# Patient Record
Sex: MALE | Race: BLACK OR AFRICAN AMERICAN | NOT HISPANIC OR LATINO | Employment: OTHER | URBAN - METROPOLITAN AREA
[De-identification: names, ages, dates, MRNs, and addresses within clinical notes are randomized per-mention and may not be internally consistent; named-entity substitution may affect disease eponyms.]

---

## 2019-04-12 ENCOUNTER — HOSPITAL ENCOUNTER (OUTPATIENT)
Dept: RADIOLOGY | Facility: HOSPITAL | Age: 69
Discharge: HOME | End: 2019-04-12
Attending: INTERNAL MEDICINE
Payer: MEDICARE

## 2019-04-12 ENCOUNTER — OFFICE VISIT (OUTPATIENT)
Dept: INTERNAL MEDICINE | Facility: CLINIC | Age: 69
End: 2019-04-12
Payer: MEDICARE

## 2019-04-12 VITALS
HEART RATE: 69 BPM | SYSTOLIC BLOOD PRESSURE: 130 MMHG | BODY MASS INDEX: 32.01 KG/M2 | DIASTOLIC BLOOD PRESSURE: 78 MMHG | TEMPERATURE: 98 F | HEIGHT: 67 IN | WEIGHT: 203.94 LBS | OXYGEN SATURATION: 96 %

## 2019-04-12 DIAGNOSIS — N50.89 TESTICLE SWELLING: ICD-10-CM

## 2019-04-12 DIAGNOSIS — R35.0 URINARY FREQUENCY: ICD-10-CM

## 2019-04-12 DIAGNOSIS — I63.329 CEREBROVASCULAR ACCIDENT (CVA) DUE TO THROMBOSIS OF ANTERIOR CEREBRAL ARTERY, UNSPECIFIED BLOOD VESSEL LATERALITY: ICD-10-CM

## 2019-04-12 DIAGNOSIS — R60.9 EDEMA, UNSPECIFIED TYPE: ICD-10-CM

## 2019-04-12 DIAGNOSIS — E11.40 TYPE 2 DIABETES MELLITUS WITH DIABETIC NEUROPATHY, WITHOUT LONG-TERM CURRENT USE OF INSULIN: ICD-10-CM

## 2019-04-12 DIAGNOSIS — Z98.890 H/O COLONOSCOPY: ICD-10-CM

## 2019-04-12 DIAGNOSIS — N40.0 BENIGN PROSTATIC HYPERPLASIA, UNSPECIFIED WHETHER LOWER URINARY TRACT SYMPTOMS PRESENT: ICD-10-CM

## 2019-04-12 DIAGNOSIS — E78.2 HYPERLIPIDEMIA, MIXED: ICD-10-CM

## 2019-04-12 DIAGNOSIS — E29.1 HYPOGONADISM IN MALE: ICD-10-CM

## 2019-04-12 DIAGNOSIS — I10 ESSENTIAL HYPERTENSION: ICD-10-CM

## 2019-04-12 DIAGNOSIS — E55.9 MILD VITAMIN D DEFICIENCY: Primary | ICD-10-CM

## 2019-04-12 DIAGNOSIS — H40.9 GLAUCOMA, UNSPECIFIED GLAUCOMA TYPE, UNSPECIFIED LATERALITY: ICD-10-CM

## 2019-04-12 DIAGNOSIS — Z20.2 STD EXPOSURE: ICD-10-CM

## 2019-04-12 PROBLEM — I63.9 CEREBROVASCULAR ACCIDENT (CVA) DUE TO THROMBOSIS: Status: ACTIVE | Noted: 2019-04-12

## 2019-04-12 PROBLEM — E11.49 TYPE 2 DIABETES MELLITUS WITH NEUROLOGIC COMPLICATION, WITHOUT LONG-TERM CURRENT USE OF INSULIN: Status: ACTIVE | Noted: 2019-04-12

## 2019-04-12 LAB
ALBUMIN/CREAT UR: 292.4 UG/MG (ref 0–30)
BILIRUB UR QL STRIP: NEGATIVE
CLARITY UR REFRACT.AUTO: CLEAR
COLOR UR AUTO: YELLOW
CREAT UR-MCNC: 79 MG/DL (ref 23–375)
GLUCOSE UR QL STRIP: NEGATIVE
HGB UR QL STRIP: NEGATIVE
KETONES UR QL STRIP: NEGATIVE
LEUKOCYTE ESTERASE UR QL STRIP: NEGATIVE
MICROALBUMIN UR DL<=1MG/L-MCNC: 231 UG/ML
NITRITE UR QL STRIP: NEGATIVE
PH UR STRIP: 6 [PH] (ref 5–8)
PROT UR QL STRIP: NEGATIVE
SP GR UR STRIP: 1.01 (ref 1–1.03)
URN SPEC COLLECT METH UR: NORMAL

## 2019-04-12 PROCEDURE — 71046 X-RAY EXAM CHEST 2 VIEWS: CPT | Mod: TC,FY,PO

## 2019-04-12 PROCEDURE — 81003 URINALYSIS AUTO W/O SCOPE: CPT

## 2019-04-12 PROCEDURE — 99999 PR PBB SHADOW E&M-NEW PATIENT-LVL V: CPT | Mod: PBBFAC,,, | Performed by: INTERNAL MEDICINE

## 2019-04-12 PROCEDURE — 93010 ELECTROCARDIOGRAM REPORT: CPT | Mod: S$PBB,,, | Performed by: INTERNAL MEDICINE

## 2019-04-12 PROCEDURE — 93010 EKG 12-LEAD: ICD-10-PCS | Mod: S$PBB,,, | Performed by: INTERNAL MEDICINE

## 2019-04-12 PROCEDURE — 90471 IMMUNIZATION ADMIN: CPT | Mod: PBBFAC,PO

## 2019-04-12 PROCEDURE — 71046 XR CHEST PA AND LATERAL: ICD-10-PCS | Mod: 26,,, | Performed by: RADIOLOGY

## 2019-04-12 PROCEDURE — 82043 UR ALBUMIN QUANTITATIVE: CPT

## 2019-04-12 PROCEDURE — 99214 PR OFFICE/OUTPT VISIT, EST, LEVL IV, 30-39 MIN: ICD-10-PCS | Mod: S$PBB,,, | Performed by: INTERNAL MEDICINE

## 2019-04-12 PROCEDURE — 87086 URINE CULTURE/COLONY COUNT: CPT

## 2019-04-12 PROCEDURE — 99999 PR PBB SHADOW E&M-NEW PATIENT-LVL V: ICD-10-PCS | Mod: PBBFAC,,, | Performed by: INTERNAL MEDICINE

## 2019-04-12 PROCEDURE — 99214 OFFICE O/P EST MOD 30 MIN: CPT | Mod: S$PBB,,, | Performed by: INTERNAL MEDICINE

## 2019-04-12 PROCEDURE — 99205 OFFICE O/P NEW HI 60 MIN: CPT | Mod: PBBFAC,25,PO | Performed by: INTERNAL MEDICINE

## 2019-04-12 PROCEDURE — 71046 X-RAY EXAM CHEST 2 VIEWS: CPT | Mod: 26,,, | Performed by: RADIOLOGY

## 2019-04-12 PROCEDURE — 93005 ELECTROCARDIOGRAM TRACING: CPT | Mod: PBBFAC,PO | Performed by: INTERNAL MEDICINE

## 2019-04-12 RX ORDER — BRINZOLAMIDE 10 MG/ML
1 SUSPENSION/ DROPS OPHTHALMIC 3 TIMES DAILY
COMMUNITY
End: 2019-05-21 | Stop reason: SDUPTHER

## 2019-04-12 RX ORDER — INSULIN PUMP SYRINGE, 3 ML
EACH MISCELLANEOUS
Qty: 1 EACH | Refills: 0 | Status: SHIPPED | OUTPATIENT
Start: 2019-04-12 | End: 2020-09-18

## 2019-04-12 RX ORDER — TIMOLOL MALEATE 5 MG/ML
1 SOLUTION/ DROPS OPHTHALMIC 2 TIMES DAILY
COMMUNITY
End: 2019-05-21 | Stop reason: SDUPTHER

## 2019-04-12 RX ORDER — LOSARTAN POTASSIUM AND HYDROCHLOROTHIAZIDE 12.5; 1 MG/1; MG/1
1 TABLET ORAL DAILY
COMMUNITY
End: 2019-05-21

## 2019-04-12 RX ORDER — BRIMONIDINE TARTRATE 1.5 MG/ML
1 SOLUTION/ DROPS OPHTHALMIC 3 TIMES DAILY
Qty: 1 BOTTLE | Refills: 3
Start: 2019-04-12 | End: 2019-05-21

## 2019-04-12 RX ORDER — METFORMIN HYDROCHLORIDE 1000 MG/1
1000 TABLET ORAL 2 TIMES DAILY WITH MEALS
COMMUNITY
End: 2019-05-21 | Stop reason: SDUPTHER

## 2019-04-12 RX ORDER — AMLODIPINE BESYLATE 10 MG/1
10 TABLET ORAL DAILY
COMMUNITY
End: 2019-04-12 | Stop reason: SDUPTHER

## 2019-04-12 RX ORDER — BRIMONIDINE TARTRATE AND TIMOLOL MALEATE 2; 5 MG/ML; MG/ML
1 SOLUTION OPHTHALMIC
COMMUNITY
End: 2019-04-12

## 2019-04-12 RX ORDER — AMLODIPINE BESYLATE 10 MG/1
10 TABLET ORAL DAILY
Qty: 90 TABLET | Refills: 3 | Status: SHIPPED | OUTPATIENT
Start: 2019-04-12 | End: 2019-05-21 | Stop reason: SDUPTHER

## 2019-04-12 RX ORDER — NAPROXEN SODIUM 220 MG/1
81 TABLET, FILM COATED ORAL DAILY
Start: 2019-04-12

## 2019-04-12 RX ORDER — ROSUVASTATIN CALCIUM 20 MG/1
20 TABLET, COATED ORAL DAILY
COMMUNITY
End: 2019-05-21 | Stop reason: SDUPTHER

## 2019-04-12 NOTE — PROGRESS NOTES
Administered pneumo 13 to the left deltoid, tolerated well, no c/o pain noted, no adverse reaction noted within wait period.

## 2019-04-14 LAB
BACTERIA UR CULT: NORMAL
BACTERIA UR CULT: NORMAL

## 2019-04-18 ENCOUNTER — PATIENT OUTREACH (OUTPATIENT)
Dept: ADMINISTRATIVE | Facility: HOSPITAL | Age: 69
End: 2019-04-18

## 2019-04-21 NOTE — PROGRESS NOTES
Subjective:       Patient ID: Jae Millan is a 68 y.o. male.    [facility].  He is out of correction now and is establishing with me as primary.  He had a stroke that affected his  L side approx 8 months ago.    Review of Systems   Respiratory: Negative for shortness of breath.    Cardiovascular: Negative for chest pain.   Gastrointestinal: Negative for abdominal pain, diarrhea, nausea and vomiting.   Genitourinary: Negative for dysuria.   Neurological: Negative for seizures, syncope and headaches.       Objective:      Physical Exam   Constitutional: He is oriented to person, place, and time. He appears well-developed and well-nourished. No distress.   HENT:   Mouth/Throat: Oropharynx is clear and moist.   Neck: Neck supple. No JVD present. No thyromegaly present.   Cardiovascular: Normal rate, regular rhythm, normal heart sounds and intact distal pulses. Exam reveals no gallop and no friction rub.   No murmur heard.  Pulmonary/Chest: Effort normal and breath sounds normal. He has no wheezes. He has no rales.   Abdominal: Soft. Bowel sounds are normal. He exhibits no distension and no mass. There is no tenderness. There is no rebound and no guarding.   Genitourinary: Rectum normal and penis normal. Rectal exam shows guaiac negative stool.   Genitourinary Comments: Penis normal - R testes appears present, L scrotal area enlarged and I cannot palpate his testis in the scrotal sac    Prostate is not enlarged, no masses   Musculoskeletal: He exhibits edema (1+ william).   Lymphadenopathy:     He has no cervical adenopathy.   Neurological: He is alert and oriented to person, place, and time.   Mild L lower face weakness seen only with full smile  L hand and arm weakness 3/5 - poor  strength  L leg mile 4/5 weakness walks easily however   Skin: Skin is warm and dry.   Psychiatric: He has a normal mood and affect. His behavior is normal. Judgment and  thought content normal.       Assessment:       1. Mild vitamin D deficiency    2. Essential hypertension    3. Type 2 diabetes mellitus with diabetic neuropathy, without long-term current use of insulin    4. Hyperlipidemia, mixed    5. Cerebrovascular accident (CVA) due to thrombosis of anterior cerebral artery, unspecified blood vessel laterality    6. H/O colonoscopy    7. Benign prostatic hyperplasia, unspecified whether lower urinary tract symptoms present    8. Urinary frequency    9. Glaucoma, unspecified glaucoma type, unspecified laterality    10. STD exposure    11. Hypogonadism in male    12. Edema, unspecified type    13. Testicle swelling        Plan:   Mild vitamin D deficiency  -     Vitamin D; Future; Expected date: 04/12/2019    Essential hypertension  -     CBC auto differential; Future; Expected date: 04/12/2019  -     Comprehensive metabolic panel; Future; Expected date: 04/12/2019  -     TSH; Future; Expected date: 04/12/2019  -     X-Ray Chest PA And Lateral; Future; Expected date: 04/12/2019  Fair control when taken a second time    Type 2 diabetes mellitus with diabetic neuropathy, without long-term current use of insulin  -     Hemoglobin A1c; Future; Expected date: 04/12/2019  -     Urinalysis  -     Microalbumin/creatinine urine ratio    Hyperlipidemia, mixed  -     Lipid panel; Future; Expected date: 04/12/2019    Cerebrovascular accident (CVA) due to thrombosis of anterior cerebral artery, unspecified blood vessel laterality  -     EKG 12-lead  -     US Carotid Bilateral; Future; Expected date: 04/12/2019  -     Ambulatory consult to Physical Medicine Rehab    H/O colonoscopy    Benign prostatic hyperplasia, unspecified whether lower urinary tract symptoms present  -     PSA, Screening; Future; Expected date: 04/12/2019    Urinary frequency  -     Urine culture    Glaucoma, unspecified glaucoma type, unspecified laterality  -     Ambulatory consult to Ophthalmology    STD exposure  -      Quantiferon Gold TB; Future; Expected date: 04/12/2019  -     HIV 1/2 Ag/Ab (4th Gen); Future; Expected date: 04/12/2019  -     RPR; Future; Expected date: 04/12/2019  -     Hepatitis panel, acute; Future; Expected date: 04/12/2019    Hypogonadism in male  -     Testosterone; Future; Expected date: 04/12/2019  -     Luteinizing hormone; Future; Expected date: 04/12/2019  -     Follicle stimulating hormone; Future; Expected date: 04/12/2019  -     Prolactin; Future; Expected date: 04/12/2019    Edema, unspecified type  -     Brain natriuretic peptide; Future; Expected date: 04/12/2019    Testicle swelling  -     Ambulatory consult to Urology    Other orders  -     aspirin 81 MG Chew; Take 1 tablet (81 mg total) by mouth once daily.  -     amLODIPine (NORVASC) 10 MG tablet; Take 1 tablet (10 mg total) by mouth once daily.  Dispense: 90 tablet; Refill: 3  -     brimonidine 0.15 % OPTH DROP (ALPHAGAN) 0.15 % ophthalmic solution; Place 1 drop into the left eye 3 (three) times daily.  Dispense: 1 Bottle; Refill: 3  -     blood-glucose meter kit; Check glucose daily E11.9 meter covered by insurance  Dispense: 1 each; Refill: 0  -     blood sugar diagnostic Strp; Strips and lancets covered by insurance E11.9  Dispense: 100 each; Refill: 3  -     (In Office Administered) Pneumococcal Conjugate Vaccine (13 Valent) (IM)    Obtain records

## 2019-05-02 ENCOUNTER — HOSPITAL ENCOUNTER (OUTPATIENT)
Dept: RADIOLOGY | Facility: HOSPITAL | Age: 69
Discharge: HOME | End: 2019-05-02
Attending: INTERNAL MEDICINE
Payer: MEDICARE

## 2019-05-02 DIAGNOSIS — I63.329 CEREBROVASCULAR ACCIDENT (CVA) DUE TO THROMBOSIS OF ANTERIOR CEREBRAL ARTERY, UNSPECIFIED BLOOD VESSEL LATERALITY: ICD-10-CM

## 2019-05-02 PROCEDURE — 93880 US CAROTID BILATERAL: ICD-10-PCS | Mod: 26,,, | Performed by: RADIOLOGY

## 2019-05-02 PROCEDURE — 93880 EXTRACRANIAL BILAT STUDY: CPT | Mod: 26,,, | Performed by: RADIOLOGY

## 2019-05-02 PROCEDURE — 93880 EXTRACRANIAL BILAT STUDY: CPT | Mod: TC

## 2019-05-03 ENCOUNTER — TELEPHONE (OUTPATIENT)
Dept: INTERNAL MEDICINE | Facility: CLINIC | Age: 69
End: 2019-05-03

## 2019-05-03 NOTE — TELEPHONE ENCOUNTER
Spoke with patient, informed of results, verbalized understanding----- Message from Maryjane Farias MD sent at 5/2/2019  6:31 PM CDT -----  Please inform no blockage seen.

## 2019-05-21 ENCOUNTER — LAB VISIT (OUTPATIENT)
Dept: LAB | Facility: HOSPITAL | Age: 69
End: 2019-05-21
Attending: INTERNAL MEDICINE
Payer: MEDICARE

## 2019-05-21 ENCOUNTER — OFFICE VISIT (OUTPATIENT)
Dept: INTERNAL MEDICINE | Facility: CLINIC | Age: 69
End: 2019-05-21
Payer: MEDICARE

## 2019-05-21 VITALS
TEMPERATURE: 98 F | HEART RATE: 72 BPM | SYSTOLIC BLOOD PRESSURE: 138 MMHG | WEIGHT: 205 LBS | DIASTOLIC BLOOD PRESSURE: 80 MMHG | HEIGHT: 67 IN | BODY MASS INDEX: 32.18 KG/M2 | OXYGEN SATURATION: 98 %

## 2019-05-21 DIAGNOSIS — H40.9 GLAUCOMA, UNSPECIFIED GLAUCOMA TYPE, UNSPECIFIED LATERALITY: ICD-10-CM

## 2019-05-21 DIAGNOSIS — E11.40 TYPE 2 DIABETES MELLITUS WITH DIABETIC NEUROPATHY, WITHOUT LONG-TERM CURRENT USE OF INSULIN: ICD-10-CM

## 2019-05-21 DIAGNOSIS — I63.329 CEREBROVASCULAR ACCIDENT (CVA) DUE TO THROMBOSIS OF ANTERIOR CEREBRAL ARTERY, UNSPECIFIED BLOOD VESSEL LATERALITY: Primary | ICD-10-CM

## 2019-05-21 DIAGNOSIS — B18.2 CHRONIC HEPATITIS C WITHOUT HEPATIC COMA: ICD-10-CM

## 2019-05-21 LAB
ALBUMIN SERPL BCP-MCNC: 3.7 G/DL (ref 3.5–5.2)
ALP SERPL-CCNC: 62 U/L (ref 55–135)
ALT SERPL W/O P-5'-P-CCNC: 38 U/L (ref 10–44)
ANION GAP SERPL CALC-SCNC: 8 MMOL/L (ref 8–16)
AST SERPL-CCNC: 26 U/L (ref 10–40)
BILIRUB SERPL-MCNC: 0.5 MG/DL (ref 0.1–1)
BUN SERPL-MCNC: 16 MG/DL (ref 8–23)
CALCIUM SERPL-MCNC: 10 MG/DL (ref 8.7–10.5)
CHLORIDE SERPL-SCNC: 106 MMOL/L (ref 95–110)
CO2 SERPL-SCNC: 26 MMOL/L (ref 23–29)
CREAT SERPL-MCNC: 1.1 MG/DL (ref 0.5–1.4)
EST. GFR  (AFRICAN AMERICAN): >60 ML/MIN/1.73 M^2
EST. GFR  (NON AFRICAN AMERICAN): >60 ML/MIN/1.73 M^2
GLUCOSE SERPL-MCNC: 131 MG/DL (ref 70–110)
POTASSIUM SERPL-SCNC: 4.1 MMOL/L (ref 3.5–5.1)
PROT SERPL-MCNC: 7.9 G/DL (ref 6–8.4)
SODIUM SERPL-SCNC: 140 MMOL/L (ref 136–145)

## 2019-05-21 PROCEDURE — 99213 PR OFFICE/OUTPT VISIT, EST, LEVL III, 20-29 MIN: ICD-10-PCS | Mod: S$PBB,,, | Performed by: INTERNAL MEDICINE

## 2019-05-21 PROCEDURE — 99215 OFFICE O/P EST HI 40 MIN: CPT | Mod: PBBFAC,PO | Performed by: INTERNAL MEDICINE

## 2019-05-21 PROCEDURE — 36415 COLL VENOUS BLD VENIPUNCTURE: CPT

## 2019-05-21 PROCEDURE — 99999 PR PBB SHADOW E&M-EST. PATIENT-LVL V: ICD-10-PCS | Mod: PBBFAC,,, | Performed by: INTERNAL MEDICINE

## 2019-05-21 PROCEDURE — 99999 PR PBB SHADOW E&M-EST. PATIENT-LVL V: CPT | Mod: PBBFAC,,, | Performed by: INTERNAL MEDICINE

## 2019-05-21 PROCEDURE — 99213 OFFICE O/P EST LOW 20 MIN: CPT | Mod: S$PBB,,, | Performed by: INTERNAL MEDICINE

## 2019-05-21 PROCEDURE — 80053 COMPREHEN METABOLIC PANEL: CPT

## 2019-05-21 PROCEDURE — 87522 HEPATITIS C REVRS TRNSCRPJ: CPT

## 2019-05-21 RX ORDER — ROSUVASTATIN CALCIUM 20 MG/1
20 TABLET, COATED ORAL DAILY
Qty: 90 TABLET | Refills: 3 | Status: SHIPPED | OUTPATIENT
Start: 2019-05-21 | End: 2019-11-25

## 2019-05-21 RX ORDER — ERGOCALCIFEROL 1.25 MG/1
CAPSULE ORAL
Qty: 8 CAPSULE | Refills: 0 | Status: SHIPPED | OUTPATIENT
Start: 2019-05-21 | End: 2019-09-06 | Stop reason: SDUPTHER

## 2019-05-21 RX ORDER — ROSUVASTATIN CALCIUM 20 MG/1
20 TABLET, COATED ORAL DAILY
Qty: 90 TABLET | Refills: 3 | Status: SHIPPED | OUTPATIENT
Start: 2019-05-21 | End: 2019-05-21 | Stop reason: SDUPTHER

## 2019-05-21 RX ORDER — METFORMIN HYDROCHLORIDE 1000 MG/1
1000 TABLET ORAL 2 TIMES DAILY WITH MEALS
Qty: 180 TABLET | Refills: 3 | Status: SHIPPED | OUTPATIENT
Start: 2019-05-21 | End: 2019-05-21 | Stop reason: SDUPTHER

## 2019-05-21 RX ORDER — CANDESARTAN CILEXETIL AND HYDROCHLOROTHIAZIDE 16; 12.5 MG/1; MG/1
1 TABLET ORAL DAILY
Qty: 90 TABLET | Refills: 3 | Status: SHIPPED | OUTPATIENT
Start: 2019-05-21 | End: 2019-07-16 | Stop reason: ALTCHOICE

## 2019-05-21 RX ORDER — CANDESARTAN CILEXETIL AND HYDROCHLOROTHIAZIDE 16; 12.5 MG/1; MG/1
1 TABLET ORAL DAILY
Qty: 90 TABLET | Refills: 3 | Status: SHIPPED | OUTPATIENT
Start: 2019-05-21 | End: 2019-05-21 | Stop reason: SDUPTHER

## 2019-05-21 RX ORDER — METFORMIN HYDROCHLORIDE 1000 MG/1
1000 TABLET ORAL 2 TIMES DAILY WITH MEALS
Qty: 180 TABLET | Refills: 3 | Status: SHIPPED | OUTPATIENT
Start: 2019-05-21 | End: 2020-02-28 | Stop reason: SDUPTHER

## 2019-05-21 RX ORDER — AMLODIPINE BESYLATE 10 MG/1
10 TABLET ORAL DAILY
Qty: 90 TABLET | Refills: 3 | Status: SHIPPED | OUTPATIENT
Start: 2019-05-21 | End: 2019-09-06 | Stop reason: SDUPTHER

## 2019-05-21 RX ORDER — ERGOCALCIFEROL 1.25 MG/1
CAPSULE ORAL
Qty: 8 CAPSULE | Refills: 0 | Status: SHIPPED | OUTPATIENT
Start: 2019-05-21 | End: 2019-05-21 | Stop reason: SDUPTHER

## 2019-05-21 RX ORDER — BRINZOLAMIDE 10 MG/ML
1 SUSPENSION/ DROPS OPHTHALMIC 3 TIMES DAILY
Qty: 10 ML | Refills: 3 | Status: SHIPPED | OUTPATIENT
Start: 2019-05-21 | End: 2019-07-16

## 2019-05-21 RX ORDER — AMLODIPINE BESYLATE 10 MG/1
10 TABLET ORAL DAILY
Qty: 90 TABLET | Refills: 3 | Status: SHIPPED | OUTPATIENT
Start: 2019-05-21 | End: 2019-05-21 | Stop reason: SDUPTHER

## 2019-05-21 RX ORDER — BRIMONIDINE TARTRATE 2 MG/ML
SOLUTION/ DROPS OPHTHALMIC
Qty: 6 ML | Refills: 3 | Status: SHIPPED | OUTPATIENT
Start: 2019-05-21 | End: 2019-07-16

## 2019-05-21 RX ORDER — TIMOLOL MALEATE 5 MG/ML
1 SOLUTION/ DROPS OPHTHALMIC 2 TIMES DAILY
Qty: 5 ML | Refills: 3 | Status: SHIPPED | OUTPATIENT
Start: 2019-05-21 | End: 2019-07-16

## 2019-05-23 NOTE — PROGRESS NOTES
Subjective:       Patient ID: Jae Millan is a 68 y.o. male.    Chief Complaint: Follow-up; Medication Refill (rouvastatin, and eye drops, strips for glucose meter); frequent urinatin at night (lower abdominal pain); and Hand Pain (left hand pain and swelling)    HPIPt is doing about the same - had issues getting his meds - only has one BP med.  We reviewed all his studies.  L hand (stroke side is bothering him)..  No new CP or SOB.  Review of Systems   Respiratory: Negative for shortness of breath.    Cardiovascular: Negative for chest pain.   Gastrointestinal: Negative for abdominal pain, diarrhea, nausea and vomiting.   Genitourinary: Negative for dysuria.   Neurological: Negative for seizures, syncope and headaches.       Objective:      Physical Exam   Constitutional: He is oriented to person, place, and time. He appears well-developed and well-nourished. No distress.   HENT:   Mouth/Throat: Oropharynx is clear and moist.   Neck: Neck supple. No JVD present. No thyromegaly present.   Cardiovascular: Normal rate, regular rhythm, normal heart sounds and intact distal pulses. Exam reveals no gallop and no friction rub.   No murmur heard.  Pulmonary/Chest: Effort normal and breath sounds normal. He has no wheezes. He has no rales.   Abdominal: Soft. Bowel sounds are normal. He exhibits no distension and no mass. There is no tenderness. There is no rebound and no guarding.   Musculoskeletal: He exhibits no edema.   Lymphadenopathy:     He has no cervical adenopathy.   Neurological: He is alert and oriented to person, place, and time.   Skin: Skin is warm and dry.   Cracks between 4th and 5th toes on both feet   Psychiatric: He has a normal mood and affect. His behavior is normal. Judgment and thought content normal.       Assessment:       1. Cerebrovascular accident (CVA) due to thrombosis of anterior cerebral artery, unspecified blood vessel laterality    2. Glaucoma, unspecified glaucoma type, unspecified  laterality    3. Chronic hepatitis C without hepatic coma    4. Type 2 diabetes mellitus with diabetic neuropathy, without long-term current use of insulin        Plan:   Cerebrovascular accident (CVA) due to thrombosis of anterior cerebral artery, unspecified blood vessel laterality  -     Ambulatory consult to Physical Therapy  -     Transthoracic echo (TTE) 2D with Color Flow; Future    Glaucoma, unspecified glaucoma type, unspecified laterality  -     Ambulatory consult to Ophthalmology  Meds refilled until he can get in  Chronic hepatitis C without hepatic coma  -     Comprehensive metabolic panel; Future; Expected date: 05/21/2019  -     HEPATITIS C RNA, QUANTITATIVE, PCR; Future; Expected date: 05/21/2019    Type 2 diabetes mellitus with diabetic neuropathy, without long-term current use of insulin  -     Ambulatory consult to Podiatry    Other orders  -   Change to   candesartan-hydrochlorothiazide (ATACAND HCT) 16-12.5 mg per tablet; Take 1 tablet by mouth once daily.  Dispense: 90 tablet; Refill: 3  -      amLODIPine (NORVASC) 10 MG tablet; Take 1 tablet (10 mg total) by mouth once daily.  Dispense: 90 tablet; Refill: 3  -      metFORMIN (GLUCOPHAGE) 1000 MG tablet; Take 1 tablet (1,000 mg total) by mouth 2 (two) times daily with meals.  Dispense: 180 tablet; Refill: 3  -     rosuvastatin (CRESTOR) 20 MG tablet; Take 1 tablet (20 mg total) by mouth once daily.  Dispense: 90 tablet; Refill: 3  -     Add SITagliptin (JANUVIA) 100 MG Tab; Take 1 tablet (100 mg total) by mouth once daily.  Dispense: 90 tablet; Refill: 3  -     brimonidine 0.2% (ALPHAGAN) 0.2 % Drop; One drop to affected eye every 8 hours  Dispense: 6 mL; Refill: 3  -     brinzolamide (AZOPT) 1 % ophthalmic suspension; Place 1 drop into the left eye 3 (three) times daily.  Dispense: 10 mL; Refill: 3  -     timolol maleate 0.5% (TIMOPTIC) 0.5 % Drop; Place 1 drop into the left eye 2 (two) times daily.  Dispense: 5 mL; Refill: 3  -     blood  sugar diagnostic Strp; Strips and lancets covered by insurance E11.9, Check glucose daily, accu check FREDERICK PLUS  Dispense: 100 each; Refill: 3  -     Discontinue: ergocalciferol (ERGOCALCIFEROL) 50,000 unit Cap; One weekly for 8 weeks then 2,000 IU daily thereafter  Dispense: 8 capsule; Refill: 0  -     ergocalciferol (ERGOCALCIFEROL) 50,000 unit Cap; One weekly for 8 weeks then 2,000 IU daily thereafter  Dispense: 8 capsule; Refill: 0  -     candesartan-hydrochlorothiazide (ATACAND HCT) 16-12.5 mg per tablet; Take 1 tablet by mouth once daily.  Dispense: 90 tablet; Refill: 3  -     metFORMIN (GLUCOPHAGE) 1000 MG tablet; Take 1 tablet (1,000 mg total) by mouth 2 (two) times daily with meals.  Dispense: 180 tablet; Refill: 3  -     amLODIPine (NORVASC) 10 MG tablet; Take 1 tablet (10 mg total) by mouth once daily.  Dispense: 90 tablet; Refill: 3  -     SITagliptin (JANUVIA) 100 MG Tab; Take 1 tablet (100 mg total) by mouth once daily.  Dispense: 90 tablet; Refill: 3  -     rosuvastatin (CRESTOR) 20 MG tablet; Take 1 tablet (20 mg total) by mouth once daily.  Dispense: 90 tablet; Refill: 3  Address low testosterone with Urology

## 2019-05-24 LAB
HCV RNA SERPL NAA+PROBE-LOG IU: 6.11 LOG (10) IU/ML
HCV RNA SERPL QL NAA+PROBE: DETECTED IU/ML
HCV RNA SPEC NAA+PROBE-ACNC: ABNORMAL IU/ML

## 2019-05-27 ENCOUNTER — TELEPHONE (OUTPATIENT)
Dept: INTERNAL MEDICINE | Facility: CLINIC | Age: 69
End: 2019-05-27

## 2019-05-27 NOTE — TELEPHONE ENCOUNTER
Please see if I need to do a prior auth for medicare in order for him to get his medications - candesartan/HCTZ 16/12.5 one daily, rosuvastatin and januvia plus one of his eye drops  - thank you!

## 2019-06-04 ENCOUNTER — HOSPITAL ENCOUNTER (OUTPATIENT)
Dept: CARDIOLOGY | Facility: CLINIC | Age: 69
Discharge: HOME | End: 2019-06-04
Attending: INTERNAL MEDICINE
Payer: MEDICAID

## 2019-06-04 VITALS
WEIGHT: 205 LBS | HEART RATE: 62 BPM | HEIGHT: 67 IN | DIASTOLIC BLOOD PRESSURE: 90 MMHG | BODY MASS INDEX: 32.18 KG/M2 | SYSTOLIC BLOOD PRESSURE: 164 MMHG

## 2019-06-04 DIAGNOSIS — I63.329 CEREBROVASCULAR ACCIDENT (CVA) DUE TO THROMBOSIS OF ANTERIOR CEREBRAL ARTERY, UNSPECIFIED BLOOD VESSEL LATERALITY: ICD-10-CM

## 2019-06-04 LAB
ASCENDING AORTA: 3.28 CM
AV INDEX (PROSTH): 0.9
AV MEAN GRADIENT: 2.98 MMHG
AV PEAK GRADIENT: 5.76 MMHG
AV VALVE AREA: 3.84 CM2
AV VELOCITY RATIO: 0.92
BSA FOR ECHO PROCEDURE: 2.1 M2
CV ECHO LV RWT: 0.38 CM
DOP CALC AO PEAK VEL: 1.2 M/S
DOP CALC AO VTI: 25.17 CM
DOP CALC LVOT AREA: 4.26 CM2
DOP CALC LVOT DIAMETER: 2.33 CM
DOP CALC LVOT PEAK VEL: 1.1 M/S
DOP CALC LVOT STROKE VOLUME: 96.74 CM3
DOP CALCLVOT PEAK VEL VTI: 22.7 CM
E WAVE DECELERATION TIME: 221.73 MSEC
E/A RATIO: 0.79
E/E' RATIO: 7.33
ECHO LV POSTERIOR WALL: 0.8 CM (ref 0.6–1.1)
FRACTIONAL SHORTENING: 34 % (ref 28–44)
INTERVENTRICULAR SEPTUM: 0.82 CM (ref 0.6–1.1)
IVRT: 0.11 MSEC
LA MAJOR: 5.09 CM
LA MINOR: 5.11 CM
LA WIDTH: 3.87 CM
LEFT ATRIUM SIZE: 3.27 CM
LEFT ATRIUM VOLUME INDEX: 26.8 ML/M2
LEFT ATRIUM VOLUME: 54.86 CM3
LEFT INTERNAL DIMENSION IN SYSTOLE: 2.75 CM (ref 2.1–4)
LEFT VENTRICLE DIASTOLIC VOLUME INDEX: 37.7 ML/M2
LEFT VENTRICLE DIASTOLIC VOLUME: 77.04 ML
LEFT VENTRICLE MASS INDEX: 49.8 G/M2
LEFT VENTRICLE SYSTOLIC VOLUME INDEX: 13.8 ML/M2
LEFT VENTRICLE SYSTOLIC VOLUME: 28.2 ML
LEFT VENTRICULAR INTERNAL DIMENSION IN DIASTOLE: 4.17 CM (ref 3.5–6)
LEFT VENTRICULAR MASS: 101.77 G
LV LATERAL E/E' RATIO: 6.88
LV SEPTAL E/E' RATIO: 7.86
MV PEAK A VEL: 0.7 M/S
MV PEAK E VEL: 0.55 M/S
PISA TR MAX VEL: 2.31 M/S
PULM VEIN S/D RATIO: 1.92
PV PEAK D VEL: 0.24 M/S
PV PEAK S VEL: 0.46 M/S
RA MAJOR: 5.08 CM
RA PRESSURE: 3 MMHG
RA WIDTH: 3.81 CM
RIGHT VENTRICULAR END-DIASTOLIC DIMENSION: 3.21 CM
RV TISSUE DOPPLER FREE WALL SYSTOLIC VELOCITY 1 (APICAL 4 CHAMBER VIEW): 14.3 M/S
SINUS: 3.1 CM
STJ: 2.64 CM
TDI LATERAL: 0.08
TDI SEPTAL: 0.07
TDI: 0.08
TR MAX PG: 21.34 MMHG
TRICUSPID ANNULAR PLANE SYSTOLIC EXCURSION: 2.1 CM
TV REST PULMONARY ARTERY PRESSURE: 24 MMHG

## 2019-06-04 PROCEDURE — 93306 TRANSTHORACIC ECHO (TTE) COMPLETE (CUPID ONLY): ICD-10-PCS | Mod: 26,S$PBB,, | Performed by: INTERNAL MEDICINE

## 2019-06-04 PROCEDURE — 93306 TTE W/DOPPLER COMPLETE: CPT | Mod: PBBFAC | Performed by: INTERNAL MEDICINE

## 2019-06-06 ENCOUNTER — CLINICAL SUPPORT (OUTPATIENT)
Dept: REHABILITATION | Facility: HOSPITAL | Age: 69
End: 2019-06-06
Attending: INTERNAL MEDICINE
Payer: MEDICAID

## 2019-06-06 DIAGNOSIS — Z74.09 IMPAIRED FUNCTIONAL MOBILITY, BALANCE, GAIT, AND ENDURANCE: Primary | ICD-10-CM

## 2019-06-06 DIAGNOSIS — R20.8 DECREASED SENSATION OF LOWER EXTREMITY: ICD-10-CM

## 2019-06-06 DIAGNOSIS — I69.354 HEMIPARESIS AFFECTING LEFT SIDE AS LATE EFFECT OF CEREBROVASCULAR ACCIDENT (CVA): ICD-10-CM

## 2019-06-06 PROCEDURE — 97162 PT EVAL MOD COMPLEX 30 MIN: CPT | Mod: PN

## 2019-06-06 NOTE — PLAN OF CARE
[facility]  Physical Therapy Neurological Rehabilitation Initial Evaluation    Name: Jae Millan  Clinic Number: [id number]    Therapy Diagnosis:   Encounter Diagnoses   Name Primary?    Impaired functional mobility, balance, gait, and endurance Yes    Hemiparesis affecting left side as late effect of cerebrovascular accident (CVA)     Decreased sensation of lower extremity      Physician: Maryjane Farias MD    Physician Orders: PT Eval, all treatment after initial eval requires auth  Medical Diagnosis from Referral: Cerebrovascular accident (CVA) due to thrombosis of anterior cerebral artery, unspecified blood vessel laterality  Evaluation Date: 6/6/2019  Authorization Period Expiration: 6/30/2019  Plan of Care Expiration: 9/6/2019  Visit # / Visits authorized: 1/ 1    Time In: 1516  Time Out: 1601  Total Billable Time: 45 minutes    Precautions: Diabetes, Fall and HTN, Stroke    Subjective     BP: 168/97, second attempt (158/93)  HR: 76, 72    Date of onset: August 2018  History of current condition - Jae reports: Since he has had his stroke he has not been able to get therapy due to various life circumstances. Pt reports no falls since stroke but many near falls.      Medical History:   No past medical history on file.    Surgical History:   Jae Millan  has a past surgical history that includes Undescended testicle exploration (Right).    Medications:   Jae has a current medication list which includes the following prescription(s): amlodipine, aspirin, blood sugar diagnostic, blood-glucose meter, brimonidine 0.2%, brinzolamide, candesartan-hydrochlorothiazide, ergocalciferol, metformin, rosuvastatin, sitagliptin, and timolol maleate 0.5%.    Allergies:   Review of patient's allergies indicates:  No Known Allergies     Imaging, (4/12/2019) US of Carotid Bilateral and chest x-ray: no acute findings    Prior Therapy: none  Social History: lives with their  "family  Falls: none, many near falls   DME: Small based Quad cane (borrowed)   Home Environment: SSH, ramp to enter   Exercise Routine / History: pt walks up and down ramp a couple times but no formal routine   Family Present at time of Eval: none   Occupation: none  Prior Level of Function: fully independent  Current Level of Function: walking longer distances, takes a long time to get dressed in the morning, unable to shower standing, unable to do stairs    Pain:  Current 4/10, worst 6/10, best 0/10   Location: left feet  and hands   Description: Tight, Tingling and Numb  Aggravating Factors: arm/leg in the dependent position  Easing Factors: relaxation and elevation    Pts goals: "to get this hand working better and to get my balance better"    Objective     Observation: pleasant and cooperative  Speech: slurred, dysarthric, rambles    Mental status: alert, oriented to person, place, and time, normal mood, behavior, speech, dress, motor activity, and thought processes  Appearance: Casually dressed  Behavior:  calm and cooperative  Attention Span and Concentration:  Decreased    Posture Alignment :midline awareness    Dominant hand:  right     Skin integrity:  Intact    Visual/Auditory: denies changes "my eyes have always been bad", possible L neglect  Tracking:Intact    ROM:   GROSS AROM/PROM  UPPER EXTREMITY  (R) UE: WFLs  (L) UE: limited as follows: pt with trace contraction of shoulder and elbow muculature, PROM WFL  LOWER EXTREMITY  (R) LE: WFLs  (L) LE: WFLs       Lower Extremity Strength  Right LE  Left LE    Hip flexion:  4+/5 Hip flexion: 3/5   Knee extension: 5/5 Knee extension: 3+/5   Knee flexion: 5/5 Knee flexion: 3+/5   Ankle dorsiflexion:  5/5 Ankle dorsiflexion: 3+/5   Ankle plantarflexion:  4/5 Ankle plantarflexion: 3/5               Hip extension: 4/5 Hip extension: 3/5     Upper extremity strength:     Right Left   Shoulder Flexion: 5/5 2/5   Shoulder Abduction: 5/5 trace             Elbow " Flexion: 5/5 trace   Elbow Extension: 5/5 trace                    Coordination:   Rapid Alternating Movements: NT  Point to Point:    -Finger to Nose: NT   -Heel to Shin: mild ataxic movemnt    Sensation: pt reports numbness and tingling in B hands and feet  Proprioception: NT    Tone/Spasticity: 1+ in L hamstrings    Functional Mobility (Bed mobility, transfers)  Bed mobility: Mod I  Supine to sit: Mod I  Sit to supine: Mod I  Rolling: Mod I  Transfers to bed: Mod I  Sit to stand:  Mod I  Stand pivot:  Mod I  Car transfers: Mod I       Evaluation   Single Limb Stance R LE   (<10 sec = HIGH FALL RISK)   Single Limb Stance L LE   (<10 sec = HIGH FALL RISK)   30 second Chair Rise 8 completed with no arms   5 times sit to stand NT   TUG 21.25 seconds   Self selected walking speed NT   m/sec (6m/ s)   Fast walking speed NT   m/sec (6m/ s)       Postural control:  MCTSIB:  1. Eyes Open/feet together/Firm: 30 seconds  2. Eyes Closed/feet together/Firm: 30 seconds  3. SLS R: 5 seconds   4. SLS L: 1-2 seconds    Gait Assessment:   - AD used: SBQC  - Assistance: mod I  - Distance: 100' (total)  - Curb: Deferred  - Ramp:  Deferred  - Stairs: Deferred    · GAIT DEVIATIONS:   Jae displays the following deviations with ambulation: decreased step length, decreased stance on L LE, decreased arm movement in L UE, and body turned toward L   Impairments contributing to deviations: impaired motor control, impaired strength in L LE, impaired sensation      Endurance Deficit: yes, pt becomes fatigued very easily and requires rest between activities     PT Evaluation Completed? Yes        CMS Impairment/Limitation/Restriction for FOTO Intake Survey    Therapist reviewed FOTO scores for Jae Millan on 6/6/2019.   FOTO documents entered into [employer] - see Media section.    Limitation Score: 44%  Category: Mobility    Current : CK = at least 40% but < 60% impaired, limited or restricted  Goal: CJ = at least 20% but < 40% impaired,  limited or restricted              Assessment   Jae is a 68 y.o. male referred to outpatient Physical Therapy with a medical diagnosis of CVA. Pt presents to PT with the following impairments leading to his/her functional decline: decreased strength in L extremities, visual deficits, decreased sensation, increased swelling in L extremities, decreased use of L extremities, decreased endurance, and overall decreased balance. PT evaluation limited some by pt's high BP due to unable to get meds, so PT deferred pushing pt as to not continue to raise his BP. He appears motivated to participate in therapy and has good potential to improve with strengthening and balance training. PT believes that pt would benefit from OT as well due to increased swelling in L UE and trace muscular contractions throughout this arm.    Pt prognosis is Good.   Pt will benefit from skilled outpatient Physical Therapy to address the deficits stated above and in the chart below, provide pt/family education, and to maximize pt's level of independence.     Plan of care discussed with patient: Yes  Pt's spiritual, cultural and educational needs considered and patient is agreeable to the plan of care and goals as stated below:     Anticipated Barriers for therapy: transportation    Medical Necessity is demonstrated by the following  History  Co-morbidities and personal factors that may impact the plan of care Co-morbidities:   diabetes, HTN and glaucoma and cataracts    Personal Factors:   education level  social background     moderate   Examination  Body Structures and Functions, activity limitations and participation restrictions that may impact the plan of care Body Regions:   lower extremities  upper extremities  trunk    Body Systems:    ROM  strength  balance  gait  motor control  blood pressure    Participation Restrictions:   Pt unable to negotiate stairs    Activity limitations:   Learning and applying knowledge  no deficits    General  Tasks and Commands  undertaking multiple tasks    Communication  no deficits    Mobility  lifting and carrying objects  fine hand use (grasping/picking up)  walking  driving (bike, car, motorcycle)    Self care  washing oneself (bathing, drying, washing hands)  caring for body parts (brushing teeth, shaving, grooming)  dressing  looking after one's health    Domestic Life  cooking  doing house work (cleaning house, washing dishes, laundry)    Interactions/Relationships  no deficits    Life Areas  no deficits    Community and Social Life  recreation and leisure         moderate   Clinical Presentation evolving clinical presentation with changing clinical characteristics moderate   Decision Making/ Complexity Score: moderate     Goals:  Short Term Goals: 5 weeks   1. Pt will be able to complete 10 sit to  30 seconds for increased mobility.  2. Pt will be able to complete TUG in less than 18 seconds in order to decrease his risk for fall.   3. Pt will be compliant with HEP and medicine management.  4. Pt will be able to ambulate >300' with/without AD and mod I.     Long Term Goals: 10 weeks   1. Pt will complete TUG in less than 15 seconds in order to decrease his risk for fall.   2. Pt will be able to stand on B LE in SLS for >/= 5 seconds in order to decrease his risk for fall.   3. Pt will be able to ascend/decend 5 stairs with 1 hand rail and supervision without cues for safety.   4. Pt will tolerate standing activities for >10 minutes in order to increase his endurance.     Plan   Plan of care Certification: 6/6/2019 to 9/6/2019.    Outpatient Physical Therapy 2 times weekly for 10 weeks to include the following interventions: Gait Training, Manual Therapy, Moist Heat/ Ice, Neuromuscular Re-ed, Orthotic Management and Training, Patient Education, Therapeutic Activites and Therapeutic Exercise.     Oc Schroeder, PT

## 2019-06-12 NOTE — PROGRESS NOTES
Physical Therapy Daily Treatment Note     Name: Jae Millan  Clinic Number: [id number]    Therapy Diagnosis:   Encounter Diagnoses   Name Primary?    Impaired functional mobility, balance, gait, and endurance Yes    Hemiparesis affecting left side as late effect of cerebrovascular accident (CVA)     Decreased sensation of lower extremity      Physician: Maryjane Farias MD    Visit Date: 6/13/2019    Physician Orders: PT Eval, all treatment after initial eval requires auth  Medical Diagnosis from Referral: Cerebrovascular accident (CVA) due to thrombosis of anterior cerebral artery, unspecified blood vessel laterality  Evaluation Date: 6/6/2019  Authorization Period Expiration: 6/30/2019  Plan of Care Expiration: 9/6/2019  Visit # / Visits authorized: 1/ 12    Time In: 1336  Time Out: 1421  Total Billable Time: 45 minutes    Precautions: Standard and Fall    Subjective     Pt reports: He is feeling tired today, but doing well. He still has not been able to figure out his medication.    BP: 167/91  HR: 79 bpm    He was compliant with home exercise program.  Response to previous treatment: none to report  Functional change: none to report    Pain: 0/10  Location: n/a    Objective       aJe received therapeutic exercises to develop strength, endurance, posture and core stabilization for 15 minutes including:  Bridges 3x15 reps   SLR 3x10 ea  Clams 3x10 reps ea  Heel raises x20 reps with 1 UE support    Jae participated in neuromuscular re-education activities to improve: Balance, Coordination, Kinesthetic, Sense, Proprioception and Posture for 25 minutes. The following activities were included:  Modified SLS on L LE with opposite foot on purple block  Tandem stance 3x30 seconds with ea LE in posterior position  3x30 seconds on AirEx, eyes closed, feet together, occaisional UE support for stability    PT provides passive stretching to L MCP, and intercarpal joints to place pt's hand flat onto surface of  mat then pt instructed to lean forward to provide stretching to wrist/finger flexors for 10x 5 second hold    Jae participated in gait training to improve functional mobility and safety for 5  minutes, including:  Pt ambulates into therapy gym from waiting area using SBQC and step to gait with wide NORA and increased time for completion of task. Pt requires SBA only.     He exits gym using same gait pattern.      Home Exercises Provided and Patient Education Provided     Education provided:   - HEP provided    Written Home Exercises Provided: yes.  Exercises were reviewed and Jae was able to demonstrate them prior to the end of the session.  Jae demonstrated good  understanding of the education provided.     See EMR under Patient Instructions for exercises provided 6/13/2019.    Assessment     Mr. Millan did well with introduction of therex and balance activities. He requires a moderate amount of rest between activities but he appears motivated and willing to participate in all activities. Pt concentrated on use of his UE today and PT reiterates referral for OT as best plan for increased use of UE. Pt's blood pressure continues to be high due to inability to get medication. He remains appropriate for skilled PT services.     Jae is progressing well towards his goals.   Pt prognosis is Good.     Pt will continue to benefit from skilled outpatient physical therapy to address the deficits listed in the problem list box on initial evaluation, provide pt/family education and to maximize pt's level of independence in the home and community environment.     Pt's spiritual, cultural and educational needs considered and pt agreeable to plan of care and goals.     Anticipated barriers to physical therapy: none    Goals:  Short Term Goals: 5 weeks   1. Pt will be able to complete 10 sit to  30 seconds for increased mobility.  2. Pt will be able to complete TUG in less than 18 seconds in order to decrease  his risk for fall.   3. Pt will be compliant with HEP and medicine management.  4. Pt will be able to ambulate >300' with/without AD and mod I.      Long Term Goals: 10 weeks   1. Pt will complete TUG in less than 15 seconds in order to decrease his risk for fall.   2. Pt will be able to stand on B LE in SLS for >/= 5 seconds in order to decrease his risk for fall.   3. Pt will be able to ascend/decend 5 stairs with 1 hand rail and supervision without cues for safety.   4. Pt will tolerate standing activities for >10 minutes in order to increase his endurance.     Plan     Continue with current plan of care. Progress activities as tolerated.     Oc Schroeder, PT

## 2019-06-13 ENCOUNTER — CLINICAL SUPPORT (OUTPATIENT)
Dept: REHABILITATION | Facility: HOSPITAL | Age: 69
End: 2019-06-13
Attending: INTERNAL MEDICINE
Payer: MEDICAID

## 2019-06-13 DIAGNOSIS — R20.8 DECREASED SENSATION OF LOWER EXTREMITY: ICD-10-CM

## 2019-06-13 DIAGNOSIS — I69.354 HEMIPARESIS AFFECTING LEFT SIDE AS LATE EFFECT OF CEREBROVASCULAR ACCIDENT (CVA): ICD-10-CM

## 2019-06-13 DIAGNOSIS — Z74.09 IMPAIRED FUNCTIONAL MOBILITY, BALANCE, GAIT, AND ENDURANCE: Primary | ICD-10-CM

## 2019-06-13 PROCEDURE — 97112 NEUROMUSCULAR REEDUCATION: CPT | Mod: PN

## 2019-06-13 PROCEDURE — 97110 THERAPEUTIC EXERCISES: CPT | Mod: PN

## 2019-06-14 NOTE — PROGRESS NOTES
Physical Therapy Daily Treatment Note     Name: Jae Millan  [facility] Number: [id number]    Therapy Diagnosis:   No diagnosis found.  Physician: Maryjane Farias MD    Visit Date: 6/17/2019    Physician Orders: PT Eval, all treatment after initial eval requires auth  Medical Diagnosis from Referral: Cerebrovascular accident (CVA) due to thrombosis of anterior cerebral artery, unspecified blood vessel laterality  Evaluation Date: 6/6/2019  Authorization Period Expiration: 6/30/2019  Plan of Care Expiration: 9/6/2019  Visit # / Visits authorized: 2/ 12 (+eval)    Time In: 1600  Time Out: 1645  Total Billable Time: 45 minutes    Precautions: Standard and Fall    Subjective     Pt reports: He was able to get his medication and took it the past two days. He really wants his hand to work.    BP: 156/95  HR:78 bpm     He was compliant with home exercise program.  Response to previous treatment: none to report  Functional change: none to report    Pain: 0/10  Location: n/a    Objective       Jae received therapeutic exercises to develop strength, endurance, posture and core stabilization for 5 minutes including:  Bridges 3x15 reps   SLR 3x10 ea  Clams 3x10 reps ea  Heel raises x20 reps with 1 UE support    Jae participated in neuromuscular re-education activities to improve: Balance, Coordination, Kinesthetic, Sense, Proprioception and Posture for 35 minutes. The following activities were included:  Modified SLS on L LE with opposite foot on purple block, 3 x 30 seconds ea  Tandem stance 3x30 seconds with ea LE in posterior position  3x30 seconds on AirEx, eyes closed, feet together, occaisional UE support for stability  +side stepping the length of the // bars, no UE support x3 laps  +ambulation with no UE support, // bars, x3 laps  +backwards ambulation with no UE support, x3 laps    PT provides passive stretching to L MCP, and intercarpal joints to place pt's hand flat onto surface of mat then pt instructed  to lean forward to provide stretching to wrist/finger flexors for 10x 5 second hold   >following this activity pt instructed to lean forward and to the L to grab cups 1 by 1 for a total of 20 cups then place them onto surface at chest level an arms reach away for increased weight bearing in L UE    Jae participated in gait training to improve functional mobility and safety for 5  minutes, including:  Pt ambulates into therapy gym from waiting area using SBQC and step to gait with wide NORA and increased time for completion of task. Pt requires SBA only.     He exits gym using same gait pattern.      Home Exercises Provided and Patient Education Provided     Education provided:   - HEP provided    Written Home Exercises Provided: yes.  Exercises were reviewed and Jae was able to demonstrate them prior to the end of the session.  Jae demonstrated good  understanding of the education provided.     See EMR under Patient Instructions for exercises provided 6/13/2019.    Assessment     Mr. Millan did well with balance activities and requires less assist from PT. He requires a moderate amount of rest between activities but he appears motivated and willing to participate in all activities. Pt does well with increased weight bearing onto L UE activities and was able to increase PROM in L wrist joint with this activity. Pt able to activate wrist flexors but unable to activate wrist extensors at this time.     Jae is progressing well towards his goals.   Pt prognosis is Good.     Pt will continue to benefit from skilled outpatient physical therapy to address the deficits listed in the problem list box on initial evaluation, provide pt/family education and to maximize pt's level of independence in the home and community environment.     Pt's spiritual, cultural and educational needs considered and pt agreeable to plan of care and goals.     Anticipated barriers to physical therapy: none    Goals:  Short Term Goals:  5 weeks   1. Pt will be able to complete 10 sit to  30 seconds for increased mobility.  2. Pt will be able to complete TUG in less than 18 seconds in order to decrease his risk for fall.   3. Pt will be compliant with HEP and medicine management.  4. Pt will be able to ambulate >300' with/without AD and mod I.      Long Term Goals: 10 weeks   1. Pt will complete TUG in less than 15 seconds in order to decrease his risk for fall.   2. Pt will be able to stand on B LE in SLS for >/= 5 seconds in order to decrease his risk for fall.   3. Pt will be able to ascend/decend 5 stairs with 1 hand rail and supervision without cues for safety.   4. Pt will tolerate standing activities for >10 minutes in order to increase his endurance.     Plan     Continue with current plan of care. Progress activities as tolerated.     Oc Schroeder, PT

## 2019-06-17 ENCOUNTER — CLINICAL SUPPORT (OUTPATIENT)
Dept: REHABILITATION | Facility: HOSPITAL | Age: 69
End: 2019-06-17
Attending: INTERNAL MEDICINE
Payer: MEDICAID

## 2019-06-17 DIAGNOSIS — Z74.09 IMPAIRED FUNCTIONAL MOBILITY, BALANCE, GAIT, AND ENDURANCE: Primary | ICD-10-CM

## 2019-06-17 DIAGNOSIS — I69.354 HEMIPARESIS AFFECTING LEFT SIDE AS LATE EFFECT OF CEREBROVASCULAR ACCIDENT (CVA): ICD-10-CM

## 2019-06-17 DIAGNOSIS — R20.8 DECREASED SENSATION OF LOWER EXTREMITY: ICD-10-CM

## 2019-06-17 PROCEDURE — 97110 THERAPEUTIC EXERCISES: CPT | Mod: PN

## 2019-06-18 NOTE — PROGRESS NOTES
Physical Therapy Daily Treatment Note     Name: Jae Millan  Clinic Number: [id number]    Therapy Diagnosis:   Encounter Diagnoses   Name Primary?    Hemiparesis affecting left side as late effect of cerebrovascular accident (CVA)     Decreased sensation of lower extremity     Impaired functional mobility, balance, gait, and endurance      Physician: Maryjane Farias MD    Visit Date: 6/19/2019    Physician Orders: PT Eval, all treatment after initial eval requires auth  Medical Diagnosis from Referral: Cerebrovascular accident (CVA) due to thrombosis of anterior cerebral artery, unspecified blood vessel laterality  Evaluation Date: 6/6/2019  Authorization Period Expiration: 6/30/2019  Plan of Care Expiration: 9/6/2019  Visit # / Visits authorized: 3/ 12 (+eval)    Time In: 945  Time Out: 1032  Total Billable Time: 47 minutes    Precautions: Standard and Fall    Subjective     Pt reports: No complaints today. He continues to take his BP medications. He report some numbness at night when he attempts to go to sleep in his L LE.     BP: 160/80  HR:80 bpm     He was compliant with home exercise program.  Response to previous treatment: none to report  Functional change: none to report    Pain: 0/10  Location: n/a    Objective     BOLD = performed today  + = new exercise or exercise progression    Jae received therapeutic exercises to develop strength, endurance, posture and core stabilization for 15 minutes including:  Bridges 3x15 reps   SLR 3x10 ea  Clams 3x10 reps ea  Heel raises x20 reps with 1 UE support  +seated DF x30 reps B LE for increased heel strike with ambulation  +hip abduction in standing, 2x15 reps ea  +mini squats, 2x15 reps  +SciFit StepOne level 1.5, 8 minutes    Jae participated in neuromuscular re-education activities to improve: Balance, Coordination, Kinesthetic, Sense, Proprioception and Posture for 17 minutes. The following activities were included:  Modified SLS on L LE with  opposite foot on purple block, 3 x 30 seconds ea  Tandem stance 3x30 seconds with ea LE in posterior position, occasional UE support for stability  3x30 seconds on AirEx, eyes closed, feet together, occaisional UE support for stability  side stepping the length of the // bars, no UE support x3 laps  backwards ambulation with no UE support, x3 laps    PT provides passive stretching to L MCP, and intercarpal joints to place pt's hand flat onto surface of mat then pt instructed to lean forward to provide stretching to wrist/finger flexors for 10x 5 second hold   >following this activity pt instructed to lean forward and to the L to grab cups 1 by 1 for a total of 20 cups then place them onto surface at chest level an arms reach away for increased weight bearing in L UE    Jae participated in gait training to improve functional mobility and safety for 15 minutes, including:  Pt ambulates into therapy gym from waiting area using SBQC and step to gait with wide NORA and increased time for completion of task. Pt requires SBA only.     He exits gym using same gait pattern.    ambulation with no AD, 180' x2 laps CGA, pt initially able to perform step through gait, but as he fatigues performs step to gait, PT cues pt to perform heel strike with B LE       Home Exercises Provided and Patient Education Provided     Education provided:   - HEP provided    Written Home Exercises Provided: yes.  Exercises were reviewed and Jae was able to demonstrate them prior to the end of the session.  Jae demonstrated good  understanding of the education provided.     See EMR under Patient Instructions for exercises provided 6/13/2019.    Assessment     Mr. Millan requires more touch assistance with balance activities today and reports a tightness feeling in his L LE. Pt does well with addition of cardiovascular endurance activity of SciFit. Ambulation without AD requires increased time but pt with good balance throughout. He remains  motivated and appropriate for skilled PT services.     Jae is progressing well towards his goals.   Pt prognosis is Good.     Pt will continue to benefit from skilled outpatient physical therapy to address the deficits listed in the problem list box on initial evaluation, provide pt/family education and to maximize pt's level of independence in the home and community environment.     Pt's spiritual, cultural and educational needs considered and pt agreeable to plan of care and goals.     Anticipated barriers to physical therapy: none    Goals:  Short Term Goals: 5 weeks   1. Pt will be able to complete 10 sit to  30 seconds for increased mobility.  2. Pt will be able to complete TUG in less than 18 seconds in order to decrease his risk for fall.   3. Pt will be compliant with HEP and medicine management.  4. Pt will be able to ambulate >300' with/without AD and mod I.      Long Term Goals: 10 weeks   1. Pt will complete TUG in less than 15 seconds in order to decrease his risk for fall.   2. Pt will be able to stand on B LE in SLS for >/= 5 seconds in order to decrease his risk for fall.   3. Pt will be able to ascend/decend 5 stairs with 1 hand rail and supervision without cues for safety.   4. Pt will tolerate standing activities for >10 minutes in order to increase his endurance.     Plan     Continue with current plan of care. Progress activities as tolerated.     Oc Schroeder, PT

## 2019-06-19 ENCOUNTER — CLINICAL SUPPORT (OUTPATIENT)
Dept: REHABILITATION | Facility: HOSPITAL | Age: 69
End: 2019-06-19
Attending: INTERNAL MEDICINE
Payer: MEDICAID

## 2019-06-19 DIAGNOSIS — R20.8 DECREASED SENSATION OF LOWER EXTREMITY: ICD-10-CM

## 2019-06-19 DIAGNOSIS — Z74.09 IMPAIRED FUNCTIONAL MOBILITY, BALANCE, GAIT, AND ENDURANCE: ICD-10-CM

## 2019-06-19 DIAGNOSIS — I69.354 HEMIPARESIS AFFECTING LEFT SIDE AS LATE EFFECT OF CEREBROVASCULAR ACCIDENT (CVA): ICD-10-CM

## 2019-06-19 PROCEDURE — 97110 THERAPEUTIC EXERCISES: CPT | Mod: PN

## 2019-06-21 ENCOUNTER — TELEPHONE (OUTPATIENT)
Dept: INTERNAL MEDICINE | Facility: CLINIC | Age: 69
End: 2019-06-21

## 2019-06-21 NOTE — TELEPHONE ENCOUNTER
----- Message from Huma Escobar sent at 6/21/2019 10:10 AM CDT -----  Contact: Patient [phone]  Pt states that he is calling to speak with someone in your office. He states that he is having problems with transportation. He states that he needs to speak with someone in regards to getting him another appt time for today. He also states that he needs this call back as soon as possible due to him being advised by transportation that he has to give them a call back soon and he will need the appt to be between noon and 1 p.m. Please call back as soon as possible.       Thanks

## 2019-06-25 ENCOUNTER — CLINICAL SUPPORT (OUTPATIENT)
Dept: REHABILITATION | Facility: HOSPITAL | Age: 69
End: 2019-06-25
Attending: INTERNAL MEDICINE
Payer: MEDICAID

## 2019-06-25 DIAGNOSIS — R20.8 DECREASED SENSATION OF LOWER EXTREMITY: ICD-10-CM

## 2019-06-25 DIAGNOSIS — I69.354 HEMIPARESIS AFFECTING LEFT SIDE AS LATE EFFECT OF CEREBROVASCULAR ACCIDENT (CVA): ICD-10-CM

## 2019-06-25 DIAGNOSIS — Z74.09 IMPAIRED FUNCTIONAL MOBILITY, BALANCE, GAIT, AND ENDURANCE: ICD-10-CM

## 2019-06-25 PROCEDURE — 97116 GAIT TRAINING THERAPY: CPT | Mod: PN | Performed by: PHYSICAL THERAPIST

## 2019-06-25 PROCEDURE — 97110 THERAPEUTIC EXERCISES: CPT | Mod: PN | Performed by: PHYSICAL THERAPIST

## 2019-06-25 NOTE — PROGRESS NOTES
Physical Therapy Daily Treatment Note     Name: Jae Millan  Clinic Number: [id number]    Therapy Diagnosis:   Encounter Diagnoses   Name Primary?    Hemiparesis affecting left side as late effect of cerebrovascular accident (CVA)     Decreased sensation of lower extremity     Impaired functional mobility, balance, gait, and endurance      Physician: Maryjane Farias MD    Visit Date: 6/25/2019    Physician Orders: PT Eval, all treatment after initial eval requires auth  Medical Diagnosis from Referral: Cerebrovascular accident (CVA) due to thrombosis of anterior cerebral artery, unspecified blood vessel laterality  Evaluation Date: 6/6/2019  Authorization Period Expiration: 6/30/2019  Plan of Care Expiration: 9/6/2019  Visit # / Visits authorized: 3/ 12 (+eval)    Time In: 1435  Time Out: 1032  Total Billable Time: 47 minutes    Precautions: Standard and Fall    Subjective     Pt reports: No complaints today. He continues to take his BP medications. He report some numbness at night when he attempts to go to sleep in his L LE.     He was compliant with home exercise program.  Response to previous treatment: none to report  Functional change: none to report    Pain: 5/10  Location: RUE- soreness, stiffness LUE. Denied pain at conclusion of session    Objective   BP= 161/85, HR= 82 bpm, at conclusion of session BP= 155/89, HR= 87 bpm    BOLD = performed today  + = new exercise or exercise progression    Jae received therapeutic exercises to develop strength, endurance, posture and core stabilization for 30 minutes including:  Bridges 2x15 reps  Bridging with hip abd Green thera band    +Heel raises 2x20 reps with 1 UE support  hip abduction in standing, 2x15 reps ea  +mini squats, 2x15 reps: 1st set with BUE support, 2nd set- only LUE support  +SciFit StepOne level 2, 8 minutes        Jae participated in gait training to improve functional mobility and safety for 17 minutes, including:  Pt ambulates  into therapy gym from waiting area using SBQC and step to gait with wide NORA and increased time for completion of task. Pt requires SBA- supervision    Parallel bar: static standing on foam, eyes open, horizontal head turns x 30 sec= fair- balance   static standing on foam, eyes open, vertical head turns x 30 sec= poor+ to fair- balance   static standing on foam, eyes closed= fair- balance   Side stepping over yoga blocks, BUE support- 3 laps, knocked over blocks ~5x    Home Exercises Provided and Patient Education Provided     Education provided:   - HEP reissued per pt request    Written Home Exercises Provided: yes. Added bridging with hip abduction, green theraband provided  Exercises were reviewed and Jae was able to demonstrate them prior to the end of the session.  Jae demonstrated good  understanding of the education provided.     See EMR under Patient Instructions for exercises provided 6/13/2019.    Assessment     Mr. Millan tolerated treatment well. Pt tolerated increased resistance on stepper and was provided with a theraband to advance bridging. Pt demonstrated fair- to poor+ balance on foam, vertical head turns caused the most balance disturbance. Pt had difficulty consistently lifting L foot to clear a block for stepping over obstacles. Pt progressed to performing mini squats without UE support. Pt can benefit from continued PT to advance gait and balance to decrease fall risk. Pt reported that he is using a borrowed cane and has to return it to a friend. Pt can benefit from obtaining a personal small based quad cane for gait. Pt can also benefit from obtaining a Giv Bernardino Sling to approximate the L shoulder to decrease subluxation and decrease associated pain. Increased weight bearing of L glenohumeral joint will also aid in improving functional use of limb.     Jae is progressing well towards his goals.   Pt prognosis is Good.     Pt will continue to benefit from skilled outpatient physical  therapy to address the deficits listed in the problem list box on initial evaluation, provide pt/family education and to maximize pt's level of independence in the home and community environment.     Pt's spiritual, cultural and educational needs considered and pt agreeable to plan of care and goals.     Anticipated barriers to physical therapy: none    Goals:  Short Term Goals: 5 weeks   1. Pt will be able to complete 10 sit to  30 seconds for increased mobility.  2. Pt will be able to complete TUG in less than 18 seconds in order to decrease his risk for fall.   3. Pt will be compliant with HEP and medicine management.  4. Pt will be able to ambulate >300' with/without AD and mod I.      Long Term Goals: 10 weeks   1. Pt will complete TUG in less than 15 seconds in order to decrease his risk for fall.   2. Pt will be able to stand on B LE in SLS for >/= 5 seconds in order to decrease his risk for fall.   3. Pt will be able to ascend/decend 5 stairs with 1 hand rail and supervision without cues for safety.   4. Pt will tolerate standing activities for >10 minutes in order to increase his endurance.     Plan     Continue with current plan of care. Progress activities as tolerated.     Naya Obregon, PT

## 2019-06-28 ENCOUNTER — CLINICAL SUPPORT (OUTPATIENT)
Dept: REHABILITATION | Facility: HOSPITAL | Age: 69
End: 2019-06-28
Attending: INTERNAL MEDICINE
Payer: MEDICAID

## 2019-06-28 DIAGNOSIS — Z74.09 IMPAIRED FUNCTIONAL MOBILITY, BALANCE, GAIT, AND ENDURANCE: ICD-10-CM

## 2019-06-28 DIAGNOSIS — R20.8 DECREASED SENSATION OF LOWER EXTREMITY: ICD-10-CM

## 2019-06-28 DIAGNOSIS — I69.354 HEMIPARESIS AFFECTING LEFT SIDE AS LATE EFFECT OF CEREBROVASCULAR ACCIDENT (CVA): ICD-10-CM

## 2019-06-28 NOTE — PROGRESS NOTES
Pt cancelled session due to being >30 minutes late due to transportation. Pt is aware of next PT visit on 7/2/2019.  No charges posted today.    Naya Obregon,DPT  6/28/2019

## 2019-07-02 ENCOUNTER — CLINICAL SUPPORT (OUTPATIENT)
Dept: REHABILITATION | Facility: HOSPITAL | Age: 69
End: 2019-07-02
Attending: INTERNAL MEDICINE
Payer: MEDICARE

## 2019-07-02 DIAGNOSIS — I69.354 HEMIPARESIS AFFECTING LEFT SIDE AS LATE EFFECT OF CEREBROVASCULAR ACCIDENT (CVA): ICD-10-CM

## 2019-07-02 DIAGNOSIS — R20.8 DECREASED SENSATION OF LOWER EXTREMITY: ICD-10-CM

## 2019-07-02 DIAGNOSIS — Z74.09 IMPAIRED FUNCTIONAL MOBILITY, BALANCE, GAIT, AND ENDURANCE: ICD-10-CM

## 2019-07-02 PROCEDURE — 97110 THERAPEUTIC EXERCISES: CPT | Mod: PN | Performed by: PHYSICAL THERAPIST

## 2019-07-02 NOTE — PROGRESS NOTES
Physical Therapy Daily Treatment Note     Name: Jae Millan  [facility] Number: [id number]    Therapy Diagnosis:   Encounter Diagnoses   Name Primary?    Hemiparesis affecting left side as late effect of cerebrovascular accident (CVA)     Decreased sensation of lower extremity     Impaired functional mobility, balance, gait, and endurance      Physician: Maryjane Farias MD    Visit Date: 7/2/2019    Physician Orders: PT Eval, all treatment after initial eval requires auth  Medical Diagnosis from Referral: Cerebrovascular accident (CVA) due to thrombosis of anterior cerebral artery, unspecified blood vessel laterality  Evaluation Date: 6/6/2019  Authorization Period Expiration: 6/30/2019  Plan of Care Expiration: 9/6/2019  Visit # / Visits authorized: 5/ 12 (+eval)    Time In: 1125  Time Out: 1200  Total Billable Time: 35 minutes    Precautions: Standard and Fall    Subjective     Pt reports: He is late due to transportation issues.      He was not compliant with HEP due to losing theraband and print out of instructions  Response to previous treatment: no adverse effects to report  Functional change: ongoing    Pain: 0/10  Location: n/a    Objective     Jae received therapeutic exercises to develop strength, endurance, posture and core stabilization for 20 minutes including:  Bridging with hip abd Green thera band  2 x 15  Sit to stands with orange tuning board on the seat w/ green theraband around knees 2x15 --> no UE support    SciFit  recumbent stepper level 2, BUE/LE x 8 minutes        Jae participated in gait training to improve functional mobility and safety for 15 minutes, including:  Pt ambulates into therapy gym from waiting area using SBQC and step to gait with wide NORA and increased time for completion of task. Pt requires SBA- supervision    Parallel bar:    Static standing on foam, feet together eyes open 2x30 sec   static standing on foam, feet together eyes closed= 2x30 sec   Ambulation  with no UE support - 2 laps with CGA-SBA   Alternating stepping forward and backward over 6 inch yellow minda - 2x10 each leg    Home Exercises Provided and Patient Education Provided     Education provided:   - HEP reissued per pt request    Written Home Exercises Provided: yes. Added bridging with hip abduction, green theraband provided  Exercises were reviewed and Jae was able to demonstrate them prior to the end of the session.  Jae demonstrated good  understanding of the education provided.     See EMR under Patient Instructions for exercises provided 6/13/2019.    Assessment     Mr. Millan tolerated treatment well. Pt arrived ~ 7 minutes late due to transportation issues, arriving with a large-based quad cane. Pt performed mini squats on chair no UE support with orange tuning board to elevate seat surface height; pt stated after first set that it was pretty easy. Pt was then progressed to sit to stands without tuning system or UE support. Pt demonstrated improved static balance on foam, not having any sway except min sway for 2nd set with feet together eyes closed. With minimal time allotted, pt still heavily requested cycling on the stepper machine. Pt progressed to Level 2 with B UE/LE use. Pt required manual support from PT to keep pt's L hand on the handle. Pt can benefit from continued PT to advance gait and balance to decrease fall risk. Due to pt's significantly limited functional use of LUE for reaching, grasping, and supporting body, PT recommends participation in OT for optimal gains with functional independence and safety to improve quality of life.     Jae is progressing well towards his goals.   Pt prognosis is Good.     Pt will continue to benefit from skilled outpatient physical therapy to address the deficits listed in the problem list box on initial evaluation, provide pt/family education and to maximize pt's level of independence in the home and community environment.     Pt's  spiritual, cultural and educational needs considered and pt agreeable to plan of care and goals.     Anticipated barriers to physical therapy: none    Goals:  Short Term Goals: 5 weeks   1. Pt will be able to complete 10 sit to  30 seconds for increased mobility. ongoing  2. Pt will be able to complete TUG in less than 18 seconds in order to decrease his risk for fall. ongoing  3. Pt will be compliant with HEP and medicine management. ongoing  4. Pt will be able to ambulate >300' with/without AD and mod I. ongoing     Long Term Goals: 10 weeks   1. Pt will complete TUG in less than 15 seconds in order to decrease his risk for fall. ongoing  2. Pt will be able to stand on B LE in SLS for >/= 5 seconds in order to decrease his risk for fall. ongoing  3. Pt will be able to ascend/decend 5 stairs with 1 hand rail and supervision without cues for safety. ongoing  4. Pt will tolerate standing activities for >10 minutes in order to increase his endurance. ongoing    Plan     Progress / advance on gait training activities     Bruce Grissom, MARISA     I, Naya Obregon DPT, certify that I was present in the room directing the student in service delivery and guiding them using my skilled judgment. As the co-signing therapist I have reviewed the students documentation and am responsible for the treatment, assessment, and plan.     Naya Obregon DPT  7/2/2019

## 2019-07-02 NOTE — PATIENT INSTRUCTIONS
Abductor Strength: Bridge Pose (Strap)        Place green band around legs above the knees. Press into band with knees and lift hips off of bed.  Hold for 3 seconds. Repeat 15 times. Perform 2 sets/ day.    Copyright © I. All rights reserved.

## 2019-07-03 ENCOUNTER — TELEPHONE (OUTPATIENT)
Dept: INTERNAL MEDICINE | Facility: CLINIC | Age: 69
End: 2019-07-03

## 2019-07-03 DIAGNOSIS — I63.329 CEREBROVASCULAR ACCIDENT (CVA) DUE TO THROMBOSIS OF ANTERIOR CEREBRAL ARTERY, UNSPECIFIED BLOOD VESSEL LATERALITY: Primary | ICD-10-CM

## 2019-07-05 ENCOUNTER — CLINICAL SUPPORT (OUTPATIENT)
Dept: REHABILITATION | Facility: HOSPITAL | Age: 69
End: 2019-07-05
Attending: INTERNAL MEDICINE
Payer: MEDICARE

## 2019-07-05 DIAGNOSIS — R20.8 DECREASED SENSATION OF LOWER EXTREMITY: ICD-10-CM

## 2019-07-05 DIAGNOSIS — Z74.09 IMPAIRED FUNCTIONAL MOBILITY, BALANCE, GAIT, AND ENDURANCE: ICD-10-CM

## 2019-07-05 DIAGNOSIS — I69.354 HEMIPARESIS AFFECTING LEFT SIDE AS LATE EFFECT OF CEREBROVASCULAR ACCIDENT (CVA): ICD-10-CM

## 2019-07-05 PROCEDURE — 97110 THERAPEUTIC EXERCISES: CPT | Mod: PN | Performed by: PHYSICAL THERAPIST

## 2019-07-05 NOTE — PLAN OF CARE
Physical Therapy Daily Treatment Note     Name: Jae Millan  Clinic Number: [id number]    Therapy Diagnosis:   Encounter Diagnoses   Name Primary?    Hemiparesis affecting left side as late effect of cerebrovascular accident (CVA)     Decreased sensation of lower extremity     Impaired functional mobility, balance, gait, and endurance      Physician: Maryjane Farias MD    Visit Date: 7/5/2019    Physician Orders: PT Eval, all treatment after initial eval requires auth  Medical Diagnosis from Referral: Cerebrovascular accident (CVA) due to thrombosis of anterior cerebral artery, unspecified blood vessel laterality  Evaluation Date: 6/6/2019  Authorization Period Expiration: 7/26/2019  Plan of Care Expiration: 9/6/2019  Visit # / Visits authorized: 6/ 12 (+eval)    Time In: 11:18  Time Out: 1200  Total Billable Time: 42 minutes    Precautions: Standard and Fall    Subjective     Pt reports: L arm pain at night is stiff and numb; L toes also numb.   He was intermittently complaint with HEP  Response to previous treatment: no adverse effects to report  Functional change: ongoing    Pain: 5/10 L shoulder , 6.75/10 L fingers   Location: see above. Moderate edema L hand/ forearm    Objective     Jae received therapeutic exercises to develop strength, endurance, posture and core stabilization for 20 minutes including:  Bridging with hip abd Green thera band  2 x 15  Sit to stands with orange tuning board on the seat w/ green theraband around knees 2x15 --> no UE support    SciFit  recumbent stepper level 3, BUE/LE x 8 minutes, mod A to manage and support LUE  Mini squats with 5 inch therafoam on seat to elevate seat height. 1x20 no UE support      Jae participated in gait training to improve functional mobility and safety for 22 minutes, including:  Pt ambulates into therapy gym from waiting area using SBQC and step to gait with wide NORA and increased time for completion of task. Pt requires SBA-  supervision    4 laps on turf - CGA w/ gait belt. Pt focusing on gait w/o AD, congruent steps, equal step length, and turns    Parallel bars: CGA for all activities, no UE support  Standing marches on foam - CGA 2 x 30 s  Single leg stance - pt unable to do w/o intermittent UE support.   Step forward - step back 2 x 10 each leg  Lateral step over obstacle 2 x 10 each direction  Ambulation backward 4 laps      Evaluation 7/5/2019   30 second Chair Rise 8 completed with no arms 9x no UE    TUG 21.25 seconds 11.08 sec w/ SBQC  13.55 sec without AD        Home Exercises Provided and Patient Education Provided     Education provided:   - taking bigger steps when turning, making sure to  left foot    Written Home Exercises Provided: Patient instructed to cont prior HEP.   Exercises were reviewed and Jae was able to demonstrate them prior to the end of the session.  Jae demonstrated good  understanding of the education provided.     See EMR under Patient Instructions for exercises provided 6/13/2019.    Assessment     Assessment period: 6/13/19 to 7/5/19: pt has been improving in physical therapy sessions. Pt has improved in functional mobility, as seen by an significant increase in TUG, being able to perform both with and without AD. Pt also shows improvement in 30 second chair rise, indicating improvement in both LE strength and endurance. Pt has improved with static balance on foam with eyes open and eyes closed. Pt still exhibits significant deficits in single limb stance bilaterally, which is needed for functional balance when abmulating. Based on gross and formal assessment, pt is still a fall risk potential, but still is making progress towards his goals.     Pt has verbalized his transportation issues and has stated he plans to schedule his transportation services a half an hour earlier than usual, which will help him be on time for remainder of appointments to continue progressing with therapy.      Jae is progressing well towards his goals.   Pt prognosis is Good.     Pt will continue to benefit from skilled outpatient physical therapy to address the deficits listed in the problem list box on initial evaluation, provide pt/family education and to maximize pt's level of independence in the home and community environment.     Pt's spiritual, cultural and educational needs considered and pt agreeable to plan of care and goals.     Anticipated barriers to physical therapy: none    Goals: 7/5/19  Short Term Goals: 5 weeks   1. Pt will be able to complete 10 sit to  30 seconds for increased mobility. improved (9xs)  2. Pt will be able to complete TUG in less than 18 seconds in order to decrease his risk for fall. MET 7/5/19- 11.08 sec with small based quad cane, 13.55 sec w/o AD  3. Pt will be compliant with HEP and medicine management. ongoing  4. Pt will be able to ambulate >300' with/without AD and mod I. Grossly MET 7/5/19- pt can ambulate at least 300 ft with small based quad cane      Long Term Goals: 10 weeks   1. Pt will complete TUG in less than 15 seconds in order to decrease his risk for fall. MET 7/5/2019  2. Pt will be able to stand on B LE in SLS for >/= 5 seconds in order to decrease his risk for fall. Ongoing (<2 s B LE)  3. Pt will be able to ascend/decend 5 stairs with 1 hand rail and supervision without cues for safety. ongoing  4. Pt will tolerate standing activities for >10 minutes in order to increase his endurance. Grossly MET 7/5/2019    Plan     Practice single leg balance activities and standing strengthening exercises to progress gait without AD    Bruce Grissom, SPT     I, Naya Obregon, DPT, certify that I was present in the room directing the student in service delivery and guiding them using my skilled judgment. As the co-signing therapist I have reviewed the students documentation and am responsible for the treatment, assessment, and plan.     Naya Obregon,  DPT  7/5/2019

## 2019-07-08 ENCOUNTER — CLINICAL SUPPORT (OUTPATIENT)
Dept: REHABILITATION | Facility: HOSPITAL | Age: 69
End: 2019-07-08
Attending: INTERNAL MEDICINE
Payer: MEDICARE

## 2019-07-08 DIAGNOSIS — R20.8 DECREASED SENSATION OF LOWER EXTREMITY: ICD-10-CM

## 2019-07-08 DIAGNOSIS — Z74.09 IMPAIRED FUNCTIONAL MOBILITY, BALANCE, GAIT, AND ENDURANCE: ICD-10-CM

## 2019-07-08 DIAGNOSIS — I69.354 HEMIPARESIS AFFECTING LEFT SIDE AS LATE EFFECT OF CEREBROVASCULAR ACCIDENT (CVA): ICD-10-CM

## 2019-07-08 PROCEDURE — 97110 THERAPEUTIC EXERCISES: CPT | Mod: PN

## 2019-07-08 NOTE — PROGRESS NOTES
Physical Therapy Daily Treatment Note     Name: Jae Millan  Clinic Number: [id number]    Therapy Diagnosis:   Encounter Diagnoses   Name Primary?    Hemiparesis affecting left side as late effect of cerebrovascular accident (CVA)     Decreased sensation of lower extremity     Impaired functional mobility, balance, gait, and endurance      Physician: Maryjane Farias MD    Visit Date: 7/8/2019    Physician Orders: PT Eval, all treatment after initial eval requires auth  Medical Diagnosis from Referral: Cerebrovascular accident (CVA) due to thrombosis of anterior cerebral artery, unspecified blood vessel laterality  Evaluation Date: 6/6/2019  Authorization Period Expiration: 6/30/2019  Plan of Care Expiration: 9/6/2019  Visit # / Visits authorized: 7/ 12 (+eval)    Time In: 1530  Time Out: 1615  Total Billable Time: 45 minutes    Precautions: Standard and Fall    Subjective     Pt reports: He is doing well and figured out his transportation issues now.     He was not compliant with HEP due to losing theraband and print out of instructions  Response to previous treatment: no adverse effects to report  Functional change: ongoing    Pain: 0/10  Location: n/a    Objective     BP: 156/88, 78 bpm    Italicized = not performed today    Jae received therapeutic exercises to develop strength, endurance, posture and core stabilization for 20 minutes including:    Bridging with hip abd Green thera band  2 x 15  Sit to stands with orange tuning board on standard chair w/ green theraband around knees 2x15 --> no UE support  SciFit  recumbent stepper level 2, BUE/LE x 8 minutes      Jae participated in gait training to improve functional mobility and safety for 15 minutes, including:  Pt ambulates into therapy gym from waiting area using SBQC and step to gait with wide NORA and increased time for completion of task. Pt requires SBA- supervision    Parallel bar:    Static standing on foam, feet together eyes open  2x30 sec   static standing on foam, feet together eyes closed= 2x30 sec   Stepping forward and backward over 6 inch yellow minda - 2x10 each leg      Ambulation with no UE support - 2 laps with CGA-SBA    Home Exercises Provided and Patient Education Provided     Education provided:   - HEP reissued per pt request    Written Home Exercises Provided: yes. Added bridging with hip abduction, green theraband provided  Exercises were reviewed and Jae was able to demonstrate them prior to the end of the session.  Jae demonstrated good  understanding of the education provided.     See EMR under Patient Instructions for exercises provided 6/13/2019.    Assessment     Monty continues to do well in skilled PT services. He requires CGA-Yehuda for stepping balance tasks but does well with ambulation without AD and cues only. Pt continues to struggle with L UE swelling due to it being in the dependent position, but finally received OT referral. He continues to be appropriate for skilled PT services.      Pt has verbalized his transportation issues and has stated he plans to schedule his transportation services a half an hour earlier than usual, which will help him be on time for remainder of appointments to continue progressing with therapy.   Jae is progressing well towards his goals.   Pt prognosis is Good.     Pt will continue to benefit from skilled outpatient physical therapy to address the deficits listed in the problem list box on initial evaluation, provide pt/family education and to maximize pt's level of independence in the home and community environment.     Pt's spiritual, cultural and educational needs considered and pt agreeable to plan of care and goals.     Anticipated barriers to physical therapy: none    Goals: 7/5/19  Short Term Goals: 5 weeks   1. Pt will be able to complete 10 sit to  30 seconds for increased mobility. improved (9xs)  2. Pt will be able to complete TUG in less than 18  seconds in order to decrease his risk for fall. MET 7/5/19- 11.08 sec with small based quad cane, 13.55 sec w/o AD  3. Pt will be compliant with HEP and medicine management. ongoing  4. Pt will be able to ambulate >300' with/without AD and mod I. Grossly MET 7/5/19- pt can ambulate at least 300 ft with small based quad cane      Long Term Goals: 10 weeks   1. Pt will complete TUG in less than 15 seconds in order to decrease his risk for fall. MET 7/5/2019  2. Pt will be able to stand on B LE in SLS for >/= 5 seconds in order to decrease his risk for fall. Ongoing (<2 s B LE)  3. Pt will be able to ascend/decend 5 stairs with 1 hand rail and supervision without cues for safety. ongoing  4. Pt will tolerate standing activities for >10 minutes in order to increase his endurance. Grossly MET 7/5/2019    Plan     Practice single leg balance activities and standing strengthening exercises to progress gait without AD    Oc Schroeder, PT    7/8/2019

## 2019-07-16 ENCOUNTER — INITIAL CONSULT (OUTPATIENT)
Dept: OPHTHALMOLOGY | Facility: CLINIC | Age: 69
End: 2019-07-16
Payer: MEDICARE

## 2019-07-16 DIAGNOSIS — H21.562 AFFERENT PUPILLARY DEFECT OF LEFT EYE: ICD-10-CM

## 2019-07-16 DIAGNOSIS — H40.32X4 TRAUMATIC GLAUCOMA, LEFT, INDETERMINATE STAGE: Primary | ICD-10-CM

## 2019-07-16 DIAGNOSIS — H25.13 NUCLEAR SCLEROTIC CATARACT OF BOTH EYES: ICD-10-CM

## 2019-07-16 DIAGNOSIS — Z87.828 HISTORY OF EYE TRAUMA: ICD-10-CM

## 2019-07-16 PROCEDURE — 99999 PR PBB SHADOW E&M-EST. PATIENT-LVL II: ICD-10-PCS | Mod: PBBFAC,,, | Performed by: OPHTHALMOLOGY

## 2019-07-16 PROCEDURE — 92004 PR EYE EXAM, NEW PATIENT,COMPREHESV: ICD-10-PCS | Mod: S$PBB,,, | Performed by: OPHTHALMOLOGY

## 2019-07-16 PROCEDURE — 92020 PR SPECIAL EYE EVAL,GONIOSCOPY: ICD-10-PCS | Mod: S$PBB,,, | Performed by: OPHTHALMOLOGY

## 2019-07-16 PROCEDURE — 92250 COLOR FUNDUS PHOTOGRAPHY - OU - BOTH EYES: ICD-10-PCS | Mod: 26,S$PBB,, | Performed by: OPHTHALMOLOGY

## 2019-07-16 PROCEDURE — 92020 GONIOSCOPY: CPT | Mod: S$PBB,,, | Performed by: OPHTHALMOLOGY

## 2019-07-16 PROCEDURE — 92004 COMPRE OPH EXAM NEW PT 1/>: CPT | Mod: S$PBB,,, | Performed by: OPHTHALMOLOGY

## 2019-07-16 PROCEDURE — 92020 GONIOSCOPY: CPT | Mod: PBBFAC | Performed by: OPHTHALMOLOGY

## 2019-07-16 PROCEDURE — 99999 PR PBB SHADOW E&M-EST. PATIENT-LVL II: CPT | Mod: PBBFAC,,, | Performed by: OPHTHALMOLOGY

## 2019-07-16 PROCEDURE — 92250 FUNDUS PHOTOGRAPHY W/I&R: CPT | Mod: PBBFAC | Performed by: OPHTHALMOLOGY

## 2019-07-16 PROCEDURE — 99212 OFFICE O/P EST SF 10 MIN: CPT | Mod: PBBFAC,25 | Performed by: OPHTHALMOLOGY

## 2019-07-16 RX ORDER — DORZOLAMIDE HYDROCHLORIDE AND TIMOLOL MALEATE 20; 5 MG/ML; MG/ML
1 SOLUTION/ DROPS OPHTHALMIC 2 TIMES DAILY
Qty: 10 ML | Refills: 12 | Status: SHIPPED | OUTPATIENT
Start: 2019-07-16 | End: 2019-09-12 | Stop reason: SDUPTHER

## 2019-07-16 RX ORDER — LOSARTAN POTASSIUM AND HYDROCHLOROTHIAZIDE 12.5; 1 MG/1; MG/1
1 TABLET ORAL DAILY
COMMUNITY
End: 2019-11-25 | Stop reason: SDUPTHER
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
## 2019-07-26 ENCOUNTER — CLINICAL SUPPORT (OUTPATIENT)
Dept: REHABILITATION | Facility: HOSPITAL | Age: 69
End: 2019-07-26
Attending: INTERNAL MEDICINE
Payer: MEDICARE

## 2019-07-26 DIAGNOSIS — I69.354 HEMIPARESIS AFFECTING LEFT SIDE AS LATE EFFECT OF CEREBROVASCULAR ACCIDENT (CVA): ICD-10-CM

## 2019-07-26 DIAGNOSIS — R20.8 DECREASED SENSATION OF LOWER EXTREMITY: ICD-10-CM

## 2019-07-26 DIAGNOSIS — I63.9 CVA (CEREBRAL VASCULAR ACCIDENT): Primary | ICD-10-CM

## 2019-07-26 DIAGNOSIS — Z74.09 IMPAIRED FUNCTIONAL MOBILITY, BALANCE, GAIT, AND ENDURANCE: ICD-10-CM

## 2019-07-26 PROCEDURE — 97110 THERAPEUTIC EXERCISES: CPT | Mod: PN | Performed by: PHYSICAL THERAPIST
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
## 2019-07-26 NOTE — PATIENT INSTRUCTIONS
EXTENSION: Sitting - Resistance Band (Active)        Sit, right knee raised. Against yellow resistance band, drive leg toward floor.  Complete 2 sets of 10 repetitions. Perform 1 sessions per day.    Copyright © [employer]. All rights reserved.   ABDUCTION: Sitting - Resistance Band (Active)        Sit with feet flat. Lift right leg slightly and, against yellow resistance band, draw it out to side.  Complete 2 sets of 10 repetitions. Perform 1 sessions per day.    Copyright © [employer]. All rights reserved.   ANKLE: Pumps        Point toes down, then up. 20 reps per set, 2 sets per day, 7 days per week      Copyright © [employer]. All rights reserved.

## 2019-08-05 ENCOUNTER — CLINICAL SUPPORT (OUTPATIENT)
Dept: REHABILITATION | Facility: HOSPITAL | Age: 69
End: 2019-08-05
Attending: INTERNAL MEDICINE
Payer: MEDICARE

## 2019-08-05 DIAGNOSIS — I69.354 HEMIPARESIS AFFECTING LEFT SIDE AS LATE EFFECT OF CEREBROVASCULAR ACCIDENT (CVA): Primary | ICD-10-CM

## 2019-08-05 DIAGNOSIS — R20.8 DECREASED SENSATION OF LOWER EXTREMITY: ICD-10-CM

## 2019-08-05 DIAGNOSIS — Z74.09 IMPAIRED FUNCTIONAL MOBILITY, BALANCE, GAIT, AND ENDURANCE: ICD-10-CM

## 2019-08-05 PROCEDURE — 97110 THERAPEUTIC EXERCISES: CPT | Mod: PN

## 2019-08-05 NOTE — PLAN OF CARE
Physical Therapy Daily Treatment Note     Name: Jae Millan  [facility] Number: [id number]    Therapy Diagnosis:   Encounter Diagnoses   Name Primary?    Hemiparesis affecting left side as late effect of cerebrovascular accident (CVA) Yes    Decreased sensation of lower extremity     Impaired functional mobility, balance, gait, and endurance      Physician: Maryjane Farias MD    Visit Date: 8/5/2019    Physician Orders: PT Eval, all treatment after initial eval requires auth  Medical Diagnosis from Referral: Cerebrovascular accident (CVA) due to thrombosis of anterior cerebral artery, unspecified blood vessel laterality  Evaluation Date: 6/6/2019  Last PN: 8/5/2019  Authorization Period Expiration: 6/30/2019  Plan of Care Expiration: 9/6/2019  Visit # / Visits authorized: 9/ 12 (+eval)    Time In: 1050  Time Out: 1137  Total Billable Time: 47 minutes    Precautions: Standard and Fall    Subjective     Pt reports: He is doing well today. Feeling like he is getting around better than before. He continues to use a borrowed cane but the people that he is borrowing the cane from want their cane back.     He was compliant with HEP.  Response to previous treatment: no adverse effects to report  Functional change: ongoing    Pain: 0/10  Location: n/a    Objective     BP: 172/81, 81 bpm      Jae received therapeutic exercises to develop strength, endurance, posture and core stabilization for 8 minutes including:    SciFit  recumbent stepper level 2, BUE/LE x 8 minutes- to improve strength and activity tolerance        Jae participated in gait training to improve functional mobility and safety for 37 minutes, including:    Parallel bar:    Static standing on foam, feet together eyes open 2x30 sec   static standing on foam, feet together eyes closed 2x30 sec   Marching on foam 2x30 sec    Ambulation with no UE support x 2 laps with CGA-SBA  Ambulation backwards w/ no UE support  x 2 laps  Marching forwards  2 x  10 taps on cone with no UE support with R LE only for increased SLS on L LE        Evaluation 7/5/2019 8/5/2019   30 second Chair Rise 8 completed with no arms 9x no UE  10 with no UEs   TUG 21.25 seconds 11.08 sec w/ SBQC  13.55 sec without AD 10.46 seconds with SBQC  12.5 seconds without AD   FGA NT NT 14/30 (without AD)      Functional Gait Assessment:   1. Gait on level surface =  2   (3) Normal: less than 5.5 sec, no A.D., no imbalance, normal gait pattern, deviates< 6in   (2) Mild impairment: 7-5.6 sec, uses A.D., mild gait deviations, or deviates 6-10 in   (1) Moderate impairment: > 7 sec, slow speed, imbalance, deviates 10-15 in.   (0) Severe impairment: needs assist, deviates >15 in, reach/touch wall  2. Change in Gait Speed = 1   (3) Normal: smooth change w/o loss of balance or gait deviation, deviates < 6 in, significant difference between speeds   (2) Mild impairment: changes speed, but demonstrates mild gait deviations, deviates 6-10 in, OR no deviations but unable to significantly speed, OR uses A.D.   (1) Moderate impairment: minor changes to speed, OR changes speed w/ significant deviations, deviates 10-15 in, OR  Changes speed , but loses balance & recovers   (0) Severe impairment: cannot change speed, deviates >15 in, or loses balance & needs assist  3. Gait with horizontal head turns  = 1   (3) Normal: no change in gait, deviates <6 in   (2) Mild impairment: slight change in speed, deviates 6-10 in, OR uses A.D.   (1) Moderate impairment: moderate change in speed, deviates 10-15 in   (0) Severe impairment: severe disruption of gait, deviates >15in  4. Gait with vertical head turns = 1   (3) Normal: no change in gait, deviates <6 in   (2) Mild impairment: slight change in speed, deviates 6-10 in OR uses A.D.   (1) Moderate impairment: moderate change in speed, deviates 10-15 in   (0) Severe impairment: severe disruption of gait, deviates >15 in  5. Gait with pivot turns = 2   (3) Normal: performs  safely in 3 sec, no LOB   (2) Mild impairment: performs in >3 sec & no LOB, OR turns safely & requires several steps to regain LOB   (1) Moderate impairment: turns slow, OR requires several small steps for balance following turn & stop   (0) Severe impairment: cannot turn safely, needs assist  6. Step over obstacle = 1   (3) Normal: steps over 2 stacked boxes w/o change in speed or LOB   (2) Mild impairment: able to step over 1 box w/o change in speed or LOB   (1) Moderate impairment: steps over 1 box but must slow down, may require VC   (0) Severe impairment: cannot perform w/o assist  7. Gait with Narrow NORA = 0   (3) Normal: 10 steps no staggering   (2) Mild impairment: 7-9 steps   (1) Moderate impairment: 4-7 steps   (0) Severe impairment: < 4 steps or cannot perform w/o assist  8. Gait with eyes closed = 2   (3) Normal: < 7 sec, no A.D., no LOB, normal gait pattern, deviates <6 in   (2) Mild impairment: 7.1-9 sec, mild gait deviations, deviates 6-10 in   (1) Moderate impairment: > 9 sec, abnormal pattern, LOB, deviates 10-15 in   (0) Severe impairment: cannot perform w/o assist, LOB, deviates >15in  9. Ambulating Backwards = 2   (3) Normal: no A.D., no LOB, normal gait pattern, deviates <6in   (2) Mild impairment: uses A.D., slower speed, mild gait deviations, deviates 6-10 in   (1) Moderate impairment: slow speed, abnormal gait pattern, LOB, deviates 10-15 in   (0) Severe impairment: severe gait deviations or LOB, deviates >15in  10. Steps = 2 (slow speed)   (3) Normal: alternating feet, no rail   (2) Mild Impairment: alternating feet, uses rail   (1) Moderate impairment: step-to, uses rail   (0) Severe impairment: cannot perform safely    Score 14/30     Score:   <22/30 fall risk   <20/30 fall risk in older adults   <18/30 fall risk in Parkinsons     Home Exercises Provided and Patient Education Provided     Education provided:   - HEP updated to include sitting exercises    Written Home Exercises Provided:  yes. Added sitting hip ext with band, hip abduction with band and ankle pumps  Exercises were reviewed and Jae was able to demonstrate them prior to the end of the session.  Jae demonstrated good  understanding of the education provided.     See EMR under Patient Instructions for exercises provided 6/13/2019.    Assessment   Assessment period 7/5/2019-8/5/2019: Jae continues to make good progress in skilled PT services. He demonstrates some improvement in his TUG and 30 second chair rise scores. PT administered FGA today and pt score at risk for fall. He continues to demonstrate unequal step lengths, decreased stance time on L LE, and decreased tolerance to weight bearing on L LE with ambulation. He would continue to benefit from skilled PT services in order to address his ambulatory balance deficits, improve SLS, and increase his overall endurance. Pt's goals have been addressed and remain appropriate for the next month of care.     Pt has verbalized his transportation issues and has stated he plans to schedule his transportation services a half an hour earlier than usual, which will help him be on time for remainder of appointments to continue progressing with therapy.   Jae is progressing well towards his goals.   Pt prognosis is Good.     Pt will continue to benefit from skilled outpatient physical therapy to address the deficits listed in the problem list box on initial evaluation, provide pt/family education and to maximize pt's level of independence in the home and community environment.     Pt's spiritual, cultural and educational needs considered and pt agreeable to plan of care and goals.     Anticipated barriers to physical therapy: none    Goals: 7/5/19  Short Term Goals: 5 weeks   1. Pt will be able to complete 10 sit to  30 seconds for increased mobility. MET 8/5/2019  2. Pt will be able to complete TUG in less than 18 seconds in order to decrease his risk for fall. MET 7/5/19-  11.08 sec with small based quad cane, 13.55 sec w/o AD  3. Pt will be compliant with HEP and medicine management. ongoing  4. Pt will be able to ambulate >300' with/without AD and mod I. MET 7/5/19- pt can ambulate at least 300 ft with small based quad cane      Long Term Goals: 10 weeks   1. Pt will complete TUG in less than 15 seconds in order to decrease his risk for fall. MET 7/5/2019  2. Pt will be able to stand on B LE in SLS for >/= 5 seconds in order to decrease his risk for fall. Ongoing  3. Pt will be able to ascend/decend 5 stairs with 1 hand rail and supervision without cues for safety. MET 8/5/2019  4. Pt will tolerate standing activities for >10 minutes in order to increase his endurance. MET 7/5/2019  5. NEW GOAL: Pt will improve score on FGA to >/= 22 in order to decrease his risk for fall.     Plan     Plan of Care Expiration: 9/6/2019  Continue to work on improving SLS time, decreasing gait deviations, improving ambulatory balance and overall increased endurance.     Oc Schroeder, PT  8/5/2019

## 2019-08-07 ENCOUNTER — CLINICAL SUPPORT (OUTPATIENT)
Dept: REHABILITATION | Facility: HOSPITAL | Age: 69
End: 2019-08-07
Attending: INTERNAL MEDICINE
Payer: MEDICARE

## 2019-08-07 DIAGNOSIS — I69.354 HEMIPARESIS AFFECTING LEFT SIDE AS LATE EFFECT OF CEREBROVASCULAR ACCIDENT (CVA): ICD-10-CM

## 2019-08-07 DIAGNOSIS — Z74.09 IMPAIRED FUNCTIONAL MOBILITY, BALANCE, GAIT, AND ENDURANCE: Primary | ICD-10-CM

## 2019-08-07 DIAGNOSIS — R20.8 DECREASED SENSATION OF LOWER EXTREMITY: ICD-10-CM

## 2019-08-07 PROCEDURE — 97110 THERAPEUTIC EXERCISES: CPT | Mod: PN

## 2019-08-07 PROCEDURE — 97116 GAIT TRAINING THERAPY: CPT | Mod: PN

## 2019-08-07 NOTE — PROGRESS NOTES
"  Physical Therapy Daily Treatment Note     Name: Jae Millan  Clinic Number: [id number]    Therapy Diagnosis:   Encounter Diagnoses   Name Primary?    Hemiparesis affecting left side as late effect of cerebrovascular accident (CVA)     Decreased sensation of lower extremity     Impaired functional mobility, balance, gait, and endurance Yes     Physician: Maryjane Farias MD    Visit Date: 8/7/2019    Physician Orders: PT Eval, all treatment after initial eval requires auth  Medical Diagnosis from Referral: Cerebrovascular accident (CVA) due to thrombosis of anterior cerebral artery, unspecified blood vessel laterality  Evaluation Date: 6/6/2019  Authorization Period Expiration: 6/30/2019  Plan of Care Expiration: 9/6/2019  Visit # / Visits authorized: 9/ 12 (+eval)    Time In: 1115  Time Out: 1200  Total Billable Time: 45 minutes    Precautions: Standard and Fall    Subjective     Pt reports: He is okay and very confused about insurance situation. He does not understand if he is able to be seen or not.     He was not compliant with HEP due to losing theraband and print out of instructions  Response to previous treatment: no adverse effects to report  Functional change: ongoing    Pain: 0/10  Location: n/a    Objective       Jae received therapeutic exercises to develop strength, endurance, posture and core stabilization for 15 minutes including:    SciFit  recumbent stepper level 3, RUE/BLE x 8 minutes- to improve strength and activity tolerance  Shuttle squats, 2 bands, x30  PT attempts to have pt complete shuttle heel raises, but pt unable to execute this task  x10 sit to stand with R LE on 2" step to encourage use of L LE    Jae participated in gait training to improve functional mobility and safety for 30 minutes, including:  (1 lap = ~90')  Ambulation with no UE support x 2 laps with CGA-SBA  Ambulation backwards w/ no UE support  x 1 lap  Marching between parallel bars x10 ea with 3 second " hold  Side stepping ea direction  2 x 10 ea LE tapping onto large cone for increase SLS time    Home Exercises Provided and Patient Education Provided     Education provided:   - HEP updated to include sitting exercises    Written Home Exercises Provided: yes. Added sitting hip ext with band, hip abduction with band and ankle pumps  Exercises were reviewed and Jae was able to demonstrate them prior to the end of the session.  Jae demonstrated good  understanding of the education provided.     See EMR under Patient Instructions for exercises provided 6/13/2019.    Assessment   Pt tolerated session well today. He continues to demonstrate increased difficulty with stance, especially single leg stance on L LE. Pt easily distractable today and requires frequent redirection to the task at hand. He remains appropriate for skilled PT services.      Pt has verbalized his transportation issues and has stated he plans to schedule his transportation services a half an hour earlier than usual, which will help him be on time for remainder of appointments to continue progressing with therapy.   Jae is progressing well towards his goals.   Pt prognosis is Good.     Pt will continue to benefit from skilled outpatient physical therapy to address the deficits listed in the problem list box on initial evaluation, provide pt/family education and to maximize pt's level of independence in the home and community environment.     Pt's spiritual, cultural and educational needs considered and pt agreeable to plan of care and goals.     Anticipated barriers to physical therapy: none    Goals: 8/5/19  Short Term Goals: 5 weeks   1. Pt will be able to complete 10 sit to  30 seconds for increased mobility. MET 8/5/2019  2. Pt will be able to complete TUG in less than 18 seconds in order to decrease his risk for fall. MET 7/5/19- 11.08 sec with small based quad cane, 13.55 sec w/o AD  3. Pt will be compliant with HEP and medicine  management. ongoing  4. Pt will be able to ambulate >300' with/without AD and mod I. MET 7/5/19- pt can ambulate at least 300 ft with small based quad cane      Long Term Goals: 10 weeks   1. Pt will complete TUG in less than 15 seconds in order to decrease his risk for fall. MET 7/5/2019  2. Pt will be able to stand on B LE in SLS for >/= 5 seconds in order to decrease his risk for fall. Ongoing  3. Pt will be able to ascend/decend 5 stairs with 1 hand rail and supervision without cues for safety. MET 8/5/2019  4. Pt will tolerate standing activities for >10 minutes in order to increase his endurance. MET 7/5/2019  5. NEW GOAL: Pt will improve score on FGA to >/= 22 in order to decrease his risk for fall.     Plan     Practice single leg balance activities, standing balance, and standing strengthening exercises to progress gait without AD    Oc Schroeder, PT    8/7/2019

## 2019-08-08 ENCOUNTER — CLINICAL SUPPORT (OUTPATIENT)
Dept: REHABILITATION | Facility: HOSPITAL | Age: 69
End: 2019-08-08
Attending: INTERNAL MEDICINE
Payer: MEDICARE

## 2019-08-08 DIAGNOSIS — R46.0 SELF-CARE DEFICIT FOR BATHING: ICD-10-CM

## 2019-08-08 DIAGNOSIS — M25.60 RANGE OF MOTION DEFICIT: ICD-10-CM

## 2019-08-08 DIAGNOSIS — Z74.1 SELF-CARE DEFICIT IN DRESSING: ICD-10-CM

## 2019-08-08 DIAGNOSIS — M79.89 SWELLING OF LEFT HAND: ICD-10-CM

## 2019-08-08 DIAGNOSIS — R29.898 DECREASED STRENGTH OF UPPER EXTREMITY: ICD-10-CM

## 2019-08-08 PROCEDURE — 97165 OT EVAL LOW COMPLEX 30 MIN: CPT | Mod: PN

## 2019-08-08 PROCEDURE — G8988 SELF CARE GOAL STATUS: HCPCS | Mod: CK,PN

## 2019-08-08 PROCEDURE — G8987 SELF CARE CURRENT STATUS: HCPCS | Mod: CL,PN

## 2019-08-09 NOTE — PLAN OF CARE
[facility] Occupational Therapy  Initial Neurological Evaluation     Date: 8/8/2019  Patient: Jae Millan  Chart Number: [id number]    Therapy Diagnosis:   1. Self-care deficit for bathing     2. Self-care deficit in dressing     3. Range of motion deficit     4. Swelling of left hand     5. Decreased strength of upper extremity         Physician: Maryjane Farias MD    Physician Orders: Eval and treat  Medical Diagnosis: Cerebrovascular accident (CVA) due to thrombosis of anterior cerebral artery, unspecified blood vessel laterality  Evaluation Date: 8/8/2019  Plan of Care Expiration Period: 8/8/19 to 10/31/19  Insurance Authorization period Expiration: 12/31/19  Date of Return to MD: 8/29/19  Visit # / Visits Authorized: 1 / 1  FOTO: 5th    Time In: 3:10 pm  Time Out: 4:05 pm  Total Billable (one on one) Time: 55 minutes    Precautions: Standard, Diabetes and HTN    Subjective     History of Current Condition: leaving the chapel in the retirement and felt off balance, he ate and laid down.  The next morning he was still a little off but went to , had lunch but walked into the emergency center in the retirement.  He ended up leaving and going to ER at [facility] .  Pt progressed with left leg and arm weakness, facial droop.  In a rehab in [facility] Octoberl until March of 2019.  Had speech 2 visits, PT a 4 times.  Began walking since March.    Involved Side: left  Dominant Side: Right  Date of Onset: August 2018  Surgical Procedure: n/a  Imaging: none in system  Previous Therapy:  A small amount of PT like 4 visits and speech x 2 visits    Patient's Goals for Therapy: I would like to have some movement of my hand and get the swelling out of it    Pain:  Pain Related Behaviors Observed: no   Functional Pain Scale Rating 0-10:   3-4/10 on average in elbow and shoulder  5-6/10 wrist and hand  Location: see above  Description: numbness and pain   Aggravating Factors: passive movement of  the arm  Easing Factors: supporting arm with right hand    Occupation:  Trained a   Working presently: n/a    Functional Limitations/Social History:    Prior Level of Function: Pt was independent with all self care, was incarcerated since  1976 and released on parole October 2018. 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    Past Surgical History:  Jae Millan  has a past surgical history that includes Undescended testicle exploration (Right).    Current Medications:  Jae has a current medication list which includes the following prescription(s): amlodipine, aspirin, blood sugar diagnostic, blood-glucose meter, dorzolamide-timolol 2-0.5%, ergocalciferol, losartan-hydrochlorothiazide 100-12.5 mg, metformin, rosuvastatin, and sitagliptin.    Allergies:  Review of patient's allergies indicates:  No Known Allergies     Other:    Objective     Cognitive Exam:  Oriented: Person and Place, time  And year  Behaviors: normal, cooperative  Follows Commands/attention: attentive  Communication: slight slur in speech  Memory: No Deficits noted as determined by 3 word recall after 1 minute and 3 minutes  Safety awareness/insight to disability: aware of diagnosis, treatment, and prognosis  Coping skills/emotional control: Appropriate to situation    Visual/Perceptual:  Tracking: intact  Saccades: intact  Acuity: n/t  R/L discrimination: intact  Visual field: impaired to left due to bad eye  Motor  Planning Praxis: intact  Comments:     Physical Exam:  Postural examination/scapula alignment: Rounded shoulder and Head forward  Joint integrity: slight subluxation left shoulder  Skin integrity: intact  Edema: Moderate of left hand and fingers  Palpation: less then fingertip subluxation left shoulder       Joint Evaluation  AROM  8/8/2019 PROM   8/8/2019        Left Left     Shoulder flex 0-180 0 100     Shoulder Abd 0-180 35 90     Shoulder ER 0-90 0 20     Shoulder IR 0-90 To belly n/t     Shoulder Extension 0-80 25 45     Shoulder Horizontal adduction 0-90 15 25     Elbow flex/ext 0-150 100/-40 130/-10     Wrist flex 0-80 0 20     Wrist ext 0-70 9 20     Supination 0-80 neutral 15     Pronation 0-80 Gravity assist full     UD 0 20     RD 0 5       Fist: no active motion of fingers, passive stretch of fingers to approx 50% of fist       Strength 8/8/2019 8/8/2019   **within available ROM** Left Right   Shoulder flex 2/5 5/5   Shoulder abd 2+/5 5/5   Shoulder ER 0/5 5/5   Shoulder IR 3-/5 5/5   Shoulder Extension 3-/5 5/5   Shoulder Horizontal adduction 3-/5 5/5   Elbow flex 3/5 5/5   Elbow ext 2/5 5/5   Wrist flex 0/5 5/5   Wrist ext 0/5 5/5   Supination 0/5 5/5   Pronation 0/5 5/5      Strength: (HERBERT Dynamometer in lbs.) Average 3 trials, Position II:     8/8/2019 8/8/2019    Left Right   Rung II 0# n/t#     Pinch Strength (Measured in psi) not tested secondary to no active hand motion left      Fine Motor Coordination: 9 Hole Peg Test not tested secondary to lack of active motion  Left 8/8/2019 Right 8/8/2019         Gross motor coordination:   - SHEILA (Rapid Alternating Movements): n/t left   - Finger to Nose (5 times): intact right  - Finger Flicks (coordination moving from digit flexion to digit extension): intact right     Tone:  Modified Estefanía Scale:   1-  Slight increase in muscle tone, manifested by a catch and release or by minimal resistance at the end of the range of motino when the affected  part(s) is moved in flexion or extension    Comments: Pt has had minimal intervention in therapy therefore joint tightness is present in entire LUE    Sensation:  Jae  reports numbness in left arm  Light touch: bilateral intact  Sharp/Dull: n/t   Kinesthesia: n/t  Proprioception: left intact  Temperature: bilateral intact    Balance:   Static Sit - GOOD+: Takes MAXIMAL challenges from all directions.    Dynamic sit- GOOD: Takes MODERATE challenges from all directions per report  Static Stand - n/t  Dynamic stand - n/t    Endurance Deficit: none                    Functional Status      Functional Mobility: not tested    ADL's:  Feeding: Mod I  Difficulty with cutting foods  Grooming: Mod I  Hygiene: Mod I  UB Dressing: Mod I  LB Dressing: Mod I  Toileting: Mod I  Bathing: Mod I    IADL's:  Homecare: does some sweeping with RUE  Cooking: microwave food or boil egg, make a sandwich  Laundry: Mod I  Yard work: n/a  Use of telephone: Mod I  Money management: I using cash  Medication management: Mod I  Handwriting:right handed  Technology Use: learning    Comments:      CMS Impairment/Limitation/Restriction for FOTO CVA Survey    Therapist reviewed FOTO scores for Jae Millan on 8/8/2019.   FOTO documents entered into [employer] - see Media section.    Limitation Score:   Category: Self Care    Current : CL = least 60% but < 80% impaired, limited or restricted  Goal: CK = at least 40% but < 60% impaired, limited or restricted  Discharge:          Treatment         Home Exercises and Patient Education Provided    Education provided:   -role of OT, goals for OT, scheduling/cancellations, insurance limitations with patient.  -Additional Education provided: verbal and demonstrative instructions    Written Home Exercises Provided: not in written form. Pt was able to demonstrate self range elbow flexion/extension and shoulder flexion in clinic.      Assessment     Jae Millan is a 68 y.o. male referred to outpatient  occupational therapy and presents with a medical diagnosis of Cerebrovascular accident (CVA) due to thrombosis of anterior cerebral artery, unspecified blood vessel laterality resulting in decreased AROM of LUE  and demonstrates limitations as described in the chart below. Following medical record review it is determined that pt will benefit from occupational therapy services in order to maximize pain free and/or functional use of left arm.    Pt prognosis is Fair due to  Length of time from onset (1 year)   Pt will benefit from skilled outpatient Occupational Therapy to address the deficits stated above and in the chart below, provide pt/family education, and to maximize pt's level of independence.     Plan of care discussed with patient: Yes  Pt's spiritual, cultural and educational needs considered and patient is agreeable to the plan of care and goals as stated below:     Anticipated Barriers for therapy: none noted at this time    Medical Necessity is demonstrated by the following  Profile and History Assessment of Occupational Performance Level of Clinical Decision Making Complexity Score   Occupational Profile:   Jae Millan is a 68 y.o. male who lives with his sister and is currently disabled,. Jae Millan has difficulty with  grooming, dressing and cutting  housework/household chores  affecting his/her daily functional abilities. His/her main goal for therapy is  would like to have some movement of my hand and get the swelling out of it.     Comorbidities:   diabetes and HTN    Medical and Therapy History Review:   Brief               Performance Deficits    Physical:  Joint Mobility  Muscle Power/Strength  Muscle Tone  self care    Cognitive:  No Deficits    Psychosocial:    No Deficits     Clinical Decision Making:  moderate    Assessment Process:  Detailed Assessments    Modification/Need for Assistance:  Minimal-Moderate Modifications/Assistance    Intervention Selection:  Several Treatment  Options       low  Based on PMHX, co morbidities , data from assessments and functional level of assistance required with task and clinical presentation directly impacting function.       The following goals were discussed with the patient and patient is in agreement with them as to be addressed in the treatment plan.     Goals:  Short Term Goals: 6 weeks    Independent in HEP for self ROM left arm   Pt to consistently elevate left hand to assist with edema when seated   Pt will perform simple meal for self 3 days a week.     LTG GOALS:  Time frame: 12 weeks  LB dressing will improve to by 10 minutes completion,  Pt will improve his ability to cut using adaptive device,   Bathing will improve by completion in 20 minutes     Simple meal prep will be performed 5 x week    Simple home care will improve to one task 4 x week(sweep, mop, dust, clean sinks)    Plan   Certification Period/Plan of care expiration: 8/8/2019 to 10/31/19    Outpatient Occupational Therapy 3 times weekly for 12 weeks to include the following interventions: Moist Heat/ Ice, Neuromuscular Re-ed, Patient Education, Self Care, Therapeutic Activites and Therapeutic Exercise.    IRLANDA Waddell      I certify the need for these services furnished under this plan of treatment and while under my care.  ____________________________________ Physician/Referring Practitioner   Date of Signature

## 2019-08-12 ENCOUNTER — CLINICAL SUPPORT (OUTPATIENT)
Dept: REHABILITATION | Facility: HOSPITAL | Age: 69
End: 2019-08-12
Attending: INTERNAL MEDICINE
Payer: MEDICARE

## 2019-08-12 DIAGNOSIS — R20.8 DECREASED SENSATION OF LOWER EXTREMITY: ICD-10-CM

## 2019-08-12 DIAGNOSIS — M79.89 SWELLING OF LEFT HAND: ICD-10-CM

## 2019-08-12 DIAGNOSIS — I69.354 HEMIPARESIS AFFECTING LEFT SIDE AS LATE EFFECT OF CEREBROVASCULAR ACCIDENT (CVA): ICD-10-CM

## 2019-08-12 DIAGNOSIS — Z74.1 SELF-CARE DEFICIT IN DRESSING: ICD-10-CM

## 2019-08-12 DIAGNOSIS — M25.60 RANGE OF MOTION DEFICIT: ICD-10-CM

## 2019-08-12 DIAGNOSIS — Z74.09 IMPAIRED FUNCTIONAL MOBILITY, BALANCE, GAIT, AND ENDURANCE: Primary | ICD-10-CM

## 2019-08-12 DIAGNOSIS — R46.0 SELF-CARE DEFICIT FOR BATHING: ICD-10-CM

## 2019-08-12 DIAGNOSIS — R29.898 DECREASED STRENGTH OF UPPER EXTREMITY: ICD-10-CM

## 2019-08-12 PROCEDURE — 97110 THERAPEUTIC EXERCISES: CPT | Mod: PN

## 2019-08-12 PROCEDURE — 97140 MANUAL THERAPY 1/> REGIONS: CPT | Mod: PN

## 2019-08-12 PROCEDURE — 97112 NEUROMUSCULAR REEDUCATION: CPT | Mod: PN

## 2019-08-12 PROCEDURE — 97116 GAIT TRAINING THERAPY: CPT | Mod: PN

## 2019-08-12 NOTE — PROGRESS NOTES
Physical Therapy Daily Treatment Note     Name: Jea Millan  [facility] Number: [id number]    Therapy Diagnosis:   Encounter Diagnoses   Name Primary?    Hemiparesis affecting left side as late effect of cerebrovascular accident (CVA)     Decreased sensation of lower extremity     Impaired functional mobility, balance, gait, and endurance Yes     Physician: Maryjane Farias MD    Visit Date: 8/12/2019    Physician Orders: PT Eval, all treatment after initial eval requires auth  Medical Diagnosis from Referral: Cerebrovascular accident (CVA) due to thrombosis of anterior cerebral artery, unspecified blood vessel laterality  Evaluation Date: 6/6/2019  Authorization Period Expiration: 6/30/2019  Plan of Care Expiration: 9/6/2019  Visit # / Visits authorized: 10/ 12 (+eval)    Time In: 1115  Time Out: 1200  Total Billable Time: 45 minutes    Precautions: Standard and Fall    Subjective     Pt reports: He is doing well. Happy to be seeing OT now.    He was not compliant with HEP due to losing theraband and print out of instructions  Response to previous treatment: no adverse effects to report  Functional change: ongoing    Pain: 0/10  Location: n/a    Objective       Jae received therapeutic exercises to develop strength, endurance, posture and core stabilization for 35 minutes including:    Upright bike, 8 mins, lv 1.0, pt struggles to keep LLE on pedal   Shuttle squats, 2 bands, x30  Shuttle single leg squat, 1.0 bands, x15   Shuttle L LE extension in standing, .5 bands, x15   x30 bridges with green theraband for hip ER and abduction  x30 clam shells with green theraband, B TOÑITO Rowe participated in gait training to improve functional mobility and safety for 10 minutes, including:  (1 lap = ~90')  Ambulation with no UE support x 2 laps with CGA-SBA    Marching between parallel bars with 3 second hold, 3 laps in bars    Home Exercises Provided and Patient Education Provided     Education provided:   - HEP  updated to include sitting exercises    Written Home Exercises Provided: yes. Added sitting hip ext with band, hip abduction with band and ankle pumps  Exercises were reviewed and Jae was able to demonstrate them prior to the end of the session.  Jae demonstrated good  understanding of the education provided.     See EMR under Patient Instructions for exercises provided 6/13/2019.    Assessment   Pt tolerated session fair today. He did seem more fatigued today and required increased time for completion of tasks. He tolerates addition of extension and single leg squats on shuttle well today. Pt continues to demonstrate some instability in the L LE and would continue to benefit from skilled PT services.     Pt has verbalized his transportation issues and has stated he plans to schedule his transportation services a half an hour earlier than usual, which will help him be on time for remainder of appointments to continue progressing with therapy.   Jae is progressing well towards his goals.   Pt prognosis is Good.     Pt will continue to benefit from skilled outpatient physical therapy to address the deficits listed in the problem list box on initial evaluation, provide pt/family education and to maximize pt's level of independence in the home and community environment.     Pt's spiritual, cultural and educational needs considered and pt agreeable to plan of care and goals.     Anticipated barriers to physical therapy: none    Goals: 8/5/19  Short Term Goals: 5 weeks   1. Pt will be able to complete 10 sit to  30 seconds for increased mobility. MET 8/5/2019  2. Pt will be able to complete TUG in less than 18 seconds in order to decrease his risk for fall. MET 7/5/19- 11.08 sec with small based quad cane, 13.55 sec w/o AD  3. Pt will be compliant with HEP and medicine management. ongoing  4. Pt will be able to ambulate >300' with/without AD and mod I. MET 7/5/19- pt can ambulate at least 300 ft with  small based quad cane      Long Term Goals: 10 weeks   1. Pt will complete TUG in less than 15 seconds in order to decrease his risk for fall. MET 7/5/2019  2. Pt will be able to stand on B LE in SLS for >/= 5 seconds in order to decrease his risk for fall. Ongoing  3. Pt will be able to ascend/decend 5 stairs with 1 hand rail and supervision without cues for safety. MET 8/5/2019  4. Pt will tolerate standing activities for >10 minutes in order to increase his endurance. MET 7/5/2019  5. NEW GOAL: Pt will improve score on FGA to >/= 22 in order to decrease his risk for fall.     Plan     Practice single leg balance activities, standing balance, and standing strengthening exercises to progress gait without AD    Oc Schroeder, PT    8/12/2019

## 2019-08-12 NOTE — PROGRESS NOTES
"  Occupational Therapy Daily Treatment Note     Date: 8/12/2019  Name: Jae Millan  Clinic Number: [id number]    Therapy Diagnosis:   Encounter Diagnoses   Name Primary?    Self-care deficit in dressing     Self-care deficit for bathing     Range of motion deficit     Swelling of left hand     Decreased strength of upper extremity      Physician: Maryjane Farias MD       Physician Orders: Eval and treat  Medical Diagnosis: Cerebrovascular accident (CVA) due to thrombosis of anterior cerebral artery, unspecified blood vessel laterality  Evaluation Date: 8/8/2019  Plan of Care Expiration Period: 8/8/19 to 10/31/19  Insurance Authorization period Expiration: 12/31/19  Date of Return to MD: 8/29/19  FOTO: 5th    Visit # / Visits authorized: 2 / 20  Time In:10:40am (10 min late)  Time Out: 11:15am  Total Billable Time: 35 minutes    Precautions:  Standard, Diabetes and HTN      Subjective     Pt reports: "Feeling ok, Hand is really swollen today and transportation was running a bit late"   he will be compliant with home exercise program given this session.   Response to previous treatment:  Functional change: ongoing    Pain: 0/10  Location: left shoulder      Objective     Jae received the following manual therapy techniques for 20 minutes:   -Edema management of L UE. Distal to proximal  - Slough removal on L hand/digits for increased hand hygeine      Jae participated in neuromuscular re-education activities to improve: Coordination, Kinesthetic and Proprioception for 15 minutes. The following activities were included:  -Self-ROM L UE table slides forward,left, and right 2x10 with 5sec holds at end range      Home Exercises and Education Provided     Education provided:   - HEP review  - Progress towards goals     Written Home Exercises Provided: yes.  Exercises were reviewed and Jae was able to demonstrate them prior to the end of the session.  Jae demonstrated good  understanding of the HEP " provided.   .   See EMR under Patient Instructions for exercises provided 8/12/2019.        Assessment      Jae arrived to treatment this date with no new c/o. He tolerated edema management and slough removal well. He verbalized an understanding of the importance of hand hygiene and self-massage for his edema.      Jae is progressing well towards his goals and there are no updates to goals at this time. Pt prognosis is Fair.     Pt will continue to benefit from skilled outpatient occupational therapy to address the deficits listed in the problem list on initial evaluation provide pt/family education and to maximize pt's level of independence in the home and community environment.     Anticipated barriers to occupational therapy: timeframe of current condition    Pt's spiritual, cultural and educational needs considered and pt agreeable to plan of care and goals.    Goals:  Short Term Goals: 6 weeks    Independent in HEP for self ROM left arm   Pt to consistently elevate left hand to assist with edema when seated   Pt will perform simple meal for self 3 days a week.      LTG GOALS:  Time frame: 12 weeks  LB dressing will improve to by 10 minutes completion,  Pt will improve his ability to cut using adaptive device,   Bathing will improve by completion in 20 minutes     Simple meal prep will be performed 5 x week    Simple home care will improve to one task 4 x week(sweep, mop, dust, clean sinks)    Plan   Cont. With established POC  Updates/Grading for next session: as tolerated      IRLANDA Galicia

## 2019-08-13 NOTE — PROGRESS NOTES
Physical Therapy Daily Treatment Note     Name: Jae Millan  [facility] Number: [id number]    Therapy Diagnosis:   Encounter Diagnoses   Name Primary?    Hemiparesis affecting left side as late effect of cerebrovascular accident (CVA)     Decreased sensation of lower extremity     Impaired functional mobility, balance, gait, and endurance Yes     Physician: Maryjane Farias MD    Visit Date: 8/14/2019    Physician Orders: PT Eval, all treatment after initial eval requires auth  Medical Diagnosis from Referral: Cerebrovascular accident (CVA) due to thrombosis of anterior cerebral artery, unspecified blood vessel laterality  Evaluation Date: 6/6/2019  Authorization Period Expiration: 6/30/2019  Plan of Care Expiration: 9/6/2019  Visit # / Visits authorized: 11/ 12 (+eval)   POC due: 9/5/2019    Time In: 1114  Time Out: 1159  Total Billable Time: 45 minutes    Precautions: Standard and Fall    Subjective     Pt reports: He is doing well. Pt reports that he goes back and forth between two homes .     He was semi-compliant with HEP.  Response to previous treatment: no adverse effects to report  Functional change: ongoing    Pain: 0/10  Location: n/a    Objective       Jae received therapeutic exercises to develop strength, endurance, posture and core stabilization for 35 minutes including:    Upright bike, 8 mins, lv 1.0  Shuttle squats, 2 bands, x30  Shuttle single leg squat, 1.0 bands, x20   Shuttle jumping, 1.5 bands, x15  Shuttle L LE extension in standing, .5 bands, x15   Matrix HS curls, 15#, 3x10   Matrix LAQ, 10#, 3x10    Jae participated in gait training to improve functional mobility and safety for 10 minutes, including:  (1 lap = ~90')  Ambulation with no UE support x 2 laps with SBA, pt demonstrates step through gait during ambulation and fair alysha  Side stepping .5 lap in each direction, increased time for completion and cues to keep feet facing sideways, SBA    Home Exercises Provided and  Patient Education Provided     Education provided:   - HEP updated to include sitting exercises    Written Home Exercises Provided: yes. Added sitting hip ext with band, hip abduction with band and ankle pumps  Exercises were reviewed and Jae was able to demonstrate them prior to the end of the session.  Jae demonstrated good  understanding of the education provided.     See EMR under Patient Instructions for exercises provided 6/13/2019.    Assessment   Pt tolerated session well today. PT added use of machines to increase L LE strength today, pt tolerated this addition well but struggles to produce full ROM into knee extension. Pt continues to do well with ambulatory tasks and remains appropriate for skilled PT services.      Pt has verbalized his transportation issues and has stated he plans to schedule his transportation services a half an hour earlier than usual, which will help him be on time for remainder of appointments to continue progressing with therapy.   Jae is progressing well towards his goals.   Pt prognosis is Good.     Pt will continue to benefit from skilled outpatient physical therapy to address the deficits listed in the problem list box on initial evaluation, provide pt/family education and to maximize pt's level of independence in the home and community environment.     Pt's spiritual, cultural and educational needs considered and pt agreeable to plan of care and goals.     Anticipated barriers to physical therapy: none    Goals: 8/5/19  Short Term Goals: 5 weeks   1. Pt will be able to complete 10 sit to  30 seconds for increased mobility. MET 8/5/2019  2. Pt will be able to complete TUG in less than 18 seconds in order to decrease his risk for fall. MET 7/5/19- 11.08 sec with small based quad cane, 13.55 sec w/o AD  3. Pt will be compliant with HEP and medicine management. ongoing  4. Pt will be able to ambulate >300' with/without AD and mod I. MET 7/5/19- pt can ambulate at  least 300 ft with small based quad cane      Long Term Goals: 10 weeks   1. Pt will complete TUG in less than 15 seconds in order to decrease his risk for fall. MET 7/5/2019  2. Pt will be able to stand on B LE in SLS for >/= 5 seconds in order to decrease his risk for fall. Ongoing  3. Pt will be able to ascend/decend 5 stairs with 1 hand rail and supervision without cues for safety. MET 8/5/2019  4. Pt will tolerate standing activities for >10 minutes in order to increase his endurance. MET 7/5/2019  5. NEW GOAL: Pt will improve score on FGA to >/= 22 in order to decrease his risk for fall.     Plan     Practice single leg balance activities, standing balance, and standing strengthening exercises to progress gait without AD    Oc Schroeder, PT    8/14/2019

## 2019-08-14 ENCOUNTER — CLINICAL SUPPORT (OUTPATIENT)
Dept: REHABILITATION | Facility: HOSPITAL | Age: 69
End: 2019-08-14
Attending: INTERNAL MEDICINE
Payer: MEDICARE

## 2019-08-14 DIAGNOSIS — Z74.09 IMPAIRED FUNCTIONAL MOBILITY, BALANCE, GAIT, AND ENDURANCE: Primary | ICD-10-CM

## 2019-08-14 DIAGNOSIS — R29.898 DECREASED STRENGTH OF UPPER EXTREMITY: ICD-10-CM

## 2019-08-14 DIAGNOSIS — R20.8 DECREASED SENSATION OF LOWER EXTREMITY: ICD-10-CM

## 2019-08-14 DIAGNOSIS — Z74.1 SELF-CARE DEFICIT IN DRESSING: ICD-10-CM

## 2019-08-14 DIAGNOSIS — R46.0 SELF-CARE DEFICIT FOR BATHING: ICD-10-CM

## 2019-08-14 DIAGNOSIS — M25.60 RANGE OF MOTION DEFICIT: ICD-10-CM

## 2019-08-14 DIAGNOSIS — M79.89 SWELLING OF LEFT HAND: ICD-10-CM

## 2019-08-14 DIAGNOSIS — I69.354 HEMIPARESIS AFFECTING LEFT SIDE AS LATE EFFECT OF CEREBROVASCULAR ACCIDENT (CVA): ICD-10-CM

## 2019-08-14 PROCEDURE — 97140 MANUAL THERAPY 1/> REGIONS: CPT | Mod: PN

## 2019-08-14 PROCEDURE — 97110 THERAPEUTIC EXERCISES: CPT | Mod: PN

## 2019-08-14 PROCEDURE — 97116 GAIT TRAINING THERAPY: CPT | Mod: PN

## 2019-08-14 PROCEDURE — 97014 ELECTRIC STIMULATION THERAPY: CPT | Mod: PN

## 2019-08-14 NOTE — PROGRESS NOTES
"  Occupational Therapy Daily Treatment Note     Date: 8/14/2019  Name: Jae Millan  Clinic Number: [id number]    Therapy Diagnosis:   Encounter Diagnoses   Name Primary?    Self-care deficit in dressing     Self-care deficit for bathing     Range of motion deficit     Swelling of left hand     Decreased strength of upper extremity      Physician: Maryjane Farias MD       Physician Orders: Eval and treat  Medical Diagnosis: Cerebrovascular accident (CVA) due to thrombosis of anterior cerebral artery, unspecified blood vessel laterality  Evaluation Date: 8/8/2019  Plan of Care Expiration Period: 8/8/19 to 10/31/19  Insurance Authorization period Expiration: 12/31/19  Date of Return to MD: 8/29/19  FOTO: 5th    Visit # / Visits authorized: 3 / 20  Time In:10:30am   Time Out: 11:15am  Total Billable Time: 45 minutes    Precautions:  Standard, Diabetes and HTN      Subjective     Pt reports: "Feeling good, my swelling has gone down since last visit"  he will be compliant with home exercise program given this session.   Response to previous treatment:  Functional change: ongoing    Pain: 0/10  Location: left shoulder      Objective     Jae received the following manual therapy techniques for 30 minutes:   -Edema management of L UE. Distal to proximal  - Supine FF, Abd, ER, IR at end range with 30sec holds      Jae participated in ESTIM unattended for 15 minutes  - L long wrist extensors to facilitate wrist extension      Home Exercises and Education Provided     Education provided:   - HEP review  - Progress towards goals     Written Home Exercises Provided: yes.  Exercises were reviewed and Jae was able to demonstrate them prior to the end of the session.  Jae demonstrated good  understanding of the HEP provided.   .   See EMR under Patient Instructions for exercises provided 8/12/2019.        Assessment       Jae arrived to treatment this date with no new c/o. He tolerated edema management and " PROM well. He cont's with ROM and strength deficits limiting his full participation in ADL/IADL tasks.     Jae is progressing well towards his goals and there are no updates to goals at this time. Pt prognosis is Fair.     Pt will continue to benefit from skilled outpatient occupational therapy to address the deficits listed in the problem list on initial evaluation provide pt/family education and to maximize pt's level of independence in the home and community environment.     Anticipated barriers to occupational therapy: timeframe of current condition    Pt's spiritual, cultural and educational needs considered and pt agreeable to plan of care and goals.    Goals:  Short Term Goals: 6 weeks    Independent in HEP for self ROM left arm   Pt to consistently elevate left hand to assist with edema when seated   Pt will perform simple meal for self 3 days a week.      LTG GOALS:  Time frame: 12 weeks  LB dressing will improve to by 10 minutes completion,  Pt will improve his ability to cut using adaptive device,   Bathing will improve by completion in 20 minutes     Simple meal prep will be performed 5 x week    Simple home care will improve to one task 4 x week(sweep, mop, dust, clean sinks)    Plan   Cont. With established POC  Updates/Grading for next session: as tolerated      IRLANDA Galicia

## 2019-08-19 ENCOUNTER — OFFICE VISIT (OUTPATIENT)
Dept: FAMILY MEDICINE | Facility: CLINIC | Age: 69
End: 2019-08-19
Payer: MEDICARE

## 2019-08-19 ENCOUNTER — CLINICAL SUPPORT (OUTPATIENT)
Dept: REHABILITATION | Facility: HOSPITAL | Age: 69
End: 2019-08-19
Attending: INTERNAL MEDICINE
Payer: MEDICARE

## 2019-08-19 VITALS
TEMPERATURE: 99 F | HEIGHT: 67 IN | RESPIRATION RATE: 20 BRPM | HEART RATE: 74 BPM | WEIGHT: 207.69 LBS | OXYGEN SATURATION: 96 % | DIASTOLIC BLOOD PRESSURE: 78 MMHG | BODY MASS INDEX: 32.6 KG/M2 | SYSTOLIC BLOOD PRESSURE: 136 MMHG

## 2019-08-19 DIAGNOSIS — M25.60 RANGE OF MOTION DEFICIT: ICD-10-CM

## 2019-08-19 DIAGNOSIS — Z74.1 SELF-CARE DEFICIT IN DRESSING: ICD-10-CM

## 2019-08-19 DIAGNOSIS — M79.89 SWELLING OF LEFT HAND: ICD-10-CM

## 2019-08-19 DIAGNOSIS — Z74.09 IMPAIRED FUNCTIONAL MOBILITY, BALANCE, GAIT, AND ENDURANCE: Primary | ICD-10-CM

## 2019-08-19 DIAGNOSIS — R29.898 DECREASED STRENGTH OF UPPER EXTREMITY: ICD-10-CM

## 2019-08-19 DIAGNOSIS — R20.8 DECREASED SENSATION OF LOWER EXTREMITY: ICD-10-CM

## 2019-08-19 DIAGNOSIS — I69.354 HEMIPARESIS AFFECTING LEFT SIDE AS LATE EFFECT OF CEREBROVASCULAR ACCIDENT (CVA): ICD-10-CM

## 2019-08-19 DIAGNOSIS — L03.011 ACUTE PARONYCHIA OF RIGHT THUMB: Primary | ICD-10-CM

## 2019-08-19 DIAGNOSIS — R46.0 SELF-CARE DEFICIT FOR BATHING: ICD-10-CM

## 2019-08-19 PROCEDURE — 99214 OFFICE O/P EST MOD 30 MIN: CPT | Mod: PBBFAC,PN,25 | Performed by: NURSE PRACTITIONER

## 2019-08-19 PROCEDURE — 97140 MANUAL THERAPY 1/> REGIONS: CPT | Mod: PN

## 2019-08-19 PROCEDURE — 97110 THERAPEUTIC EXERCISES: CPT | Mod: PN

## 2019-08-19 PROCEDURE — 26010 DRAINAGE OF FINGER ABSCESS: CPT | Mod: PBBFAC,PN | Performed by: NURSE PRACTITIONER

## 2019-08-19 PROCEDURE — 99214 OFFICE O/P EST MOD 30 MIN: CPT | Mod: S$PBB,25,, | Performed by: NURSE PRACTITIONER

## 2019-08-19 PROCEDURE — 26010 DRAINAGE OF FINGER ABSCESS: CPT | Mod: S$PBB,RT,, | Performed by: NURSE PRACTITIONER

## 2019-08-19 PROCEDURE — 97116 GAIT TRAINING THERAPY: CPT | Mod: PN

## 2019-08-19 PROCEDURE — 97014 ELECTRIC STIMULATION THERAPY: CPT | Mod: PN

## 2019-08-19 PROCEDURE — 26010 INCISION & DRAINAGE: ICD-10-PCS | Mod: S$PBB,RT,, | Performed by: NURSE PRACTITIONER

## 2019-08-19 PROCEDURE — 99999 PR PBB SHADOW E&M-EST. PATIENT-LVL IV: ICD-10-PCS | Mod: PBBFAC,,, | Performed by: NURSE PRACTITIONER

## 2019-08-19 PROCEDURE — 99999 PR PBB SHADOW E&M-EST. PATIENT-LVL IV: CPT | Mod: PBBFAC,,, | Performed by: NURSE PRACTITIONER

## 2019-08-19 PROCEDURE — 99214 PR OFFICE/OUTPT VISIT, EST, LEVL IV, 30-39 MIN: ICD-10-PCS | Mod: S$PBB,25,, | Performed by: NURSE PRACTITIONER

## 2019-08-19 RX ORDER — DOXYCYCLINE HYCLATE 100 MG
100 TABLET ORAL 2 TIMES DAILY
Qty: 14 TABLET | Refills: 0 | Status: SHIPPED | OUTPATIENT
Start: 2019-08-19 | End: 2019-09-30

## 2019-08-19 NOTE — PROCEDURES
"Incision & Drainage  Date/Time: 8/19/2019 1:50 PM  Performed by: Vinay Alvarez NP  Authorized by: Vinay Alvarez NP     Time out: Immediately prior to procedure a "time out" was called to verify the correct patient, procedure, equipment, support staff and site/side marked as required.    Consent Done?:  Yes (Verbal)    Type:  Abscess  Body area:  Upper extremity  Location details:  Right thumb  Scalpel size: poked with 25 G needle.  Complexity:  Simple  Drainage:  Pus  Drainage amount:  Scant  Wound treatment:  Drainage  Patient tolerance:  Patient tolerated the procedure well with no immediate complications    The patient is paronychia was drained via sterile fashion with two pokes from a 25 gauge needle.  Purulent drainage was expressed with no blood.  Patient tolerated procedure well.      "

## 2019-08-19 NOTE — PROGRESS NOTES
Physical Therapy Daily Treatment Note     Name: Jae Millan  [facility] Number: [id number]    Therapy Diagnosis:   Encounter Diagnoses   Name Primary?    Hemiparesis affecting left side as late effect of cerebrovascular accident (CVA)     Decreased sensation of lower extremity     Impaired functional mobility, balance, gait, and endurance Yes     Physician: Maryjane Farias MD    Visit Date: 8/19/2019    Physician Orders: PT Eval, all treatment after initial eval requires auth  Medical Diagnosis from Referral: Cerebrovascular accident (CVA) due to thrombosis of anterior cerebral artery, unspecified blood vessel laterality  Evaluation Date: 6/6/2019  Authorization Period Expiration: 6/30/2019  Plan of Care Expiration: 9/6/2019  Visit # / Visits authorized: 12/ 12 (+eval)   POC due: 9/5/2019    Time In: 1031  Time Out: 1116  Total Billable Time: 45 minutes    Precautions: Standard and Fall    Subjective     Pt reports: He is doing well. Pt had a good weekend. No new complaints.    He was semi-compliant with HEP.  Response to previous treatment: no adverse effects to report  Functional change: ongoing    Pain: 0/10  Location: n/a    Objective       Jae received therapeutic exercises to develop strength, endurance, posture and core stabilization for 35 minutes including:    SciFit StepOne, 8 mins, lv 3.0, B LE & R UE  Shuttle L LE extension in standing, .5 bands, x15   Matrix HS curls, 15#, 3x10, L LE only   Matrix LAQ with 3 second hold at top, 10#, 3x10, L LE only  Matrix hip abduction, 3 x15, 35#    Not performed today:  Shuttle squats, 2 bands, x30  Shuttle single leg squat, 1.0 bands, x20   Shuttle jumping, 1.5 bands, x15    Jae participated in gait training to improve functional mobility and safety for 10 minutes, including:  Ladder drills:    2 laps forward walking, 2 feet in each square, SBA   2 laps side stepping in ea direction, SBA   1 lap zig zag stepping forward and backward through ladder, min  cues and CGA    Home Exercises Provided and Patient Education Provided     Education provided:   - HEP updated to include sitting exercises    Written Home Exercises Provided: yes. Added sitting hip ext with band, hip abduction with band and ankle pumps  Exercises were reviewed and Jae was able to demonstrate them prior to the end of the session.  Jae demonstrated good  understanding of the education provided.     See EMR under Patient Instructions for exercises provided 6/13/2019.    Assessment   Pt tolerated session well today. Pt tolerated use of ladder drills for increased ambulatory balance well today. He ambulates throughout session without AD but uses SBQC to enter and exit session. Pt continues to do well with therapeutic exercises to increase his strength and endurance in L LE. Pt continues to remain appropriate for skilled PT services.       Pt has verbalized his transportation issues and has stated he plans to schedule his transportation services a half an hour earlier than usual, which will help him be on time for remainder of appointments to continue progressing with therapy.   Jae is progressing well towards his goals.   Pt prognosis is Good.     Pt will continue to benefit from skilled outpatient physical therapy to address the deficits listed in the problem list box on initial evaluation, provide pt/family education and to maximize pt's level of independence in the home and community environment.     Pt's spiritual, cultural and educational needs considered and pt agreeable to plan of care and goals.     Anticipated barriers to physical therapy: none    Goals: 8/5/19  Short Term Goals: 5 weeks   1. Pt will be able to complete 10 sit to  30 seconds for increased mobility. MET 8/5/2019  2. Pt will be able to complete TUG in less than 18 seconds in order to decrease his risk for fall. MET 7/5/19- 11.08 sec with small based quad cane, 13.55 sec w/o AD  3. Pt will be compliant with HEP  and medicine management. ongoing  4. Pt will be able to ambulate >300' with/without AD and mod I. MET 7/5/19- pt can ambulate at least 300 ft with small based quad cane      Long Term Goals: 10 weeks   1. Pt will complete TUG in less than 15 seconds in order to decrease his risk for fall. MET 7/5/2019  2. Pt will be able to stand on B LE in SLS for >/= 5 seconds in order to decrease his risk for fall. Ongoing  3. Pt will be able to ascend/decend 5 stairs with 1 hand rail and supervision without cues for safety. MET 8/5/2019  4. Pt will tolerate standing activities for >10 minutes in order to increase his endurance. MET 7/5/2019  5. NEW GOAL: Pt will improve score on FGA to >/= 22 in order to decrease his risk for fall.     Plan     Practice single leg balance activities, standing balance, and standing strengthening exercises to progress gait without AD    Oc Schroeder, PT  8/19/2019

## 2019-08-19 NOTE — PROGRESS NOTES
Routine Office Visit    Patient Name: Jae Millan    : 1950  MRN: [id number]    Chief Complaint:  Paronychia    Subjective:  Jae is a 68 y.o. male who presents today for:    1.  Paronychia - patient reports today for a paronychia on his rightt thumb.  States that it started 3 days ago but worsened yesterday.  He has a history of diabetes and is concerned about developing an infection.  The pain feels like a toothache and is aching.  Denies any fevers, chills, shortness of breath, chest pain, wheezing, palpitations, coughing, or other alarm symptoms.  All he tried was hot and cold water which he states did not help.  The thumb is painful and has some numbness on the thumb but no tingling no weakness and no loss of range of motion to the thumb.  Denies any nausea, vomiting, or diarrhea.  Denies any other concerning signs or symptoms.    Past Medical History  Past Medical History:   Diagnosis Date    Cataract     Diabetes mellitus     Glaucoma     Hypertension        Past Surgical History  Past Surgical History:   Procedure Laterality Date    UNDESCENDED TESTICLE EXPLORATION Right     R testicle removed as it was undescended       Family History  Family History   Problem Relation Age of Onset    Heart disease Brother         cabg    Hypertension Sister     Amblyopia Neg Hx     Blindness Neg Hx     Cataracts Neg Hx     Glaucoma Neg Hx     Macular degeneration Neg Hx     Strabismus Neg Hx     Retinal detachment Neg Hx     Diabetes Neg Hx        Social History  Social History     Socioeconomic History    Marital status: Single     Spouse name: Not on file    Number of children: Not on file    Years of education: Not on file    Highest education level: Not on file   Occupational History    Not on file   Social Needs    Financial resource strain: Not on file    Food insecurity:     Worry: Not on file     Inability: Not on file    Transportation needs:     Medical: Not on file      Non-medical: Not on file   Tobacco Use    Smoking status: Former Smoker     Packs/day: 1.00     Years: 18.00     Pack years: 18.00     Types: Cigarettes     Last attempt to quit: 1986     Years since quittin.3    Smokeless tobacco: Never Used   Substance and Sexual Activity    Alcohol use: Never     Frequency: Never    Drug use: Never    Sexual activity: Not Currently   Lifestyle    Physical activity:     Days per week: Not on file     Minutes per session: Not on file    Stress: Not on file   Relationships    Social connections:     Talks on phone: Not on file     Gets together: Not on file     Attends [employer] service: Not on file     Active member of club or organization: Not on file     Attends meetings of clubs or organizations: Not on file     Relationship status: Not on file   Other Topics Concern    Not on file   Social History Narrative    Not on file       Current Medications  Current Outpatient Medications on File Prior to Visit   Medication Sig Dispense Refill    amLODIPine (NORVASC) 10 MG tablet Take 1 tablet (10 mg total) by mouth once daily. 90 tablet 3    aspirin 81 MG Chew Take 1 tablet (81 mg total) by mouth once daily.      blood-glucose meter kit Check glucose daily E11.9 meter covered by insurance 1 each 0    dorzolamide-timolol 2-0.5% (COSOPT) 22.3-6.8 mg/mL ophthalmic solution Place 1 drop into the left eye 2 (two) times daily. 10 mL 12    ergocalciferol (ERGOCALCIFEROL) 50,000 unit Cap One weekly for 8 weeks then 2,000 IU daily thereafter 8 capsule 0    losartan-hydrochlorothiazide 100-12.5 mg (HYZAAR) 100-12.5 mg Tab Take 1 tablet by mouth once daily.      metFORMIN (GLUCOPHAGE) 1000 MG tablet Take 1 tablet (1,000 mg total) by mouth 2 (two) times daily with meals. 180 tablet 3    rosuvastatin (CRESTOR) 20 MG tablet Take 1 tablet (20 mg total) by mouth once daily. 90 tablet 3    SITagliptin (JANUVIA) 100 MG Tab Take 1 tablet (100 mg total) by mouth once daily. 90  "tablet 3    blood sugar diagnostic Strp Strips and lancets covered by insurance E11.9, Check glucose daily, accu check FREDERICK PLUS 100 each 3     No current facility-administered medications on file prior to visit.        Allergies   Review of patient's allergies indicates:  No Known Allergies    Review of Systems (Pertinent positives)  Review of Systems   Constitutional: Negative for chills, diaphoresis, fever, malaise/fatigue and weight loss.   HENT: Negative.    Eyes: Negative for blurred vision, double vision, photophobia, pain, discharge and redness.   Respiratory: Negative for cough, hemoptysis, sputum production, shortness of breath and wheezing.    Cardiovascular: Negative for chest pain, palpitations, orthopnea, claudication, leg swelling and PND.   Gastrointestinal: Negative.    Genitourinary: Negative.    Musculoskeletal: Negative.    Skin: Negative.    Neurological: Negative.    Endo/Heme/Allergies: Negative.    Psychiatric/Behavioral: Negative.        /78 (BP Location: Right arm, Patient Position: Sitting, BP Method: X-Large (Manual))   Pulse 74   Temp 98.5 °F (36.9 °C) (Oral)   Resp 20   Ht 5' 7" (1.702 m)   Wt 94.2 kg (207 lb 10.8 oz)   SpO2 96%   BMI 32.53 kg/m²     Physical Exam   Constitutional: He is oriented to person, place, and time. He appears well-developed and well-nourished. No distress.   Eyes: Pupils are equal, round, and reactive to light. Conjunctivae and EOM are normal.   Neck: Normal range of motion. Neck supple.   Cardiovascular: Normal rate, regular rhythm, normal heart sounds and intact distal pulses. Exam reveals no gallop and no friction rub.   No murmur heard.  Pulmonary/Chest: Effort normal and breath sounds normal. No stridor. No respiratory distress. He has no wheezes. He has no rales. He exhibits no tenderness.   Abdominal: Soft. Bowel sounds are normal. He exhibits no distension and no mass. There is no tenderness. There is no rebound and no guarding. No " hernia.   Musculoskeletal: Normal range of motion.        Hands:  Neurological: He is alert and oriented to person, place, and time.   Skin: Skin is warm and dry. Capillary refill takes less than 2 seconds. He is not diaphoretic.                Assessment/Plan:  Jae Millan is a 68 y.o. male who presents today for :    Jae was seen today for finger infection.    Diagnoses and all orders for this visit:    Acute paronychia of right thumb  -     doxycycline (VIBRA-TABS) 100 MG tablet; Take 1 tablet (100 mg total) by mouth 2 (two) times daily.  -     Incision & Drainage     The patient's paronychia was drained with a 25 gauge needle via sterile fashion.  See procedural note for complete details.  Patient tolerated procedure well.  Encouraged patient to soak his thumb and warm salt water 4 to 5 times a day for 10-15 minutes to help clean the area.  Use over-the-counter Tylenol for any pain. Can also use warm compresses.  Will send doxycycline to be taken since patient has diabetes and is at risk for MRSA.  Warned patient about risk of photosensitivity with doxycycline and 1 patient to wear sunscreen while outside on this medication.  Patient is to follow-up if symptoms recur as he may need referral to Orthopedics if so.  Other return precautions and e.d. precautions were given for the patient.  Patient verbalized understanding of instructions.        This office note has been dictated.  This dictation has been generated using M-Modal Fluency Direct dictation; some phonetic errors may occur.   My collaborating physician is Dr. Anjel Luz.

## 2019-08-19 NOTE — PROGRESS NOTES
"  Occupational Therapy Daily Treatment Note     Date: 8/19/2019  Name: Jae Millan  Clinic Number: [id number]    Therapy Diagnosis:   Encounter Diagnoses   Name Primary?    Self-care deficit in dressing     Self-care deficit for bathing     Range of motion deficit     Swelling of left hand     Decreased strength of upper extremity      Physician: Maryjane Farias MD       Physician Orders: Eval and treat  Medical Diagnosis: Cerebrovascular accident (CVA) due to thrombosis of anterior cerebral artery, unspecified blood vessel laterality  Evaluation Date: 8/8/2019  Plan of Care Expiration Period: 8/8/19 to 10/31/19  Insurance Authorization period Expiration: 12/31/19  Date of Return to MD: 8/29/19  FOTO: 5th    Visit # / Visits authorized: 4 / 20  Time In: 9:55am (10 min late)   Time Out: 10:30am  Total Billable Time: 35 minutes    Precautions:  Standard, Diabetes and HTN      Subjective     Pt reports: "Transportation keeps messing up my schedule. They told me I cancelled when I didn't even call them" "My right thumb is infected"  he was compliant with home exercise program given this session.   Response to previous treatment: positive  Functional change: ongoing    Pain: 0/10  Location: left shoulder      Objective     Jae received the following manual therapy techniques for 15 minutes:   -Edema management of L UE. Distal to proximal    Jae participated in ESTIM unattended for 15 minutes  - L long wrist extensors to facilitate wrist extension x12min with skin prep and set up      Home Exercises and Education Provided     Education provided:   - HEP review  - Progress towards goals     Written Home Exercises Provided: yes.  Exercises were reviewed and Jae was able to demonstrate them prior to the end of the session.  Jae demonstrated good  understanding of the HEP provided.   .   See EMR under Patient Instructions for exercises provided 8/12/2019.        Assessment     Jae arrived to " treatment this date with no new c/o. However, he did have some sort of injury to his Right thumb. He was instructed to go next door and see if a nurse could see him to treat. He tolerated edema management well. Pt. Is having some difficulties organizing his transportation schedule. He cont's with ROM and strength deficits limiting his full participation in ADL/IADL tasks.     Jae is progressing well towards his goals and there are no updates to goals at this time. Pt prognosis is Fair.     Pt will continue to benefit from skilled outpatient occupational therapy to address the deficits listed in the problem list on initial evaluation provide pt/family education and to maximize pt's level of independence in the home and community environment.     Anticipated barriers to occupational therapy: timeframe of current condition    Pt's spiritual, cultural and educational needs considered and pt agreeable to plan of care and goals.    Goals:  Short Term Goals: 6 weeks    Independent in HEP for self ROM left arm MET 8/19/2019   Pt to consistently elevate left hand to assist with edema when seated MET 8/19/2019   Pt will perform simple meal for self 3 days a week. MET 8/19/2019     LTG GOALS:  Time frame: 12 weeks  LB dressing will improve to by 10 minutes completion,  Pt will improve his ability to cut using adaptive device,   Bathing will improve by completion in 20 minutes     Simple meal prep will be performed 5 x week    Simple home care will improve to one task 4 x week(sweep, mop, dust, clean sinks)    Plan   Cont. With established POC  Updates/Grading for next session: LB dressing      IRLANDA Galicia

## 2019-08-21 ENCOUNTER — CLINICAL SUPPORT (OUTPATIENT)
Dept: REHABILITATION | Facility: HOSPITAL | Age: 69
End: 2019-08-21
Attending: INTERNAL MEDICINE
Payer: MEDICARE

## 2019-08-21 DIAGNOSIS — I69.354 HEMIPARESIS AFFECTING LEFT SIDE AS LATE EFFECT OF CEREBROVASCULAR ACCIDENT (CVA): ICD-10-CM

## 2019-08-21 DIAGNOSIS — Z74.09 IMPAIRED FUNCTIONAL MOBILITY, BALANCE, GAIT, AND ENDURANCE: ICD-10-CM

## 2019-08-21 DIAGNOSIS — R20.8 DECREASED SENSATION OF LOWER EXTREMITY: ICD-10-CM

## 2019-08-21 PROCEDURE — 97110 THERAPEUTIC EXERCISES: CPT | Mod: PN

## 2019-08-21 NOTE — PROGRESS NOTES
"  Physical Therapy Daily Treatment Note     Name: Jae Millan  Clinic Number: [id number]    Therapy Diagnosis:   Encounter Diagnoses   Name Primary?    Hemiparesis affecting left side as late effect of cerebrovascular accident (CVA)     Decreased sensation of lower extremity     Impaired functional mobility, balance, gait, and endurance      Physician: Maryjane Farias MD    Visit Date: 8/21/2019    Physician Orders: PT Eval, all treatment after initial eval requires auth  Medical Diagnosis from Referral: Cerebrovascular accident (CVA) due to thrombosis of anterior cerebral artery, unspecified blood vessel laterality  Evaluation Date: 6/6/2019  Authorization Period Expiration: 6/30/2019  Plan of Care Expiration: 9/6/2019  Visit # / Visits authorized: 6/ 12 (total visits: 13)   POC due: 9/5/2019    Time In: 1022  Time Out: 1115  Total Billable Time: 53 minutes    Precautions: Standard and Fall    Subjective     Pt reports: He is doing well. The doctor was able to drain his thumb on Monday and it is feeling much better.     He was semi-compliant with HEP.  Response to previous treatment: no adverse effects to report  Functional change: ongoing    Pain: 0/10  Location: n/a    Objective       Jae received therapeutic exercises to develop strength, endurance, posture and core stabilization for 53 minutes including:    SciFit StepOne, 8 mins, lv 4.0, B LE & R UE  Shuttle L LE extension in standing, .5 bands, x15   Matrix HS curls, 15#, 3x10, L LE only   Matrix LAQ with 3 second hold at top, 10#, 3x10, L LE only  Matrix hip abduction, 3 x15, 35#  Shuttle squats, 2 bands, x30  Shuttle single leg squat, 1.5 bands, x20   Shuttle jumping, 1.5 bands, x20  x10 sit to stand from 18" jump box with R LE placed anteriorly and able to weight bear only     Jae participated in gait training to improve functional mobility and safety for 0 minutes, including:    Home Exercises Provided and Patient Education Provided "     Education provided:   - HEP updated to include sitting exercises    Written Home Exercises Provided: yes. Added sitting hip ext with band, hip abduction with band and ankle pumps  Exercises were reviewed and Jae was able to demonstrate them prior to the end of the session.  Jae demonstrated good  understanding of the education provided.     See EMR under Patient Instructions for exercises provided 6/13/2019.    Assessment   Pt did well in session today. He appeared slightly more fatigued today than usual, taking longer rest breaks between activities. He struggled with balance during the sit<>stand activity but was able to complete with short rest between each rep. He continues to be appropriate for skilled PT Services.        Pt has verbalized his transportation issues and has stated he plans to schedule his transportation services a half an hour earlier than usual, which will help him be on time for remainder of appointments to continue progressing with therapy.   Jae is progressing well towards his goals.   Pt prognosis is Good.     Pt will continue to benefit from skilled outpatient physical therapy to address the deficits listed in the problem list box on initial evaluation, provide pt/family education and to maximize pt's level of independence in the home and community environment.     Pt's spiritual, cultural and educational needs considered and pt agreeable to plan of care and goals.     Anticipated barriers to physical therapy: none    Goals: 8/5/19  Short Term Goals: 5 weeks   1. Pt will be able to complete 10 sit to  30 seconds for increased mobility. MET 8/5/2019  2. Pt will be able to complete TUG in less than 18 seconds in order to decrease his risk for fall. MET 7/5/19- 11.08 sec with small based quad cane, 13.55 sec w/o AD  3. Pt will be compliant with HEP and medicine management. ongoing  4. Pt will be able to ambulate >300' with/without AD and mod I. MET 7/5/19- pt can ambulate  at least 300 ft with small based quad cane      Long Term Goals: 10 weeks   1. Pt will complete TUG in less than 15 seconds in order to decrease his risk for fall. MET 7/5/2019  2. Pt will be able to stand on B LE in SLS for >/= 5 seconds in order to decrease his risk for fall. Ongoing  3. Pt will be able to ascend/decend 5 stairs with 1 hand rail and supervision without cues for safety. MET 8/5/2019  4. Pt will tolerate standing activities for >10 minutes in order to increase his endurance. MET 7/5/2019  5. NEW GOAL: Pt will improve score on FGA to >/= 22 in order to decrease his risk for fall.     Plan     Practice single leg balance activities, standing balance, and standing strengthening exercises to progress gait without AD    Oc Schroeder, PT  8/21/2019

## 2019-08-22 ENCOUNTER — OFFICE VISIT (OUTPATIENT)
Dept: INTERNAL MEDICINE | Facility: CLINIC | Age: 69
End: 2019-08-22
Payer: MEDICARE

## 2019-08-22 ENCOUNTER — LAB VISIT (OUTPATIENT)
Dept: LAB | Facility: HOSPITAL | Age: 69
End: 2019-08-22
Attending: INTERNAL MEDICINE
Payer: MEDICARE

## 2019-08-22 VITALS
BODY MASS INDEX: 32.53 KG/M2 | HEIGHT: 67 IN | SYSTOLIC BLOOD PRESSURE: 152 MMHG | HEART RATE: 67 BPM | DIASTOLIC BLOOD PRESSURE: 86 MMHG | WEIGHT: 207.25 LBS | OXYGEN SATURATION: 97 % | TEMPERATURE: 99 F

## 2019-08-22 DIAGNOSIS — E29.1 HYPOGONADISM IN MALE: ICD-10-CM

## 2019-08-22 DIAGNOSIS — B18.2 CHRONIC HEPATITIS C WITHOUT HEPATIC COMA: ICD-10-CM

## 2019-08-22 DIAGNOSIS — E11.40 TYPE 2 DIABETES MELLITUS WITH DIABETIC NEUROPATHY, WITHOUT LONG-TERM CURRENT USE OF INSULIN: ICD-10-CM

## 2019-08-22 DIAGNOSIS — E11.40 TYPE 2 DIABETES MELLITUS WITH DIABETIC NEUROPATHY, WITHOUT LONG-TERM CURRENT USE OF INSULIN: Primary | ICD-10-CM

## 2019-08-22 DIAGNOSIS — I63.329 CEREBROVASCULAR ACCIDENT (CVA) DUE TO THROMBOSIS OF ANTERIOR CEREBRAL ARTERY, UNSPECIFIED BLOOD VESSEL LATERALITY: ICD-10-CM

## 2019-08-22 LAB
AFP SERPL-MCNC: 7.8 NG/ML (ref 0–8.4)
ESTIMATED AVG GLUCOSE: 163 MG/DL (ref 68–131)
HBA1C MFR BLD HPLC: 7.3 % (ref 4–5.6)

## 2019-08-22 PROCEDURE — 99999 PR PBB SHADOW E&M-EST. PATIENT-LVL V: CPT | Mod: PBBFAC,,, | Performed by: INTERNAL MEDICINE

## 2019-08-22 PROCEDURE — 99999 PR PBB SHADOW E&M-EST. PATIENT-LVL V: ICD-10-PCS | Mod: PBBFAC,,, | Performed by: INTERNAL MEDICINE

## 2019-08-22 PROCEDURE — 82105 ALPHA-FETOPROTEIN SERUM: CPT

## 2019-08-22 PROCEDURE — 99215 OFFICE O/P EST HI 40 MIN: CPT | Mod: PBBFAC,PO | Performed by: INTERNAL MEDICINE

## 2019-08-22 PROCEDURE — 99214 OFFICE O/P EST MOD 30 MIN: CPT | Mod: S$PBB,,, | Performed by: INTERNAL MEDICINE

## 2019-08-22 PROCEDURE — 36415 COLL VENOUS BLD VENIPUNCTURE: CPT | Mod: PO

## 2019-08-22 PROCEDURE — 99214 PR OFFICE/OUTPT VISIT, EST, LEVL IV, 30-39 MIN: ICD-10-PCS | Mod: S$PBB,,, | Performed by: INTERNAL MEDICINE

## 2019-08-22 PROCEDURE — 83036 HEMOGLOBIN GLYCOSYLATED A1C: CPT

## 2019-08-23 ENCOUNTER — TELEPHONE (OUTPATIENT)
Dept: HEPATOLOGY | Facility: CLINIC | Age: 69
End: 2019-08-23

## 2019-08-23 NOTE — TELEPHONE ENCOUNTER
----- Message from Leia Dobbins sent at 8/23/2019  8:50 AM CDT -----  Contact: Dr Farias office       ----- Message -----  From: Garima Torres  Sent: 8/22/2019   3:44 PM  To: Hepatology Scheduling    Patient Requesting Sooner Appointment.     Reason for sooner appt.: pt is being referred to be seen for hepatitis C by Dr Farias   When is the first available appointment? N/A  Communication Preference: can you please call pt at [phone]  Additional Information: none    JASWINDER

## 2019-08-23 NOTE — PROGRESS NOTES
"  Occupational Therapy Daily Treatment Note     Date: 8/26/2019  Name: Jae Millan  Clinic Number: [id number]    Therapy Diagnosis:   Encounter Diagnoses   Name Primary?    Self-care deficit in dressing     Self-care deficit for bathing     Range of motion deficit     Swelling of left hand     Decreased strength of upper extremity      Physician: Maryjane Farias MD       Physician Orders: Eval and treat  Medical Diagnosis: Cerebrovascular accident (CVA) due to thrombosis of anterior cerebral artery, unspecified blood vessel laterality  Evaluation Date: 8/8/2019  Plan of Care Expiration Period: 8/8/19 to 10/31/19  Insurance Authorization period Expiration: 12/31/19  Date of Return to MD: 8/29/19  FOTO: 8/26/2019: 47% limited in Mobility    Visit # / Visits authorized: 5 / 20  Time In: 10:15am   Time Out: 11:15am  Total Billable Time: 60 minutes    Precautions:  Standard, Diabetes and HTN      Subjective     Pt reports: "Feeling ok, just tight in my shoulder"  he was compliant with home exercise program given this session.   Response to previous treatment: positive  Functional change: ongoing    Pain: 0/10  Location: left shoulder      Objective     Jae received the following manual therapy techniques for 15 minutes:   - Edema management of L UE. Distal to proximal    Jae participated in therapeutic exercises for 15 minutes  - Nustep x15 min with L hand secured for increased dressing endurance and strength, assistance needed for set up and safe transfer    Jae participate din therapeutic activities for 15 minutes  - LB dressing with socks and shoes utilizing leonardo techniques  - Discussed importance of hand and foot hygiene     Jae participated in ESTIM unattended   - Spanish L long wrist extensors to facilitate wrist extension x15min with skin prep and set up      Home Exercises and Education Provided     Education provided:   - HEP review  - Progress towards goals     Written Home Exercises " Provided: yes.  Exercises were reviewed and Jae was able to demonstrate them prior to the end of the session.  Jae demonstrated good  understanding of the HEP provided.   .   See EMR under Patient Instructions for exercises provided 8/12/2019.        Assessment     Pt. With good tolerance to tx this date. Edema seemed increased this date but improved with manual therapy. Shoulder ROM grossly improved after nustep. PT. Was I with socks/shoes and required no verbal cues for technique. He was instructed to dress at the edge of his bed vs the edge of his bathtub for safety. He cont's with ROM and strength deficits limiting his full participation in ADL/IADL tasks.     Jae is progressing well towards his goals and there are no updates to goals at this time. Pt prognosis is Fair.     Pt will continue to benefit from skilled outpatient occupational therapy to address the deficits listed in the problem list on initial evaluation provide pt/family education and to maximize pt's level of independence in the home and community environment.     Anticipated barriers to occupational therapy: timeframe of current condition    Pt's spiritual, cultural and educational needs considered and pt agreeable to plan of care and goals.    Goals:  Short Term Goals: 6 weeks    Independent in HEP for self ROM left arm MET 8/19/2019   Pt to consistently elevate left hand to assist with edema when seated MET 8/19/2019   Pt will perform simple meal for self 3 days a week. MET 8/19/2019     LTG GOALS:  Time frame: 12 weeks  LB dressing will improve to by 10 minutes completion MET 8/26/2019  Pt will improve his ability to cut using adaptive device,   Bathing will improve by completion in 20 minutes     Simple meal prep will be performed 5 x week    Simple home care will improve to one task 4 x week(sweep, mop, dust, clean sinks)    Plan   Cont. With established POC  Updates/Grading for next session: LB dressing      Nirmal Wood,  LOTR

## 2019-08-26 ENCOUNTER — CLINICAL SUPPORT (OUTPATIENT)
Dept: REHABILITATION | Facility: HOSPITAL | Age: 69
End: 2019-08-26
Attending: INTERNAL MEDICINE
Payer: MEDICARE

## 2019-08-26 DIAGNOSIS — R46.0 SELF-CARE DEFICIT FOR BATHING: ICD-10-CM

## 2019-08-26 DIAGNOSIS — R29.898 DECREASED STRENGTH OF UPPER EXTREMITY: ICD-10-CM

## 2019-08-26 DIAGNOSIS — M79.89 SWELLING OF LEFT HAND: ICD-10-CM

## 2019-08-26 DIAGNOSIS — Z74.09 IMPAIRED FUNCTIONAL MOBILITY, BALANCE, GAIT, AND ENDURANCE: ICD-10-CM

## 2019-08-26 DIAGNOSIS — M25.60 RANGE OF MOTION DEFICIT: ICD-10-CM

## 2019-08-26 DIAGNOSIS — Z74.1 SELF-CARE DEFICIT IN DRESSING: ICD-10-CM

## 2019-08-26 DIAGNOSIS — I69.354 HEMIPARESIS AFFECTING LEFT SIDE AS LATE EFFECT OF CEREBROVASCULAR ACCIDENT (CVA): Primary | ICD-10-CM

## 2019-08-26 DIAGNOSIS — R20.8 DECREASED SENSATION OF LOWER EXTREMITY: ICD-10-CM

## 2019-08-26 PROCEDURE — 97112 NEUROMUSCULAR REEDUCATION: CPT | Mod: PN

## 2019-08-26 PROCEDURE — 97014 ELECTRIC STIMULATION THERAPY: CPT | Mod: PN

## 2019-08-26 PROCEDURE — 97140 MANUAL THERAPY 1/> REGIONS: CPT | Mod: PN

## 2019-08-26 PROCEDURE — 97110 THERAPEUTIC EXERCISES: CPT | Mod: PN

## 2019-08-26 PROCEDURE — 97116 GAIT TRAINING THERAPY: CPT | Mod: PN,59

## 2019-08-26 PROCEDURE — 97530 THERAPEUTIC ACTIVITIES: CPT | Mod: PN

## 2019-08-26 NOTE — PROGRESS NOTES
Subjective:       Patient ID: Jae Millan is a 68 y.o. male.    Chief Complaint: Follow-up    HPIPt has not been able to get his strips and lancets for his meter.  Needs brace for L hand. No CP or SOB. No GI issues. We discussed hypogonadism and Hep C.  Review of Systems   Respiratory: Negative for shortness of breath.    Cardiovascular: Negative for chest pain.   Gastrointestinal: Negative for abdominal pain, diarrhea, nausea and vomiting.   Genitourinary: Negative for dysuria.   Neurological: Negative for seizures, syncope and headaches.       Objective:      Physical Exam   Constitutional: He is oriented to person, place, and time. He appears well-developed and well-nourished. No distress.   HENT:   Mouth/Throat: Oropharynx is clear and moist.   Neck: Neck supple. No JVD present. No thyromegaly present.   Cardiovascular: Normal rate, regular rhythm, normal heart sounds and intact distal pulses. Exam reveals no gallop and no friction rub.   No murmur heard.  Pulmonary/Chest: Effort normal and breath sounds normal. He has no wheezes. He has no rales.   Abdominal: Soft. Bowel sounds are normal. He exhibits no distension and no mass. There is no tenderness. There is no rebound and no guarding.   Musculoskeletal: He exhibits no edema.   Lymphadenopathy:     He has no cervical adenopathy.   Neurological: He is alert and oriented to person, place, and time.   Skin: Skin is warm and dry.   Psychiatric: He has a normal mood and affect. His behavior is normal. Judgment and thought content normal.       Assessment:       1. Type 2 diabetes mellitus with diabetic neuropathy, without long-term current use of insulin    2. Chronic hepatitis C without hepatic coma    3. Hypogonadism in male    4. Cerebrovascular accident (CVA) due to thrombosis of anterior cerebral artery, unspecified blood vessel laterality        Plan:   Type 2 diabetes mellitus with diabetic neuropathy, without long-term current use of insulin  -      Hemoglobin A1c; Future; Expected date: 08/22/2019  -     Ambulatory consult to Podiatry    Chronic hepatitis C without hepatic coma  -     Ambulatory Referral to Hepatology  -     AFP tumor marker; Future; Expected date: 08/22/2019  -     US Abdomen Complete; Future; Expected date: 08/22/2019    Hypogonadism in male  -     Ambulatory consult to Endocrinology    Cerebrovascular accident (CVA) due to thrombosis of anterior cerebral artery, unspecified blood vessel laterality  -     CANE FOR HOME USE    Continue therapy - try to get strips and lancets - vs judie system

## 2019-08-26 NOTE — PROGRESS NOTES
"  Physical Therapy Daily Treatment Note     Name: Jae Millan  Clinic Number: [id number]    Therapy Diagnosis:   Encounter Diagnoses   Name Primary?    Hemiparesis affecting left side as late effect of cerebrovascular accident (CVA) Yes    Decreased sensation of lower extremity     Impaired functional mobility, balance, gait, and endurance      Physician: Maryjane Farias MD    Visit Date: 8/26/2019    Physician Orders: PT Eval, all treatment after initial eval requires auth  Medical Diagnosis from Referral: Cerebrovascular accident (CVA) due to thrombosis of anterior cerebral artery, unspecified blood vessel laterality  Evaluation Date: 6/6/2019  Authorization Period Expiration: 6/30/2019  Plan of Care Expiration: 9/6/2019  Visit # / Visits authorized: 7/ 12 (total visits: 13)   POC due: 9/5/2019    Time In: 1115  Time Out: 1200  Total Billable Time: 45 minutes    Precautions: Standard and Fall    Subjective     Pt reports: He is doing well. The doctor was able to drain his thumb on Monday and it is feeling much better.     He was semi-compliant with HEP.  Response to previous treatment: no adverse effects to report  Functional change: ongoing    Pain: 0/10  Location: n/a    Objective       Jae received therapeutic exercises to develop strength, endurance, posture and core stabilization for  37 minutes including:    Matrix HS curls, 15#, 3x10, L LE only   Matrix LAQ with 3 second hold at top, 10#, 3x10, L LE only  Matrix hip abduction, 3 x15, 35#  x5 sit to stand from 24" jump box with R LE placed on foam, x5 with R LE placed anteriorly and able to weight bear only on L LE    Jae participated in gait training to improve functional mobility and safety for 8 minutes, including:  Ambulation on treadmill at .7 mph with emphasis on increased step length for 5 minutes    Home Exercises Provided and Patient Education Provided     Education provided:   - HEP updated to include sitting exercises    Written " Home Exercises Provided: yes. Added sitting hip ext with band, hip abduction with band and ankle pumps  Exercises were reviewed and Jae was able to demonstrate them prior to the end of the session.  Jae demonstrated good  understanding of the education provided.     See EMR under Patient Instructions for exercises provided 6/13/2019.    Assessment   Pt did well in session today. He tolerated addition of treadmill training well though required frequent cues to use step through gait. Pt able to increase weight on Matrix machines today. He continues to be appropriate for skilled PT Services.        Pt has verbalized his transportation issues and has stated he plans to schedule his transportation services a half an hour earlier than usual, which will help him be on time for remainder of appointments to continue progressing with therapy.   Jae is progressing well towards his goals.   Pt prognosis is Good.     Pt will continue to benefit from skilled outpatient physical therapy to address the deficits listed in the problem list box on initial evaluation, provide pt/family education and to maximize pt's level of independence in the home and community environment.     Pt's spiritual, cultural and educational needs considered and pt agreeable to plan of care and goals.     Anticipated barriers to physical therapy: none    Goals: 8/5/19  Short Term Goals: 5 weeks   1. Pt will be able to complete 10 sit to  30 seconds for increased mobility. MET 8/5/2019  2. Pt will be able to complete TUG in less than 18 seconds in order to decrease his risk for fall. MET 7/5/19- 11.08 sec with small based quad cane, 13.55 sec w/o AD  3. Pt will be compliant with HEP and medicine management. ongoing  4. Pt will be able to ambulate >300' with/without AD and mod I. MET 7/5/19- pt can ambulate at least 300 ft with small based quad cane      Long Term Goals: 10 weeks   1. Pt will complete TUG in less than 15 seconds in order to  decrease his risk for fall. MET 7/5/2019  2. Pt will be able to stand on B LE in SLS for >/= 5 seconds in order to decrease his risk for fall. Ongoing  3. Pt will be able to ascend/decend 5 stairs with 1 hand rail and supervision without cues for safety. MET 8/5/2019  4. Pt will tolerate standing activities for >10 minutes in order to increase his endurance. MET 7/5/2019  5. NEW GOAL: Pt will improve score on FGA to >/= 22 in order to decrease his risk for fall.     Plan     Practice single leg balance activities, standing balance, and standing strengthening exercises to progress gait without AD    Oc Schroeder, PT  8/26/2019

## 2019-08-27 ENCOUNTER — OFFICE VISIT (OUTPATIENT)
Dept: PHYSICAL MEDICINE AND REHAB | Facility: CLINIC | Age: 69
End: 2019-08-27
Payer: MEDICARE

## 2019-08-27 VITALS
WEIGHT: 218.81 LBS | HEIGHT: 70 IN | DIASTOLIC BLOOD PRESSURE: 90 MMHG | HEART RATE: 62 BPM | SYSTOLIC BLOOD PRESSURE: 158 MMHG | BODY MASS INDEX: 31.32 KG/M2

## 2019-08-27 DIAGNOSIS — I69.354 HEMIPARESIS AFFECTING LEFT SIDE AS LATE EFFECT OF CEREBROVASCULAR ACCIDENT (CVA): Primary | ICD-10-CM

## 2019-08-27 DIAGNOSIS — Z74.09 IMPAIRED FUNCTIONAL MOBILITY, BALANCE, GAIT, AND ENDURANCE: ICD-10-CM

## 2019-08-27 DIAGNOSIS — I63.329 CEREBROVASCULAR ACCIDENT (CVA) DUE TO THROMBOSIS OF ANTERIOR CEREBRAL ARTERY, UNSPECIFIED BLOOD VESSEL LATERALITY: ICD-10-CM

## 2019-08-27 DIAGNOSIS — R47.1 DYSARTHRIA: ICD-10-CM

## 2019-08-27 PROCEDURE — 99204 PR OFFICE/OUTPT VISIT, NEW, LEVL IV, 45-59 MIN: ICD-10-PCS | Mod: S$PBB,,, | Performed by: PHYSICAL MEDICINE & REHABILITATION

## 2019-08-27 PROCEDURE — 99999 PR PBB SHADOW E&M-EST. PATIENT-LVL III: CPT | Mod: PBBFAC,,, | Performed by: PHYSICAL MEDICINE & REHABILITATION

## 2019-08-27 PROCEDURE — 99999 PR PBB SHADOW E&M-EST. PATIENT-LVL III: ICD-10-PCS | Mod: PBBFAC,,, | Performed by: PHYSICAL MEDICINE & REHABILITATION

## 2019-08-27 PROCEDURE — 99204 OFFICE O/P NEW MOD 45 MIN: CPT | Mod: S$PBB,,, | Performed by: PHYSICAL MEDICINE & REHABILITATION

## 2019-08-27 PROCEDURE — 99213 OFFICE O/P EST LOW 20 MIN: CPT | Mod: PBBFAC | Performed by: PHYSICAL MEDICINE & REHABILITATION

## 2019-08-27 NOTE — LETTER
August 27, 2019      Maryjane Farias MD  [address]  [facility] [address]           [facility]  [address]  [facility] [address]  Phone: [phone]          Patient: Jae Millan   MR Number: [id number]   YOB: 1950   Date of Visit: 8/27/2019       Dear Dr. Maryjane Farias:    Thank you for referring Jae Millan to me for evaluation. Attached you will find relevant portions of my assessment and plan of care.    If you have questions, please do not hesitate to call me. I look forward to following Jae Millan along with you.    Sincerely,    Charis Salazar MD    Enclosure  CC:  No Recipients    If you would like to receive this communication electronically, please contact [email or web address] or [phone] to request more information on [employer] access.    For providers and/or their staff who would like to refer a patient to [facility], please contact us through our one-stop-shop provider referral line, [facility], at [phone].    If you feel you have received this communication in error or would no longer like to receive these types of communications, please e-mail [email or web address]

## 2019-08-27 NOTE — PROGRESS NOTES
Physical Therapy Daily Treatment Note     Name: Jae Millan  [facility] Number: [id number]    Therapy Diagnosis:   Encounter Diagnoses   Name Primary?    Hemiparesis affecting left side as late effect of cerebrovascular accident (CVA) Yes    Decreased sensation of lower extremity     Impaired functional mobility, balance, gait, and endurance      Physician: Maryjane Farias MD    Visit Date: 8/28/2019    Physician Orders: PT Eval, all treatment after initial eval requires auth  Medical Diagnosis from Referral: Cerebrovascular accident (CVA) due to thrombosis of anterior cerebral artery, unspecified blood vessel laterality  Evaluation Date: 6/6/2019  Authorization Period Expiration: 6/30/2019  NEW Plan of Care Expiration: 10/31/2019  Visit # / Visits authorized: 8/ 12 (total visits: 14)   POC due: 9/28/2019    Time In: 1115  Time Out: 1200  Total Billable Time: 45 minutes    Precautions: Standard and Fall    Subjective     Pt reports: He is doing well. The doctor was able to drain his thumb on Monday and it is feeling much better.     He was semi-compliant with HEP.  Response to previous treatment: no adverse effects to report  Functional change: ongoing    Pain: 0/10  Location: n/a    Objective       Jae received therapeutic exercises to develop strength, endurance, posture and core stabilization for  15 minutes including:    Standing hip extension on shuttle, .5 bands, x20 reps L LE only  Shuttle squats, 2.0 bands, x30   Shuttle single leg squats L LE only 1.0 bands, x30   3x30 seconds stretching gastroc      Jae participated in gait training to improve functional mobility and safety for 30 minutes, including:  Ambulation on treadmill at .7 mph with emphasis on increased step length for 6 minutes         Evaluation 7/5/2019 8/5/2019 8/28/2019   30 second Chair Rise 8 completed with no arms 9x no UE  10 with no UEs 11 without UE support   TUG 21.25 seconds 11.08 sec w/ SBQC  13.55 sec without AD 10.46  seconds with SBQC  12.5 seconds without AD 9.0 seconds with SBQC  11.6 seconds without AD   FGA NT NT 14/30 (without AD) 17/30 (without AD)   SSWS NT NT NT .88 m/s with no AD (6m/6.8sec)      SLS R = 6 seconds  SLS L = 3 seconds    Functional Gait Assessment:   1. Gait on level surface =  2   (3) Normal: less than 5.5 sec, no A.D., no imbalance, normal gait pattern, deviates< 6in   (2) Mild impairment: 7-5.6 sec, uses A.D., mild gait deviations, or deviates 6-10 in   (1) Moderate impairment: > 7 sec, slow speed, imbalance, deviates 10-15 in.   (0) Severe impairment: needs assist, deviates >15 in, reach/touch wall  2. Change in Gait Speed = 2   (3) Normal: smooth change w/o loss of balance or gait deviation, deviates < 6 in, significant difference between speeds   (2) Mild impairment: changes speed, but demonstrates mild gait deviations, deviates 6-10 in, OR no deviations but unable to significantly speed, OR uses A.D.   (1) Moderate impairment: minor changes to speed, OR changes speed w/ significant deviations, deviates 10-15 in, OR  Changes speed , but loses balance & recovers   (0) Severe impairment: cannot change speed, deviates >15 in, or loses balance & needs assist  3. Gait with horizontal head turns  = 2   (3) Normal: no change in gait, deviates <6 in   (2) Mild impairment: slight change in speed, deviates 6-10 in, OR uses A.D.   (1) Moderate impairment: moderate change in speed, deviates 10-15 in   (0) Severe impairment: severe disruption of gait, deviates >15in  4. Gait with vertical head turns = 2   (3) Normal: no change in gait, deviates <6 in   (2) Mild impairment: slight change in speed, deviates 6-10 in OR uses A.D.   (1) Moderate impairment: moderate change in speed, deviates 10-15 in   (0) Severe impairment: severe disruption of gait, deviates >15 in  5. Gait with pivot turns = 2   (3) Normal: performs safely in 3 sec, no LOB   (2) Mild impairment: performs in >3 sec & no LOB, OR turns safely &  requires several steps to regain LOB   (1) Moderate impairment: turns slow, OR requires several small steps for balance following turn & stop   (0) Severe impairment: cannot turn safely, needs assist  6. Step over obstacle = 1   (3) Normal: steps over 2 stacked boxes w/o change in speed or LOB   (2) Mild impairment: able to step over 1 box w/o change in speed or LOB   (1) Moderate impairment: steps over 1 box but must slow down, may require VC   (0) Severe impairment: cannot perform w/o assist  7. Gait with Narrow NORA = 0   (3) Normal: 10 steps no staggering   (2) Mild impairment: 7-9 steps   (1) Moderate impairment: 4-7 steps   (0) Severe impairment: < 4 steps or cannot perform w/o assist  8. Gait with eyes closed = 2   (3) Normal: < 7 sec, no A.D., no LOB, normal gait pattern, deviates <6 in   (2) Mild impairment: 7.1-9 sec, mild gait deviations, deviates 6-10 in   (1) Moderate impairment: > 9 sec, abnormal pattern, LOB, deviates 10-15 in   (0) Severe impairment: cannot perform w/o assist, LOB, deviates >15in  9. Ambulating Backwards = 1   (3) Normal: no A.D., no LOB, normal gait pattern, deviates <6in   (2) Mild impairment: uses A.D., slower speed, mild gait deviations, deviates 6-10 in   (1) Moderate impairment: slow speed, abnormal gait pattern, LOB, deviates 10-15 in   (0) Severe impairment: severe gait deviations or LOB, deviates >15in  10. Steps = 3   (3) Normal: alternating feet, no rail   (2) Mild Impairment: alternating feet, uses rail   (1) Moderate impairment: step-to, uses rail   (0) Severe impairment: cannot perform safely    Score 17/30     Score:   <22/30 fall risk   <20/30 fall risk in older adults   <18/30 fall risk in Parkinsons         Home Exercises Provided and Patient Education Provided     Education provided:   - HEP updated to include sitting exercises    Written Home Exercises Provided: yes. Added sitting hip ext with band, hip abduction with band and ankle pumps  Exercises were reviewed  and Jae was able to demonstrate them prior to the end of the session.  Jae demonstrated good  understanding of the education provided.     See EMR under Patient Instructions for exercises provided 6/13/2019.    Assessment   Assessment period 8/5/2019-8/28/2019: Jae continues to make steady progress toward his goals. He demonstrates improvement in TUG, SLS, FGA, and 30 second chair rise. He demonstrates most difficulty with tandem ambulation during FGA and continues to struggle with SLS. Pt remains appropriate for skilled PT services in order to increase his dynamic balance, continue to work on ambulation without AD, and increase his overall mobility. PT to extend POC through 10/31/2019. Goals have been addressed and remain appropriate for this new POC.      Pt has verbalized his transportation issues and has stated he plans to schedule his transportation services a half an hour earlier than usual, which will help him be on time for remainder of appointments to continue progressing with therapy.   Jae is progressing well towards his goals.   Pt prognosis is Good.     Pt will continue to benefit from skilled outpatient physical therapy to address the deficits listed in the problem list box on initial evaluation, provide pt/family education and to maximize pt's level of independence in the home and community environment.     Pt's spiritual, cultural and educational needs considered and pt agreeable to plan of care and goals.     Anticipated barriers to physical therapy: none    Goals: 8/28/19  Short Term Goals: 5 weeks   1. Pt will be able to complete 10 sit to  30 seconds for increased mobility. MET 8/5/2019  2. Pt will be able to complete TUG in less than 18 seconds in order to decrease his risk for fall. MET 7/5/19- 11.08 sec with small based quad cane, 13.55 sec w/o AD  3. Pt will be compliant with HEP and medicine management. Ongoing, semicompliant  4. Pt will be able to ambulate >300'  with/without AD and mod I. MET 7/5/19- pt can ambulate at least 300 ft with small based quad cane      Long Term Goals: 10 weeks   1. Pt will complete TUG in less than 15 seconds in order to decrease his risk for fall. MET 7/5/2019  2. Pt will be able to stand on B LE in SLS for >/= 5 seconds in order to decrease his risk for fall. Ongoing  3. Pt will be able to ascend/decend 5 stairs with 1 hand rail and supervision without cues for safety. MET 8/5/2019  4. Pt will tolerate standing activities for >10 minutes in order to increase his endurance. MET 7/5/2019  5. NEW GOAL: Pt will improve score on FGA to >/= 22 in order to decrease his risk for fall. Ongoing, improved (17/30)  6. NEW GOAL (8/28/2019): Pt will improve his SSWS to .95 without AD in order to normalize gait compared to peers of his age and gender.    Plan   NEW Plan of Care Expiration: 10/31/2019    Focus on tandem stance activities and ambulaiton, SLS activities, decreasing gait deviations and overall strengthening of L LE.     Oc Schroeder, PT  8/28/2019

## 2019-08-27 NOTE — PROGRESS NOTES
"  Occupational Therapy Daily Treatment Note     Date: 8/28/2019  Name: aJe Millan  Clinic Number: [id number]    Therapy Diagnosis:   Encounter Diagnoses   Name Primary?    Self-care deficit in dressing     Self-care deficit for bathing     Range of motion deficit     Swelling of left hand     Decreased strength of upper extremity      Physician: Maryjane Farias MD       Physician Orders: Eval and treat  Medical Diagnosis: Cerebrovascular accident (CVA) due to thrombosis of anterior cerebral artery, unspecified blood vessel laterality  Evaluation Date: 8/8/2019  Plan of Care Expiration Period: 8/8/19 to 10/31/19  Insurance Authorization period Expiration: 12/31/19  Date of Return to MD: 8/29/19  FOTO: 10th visit  8/26/2019: 47% limited in Mobility    Visit # / Visits authorized: 6 / 20  Time In: 10:30am   Time Out: 11:15am  Total Billable Time: 45 minutes    Precautions:  Standard, Diabetes and HTN      Subjective     Pt reports: "Feeling good. Trying to organize these doctor orders"  he was compliant with home exercise program given this session.   Response to previous treatment: positive  Functional change: ongoing    Pain: 0/10  Location: left shoulder      Objective     Jae participated in therapeutic exercises for 15 minutes  - Nustep x12 min with L hand secured for increased dressing endurance and strength, assistance needed for set up and safe transfer  - Kinesiotape  To L UE for edema management (I strip with 4 cuts over each digit)    Jae participated in therapeutic activities for 30 min  - Pt. Organized his doctor's orders for DME. Therapist contacted DME provider and with Pt's help, organized faxing over the orders so Pt. Could receive his needed equipment     Home Exercises and Education Provided     Education provided:   - HEP review  - Progress towards goals     Written Home Exercises Provided: yes.  Exercises were reviewed and Jae was able to demonstrate them prior to the end of " the session.  Jae demonstrated good  understanding of the HEP provided.   .   See EMR under Patient Instructions for exercises provided 8/12/2019.        Assessment     Jae arrived this date feeling somewhat anxious due to his medication refill error and new orders for DME. He was unsure on how to manage the orders. With therapist's assistance, he was able to organize and fax off the orders so he could receive his needed equipment.  He cont's with ROM and strength deficits limiting his full participation in ADL/IADL tasks.     Jae is progressing well towards his goals and there are no updates to goals at this time. Pt prognosis is Fair.     Pt will continue to benefit from skilled outpatient occupational therapy to address the deficits listed in the problem list on initial evaluation provide pt/family education and to maximize pt's level of independence in the home and community environment.     Anticipated barriers to occupational therapy: timeframe of current condition    Pt's spiritual, cultural and educational needs considered and pt agreeable to plan of care and goals.    Goals:  Short Term Goals: 6 weeks    Independent in HEP for self ROM left arm MET 8/19/2019   Pt to consistently elevate left hand to assist with edema when seated MET 8/19/2019   Pt will perform simple meal for self 3 days a week. MET 8/19/2019     LTG GOALS:  Time frame: 12 weeks  LB dressing will improve to by 10 minutes completion MET 8/26/2019  Pt will improve his ability to cut using adaptive device,   Bathing will improve by completion in 20 minutes     Simple meal prep will be performed 5 x week    Simple home care will improve to one task 4 x week(sweep, mop, dust, clean sinks)    Plan   Cont. With established POC  Updates/Grading for next session: LB dressing      IRLANDA Galicia

## 2019-08-27 NOTE — PROGRESS NOTES
Subjective:       Patient ID: Jae Millan is a 68 y.o. male.    Chief Complaint: No chief complaint on file.    HPI     Mr. Millan is a 68-year-old right-handed black male with past medical history of hypertension diabetes mellitus and stroke was presented to the Physical Medicine Clinic for stroke management.  The patient sustained an ischemic stroke with left hemiparesis and 08/2018.  He had some associated dysarthria and dysphagia.  He was [facility].  He received limited therapy.  The patient was incarcerated and out of retirement in 11/2018.  He presented to [facility] for medical care in 04/2019.  He was referred to multiple specialties including Endocrinology, Podiatry and Physical Medicine and Rehabilitation.    Currently, the patient lives with his sister in a single-story elevated house with ramp access.  He is independent with feeding himself and simple meal preparation.  He is independent with grooming and dressing but it takes him a long time.  He is dependent with toileting and bathing with a shower chair.  He can ambulate inside the house holding onto walls and furniture.  He uses a quad cane outside the house.  It was borrowed from a friend of his.  He reports occasional episodes of near falls.  He had a fall about 2 months ago but without serious trauma.  He currently denies any dysphagia.  Has occasional mild word-finding difficulties and slurred speech.  He denies any bowel or bladder incontinence.  He denies any seizures.  He has mild left hemisensory impairment.  He is currently getting outpatient physical and occupational therapy but not speech therapy.      Review of Systems   Constitutional: Positive for fatigue. Negative for chills and fever.   Eyes: Positive for visual disturbance.   Respiratory: Negative for shortness of breath.    Cardiovascular: Negative for chest pain.   Gastrointestinal: Positive for constipation.  Negative for nausea and vomiting.   Genitourinary: Positive for difficulty urinating and urgency.   Musculoskeletal: Positive for back pain, gait problem and neck stiffness. Negative for neck pain.   Neurological: Positive for weakness. Negative for dizziness and headaches.   Psychiatric/Behavioral: Positive for sleep disturbance. Negative for behavioral problems.       Objective:      Physical Exam   Constitutional: He appears well-developed and well-nourished. No distress.   HENT:   Head: Normocephalic and atraumatic.   Eyes: EOM are normal.   Neck: Normal range of motion.   Cardiovascular: Normal rate, regular rhythm and normal heart sounds.   Pulmonary/Chest: Breath sounds normal.   Abdominal: Soft. Bowel sounds are normal.   Musculoskeletal:   BUE:  ROM:   RUE: full.   LUE: full. Increased tone (modified Estefanía 2)  Strength:    RUE: 5/5 at shoulder abduction, 5 elbow flexion, 5 elbow extension, 5 hand .   LUE: 2+/5 at shoulder abduction, 2 elbow flexion, 2 elbow extension, 2 hand .  Sensation to pinprick:   RUE: intact.   LUE: hypersensitive.  DTR:    RUE: +1 biceps, +1 triceps.   LUE:  +2 biceps, +2 triceps.    BLE:  ROM:   RLE: full.   LLE: full.Increased tone (modified Estefanía 2)  Knee crepitus:   RLE: -ve.   LLE: -ve.   Strength:    RLE: 5/5 at hip flexion, 5 knee extension, 5 ankle DF, 5 PF.   LLE: 3/5 at hip flexion, 4 knee extension, 4- ankle DF, 4 PF.  Sensation to pinprick:     RLE: intact.      LLE: decreased.   DTR:     RLE: +1 knee, +1 ankle.    LLE: +1 knee, +1 ankle.  Clonus:    Rt ankle: -ve.    Lt ankle: -ve.    Gait: slow alysha, using a quad cane in Rt hand.     Neurological: He is alert.   Skin: Skin is warm.   Psychiatric: He has a normal mood and affect. His behavior is normal.   Vitals reviewed.      Assessment:       1. Hemiparesis affecting left side as late effect of cerebrovascular accident (CVA)    2. Cerebrovascular accident (CVA) due to thrombosis of anterior cerebral  artery, unspecified blood vessel laterality    3. Dysarthria    4. Impaired functional mobility, balance, gait, and endurance        Plan:       - The patient was given a prescription for a narrow base quad cane.  - He was also given a prescription for a left cock-up wrist splint.  - Continue Physical and Occupational therapy, followed by a regular home exercise program.  - Ambulatory consult to Speech Therapy  - Follow up in about 3 months (around 11/27/2019).      This was a 45 minute visit, 50% of which was spent educating the patient about the diagnosis and the treatment plan.

## 2019-08-28 ENCOUNTER — CLINICAL SUPPORT (OUTPATIENT)
Dept: REHABILITATION | Facility: HOSPITAL | Age: 69
End: 2019-08-28
Attending: INTERNAL MEDICINE
Payer: MEDICARE

## 2019-08-28 ENCOUNTER — TELEPHONE (OUTPATIENT)
Dept: HEPATOLOGY | Facility: CLINIC | Age: 69
End: 2019-08-28

## 2019-08-28 DIAGNOSIS — R46.0 SELF-CARE DEFICIT FOR BATHING: ICD-10-CM

## 2019-08-28 DIAGNOSIS — M79.89 SWELLING OF LEFT HAND: ICD-10-CM

## 2019-08-28 DIAGNOSIS — M25.60 RANGE OF MOTION DEFICIT: ICD-10-CM

## 2019-08-28 DIAGNOSIS — R20.8 DECREASED SENSATION OF LOWER EXTREMITY: ICD-10-CM

## 2019-08-28 DIAGNOSIS — I69.354 HEMIPARESIS AFFECTING LEFT SIDE AS LATE EFFECT OF CEREBROVASCULAR ACCIDENT (CVA): Primary | ICD-10-CM

## 2019-08-28 DIAGNOSIS — Z74.09 IMPAIRED FUNCTIONAL MOBILITY, BALANCE, GAIT, AND ENDURANCE: ICD-10-CM

## 2019-08-28 DIAGNOSIS — Z74.1 SELF-CARE DEFICIT IN DRESSING: ICD-10-CM

## 2019-08-28 DIAGNOSIS — R29.898 DECREASED STRENGTH OF UPPER EXTREMITY: ICD-10-CM

## 2019-08-28 PROCEDURE — 97110 THERAPEUTIC EXERCISES: CPT | Mod: PN

## 2019-08-28 PROCEDURE — 97116 GAIT TRAINING THERAPY: CPT | Mod: PN

## 2019-08-28 PROCEDURE — 97530 THERAPEUTIC ACTIVITIES: CPT | Mod: PN

## 2019-08-28 NOTE — TELEPHONE ENCOUNTER
----- Message from Kaylin Gilman sent at 8/28/2019  9:25 AM CDT -----  Contact: Pt  Pt called to reschedule his 8/29/19 appt due to transportation which requires 3 days advance notice and would like a call back today after 12 noon at [phone]

## 2019-09-04 ENCOUNTER — TELEPHONE (OUTPATIENT)
Dept: PHYSICAL MEDICINE AND REHAB | Facility: CLINIC | Age: 69
End: 2019-09-04

## 2019-09-04 NOTE — TELEPHONE ENCOUNTER
[facility] [phone], office [phone]- [facility]

## 2019-09-04 NOTE — PROGRESS NOTES
HEPATOLOGY CLINIC VISIT NOTE - HCV clinic    REFERRING PROVIDER:Dr. Maryjane Farias    CHIEF COMPLAINT: Hepatitis C - discuss treatment    HISTORY: This is a 68 y.o. Black or  male with chronic hepatitis C here for evaluation. Pt delayed for arrival due to accident on [facility]. He is referred by PCP. HCV genotype is unknown. HCV RNA of 1,301,498 IU/mL. He reports diagnosis during incarceration. Risk factors for HCV are listed below. He reports he was tested for HCV during long-term, reports treatment with pills, uncertain of med. He denies TB or other infectious conditions.     He hasn't had formal fibrosis staging or recent abdominal imaging.     Most recent labs note normal liver enzymes and normal synthetic liver function. PLTS are WNL.     Denies jaundice, dark urine, hematemesis, melena, slowed mentation, abdominal distention.     PMH listed below. Post stroke with residual hemiparesis of left side.     HCV history:  Genotype unknown  HCV RNA: 1,301,498 IU/mL  Treatment experienced?     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     Liver staging:  NO formal staging   AST 26, ALT 38  Normal synthetic liver function  PLTs WNL   Past Medical History:   Diagnosis Date    Cataract     Diabetes mellitus     Glaucoma     Hypertension      Past Surgical History:   Procedure Laterality Date    UNDESCENDED TESTICLE EXPLORATION Right     R testicle removed as it was undescended     FAMILY HISTORY: Negative for liver disease    SOCIAL HISTORY:   Social History     Tobacco Use   Smoking Status Former Smoker    Packs/day: 1.00    Years: 18.00    Pack years: 18.00    Types: Cigarettes    Last attempt to quit: 1986    Years since quittin.4   Smokeless Tobacco Never Used     Social History     Substance and Sexual Activity   Alcohol Use  Never    Frequency: Never   denies- daily use several decades ago, none for 35 years     Social History     Substance and Sexual Activity   Drug Use Never     ROS:   No fever, chills, weight loss, fatigue  No chest pain, palpitations, dyspnea, cough  No abdominal pain, nausea, vomiting  No headaches, visual changes  No lower extremity edema  No depression or anxiety      PHYSICAL EXAM:  Friendly Black or  male, in no acute distress; alert and oriented to person, place and time  VITALS: reviewed  HEENT: Sclerae anicteric.   NECK: Supple  CVS: Regular rate and rhythm. No murmurs  LUNGS: Normal respiratory effort. Clear bilaterally  ABDOMEN: Flat, soft, nontender. No organomegaly or masses. No ascites or hernias. SKIN: Warm and dry. No jaundice, No obvious rashes.   EXTREMITIES: No lower extremity edema  NEURO/PSYCH: Normal gate. Memory intact. Thought and speech pattern appropriate. Behavior normal. No depression or anxiety noted.    RECENT LABS:  Lab Results   Component Value Date    WBC 7.31 04/12/2019    HGB 13.9 (L) 04/12/2019     04/12/2019     No results found for: INR  Lab Results   Component Value Date    AST 26 05/21/2019    ALT 38 05/21/2019    BILITOT 0.5 05/21/2019    ALBUMIN 3.7 05/21/2019    ALKPHOS 62 05/21/2019    CREATININE 1.1 05/21/2019    BUN 16 05/21/2019     05/21/2019    K 4.1 05/21/2019    AFP 7.8 08/22/2019     RECENT IMAGING:  None     ASSESSMENT  68 y.o. Black or  male with:  1. CHRONIC HEPATITIS C, GENOTYPE unknown- treatment experienced?  -- Prior HCV treatment - reports PO treatment during incarceration, however 10 years ago wouldn't have been IFN free. Denies h/o TB  -- essentially normal transaminases  -- unknown immunity to HAV or HBV     EDUCATION:  The natural history of Hepatitis C, including potential progression to cirrhosis was reviewed. Complications of cirrhosis, including hepatic decompensation, liver cancer, liver failure, need  for liver transplant, and death were reviewed.     We discussed the increased progression of liver disease secondary to alcohol use; patient was advised to avoid alcohol completely.     Transmission of Hepatitis C was reviewed, including possible sexual transmission. Sexual contacts should be screened.     Risk of vertical transmission of Hepatitis C from mother to baby was reviewed. Children should be screened.     Patient should avoid sharing personal products such as razors, toothbrushes, etc.     We reviewed that vaccination against Hepatitis A and Hepatitis B is recommended if patient does not already have immunity.    Recommend avoiding raw seafood.    Limit acetaminophen to 2000mg daily.    PLAN:  1. Labs today  2. Fibroscan  3. F/u in 3-4 weeks post U/S     Duration of visit    With >50% of visit spent on counseling pt   Warren Bianchi PA-C

## 2019-09-05 ENCOUNTER — OFFICE VISIT (OUTPATIENT)
Dept: HEPATOLOGY | Facility: CLINIC | Age: 69
End: 2019-09-05
Payer: MEDICARE

## 2019-09-05 ENCOUNTER — PROCEDURE VISIT (OUTPATIENT)
Dept: HEPATOLOGY | Facility: CLINIC | Age: 69
End: 2019-09-05
Attending: PHYSICIAN ASSISTANT
Payer: MEDICARE

## 2019-09-05 VITALS
TEMPERATURE: 98 F | HEART RATE: 77 BPM | BODY MASS INDEX: 30.89 KG/M2 | HEIGHT: 69 IN | DIASTOLIC BLOOD PRESSURE: 86 MMHG | SYSTOLIC BLOOD PRESSURE: 161 MMHG | OXYGEN SATURATION: 95 % | WEIGHT: 208.56 LBS

## 2019-09-05 DIAGNOSIS — B18.2 CHRONIC HEPATITIS C WITHOUT HEPATIC COMA: ICD-10-CM

## 2019-09-05 DIAGNOSIS — B18.2 CHRONIC HEPATITIS C WITHOUT HEPATIC COMA: Primary | ICD-10-CM

## 2019-09-05 PROCEDURE — 99999 PR PBB SHADOW E&M-EST. PATIENT-LVL IV: ICD-10-PCS | Mod: PBBFAC,,, | Performed by: PHYSICIAN ASSISTANT

## 2019-09-05 PROCEDURE — 91200 LIVER ELASTOGRAPHY: CPT | Mod: 26,S$PBB,, | Performed by: PHYSICIAN ASSISTANT

## 2019-09-05 PROCEDURE — 91200 PR LIVER ELASTOGRAPHY W/OUT IMAG W/INTERP & REPORT: ICD-10-PCS | Mod: 26,S$PBB,, | Performed by: PHYSICIAN ASSISTANT

## 2019-09-05 PROCEDURE — 99214 OFFICE O/P EST MOD 30 MIN: CPT | Mod: PBBFAC | Performed by: PHYSICIAN ASSISTANT

## 2019-09-05 PROCEDURE — 99999 PR PBB SHADOW E&M-EST. PATIENT-LVL IV: CPT | Mod: PBBFAC,,, | Performed by: PHYSICIAN ASSISTANT

## 2019-09-05 PROCEDURE — 91200 LIVER ELASTOGRAPHY: CPT | Mod: PBBFAC | Performed by: PHYSICIAN ASSISTANT

## 2019-09-05 PROCEDURE — 99214 PR OFFICE/OUTPT VISIT, EST, LEVL IV, 30-39 MIN: ICD-10-PCS | Mod: S$PBB,,, | Performed by: PHYSICIAN ASSISTANT

## 2019-09-05 PROCEDURE — 99214 OFFICE O/P EST MOD 30 MIN: CPT | Mod: S$PBB,,, | Performed by: PHYSICIAN ASSISTANT

## 2019-09-05 NOTE — LETTER
September 5, 2019      Maryjane Farias MD  [address]  [place] [address]           [facility] - Hepatology  [address]  [place] [address]  Phone: [phone]  Fax: [phone]          Patient: Jae Millan   MR Number: [id number]   YOB: 1950   Date of Visit: 9/5/2019       Dear Dr. Maryjane Farias:    Thank you for referring Jae Millan to me for evaluation. Attached you will find relevant portions of my assessment and plan of care.    If you have questions, please do not hesitate to call me. I look forward to following Jae Millan along with you.    Sincerely,    Warren Bianchi PA-C    Enclosure  CC:  No Recipients    If you would like to receive this communication electronically, please contact [email or web address] or [phone] to request more information on [employer] Link access.    For providers and/or their staff who would like to refer a patient to [facility], please contact us through our one-stop-shop provider referral line, [facility], at [phone].    If you feel you have received this communication in error or would no longer like to receive these types of communications, please e-mail [email or web address]

## 2019-09-06 ENCOUNTER — TELEPHONE (OUTPATIENT)
Dept: HEPATOLOGY | Facility: CLINIC | Age: 69
End: 2019-09-06

## 2019-09-06 ENCOUNTER — CLINICAL SUPPORT (OUTPATIENT)
Dept: REHABILITATION | Facility: HOSPITAL | Age: 69
End: 2019-09-06
Attending: PHYSICAL MEDICINE & REHABILITATION
Payer: MEDICARE

## 2019-09-06 DIAGNOSIS — R20.8 DECREASED SENSATION OF LOWER EXTREMITY: ICD-10-CM

## 2019-09-06 DIAGNOSIS — M25.60 RANGE OF MOTION DEFICIT: ICD-10-CM

## 2019-09-06 DIAGNOSIS — Z74.1 SELF-CARE DEFICIT IN DRESSING: ICD-10-CM

## 2019-09-06 DIAGNOSIS — R46.0 SELF-CARE DEFICIT FOR BATHING: ICD-10-CM

## 2019-09-06 DIAGNOSIS — I69.354 HEMIPARESIS AFFECTING LEFT SIDE AS LATE EFFECT OF CEREBROVASCULAR ACCIDENT (CVA): ICD-10-CM

## 2019-09-06 DIAGNOSIS — Z74.09 IMPAIRED FUNCTIONAL MOBILITY, BALANCE, GAIT, AND ENDURANCE: ICD-10-CM

## 2019-09-06 DIAGNOSIS — M79.89 SWELLING OF LEFT HAND: ICD-10-CM

## 2019-09-06 DIAGNOSIS — R29.898 DECREASED STRENGTH OF UPPER EXTREMITY: ICD-10-CM

## 2019-09-06 PROCEDURE — 97530 THERAPEUTIC ACTIVITIES: CPT | Mod: PN

## 2019-09-06 PROCEDURE — 97110 THERAPEUTIC EXERCISES: CPT | Mod: PN | Performed by: PHYSICAL THERAPIST

## 2019-09-06 PROCEDURE — 97110 THERAPEUTIC EXERCISES: CPT | Mod: PN

## 2019-09-06 RX ORDER — ERGOCALCIFEROL 1.25 MG/1
CAPSULE ORAL
Qty: 8 CAPSULE | Refills: 0 | Status: SHIPPED | OUTPATIENT
Start: 2019-09-06 | End: 2019-09-30

## 2019-09-06 RX ORDER — AMLODIPINE BESYLATE 10 MG/1
10 TABLET ORAL DAILY
Qty: 90 TABLET | Refills: 3 | Status: SHIPPED | OUTPATIENT
Start: 2019-09-06 | End: 2020-02-28 | Stop reason: SDUPTHER

## 2019-09-06 NOTE — TELEPHONE ENCOUNTER
----- Message from Warren Bianchi PA-C sent at 9/5/2019 12:25 PM CDT -----  Needs  clinic visit in 3-4 weeks; shouldn't be scheduled prior to U/S being done  Thanks

## 2019-09-06 NOTE — TELEPHONE ENCOUNTER
----- Message from Yamila Sanford sent at 9/6/2019  3:22 PM CDT -----  Contact: Self [phone]  Patient is calling for an RX refill or new RX.  Is this a refill or new RX:  new  RX name and strength: ergocalciferol (ERGOCALCIFEROL) 50,000 unit Cap  Directions (copy/paste from chart):    Is this a 30 day or 90 day RX:  30 day  Local pharmacy or mail order pharmacy:  local  Pharmacy name and phone # (copy/paste from chart):   [id number] [place], [place] - [address] [phone] (Phone)  [phone] (Fax)  Comments:        Patient is calling for an RX refill or new RX.  Is this a refill or new RX:  new  RX name and strength: amLODIPine (NORVASC) 10 MG tablet  Directions (copy/paste from chart):    Is this a 30 day or 90 day RX:  30 day  Local pharmacy or mail order pharmacy:  local  Pharmacy name and phone # (copy/paste from chart):   [facility] #[id number] [place], [place] - [address] [phone] (Phone)  [phone] (Fax)  Comments:      Patient is calling for an RX refill or new RX.  Is this a refill or new RX:  refill  RX name and strength: aspirin 81 MG Chew  Directions (copy/paste from chart):    Is this a 30 day or 90 day RX:  30 day  Local pharmacy or mail order pharmacy:  local  Pharmacy name and phone # (copy/paste from chart):   [facility] #[id number] [place], [place] - [address] [phone] (Phone)  [phone] (Fax)  Comments:        Patient is calling for an RX refill or new RX.  Is this a refill or new RX:  refill  RX name and strength: rosuvastatin (CRESTOR) 20 MG tablet  Directions (copy/paste from chart):    Is this a 30 day or 90 day RX:  90 day  Local pharmacy or mail order pharmacy:  local  Pharmacy name and phone # (copy/paste from chart):  [id number] - [place], [place] - [address]  [facility] [phone] (Phone)  [phone] (Fax)   Comments:        Patient is calling for an RX refill or new RX.  Is this a refill or new RX:  refill  RX name and strength: SITagliptin (JANUVIA) 100 MG Tab  Directions (copy/paste from chart):    Is this a 30 day or 90 day RX:  90 day   Local pharmacy or mail order pharmacy:  local  Pharmacy name and phone # (copy/paste from chart):   [id number], [place] - [address] [phone] (Phone)  [phone] (Fax)  Comments:        Patient is calling for an RX refill or new RX.  Is this a refill or new RX:  refill  RX name and strength: metFORMIN (GLUCOPHAGE) 1000 MG tablet  Directions (copy/paste from chart):    Is this a 30 day or 90 day RX:  90 day   Local pharmacy or mail order pharmacy:  local  Pharmacy name and phone # (copy/paste from chart):   [facility] #[id number] [facility] [place] - [address] [phone] (Phone)  [phone] (Fax)  Comments:        Patient is calling for an RX refill or new RX.  Is this a refill or new RX:  refill  RX name and strength: losartan-hydrochlorothiazide 100-12.5 mg (HYZAAR) 100-12.5 mg Tab  Directions (copy/paste from chart):    Is this a 30 day or 90 day RX:  30 day   Local pharmacy or mail order pharmacy:  local  Pharmacy name and phone # (copy/paste from chart):   [facility] #[id number] [facility] [place] - [address] [phone] (Phone)  [phone] (Fax)  Comments:

## 2019-09-06 NOTE — PROGRESS NOTES
Physical Therapy Daily Treatment Note     Name: Jae Millan  [facility] Number: [id number]    Therapy Diagnosis:   Encounter Diagnoses   Name Primary?    Hemiparesis affecting left side as late effect of cerebrovascular accident (CVA)     Decreased sensation of lower extremity     Impaired functional mobility, balance, gait, and endurance      Physician: Maryjane Farias MD    Visit Date: 9/6/2019    Physician Orders: PT Eval, all treatment after initial eval requires auth  Medical Diagnosis from Referral: Cerebrovascular accident (CVA) due to thrombosis of anterior cerebral artery, unspecified blood vessel laterality  Evaluation Date: 6/6/2019  Authorization Period Expiration: 12/31/2019  NEW Plan of Care Expiration: 10/31/2019  Visit # / Visits authorized: 9/ 12 (total visits: 14)   POC due: 9/28/2019    Time In: 1115  Time Out: 1200  Total Billable Time: 45 minutes    Precautions: Standard and Fall    Subjective     Pt reports: He is doing well. The doctor was able to drain his thumb on Monday and it is feeling much better.     He was semi-compliant with HEP.  Response to previous treatment: no adverse effects to report  Functional change: ongoing    Pain: 0/10  Location: n/a    Objective       Jae received therapeutic exercises to develop strength, endurance, posture and core stabilization for  45 minutes including:    Recumbent cycle L5 x 8 min, L foot strapped to improve LE alignment    Matrix HS curls, 15#, 3x10, L LE only   Matrix LAQ with 3 second hold at top, 20#, 3x8, L LE only  Matrix hip abduction, 3 x10, 35#  matrix hip adduction 3 x 10, 35#     - Restoration of arches of L hand  6 basic wrist stretches: 1. P-A glide of scaphoid on radius, 2. Radial deviation,    3. Increasing mobility of metacarpals, 4. Carpal roll  Paddle splint applied to L hand  35 deg wrist ext brace applied to L  Wrapped to address edema    Home Exercises Provided and Patient Education Provided     Education provided:    - continue with HEP    Written Home Exercises Provided: Patient instructed to cont prior HEP.   Exercises were reviewed and Jae was able to demonstrate them prior to the end of the session.  Jae demonstrated good  understanding of the education provided.     See EMR under Patient Instructions for exercises provided 6/13/2019.    Assessment   Jae tolerated treatment well.  today's session focused on LE strengthening. Pt used recumbent cycle for LE strengthening and motor control. Pt had difficulty maintaining neutral hip adduction. Resistance increased for L knee strengthening. Hip adduction strengthening also added. Pt demonstrated improved stride and gait quality after use of resistance equipment. Pt can benefit from continued skilled PT to address impairments to improve gait without AD. Skin integrity of L hand was intact after removal of splint, no adverse reactions noted. Pt able to tolerate L hand splint x 2 sessions (1.5 hrs).       Pt has verbalized his transportation issues and has stated he plans to schedule his transportation services a half an hour earlier than usual, which will help him be on time for remainder of appointments to continue progressing with therapy.   Jae is progressing well towards his goals.   Pt prognosis is Good.     Pt will continue to benefit from skilled outpatient physical therapy to address the deficits listed in the problem list box on initial evaluation, provide pt/family education and to maximize pt's level of independence in the home and community environment.     Pt's spiritual, cultural and educational needs considered and pt agreeable to plan of care and goals.     Anticipated barriers to physical therapy: none    Goals: 8/28/19  Short Term Goals: 5 weeks   1. Pt will be able to complete 10 sit to  30 seconds for increased mobility. MET 8/5/2019  2. Pt will be able to complete TUG in less than 18 seconds in order to decrease his risk for fall. MET  7/5/19- 11.08 sec with small based quad cane, 13.55 sec w/o AD  3. Pt will be compliant with HEP and medicine management. Ongoing, semicompliant  4. Pt will be able to ambulate >300' with/without AD and mod I. MET 7/5/19- pt can ambulate at least 300 ft with small based quad cane      Long Term Goals: 10 weeks   1. Pt will complete TUG in less than 15 seconds in order to decrease his risk for fall. MET 7/5/2019  2. Pt will be able to stand on B LE in SLS for >/= 5 seconds in order to decrease his risk for fall. Ongoing  3. Pt will be able to ascend/decend 5 stairs with 1 hand rail and supervision without cues for safety. MET 8/5/2019  4. Pt will tolerate standing activities for >10 minutes in order to increase his endurance. MET 7/5/2019  5. NEW GOAL: Pt will improve score on FGA to >/= 22 in order to decrease his risk for fall. Ongoing, improved (17/30)  6. NEW GOAL (8/28/2019): Pt will improve his SSWS to .95 without AD in order to normalize gait compared to peers of his age and gender.    Plan     Address balance activities next session, continue with use of resistance machines for strengthening    Naya Obregon, PT  9/6/2019

## 2019-09-06 NOTE — PROGRESS NOTES
Physical Therapy Daily Treatment Note     Name: Jae Millan  Clinic Number: [id number]    Therapy Diagnosis:   Encounter Diagnoses   Name Primary?    Hemiparesis affecting left side as late effect of cerebrovascular accident (CVA)     Decreased sensation of lower extremity     Impaired functional mobility, balance, gait, and endurance Yes     Physician: Maryjane Farias MD    Visit Date: 9/9/2019    Physician Orders: PT Eval, all treatment after initial eval requires auth  Medical Diagnosis from Referral: Cerebrovascular accident (CVA) due to thrombosis of anterior cerebral artery, unspecified blood vessel laterality  Evaluation Date: 6/6/2019  Authorization Period Expiration: 12/31/2019  NEW Plan of Care Expiration: 10/31/2019  Visit # / Visits authorized: 10/ 12 (total visits: 15)   POC due: 9/28/2019    Time In: 1115  Time Out: 1200  Total Billable Time: 45 minutes    Precautions: Standard and Fall    Subjective     Pt reports: He is doing well. He did not get up and do much though.     He was semi-compliant with HEP.  Response to previous treatment: no adverse effects to report  Functional change: ongoing    Pain: 4/10  Location: L shoulder    Objective       Jae received therapeutic exercises to develop strength, endurance, posture and core stabilization for  45 minutes including:    Recumbent cycle L5 x 8 min, L foot strapped to improve LE alignment  x5 sit to stand from EOM (blue mat) with R LE anterior to L LE    Shuttle, 1 band, hip extension L LE only, x20 reps  Shuttle squats, 3 bands, B LE x50 reps with intermittent breaks  Shuttle squats, 2 bands, L LE only, x15 reps  Matrix HS curls, 15#, 3x15, L LE only   Matrix LAQ with 3 second hold at top, 15#, 3x10, L LE only  Matrix hip abduction, 3 x15, 35#  matrix hip adduction 3 x 15, 35#       Home Exercises Provided and Patient Education Provided     Education provided:   - continue with HEP    Written Home Exercises Provided: Patient  instructed to cont prior HEP.   Exercises were reviewed and Jae was able to demonstrate them prior to the end of the session.  Jae demonstrated good  understanding of the education provided.     See EMR under Patient Instructions for exercises provided 6/13/2019.    Assessment   Jae tolerated treatment well. He was more fatigued today than usual but able to tolerate all therex well. He c/o L shoulder pain at start of session but relates that it lessens throughout session. He continues to demonstrate decreased strength/endurance and balance in L LE. He remains appropriate for skilled PT services.     Pt has verbalized his transportation issues and has stated he plans to schedule his transportation services a half an hour earlier than usual, which will help him be on time for remainder of appointments to continue progressing with therapy.   Jae is progressing well towards his goals.   Pt prognosis is Good.     Pt will continue to benefit from skilled outpatient physical therapy to address the deficits listed in the problem list box on initial evaluation, provide pt/family education and to maximize pt's level of independence in the home and community environment.     Pt's spiritual, cultural and educational needs considered and pt agreeable to plan of care and goals.     Anticipated barriers to physical therapy: none    Goals: 8/28/19  Short Term Goals: 5 weeks   1. Pt will be able to complete 10 sit to  30 seconds for increased mobility. MET 8/5/2019  2. Pt will be able to complete TUG in less than 18 seconds in order to decrease his risk for fall. MET 7/5/19- 11.08 sec with small based quad cane, 13.55 sec w/o AD  3. Pt will be compliant with HEP and medicine management. Ongoing, semicompliant  4. Pt will be able to ambulate >300' with/without AD and mod I. MET 7/5/19- pt can ambulate at least 300 ft with small based quad cane      Long Term Goals: 10 weeks   1. Pt will complete TUG in less than  15 seconds in order to decrease his risk for fall. MET 7/5/2019  2. Pt will be able to stand on B LE in SLS for >/= 5 seconds in order to decrease his risk for fall. Ongoing  3. Pt will be able to ascend/decend 5 stairs with 1 hand rail and supervision without cues for safety. MET 8/5/2019  4. Pt will tolerate standing activities for >10 minutes in order to increase his endurance. MET 7/5/2019  5. NEW GOAL: Pt will improve score on FGA to >/= 22 in order to decrease his risk for fall. Ongoing, improved (17/30)  6. NEW GOAL (8/28/2019): Pt will improve his SSWS to .95 without AD in order to normalize gait compared to peers of his age and gender.    Plan     Address balance activities next session, continue with use of resistance machines for strengthening    Oc Schroeder, PT  9/9/2019

## 2019-09-06 NOTE — PROGRESS NOTES
"  Occupational Therapy Daily Treatment Note     Date: 9/6/2019  Name: Jae Millan  Clinic Number: [id number]    Therapy Diagnosis:   Encounter Diagnoses   Name Primary?    Self-care deficit in dressing     Self-care deficit for bathing     Range of motion deficit     Swelling of left hand     Decreased strength of upper extremity      Physician: Maryjane Farias MD       Physician Orders: Eval and treat  Medical Diagnosis: Cerebrovascular accident (CVA) due to thrombosis of anterior cerebral artery, unspecified blood vessel laterality  Evaluation Date: 8/8/2019  Plan of Care Expiration Period: 8/8/19 to 10/31/19  Insurance Authorization period Expiration: 12/31/19  Date of Return to MD: 8/29/19  FOTO: 10th visit  8/26/2019: 47% limited in Mobility    Visit # / Visits authorized: 7 / 20  Time In: 10:30am   Time Out: 11:15am  Total Billable Time: 45 minutes    Precautions:  Standard, Diabetes and HTN      Subjective     Pt reports: "More swelling in my hand with stiffness"  he was compliant with home exercise program given this session.   Response to previous treatment: positive  Functional change: ongoing    Pain: 6/10  Location: L hand    Objective     Jae participated in therapeutic activities for 30 min  - Discussed home activities that could be contributing to his increased edema in his R hand. Pt. Could not identify anything in particular  - Paddle splint was applied with assistance from his PT (Naya Obregon) Also discussed importance of following up with his DME orders  - Edema management for R UE using ice, elevation, and paddle splint with tapping from distal to proximal      Jae participated in therapeutic exercise 15 minutes:  - Kinesiotape to R hand for prolonged edema managemnt and facilitation of extensorsu      Home Exercises and Education Provided     Education provided:   - HEP review  - Progress towards goals     Written Home Exercises Provided: yes.  Exercises were reviewed and " Jae was able to demonstrate them prior to the end of the session.  Jae demonstrated good  understanding of the HEP provided.   .   See EMR under Patient Instructions for exercises provided 8/12/2019.        Assessment     Jae tolerated adapted treatment session well this date. He arrived with increased edema in his R hand which was managed through taping and splinting. He was instructed in the importance of ice, massage, and elevation at home. He cont's with ROM and strength deficits limiting his full participation in ADL/IADL tasks.     Jae is progressing well towards his goals and there are no updates to goals at this time. Pt prognosis is Fair.     Pt will continue to benefit from skilled outpatient occupational therapy to address the deficits listed in the problem list on initial evaluation provide pt/family education and to maximize pt's level of independence in the home and community environment.     Anticipated barriers to occupational therapy: timeframe of current condition    Pt's spiritual, cultural and educational needs considered and pt agreeable to plan of care and goals.    Goals:  Short Term Goals: 6 weeks    Independent in HEP for self ROM left arm MET 8/19/2019   Pt to consistently elevate left hand to assist with edema when seated MET 8/19/2019   Pt will perform simple meal for self 3 days a week. MET 8/19/2019     LTG GOALS:  Time frame: 12 weeks  LB dressing will improve to by 10 minutes completion MET 8/26/2019  Pt will improve his ability to cut using adaptive device,   Bathing will improve by completion in 20 minutes     Simple meal prep will be performed 5 x week    Simple home care will improve to one task 4 x week(sweep, mop, dust, clean sinks)    Plan   Cont. With established POC  Updates/Grading for next session:       IRLANDA Galicia

## 2019-09-06 NOTE — PROCEDURES
Procedures   Vibration-controlled Transient Elastography Procedure (Fibroscan)    Name: Jae Millan  Date of Procedure : 2019   :: Warren Bianchi PA-C  Diagnosis: HCV    Probe: XL    Findings  Median liver stiffness score: 8.5 KPa  CAP readin dB/m    IQR/med: 16 %    Interpretation  Fibrosis interpretation is based on medial liver stiffness - Kilopascal (kPa).     Fibrosis stage: F2     Steatosis interpretation is based on controlled attenuation parameter - (dB/m).    Steatosis grade: S2       Warren Bianchi PA-C  Hepatology/HCV  [facility]

## 2019-09-09 ENCOUNTER — CLINICAL SUPPORT (OUTPATIENT)
Dept: REHABILITATION | Facility: HOSPITAL | Age: 69
End: 2019-09-09
Attending: PHYSICAL MEDICINE & REHABILITATION
Payer: MEDICARE

## 2019-09-09 ENCOUNTER — TELEPHONE (OUTPATIENT)
Dept: PHYSICAL MEDICINE AND REHAB | Facility: CLINIC | Age: 69
End: 2019-09-09

## 2019-09-09 DIAGNOSIS — R29.898 DECREASED STRENGTH OF UPPER EXTREMITY: ICD-10-CM

## 2019-09-09 DIAGNOSIS — Z74.09 IMPAIRED FUNCTIONAL MOBILITY, BALANCE, GAIT, AND ENDURANCE: Primary | ICD-10-CM

## 2019-09-09 DIAGNOSIS — M79.89 SWELLING OF LEFT HAND: ICD-10-CM

## 2019-09-09 DIAGNOSIS — Z74.1 SELF-CARE DEFICIT IN DRESSING: ICD-10-CM

## 2019-09-09 DIAGNOSIS — R47.1 DYSARTHRIA: ICD-10-CM

## 2019-09-09 DIAGNOSIS — M25.60 RANGE OF MOTION DEFICIT: ICD-10-CM

## 2019-09-09 DIAGNOSIS — R20.8 DECREASED SENSATION OF LOWER EXTREMITY: ICD-10-CM

## 2019-09-09 DIAGNOSIS — I69.354 HEMIPARESIS AFFECTING LEFT SIDE AS LATE EFFECT OF CEREBROVASCULAR ACCIDENT (CVA): ICD-10-CM

## 2019-09-09 DIAGNOSIS — R46.0 SELF-CARE DEFICIT FOR BATHING: ICD-10-CM

## 2019-09-09 PROCEDURE — 97140 MANUAL THERAPY 1/> REGIONS: CPT | Mod: PN

## 2019-09-09 PROCEDURE — 97110 THERAPEUTIC EXERCISES: CPT | Mod: PN

## 2019-09-09 PROCEDURE — 97014 ELECTRIC STIMULATION THERAPY: CPT | Mod: PN

## 2019-09-09 PROCEDURE — 92522 EVALUATE SPEECH PRODUCTION: CPT | Mod: PN | Performed by: SPEECH-LANGUAGE PATHOLOGIST

## 2019-09-09 NOTE — PLAN OF CARE
"Outpatient Neurological Rehabilitation  Speech and Language Therapy Evaluation    Date: 9/9/2019     Name: Jae Millan   MRN: [id number]    Therapy Diagnosis:   Encounter Diagnosis   Name Primary?    Dysarthria    Physician: Charis Salazar MD  Physician Orders: [id number] - Ambulatory consult to Speech Therapy  Medical Diagnosis: I69.354 (ICD-10-CM) - Hemiparesis affecting left side as late effect of cerebrovascular accident (CVA)  I63.329 (ICD-10-CM) - Cerebrovascular accident (CVA) due to thrombosis of anterior cerebral artery, unspecified blood vessel laterality  R47.1 (ICD-10-CM) - Dysarthria    Visit # / Visits Authorized:  1 / 1   Date of Evaluation:  9/9/2019   Insurance Authorization Period: 8/27/19-8/26/20  Plan of Care Certification:    9/9/2019 to 11/4/19      Time In: 1430  Time Out: 1515  Total Billable Time: 45 min  Procedure Min.   Evaluation of Speech Sound Production  45 min     Precautions:Standard and Fall  Subjective   Date of Onset: August 2018  History of Current Condition:   Pt reports having a stroke in August of 2018. Pt states initial symptoms were dizziness, balance problems, slurred speech, and facial droop at [facility]. Pt states he went to [facility] and stayed in hospital for 3 days before returning back to the [facility] for 1 month. Pt then was transfered to "[facility]" in [place], [place] for 1 month, where he did not receive any rehab services. Pt reports he then went to a  "transition" program in [place] for 2 months and now lives in [place] with his sister.   Stared getting therapy 1 month ago for PT services at [facility], has been getting OT services for 2 weeks. Pt endorses: drooling and feeling as if his face is swollen, difficulty pronouncing words, slow rate of speech, slurred words. Pt denies memory, attention deficits.  Denies coughing and choking with foods/liquids.   Mast Medical History: Jae Millan  has a past " "medical history of Cataract, Diabetes mellitus, Glaucoma, and Hypertension.  Jae Millan  has a past surgical history that includes Undescended testicle exploration (Right).  Medical Hx and Allergies: Jae has a current medication list which includes the following prescription(s): amlodipine, aspirin, blood sugar diagnostic, blood-glucose meter, dorzolamide-timolol 2-0.5%, doxycycline, ergocalciferol, losartan-hydrochlorothiazide 100-12.5 mg, metformin, rosuvastatin, and sitagliptin. Review of patient's allergies indicates:  No Known Allergies  Prior Therapy:  ST services at [facility] 2 times.  Social History:  Lives with sister in [place], pt states he is not currently working. He reports that he peviously worked at a "".  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   Prior Level of Function: Independent   Current Level of Function: Pt with decreased intelligibility and trouble communicating on phone    Pain:   3/10  Pain Location / Description: Numbness in tips of left fingers and shoulder   Nutrition: Regular/thin as reported by patient   Patient's Therapy Goals:  "To talk better"  Objective   Formal Assessment:  Pt's motor speech, fluency, and voice were informally assessed. OME performed within Cranial Nerve Assessment detailed below. Motor speech skills were assessed and were not functional for daily communication with a variety of communication partners (i.e. Family, friends, medical personnel).   Respiration / Phonation:   # of reps/ 5s Norms/ # reps per second Maximum Phon. Time (ah)   P^ 12  5.0-7.1 2.4  Trial 1: 16   T^ 14  4.8-7.1 2.8  Trial 2: 10    K^ 15  4.4-7.4 3     P^T^K^ 9  3.6-7.5 1.8  Av             Norm (F 10-26, M 13-34.6)     Pt's speech judged to be 80% intelligible, requiring careful listening in both known and unknown contexts.      Phonation Conversation   Stimulus Sustained ah: History and " Conversation   Quality raspy raspy   Duration  8 seconds  N/a   Steadiness WFL WFL       Clinical Swallow Exam/ Felisha Mechanism Exam:  Mandibular Strength and Mobility - Trigeminal Nerve (CNV) Rest: WFL   Lateralization: WFL  Protrusion: WFL  Retraction:  WFL  Involuntary Movement: absent    Oral Labial Strength and Mobility -Facial Nerve (CN VII)  Rest: WFL   Protrusion: WFL  Retraction:  Mildly reduced ROM  Involuntary Movement: absent    Lingual Strength and Mobility- Hypoglossal Nerve (CN XII)  Rest: WFL   Lateralization: mildly reduced strength and ROM  Protrusion: mildly reduced strength and ROM  Elevation:  mildly reduced strength and ROM  Involuntary Movement: absent    Velar Elevation - Glossopharyngeal Nerve (CN IX) and Vagus (CN X) Rest: WFL   Symmetry: WFL   Elevation: WFL   Sustained elevation: WFL   Involuntary movement: present    Buccal Strength and Mobility - Facial Nerve (CN VII)  WFL    Facial Sensation and Movement - Facial Nerve (CN VII) Symmetrical at rest: yes  Wrinkle forehead: yes  Able to close eyes tightly: yes  Taste to Anterior 2/3 of Tongue: yes     Structure Abnormalities: no  Dentition: Present, scattered but adequate   Secretion Management: Pt reports frequent drooling, though not observed by SLP  Mucosal Quality: adequate   Volitional Cough: weak   Volitional Swallow: unable to swallow on command  Voice Prior to PO Intake: clear     [place] Swallow Protocol:  PASSED  [place] Swallow Protocol dictates pt remain NPO if fail screener; (Tanmayr et al. 2014) however, as objective swallow assessment is not available for greater than a week, pt will remain on current diet until objective assessment is completed unless otherwise indicated.     Clinical Swallow Examination:   Pt presented with:   THIN:- self regulated thin liquid via cup    PUREE:- tsp bites of pudding x2    SOLID: -bite of sydnie cracker x1     DESCRIPTION: Oral Phase: No anterior spillage noted. No significant oral residue.  Mastication appeared effective and efficient.    Pharyngeal:  Laryngeal lift present upon manual palpation.  Inconsistent coughing/throat clearing following thin, puree, and solid consistencies. Vocal quality remained clear post PO trials.     Eating Assessment Tool (EAT-10): 17/40 (A score above 3 indicates difficulty swallowing.)  Recommend MBSS: yes    RICH National Outcome Measures System (NOMS):   Motor Speech  Current status: FCM: LEVEL 5: The individual is able to speak intelligibly using simple sentences in daily routine activities with both familiar and unfamiliar communication partners. The individual occasionally requires minimal cueing to produce more complex sentences/messages in routine activities, although accuracy may vary and the individual may occasionally use compensatory strategies.  Projected status:  FCM:  LEVEL 6: The individual is successfully able to communicate intelligibly in most activities, but some limitations in intelligibility are still apparent in vocational, avocational, and social activities. The individual rarely requires minimal cueing to produce complex sentences/messages intelligibly. The individual usually uses compensatory strategies when encountering difficulty.       Treatment   Treatment Time In: n/a  Treatment Time Out: n/a  Total Treatment Time: n/a  no treatment performed 2/2 time to complete evaluation.    Education: Plan of Care, role of SLP in care, aspiration precautions , motor speech strategies and scheduling/ cancellation policy were discussed with pt. Patient expressed understanding of all discussed.     Home Program: n/a  Assessment     Jae presents to [facility] s/p medical diagnosis of  CVA.  Demonstrates impairments including limitations as described in the problem list. He presents with dysarthria c/b imprecise consonants, slow rate of speech, and decreased speech intelligibility. Pt presents with possible dysphagia c/b  coughing/throat clearing following PO trials of thin liquids, puree textures, and solids. Positive prognostic factors include pt motivation. Negative prognostic factors include transportation. .No barriers to therapy identified. Patient will benefit from skilled, outpatient neurological rehabilitation speech therapy.    Rehab Potential: fair  Pt's spiritual, cultural and educational needs considered and pt agreeable to plan of care and goals.    Short Term Goals (4 weeks):   1. Pt will rate his speech while reading as at least a 2 on a 3 point scale ( 1 = same as before beginning therapy, 2 =better but not best, 3 =best possible outcome) on  8 /10 trials.   2. Pt will differentiate between his speech and error-free speech by giving specific examples of differences on  8 /10 trials.   3. Pt will identify a self-cue for use of therapeutic speech and use it 5x per session independently  4. Pt will use motor speech strategies and answer questions in conversation with a self-rating of at least 2 on a 3 point scale ( 1 = same as before beginning therapy, 2 =better but not best, 3 =best possible outcome) on  8 /10 trials.   5. Pt will recall 3/4 clear speech strategies independently   6. Pt will participate in MBSS to objectively assess his swallow, rule out silent aspiration, and determine the safest possible diet.    - further short term goals to be determined based on MBSS results and recommendations     Long Term Goals (8 weeks):   1. He  will develop functional and intelligible speech and utilize compensatory strategies through the use of adequate labial and lingual function, increased articulatory precision and speech prosody.  2. He  will maintain adequate hydration/nutrition with optimum safety and efficiency of swallowing function on PO intake without overt signs and symptoms of aspiration for highest appropriate diet level (to be determined upon completion of MBSS)    Plan     Plan of Care Certification Period:  9/9/2019  to 11/4/19    Recommended Treatment Plan:  Patient will participate in the [facility] neurological rehabilitation program for speech therapy 2 times per week to address his Motor Speech deficits, to educate patient and their family, and to participate in a home exercise program.    Other Recommendations: Participate in MBSS     Therapist's Name:   RICHMOND Shirley-SLP   9/9/2019

## 2019-09-09 NOTE — PROGRESS NOTES
"  Occupational Therapy Daily Treatment Note     Date: 9/9/2019  Name: Jae Millan  Clinic Number: [id number]    Therapy Diagnosis:   Encounter Diagnoses   Name Primary?    Self-care deficit in dressing     Self-care deficit for bathing     Range of motion deficit     Swelling of left hand     Decreased strength of upper extremity      Physician: Maryjane Farias MD       Physician Orders: Eval and treat  Medical Diagnosis: Cerebrovascular accident (CVA) due to thrombosis of anterior cerebral artery, unspecified blood vessel laterality  Evaluation Date: 8/8/2019  Plan of Care Expiration Period: 8/8/19 to 10/31/19  Insurance Authorization period Expiration: 12/31/19  Date of Return to MD: 8/29/19  FOTO: 10th visit  8/26/2019: 47% limited in Mobility    Visit # / Visits authorized: 8 / 20  Time In: 10:30am   Time Out: 11:15am  Total Billable Time: 45 minutes    Precautions:  Standard, Diabetes and HTN      Subjective     Pt reports: "Shoulder is stiff and hurting me today"  he was compliant with home exercise program given this session.   Response to previous treatment: positive  Functional change: ongoing    Pain: 6/10  Location: L shoulder    Objective     Jae participated in therapeutic exercise 15 minutes:  - UBE x6min forward x 6min backward for increased cardiovascular endurance and activation of affected side; therapist's assistance needed for set uo and safe transfer    - Jae participated in manual therapy for 15 minutes:  - PROM of L UE; wrist ext/flex, Shoulder: IR/ER/FF/Abd with prolonged holds for each (breaks needed due to pain)    Jae participated in Unattended ESTIM:  - IFC to L shoulder for pain management       Home Exercises and Education Provided     Education provided:   - HEP review  - Progress towards goals     Written Home Exercises Provided: yes.  Exercises were reviewed and Jae was able to demonstrate them prior to the end of the session.  Jae demonstrated good  " understanding of the HEP provided.   .   See EMR under Patient Instructions for exercises provided 8/12/2019.        Assessment     Jae tolerated adapted treatment session well this date. Pt. Had an increase in L shoulder pain which was managed through IFC. Discussed importance of hygiene for his affected hand and foot. Pt. Verbalized an understanding of keeping everything clean and getting his nails cut. He cont's with ROM and strength deficits limiting his full participation in ADL/IADL tasks.     Jae is progressing well towards his goals and there are no updates to goals at this time. Pt prognosis is Fair.     Pt will continue to benefit from skilled outpatient occupational therapy to address the deficits listed in the problem list on initial evaluation provide pt/family education and to maximize pt's level of independence in the home and community environment.     Anticipated barriers to occupational therapy: timeframe of current condition    Pt's spiritual, cultural and educational needs considered and pt agreeable to plan of care and goals.    Goals:  Short Term Goals: 6 weeks    Independent in HEP for self ROM left arm MET 8/19/2019   Pt to consistently elevate left hand to assist with edema when seated MET 8/19/2019   Pt will perform simple meal for self 3 days a week. MET 8/19/2019     LTG GOALS:  Time frame: 12 weeks  LB dressing will improve to by 10 minutes completion MET 8/26/2019  Pt will improve his ability to cut using adaptive device,   Bathing will improve by completion in 20 minutes     Simple meal prep will be performed 5 x week    Simple home care will improve to one task 4 x week(sweep, mop, dust, clean sinks)    Plan   Cont. With established POC  Updates/Grading for next session:  Progress as tolerated      IRLANDA Galicia

## 2019-09-09 NOTE — TELEPHONE ENCOUNTER
Notes faxed to Emily Luke @ [phone]      ----- Message from Vandana Sheriff sent at 9/9/2019  2:03 PM CDT -----  Contact: Emily Luke at [phone]  fax to [phone]  Martin pt-they are requesting notes from his visit on August 27. Just needs the notes.

## 2019-09-10 DIAGNOSIS — I69.354 HEMIPARESIS AFFECTING LEFT SIDE AS LATE EFFECT OF CEREBROVASCULAR ACCIDENT (CVA): Primary | ICD-10-CM

## 2019-09-10 DIAGNOSIS — R13.10 DYSPHAGIA, UNSPECIFIED TYPE: ICD-10-CM

## 2019-09-10 DIAGNOSIS — I63.329 CEREBROVASCULAR ACCIDENT (CVA) DUE TO THROMBOSIS OF ANTERIOR CEREBRAL ARTERY, UNSPECIFIED BLOOD VESSEL LATERALITY: ICD-10-CM

## 2019-09-10 DIAGNOSIS — R47.1 DYSARTHRIA: ICD-10-CM

## 2019-09-10 NOTE — PROGRESS NOTES
HPI     DLS: 7/16/19    Pt here for HVF review;  Pt needs refills on his Dorzolamide-Timolol eye drops.     Meds: Pt ran out of Dorzolamide-Timolol eye drops about a week ago.   Pt has 11 refills left - but did not go get refill - ? 2/2 cost or   transportaion         Last edited by Tamera Chew MD on 9/12/2019  3:09 PM. (History)              Assessment /Plan     For exam results, see Encounter Report.    Traumatic glaucoma, left, indeterminate stage    Afferent pupillary defect of left eye    Nuclear sclerotic cataract of both eyes    History of eye trauma           Glaucoma (type and duration)       First HVF   ??   First photos   7/2019   Treatment / Drops started    Timolol BID OU , Azopt TID OU, Brimonidine BID OU            Family history    none        Glaucoma meds    Off all gtts (use to suse brim / azopt /timolol- os )         H/O adverse rxn to glaucoma drops         LASERS    ??        GLAUCOMA SURGERIES    none        OTHER EYE SURGERIES    none        CDR    OD: 0.6 OS: 0.85        Tbase    16/18         Tmax    16/18            Ttarget    ??             HVF    Full od //  gen dep thru out         Gonio   +4 od // recess 360 os          CCT    585/599        OCT    1 test 2019 to 2019 - nl od // dec thru out os         HRT   ??        Disc photos    7/2019    - Ttoday    18/18   - Test done today     HVF / DFE / OCT // CCT     2. + APD os     3. NS     Pt new patient to me, used to be seen in [facility] for glaucoma. Pt ran out of medications in June 2019. Pt has no eye pain, red eye , flashes, floaters, changes in vision. PT has no hx of surgery or family hx of glaucoma.    Recommend restart eye drops   cosopt os bid- RE-START       IN FURUTRE DO 24-2 STIM v OS     F/U with  3 months with HRT // IOP check - hopefully  back on gtts os // if combined gtts too expensive can separate them

## 2019-09-10 NOTE — PROGRESS NOTES
Physical Therapy Daily Treatment Note     Name: Jae Millan  [facility] Number: [id number]    Therapy Diagnosis:   Encounter Diagnoses   Name Primary?    Hemiparesis affecting left side as late effect of cerebrovascular accident (CVA)     Decreased sensation of lower extremity     Impaired functional mobility, balance, gait, and endurance Yes     Physician: Maryjane Farias MD    Visit Date: 9/11/2019    Physician Orders: PT Eval, all treatment after initial eval requires auth  Medical Diagnosis from Referral: Cerebrovascular accident (CVA) due to thrombosis of anterior cerebral artery, unspecified blood vessel laterality  Evaluation Date: 6/6/2019  Authorization Period Expiration: 12/31/2019  NEW Plan of Care Expiration: 10/31/2019  Visit # / Visits authorized: 11/ 12 (total visits: 15)   POC due: 9/28/2019    Time In: 1115  Time Out: 1155 (pt requests to finish session early)   Total Billable Time: 40 minutes    Precautions: Standard and Fall    Subjective     Pt reports: He is doing well. He did not get up and do much though.     He was semi-compliant with HEP.  Response to previous treatment: no adverse effects to report  Functional change: ongoing    Pain: 4/10  Location: L shoulder    Objective       Jae received therapeutic exercises to develop strength, endurance, posture and core stabilization for  15 minutes including:    Recumbent cycle L5 x 8 min, L foot strapped to improve LE alignment  x5 sit to stand from EOM (blue mat) with R LE anterior to L LE      Jae received neuromuscular reeducation to develop balance, proprioception, and kinesthetic sense for 25 minutes including:   2 x 30 sec tandem, SBA  2 x 30 sec feet together, eyes closed, CGA  x20 alternating taps onto medium cone while standing on foam, CGA  x20 alternating taps onto large cone on solid ground, CGA  2 laps    Home Exercises Provided and Patient Education Provided     Education provided:   - continue with HEP    Written Home  Exercises Provided: Patient instructed to cont prior HEP.   Exercises were reviewed and Jae was able to demonstrate them prior to the end of the session.  Jae demonstrated good  understanding of the education provided.     See EMR under Patient Instructions for exercises provided 6/13/2019.    Assessment   Jae tolerated treatment well. Session focused more on balance today instead of strengthening as last few sessions have. He continues to struggle with SLS balance but tolerates addition of balance activities well. Pt appearing more fatigued and distracted than usual today and requests that session be terminated slightly early for these reasons.     Pt has verbalized his transportation issues and has stated he plans to schedule his transportation services a half an hour earlier than usual, which will help him be on time for remainder of appointments to continue progressing with therapy.   Jae is progressing well towards his goals.   Pt prognosis is Good.     Pt will continue to benefit from skilled outpatient physical therapy to address the deficits listed in the problem list box on initial evaluation, provide pt/family education and to maximize pt's level of independence in the home and community environment.     Pt's spiritual, cultural and educational needs considered and pt agreeable to plan of care and goals.     Anticipated barriers to physical therapy: none    Goals: 8/28/19  Short Term Goals: 5 weeks   1. Pt will be able to complete 10 sit to  30 seconds for increased mobility. MET 8/5/2019  2. Pt will be able to complete TUG in less than 18 seconds in order to decrease his risk for fall. MET 7/5/19- 11.08 sec with small based quad cane, 13.55 sec w/o AD  3. Pt will be compliant with HEP and medicine management. Ongoing, semicompliant  4. Pt will be able to ambulate >300' with/without AD and mod I. MET 7/5/19- pt can ambulate at least 300 ft with small based quad cane      Long Term  Goals: 10 weeks   1. Pt will complete TUG in less than 15 seconds in order to decrease his risk for fall. MET 7/5/2019  2. Pt will be able to stand on B LE in SLS for >/= 5 seconds in order to decrease his risk for fall. Ongoing  3. Pt will be able to ascend/decend 5 stairs with 1 hand rail and supervision without cues for safety. MET 8/5/2019  4. Pt will tolerate standing activities for >10 minutes in order to increase his endurance. MET 7/5/2019  5. NEW GOAL: Pt will improve score on FGA to >/= 22 in order to decrease his risk for fall. Ongoing, improved (17/30)  6. NEW GOAL (8/28/2019): Pt will improve his SSWS to .95 without AD in order to normalize gait compared to peers of his age and gender.    Plan     Address balance activities next session, continue with use of resistance machines for strengthening    Oc Schroeder, PT  9/11/2019

## 2019-09-11 ENCOUNTER — CLINICAL SUPPORT (OUTPATIENT)
Dept: REHABILITATION | Facility: HOSPITAL | Age: 69
End: 2019-09-11
Attending: PHYSICAL MEDICINE & REHABILITATION
Payer: MEDICARE

## 2019-09-11 DIAGNOSIS — R46.0 SELF-CARE DEFICIT FOR BATHING: ICD-10-CM

## 2019-09-11 DIAGNOSIS — R20.8 DECREASED SENSATION OF LOWER EXTREMITY: ICD-10-CM

## 2019-09-11 DIAGNOSIS — M79.89 SWELLING OF LEFT HAND: ICD-10-CM

## 2019-09-11 DIAGNOSIS — R29.898 DECREASED STRENGTH OF UPPER EXTREMITY: ICD-10-CM

## 2019-09-11 DIAGNOSIS — I69.354 HEMIPARESIS AFFECTING LEFT SIDE AS LATE EFFECT OF CEREBROVASCULAR ACCIDENT (CVA): ICD-10-CM

## 2019-09-11 DIAGNOSIS — Z74.09 IMPAIRED FUNCTIONAL MOBILITY, BALANCE, GAIT, AND ENDURANCE: Primary | ICD-10-CM

## 2019-09-11 DIAGNOSIS — R13.10 DYSPHAGIA, UNSPECIFIED TYPE: ICD-10-CM

## 2019-09-11 DIAGNOSIS — R47.1 DYSARTHRIA: ICD-10-CM

## 2019-09-11 DIAGNOSIS — M25.60 RANGE OF MOTION DEFICIT: ICD-10-CM

## 2019-09-11 DIAGNOSIS — Z74.1 SELF-CARE DEFICIT IN DRESSING: ICD-10-CM

## 2019-09-11 DIAGNOSIS — I63.329 CEREBROVASCULAR ACCIDENT (CVA) DUE TO THROMBOSIS OF ANTERIOR CEREBRAL ARTERY, UNSPECIFIED BLOOD VESSEL LATERALITY: ICD-10-CM

## 2019-09-11 PROCEDURE — 97112 NEUROMUSCULAR REEDUCATION: CPT | Mod: PN

## 2019-09-11 PROCEDURE — 92507 TX SP LANG VOICE COMM INDIV: CPT | Mod: PN | Performed by: SPEECH-LANGUAGE PATHOLOGIST

## 2019-09-11 PROCEDURE — 97110 THERAPEUTIC EXERCISES: CPT | Mod: PN

## 2019-09-11 PROCEDURE — 97112 NEUROMUSCULAR REEDUCATION: CPT | Mod: PN,59

## 2019-09-11 PROCEDURE — 97140 MANUAL THERAPY 1/> REGIONS: CPT | Mod: PN

## 2019-09-11 NOTE — PROGRESS NOTES
"Outpatient Neurological Rehabilitation   Speech and Language Therapy Daily Note  Date:  9/11/2019     Name: Jae Millan   MRN: [id number]   Therapy Diagnosis:   Encounter Diagnosis   Name Primary?    Dysarthria    Physician: Maryjane Farias MD  Physician Orders: [id number] - Ambulatory consult to Speech Therapy  Medical Diagnosis: I69.354 (ICD-10-CM) - Hemiparesis affecting left side as late effect of cerebrovascular accident (CVA)  I63.329 (ICD-10-CM) - Cerebrovascular accident (CVA) due to thrombosis of anterior cerebral artery, unspecified blood vessel laterality  R47.1 (ICD-10-CM) - Dysarthria       Visit #/ Visits Authorized: 2/5  Date of Evaluation:  9/9/2019   Insurance Authorization Period: 8/27/19-8/26/20  Plan of Care Certification:    9/9/2019 to 11/4/19  Extended POC:  n/a  Progress Note: due 10/9/19  Visits Cancelled: 0  Visits No Show: 0    Time In:  1430  Time Out:  1510  Total Billable Time: 40 min    Precautions: Standard    Subjective:   Pt reports: "Feeling good." "Had to wait a while" No complaints.  He was not compliant to home exercise program 2/2 no HEP established   Response to previous treatment: "good"   Pain Scale:  0/10 on VAS currently.   Pain Location: n/a  Objective:     UNTIMED  Procedure Min.   Speech- Language- Voice Therapy  45   Total Timed Units: 0  Total Untimed Units: 1  Charges Billed/# of units: 1    Short Term Goals: (4 weeks) Current Progress:   1. Pt will rate his speech while reading as at least a 2 on a 3 point scale ( 1 = same as before beginning therapy, 2 =better but not best, 3 =best possible outcome) on  8 /10 trials.     Progressing/ Not Met 9/11/2019   Patient rated his speech during reading tasks as a 2 on 6/10 trials and 1 on 4/10 trials.    2. Pt will differentiate between his speech and error-free speech by giving specific examples of differences on  8 /10 trials.     Progressing/ Not Met 9/11/2019   Not formally addressed      3. Pt will identify a " self-cue for use of therapeutic speech and use it 5x per session independently    Progressing/ Not Met 9/11/2019   Pt identified a self-cue (throat clear/swallow)   4. Pt will use motor speech strategies and answer questions in conversation with a self-rating of at least 2 on a 3 point scale ( 1 = same as before beginning therapy, 2 =better but not best, 3 =best possible outcome) on  8 /10 trials.     Progressing/ Not Met 9/11/2019   Not formally addressed       5. Pt will recall 3/4 clear speech strategies independently     Progressing/ Not Met 9/11/2019   Discussed motor speech strategies and provided pt with examples .   6. Pt will participate in MBSS to objectively assess his swallow, rule out silent aspiration, and determine the safest possible diet.      Progressing/ Not Met 9/11/2019   Order for MBSS has been places, need to scheudle procedure at [facility]   Pt repeated multisyllabic words 3x each at a fast rate (30 trials) to improve speed and coordination of articulators.       Patient Education/Response:   Discussed motor speech strategies, HEP, POC, and goals. Pt verbalized understanding of all discussed.     Written Home Exercises Provided: no. Pt instructed to repeat multisyllabic words 3x each at a fast rate (30 times) to improve speed and coordination of articulators. Pt instructed to complete OME 3x a day.   Exercises were reviewed and Jae was able to demonstrate them prior to the end of the session.  Jae demonstrated good  understanding of the education provided.     Assessment:   Jae is progressing well towards his goals. Fair performance with cognitive motor speech tasks when given direct instruction and max cues. Demonstrated good performance of HEP. Good Current goals remain appropriate. Goals to be updated as necessary.     Pt prognosis is Fair. Pt will continue to benefit from skilled outpatient speech and language therapy to address the deficits listed in the problem list on initial  evaluation, provide pt/family education and to maximize pt's level of independence in the home and community environment.     Medical necessity is demonstrated by the following IMPAIRMENTS:  Reduced speech intelligibility limits functional communication with both familiar and unfamiliar communication partners.     Barriers to Therapy: none identified   Pt's spiritual, cultural and educational needs considered and pt agreeable to plan of care and goals.  Plan:   Continue POC with focus on cognitive approach to motor speech     Tamera Crum CF-SLP   9/11/2019

## 2019-09-12 ENCOUNTER — CLINICAL SUPPORT (OUTPATIENT)
Dept: OPHTHALMOLOGY | Facility: CLINIC | Age: 69
End: 2019-09-12
Payer: MEDICARE

## 2019-09-12 ENCOUNTER — TELEPHONE (OUTPATIENT)
Dept: SPEECH THERAPY | Facility: HOSPITAL | Age: 69
End: 2019-09-12

## 2019-09-12 ENCOUNTER — OFFICE VISIT (OUTPATIENT)
Dept: OPHTHALMOLOGY | Facility: CLINIC | Age: 69
End: 2019-09-12
Payer: MEDICARE

## 2019-09-12 DIAGNOSIS — Z87.828 HISTORY OF EYE TRAUMA: ICD-10-CM

## 2019-09-12 DIAGNOSIS — H25.13 NUCLEAR SCLEROTIC CATARACT OF BOTH EYES: ICD-10-CM

## 2019-09-12 DIAGNOSIS — H21.562 AFFERENT PUPILLARY DEFECT OF LEFT EYE: ICD-10-CM

## 2019-09-12 DIAGNOSIS — H40.32X3 TRAUMATIC GLAUCOMA, LEFT, SEVERE STAGE: Primary | ICD-10-CM

## 2019-09-12 DIAGNOSIS — H40.32X4 TRAUMATIC GLAUCOMA, LEFT, INDETERMINATE STAGE: ICD-10-CM

## 2019-09-12 PROCEDURE — 92083 HUMPHREY VISUAL FIELD - OU - BOTH EYES: ICD-10-PCS | Mod: 26,S$PBB,, | Performed by: OPHTHALMOLOGY

## 2019-09-12 PROCEDURE — 92083 EXTENDED VISUAL FIELD XM: CPT | Mod: PBBFAC

## 2019-09-12 PROCEDURE — 92133 CPTRZD OPH DX IMG PST SGM ON: CPT | Mod: PBBFAC | Performed by: OPHTHALMOLOGY

## 2019-09-12 PROCEDURE — 92012 INTRM OPH EXAM EST PATIENT: CPT | Mod: S$PBB,,, | Performed by: OPHTHALMOLOGY

## 2019-09-12 PROCEDURE — 99999 PR PBB SHADOW E&M-EST. PATIENT-LVL II: CPT | Mod: PBBFAC,,, | Performed by: OPHTHALMOLOGY

## 2019-09-12 PROCEDURE — 99999 PR PBB SHADOW E&M-EST. PATIENT-LVL II: ICD-10-PCS | Mod: PBBFAC,,, | Performed by: OPHTHALMOLOGY

## 2019-09-12 PROCEDURE — 92083 EXTENDED VISUAL FIELD XM: CPT | Mod: 26,S$PBB,, | Performed by: OPHTHALMOLOGY

## 2019-09-12 PROCEDURE — 92133 POSTERIOR SEGMENT OCT OPTIC NERVE(OCULAR COHERENCE TOMOGRAPHY) - OU - BOTH EYES: ICD-10-PCS | Mod: 26,S$PBB,, | Performed by: OPHTHALMOLOGY

## 2019-09-12 PROCEDURE — 92012 PR EYE EXAM, EST PATIENT,INTERMED: ICD-10-PCS | Mod: S$PBB,,, | Performed by: OPHTHALMOLOGY

## 2019-09-12 PROCEDURE — 99212 OFFICE O/P EST SF 10 MIN: CPT | Mod: PBBFAC,25 | Performed by: OPHTHALMOLOGY

## 2019-09-12 RX ORDER — DORZOLAMIDE HYDROCHLORIDE AND TIMOLOL MALEATE 20; 5 MG/ML; MG/ML
1 SOLUTION/ DROPS OPHTHALMIC 2 TIMES DAILY
Qty: 10 ML | Refills: 12 | Status: SHIPPED | OUTPATIENT
Start: 2019-09-12 | End: 2019-09-12 | Stop reason: SDUPTHER

## 2019-09-12 RX ORDER — DORZOLAMIDE HYDROCHLORIDE AND TIMOLOL MALEATE 20; 5 MG/ML; MG/ML
1 SOLUTION/ DROPS OPHTHALMIC 2 TIMES DAILY
Qty: 10 ML | Refills: 12 | Status: SHIPPED | OUTPATIENT
Start: 2019-09-12 | End: 2019-10-18 | Stop reason: SDUPTHER

## 2019-09-17 NOTE — PROGRESS NOTES
"  Occupational Therapy Daily Treatment Note and Progress     Date: 9/18/2019  Name: Jae Millan  Clinic Number: [id number]    Therapy Diagnosis:   Encounter Diagnoses   Name Primary?    Self-care deficit in dressing     Self-care deficit for bathing     Range of motion deficit     Swelling of left hand     Decreased strength of upper extremity      Physician: Maryjane Farias MD       Physician Orders: Eval and treat  Medical Diagnosis: Cerebrovascular accident (CVA) due to thrombosis of anterior cerebral artery, unspecified blood vessel laterality  Evaluation Date: 8/8/2019  Plan of Care Expiration Period: 8/8/19 to 10/31/19  Insurance Authorization period Expiration: 12/31/19  Date of Return to MD: 8/29/19  FOTO: 9/18/2019  47% Mobility  8/26/2019: 47% limited in Mobility    Visit # / Visits authorized: 10 / 20  Time In: 10:30am  Time Out: 11:15am  Total Billable Time: 45 minutes    Precautions:  Standard, Diabetes and HTN      Subjective     Pt reports: "Got my new splint! Need a little help setting it up"  he was compliant with home exercise program given this session.   Response to previous treatment: positive  Functional change: ongoing    Pain:  8/10 2/10 post  Location: L shoulder    Objective     Pt. Arrived to treatment with his new quad cane and "Bendease" splint    Reassessed FOTO     Jae participated in unattended ESTIM:  - IFC to L shoulder with moist heat application for pain reduction    Jae participated in therapeutic activities for 30 min:  - Pt. Was educated on his resting hand splint, education included the folllowing:   - Care instructions   - Correct don/doff technique   - Skin check for any irritation   - Propper wear schedule      Home Exercises and Education Provided     Education provided:   - HEP review  - Progress towards goals     Written Home Exercises Provided: yes.  Exercises were reviewed and Jae was able to demonstrate them prior to the end of the session.  " Jae demonstrated good  understanding of the HEP provided.   .   See EMR under Patient Instructions for exercises provided 8/12/2019.        Assessment     Jae had good tolerance to treatment this date. He did have increased shoulder pain which resolved some after IFC. He was able to verbalize understanding and could don/dof his new splint with Min A. Further education on wear schedule and don/dof will need to be provided for successful use. and  He cont's with ROM and strength deficits limiting his full participation in ADL/IADL tasks.     Jae is progressing well towards his goals and there are no updates to goals at this time. Pt prognosis is Fair.     Pt will continue to benefit from skilled outpatient occupational therapy to address the deficits listed in the problem list on initial evaluation provide pt/family education and to maximize pt's level of independence in the home and community environment.     Anticipated barriers to occupational therapy: timeframe of current condition    Pt's spiritual, cultural and educational needs considered and pt agreeable to plan of care and goals.    Goals:  Short Term Goals: 6 weeks    Independent in HEP for self ROM left arm MET 8/19/2019   Pt to consistently elevate left hand to assist with edema when seated MET 8/19/2019   Pt will perform simple meal for self 3 days a week. MET 8/19/2019     LTG GOALS:  Time frame: 12 weeks  LB dressing will improve to by 10 minutes completion MET 8/26/2019  Pt will improve his ability to cut using adaptive device,   Bathing will improve by completion in 20 minutes     Simple meal prep will be performed 5 x week    Simple home care will improve to one task 4 x week(sweep, mop, dust, clean sinks)    Plan   Cont. With established POC  Updates/Grading for next session:  Progress as tolerated      IRLANDA Galicia

## 2019-09-18 ENCOUNTER — CLINICAL SUPPORT (OUTPATIENT)
Dept: REHABILITATION | Facility: HOSPITAL | Age: 69
End: 2019-09-18
Attending: PHYSICAL MEDICINE & REHABILITATION
Payer: MEDICARE

## 2019-09-18 DIAGNOSIS — Z74.09 IMPAIRED FUNCTIONAL MOBILITY, BALANCE, GAIT, AND ENDURANCE: Primary | ICD-10-CM

## 2019-09-18 DIAGNOSIS — R29.898 DECREASED STRENGTH OF UPPER EXTREMITY: ICD-10-CM

## 2019-09-18 DIAGNOSIS — Z74.1 SELF-CARE DEFICIT IN DRESSING: ICD-10-CM

## 2019-09-18 DIAGNOSIS — M79.89 SWELLING OF LEFT HAND: ICD-10-CM

## 2019-09-18 DIAGNOSIS — I69.354 HEMIPARESIS AFFECTING LEFT SIDE AS LATE EFFECT OF CEREBROVASCULAR ACCIDENT (CVA): ICD-10-CM

## 2019-09-18 DIAGNOSIS — R46.0 SELF-CARE DEFICIT FOR BATHING: ICD-10-CM

## 2019-09-18 DIAGNOSIS — M25.60 RANGE OF MOTION DEFICIT: ICD-10-CM

## 2019-09-18 DIAGNOSIS — R20.8 DECREASED SENSATION OF LOWER EXTREMITY: ICD-10-CM

## 2019-09-18 PROCEDURE — 97112 NEUROMUSCULAR REEDUCATION: CPT | Mod: PN

## 2019-09-18 PROCEDURE — G8978 MOBILITY CURRENT STATUS: HCPCS | Mod: CK,PN

## 2019-09-18 PROCEDURE — 97014 ELECTRIC STIMULATION THERAPY: CPT | Mod: PN

## 2019-09-18 PROCEDURE — 97110 THERAPEUTIC EXERCISES: CPT | Mod: PN

## 2019-09-18 PROCEDURE — G8979 MOBILITY GOAL STATUS: HCPCS | Mod: CJ,PN

## 2019-09-18 PROCEDURE — 97530 THERAPEUTIC ACTIVITIES: CPT | Mod: PN

## 2019-09-18 NOTE — PROGRESS NOTES
Physical Therapy Daily Treatment Note     Name: Jae Millan  [facility] Number: [id number]    Therapy Diagnosis:   Encounter Diagnoses   Name Primary?    Hemiparesis affecting left side as late effect of cerebrovascular accident (CVA)     Decreased sensation of lower extremity     Impaired functional mobility, balance, gait, and endurance Yes     Physician: Maryjane Farias MD    Visit Date: 9/18/2019    Physician Orders: PT Eval, all treatment after initial eval requires auth  Medical Diagnosis from Referral: Cerebrovascular accident (CVA) due to thrombosis of anterior cerebral artery, unspecified blood vessel laterality  Evaluation Date: 6/6/2019  Authorization Period Expiration: 12/31/2019  NEW Plan of Care Expiration: 10/31/2019  Visit # / Visits authorized: 12/ 12 (total visits: 16)   POC due: 9/28/2019    Time In: 1115  Time Out: 1200  Total Billable Time: 45 minutes    Precautions: Standard and Fall    Subjective     Pt reports: He is tired today. He does not feel like doing much therapy today.     He was semi-compliant with HEP.  Response to previous treatment: no adverse effects to report  Functional change: ongoing    Pain: 4/10  Location: L shoulder    Objective       Jae received therapeutic exercises to develop strength, endurance, posture and core stabilization for  15 minutes including:    Recumbent stepper L3.5 x 10 min, L hand strapped to improve alignment  x5 sit to stand from EOM (blue mat) with R LE anterior to L LE    Jae received neuromuscular reeducation to develop balance, proprioception, and kinesthetic sense for 30 minutes including:   2 x 30 sec tandem, SBA  2 x 30 sec feet together, eyes closed, CGA  x20 alternating taps onto medium cone while standing on foam, CGA  x20 alternating taps onto large cone on solid ground, CGA  x5 lunges onto airex on step, with 5 second balance in half kneeling, then attempting to step through onto forward lunged foot    Home Exercises Provided  and Patient Education Provided     Education provided:   - continue with HEP    Written Home Exercises Provided: Patient instructed to cont prior HEP.   Exercises were reviewed and Jae was able to demonstrate them prior to the end of the session.  Jae demonstrated good  understanding of the education provided.     See EMR under Patient Instructions for exercises provided 6/13/2019.    Assessment   Jae tolerated treatment well. He required some redirection today as he continually focused on his remaining schedule as well as his MD appointments scheduled. He continues to struggle with SLS tasks as well as some balance though he is progressing well. Pt tolerated use of lunging to gain strength/balance in hemiparetic limb though he required Yehuda for balance during this task. He remains appropriate for skilled PT services.     Pt has verbalized his transportation issues and has stated he plans to schedule his transportation services a half an hour earlier than usual, which will help him be on time for remainder of appointments to continue progressing with therapy.   Jae is progressing well towards his goals.   Pt prognosis is Good.     Pt will continue to benefit from skilled outpatient physical therapy to address the deficits listed in the problem list box on initial evaluation, provide pt/family education and to maximize pt's level of independence in the home and community environment.     Pt's spiritual, cultural and educational needs considered and pt agreeable to plan of care and goals.     Anticipated barriers to physical therapy: none    Goals: 8/28/19  Short Term Goals: 5 weeks   1. Pt will be able to complete 10 sit to  30 seconds for increased mobility. MET 8/5/2019  2. Pt will be able to complete TUG in less than 18 seconds in order to decrease his risk for fall. MET 7/5/19- 11.08 sec with small based quad cane, 13.55 sec w/o AD  3. Pt will be compliant with HEP and medicine management.  Ongoing, semicompliant  4. Pt will be able to ambulate >300' with/without AD and mod I. MET 7/5/19- pt can ambulate at least 300 ft with small based quad cane      Long Term Goals: 10 weeks   1. Pt will complete TUG in less than 15 seconds in order to decrease his risk for fall. MET 7/5/2019  2. Pt will be able to stand on B LE in SLS for >/= 5 seconds in order to decrease his risk for fall. Ongoing  3. Pt will be able to ascend/decend 5 stairs with 1 hand rail and supervision without cues for safety. MET 8/5/2019  4. Pt will tolerate standing activities for >10 minutes in order to increase his endurance. MET 7/5/2019  5. NEW GOAL: Pt will improve score on FGA to >/= 22 in order to decrease his risk for fall. Ongoing, improved (17/30)  6. NEW GOAL (8/28/2019): Pt will improve his SSWS to .95 without AD in order to normalize gait compared to peers of his age and gender.    Plan     Address balance activities next session, continue with use of resistance machines for strengthening    Oc Schroeder, PT  9/18/2019

## 2019-09-19 ENCOUNTER — HOSPITAL ENCOUNTER (OUTPATIENT)
Dept: RADIOLOGY | Facility: HOSPITAL | Age: 69
Discharge: HOME | End: 2019-09-19
Attending: INTERNAL MEDICINE
Payer: MEDICARE

## 2019-09-19 DIAGNOSIS — B18.2 CHRONIC HEPATITIS C WITHOUT HEPATIC COMA: ICD-10-CM

## 2019-09-19 PROCEDURE — 76700 US EXAM ABDOM COMPLETE: CPT | Mod: 26,,, | Performed by: RADIOLOGY

## 2019-09-19 PROCEDURE — 76700 US EXAM ABDOM COMPLETE: CPT | Mod: TC

## 2019-09-19 PROCEDURE — 76700 US ABDOMEN COMPLETE: ICD-10-PCS | Mod: 26,,, | Performed by: RADIOLOGY

## 2019-09-19 NOTE — PROGRESS NOTES
Physical Therapy Daily Treatment Note     Name: Jae Millan  [facility] Number: [id number]    Therapy Diagnosis:   No diagnosis found.  Physician: Maryjane Farias MD    Visit Date: 9/23/2019    Physician Orders: PT Eval, all treatment after initial eval requires auth  Medical Diagnosis from Referral: Cerebrovascular accident (CVA) due to thrombosis of anterior cerebral artery, unspecified blood vessel laterality  Evaluation Date: 6/6/2019  Authorization Period Expiration: 12/31/2019  NEW Plan of Care Expiration: 10/31/2019  Visit # / Visits authorized: 12/ 12 (total visits: 16)   POC due: 9/28/2019    Time In: ***  Time Out: ***  Total Billable Time: *** minutes    Precautions: Standard and Fall    Subjective     Pt reports: He is tired today. He does not feel like doing much therapy today.     He was semi-compliant with HEP.  Response to previous treatment: no adverse effects to report  Functional change: ongoing    Pain: 4/10  Location: L shoulder    Objective       Jae received therapeutic exercises to develop strength, endurance, posture and core stabilization for  15 minutes including:    Recumbent stepper L3.5 x 10 min, L hand strapped to improve alignment  x5 sit to stand from EOM (blue mat) with R LE anterior to L LE    Jae received neuromuscular reeducation to develop balance, proprioception, and kinesthetic sense for 30 minutes including:   2 x 30 sec tandem, SBA  2 x 30 sec feet together, eyes closed, CGA  x20 alternating taps onto medium cone while standing on foam, CGA  x20 alternating taps onto large cone on solid ground, CGA  x5 lunges onto airex on step, with 5 second balance in half kneeling, then attempting to step through onto forward lunged foot    Home Exercises Provided and Patient Education Provided     Education provided:   - continue with HEP    Written Home Exercises Provided: Patient instructed to cont prior HEP.   Exercises were reviewed and Jae was able to demonstrate them  prior to the end of the session.  Jae demonstrated good  understanding of the education provided.     See EMR under Patient Instructions for exercises provided 6/13/2019.    Assessment   Jae tolerated treatment well. He required some redirection today as he continually focused on his remaining schedule as well as his MD appointments scheduled. He continues to struggle with SLS tasks as well as some balance though he is progressing well. Pt tolerated use of lunging to gain strength/balance in hemiparetic limb though he required Yehuda for balance during this task. He remains appropriate for skilled PT services.     Pt has verbalized his transportation issues and has stated he plans to schedule his transportation services a half an hour earlier than usual, which will help him be on time for remainder of appointments to continue progressing with therapy.   Jae is progressing well towards his goals.   Pt prognosis is Good.     Pt will continue to benefit from skilled outpatient physical therapy to address the deficits listed in the problem list box on initial evaluation, provide pt/family education and to maximize pt's level of independence in the home and community environment.     Pt's spiritual, cultural and educational needs considered and pt agreeable to plan of care and goals.     Anticipated barriers to physical therapy: none    Goals: 8/28/19  Short Term Goals: 5 weeks   1. Pt will be able to complete 10 sit to  30 seconds for increased mobility. MET 8/5/2019  2. Pt will be able to complete TUG in less than 18 seconds in order to decrease his risk for fall. MET 7/5/19- 11.08 sec with small based quad cane, 13.55 sec w/o AD  3. Pt will be compliant with HEP and medicine management. Ongoing, semicompliant  4. Pt will be able to ambulate >300' with/without AD and mod I. MET 7/5/19- pt can ambulate at least 300 ft with small based quad cane      Long Term Goals: 10 weeks   1. Pt will complete TUG in  less than 15 seconds in order to decrease his risk for fall. MET 7/5/2019  2. Pt will be able to stand on B LE in SLS for >/= 5 seconds in order to decrease his risk for fall. Ongoing  3. Pt will be able to ascend/decend 5 stairs with 1 hand rail and supervision without cues for safety. MET 8/5/2019  4. Pt will tolerate standing activities for >10 minutes in order to increase his endurance. MET 7/5/2019  5. NEW GOAL: Pt will improve score on FGA to >/= 22 in order to decrease his risk for fall. Ongoing, improved (17/30)  6. NEW GOAL (8/28/2019): Pt will improve his SSWS to .95 without AD in order to normalize gait compared to peers of his age and gender.    Plan     Address balance activities next session, continue with use of resistance machines for strengthening    Oc Schroeder, PT  9/23/2019

## 2019-09-20 ENCOUNTER — CLINICAL SUPPORT (OUTPATIENT)
Dept: REHABILITATION | Facility: HOSPITAL | Age: 69
End: 2019-09-20
Attending: PHYSICAL MEDICINE & REHABILITATION
Payer: MEDICARE

## 2019-09-20 DIAGNOSIS — M25.60 RANGE OF MOTION DEFICIT: ICD-10-CM

## 2019-09-20 DIAGNOSIS — Z74.09 IMPAIRED FUNCTIONAL MOBILITY, BALANCE, GAIT, AND ENDURANCE: ICD-10-CM

## 2019-09-20 DIAGNOSIS — Z74.1 SELF-CARE DEFICIT IN DRESSING: ICD-10-CM

## 2019-09-20 DIAGNOSIS — I69.354 HEMIPARESIS AFFECTING LEFT SIDE AS LATE EFFECT OF CEREBROVASCULAR ACCIDENT (CVA): ICD-10-CM

## 2019-09-20 DIAGNOSIS — R46.0 SELF-CARE DEFICIT FOR BATHING: ICD-10-CM

## 2019-09-20 DIAGNOSIS — R29.898 DECREASED STRENGTH OF UPPER EXTREMITY: ICD-10-CM

## 2019-09-20 DIAGNOSIS — R47.1 DYSARTHRIA: ICD-10-CM

## 2019-09-20 DIAGNOSIS — M79.89 SWELLING OF LEFT HAND: ICD-10-CM

## 2019-09-20 DIAGNOSIS — R20.8 DECREASED SENSATION OF LOWER EXTREMITY: ICD-10-CM

## 2019-09-20 PROCEDURE — 97018 PARAFFIN BATH THERAPY: CPT | Mod: PN,59

## 2019-09-20 PROCEDURE — 97110 THERAPEUTIC EXERCISES: CPT | Mod: PN | Performed by: PHYSICAL THERAPIST

## 2019-09-20 PROCEDURE — 92507 TX SP LANG VOICE COMM INDIV: CPT | Mod: PN | Performed by: SPEECH-LANGUAGE PATHOLOGIST

## 2019-09-20 PROCEDURE — 97530 THERAPEUTIC ACTIVITIES: CPT | Mod: PN

## 2019-09-20 PROCEDURE — 97140 MANUAL THERAPY 1/> REGIONS: CPT | Mod: PN

## 2019-09-20 PROCEDURE — 97112 NEUROMUSCULAR REEDUCATION: CPT | Mod: PN | Performed by: PHYSICAL THERAPIST

## 2019-09-20 NOTE — PROGRESS NOTES
Physical Therapy Daily Treatment Note     Name: Jae Millan  [facility] Number: [id number]    Therapy Diagnosis:   Encounter Diagnoses   Name Primary?    Hemiparesis affecting left side as late effect of cerebrovascular accident (CVA) Yes    Decreased sensation of lower extremity     Impaired functional mobility, balance, gait, and endurance      Physician: Maryjane Farias MD    Visit Date: 9/23/2019    Physician Orders: PT Eval, all treatment after initial eval requires auth  Medical Diagnosis from Referral: Cerebrovascular accident (CVA) due to thrombosis of anterior cerebral artery, unspecified blood vessel laterality  Evaluation Date: 6/6/2019  Authorization Period Expiration: 12/31/2019  NEW Plan of Care Expiration: 10/31/2019  Visit # / Visits authorized: 1/ 20 (total visits: 17)   POC due: 9/28/2019    Time In: 1120  Time Out: 1150 (pt request termination of session early due to transportation)  Total Billable Time: 30 minutes    Precautions: Standard and Fall    Subjective     Pt reports: He didn't go for a walk this weekend. Overall good weekend.     He was compliant with HEP mostly for LUE, reports walking for LE strengthening.  Response to previous treatment: no adverse effects to report  Functional change: ongoing    Pain: 0/10  Location: L shoulder    Objective       Jae received therapeutic exercises to develop strength, endurance, posture and core stabilization for  15 minutes including:    Recumbent cycle L4 x 8 min, L foot strapped to pedal, LUE placed on bedside table for improved alignment    DF stretch on wedge 30sec      Jae received neuromuscular reeducation to develop balance, proprioception, and kinesthetic sense for 15 minutes including:   Parallel bars:   2 x 30 sec static standing on Bosu (flat side up) with poor+ to fair- balance  X 30 seconds with head turns L/R on bosu, Yehuda  X 30 seconds with head turns up/down on bosu, Yehuda  2 x20 alternating taps onto large cone on  solid ground, CGA  Tandem gait front/ back- 3 laps, intermittent UE support    Home Exercises Provided and Patient Education Provided     Education provided:   - continue with HEP    Written Home Exercises Provided: Patient instructed to cont prior HEP.   Exercises were reviewed and Jae was able to demonstrate them prior to the end of the session.  Jae demonstrated good  understanding of the education provided.     See EMR under Patient Instructions for exercises provided 6/13/2019.    Assessment   Jae tolerated treatment well. He demonstrated increased tolerance to balance on Bosu today. Pt is worn out at start of session and requests terminating session early due to transportation issues. He continues to do well with endurance activities. He remains appropriate for skilled PT services.     Pt has verbalized his transportation issues and has stated he plans to schedule his transportation services a half an hour earlier than usual, which will help him be on time for remainder of appointments to continue progressing with therapy.   Jae is progressing well towards his goals.   Pt prognosis is Good.     Pt will continue to benefit from skilled outpatient physical therapy to address the deficits listed in the problem list box on initial evaluation, provide pt/family education and to maximize pt's level of independence in the home and community environment.     Pt's spiritual, cultural and educational needs considered and pt agreeable to plan of care and goals.     Anticipated barriers to physical therapy: none    Goals: 8/28/19  Short Term Goals: 5 weeks   1. Pt will be able to complete 10 sit to  30 seconds for increased mobility. MET 8/5/2019  2. Pt will be able to complete TUG in less than 18 seconds in order to decrease his risk for fall. MET 7/5/19- 11.08 sec with small based quad cane, 13.55 sec w/o AD  3. Pt will be compliant with HEP and medicine management. Ongoing, semicompliant  4. Pt will  be able to ambulate >300' with/without AD and mod I. MET 7/5/19- pt can ambulate at least 300 ft with small based quad cane      Long Term Goals: 10 weeks   1. Pt will complete TUG in less than 15 seconds in order to decrease his risk for fall. MET 7/5/2019  2. Pt will be able to stand on B LE in SLS for >/= 5 seconds in order to decrease his risk for fall. Ongoing  3. Pt will be able to ascend/decend 5 stairs with 1 hand rail and supervision without cues for safety. MET 8/5/2019  4. Pt will tolerate standing activities for >10 minutes in order to increase his endurance. MET 7/5/2019  5. NEW GOAL: Pt will improve score on FGA to >/= 22 in order to decrease his risk for fall. Ongoing, improved (17/30)  6. NEW GOAL (8/28/2019): Pt will improve his SSWS to .95 without AD in order to normalize gait compared to peers of his age and gender.    Plan     Continue to advance balance and single leg stance challenges as appropriate, continue with use of resistance machines for strengthening    Oc Schroeder, PT  9/23/2019

## 2019-09-20 NOTE — PROGRESS NOTES
"  Occupational Therapy Daily Treatment Note     Date: 9/20/2019  Name: Jae Millan  Clinic Number: [id number]    Therapy Diagnosis:   Encounter Diagnoses   Name Primary?    Self-care deficit in dressing     Self-care deficit for bathing     Range of motion deficit     Swelling of left hand     Decreased strength of upper extremity      Physician: Maryjane Farias MD       Physician Orders: Eval and treat  Medical Diagnosis: Cerebrovascular accident (CVA) due to thrombosis of anterior cerebral artery, unspecified blood vessel laterality  Evaluation Date: 8/8/2019  Plan of Care Expiration Period: 8/8/19 to 10/31/19  Insurance Authorization period Expiration: 12/31/19  Date of Return to MD: 8/29/19  FOTO: 9/18/2019  47% Mobility  8/26/2019: 47% limited in Mobility    Visit # / Visits authorized: 11 / 20  Time In: 10:30am  Time Out: 11:15am  Total Billable Time: 45 minutes    Precautions:  Standard, Diabetes and HTN      Subjective     Pt reports: "Feeling good. Still need a little help with this splint"  he was compliant with home exercise program given this session.   Response to previous treatment: positive  Functional change: ongoing    Pain:  7/10 2/10 post  Location: L shoulder    Objective     Pt. Arrived to treatment with his new quad cane and "Bendease" splint on     Jae participated in manual therapy for 15 minutes:  - Supine PROM of L GH joint: FF/Abd/ER/IR  - General wrist stretches including               1. Scaphoid on radius               2. Increasing mobility of metacarpals               3. Carpal rolls               4. Increasing mobility towards radial deviation               5. Increasing mobility of wrist towards extension              6. Increasing mobility of wrist towards supination/pronation    Jae participated in therapeutic activities for 15 min:  - Pt. Was educated on his resting hand splint, education included the folllowing:   - Care instructions   - Correct don/doff " technique   - Skin check for any irritation   - Propper wear schedule    Jae received Paraffin to L hand Wrist with hand wrapped on coban x15 minutes      Home Exercises and Education Provided     Education provided:   - HEP review  - Progress towards goals     Written Home Exercises Provided: yes.  Exercises were reviewed and Jae was able to demonstrate them prior to the end of the session.  Jae demonstrated good  understanding of the HEP provided.   .   See EMR under Patient Instructions for exercises provided 8/12/2019.        Assessment     Jae had good tolerance to treatment this date. Pt. Verbalized and demonstrated better knowledge of splint and don/doff routine. He did struggle some with the straps. Review as needed.  He cont's with ROM and strength deficits limiting his full participation in ADL/IADL tasks.     Jae is progressing well towards his goals and there are no updates to goals at this time. Pt prognosis is Fair.     Pt will continue to benefit from skilled outpatient occupational therapy to address the deficits listed in the problem list on initial evaluation provide pt/family education and to maximize pt's level of independence in the home and community environment.     Anticipated barriers to occupational therapy: timeframe of current condition    Pt's spiritual, cultural and educational needs considered and pt agreeable to plan of care and goals.    Goals:  Short Term Goals: 6 weeks    Independent in HEP for self ROM left arm MET 8/19/2019   Pt to consistently elevate left hand to assist with edema when seated MET 8/19/2019   Pt will perform simple meal for self 3 days a week. MET 8/19/2019     LTG GOALS:  Time frame: 12 weeks  LB dressing will improve to by 10 minutes completion MET 8/26/2019  Pt will improve his ability to cut using adaptive device,   Bathing will improve by completion in 20 minutes     Simple meal prep will be performed 5 x week    Simple home care will improve  to one task 4 x week(sweep, mop, dust, clean sinks)    Plan   Cont. With established POC  Updates/Grading for next session:  Progress as tolerated      IRLANDA Galicia

## 2019-09-20 NOTE — PROGRESS NOTES
Physical Therapy Daily Treatment Note     Name: Jae Millan  [facility] Number: [id number]    Therapy Diagnosis:   Encounter Diagnoses   Name Primary?    Hemiparesis affecting left side as late effect of cerebrovascular accident (CVA)     Decreased sensation of lower extremity     Impaired functional mobility, balance, gait, and endurance      Physician: Maryjane Farias MD    Visit Date: 9/20/2019    Physician Orders: PT Eval, all treatment after initial eval requires auth  Medical Diagnosis from Referral: Cerebrovascular accident (CVA) due to thrombosis of anterior cerebral artery, unspecified blood vessel laterality  Evaluation Date: 6/6/2019  Authorization Period Expiration: 12/31/2019  NEW Plan of Care Expiration: 10/31/2019  Visit # / Visits authorized: 12/ 12 (total visits: 16)   POC due: 9/28/2019    Time In: 1122  Time Out: 1205  Total Billable Time: 42 minutes    Precautions: Standard and Fall    Subjective     Pt reports: I'm going for a 2 mi walk tomorrow    He was compliant with HEP mostly for LUE, reports walking for LE strengthening.  Response to previous treatment: no adverse effects to report  Functional change: ongoing    Pain: 0/10  Location: L shoulder    Objective       Jae received therapeutic exercises to develop strength, endurance, posture and core stabilization for  9 minutes including:    Recumbent cycle L4 x 8 min, L foot strapped to pedal, LUE placed on bedside table for improved alignment    DF stretch on wedge 30sec      Jae received neuromuscular reeducation to develop balance, proprioception, and kinesthetic sense for 33 minutes including:   Parallel bars:   2 x 30 sec tandem, SBA  x 30 sec feet together, eyes closed= fair+ to good   X 30 sec romberg, foam= fair+ to good balance  X 30 sec romberg, foam, head turns (vertical and horizontal)= fair+ to good balance  2 x 30 sec static standing on Bosu (flat side up) with poor+ to fair- balance  2 x20 alternating taps onto  large cone on solid ground, CGA  Stepping over short minda (3x)- 4 laps with intermittent UE support and CGA, reciprocal pattern  Side step over med minda with intermittent UE support 10x- ea leg  Tandem gait front/ back- 3 laps, intermittent UE support    Home Exercises Provided and Patient Education Provided     Education provided:   - continue with HEP    Written Home Exercises Provided: Patient instructed to cont prior HEP.   Exercises were reviewed and Jae was able to demonstrate them prior to the end of the session.  Jae demonstrated good  understanding of the education provided.     See EMR under Patient Instructions for exercises provided 6/13/2019.    Assessment   Jae tolerated treatment well. Pt demonstrates improved balance on foam with decreased visual input. Pt was progressed to balance on Bosu to improve balance.  strategies and LE strength.  Pt tolerated increased single leg stance activities. He has difficulty with dynamic stability of LLE demonstrated when attempting to step over hurdles. Balance with narrow Base of support is slowly improving.  He remains appropriate for skilled PT services.     Pt has verbalized his transportation issues and has stated he plans to schedule his transportation services a half an hour earlier than usual, which will help him be on time for remainder of appointments to continue progressing with therapy.   Jae is progressing well towards his goals.   Pt prognosis is Good.     Pt will continue to benefit from skilled outpatient physical therapy to address the deficits listed in the problem list box on initial evaluation, provide pt/family education and to maximize pt's level of independence in the home and community environment.     Pt's spiritual, cultural and educational needs considered and pt agreeable to plan of care and goals.     Anticipated barriers to physical therapy: none    Goals: 8/28/19  Short Term Goals: 5 weeks   1. Pt will be able to  complete 10 sit to  30 seconds for increased mobility. MET 8/5/2019  2. Pt will be able to complete TUG in less than 18 seconds in order to decrease his risk for fall. MET 7/5/19- 11.08 sec with small based quad cane, 13.55 sec w/o AD  3. Pt will be compliant with HEP and medicine management. Ongoing, semicompliant  4. Pt will be able to ambulate >300' with/without AD and mod I. MET 7/5/19- pt can ambulate at least 300 ft with small based quad cane      Long Term Goals: 10 weeks   1. Pt will complete TUG in less than 15 seconds in order to decrease his risk for fall. MET 7/5/2019  2. Pt will be able to stand on B LE in SLS for >/= 5 seconds in order to decrease his risk for fall. Ongoing  3. Pt will be able to ascend/decend 5 stairs with 1 hand rail and supervision without cues for safety. MET 8/5/2019  4. Pt will tolerate standing activities for >10 minutes in order to increase his endurance. MET 7/5/2019  5. NEW GOAL: Pt will improve score on FGA to >/= 22 in order to decrease his risk for fall. Ongoing, improved (17/30)  6. NEW GOAL (8/28/2019): Pt will improve his SSWS to .95 without AD in order to normalize gait compared to peers of his age and gender.    Plan     Continue to advance balance and single leg stance challenges as appropriate, continue with use of resistance machines for strengthening    Naya Obregon, PT  9/20/2019

## 2019-09-20 NOTE — PROGRESS NOTES
"Outpatient [facility]rological [facility]   Speech and Language Therapy Daily Note  Date:  9/20/2019     Name: Jae Millan   MRN: [id number]   Therapy Diagnosis:   Encounter Diagnosis   Name Primary?    Dysarthria    Physician: Charis Salazar MD  Physician Orders: [id number] - Ambulatory consult to Speech Therapy  Medical Diagnosis: I69.354 (ICD-10-CM) - Hemiparesis affecting left side as late effect of cerebrovascular accident (CVA)  I63.329 (ICD-10-CM) - Cerebrovascular accident (CVA) due to thrombosis of anterior cerebral artery, unspecified blood vessel laterality  R47.1 (ICD-10-CM) - Dysarthria       Visit #/ Visits Authorized: 3/5  Date of Evaluation:  9/9/2019   Insurance Authorization Period: 8/27/19-8/26/20  Plan of Care Certification:    9/9/2019 to 11/4/19  Extended POC:  n/a  Progress Note: due 10/9/19  Visits Cancelled: 1  Visits No Show: 0    Time In:  8:13  Time Out:  9:00  Total Billable Time: 47 min    Precautions: Standard    Subjective:   Pt reports: Pt in pleasant spirits. No complaints stated. Pt is frustrated that transportation is so unreliable. Pt states he "feels" his speech deficits more than "hearing" his speech deficits. He states his family and friends have recently commented on how much his speech has improved.   He was compliant to home exercise program.   Response to previous treatment: "ok"  Pain Scale:  6/10 on VAS currently.   Pain Location: left hand   Objective:     UNTIMED  Procedure Min.   Speech- Language- Voice Therapy  47   Total Timed Units: 0  Total Untimed Units: 1  Charges Billed/# of units: 1    Short Term Goals: (4 weeks) Current Progress:   1. Pt will rate his speech while reading as at least a 2 on a 3 point scale ( 1 = same as before beginning therapy, 2 =better but not best, 3 =best possible outcome) on  8 /10 trials.     Progressing/ Not Met 9/20/2019   Patient rated his speech during reading tasks (topic of reading material of special interest to patient) as a 2 " on 7/10 trials and 3 on 3/10 trials.   METX1   2. Pt will differentiate between his speech and error-free speech by giving specific examples of differences on  8 /10 trials.     Progressing/ Not Met 9/20/2019   Pt able to differentiate between his speech and error-free speech with 70% acc indly    3. Pt will identify a self-cue for use of therapeutic speech and use it 5x per session independently    Progressing/ Not Met 9/20/2019   Pt identified a self-cue (throat clear/swallow). Pt observed to utilize this cue 2x this session indly    4. Pt will use motor speech strategies and answer questions in conversation with a self-rating of at least 2 on a 3 point scale ( 1 = same as before beginning therapy, 2 =better but not best, 3 =best possible outcome) on  8 /10 trials.     Progressing/ Not Met 9/20/2019   Patient rated his speech during conversational tasks as a 2 on 7/10 trials and 1 on 1/10 trials, and a 3 on 2/10 trials    5. Pt will recall 3/4 clear speech strategies independently     Progressing/ Not Met 9/20/2019   Pt unable to recall clear speech strategies indly, reviewed/discussed strategies.  Following 20 minutes, pt able to state 3/4 clear speech strategies indly   6. Pt will participate in MBSS to objectively assess his swallow, rule out silent aspiration, and determine the safest possible diet.      Progressing/ Not Met 9/20/2019   Order for MBSS has been placed. Pt is scheduled for procedure 10/3/19 at [facility] [place]   7. New: Pt will complete oral motor exercises x 40 each focusing on labial and lingual muscles given min A to improve articulation.     Progressing/ Not Met 9/20/2019  Discussed/reviewed oral motor exercises and suggested pt use a mirror for visual feedback to encourage self-monitoring and self-correcion.      - Pt repeated multisyllabic words 3x each at a fast rate (30 trials) to improve speed and coordination of articulators.      - thin liquids x5, inconsistent throat clearing  following thin liquid cup sips. No coughing observed.     Patient Education/Response:   Discussed clear speech strategies, HEP, pt's POC, and insurance limitations/scheduling. Pt verbalized understanding of all discussed.     Written Home Exercises Provided: Yes. Pt instructed to repeat multisyllabic words 3x each at a fast rate (30 times) to improve speed and coordination of articulators. Pt instructed to complete OME 3x a day. Pt instructed to read sentences aloud (provided) using motor speech strategies.   Exercises were reviewed and Jae was able to demonstrate them prior to the end of the session.  Jae demonstrated good  understanding of the education provided.     Assessment:   Jae is progressing well towards his goals. Improved performance regarding cognitive motor speech tasks when given mod cues. Pt able to recall clear speech strategies and utilize them appropriately. Pt with good performance/understanding of HEP. Current goals remain appropriate. Goals to be updated as necessary.     Pt prognosis is Fair. Pt will continue to benefit from skilled outpatient speech and language therapy to address the deficits listed in the problem list on initial evaluation, provide pt/family education and to maximize pt's level of independence in the home and community environment.     Medical necessity is demonstrated by the following IMPAIRMENTS:  Reduced speech intelligibility limits functional communication with both familiar and unfamiliar communication partners.     Barriers to Therapy: none identified   Pt's spiritual, cultural and educational needs considered and pt agreeable to plan of care and goals.  Plan:   Continue POC with focus on cognitive approach to motor speech. MBSS scheduled for 10/3/19.    Tamera Crum CF-SLP   9/20/2019

## 2019-09-21 ENCOUNTER — PATIENT OUTREACH (OUTPATIENT)
Dept: ADMINISTRATIVE | Facility: OTHER | Age: 69
End: 2019-09-21

## 2019-09-23 ENCOUNTER — CLINICAL SUPPORT (OUTPATIENT)
Dept: REHABILITATION | Facility: HOSPITAL | Age: 69
End: 2019-09-23
Attending: PHYSICAL MEDICINE & REHABILITATION
Payer: MEDICARE

## 2019-09-23 DIAGNOSIS — R29.898 DECREASED STRENGTH OF UPPER EXTREMITY: ICD-10-CM

## 2019-09-23 DIAGNOSIS — M79.89 SWELLING OF LEFT HAND: ICD-10-CM

## 2019-09-23 DIAGNOSIS — M25.60 RANGE OF MOTION DEFICIT: ICD-10-CM

## 2019-09-23 DIAGNOSIS — Z74.09 IMPAIRED FUNCTIONAL MOBILITY, BALANCE, GAIT, AND ENDURANCE: ICD-10-CM

## 2019-09-23 DIAGNOSIS — R20.8 DECREASED SENSATION OF LOWER EXTREMITY: ICD-10-CM

## 2019-09-23 DIAGNOSIS — R46.0 SELF-CARE DEFICIT FOR BATHING: ICD-10-CM

## 2019-09-23 DIAGNOSIS — Z74.1 SELF-CARE DEFICIT IN DRESSING: ICD-10-CM

## 2019-09-23 DIAGNOSIS — I69.354 HEMIPARESIS AFFECTING LEFT SIDE AS LATE EFFECT OF CEREBROVASCULAR ACCIDENT (CVA): Primary | ICD-10-CM

## 2019-09-23 PROCEDURE — 97530 THERAPEUTIC ACTIVITIES: CPT | Mod: PN,59

## 2019-09-23 PROCEDURE — 97014 ELECTRIC STIMULATION THERAPY: CPT | Mod: PN

## 2019-09-23 PROCEDURE — 97112 NEUROMUSCULAR REEDUCATION: CPT | Mod: PN

## 2019-09-23 PROCEDURE — 97110 THERAPEUTIC EXERCISES: CPT | Mod: PN

## 2019-09-23 PROCEDURE — 97140 MANUAL THERAPY 1/> REGIONS: CPT | Mod: PN

## 2019-09-23 NOTE — PROGRESS NOTES
"  Occupational Therapy Daily Treatment Note     Date: 9/23/2019  Name: Jae Millan  Clinic Number: [id number]    Therapy Diagnosis:   Encounter Diagnoses   Name Primary?    Self-care deficit in dressing     Self-care deficit for bathing     Range of motion deficit     Swelling of left hand     Decreased strength of upper extremity      Physician: Maryjane Farias MD       Physician Orders: Eval and treat  Medical Diagnosis: Cerebrovascular accident (CVA) due to thrombosis of anterior cerebral artery, unspecified blood vessel laterality  Evaluation Date: 8/8/2019  Plan of Care Expiration Period: 8/8/19 to 10/31/19  Insurance Authorization period Expiration: 12/31/19  Date of Return to MD: DALIA  FOTO: 9/18/2019  47% Mobility  8/26/2019: 47% limited in Mobility    Visit # / Visits authorized: 12 / 20  Time In: 10:30am  Time Out: 11:15am  Total Billable Time: 45 minutes    Precautions:  Standard, Diabetes and HTN      Subjective     Pt reports: "Feeling ok. Hard to wear this thing at night"  he was compliant with home exercise program given this session.   Response to previous treatment: positive  Functional change: ongoing    Pain:  8/10   Location: L shoulder    Objective     Jae arrived to treatment this date with his orthotic properly applied to L hand     Jae participated in manual therapy for 15 minutes:  - Supine PROM of L GH joint: FF/Abd/ER/IR  - General wrist stretches including               1. Scaphoid on radius               2. Increasing mobility of metacarpals               3. Carpal rolls               4. Increasing mobility towards radial deviation               5. Increasing mobility of wrist towards extension              6. Increasing mobility of wrist towards supination/pronation    Jae participated in therapeutic activities for 15 min:  - Standing at counter top, L hand was a passive stabilizer while R arm removed and placed objects into cabinets     Jae received unattended " ESTIM for 15 min  - L shoulder IFC with moist heat      Home Exercises and Education Provided     Education provided:   - HEP review  - Progress towards goals     Written Home Exercises Provided: yes.  Exercises were reviewed and Jae was able to demonstrate them prior to the end of the session.  Jae demonstrated good  understanding of the HEP provided.   .   See EMR under Patient Instructions for exercises provided 8/12/2019.        Assessment     Jae had good tolerance to treatment this date. Pt. Arrived to orthotic properly applied but is not wearing it for long periods of time yet. Review of wear schedule will be ongoing.  He cont's with ROM and strength deficits limiting his full participation in ADL/IADL tasks.     Jae is progressing well towards his goals and there are no updates to goals at this time. Pt prognosis is Fair.     Pt will continue to benefit from skilled outpatient occupational therapy to address the deficits listed in the problem list on initial evaluation provide pt/family education and to maximize pt's level of independence in the home and community environment.     Anticipated barriers to occupational therapy: timeframe of current condition    Pt's spiritual, cultural and educational needs considered and pt agreeable to plan of care and goals.    Goals:  Short Term Goals: 6 weeks    Independent in HEP for self ROM left arm MET 8/19/2019   Pt to consistently elevate left hand to assist with edema when seated MET 8/19/2019   Pt will perform simple meal for self 3 days a week. MET 8/19/2019     LTG GOALS:  Time frame: 12 weeks  LB dressing will improve to by 10 minutes completion MET 8/26/2019  Pt will improve his ability to cut using adaptive device,   Bathing will improve by completion in 20 minutes     Simple meal prep will be performed 5 x week    Simple home care will improve to one task 4 x week(sweep, mop, dust, clean sinks)    Plan   Cont. With established  POC  Updates/Grading for next session:  Progress as tolerated      IRLANDA Galicia

## 2019-09-25 ENCOUNTER — CLINICAL SUPPORT (OUTPATIENT)
Dept: REHABILITATION | Facility: HOSPITAL | Age: 69
End: 2019-09-25
Attending: PHYSICAL MEDICINE & REHABILITATION
Payer: MEDICARE

## 2019-09-25 ENCOUNTER — OFFICE VISIT (OUTPATIENT)
Dept: PODIATRY | Facility: CLINIC | Age: 69
End: 2019-09-25
Payer: MEDICARE

## 2019-09-25 VITALS
DIASTOLIC BLOOD PRESSURE: 86 MMHG | HEIGHT: 69 IN | WEIGHT: 208 LBS | BODY MASS INDEX: 30.81 KG/M2 | SYSTOLIC BLOOD PRESSURE: 162 MMHG

## 2019-09-25 DIAGNOSIS — I69.354 HEMIPARESIS AFFECTING LEFT SIDE AS LATE EFFECT OF CEREBROVASCULAR ACCIDENT (CVA): ICD-10-CM

## 2019-09-25 DIAGNOSIS — M20.40 HAMMER TOE, UNSPECIFIED LATERALITY: ICD-10-CM

## 2019-09-25 DIAGNOSIS — R46.0 SELF-CARE DEFICIT FOR BATHING: ICD-10-CM

## 2019-09-25 DIAGNOSIS — R20.8 DECREASED SENSATION OF LOWER EXTREMITY: ICD-10-CM

## 2019-09-25 DIAGNOSIS — R29.898 DECREASED STRENGTH OF UPPER EXTREMITY: ICD-10-CM

## 2019-09-25 DIAGNOSIS — Z74.09 IMPAIRED FUNCTIONAL MOBILITY, BALANCE, GAIT, AND ENDURANCE: Primary | ICD-10-CM

## 2019-09-25 DIAGNOSIS — E11.40 TYPE 2 DIABETES MELLITUS WITH DIABETIC NEUROPATHY, WITHOUT LONG-TERM CURRENT USE OF INSULIN: Primary | ICD-10-CM

## 2019-09-25 DIAGNOSIS — M79.89 SWELLING OF LEFT HAND: ICD-10-CM

## 2019-09-25 DIAGNOSIS — Z74.1 SELF-CARE DEFICIT IN DRESSING: ICD-10-CM

## 2019-09-25 DIAGNOSIS — M25.60 RANGE OF MOTION DEFICIT: ICD-10-CM

## 2019-09-25 PROCEDURE — 97110 THERAPEUTIC EXERCISES: CPT | Mod: PN

## 2019-09-25 PROCEDURE — 99999 PR PBB SHADOW E&M-EST. PATIENT-LVL III: ICD-10-PCS | Mod: PBBFAC,,, | Performed by: PODIATRIST

## 2019-09-25 PROCEDURE — 99999 PR PBB SHADOW E&M-EST. PATIENT-LVL III: CPT | Mod: PBBFAC,,, | Performed by: PODIATRIST

## 2019-09-25 PROCEDURE — 99213 OFFICE O/P EST LOW 20 MIN: CPT | Mod: PBBFAC,PO | Performed by: PODIATRIST

## 2019-09-25 PROCEDURE — 97530 THERAPEUTIC ACTIVITIES: CPT | Mod: PN

## 2019-09-25 PROCEDURE — 97112 NEUROMUSCULAR REEDUCATION: CPT | Mod: PN

## 2019-09-25 PROCEDURE — 99203 PR OFFICE/OUTPT VISIT, NEW, LEVL III, 30-44 MIN: ICD-10-PCS | Mod: S$PBB,,, | Performed by: PODIATRIST

## 2019-09-25 PROCEDURE — 97140 MANUAL THERAPY 1/> REGIONS: CPT | Mod: PN

## 2019-09-25 PROCEDURE — 99203 OFFICE O/P NEW LOW 30 MIN: CPT | Mod: S$PBB,,, | Performed by: PODIATRIST

## 2019-09-25 RX ORDER — DICLOFENAC SODIUM 10 MG/G
2 GEL TOPICAL DAILY
Qty: 100 G | Refills: 3 | Status: SHIPPED | OUTPATIENT
Start: 2019-09-25 | End: 2020-01-27 | Stop reason: SDUPTHER

## 2019-09-25 NOTE — PROGRESS NOTES
"  Occupational Therapy Daily Treatment Note     Date: 9/25/2019  Name: Jae Millan  Clinic Number: [id number]    Therapy Diagnosis:   Encounter Diagnoses   Name Primary?    Self-care deficit in dressing     Self-care deficit for bathing     Range of motion deficit     Swelling of left hand     Decreased strength of upper extremity      Physician: Maryjane Farias MD       Physician Orders: Eval and treat  Medical Diagnosis: Cerebrovascular accident (CVA) due to thrombosis of anterior cerebral artery, unspecified blood vessel laterality  Evaluation Date: 8/8/2019  Plan of Care Expiration Period: 8/8/19 to 10/31/19  Insurance Authorization period Expiration: 12/31/19  Date of Return to MD: DALIA  FOTO: 9/18/2019  47% Mobility  8/26/2019: 47% limited in Mobility    Visit # / Visits authorized: 13 / 20  Time In: 10:30am  Time Out: 11:15am  Total Billable Time: 45 minutes    Precautions:  Standard, Diabetes and HTN      Subjective     Pt reports: "My shoulder feels better after working it today"  he was compliant with home exercise program given this session.   Response to previous treatment: positive  Functional change: ongoing    Pain:  5/10   Location: L shoulder    Objective     Jae arrived to treatment this date with his orthotic properly applied to L hand     Jae participated in manual therapy for 15 minutes:  - Supine PROM of L GH joint: FF/Abd/ER/IR  - General wrist stretches including               1. Scaphoid on radius               2. Increasing mobility of metacarpals               3. Carpal rolls               4. Increasing mobility towards radial deviation               5. Increasing mobility of wrist towards extension              6. Increasing mobility of wrist towards supination/pronation    Jae participated in therapeutic activities for 15 min:  - Standing at counter top, [place] [employer] cleaning activity with lateral weight shifts into L leg for increased dynamic stability     Jae received " therapeutic exercise for 15 min  - Kinesio tapping to L hand/forearm for edema management      Home Exercises and Education Provided     Education provided:   - HEP review  - Progress towards goals     Written Home Exercises Provided: yes.  Exercises were reviewed and Jae was able to demonstrate them prior to the end of the session.  Jae demonstrated good  understanding of the HEP provided.   .   See EMR under Patient Instructions for exercises provided 8/12/2019.        Assessment     Jae had good tolerance to treatment this date. PROM in L UE is improving but remains painful at end range.  He cont's with ROM and strength deficits limiting his full participation in ADL/IADL tasks.     Jae is progressing well towards his goals and there are no updates to goals at this time. Pt prognosis is Fair.     Pt will continue to benefit from skilled outpatient occupational therapy to address the deficits listed in the problem list on initial evaluation provide pt/family education and to maximize pt's level of independence in the home and community environment.     Anticipated barriers to occupational therapy: timeframe of current condition    Pt's spiritual, cultural and educational needs considered and pt agreeable to plan of care and goals.    Goals:  Short Term Goals: 6 weeks    Independent in HEP for self ROM left arm MET 8/19/2019   Pt to consistently elevate left hand to assist with edema when seated MET 8/19/2019   Pt will perform simple meal for self 3 days a week. MET 8/19/2019     LTG GOALS:  Time frame: 12 weeks  LB dressing will improve to by 10 minutes completion MET 8/26/2019  Pt will improve his ability to cut using adaptive device,   Bathing will improve by completion in 20 minutes     Simple meal prep will be performed 5 x week    Simple home care will improve to one task 4 x week(sweep, mop, dust, clean sinks)    Plan   Cont. With established POC  Updates/Grading for next session:  Progress as  tolerated      IRLANDA Galicia

## 2019-09-25 NOTE — PLAN OF CARE
Physical Therapy Daily Treatment Note     Name: Jae Millan  Clinic Number: [id number]    Therapy Diagnosis:   Encounter Diagnoses   Name Primary?    Hemiparesis affecting left side as late effect of cerebrovascular accident (CVA)     Decreased sensation of lower extremity     Impaired functional mobility, balance, gait, and endurance Yes     Physician: Maryjane Farias MD    Visit Date: 9/25/2019    Physician Orders: PT Eval, all treatment after initial eval requires auth  Medical Diagnosis from Referral: Cerebrovascular accident (CVA) due to thrombosis of anterior cerebral artery, unspecified blood vessel laterality  Evaluation Date: 6/6/2019  Authorization Period Expiration: 12/31/2019  NEW Plan of Care Expiration: 10/31/2019  Visit # / Visits authorized: 2/ 20 (total visits: 18) KX modifier   POC due: 10/25/2019    Time In: 1115  Time Out: 1200  Total Billable Time: 45 minutes    Precautions: Standard and Fall    Subjective     Pt reports: He is doing well today. No new complaints.    He was compliant with HEP mostly for LUE, reports walking for LE strengthening.  Response to previous treatment: no adverse effects to report  Functional change: ongoing    Pain: 3/10  Location: L shoulder    Objective     Jae received therapeutic exercises to develop strength, endurance, posture and core stabilization for  15 minutes including:    Recumbent stepper L4 x 8 min, L hand strapped to pedal    DF stretch on wedge 30sec    3x15 reps matrix knee extension, 15#  3x15 reps matrix knee flexion, 15#    Jae received neuromuscular reeducation to develop balance, proprioception, and kinesthetic sense for 30 minutes including:   Parallel bars:          Evaluation 7/5/2019 8/5/2019 8/28/2019 9/25/2019   30 second Chair Rise 8 completed with no arms 9x no UE  10 with no UEs 11 without UE support 10 without UE supprot   TUG 21.25 seconds 11.08 sec w/ SBQC  13.55 sec without AD 10.46 seconds with SBQC  12.5 seconds  without AD 9.0 seconds with SBQC  11.6 seconds without AD 9.8 seconds with SBQC  10.1 seconds without AD   FGA NT NT 14/30 (without AD) 17/30 (without AD) 20/30 (no AD)   SSWS NT NT NT .88 m/s with no AD (6m/6.8sec) 1.1 m/s with no AD (6m/5.7sec)      SLS R = 8 seconds (previous score = 6 seconds)  SLS L = 1 second (previous score = 3 seconds)    Functional Gait Assessment:   1. Gait on level surface =  2   (3) Normal: less than 5.5 sec, no A.D., no imbalance, normal gait pattern, deviates< 6in   (2) Mild impairment: 7-5.6 sec, uses A.D., mild gait deviations, or deviates 6-10 in   (1) Moderate impairment: > 7 sec, slow speed, imbalance, deviates 10-15 in.   (0) Severe impairment: needs assist, deviates >15 in, reach/touch wall  2. Change in Gait Speed = 2   (3) Normal: smooth change w/o loss of balance or gait deviation, deviates < 6 in, significant difference between speeds   (2) Mild impairment: changes speed, but demonstrates mild gait deviations, deviates 6-10 in, OR no deviations but unable to significantly speed, OR uses A.D.   (1) Moderate impairment: minor changes to speed, OR changes speed w/ significant deviations, deviates 10-15 in, OR  Changes speed , but loses balance & recovers   (0) Severe impairment: cannot change speed, deviates >15 in, or loses balance & needs assist  3. Gait with horizontal head turns  = 2   (3) Normal: no change in gait, deviates <6 in   (2) Mild impairment: slight change in speed, deviates 6-10 in, OR uses A.D.   (1) Moderate impairment: moderate change in speed, deviates 10-15 in   (0) Severe impairment: severe disruption of gait, deviates >15in  4. Gait with vertical head turns = 2   (3) Normal: no change in gait, deviates <6 in   (2) Mild impairment: slight change in speed, deviates 6-10 in OR uses A.D.   (1) Moderate impairment: moderate change in speed, deviates 10-15 in   (0) Severe impairment: severe disruption of gait, deviates >15 in  5. Gait with pivot turns = 2   (3)  Normal: performs safely in 3 sec, no LOB   (2) Mild impairment: performs in >3 sec & no LOB, OR turns safely & requires several steps to regain LOB   (1) Moderate impairment: turns slow, OR requires several small steps for balance following turn & stop   (0) Severe impairment: cannot turn safely, needs assist  6. Step over obstacle = 2   (3) Normal: steps over 2 stacked boxes w/o change in speed or LOB   (2) Mild impairment: able to step over 1 box w/o change in speed or LOB   (1) Moderate impairment: steps over 1 box but must slow down, may require VC   (0) Severe impairment: cannot perform w/o assist  7. Gait with Narrow NORA = 1   (3) Normal: 10 steps no staggering   (2) Mild impairment: 7-9 steps   (1) Moderate impairment: 4-7 steps   (0) Severe impairment: < 4 steps or cannot perform w/o assist  8. Gait with eyes closed = 2   (3) Normal: < 7 sec, no A.D., no LOB, normal gait pattern, deviates <6 in   (2) Mild impairment: 7.1-9 sec, mild gait deviations, deviates 6-10 in   (1) Moderate impairment: > 9 sec, abnormal pattern, LOB, deviates 10-15 in   (0) Severe impairment: cannot perform w/o assist, LOB, deviates >15in  9. Ambulating Backwards = 2   (3) Normal: no A.D., no LOB, normal gait pattern, deviates <6in   (2) Mild impairment: uses A.D., slower speed, mild gait deviations, deviates 6-10 in   (1) Moderate impairment: slow speed, abnormal gait pattern, LOB, deviates 10-15 in   (0) Severe impairment: severe gait deviations or LOB, deviates >15in  10. Steps = 3   (3) Normal: alternating feet, no rail   (2) Mild Impairment: alternating feet, uses rail   (1) Moderate impairment: step-to, uses rail   (0) Severe impairment: cannot perform safely    Score 20/30     Score:   <22/30 fall risk   <20/30 fall risk in older adults   <18/30 fall risk in Parkinsons       Home Exercises Provided and Patient Education Provided     Education provided:   - continue with HEP    Written Home Exercises Provided: Patient  instructed to cont prior HEP.   Exercises were reviewed and Jae was able to demonstrate them prior to the end of the session.  Jae demonstrated good  understanding of the education provided.     See EMR under Patient Instructions for exercises provided 6/13/2019.    Assessment   Jae tolerated treatment well. Assessment period 8/28/19-9/25/2019: Jae continues to make progress in skilled PT services. Although he completes one fewer rep for 30 second sit to stand, he was able to increase score on TUG, FGA, and SSWS. Pt continues to struggle greatly with SLS as evidenced by <10 seconds SLS time bilaterally. He also remains at risk for fall based on FGA score. Pt continues to be motivated in sessions and is appropriate for continued skilled PT care. Pt's goals have been addressed and remain appropriate for the next month of therapy.    Jae is progressing well towards his goals.   Pt prognosis is Good.     Pt will continue to benefit from skilled outpatient physical therapy to address the deficits listed in the problem list box on initial evaluation, provide pt/family education and to maximize pt's level of independence in the home and community environment.     Pt's spiritual, cultural and educational needs considered and pt agreeable to plan of care and goals.     Anticipated barriers to physical therapy: none    Goals: 9/25/19  Short Term Goals: 5 weeks   1. Pt will be able to complete 10 sit to  30 seconds for increased mobility. MET 8/5/2019  2. Pt will be able to complete TUG in less than 18 seconds in order to decrease his risk for fall. MET 7/5/19- 11.08 sec with small based quad cane, 13.55 sec w/o AD  3. Pt will be compliant with HEP and medicine management. Ongoing, semicompliant  4. Pt will be able to ambulate >300' with/without AD and mod I. MET 7/5/19- pt can ambulate at least 300 ft with small based quad cane      Long Term Goals: 10 weeks   1. Pt will complete TUG in less than 15  seconds in order to decrease his risk for fall. MET 7/5/2019  2. Pt will be able to stand on B LE in SLS for >/= 5 seconds in order to decrease his risk for fall. Ongoing  3. Pt will be able to ascend/decend 5 stairs with 1 hand rail and supervision without cues for safety. MET 8/5/2019  4. Pt will tolerate standing activities for >10 minutes in order to increase his endurance. MET 7/5/2019  5. NEW GOAL: Pt will improve score on FGA to >/= 22 in order to decrease his risk for fall. Ongoing, improved (20/30)  6. NEW GOAL (8/28/2019): Pt will improve his SSWS to .95 without AD in order to normalize gait compared to peers of his age and gender. MET    Plan     Continue to work on higher level balance with bosu and incorporate more SLS balance tasks.     Oc Schroeder, PT  9/25/2019

## 2019-09-25 NOTE — LETTER
September 29, 2019      Maryjane Farias MD  [address]  [place] [address]           [facility]  [address]  [place] [address]  Phone: [phone]          Patient: Jae Millan   MR Number: [id number]   YOB: 1950   Date of Visit: 9/25/2019       Dear Dr. Maryjane Farias:    Thank you for referring Jae Millan to me for evaluation. Attached you will find relevant portions of my assessment and plan of care.    If you have questions, please do not hesitate to call me. I look forward to following Jae Millan along with you.    Sincerely,    Huyen Singh, RHONDA    Enclosure  CC:  No Recipients    If you would like to receive this communication electronically, please contact [email or web address] or [phone] to request more information on [employer] access.    For providers and/or their staff who would like to refer a patient to [facility], please contact us through our one-stop-shop provider referral line, [facility] , at [phone].    If you feel you have received this communication in error or would no longer like to receive these types of communications, please e-mail [email or web address]

## 2019-09-27 ENCOUNTER — PATIENT OUTREACH (OUTPATIENT)
Dept: ADMINISTRATIVE | Facility: OTHER | Age: 69
End: 2019-09-27

## 2019-09-29 NOTE — PROGRESS NOTES
Subjective:      Patient ID: Jae Millan is a 68 y.o. male.    Chief Complaint: Nail Care (last ov august) and Diabetic Foot Exam    Jae is a 68 y.o. male who presents to the clinic upon referral from Dr. Farias  for evaluation and treatment of diabetic feet. Jae has a past medical history of Cataract, Diabetes mellitus, Glaucoma, and Hypertension. Presents for diabetic foot risk assessment.       PCP: Maryjane Farias MD    Date Last Seen by PCP: None found    Current shoe gear: Tennis shoes    Hemoglobin A1C   Date Value Ref Range Status   08/22/2019 7.3 (H) 4.0 - 5.6 % Final     Comment:     ADA Screening Guidelines:  5.7-6.4%  Consistent with prediabetes  >or=6.5%  Consistent with diabetes  High levels of fetal hemoglobin interfere with the HbA1C  assay. Heterozygous hemoglobin variants (HbS, HgC, etc)do  not significantly interfere with this assay.   However, presence of multiple variants may affect accuracy.     04/12/2019 7.5 (H) 4.0 - 5.6 % Final     Comment:     ADA Screening Guidelines:  5.7-6.4%  Consistent with prediabetes  >or=6.5%  Consistent with diabetes  High levels of fetal hemoglobin interfere with the HbA1C  assay. Heterozygous hemoglobin variants (HbS, HgC, etc)do  not significantly interfere with this assay.   However, presence of multiple variants may affect accuracy.             Review of Systems   Constitution: Negative for chills, diaphoresis and fever.   Cardiovascular: Negative for claudication, cyanosis, leg swelling and syncope.   Respiratory: Negative for cough and shortness of breath.    Skin: Positive for color change, nail changes and suspicious lesions.   Musculoskeletal: Negative for falls, joint pain, muscle cramps and muscle weakness.   Gastrointestinal: Negative for diarrhea, nausea and vomiting.   Neurological: Negative for disturbances in coordination, numbness, paresthesias, sensory change, tremors and weakness.   Psychiatric/Behavioral: Negative for altered  mental status.           Objective:      Physical Exam   Constitutional: He appears well-developed. He is cooperative.   Oriented to time, place, and person.   Cardiovascular:   DP and PT pulses are palpable bilaterally. 3 sec capillary refill time and toes and feet are warm to touch proximally .  There is  hair growth on the feet and toes b/l. There is no edema b/l. No spider veins or varicosities present b/l.      Musculoskeletal:   Equinus noted b/l ankles with < 10 deg DF noted. MMT 5/5 in DF/PF/Inv/Ev resistance with no reproduction of pain in any direction. Passive range of motion of ankle and pedal joints is painless b/l.    Decreased stride, station of gait.  apropulsive toe off.  Increased angle and base of gait.      Patient has hammertoes of digits 2-5 bilateral partially reducible without symptom today.     Visible and palpable bunion without pain at dorsomedial 1st metatarsal head right and left.  Hallux abducted right and left partially reducible, tracks laterally without being track bound.  No ecchymosis, erythema, edema, or cardinal signs infection or signs of trauma same foot.     Fat pad atrophy to heels and met heads bilateral     Feet:   Right Foot:   Skin Integrity: Negative for callus or dry skin.   Left Foot:   Skin Integrity: Negative for callus or dry skin.   Lymphadenopathy:   Negative lymphadenopathy bilateral popliteal fossa and tarsal tunnel.   Neurological: He is alert.   Light touch, proprioception, and sharp/dull sensation are all intact bilaterally. Protective threshold with the [place]Wienstein monofilament is intact bilaterally.  Subjective paresthesias with no clearly identifiable source or trigger.      Skin:   No open lesions, lacerations or wounds noted.Interdigital spaces clean, dry and intact b/l. No erythema noted to b/l foot.  Toenails 1-5 bilaterally are elongated by 2-3 mm, thickened by 2-3 mm, discolored/yellowed, dystrophic, brittle with subungual debris.        Psychiatric: He has a normal mood and affect.             Assessment:       Encounter Diagnoses   Name Primary?    Type 2 diabetes mellitus with diabetic neuropathy, without long-term current use of insulin Yes    Hammer toe, unspecified laterality          Plan:       Jae was seen today for nail care and diabetic foot exam.    Diagnoses and all orders for this visit:    Type 2 diabetes mellitus with diabetic neuropathy, without long-term current use of insulin  -     DIABETIC SHOES FOR HOME USE    Hammer toe, unspecified laterality  -     DIABETIC SHOES FOR HOME USE    Other orders  -     diclofenac sodium (VOLTAREN) 1 % Gel; Apply 2 g topically once daily.      I counseled the patient on his conditions, their implications and medical management.    - Shoe inspection. Diabetic Foot Education. Patient reminded of the importance of good nutrition and blood sugar control to help prevent podiatric complications of diabetes. Patient instructed on proper foot hygeine. We discussed wearing proper shoe gear, daily foot inspections, never walking without protective shoe gear, never putting sharp instruments to feet, routine podiatric nail visits every 12 months.   - With patient's permission, nails were aggressively reduced and debrided x 10 to their soft tissue attachment mechanically and with electric , removing all offending nail and debris. Patient relates relief following the procedure. He will continue to monitor the areas daily, inspect his feet, wear protective shoe gear when ambulatory, moisturizer to maintain skin integrity and follow in this office in approximately 12 months, sooner p.r.n.     Rx Voltaren gel to be applied to affected area up to 3-4 x daily as needed for pain    Rx diabetic shoes with custom molded inserts to be worn at all times while ambulating. Prescription provided with list of local retailers.     F/u one year DM foot exam sooner PRN

## 2019-09-30 ENCOUNTER — IMMUNIZATION (OUTPATIENT)
Dept: INTERNAL MEDICINE | Facility: CLINIC | Age: 69
End: 2019-09-30
Payer: MEDICARE

## 2019-09-30 ENCOUNTER — LAB VISIT (OUTPATIENT)
Dept: LAB | Facility: HOSPITAL | Age: 69
End: 2019-09-30
Attending: INTERNAL MEDICINE
Payer: MEDICARE

## 2019-09-30 ENCOUNTER — OFFICE VISIT (OUTPATIENT)
Dept: INTERNAL MEDICINE | Facility: CLINIC | Age: 69
End: 2019-09-30
Payer: MEDICARE

## 2019-09-30 VITALS
WEIGHT: 205 LBS | BODY MASS INDEX: 30.36 KG/M2 | OXYGEN SATURATION: 98 % | DIASTOLIC BLOOD PRESSURE: 80 MMHG | HEART RATE: 77 BPM | TEMPERATURE: 98 F | HEIGHT: 69 IN | SYSTOLIC BLOOD PRESSURE: 138 MMHG

## 2019-09-30 DIAGNOSIS — E11.40 TYPE 2 DIABETES MELLITUS WITH DIABETIC NEUROPATHY, WITHOUT LONG-TERM CURRENT USE OF INSULIN: ICD-10-CM

## 2019-09-30 DIAGNOSIS — R35.0 URINARY FREQUENCY: ICD-10-CM

## 2019-09-30 DIAGNOSIS — I63.329 CEREBROVASCULAR ACCIDENT (CVA) DUE TO THROMBOSIS OF ANTERIOR CEREBRAL ARTERY, UNSPECIFIED BLOOD VESSEL LATERALITY: ICD-10-CM

## 2019-09-30 DIAGNOSIS — I10 ESSENTIAL HYPERTENSION: ICD-10-CM

## 2019-09-30 DIAGNOSIS — R35.0 URINARY FREQUENCY: Primary | ICD-10-CM

## 2019-09-30 PROCEDURE — 99213 OFFICE O/P EST LOW 20 MIN: CPT | Mod: S$PBB,,, | Performed by: INTERNAL MEDICINE

## 2019-09-30 PROCEDURE — 99213 PR OFFICE/OUTPT VISIT, EST, LEVL III, 20-29 MIN: ICD-10-PCS | Mod: S$PBB,,, | Performed by: INTERNAL MEDICINE

## 2019-09-30 PROCEDURE — 99999 PR PBB SHADOW E&M-EST. PATIENT-LVL IV: ICD-10-PCS | Mod: PBBFAC,,, | Performed by: INTERNAL MEDICINE

## 2019-09-30 PROCEDURE — 81001 URINALYSIS AUTO W/SCOPE: CPT

## 2019-09-30 PROCEDURE — 87086 URINE CULTURE/COLONY COUNT: CPT

## 2019-09-30 PROCEDURE — 99214 OFFICE O/P EST MOD 30 MIN: CPT | Mod: PBBFAC,25 | Performed by: INTERNAL MEDICINE

## 2019-09-30 PROCEDURE — 99999 PR PBB SHADOW E&M-EST. PATIENT-LVL IV: CPT | Mod: PBBFAC,,, | Performed by: INTERNAL MEDICINE

## 2019-09-30 PROCEDURE — 90662 IIV NO PRSV INCREASED AG IM: CPT | Mod: PBBFAC

## 2019-09-30 NOTE — PROGRESS NOTES
HEPATOLOGY CLINIC VISIT NOTE - HCV clinic    REFERRING PROVIDER:Dr. Maryjane Farias    CHIEF COMPLAINT: Hepatitis C - discuss treatment    HISTORY: This is a 68 y.o. Black or  male with chronic hepatitis C here for follow up. He is referred by PCP. HCV genotype is unknown. HCV RNA of 1,301,498 IU/mL. He reports diagnosis during incarceration. Risk factors for HCV are listed below. He reports he was tested for HCV during [facility], reports treatment with pills, uncertain of med. He denies TB or other infectious conditions.     Fibroscan F2 at 8.5 kPa, S2. U/S with gallstones but no other concerning findings.     He hasn't had formal fibrosis staging or recent abdominal imaging.     Denies jaundice, dark urine, hematemesis, melena, slowed mentation, abdominal distention.     PMH listed below. Post stroke with residual hemiparesis of left side.     HCV history:  Genotype unknown  HCV RNA: 1,301,498 IU/mL  Treatment experienced?     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     Liver staging:  Fibroscan F2  AST 26, ALT 38  Normal synthetic liver function  PLTs WNL     Past Medical History:   Diagnosis Date    Cataract     Diabetes mellitus     Glaucoma     Hypertension      Past Surgical History:   Procedure Laterality Date    UNDESCENDED TESTICLE EXPLORATION Right     R testicle removed as it was undescended     FAMILY HISTORY: Negative for liver disease    SOCIAL HISTORY:   Social History     Tobacco Use   Smoking Status Former Smoker    Packs/day: 1.00    Years: 18.00    Pack years: 18.00    Types: Cigarettes    Last attempt to quit: 1986    Years since quittin.4   Smokeless Tobacco Never Used     Social History     Substance and Sexual Activity   Alcohol Use Never    Frequency: Never   denies- daily use several decades  ago, none for 35 years     Social History     Substance and Sexual Activity   Drug Use Never     ROS:   No fever, chills, weight loss, fatigue  No chest pain, palpitations, dyspnea, cough  No abdominal pain, nausea, vomiting  No headaches, visual changes  No lower extremity edema  No depression or anxiety      PHYSICAL EXAM:  Friendly Black or  male, in no acute distress; alert and oriented to person, place and time  VITALS: reviewed  HEENT: Sclerae anicteric.   NECK: Supple  CVS: Regular rate and rhythm. No murmurs  LUNGS: Normal respiratory effort. Clear bilaterally  ABDOMEN: Flat, soft, nontender. No organomegaly or masses. No ascites or hernias. SKIN: Warm and dry. No jaundice, No obvious rashes.   EXTREMITIES: No lower extremity edema  NEURO/PSYCH: Normal gate. Memory intact. Thought and speech pattern appropriate. Behavior normal. No depression or anxiety noted.    RECENT LABS:  Lab Results   Component Value Date    WBC 7.31 04/12/2019    HGB 13.9 (L) 04/12/2019     04/12/2019     No results found for: INR  Lab Results   Component Value Date    AST 26 05/21/2019    ALT 38 05/21/2019    BILITOT 0.5 05/21/2019    ALBUMIN 3.7 05/21/2019    ALKPHOS 62 05/21/2019    CREATININE 1.1 05/21/2019    BUN 16 05/21/2019     05/21/2019    K 4.1 05/21/2019    AFP 7.8 08/22/2019     RECENT IMAGING:  US Abdomen Complete   Order: [id number]   Status:  Final result   Visible to patient:  No (Not Released)   Next appt:  10/03/2019 at 10:30 AM in Radiology ([id number])   Dx:  Chronic hepatitis C without hepatic coma   Details     Reading Physician Reading Date Result Priority   Abelino Paul MD 9/19/2019 Routine      Narrative     EXAMINATION:  US ABDOMEN COMPLETE    CLINICAL HISTORY:  789.00; Chronic viral hepatitis C    TECHNIQUE:  Complete abdominal ultrasound (including pancreas, aorta, liver, gallbladder, common bile duct, IVC, kidneys, and spleen) was  performed.    COMPARISON:  None    FINDINGS:  Pancreas: There is no pancreatic masses or pancreatic ductal dilatation.    Abdominal aorta: There is no aneurysmal dilatation of the abdominal aorta.    IVC: IVC is patent.    Liver: The liver measures approximately 16.4 cm in its craniocaudal dimension.  Normal echotexture to the liver without hepatic masses or biliary ductal dilatation.  Portal vein is widely patent and within normal limits.    Gallbladder: In the neck of the gallbladder there is a shadowing echogenic no bile calculus.  No significant thickening of the wall of the gallbladder.  There is no abnormal pericholecystic fluid collections.  No definite signs for acute cholecystitis.  The extrahepatic common bile duct measures 7 mm.  This is at the upper limits of normal.    The right kidney measures approximately 11.3 cm in its longitudinal axis.  No hydronephrosis or renal calculi or abnormal perinephric fluid collections.  There is a subserosal approximately 0.6 x 0.6 cm cyst.    The left kidney measures 11.1 cm.  No hydronephrosis or renal masses or renal calculi or abnormal perinephric fluid collections.    Homogeneous spleen measuring 10.6 cm.    There is no ascites.      Impression       1. Cholelithiasis without definite signs for acute cholecystitis.  2. Upper abdominal ultrasound otherwise is unremarkable.           ASSESSMENT  68 y.o. Black or  male with:  1. CHRONIC HEPATITIS C, GENOTYPE unknown- treatment experienced?  -- Prior HCV treatment - reports PO treatment during incarceration, however 10 years ago wouldn't have been IFN free. Denies h/o TB  -- essentially normal transaminases  -- unknown immunity to HAV or HBV     EDUCATION:  Discussed goal of HCV eradication to prevent progression of liver disease.  Discussed use of Epclusa daily x 12 weeks w/ potential side effects of fatigue and headache.     Reviewed limitations on acid suppressant medications due to DDI w/  Epclusa:  -- Antacids - must be  from Epclusa by 4 hours  -- H2 Receptor Antagonist - Pt not currently taking   Must be dosed at same time as Epclusa  -- PPI - Cannot be co administered with Epclusa. Pt will do trial of H2 blocker.     Patient instructed to contact me if experiencing additional acid related symptoms so further recommendations can be made regarding acid suppression therapy.      Herbal / alternative therapies must be discontinued    Crestor to be reduced to 10mg     PLAN:  1. Labs today  2. HCV treatment pending labs; Epclusa x 12, will reduce crestor to 10mg for treatment   3. F/u at SVR 12    Warren Bianchi PA-C

## 2019-10-01 ENCOUNTER — OFFICE VISIT (OUTPATIENT)
Dept: HEPATOLOGY | Facility: CLINIC | Age: 69
End: 2019-10-01
Payer: MEDICARE

## 2019-10-01 ENCOUNTER — LAB VISIT (OUTPATIENT)
Dept: LAB | Facility: HOSPITAL | Age: 69
End: 2019-10-01
Payer: MEDICARE

## 2019-10-01 VITALS
SYSTOLIC BLOOD PRESSURE: 170 MMHG | DIASTOLIC BLOOD PRESSURE: 84 MMHG | BODY MASS INDEX: 30.57 KG/M2 | WEIGHT: 206.38 LBS | RESPIRATION RATE: 18 BRPM | HEIGHT: 69 IN | HEART RATE: 78 BPM | OXYGEN SATURATION: 97 %

## 2019-10-01 DIAGNOSIS — B18.2 CHRONIC HEPATITIS C WITHOUT HEPATIC COMA: Primary | ICD-10-CM

## 2019-10-01 DIAGNOSIS — B18.2 CHRONIC HEPATITIS C WITHOUT HEPATIC COMA: ICD-10-CM

## 2019-10-01 LAB
ALBUMIN SERPL BCP-MCNC: 3.8 G/DL (ref 3.5–5.2)
ALP SERPL-CCNC: 64 U/L (ref 55–135)
ALT SERPL W/O P-5'-P-CCNC: 27 U/L (ref 10–44)
ANION GAP SERPL CALC-SCNC: 11 MMOL/L (ref 8–16)
AST SERPL-CCNC: 20 U/L (ref 10–40)
BACTERIA #/AREA URNS AUTO: ABNORMAL /HPF
BASOPHILS # BLD AUTO: 0.05 K/UL (ref 0–0.2)
BASOPHILS NFR BLD: 0.8 % (ref 0–1.9)
BILIRUB SERPL-MCNC: 0.5 MG/DL (ref 0.1–1)
BILIRUB UR QL STRIP: NEGATIVE
BUN SERPL-MCNC: 15 MG/DL (ref 8–23)
CALCIUM SERPL-MCNC: 9.3 MG/DL (ref 8.7–10.5)
CHLORIDE SERPL-SCNC: 105 MMOL/L (ref 95–110)
CLARITY UR REFRACT.AUTO: CLEAR
CO2 SERPL-SCNC: 21 MMOL/L (ref 23–29)
COLOR UR AUTO: YELLOW
CREAT SERPL-MCNC: 1.2 MG/DL (ref 0.5–1.4)
DIFFERENTIAL METHOD: ABNORMAL
EOSINOPHIL # BLD AUTO: 0.1 K/UL (ref 0–0.5)
EOSINOPHIL NFR BLD: 1.9 % (ref 0–8)
ERYTHROCYTE [DISTWIDTH] IN BLOOD BY AUTOMATED COUNT: 13.7 % (ref 11.5–14.5)
EST. GFR  (AFRICAN AMERICAN): >60 ML/MIN/1.73 M^2
EST. GFR  (NON AFRICAN AMERICAN): >60 ML/MIN/1.73 M^2
GLUCOSE SERPL-MCNC: 102 MG/DL (ref 70–110)
GLUCOSE UR QL STRIP: NEGATIVE
HCT VFR BLD AUTO: 43 % (ref 40–54)
HGB BLD-MCNC: 13.7 G/DL (ref 14–18)
HGB UR QL STRIP: ABNORMAL
HYALINE CASTS UR QL AUTO: 32 /LPF
IMM GRANULOCYTES # BLD AUTO: 0.01 K/UL (ref 0–0.04)
IMM GRANULOCYTES NFR BLD AUTO: 0.2 % (ref 0–0.5)
INR PPP: 1.1 (ref 0.8–1.2)
KETONES UR QL STRIP: NEGATIVE
LEUKOCYTE ESTERASE UR QL STRIP: NEGATIVE
LYMPHOCYTES # BLD AUTO: 0.8 K/UL (ref 1–4.8)
LYMPHOCYTES NFR BLD: 12.9 % (ref 18–48)
MCH RBC QN AUTO: 26.7 PG (ref 27–31)
MCHC RBC AUTO-ENTMCNC: 31.9 G/DL (ref 32–36)
MCV RBC AUTO: 84 FL (ref 82–98)
MICROSCOPIC COMMENT: ABNORMAL
MONOCYTES # BLD AUTO: 0.5 K/UL (ref 0.3–1)
MONOCYTES NFR BLD: 8 % (ref 4–15)
NEUTROPHILS # BLD AUTO: 4.8 K/UL (ref 1.8–7.7)
NEUTROPHILS NFR BLD: 76.2 % (ref 38–73)
NITRITE UR QL STRIP: NEGATIVE
NRBC BLD-RTO: 0 /100 WBC
PH UR STRIP: 5 [PH] (ref 5–8)
PLATELET # BLD AUTO: 253 K/UL (ref 150–350)
PMV BLD AUTO: 11.4 FL (ref 9.2–12.9)
POTASSIUM SERPL-SCNC: 3.9 MMOL/L (ref 3.5–5.1)
PROT SERPL-MCNC: 8.2 G/DL (ref 6–8.4)
PROT UR QL STRIP: ABNORMAL
PROTHROMBIN TIME: 10.9 SEC (ref 9–12.5)
RBC # BLD AUTO: 5.14 M/UL (ref 4.6–6.2)
RBC #/AREA URNS AUTO: 4 /HPF (ref 0–4)
SODIUM SERPL-SCNC: 137 MMOL/L (ref 136–145)
SP GR UR STRIP: 1.02 (ref 1–1.03)
SQUAMOUS #/AREA URNS AUTO: 1 /HPF
URN SPEC COLLECT METH UR: ABNORMAL
WBC # BLD AUTO: 6.28 K/UL (ref 3.9–12.7)
WBC #/AREA URNS AUTO: 1 /HPF (ref 0–5)

## 2019-10-01 PROCEDURE — 87902 NFCT AGT GNTYP ALYS HEP C: CPT

## 2019-10-01 PROCEDURE — 99214 OFFICE O/P EST MOD 30 MIN: CPT | Mod: S$PBB,,, | Performed by: PHYSICIAN ASSISTANT

## 2019-10-01 PROCEDURE — 86704 HEP B CORE ANTIBODY TOTAL: CPT

## 2019-10-01 PROCEDURE — 87522 HEPATITIS C REVRS TRNSCRPJ: CPT

## 2019-10-01 PROCEDURE — 99214 OFFICE O/P EST MOD 30 MIN: CPT | Mod: PBBFAC | Performed by: PHYSICIAN ASSISTANT

## 2019-10-01 PROCEDURE — 85610 PROTHROMBIN TIME: CPT

## 2019-10-01 PROCEDURE — 86706 HEP B SURFACE ANTIBODY: CPT

## 2019-10-01 PROCEDURE — 99214 PR OFFICE/OUTPT VISIT, EST, LEVL IV, 30-39 MIN: ICD-10-PCS | Mod: S$PBB,,, | Performed by: PHYSICIAN ASSISTANT

## 2019-10-01 PROCEDURE — 86790 VIRUS ANTIBODY NOS: CPT

## 2019-10-01 PROCEDURE — 80053 COMPREHEN METABOLIC PANEL: CPT

## 2019-10-01 PROCEDURE — 99999 PR PBB SHADOW E&M-EST. PATIENT-LVL IV: CPT | Mod: PBBFAC,,, | Performed by: PHYSICIAN ASSISTANT

## 2019-10-01 PROCEDURE — 85025 COMPLETE CBC W/AUTO DIFF WBC: CPT

## 2019-10-01 PROCEDURE — 99999 PR PBB SHADOW E&M-EST. PATIENT-LVL IV: ICD-10-PCS | Mod: PBBFAC,,, | Performed by: PHYSICIAN ASSISTANT

## 2019-10-01 NOTE — Clinical Note
Hi Dr. Pennington share this mutual pt. We were discussing his daily meds. He said he has been unable to  his Rxed meds from pharmacy due to lack of transportation. We discussed mail order pharmacy. Do you think you could transfer RXes to [facility] in [place] for delivery? I will also be reducing his statin dose temporarily for HCV treatmentJamiemma

## 2019-10-02 ENCOUNTER — TELEPHONE (OUTPATIENT)
Dept: PHARMACY | Facility: CLINIC | Age: 69
End: 2019-10-02

## 2019-10-02 LAB
BACTERIA UR CULT: NORMAL
HBV CORE AB SERPL QL IA: POSITIVE
HBV SURFACE AB SER-ACNC: NEGATIVE M[IU]/ML
HEPATITIS A ANTIBODY, IGG: POSITIVE

## 2019-10-02 RX ORDER — VELPATASVIR AND SOFOSBUVIR 100; 400 MG/1; MG/1
1 TABLET, FILM COATED ORAL DAILY
Qty: 28 TABLET | Refills: 2 | Status: SHIPPED | OUTPATIENT
Start: 2019-10-02 | End: 2020-05-31 | Stop reason: ALTCHOICE

## 2019-10-02 RX ORDER — ROSUVASTATIN CALCIUM 10 MG/1
10 TABLET, COATED ORAL DAILY
Qty: 90 TABLET | Refills: 0 | Status: SHIPPED | OUTPATIENT
Start: 2019-10-02 | End: 2020-02-28 | Stop reason: SDUPTHER

## 2019-10-03 ENCOUNTER — HOSPITAL ENCOUNTER (OUTPATIENT)
Dept: RADIOLOGY | Facility: HOSPITAL | Age: 69
Discharge: HOME | End: 2019-10-03
Attending: PHYSICAL MEDICINE & REHABILITATION
Payer: MEDICARE

## 2019-10-03 DIAGNOSIS — R13.10 DYSPHAGIA, UNSPECIFIED TYPE: ICD-10-CM

## 2019-10-03 DIAGNOSIS — R47.1 DYSARTHRIA: ICD-10-CM

## 2019-10-03 DIAGNOSIS — I69.354 HEMIPARESIS AFFECTING LEFT SIDE AS LATE EFFECT OF CEREBROVASCULAR ACCIDENT (CVA): ICD-10-CM

## 2019-10-03 DIAGNOSIS — I63.329 CEREBROVASCULAR ACCIDENT (CVA) DUE TO THROMBOSIS OF ANTERIOR CEREBRAL ARTERY, UNSPECIFIED BLOOD VESSEL LATERALITY: ICD-10-CM

## 2019-10-03 PROCEDURE — 92611 MOTION FLUOROSCOPY/SWALLOW: CPT

## 2019-10-03 PROCEDURE — 74230 FL MODIFIED BARIUM SWALLOW SPEECH STUDY: ICD-10-PCS | Mod: 26,,, | Performed by: RADIOLOGY

## 2019-10-03 PROCEDURE — 74230 X-RAY XM SWLNG FUNCJ C+: CPT | Mod: 26,,, | Performed by: RADIOLOGY

## 2019-10-03 PROCEDURE — 74230 X-RAY XM SWLNG FUNCJ C+: CPT | Mod: TC

## 2019-10-03 NOTE — PLAN OF CARE
REHAB SERVICE VIDEO SWALLOW STUDY    Name: Jae Millan  YOB: 1950  MRN:  [id number]  Referring Provider: Charis Salazar MD  [id number] [place] [address]  Onset Date:  CVA August 2018  SOC Date:  10/03/2019  Primary Diagnosis:  CVA  Treatment Diagnosis:  Oropharyngeal Dyspahgia    Prior Therapy Dates/Results (same condition):  ST services at [facility] 2 times after stroke.  Patient currently seeing OP SLP who referred patient for Modified Barium Swallow Study to objectively r/o aspiration and determine the least restrictive and safest po diet. OP ST evaluation completed 09/10/2019. Previous outpatient clinical swallowing assessment reads as follows:   Clinical Swallow Examination:   Pt presented with:   THIN:- self regulated thin liquid via cup  PUREE:- tsp bites of pudding x2  SOLID: -bite of sydnie cracker x1   DESCRIPTION: Oral Phase: No anterior spillage noted. No significant oral residue. Mastication appeared effective and efficient.    Pharyngeal:  Laryngeal lift present upon manual palpation.  Inconsistent coughing/throat clearing following thin, puree, and solid consistencies. Vocal quality remained clear post PO trials.   Eating Assessment Tool (EAT-10): 17/40 (A score above 3 indicates difficulty swallowing.)  Recommend MBSS: yes    Functional Deficits Leading to Referral:  OP SLP referred patient 2/2 noted s/s of dysphagia during clinical swallowing evaluation (see above or ST note from 9/10/2019). Patient reports coughing once a day with liquids. He reports coughing when taking medications, but 'not necessarily getting choked.'    Chest X-Ray: 4/12/2019: No acute intrathoracic pathology identified    Pertinent Medical History:    Past Medical History:   Diagnosis Date    Cataract     Diabetes mellitus     Glaucoma     Hypertension      Past Surgical History:   Procedure Laterality Date    UNDESCENDED TESTICLE EXPLORATION Right     R testicle removed as  it was undescended     Prior Level of Function:  Patient eating a regular diet and thin liquids at home without diet restrictions    Social Cultural:  Lives with sister. Not currently working.     Signs of Abuse:  No    Alertness/Attention:  WFL    Oral Motor:    Oral Musculature: WFL  Structure Abnormalities: none  Dentition: scattered dentition; adequate   Secretion Management: WFL  Mucosal Quality: WFL  Mandibular Strength and Mobility: WFL  Oral Labial Strength and Mobility: WFL; mildly impaired retraction   Lingual Strength and Mobility: WFL  Velar Elevation: did not assess  Buccal Strength and Mobility: WFL  Volitional Cough: elicited  Volitional Swallow: mildly delayed  Voice Prior to PO Intake: clear      Patient Position:  Sitting  View:  Lateral  Presentations:    THIN:- self regulated sips via cup x3 (one with head turn) and via straw x2, SLP cued small sips x2  · Small sips, self regulated sip with head turn to left-Rosenbek 8-Point Penetration-Aspiration Scale Score: 1: Material does not enter the airway.  · Large cup sips: Rosenbek 8-Point Penetration-Aspiration Scale Score: 5: Material enters the airway, contacts the vocal folds, and is not ejected from the airway.  · Straw sips with barium tablet: Rosenbek 8-Point Penetration-Aspiration Scale Score: 7: Material enters the airway, passes below the vocal folds, and is not ejected from the trachea despite effort.    PUREE:- self fed tsp bites of pudding with barium paste x1  · Rosenbek 8-Point Penetration-Aspiration Scale Score: 1: Material does not enter the airway.    DENTAL SOFT:- self fed tsp bites of banana with barium paste x1  · Rosenbek 8-Point Penetration-Aspiration Scale Score: 1: Material does not enter the airway.    SOLID:- 1 inch bite of sydnie cracker with barium paste x1  · Rosenbek 8-Point Penetration-Aspiration Scale Score: 1: Material does not enter the airway.    BARIUM TABLET:- 1 barium tablet thin liquid wash via straw  x1  · Tablet: Rosenbek 8-Point Penetration-Aspiration Scale Score: 1: Material does not enter the airway. 1  · Thin liquid utilized to swallow tablet: Rosenbek 8-Point Penetration-Aspiration Scale Score: 7: Material enters the airway, passes below the vocal folds, and is not ejected from the trachea despite effort.    Oral Preparation / Oral Phase: Patient presents with mild oral dysphagia c/b mild decreased lingual coordination resulting in moderate premature spillage of puree, dental soft, and solid trials pooling in the vallecula   Pt with adequate bolus acceptance and timely A-P transfer across consistencies    No presence of naso-pharyngeal reflux   Patient able to hold liquids in oral cavity until cued to swallow (only completed during initial two sips 2/2 patient thought he had to wait for SLP cue to swallow)    Pharyngeal Phase: Patient presents with moderate pharyngeal dysphagia c/b delayed pharyngeal swallow, decreased base of tongue retraction and mildly decreased hyolaryngeal elevation/excursion resulting in:   · Premature spillage of puree, dental soft, and solid trials to the vallecula with a mildly delayed pharyngeal swallow  · trace-mild penetration of thin liquids via large cup and straw sips during the swallow, and noted trace amounts of barium sitting anteriorly on VFs after the swallow. Cued coughs and throat clears not consistently successful at clearing laryngeal vestibule of trace amounts of penetrated liquids. Small sips of thin liquid and unregulated sips of thin liquid with a head turn to the left successful in eliminating penetration.   · Moderate penetration of thin liquids via straw with barium tablet during the swallow with altaf aspiration of penetrated material after the swallow. Volitional cough delayed and not successful in clearing airway. Cued strong cough not successful in clearing aspirated material from trachea.   · Mild-moderate vallecular residue noted with puree, dental  soft, and solid trials. Cued and spontaneous second swallows effective in clearing residue.   Overall characteristics of pharyngeal phase:   Mildly delayed swallow initiation with moderate pharyngeal pooling in the vallecula with puree, dental soft, and solid trials.    Pt with mildly reduced BOT retraction   Pt with reduced hyolaryngeal elevation and excursion patterns   Pt with complete epiglottic inversion patterns   Strategies attempted (smal, single sips at a time, head turn to the left, double swallows) were effective in eliminating or reducing the occurrence of penetration/aspiration    Pt with adequate airway protection without penetration or aspiration with small sips of thin liquids 1 at a time, sips of thin liquid with head turn to the left, puree, dental soft, and solid trials    Cervical Esophageal Phase    UES appeared to accommodate all bolus types without stasis or retrograde movement observed     Strategies/Compensatory Techniques Utilized:  Head turn to the left with thin liquids, small/single sips, double swallows (patient noted to complete a spontaneous second swallow when not cued and given extra time)     Impressions:  Patient presents with mild oral and moderate pharyngeal Dysphagia. See above.     Recommendations/Precautions:    1. Regular diet  2. Thin liquids  3. Medications buried in puree or crushed   4. Aspiration precautions:   · NO STRAWS  · Very small sips of liquid one at a time OR head turn to the left with all thin liquid swallows  · Excellent and frequent oral care  · Upright for all po intake and remain upright for 30 minutes after intake  · One bite at a time at a slow rate (allow time to complete second swallow per bite)  · Continue to monitor for signs and symptoms of aspiration and discontinue oral feeding should you notice any of the following: watery eyes, reddened facial area, wet vocal quality, increased work of breathing, change in respiratory status, increased  congestion, coughing, fever, etc.  5. Initiate Dysphagia therapy with Outpatient Speech Therapy     Education: SLP provided extensive education on MBSS results utilizing imaging to show patient results including aspiration event, SLP recommendations, SLP role, s/s and risks of aspiration, safe swallow precautions, and POC. Safe swallowing recommendations written down. Patient verbalized understanding of all discussed and all questions addressed. Patient would benefit from continued education in the outpatient setting.       TIME RECORD  Date: 10/03/2019  Start Time:  1015   Stop Time:  1050    PROCEDURES:  Total Timed Minutes:  35  Total Timed Units:  0  Total Untimed Units:  1  Charges Billed/# of units:  1    ADAMS Eastman., CCC-SLP  10/03/2019

## 2019-10-03 NOTE — PROGRESS NOTES
"  Occupational Therapy Daily Treatment Note     Date: 10/4/2019  Name: Jae Millan  Clinic Number: [id number]    Therapy Diagnosis:   Encounter Diagnoses   Name Primary?    Self-care deficit in dressing     Self-care deficit for bathing     Range of motion deficit     Swelling of left hand     Decreased strength of upper extremity      Physician: Maryjane Farias MD       Physician Orders: Eval and treat  Medical Diagnosis: Cerebrovascular accident (CVA) due to thrombosis of anterior cerebral artery, unspecified blood vessel laterality  Evaluation Date: 8/8/2019  Plan of Care Expiration Period: 8/8/19 to 10/31/19  Insurance Authorization period Expiration: 12/31/19  Date of Return to MD: DALIA  FOTO: 9/18/2019  47% Mobility  8/26/2019: 47% limited in Mobility    Visit # / Visits authorized: 14 / 20  Time In: 10:30am  Time Out: 11:15am  Total Billable Time: 45 minutes    Precautions:  Standard, Diabetes and HTN      Subjective     Pt reports: "Pain has been better. Less intense"  he was compliant with home exercise program given this session.   Response to previous treatment: positive  Functional change: ongoing    Pain:  4/10   Location: L shoulder    Objective      Jae participated in therapeutic exercise for 15 minutes for increased cardiovascular endurance and UE strength:  - UBE x6min forward x6min back res 4, L Hand secured, therapist's assistance needed for set up and safe transfer      Jae received neuro re-ed activities for 30 minutes to improve to improve: tone normalization, Balance, Coordination, Kinesthetic, Proprioception, Posture and awareness of L UE :  - Supine PROM of L GH joint: FF/Abd/ER/IR  - General wrist stretches including               1. Scaphoid on radius               2. Increasing mobility of metacarpals               3. Carpal rolls               4. Increasing mobility towards radial deviation               5. Increasing mobility of wrist towards extension              6. " Increasing mobility of wrist towards supination/pronation  - AAROM Scapular elevation/depression/protraction/retration with facilitation from therapist to L UE    - AAROM  L FF/Abd with facilitation   - Supine thoracic stretch over half bolster x5 min  - Seated EOM thoracic stretch with large yoga ball behind and L UE in ER    Home Exercises and Education Provided     Education provided:   - HEP review  - Progress towards goals     Written Home Exercises Provided: yes.  Exercises were reviewed and Jae was able to demonstrate them prior to the end of the session.  Jae demonstrated good  understanding of the HEP provided.   .   See EMR under Patient Instructions for exercises provided 8/12/2019.        Assessment     Jae had good tolerance to treatment this date. Posture was addressed for improved biomechanics for shoulder ROM.  He cont's with ROM and strength deficits limiting his full participation in ADL/IADL tasks.     Jae is progressing well towards his goals and there are no updates to goals at this time. Pt prognosis is Fair.     Pt will continue to benefit from skilled outpatient occupational therapy to address the deficits listed in the problem list on initial evaluation provide pt/family education and to maximize pt's level of independence in the home and community environment.     Anticipated barriers to occupational therapy: timeframe of current condition    Pt's spiritual, cultural and educational needs considered and pt agreeable to plan of care and goals.    Goals:  Short Term Goals: 6 weeks    Independent in HEP for self ROM left arm MET 8/19/2019   Pt to consistently elevate left hand to assist with edema when seated MET 8/19/2019   Pt will perform simple meal for self 3 days a week. MET 8/19/2019     LTG GOALS:  Time frame: 12 weeks  LB dressing will improve to by 10 minutes completion MET 8/26/2019  Pt will improve his ability to cut using adaptive device,   Bathing will improve by  completion in 20 minutes     Simple meal prep will be performed 5 x week    Simple home care will improve to one task 4 x week(sweep, mop, dust, clean sinks)    Plan   Cont. With established POC  Updates/Grading for next session:  Progress as tolerated      IRLANDA Galicia

## 2019-10-04 ENCOUNTER — CLINICAL SUPPORT (OUTPATIENT)
Dept: REHABILITATION | Facility: HOSPITAL | Age: 69
End: 2019-10-04
Attending: INTERNAL MEDICINE
Payer: MEDICARE

## 2019-10-04 DIAGNOSIS — R29.898 DECREASED STRENGTH OF UPPER EXTREMITY: ICD-10-CM

## 2019-10-04 DIAGNOSIS — Z74.1 SELF-CARE DEFICIT IN DRESSING: ICD-10-CM

## 2019-10-04 DIAGNOSIS — I69.354 HEMIPARESIS AFFECTING LEFT SIDE AS LATE EFFECT OF CEREBROVASCULAR ACCIDENT (CVA): ICD-10-CM

## 2019-10-04 DIAGNOSIS — M79.89 SWELLING OF LEFT HAND: ICD-10-CM

## 2019-10-04 DIAGNOSIS — M25.60 RANGE OF MOTION DEFICIT: ICD-10-CM

## 2019-10-04 DIAGNOSIS — Z74.09 IMPAIRED FUNCTIONAL MOBILITY, BALANCE, GAIT, AND ENDURANCE: ICD-10-CM

## 2019-10-04 DIAGNOSIS — R20.8 DECREASED SENSATION OF LOWER EXTREMITY: ICD-10-CM

## 2019-10-04 DIAGNOSIS — R46.0 SELF-CARE DEFICIT FOR BATHING: ICD-10-CM

## 2019-10-04 DIAGNOSIS — R47.1 DYSARTHRIA: ICD-10-CM

## 2019-10-04 LAB
HCV GENTYP SERPL NAA+PROBE: ABNORMAL
HCV RNA SERPL NAA+PROBE-LOG IU: 6 LOG (10) IU/ML
HCV RNA SERPL QL NAA+PROBE: DETECTED
HCV RNA SPEC NAA+PROBE-ACNC: ABNORMAL IU/ML

## 2019-10-04 PROCEDURE — 92526 ORAL FUNCTION THERAPY: CPT | Mod: PN | Performed by: SPEECH-LANGUAGE PATHOLOGIST

## 2019-10-04 PROCEDURE — 97112 NEUROMUSCULAR REEDUCATION: CPT | Mod: PN,59

## 2019-10-04 PROCEDURE — 97110 THERAPEUTIC EXERCISES: CPT | Mod: PN,59

## 2019-10-04 NOTE — PATIENT INSTRUCTIONS
"Effortful Swallow:  Goal: The goal of this activity is to keep food or fluid from getting stuck in your pharynx, or throat, by improving the force and timing of your swallow.    Directions:  · Swallow normally, but tightly squeeze your tongue and throat muscles throughout the swallow.  · Try to swallow with as much effort as you can.  · Repeat as instructed by your therapist.    Explanation: The muscles of your tongue and pharynx work together to help you swallow properly. By swallowing with as much effort as possible, you can keep food from getting stuck in your throat.    Chin Tuck Against Resistance (CTAR):  Goal: to strengthen the suprahyoid muscles  Directions:   · Place small resistance ball between chin and chest  · Press chin into ball on chest for 45 repetitions  · Press chin into ball on chest and hold for 3 minutes total     Pitch glides:  Goal: to strengthen the muscles responsible for lifting your larynx   Directions:   · Say "eee" for 3-5 seconds for 30 repetitions   · Say "eee" and glide to a high pitch for 3-5 seconds for 30 repetitions  · Say "eee" and glide to a low pitch for 3-5 seconds for 30 repetitions      Complete each exercise 3x per day.     Exercise descriptions from:   Home Exercise Program. (n.d.). Retrieved October 10, 2017, from [email or web address]    "

## 2019-10-04 NOTE — PROGRESS NOTES
"Outpatient Neurological Rehabilitation   Speech and Language Therapy Daily Note  Date:  10/4/2019     Name: Jae Millan   MRN: [id number]   Therapy Diagnosis:   Encounter Diagnosis   Name Primary?    Dysarthria    Physician: Charis Salazar MD  Physician Orders: [id number] - Ambulatory consult to Speech Therapy  Medical Diagnosis: I69.354 (ICD-10-CM) - Hemiparesis affecting left side as late effect of cerebrovascular accident (CVA)  I63.329 (ICD-10-CM) - Cerebrovascular accident (CVA) due to thrombosis of anterior cerebral artery, unspecified blood vessel laterality  R47.1 (ICD-10-CM) - Dysarthria       Visit #/ Visits Authorized: 4/5  Date of Evaluation:  9/9/2019   Insurance Authorization Period: 8/27/19-8/26/20  Plan of Care Certification:    9/9/2019 to 11/4/19  Extended POC:  n/a  Progress Note: due 10/9/19  Visits Cancelled: 1  Visits No Show: 0    Time In:  11:15  Time Out:  12:00  Total Billable Time: 45 min    Precautions: Standard    Subjective:   Pt reports: Pt in pleasant spirits. No complaints stated. Pt participated in MBSS yesterday.  He was compliant to home exercise program.   Response to previous treatment: "ok"  Pain Scale:  6/10 on VAS currently.   Pain Location: left hand   Objective:     UNTIMED  Procedure Min.   Speech- Language- Voice Therapy 0   Dysphagia Therapy 45   Total Timed Units: 0  Total Untimed Units: 1  Charges Billed/# of units: 1    Short Term Goals: (4 weeks) Current Progress:   1. Pt will rate his speech while reading as at least a 2 on a 3 point scale ( 1 = same as before beginning therapy, 2 =better but not best, 3 =best possible outcome) on  8 /10 trials.     Progressing/ Not Met 10/4/2019   Not formally addressed    METX1 previously    2. Pt will differentiate between his speech and error-free speech by giving specific examples of differences on  8 /10 trials.     Progressing/ Not Met 10/4/2019   Not formally addressed     3. Pt will identify a self-cue for use of " "therapeutic speech and use it 5x per session independently    Progressing/ Not Met 10/4/2019   Not formally addressed       4. Pt will use motor speech strategies and answer questions in conversation with a self-rating of at least 2 on a 3 point scale ( 1 = same as before beginning therapy, 2 =better but not best, 3 =best possible outcome) on  8 /10 trials.     Progressing/ Not Met 10/4/2019   Not formally addressed     5. Pt will recall 3/4 clear speech strategies independently     Progressing/ Not Met 10/4/2019   Pt able to recall 3/4 clear speech strategies indly, further reviewed/discussed strategies.  METx1   6. Pt will participate in MBSS to objectively assess his swallow, rule out silent aspiration, and determine the safest possible diet.     Goal Met 10/3/19 / Discontinue   Impressions/Recs:     "Impressions:  Patient presents with mild oral and moderate pharyngeal Dysphagia. See above.   Recommendations/Precautions:    1. Regular diet  2. Thin liquids  3. Medications buried in puree or crushed   4. Aspiration precautions:   · NO STRAWS  · Very small sips of liquid one at a time OR head turn to the left with all thin liquid swallows  · Excellent and frequent oral care  · Upright for all po intake and remain upright for 30 minutes after intake  · One bite at a time at a slow rate (allow time to complete second swallow per bite)  · Continue to monitor for signs and symptoms of aspiration and discontinue oral feeding should you notice any of the following: watery eyes, reddened facial area, wet vocal quality, increased work of breathing, change in respiratory status, increased congestion, coughing, fever, etc.  5. Initiate Dysphagia therapy with Outpatient Speech Therapy"     7. Pt will complete oral motor exercises x 40 each focusing on labial and lingual muscles given min A to improve articulation.     Progressing/ Not Met 10/4/2019  Discussed/reviewed oral motor exercises   8.  Pt will participate in tongue " base retraction exercises x 30 given min A to improve swallow function.      Progressing/ Not Met 10/4/2019  - Hard Effortfull Swallow x30; SLP provided model and rationale    9. Pt will participate in laryngeal elevation exercises x 30 with min A to improve hyolaryngeal excursion.      Progressing/ Not Met 10/4/2019  -high /i/ x25  -pitch glide /i/ high x25  -pitch glide /i/ low x25    SLP provided model and rationale    10. Pt will complete chin tuck against resistance (CTAR) hold x30 seconds x3 with min A to improve hyolaryngeal elevation / excursion.    Progressing/ Not Met 10/4/2019  - CTAR hold for 1 minute x3; SLP provided model and rationale    11. Pt will complete chin tuck against resistance (CTAR) repetitions x30 with min A to improve hyolaryngeal elevation / excursion.    Progressing/ Not Met 10/4/2019  - CTAR repetitions x45; SLP provided model and rationale    12. Pt will recall and utilize aspiration precautions and safe swallow strategies independently (no straws, medications crushed/burried in puree, small bites/sips, eat/drink slowly, head turn to the left, frequent oral care, 1 bite at a time, upright for all PO intake/remain upright for all PO intake)    Progressing/ Not Met 10/4/2019  Discussed safe swallow strategies and aspiration precautions extensively     Pt observed to take small sips of thin liquids and perform a head turn to the left indly on approximately 75% of swallows   13. Pt will participate in po trials of regular consistencies and thin liquids with no overt signs/symptoms of aspiration.     Progressing/ Not Met 10/4/2019   -Thin liquids x15     -Pt stated his voice is hoarse and bothers him, discussed clinical definition of hoarseness and pt explained that he feels his voice is different, discussed wet vocal quality and what it sounds like and pt stated this is what he is perceiving to be different about his voice occasionally. Discussed cause of wet vocal quality and advised pt  to perform a throat clear, cough, or strong swallow to address the wet vocal quality.   Patient Education/Response:   Pt concerned about swallow function, discussed results/reccomendations of yesterday's MBSS. Discussed normal swallow anatomy/physiology vs the pt's swallow physiology. Discussed purpose of dysphagia exercises. Discussed HEP, pt's POC, and new goals added per MBSS. Extensive education provided regarding safe swallow strategies and aspiration precautions (listed above). Pt verbalized understanding and stated he feels much better about his current swallow function and is not afraid to swallow following education by SLP.     Written Home Exercises Provided: Yes. Laryngeal elevation/excursion exercises, tongue base retraction exercises, and CTAR exercises. Pt instructed to repeat multisyllabic words 3x each at a fast rate (30 times) to improve speed and coordination of articulators. Pt instructed to complete OME 3x a day. Pt instructed to read sentences aloud (provided) using motor speech strategies.   Exercises were reviewed and Jae was able to demonstrate them prior to the end of the session.  Jae demonstrated good  understanding of the education provided.     Assessment:   Jae is progressing well towards his goals. Pt demonstrated understanding of MBSS results and recommendation via teach-back. Pt with good performance of dysphagia exercises given initial model by SLP. Current goals remain appropriate. Goals to be updated as necessary.     Pt prognosis is Fair. Pt will continue to benefit from skilled outpatient speech and language therapy to address the deficits listed in the problem list on initial evaluation, provide pt/family education and to maximize pt's level of independence in the home and community environment.     Medical necessity is demonstrated by the following IMPAIRMENTS:  Reduced speech intelligibility limits functional communication with both familiar and unfamiliar  communication partners.     Barriers to Therapy: none identified   Pt's spiritual, cultural and educational needs considered and pt agreeable to plan of care and goals.  Plan:   Continue POC with focus on speech intelligibility and swallow function    LINCOLN Shirley, CF-SLP  Speech Language Pathologist   10/4/2019

## 2019-10-04 NOTE — PROGRESS NOTES
Subjective:       Patient ID: Jae Millan is a 68 y.o. male.    Chief Complaint: Follow-up    HPIHe has his hand brace and he is getting therapy - still has urinary frequency.  Review of Systems   Respiratory: Negative for shortness of breath.    Cardiovascular: Negative for chest pain.   Gastrointestinal: Negative for abdominal pain, diarrhea, nausea and vomiting.   Genitourinary: Negative for dysuria.   Neurological: Negative for seizures, syncope and headaches.       Objective:      Physical Exam   Constitutional: He is oriented to person, place, and time. He appears well-developed and well-nourished. No distress.   HENT:   Mouth/Throat: Oropharynx is clear and moist.   Neck: Neck supple. No JVD present. No thyromegaly present.   Cardiovascular: Normal rate, regular rhythm, normal heart sounds and intact distal pulses. Exam reveals no gallop and no friction rub.   No murmur heard.  Pulmonary/Chest: Effort normal and breath sounds normal. He has no wheezes. He has no rales.   Abdominal: Soft. Bowel sounds are normal. He exhibits no distension and no mass. There is no tenderness. There is no rebound and no guarding.   Musculoskeletal: He exhibits no edema.   Lymphadenopathy:     He has no cervical adenopathy.   Neurological: He is alert and oriented to person, place, and time.   Skin: Skin is warm and dry.   Psychiatric: He has a normal mood and affect. His behavior is normal. Judgment and thought content normal.       Assessment:       1. Urinary frequency    2. Type 2 diabetes mellitus with diabetic neuropathy, without long-term current use of insulin    3. Cerebrovascular accident (CVA) due to thrombosis of anterior cerebral artery, unspecified blood vessel laterality    4. Essential hypertension        Plan:   Urinary frequency  -     Urinalysis  -     Urine culture  -     URINALYSIS; Future; Expected date: 09/30/2019  -     CULTURE, URINE; Future; Expected date: 10/01/2019    Type 2 diabetes mellitus with  diabetic neuropathy, without long-term current use of insulin    Cerebrovascular accident (CVA) due to thrombosis of anterior cerebral artery, unspecified blood vessel laterality    Essential hypertension    Felt weak got glucose tablet and crackers

## 2019-10-04 NOTE — PROGRESS NOTES
"  Physical Therapy Daily Treatment Note     Name: Jae Millan  Clinic Number: [id number]    Therapy Diagnosis:   Encounter Diagnoses   Name Primary?    Hemiparesis affecting left side as late effect of cerebrovascular accident (CVA)     Decreased sensation of lower extremity     Impaired functional mobility, balance, gait, and endurance      Physician: Charis Salazar MD    Visit Date: 10/4/2019    Physician Orders: PT Eval, all treatment after initial eval requires auth  Medical Diagnosis from Referral: Cerebrovascular accident (CVA) due to thrombosis of anterior cerebral artery, unspecified blood vessel laterality  Evaluation Date: 6/6/2019  Authorization Period Expiration: 12/31/2019  NEW Plan of Care Expiration: 10/31/2019  Visit # / Visits authorized: 2/ 20 (total visits: 17)   POC due: 9/28/2019    Time In: 0930  Time Out: 1020  Total Billable Time: 30 minutes    Precautions: Standard and Fall    Subjective     Pt reports: he walks for exercise. Pt expressed feeling good today, but was unable to attend last session 2' family emergency    He was compliant with HEP mostly for LUE, reports walking for LE strengthening.  Response to previous treatment: no adverse effects to report  Functional change: ongoing    Pain: "soreness" did not rate/10  Location: L shoulder    Objective       Jae received therapeutic exercises to develop strength, endurance, posture and core stabilization for  15 minutes including:    Recumbent stepper L4 x 8 min, L hand strapped to pedal     DF stretch on wedge 30sec     3x15 reps matrix knee extension, 15#  3x15 reps matrix knee flexion, 15#        Jae received neuromuscular reeducation to develop balance, proprioception, and kinesthetic sense for 35 minutes including:   2 x 30 sec tandem, SBA  2 x 30 sec feet together, eyes closed, CGA  x20 alternating taps onto medium cone while standing on foam, CGA  x20 alternating taps onto large cone on solid ground, CGA  x5 lunges " onto airex on step, with 5 second balance in half kneeling, then attempting to step through onto forward lunged foot      Home Exercises Provided and Patient Education Provided     Education provided:   - continue with HEP    Written Home Exercises Provided: Patient instructed to cont prior HEP.   Exercises were reviewed and Jae was able to demonstrate them prior to the end of the session.  Jae demonstrated good  understanding of the education provided.     See EMR under Patient Instructions for exercises provided 6/13/2019.    Assessment   Pt tolerated session well despite r/o fatigue. Pt sufficiently challenged with standing balance activity. Pt required CGA-Min A for balance at times during cone taps He remains appropriate for skilled PT services.     Pt has verbalized his transportation issues and has stated he plans to schedule his transportation services a half an hour earlier than usual, which will help him be on time for remainder of appointments to continue progressing with therapy.   Jae is progressing well towards his goals.   Pt prognosis is Good.     Pt will continue to benefit from skilled outpatient physical therapy to address the deficits listed in the problem list box on initial evaluation, provide pt/family education and to maximize pt's level of independence in the home and community environment.     Pt's spiritual, cultural and educational needs considered and pt agreeable to plan of care and goals.     Anticipated barriers to physical therapy: none    Goals: 8/28/19  Short Term Goals: 5 weeks   1. Pt will be able to complete 10 sit to  30 seconds for increased mobility. MET 8/5/2019  2. Pt will be able to complete TUG in less than 18 seconds in order to decrease his risk for fall. MET 7/5/19- 11.08 sec with small based quad cane, 13.55 sec w/o AD  3. Pt will be compliant with HEP and medicine management. Ongoing, semicompliant  4. Pt will be able to ambulate >300' with/without  AD and mod I. MET 7/5/19- pt can ambulate at least 300 ft with small based quad cane      Long Term Goals: 10 weeks   1. Pt will complete TUG in less than 15 seconds in order to decrease his risk for fall. MET 7/5/2019  2. Pt will be able to stand on B LE in SLS for >/= 5 seconds in order to decrease his risk for fall. Ongoing  3. Pt will be able to ascend/decend 5 stairs with 1 hand rail and supervision without cues for safety. MET 8/5/2019  4. Pt will tolerate standing activities for >10 minutes in order to increase his endurance. MET 7/5/2019  5. NEW GOAL: Pt will improve score on FGA to >/= 22 in order to decrease his risk for fall. Ongoing, improved (17/30)  6. NEW GOAL (8/28/2019): Pt will improve his SSWS to .95 without AD in order to normalize gait compared to peers of his age and gender.    Plan     Continue to advance balance and single leg stance challenges as appropriate, continue with use of resistance machines for strengthening    Louann Quinonez, PTA  10/4/2019

## 2019-10-07 ENCOUNTER — CLINICAL SUPPORT (OUTPATIENT)
Dept: REHABILITATION | Facility: HOSPITAL | Age: 69
End: 2019-10-07
Attending: INTERNAL MEDICINE
Payer: MEDICARE

## 2019-10-07 ENCOUNTER — TELEPHONE (OUTPATIENT)
Dept: HEPATOLOGY | Facility: CLINIC | Age: 69
End: 2019-10-07

## 2019-10-07 DIAGNOSIS — R20.8 DECREASED SENSATION OF LOWER EXTREMITY: ICD-10-CM

## 2019-10-07 DIAGNOSIS — Z74.09 IMPAIRED FUNCTIONAL MOBILITY, BALANCE, GAIT, AND ENDURANCE: Primary | ICD-10-CM

## 2019-10-07 DIAGNOSIS — R29.898 DECREASED STRENGTH OF UPPER EXTREMITY: ICD-10-CM

## 2019-10-07 DIAGNOSIS — M79.89 SWELLING OF LEFT HAND: ICD-10-CM

## 2019-10-07 DIAGNOSIS — Z74.1 SELF-CARE DEFICIT IN DRESSING: ICD-10-CM

## 2019-10-07 DIAGNOSIS — I69.354 HEMIPARESIS AFFECTING LEFT SIDE AS LATE EFFECT OF CEREBROVASCULAR ACCIDENT (CVA): ICD-10-CM

## 2019-10-07 DIAGNOSIS — M25.60 RANGE OF MOTION DEFICIT: ICD-10-CM

## 2019-10-07 DIAGNOSIS — R46.0 SELF-CARE DEFICIT FOR BATHING: ICD-10-CM

## 2019-10-07 DIAGNOSIS — R47.1 DYSARTHRIA: ICD-10-CM

## 2019-10-07 PROCEDURE — 97112 NEUROMUSCULAR REEDUCATION: CPT | Mod: PN,59

## 2019-10-07 PROCEDURE — 97110 THERAPEUTIC EXERCISES: CPT | Mod: PN,59

## 2019-10-07 PROCEDURE — 97014 ELECTRIC STIMULATION THERAPY: CPT | Mod: PN

## 2019-10-07 PROCEDURE — 92526 ORAL FUNCTION THERAPY: CPT | Mod: PN | Performed by: SPEECH-LANGUAGE PATHOLOGIST

## 2019-10-07 PROCEDURE — 97112 NEUROMUSCULAR REEDUCATION: CPT | Mod: PN

## 2019-10-07 NOTE — PROGRESS NOTES
"  Physical Therapy Daily Treatment Note     Name: Jae Millan  Clinic Number: [id number]    Therapy Diagnosis:   Encounter Diagnoses   Name Primary?    Hemiparesis affecting left side as late effect of cerebrovascular accident (CVA)     Decreased sensation of lower extremity     Impaired functional mobility, balance, gait, and endurance Yes     Physician: Maryjane Farias MD    Visit Date: 10/7/2019    Physician Orders: PT Eval, all treatment after initial eval requires auth  Medical Diagnosis from Referral: Cerebrovascular accident (CVA) due to thrombosis of anterior cerebral artery, unspecified blood vessel laterality  Evaluation Date: 6/6/2019  Authorization Period Expiration: 12/31/2019  NEW Plan of Care Expiration: 10/31/2019  Visit # / Visits authorized: 3/ 20 (total visits: 18)   POC due: 10/25/2019    Time In: 1256  Time Out: 1341  Total Billable Time: 45 minutes    Precautions: Standard and Fall    Subjective     Pt reports: He is concerned because he did a swallow study and they told him some of his food is going into his lungs. No new complaints otherwise.    He was compliant with HEP mostly for LUE, reports walking for LE strengthening.    Response to previous treatment: no adverse effects to report  Functional change: ongoing    Pain: "numbness" did not rate/10  Location: L hand    Objective       Jae received therapeutic exercises to develop strength, endurance, posture and core stabilization for  10 minutes including:    Upright bike L2 x 8 min, L foot strapped to pedal    Jae received neuromuscular reeducation to develop balance, proprioception, and kinesthetic sense for 35 minutes including:   2 x 30 sec tandem, SBA  2 x 30 sec feet together on foam, eyes closed, SBA  x20 alternating taps onto medium cone while standing on foam, CGA  3 x 30 seconds modified SLS with opposing foot on basket ball  Tandem ambulation fwd and bwd 3 laps of // bars, SBA  Marching with 3 second hold, 3 laps " with CGA, pt struggles when standing on L LE    Home Exercises Provided and Patient Education Provided     Education provided:   - continue with HEP    Written Home Exercises Provided: Patient instructed to cont prior HEP.   Exercises were reviewed and Jae was able to demonstrate them prior to the end of the session.  Jae demonstrated good  understanding of the education provided.     See EMR under Patient Instructions for exercises provided 6/13/2019.    Assessment   Pt tolerated session well. He continues to struggle with single leg stance/balance. Pt tolerates addition of new therex and balance activities well with no adverse effects voiced. Pt reports he attempts to stay active outside of sessions and walks often. He remains appropriate for skilled PT services in order to improve mobility and increase independence.     Pt has verbalized his transportation issues and has stated he plans to schedule his transportation services a half an hour earlier than usual, which will help him be on time for remainder of appointments to continue progressing with therapy.   Jae is progressing well towards his goals.   Pt prognosis is Good.     Pt will continue to benefit from skilled outpatient physical therapy to address the deficits listed in the problem list box on initial evaluation, provide pt/family education and to maximize pt's level of independence in the home and community environment.     Pt's spiritual, cultural and educational needs considered and pt agreeable to plan of care and goals.     Anticipated barriers to physical therapy: none    Goals: 8/28/19  Short Term Goals: 5 weeks   1. Pt will be able to complete 10 sit to  30 seconds for increased mobility. MET 8/5/2019  2. Pt will be able to complete TUG in less than 18 seconds in order to decrease his risk for fall. MET 7/5/19- 11.08 sec with small based quad cane, 13.55 sec w/o AD  3. Pt will be compliant with HEP and medicine management.  Ongoing, semicompliant  4. Pt will be able to ambulate >300' with/without AD and mod I. MET 7/5/19- pt can ambulate at least 300 ft with small based quad cane      Long Term Goals: 10 weeks   1. Pt will complete TUG in less than 15 seconds in order to decrease his risk for fall. MET 7/5/2019  2. Pt will be able to stand on B LE in SLS for >/= 5 seconds in order to decrease his risk for fall. Ongoing  3. Pt will be able to ascend/decend 5 stairs with 1 hand rail and supervision without cues for safety. MET 8/5/2019  4. Pt will tolerate standing activities for >10 minutes in order to increase his endurance. MET 7/5/2019  5. NEW GOAL: Pt will improve score on FGA to >/= 22 in order to decrease his risk for fall. Ongoing, improved (17/30)  6. NEW GOAL (8/28/2019): Pt will improve his SSWS to .95 without AD in order to normalize gait compared to peers of his age and gender.    Plan     Continue to advance balance and single leg stance challenges as appropriate, continue with use of resistance machines for strengthening    Oc Schroeder, PT  10/7/2019

## 2019-10-07 NOTE — PROGRESS NOTES
[facility]   Speech and Language Therapy Daily Note  Date:  10/7/2019     Name: Jae Millan   MRN: [id number]   Therapy Diagnosis:   Encounter Diagnosis   Name Primary?    Dysarthria    Physician: Charis Salazar MD  Physician Orders: [id number] - Ambulatory consult to Speech Therapy  Medical Diagnosis: I69.354 (ICD-10-CM) - Hemiparesis affecting left side as late effect of cerebrovascular accident (CVA)  I63.329 (ICD-10-CM) - Cerebrovascular accident (CVA) due to thrombosis of anterior cerebral artery, unspecified blood vessel laterality  R47.1 (ICD-10-CM) - Dysarthria       Visit #/ Visits Authorized: 5/5   Date of Evaluation:  9/9/2019   Insurance Authorization Period: 8/27/19-8/26/20  Plan of Care Certification:    9/9/2019 to 11/4/19  Extended POC:  n/a  Progress Note: due 10/9/19  Visits Cancelled: 1  Visits No Show: 0    Time In:  1:45  Time Out: 2:30  Total Billable Time: 45 min    Precautions: Standard    Subjective:   Pt reports: Pt in pleasant spirits with no reported complaints. Pt with questions regarding MBSS results and purpose of swallow exercises.   He was compliant to home exercise program.   Response to previous treatment: n/a  Pain Scale:  6/10 on VAS currently.   Pain Location: left hand   Objective:     UNTIMED  Procedure Min.   Speech- Language- Voice Therapy 0   Dysphagia Therapy 45   Total Timed Units: 0  Total Untimed Units: 1  Charges Billed/# of units: 1    Short Term Goals: (4 weeks) Current Progress:   1. Pt will rate his speech while reading as at least a 2 on a 3 point scale ( 1 = same as before beginning therapy, 2 =better but not best, 3 =best possible outcome) on  8 /10 trials.     Progressing/ Not Met 10/7/2019   Not formally addressed    METX1 previously    2. Pt will differentiate between his speech and error-free speech by giving specific examples of differences on  8 /10 trials.     Progressing/ Not Met 10/7/2019   Not formally addressed     3.  "Pt will identify a self-cue for use of therapeutic speech and use it 5x per session independently    Progressing/ Not Met 10/7/2019   Not formally addressed       4. Pt will use motor speech strategies and answer questions in conversation with a self-rating of at least 2 on a 3 point scale ( 1 = same as before beginning therapy, 2 =better but not best, 3 =best possible outcome) on  8 /10 trials.     Progressing/ Not Met 10/7/2019   Not formally addressed     5. Pt will recall 3/4 clear speech strategies independently    Pt able to recall 3/4 clear speech strategies indly, further reviewed/discussed strategies.    METx2 Goal Met 10/7/19/ Discontinue      6. Pt will participate in MBSS to objectively assess his swallow, rule out silent aspiration, and determine the safest possible diet.     Goal Met 10/3/19 / Discontinue   Impressions/Recs:     "Impressions:  Patient presents with mild oral and moderate pharyngeal Dysphagia. See above.   Recommendations/Precautions:    1. Regular diet  2. Thin liquids  3. Medications buried in puree or crushed   4. Aspiration precautions:   · NO STRAWS  · Very small sips of liquid one at a time OR head turn to the left with all thin liquid swallows  · Excellent and frequent oral care  · Upright for all po intake and remain upright for 30 minutes after intake  · One bite at a time at a slow rate (allow time to complete second swallow per bite)  · Continue to monitor for signs and symptoms of aspiration and discontinue oral feeding should you notice any of the following: watery eyes, reddened facial area, wet vocal quality, increased work of breathing, change in respiratory status, increased congestion, coughing, fever, etc.  5. Initiate Dysphagia therapy with Outpatient Speech Therapy"     7. Pt will complete oral motor exercises x 40 each focusing on labial and lingual muscles given min A to improve articulation.     Progressing/ Not Met 10/7/2019  Not formally addressed      8.  Pt " will participate in tongue base retraction exercises x 30 given min A to improve swallow function.      Progressing/ Not Met 10/7/2019  - Hard Effortfull Swallow x20   9. Pt will participate in laryngeal elevation exercises x 30 with min A to improve hyolaryngeal excursion.      Progressing/ Not Met 10/7/2019  -pitch glide /i/ high x30  -pitch glide /i/ low x30    Required mod cues to perform appropriately    10. Pt will complete chin tuck against resistance (CTAR) hold x30 seconds x3 with min A to improve hyolaryngeal elevation / excursion.    Progressing/ Not Met 10/7/2019  Not formally addressed     11. Pt will complete chin tuck against resistance (CTAR) repetitions x30 with min A to improve hyolaryngeal elevation / excursion.    Progressing/ Not Met 10/7/2019  Not formally addressed     12. Pt will recall and utilize aspiration precautions and safe swallow strategies independently (no straws, medications crushed/burried in puree, small bites/sips, eat/drink slowly, head turn to the left, frequent oral care, 1 bite at a time, upright for all PO intake/remain upright for all PO intake)    Progressing/ Not Met 10/7/2019  Ongoing discussion of safe swallow strategies and aspiration precautions completed.   Pt observed to take small sips of thin liquids and perform a head turn to the left indly on approximately 75% of swallows   13. Pt will participate in po trials of regular consistencies and thin liquids with no overt signs/symptoms of aspiration.     Progressing/ Not Met 10/7/2019   -Thin liquids x25    Pt with consistent throat clearing throughout session     Patient Education/Response:   Discussed safe swallow strategies, aspiration precautions, and HEP. Discussed using hard swallow vs. throat clear when he feels sensation that he needs to clear his throat. Pt verbalized understanding of all discussed.     Written Home Exercises Provided: Yes. Laryngeal elevation/excursion exercises, tongue base retraction  exercises, and CTAR exercises. Pt instructed to repeat multisyllabic words 3x each at a fast rate (30 times) to improve speed and coordination of articulators. Pt instructed to complete OME 3x a day. Pt instructed to read sentences aloud (provided) using motor speech strategies.   Exercises were reviewed and Jae was able to demonstrate them prior to the end of the session.  Jae demonstrated good  understanding of the education provided.     Assessment:   Jae is progressing well towards his goals. Pt with extensive questions today and required education regarding swallowing function and exercises, SLP provided education. Pt able to complete dysphagia exercises given mod cues. Current goals remain appropriate. Goals to be updated as necessary.     Pt prognosis is Fair. Pt will continue to benefit from skilled outpatient speech and language therapy to address the deficits listed in the problem list on initial evaluation, provide pt/family education and to maximize pt's level of independence in the home and community environment.     Medical necessity is demonstrated by the following IMPAIRMENTS:  Reduced speech intelligibility limits functional communication with both familiar and unfamiliar communication partners.     Barriers to Therapy: none identified   Pt's spiritual, cultural and educational needs considered and pt agreeable to plan of care and goals.  Plan:   Continue POC with focus on speech intelligibility and swallow function    LINCOLN Shirley, CF-SLP  Speech Language Pathologist   10/7/2019

## 2019-10-07 NOTE — PROGRESS NOTES
"  Occupational Therapy Daily Treatment Note     Date: 10/7/2019  Name: Jae Millan  Clinic Number: [id number]    Therapy Diagnosis:   Encounter Diagnoses   Name Primary?    Self-care deficit in dressing     Self-care deficit for bathing     Range of motion deficit     Swelling of left hand     Decreased strength of upper extremity      Physician: Maryjane Farias MD       Physician Orders: Eval and treat  Medical Diagnosis: Cerebrovascular accident (CVA) due to thrombosis of anterior cerebral artery, unspecified blood vessel laterality  Evaluation Date: 8/8/2019  Plan of Care Expiration Period: 8/8/19 to 10/31/19  Insurance Authorization period Expiration: 12/31/19  Date of Return to MD: DALIA  FOTO: 9/18/2019  47% Mobility  8/26/2019: 47% limited in Mobility    Visit # / Visits authorized: 15 / 20  Time In: 2:30pm  Time Out: 3:15pm  Total Billable Time: 45 minutes    Precautions:  Standard, Diabetes and HTN      Subjective     Pt reports: "The exercises have been going well"  he was compliant with home exercise program given this session.   Response to previous treatment: positive  Functional change: ongoing    Pain:  4/10   Location: L shoulder    Objective      Jae participated in unattended estim for 15 minutes:  - [place] 10/10 L long wrist extensors      Jae received neuro re-ed activities for 30 minutes to improve to improve: tone normalization, Balance, Coordination, Kinesthetic, Proprioception, Posture and awareness of L UE :  - Supine PROM of L GH joint: FF/Abd/ER/IR  - General wrist stretches including               1. Scaphoid on radius               2. Increasing mobility of metacarpals               3. Carpal rolls               4. Increasing mobility towards radial deviation               5. Increasing mobility of wrist towards extension              6. Increasing mobility of wrist towards supination/pronation  - Side lying scap PROM in all planes  - Seated thoracic stretch for improved " seated posture and scapular alignment   - AAROM of scapula elevation, retraction, protraction, depression    Home Exercises and Education Provided     Education provided:   - HEP review  - Progress towards goals     Written Home Exercises Provided: yes.  Exercises were reviewed and Jae was able to demonstrate them prior to the end of the session.  Jae demonstrated good  understanding of the HEP provided.   .   See EMR under Patient Instructions for exercises provided 8/12/2019.        Assessment     Jae had good tolerance to treatment this date. Post postural stretches, Jae had improved seated posture and scapular alignment which allowed for increased AAROM in FF.  He cont's with ROM and strength deficits limiting his full participation in ADL/IADL tasks.     Jae is progressing well towards his goals and there are no updates to goals at this time. Pt prognosis is Fair.     Pt will continue to benefit from skilled outpatient occupational therapy to address the deficits listed in the problem list on initial evaluation provide pt/family education and to maximize pt's level of independence in the home and community environment.     Anticipated barriers to occupational therapy: timeframe of current condition    Pt's spiritual, cultural and educational needs considered and pt agreeable to plan of care and goals.    Goals:  Short Term Goals: 6 weeks    Independent in HEP for self ROM left arm MET 8/19/2019   Pt to consistently elevate left hand to assist with edema when seated MET 8/19/2019   Pt will perform simple meal for self 3 days a week. MET 8/19/2019     LTG GOALS:  Time frame: 12 weeks  LB dressing will improve to by 10 minutes completion MET 8/26/2019  Pt will improve his ability to cut using adaptive device,   Bathing will improve by completion in 20 minutes     Simple meal prep will be performed 5 x week    Simple home care will improve to one task 4 x week(sweep, mop, dust, clean sinks)    Plan    Cont. With established POC  Updates/Grading for next session:  Progress as tolerated      IRLANDA Galicia

## 2019-10-08 NOTE — PROGRESS NOTES
Physical Therapy Daily Treatment Note     Name: Jae Millan  [facility] Number: [id number]    Therapy Diagnosis:   Encounter Diagnoses   Name Primary?    Hemiparesis affecting left side as late effect of cerebrovascular accident (CVA)     Decreased sensation of lower extremity     Impaired functional mobility, balance, gait, and endurance Yes     Physician: Maryjane Farias MD    Visit Date: 10/9/2019    Physician Orders: PT Eval, all treatment after initial eval requires auth  Medical Diagnosis from Referral: Cerebrovascular accident (CVA) due to thrombosis of anterior cerebral artery, unspecified blood vessel laterality  Evaluation Date: 6/6/2019  Authorization Period Expiration: 12/31/2019  NEW Plan of Care Expiration: 10/31/2019  Visit # / Visits authorized: 4/ 20 (total visits: 19) - KX modifier   POC due: 10/25/2019    Time In: 1200  Time Out: 1245  Total Billable Time: 40 minutes (pt takes bathroom break during session)    Precautions: Standard and Fall    Subjective     Pt reports: Overall doing well this morning, no pain. Feeling a bit tired.    He was compliant with HEP, reports walking for LE strengthening.    Response to previous treatment: no adverse effects to report  Functional change: ongoing    Pain: 0/10  Location: n/a    Objective       Jae received therapeutic exercises to develop strength, endurance, posture and core stabilization for  10 minutes including:    Upright bike L2 x 8 min, L foot strapped to pedal    Jae received neuromuscular reeducation to develop balance, proprioception, and kinesthetic sense for 30 minutes including:   2 x 30 sec tandem, SBA  2 x 30 sec feet together on foam, eyes closed, SBA  x20 alternating taps onto medium cone while standing on foam, CGA  3 x 30 seconds modified SLS with opposing foot on basket ball  Marching with 3 second hold, 3 laps with CGA, pt struggles when standing on L LE    Bosu:   2 x 30 seconds, static standing, CGA  X 30 seconds head  turns L/R, Yehuda  x30 seconds head turns up/down, Yehuda    Home Exercises Provided and Patient Education Provided     Education provided:   - continue with HEP    Written Home Exercises Provided: Patient instructed to cont prior HEP.   Exercises were reviewed and Jae was able to demonstrate them prior to the end of the session.  Jae demonstrated good  understanding of the education provided.     See EMR under Patient Instructions for exercises provided 6/13/2019.    Assessment   Pt tolerated session well. He continues to struggle with single leg stance/balance. Pt tolerates use of Bosu well today. He requires more rest breaks today, though this was his third therapy in a row. He remains appropriate for skilled PT services in order to improve mobility and increase independence.     Pt has verbalized his transportation issues and has stated he plans to schedule his transportation services a half an hour earlier than usual, which will help him be on time for remainder of appointments to continue progressing with therapy.   Jae is progressing well towards his goals.   Pt prognosis is Good.     Pt will continue to benefit from skilled outpatient physical therapy to address the deficits listed in the problem list box on initial evaluation, provide pt/family education and to maximize pt's level of independence in the home and community environment.     Pt's spiritual, cultural and educational needs considered and pt agreeable to plan of care and goals.     Anticipated barriers to physical therapy: none    Goals: 8/28/19  Short Term Goals: 5 weeks   1. Pt will be able to complete 10 sit to  30 seconds for increased mobility. MET 8/5/2019  2. Pt will be able to complete TUG in less than 18 seconds in order to decrease his risk for fall. MET 7/5/19- 11.08 sec with small based quad cane, 13.55 sec w/o AD  3. Pt will be compliant with HEP and medicine management. Ongoing, semicompliant  4. Pt will be able to  ambulate >300' with/without AD and mod I. MET 7/5/19- pt can ambulate at least 300 ft with small based quad cane      Long Term Goals: 10 weeks   1. Pt will complete TUG in less than 15 seconds in order to decrease his risk for fall. MET 7/5/2019  2. Pt will be able to stand on B LE in SLS for >/= 5 seconds in order to decrease his risk for fall. Ongoing  3. Pt will be able to ascend/decend 5 stairs with 1 hand rail and supervision without cues for safety. MET 8/5/2019  4. Pt will tolerate standing activities for >10 minutes in order to increase his endurance. MET 7/5/2019  5. NEW GOAL: Pt will improve score on FGA to >/= 22 in order to decrease his risk for fall. Ongoing, improved (17/30)  6. NEW GOAL (8/28/2019): Pt will improve his SSWS to .95 without AD in order to normalize gait compared to peers of his age and gender.    Plan     Continue to advance balance and single leg stance challenges as appropriate, continue with use of resistance machines for strengthening    cO Schroeder, PT  10/9/2019

## 2019-10-08 NOTE — TELEPHONE ENCOUNTER
Documentation Only:  Faxed prior authorization for Epclus[employer] to insurance company for review on 10.08.2019 as URGENT AKF

## 2019-10-09 ENCOUNTER — CLINICAL SUPPORT (OUTPATIENT)
Dept: REHABILITATION | Facility: HOSPITAL | Age: 69
End: 2019-10-09
Attending: INTERNAL MEDICINE
Payer: MEDICARE

## 2019-10-09 DIAGNOSIS — R46.0 SELF-CARE DEFICIT FOR BATHING: ICD-10-CM

## 2019-10-09 DIAGNOSIS — M79.89 SWELLING OF LEFT HAND: ICD-10-CM

## 2019-10-09 DIAGNOSIS — I69.354 HEMIPARESIS AFFECTING LEFT SIDE AS LATE EFFECT OF CEREBROVASCULAR ACCIDENT (CVA): ICD-10-CM

## 2019-10-09 DIAGNOSIS — R47.1 DYSARTHRIA: ICD-10-CM

## 2019-10-09 DIAGNOSIS — M25.60 RANGE OF MOTION DEFICIT: ICD-10-CM

## 2019-10-09 DIAGNOSIS — Z74.1 SELF-CARE DEFICIT IN DRESSING: ICD-10-CM

## 2019-10-09 DIAGNOSIS — R20.8 DECREASED SENSATION OF LOWER EXTREMITY: ICD-10-CM

## 2019-10-09 DIAGNOSIS — Z74.09 IMPAIRED FUNCTIONAL MOBILITY, BALANCE, GAIT, AND ENDURANCE: Primary | ICD-10-CM

## 2019-10-09 DIAGNOSIS — R29.898 DECREASED STRENGTH OF UPPER EXTREMITY: ICD-10-CM

## 2019-10-09 PROCEDURE — 97014 ELECTRIC STIMULATION THERAPY: CPT | Mod: PN,59

## 2019-10-09 PROCEDURE — 92507 TX SP LANG VOICE COMM INDIV: CPT | Mod: PN | Performed by: SPEECH-LANGUAGE PATHOLOGIST

## 2019-10-09 PROCEDURE — 97110 THERAPEUTIC EXERCISES: CPT | Mod: PN

## 2019-10-09 PROCEDURE — 97112 NEUROMUSCULAR REEDUCATION: CPT | Mod: PN,59

## 2019-10-09 PROCEDURE — 97110 THERAPEUTIC EXERCISES: CPT | Mod: KX,PN,59

## 2019-10-09 PROCEDURE — 92526 ORAL FUNCTION THERAPY: CPT | Mod: PN | Performed by: SPEECH-LANGUAGE PATHOLOGIST

## 2019-10-09 PROCEDURE — 97112 NEUROMUSCULAR REEDUCATION: CPT | Mod: KX,PN,59

## 2019-10-09 NOTE — TELEPHONE ENCOUNTER
pa denied -- patient must try and fail Abdiel -- forwarding to clinical pharmacist for review -- AKF

## 2019-10-09 NOTE — PROGRESS NOTES
"  Occupational Therapy Daily Treatment Note     Date: 10/9/2019  Name: Jae Millan  Clinic Number: [id number]    Therapy Diagnosis:   Encounter Diagnoses   Name Primary?    Self-care deficit in dressing     Self-care deficit for bathing     Range of motion deficit     Swelling of left hand     Decreased strength of upper extremity      Physician: Maryjane Farias MD       Physician Orders: Eval and treat  Medical Diagnosis: Cerebrovascular accident (CVA) due to thrombosis of anterior cerebral artery, unspecified blood vessel laterality  Evaluation Date: 8/8/2019  Plan of Care Expiration Period: 8/8/19 to 10/31/19  Insurance Authorization period Expiration: 12/31/19  Date of Return to MD: DALIA  FOTO: 9/18/2019  47% Mobility  8/26/2019: 47% limited in Mobility    Visit # / Visits authorized: 16 / 20  Time In: 10:30am  Time Out: 11:15am  Total Billable Time: 45 minutes    Precautions:  Standard, Diabetes and HTN      Subjective     Pt reports: "I did a lot of stretching so I am a little sore"  he was compliant with home exercise program given this session.   Response to previous treatment: positive  Functional change: ongoing    Pain:  3/10   Location: L shoulder    Objective        Jae received neuro re-ed activities for 15 minutes to improve to improve: tone normalization, Balance, Coordination, Kinesthetic, Proprioception, Posture and awareness of L UE :  - Supine PROM of L GH joint: FF/Abd/ER/IR  - General wrist stretches including               1. Scaphoid on radius               2. Increasing mobility of metacarpals               3. Carpal rolls               4. Increasing mobility towards radial deviation               5. Increasing mobility of wrist towards extension              6. Increasing mobility of wrist towards supination/pronation  - Side lying scap PROM in all planes      Jae participated in therapeutic exercises for 15 minutes  - AA Supine FF with dowel 2x25 with L hand secured, rest " breaks needed  - AA Supine chest press with dowel 2x25 with L hand secured, rest breaks needed    Jae received unattended ESTIM for 15 minutes  - Mal[place]ian 10/10 L long wrist extensors with verbal cue to activate wrist extension when he feels the stimulus     Home Exercises and Education Provided     Education provided:   - HEP review  - Progress towards goals     Written Home Exercises Provided: yes.  Exercises were reviewed and Jae was able to demonstrate them prior to the end of the session.  Jae demonstrated good  understanding of the HEP provided.   .   See EMR under Patient Instructions for exercises provided 8/12/2019.        Assessment       Jae had good tolerance to treatment this date. AA shoulder strengthening went well and Pt. Reports increased activation of his L UE. He was educate don the importance of using his L UE as often as he can with simple home care tasks.  He cont's with ROM and strength deficits limiting his full participation in ADL/IADL tasks.     Jae is progressing well towards his goals and there are no updates to goals at this time. Pt prognosis is Fair.     Pt will continue to benefit from skilled outpatient occupational therapy to address the deficits listed in the problem list on initial evaluation provide pt/family education and to maximize pt's level of independence in the home and community environment.     Anticipated barriers to occupational therapy: timeframe of current condition    Pt's spiritual, cultural and educational needs considered and pt agreeable to plan of care and goals.    Goals:  Short Term Goals: 6 weeks    Independent in HEP for self ROM left arm MET 8/19/2019   Pt to consistently elevate left hand to assist with edema when seated MET 8/19/2019   Pt will perform simple meal for self 3 days a week. MET 8/19/2019     LTG GOALS:  Time frame: 12 weeks  LB dressing will improve to 10 minutes completion MET 8/26/2019  Pt will improve his ability to cut  using L UE as a passive stablizer MET 10/9/2019    Bathing will improve by completion in 20 minutes     Simple meal prep will be performed 5 x week  MET 10/9/2019 (When sister is not home)  Simple home care will improve to one task 4 x week(sweep, mop, dust, clean sinks)    Plan   Cont. With established POC  Updates/Grading for next session:  Progress as tolerated      IRLANDA Galicia

## 2019-10-09 NOTE — PROGRESS NOTES
"Outpatient Neurological Rehabilitation   Speech and Language Therapy Daily Note  Date:  10/9/2019     Name: Jae Millan   MRN: [id number]   Therapy Diagnosis:   Encounter Diagnosis   Name Primary?    Dysarthria    Physician: Charis Salazar MD  Physician Orders: [id number] - Ambulatory consult to Speech Therapy  Medical Diagnosis: I69.354 (ICD-10-CM) - Hemiparesis affecting left side as late effect of cerebrovascular accident (CVA)  I63.329 (ICD-10-CM) - Cerebrovascular accident (CVA) due to thrombosis of anterior cerebral artery, unspecified blood vessel laterality  R47.1 (ICD-10-CM) - Dysarthria       Visit #/ Visits Authorized: 5/20  Date of Evaluation:  9/9/2019   Insurance Authorization Period: 09/09/2019-12/31/2019  Plan of Care Certification:    9/9/2019 to 11/4/19  Extended POC:  n/a  Progress Note: due 11/9/19   Visits Cancelled: 1  Visits No Show: 0    Time In:  11:15  Time Out: 12:00  Total Billable Time: 45 min    Precautions: Standard    Subjective:   Pt reports: Pt in pleasant spirits with no reported complaints. "I feel good today."  He was compliant to home exercise program.   Response to previous treatment: n/a  Pain Scale:  0/10 on VAS currently.   Pain Location: n/a  Objective:     UNTIMED  Procedure Min.   Speech- Language- Voice Therapy 20   Dysphagia Therapy 25   Total Timed Units: 0  Total Untimed Units: 2  Charges Billed/# of units: 2    Short Term Goals: (4 weeks) Current Progress:   1. Pt will rate his speech while reading as at least a 2 on a 3 point scale ( 1 = same as before beginning therapy, 2 =better but not best, 3 =best possible outcome) on  8 /10 trials.     Progressing/ Not Met 10/9/2019   Pt read sentences while utilizing motor speech strategies with 100% speech intelligibility (min artic imprecision noted), required min cues to speak overarticulate - Pt rated himself a 1 on 1/10 trials, a 2 on 8/10 trials, and a 3 on 1/10 trials.  METx2 Goal Met 10/9/19 / Discontinue      2. " "Pt will differentiate between his speech and error-free speech by giving specific examples of differences on  8 /10 trials.     Progressing/ Not Met 10/9/2019   Pt distinguished between imprecise speech and error-free speech on 9/10 trials.  METx1   3. Pt will identify a self-cue for use of therapeutic speech and use it 5x per session independently    Progressing/ Not Met 10/9/2019   Discussed using hard swallow as self cue.    4. Pt will use motor speech strategies and answer questions in conversation with a self-rating of at least 2 on a 3 point scale ( 1 = same as before beginning therapy, 2 =better but not best, 3 =best possible outcome) on  8 /10 trials.     Progressing/ Not Met 10/9/2019   Not formally addressed     5. Pt will recall 3/4 clear speech strategies independently     Goal Met 10/7/19/ Discontinue      6. Pt will participate in MBSS to objectively assess his swallow, rule out silent aspiration, and determine the safest possible diet.     Goal Met 10/3/19 / Discontinue   Impressions/Recs:     "Impressions:  Patient presents with mild oral and moderate pharyngeal Dysphagia. See above.   Recommendations/Precautions:    1. Regular diet  2. Thin liquids  3. Medications buried in puree or crushed   4. Aspiration precautions:   · NO STRAWS  · Very small sips of liquid one at a time OR head turn to the left with all thin liquid swallows  · Excellent and frequent oral care  · Upright for all po intake and remain upright for 30 minutes after intake  · One bite at a time at a slow rate (allow time to complete second swallow per bite)  · Continue to monitor for signs and symptoms of aspiration and discontinue oral feeding should you notice any of the following: watery eyes, reddened facial area, wet vocal quality, increased work of breathing, change in respiratory status, increased congestion, coughing, fever, etc.  5. Initiate Dysphagia therapy with Outpatient Speech Therapy"     7. Pt will complete oral motor " exercises x 40 each focusing on labial and lingual muscles given min A to improve articulation.     Progressing/ Not Met 10/9/2019  Not formally addressed      8.  Pt will participate in tongue base retraction exercises x 30 given min A to improve swallow function.      Progressing/ Not Met 10/9/2019  - Hard Effortfull Swallow x50   9. Pt will participate in laryngeal elevation exercises x 30 with min A to improve hyolaryngeal excursion.      Progressing/ Not Met 10/9/2019  -pitch glide /i/ high x30  -Mendelsohn x10 - pt unable to perform despite max tactile and verbal cues    10. Pt will complete chin tuck against resistance (CTAR) hold x60 seconds x3 with min A to improve hyolaryngeal elevation / excursion.    Progressing/ Not Met 10/9/2019  - CTAR hold for 60 seconds 3x    11. Pt will complete chin tuck against resistance (CTAR) repetitions x30 with min A to improve hyolaryngeal elevation / excursion.    Progressing/ Not Met 10/9/2019  -CTAR 45 repetitions    12. Pt will recall and utilize aspiration precautions and safe swallow strategies independently (no straws, medications crushed/burried in puree, small bites/sips, eat/drink slowly, head turn to the left, frequent oral care, 1 bite at a time, upright for all PO intake/remain upright for all PO intake)    Progressing/ Not Met 10/9/2019  Ongoing discussion of safe swallow strategies and aspiration precautions completed.   Pt observed to take small sips of thin liquids and perform a head turn to the left indly on approximately 90% of swallows   13. Pt will participate in po trials of regular consistencies and thin liquids with no overt signs/symptoms of aspiration.     Progressing/ Not Met 10/9/2019   -Thin liquids x45  Pt with inconsistent throat clearing throughout session, no coughing with PO trials     Pt refused pudding/applesauce/cracker     Patient Education/Response:   Provided skilled education re: safe swallow strategies, aspiration precautions, and  HEP. Pt verbalized understanding of all discussed.     Written Home Exercises Provided: Yes. Laryngeal elevation/excursion exercises, tongue base retraction exercises, and CTAR exercises. Pt instructed to repeat multisyllabic words 3x each at a fast rate (30 times) to improve speed and coordination of articulators. Pt instructed to complete OME 3x a day. Pt instructed to read sentences aloud (provided) using motor speech strategies.   Exercises were reviewed and Jae was able to demonstrate them prior to the end of the session.  Jae demonstrated good  understanding of the education provided.     Assessment:   Jae is progressing well towards his goals. Total of 45 PO trials (thin liquids) completed today, inconsistent coughing/throat clearing following PO trials. No additional overt s/s aspiration appreciated. Pt able to complete dysphagia exercises given min cues for technique. Speech intelligibility has not been addressed for 2 previous sessions in order to focus on pt's swallowing function though pt with good performance with motor speech tasks today. He continues to require min-mod cues to utilize clear speech strategies appropriately. Current goals remain appropriate. Goals to be updated as necessary.     Pt prognosis is Fair. Pt will continue to benefit from skilled outpatient speech and language therapy to address the deficits listed in the problem list on initial evaluation, provide pt/family education and to maximize pt's level of independence in the home and community environment.     Medical necessity is demonstrated by the following IMPAIRMENTS:  Reduced speech intelligibility limits functional communication with both familiar and unfamiliar communication partners.     Barriers to Therapy: none identified   Pt's spiritual, cultural and educational needs considered and pt agreeable to plan of care and goals.  Plan:   Continue POC with focus on speech intelligibility and swallow function    Tamera  LINCOLN Crum, CF-SLP  Speech Language Pathologist   10/9/2019

## 2019-10-15 ENCOUNTER — EPISODE CHANGES (OUTPATIENT)
Dept: HEPATOLOGY | Facility: CLINIC | Age: 69
End: 2019-10-15

## 2019-10-15 NOTE — PROGRESS NOTES
Physical Therapy Daily Treatment Note     Name: Jae Millan  [facility] Number: [id number]    Therapy Diagnosis:   Encounter Diagnoses   Name Primary?    Hemiparesis affecting left side as late effect of cerebrovascular accident (CVA)     Decreased sensation of lower extremity     Impaired functional mobility, balance, gait, and endurance Yes     Physician: Maryjane Farias MD    Visit Date: 10/16/2019    Physician Orders: PT Eval, all treatment after initial eval requires auth  Medical Diagnosis from Referral: Cerebrovascular accident (CVA) due to thrombosis of anterior cerebral artery, unspecified blood vessel laterality  Evaluation Date: 6/6/2019  Authorization Period Expiration: 12/31/2019  NEW Plan of Care Expiration: 10/31/2019  Visit # / Visits authorized: 5/ 20 (total visits: 20) - KX modifier   POC due: 10/25/2019    Time In: 945  Time Out: 1027  Total Billable Time: 42 minutes     Precautions: Standard and Fall    Subjective     Pt reports: Overall doing well this morning, slight pain in his shoulder.     He was compliant with HEP, reports walking for LE strengthening.    Response to previous treatment: no adverse effects to report  Functional change: ongoing    Pain: 2/10  Location: n/a    Objective       Jae received therapeutic exercises to develop strength, endurance, posture and core stabilization for  10 minutes including:    Upright bike L4 x 8 min, L foot strapped to pedal, L hand weight bearing through bike     Jae received neuromuscular reeducation to develop balance, proprioception, and kinesthetic sense for 30 minutes including:   2 x 30 sec tandem, SBA  x20 alternating taps onto large cone, SBA  x10 ea tapping large then med cone, CGA-Yehuda  3 x 30 seconds modified SLS with opposing foot on basket ball  2 laps of // bars marching with hold, Yehuda/CGA    Bosu:   2 x 30 seconds, static standing, CGA  X 30 seconds head turns L/R, Yehuda  x30 seconds head turns up/down, Yehuda    Home  Exercises Provided and Patient Education Provided     Education provided:   - continue with HEP    Written Home Exercises Provided: Patient instructed to cont prior HEP.   Exercises were reviewed and Jae was able to demonstrate them prior to the end of the session.  Jae demonstrated good  understanding of the education provided.     See EMR under Patient Instructions for exercises provided 6/13/2019.    Assessment   Pt tolerated session fair. He was easily distracted today with therapy gym busier than normal. Therapy session focused on balance and improving SLS. He requires cues to slow down when performing SLS on L LE due to weakness and balance deficits in this leg. Pt may benefit from treadmill training to increase repetition with ambulation to decrease dependence on truncal lean when ambulating without AD. He remains appropriate for skilled PT services.     Pt has verbalized his transportation issues and has stated he plans to schedule his transportation services a half an hour earlier than usual, which will help him be on time for remainder of appointments to continue progressing with therapy.   Jae is progressing well towards his goals.   Pt prognosis is Good.     Pt will continue to benefit from skilled outpatient physical therapy to address the deficits listed in the problem list box on initial evaluation, provide pt/family education and to maximize pt's level of independence in the home and community environment.     Pt's spiritual, cultural and educational needs considered and pt agreeable to plan of care and goals.     Anticipated barriers to physical therapy: none    Goals: 8/28/19  Short Term Goals: 5 weeks   1. Pt will be able to complete 10 sit to  30 seconds for increased mobility. MET 8/5/2019  2. Pt will be able to complete TUG in less than 18 seconds in order to decrease his risk for fall. MET 7/5/19- 11.08 sec with small based quad cane, 13.55 sec w/o AD  3. Pt will be compliant  with HEP and medicine management. Ongoing, semicompliant  4. Pt will be able to ambulate >300' with/without AD and mod I. MET 7/5/19- pt can ambulate at least 300 ft with small based quad cane      Long Term Goals: 10 weeks   1. Pt will complete TUG in less than 15 seconds in order to decrease his risk for fall. MET 7/5/2019  2. Pt will be able to stand on B LE in SLS for >/= 5 seconds in order to decrease his risk for fall. Ongoing  3. Pt will be able to ascend/decend 5 stairs with 1 hand rail and supervision without cues for safety. MET 8/5/2019  4. Pt will tolerate standing activities for >10 minutes in order to increase his endurance. MET 7/5/2019  5. NEW GOAL: Pt will improve score on FGA to >/= 22 in order to decrease his risk for fall. Ongoing, improved (17/30)  6. NEW GOAL (8/28/2019): Pt will improve his SSWS to .95 without AD in order to normalize gait compared to peers of his age and gender.    Plan     Continue to advance balance and single leg stance challenges as appropriate, continue with use of resistance machines for strengthening    Oc Schroeder, PT  10/16/2019

## 2019-10-16 ENCOUNTER — CLINICAL SUPPORT (OUTPATIENT)
Dept: REHABILITATION | Facility: HOSPITAL | Age: 69
End: 2019-10-16
Attending: INTERNAL MEDICINE
Payer: MEDICARE

## 2019-10-16 ENCOUNTER — PATIENT OUTREACH (OUTPATIENT)
Dept: ADMINISTRATIVE | Facility: OTHER | Age: 69
End: 2019-10-16

## 2019-10-16 DIAGNOSIS — I69.354 HEMIPARESIS AFFECTING LEFT SIDE AS LATE EFFECT OF CEREBROVASCULAR ACCIDENT (CVA): ICD-10-CM

## 2019-10-16 DIAGNOSIS — R20.8 DECREASED SENSATION OF LOWER EXTREMITY: ICD-10-CM

## 2019-10-16 DIAGNOSIS — M79.89 SWELLING OF LEFT HAND: ICD-10-CM

## 2019-10-16 DIAGNOSIS — M25.60 RANGE OF MOTION DEFICIT: ICD-10-CM

## 2019-10-16 DIAGNOSIS — R46.0 SELF-CARE DEFICIT FOR BATHING: ICD-10-CM

## 2019-10-16 DIAGNOSIS — R47.1 DYSARTHRIA: ICD-10-CM

## 2019-10-16 DIAGNOSIS — Z74.09 IMPAIRED FUNCTIONAL MOBILITY, BALANCE, GAIT, AND ENDURANCE: Primary | ICD-10-CM

## 2019-10-16 DIAGNOSIS — R29.898 DECREASED STRENGTH OF UPPER EXTREMITY: ICD-10-CM

## 2019-10-16 DIAGNOSIS — Z74.1 SELF-CARE DEFICIT IN DRESSING: ICD-10-CM

## 2019-10-16 PROCEDURE — 97112 NEUROMUSCULAR REEDUCATION: CPT | Mod: PN,59

## 2019-10-16 PROCEDURE — 92507 TX SP LANG VOICE COMM INDIV: CPT | Mod: PN | Performed by: SPEECH-LANGUAGE PATHOLOGIST

## 2019-10-16 PROCEDURE — 97110 THERAPEUTIC EXERCISES: CPT | Mod: PN

## 2019-10-16 PROCEDURE — 97110 THERAPEUTIC EXERCISES: CPT | Mod: PN,59

## 2019-10-16 PROCEDURE — 92526 ORAL FUNCTION THERAPY: CPT | Mod: PN | Performed by: SPEECH-LANGUAGE PATHOLOGIST

## 2019-10-16 PROCEDURE — 97014 ELECTRIC STIMULATION THERAPY: CPT | Mod: PN

## 2019-10-16 PROCEDURE — 97140 MANUAL THERAPY 1/> REGIONS: CPT | Mod: PN

## 2019-10-16 NOTE — PROGRESS NOTES
"  Occupational Therapy Daily Treatment Note     Date: 10/16/2019  Name: Jae Millan  Clinic Number: [id number]    Therapy Diagnosis:   Encounter Diagnoses   Name Primary?    Self-care deficit in dressing     Self-care deficit for bathing     Range of motion deficit     Swelling of left hand     Decreased strength of upper extremity      Physician: Maryjane Farias MD       Physician Orders: Eval and treat  Medical Diagnosis: Cerebrovascular accident (CVA) due to thrombosis of anterior cerebral artery, unspecified blood vessel laterality  Evaluation Date: 8/8/2019  Plan of Care Expiration Period: 8/8/19 to 10/31/19  Insurance Authorization period Expiration: 12/31/19  Date of Return to MD: DALIA  FOTO: 9/18/2019  47% Mobility  8/26/2019: 47% limited in Mobility    Visit # / Visits authorized: 17 / 20  Time In: 10:30am  Time Out: 11:15am  Total Billable Time: 45 minutes    Precautions:  Standard, Diabetes and HTN      Subjective     Pt reports: "My hand is looking much better. Less swelling"  he was compliant with home exercise program given this session.   Response to previous treatment: positive  Functional change: ongoing    Pain:  3/10   Location: L shoulder    Objective        Jae received manual therapy for 15 minutes   - Supine PROM of L GH joint: FF/Abd/ER/IR  - General wrist stretches including               1. Scaphoid on radius               2. Increasing mobility of metacarpals               3. Carpal rolls               4. Increasing mobility towards radial deviation               5. Increasing mobility of wrist towards extension              6. Increasing mobility of wrist towards supination/pronation  - Side lying scap PROM in all planes      Jae participated in therapeutic exercises for 15 minutes  - AA Supine FF with dowel 2x25 with L hand secured, rest breaks needed  - AA Supine chest press with dowel 2x25 with L hand secured, rest breaks needed    Jae participated in unattended " estim for 15 minutes to L GH joint for pain management and increased tolerance to manual therapy        Home Exercises and Education Provided     Education provided:   - HEP review  - Progress towards goals     Written Home Exercises Provided: yes.  Exercises were reviewed and Jae was able to demonstrate them prior to the end of the session.  Jae demonstrated good  understanding of the HEP provided.   .   See EMR under Patient Instructions for exercises provided 8/12/2019.        Assessment       Jae had good tolerance to treatment this date. Estim to L GH joint was give to allow for increased tolerance to manual therapy. Pt. Reported decreased pain with PROM post estim.  He cont's with ROM and strength deficits limiting his full participation in ADL/IADL tasks.     Jae is progressing well towards his goals and there are no updates to goals at this time. Pt prognosis is Fair.     Pt will continue to benefit from skilled outpatient occupational therapy to address the deficits listed in the problem list on initial evaluation provide pt/family education and to maximize pt's level of independence in the home and community environment.     Anticipated barriers to occupational therapy: timeframe of current condition    Pt's spiritual, cultural and educational needs considered and pt agreeable to plan of care and goals.    Goals:  Short Term Goals: 6 weeks    Independent in HEP for self ROM left arm MET 8/19/2019   Pt to consistently elevate left hand to assist with edema when seated MET 8/19/2019   Pt will perform simple meal for self 3 days a week. MET 8/19/2019     LTG GOALS:  Time frame: 12 weeks  LB dressing will improve to 10 minutes completion MET 8/26/2019  Pt will improve his ability to cut using L UE as a passive stablizer MET 10/9/2019    Bathing will improve by completion in 20 minutes     Simple meal prep will be performed 5 x week  MET 10/9/2019 (When sister is not home)  Simple home care will  improve to one task 4 x week(sweep, mop, dust, clean sinks)    Plan   Cont. With established POC  Updates/Grading for next session:  Progress as tolerated      IRLANDA Galicia

## 2019-10-16 NOTE — PROGRESS NOTES
"Outpatient Neurological Rehabilitation   Speech and Language Therapy Daily Note  Date:  10/16/2019     Name: Jae Millan   MRN: [id number]   Therapy Diagnosis:   Encounter Diagnosis   Name Primary?    Dysarthria    Physician: Charis Salazar MD  Physician Orders: [id number] - Ambulatory consult to Speech Therapy  Medical Diagnosis: I69.354 (ICD-10-CM) - Hemiparesis affecting left side as late effect of cerebrovascular accident (CVA)  I63.329 (ICD-10-CM) - Cerebrovascular accident (CVA) due to thrombosis of anterior cerebral artery, unspecified blood vessel laterality  R47.1 (ICD-10-CM) - Dysarthria       Visit #/ Visits Authorized: 7/20  Date of Evaluation:  9/9/2019   Insurance Authorization Period: 09/09/2019-12/31/2019  Plan of Care Certification:    9/9/2019 to 11/4/19  Extended POC:  n/a  Progress Note: due 11/9/19   Visits Cancelled: 1  Visits No Show: 0    Time In:  11:17  Time Out: 11:52  Total Billable Time: 35 min    Precautions: Standard    Subjective:   Pt reports: Pt in pleasant spirits with no reported complaints. No reported changes. Pt states his speech is improved and keeps getting better. Pt states "my face feels swollen today." Pt requested session end 10 minutes early to accommodate transportation services.   He was no compliant to home exercise program. "I've been cheating a little bit, I haven't been doing my exercises."  Response to previous treatment: n/a  Pain Scale:  0/10 on VAS currently.   Pain Location: n/a  Objective:     UNTIMED  Procedure Min.   Speech- Language- Voice Therapy 15   Dysphagia Therapy 20   Total Timed Units: 0  Total Untimed Units: 2  Charges Billed/# of units: 2    Short Term Goals: (4 weeks) Current Progress:   1. Pt will rate his speech while reading as at least a 2 on a 3 point scale ( 1 = same as before beginning therapy, 2 =better but not best, 3 =best possible outcome) on  8 /10 trials.   Goal Met 10/9/19 / Discontinue      2. Pt will differentiate between his " "speech and error-free speech by giving specific examples of differences on  8 /10 trials.  Goal Met 10/16/19 / Discontinue      3. Pt will identify a self-cue for use of therapeutic speech and use it 5x per session independently    Progressing/ Not Met 10/16/2019   Discussed using hard swallow as self cue.    4. Pt will use motor speech strategies and answer questions in conversation with a self-rating of at least 2 on a 3 point scale ( 1 = same as before beginning therapy, 2 =better but not best, 3 =best possible outcome) on  8 /10 trials.     Progressing/ Not Met 10/16/2019   Pt answered questions while utilizing motor speech strategies with 100% speech intelligibility, required min cues to overarticulate - Pt rated himself a 1 on 2/10 trials, a 2 on 4/10 trials, and a 3 on 4/10 trials.   5. Pt will recall 3/4 clear speech strategies independently    Goal Met 10/7/19/ Discontinue      6. Pt will participate in MBSS to objectively assess his swallow, rule out silent aspiration, and determine the safest possible diet.     Goal Met 10/3/19 / Discontinue   Impressions/Recs:     "Impressions:  Patient presents with mild oral and moderate pharyngeal Dysphagia. See above.   Recommendations/Precautions:    1. Regular diet  2. Thin liquids  3. Medications buried in puree or crushed   4. Aspiration precautions:   · NO STRAWS  · Very small sips of liquid one at a time OR head turn to the left with all thin liquid swallows  · Excellent and frequent oral care  · Upright for all po intake and remain upright for 30 minutes after intake  · One bite at a time at a slow rate (allow time to complete second swallow per bite)  · Continue to monitor for signs and symptoms of aspiration and discontinue oral feeding should you notice any of the following: watery eyes, reddened facial area, wet vocal quality, increased work of breathing, change in respiratory status, increased congestion, coughing, fever, etc.  5. Initiate Dysphagia " "therapy with Outpatient Speech Therapy"     7. Pt will complete oral motor exercises x 40 each focusing on labial and lingual muscles given min A to improve articulation.     Progressing/ Not Met 10/16/2019  Pt completes oral motor exercises independently 3x per day   8.  Pt will participate in tongue base retraction exercises x 30 given min A to improve swallow function.      Progressing/ Not Met 10/16/2019  - Hard Effortfull Swallow (thin liquids, puree, dry swallows) x60   9. Pt will participate in laryngeal elevation exercises x 30 with min A to improve hyolaryngeal excursion.      Progressing/ Not Met 10/16/2019  -pitch glide /i/ high x47  -Mendelsohn attempted x10 - pt unable to perform despite max tactile and verbal cues, pt requested this task be discontinued    10. Pt will complete chin tuck against resistance (CTAR) hold x60 seconds x3 with min A to improve hyolaryngeal elevation / excursion.    Progressing/ Not Met 10/16/2019  - CTAR hold for 60 seconds 3x    11. Pt will complete chin tuck against resistance (CTAR) repetitions x30 with min A to improve hyolaryngeal elevation / excursion.    Progressing/ Not Met 10/16/2019  - CTAR 45 repetitions    12. Pt will recall and utilize aspiration precautions and safe swallow strategies independently (no straws, medications crushed/burried in puree, small bites/sips, eat/drink slowly, head turn to the left, frequent oral care, 1 bite at a time, upright for all PO intake/remain upright for all PO intake)    Progressing/ Not Met 10/16/2019  Ongoing discussion of safe swallow strategies and aspiration precautions completed.   Pt observed to take small sips of thin liquids and perform a head turn to the left indly on 100% of swallows   13. Pt will participate in po trials of regular consistencies and thin liquids with no overt signs/symptoms of aspiration.     Progressing/ Not Met 10/16/2019   -Pt observed consuming thin liquids (approximately 8 ounces) throughout " session, pt with inconsistent throat clearing throughout session, no coughing with PO trials        Patient Education/Response:   Discussed motor speech strategies, safe swallow strategies, aspiration precautions, and importance of adherance to HEP. Pt verbalized understanding of all discussed.     Written Home Exercises Provided: Yes. Laryngeal elevation/excursion exercises, tongue base retraction exercises, and CTAR exercises. Pt instructed to repeat multisyllabic words 3x each at a fast rate (30 times) to improve speed and coordination of articulators. Pt instructed to complete OME 3x a day. Pt instructed to read sentences aloud (provided) using motor speech strategies.   Exercises were reviewed and Jae was able to demonstrate them prior to the end of the session.  Jae demonstrated good  understanding of the education provided.     Assessment:   Jae is progressing well towards his goals. No overt s/s aspiration appreciated throughout or post PO trials this session, pt observed with thin liquids and puree textures. Pt able to complete dysphagia exercises given min cues for technique. Good performance with motor speech tasks today, though pt stated he was not speaking well 2/2 left side of his face feeling swollen. Current goals remain appropriate. Goals to be updated as necessary.     Pt prognosis is Fair. Pt will continue to benefit from skilled outpatient speech and language therapy to address the deficits listed in the problem list on initial evaluation, provide pt/family education and to maximize pt's level of independence in the home and community environment.     Medical necessity is demonstrated by the following IMPAIRMENTS:  Reduced speech intelligibility limits functional communication with both familiar and unfamiliar communication partners.     Barriers to Therapy: none identified   Pt's spiritual, cultural and educational needs considered and pt agreeable to plan of care and goals.  Plan:    Continue POC with focus on speech intelligibility and swallow function    LINCOLN Shirley, CF-SLP  Speech Language Pathologist   10/16/2019

## 2019-10-18 DIAGNOSIS — H40.32X3 TRAUMATIC GLAUCOMA, LEFT, SEVERE STAGE: ICD-10-CM

## 2019-10-18 NOTE — TELEPHONE ENCOUNTER
----- Message from Melony Saavedra sent at 10/18/2019 12:40 PM CDT -----  Contact: self   Patient is calling about needing a handicap apartment. Patient states the apartment will be contacting the doctor's office. Please call and advise    Patient also states he has some questions about a Rx that needs to be filled and the patient was advised to contact his PCP.

## 2019-10-20 RX ORDER — DORZOLAMIDE HYDROCHLORIDE AND TIMOLOL MALEATE 20; 5 MG/ML; MG/ML
1 SOLUTION/ DROPS OPHTHALMIC 2 TIMES DAILY
Qty: 10 ML | Refills: 12 | Status: SHIPPED | OUTPATIENT
Start: 2019-10-20 | End: 2020-04-15 | Stop reason: SDUPTHER

## 2019-10-21 ENCOUNTER — OFFICE VISIT (OUTPATIENT)
Dept: UROLOGY | Facility: CLINIC | Age: 69
End: 2019-10-21
Payer: MEDICARE

## 2019-10-21 VITALS
HEIGHT: 69 IN | SYSTOLIC BLOOD PRESSURE: 162 MMHG | DIASTOLIC BLOOD PRESSURE: 86 MMHG | WEIGHT: 206 LBS | BODY MASS INDEX: 30.51 KG/M2 | HEART RATE: 77 BPM

## 2019-10-21 DIAGNOSIS — N52.9 ERECTILE DYSFUNCTION, UNSPECIFIED ERECTILE DYSFUNCTION TYPE: ICD-10-CM

## 2019-10-21 DIAGNOSIS — N50.819 ORCHIALGIA: Primary | ICD-10-CM

## 2019-10-21 LAB
BILIRUB SERPL-MCNC: ABNORMAL MG/DL
BLOOD URINE, POC: ABNORMAL
COLOR, POC UA: ABNORMAL
GLUCOSE UR QL STRIP: 250
KETONES UR QL STRIP: ABNORMAL
LEUKOCYTE ESTERASE URINE, POC: ABNORMAL
NITRITE, POC UA: ABNORMAL
PH, POC UA: 5
PROTEIN, POC: 500
SPECIFIC GRAVITY, POC UA: 1.02
UROBILINOGEN, POC UA: ABNORMAL

## 2019-10-21 PROCEDURE — 99999 PR PBB SHADOW E&M-EST. PATIENT-LVL IV: CPT | Mod: PBBFAC,,, | Performed by: UROLOGY

## 2019-10-21 PROCEDURE — 99999 PR PBB SHADOW E&M-EST. PATIENT-LVL IV: ICD-10-PCS | Mod: PBBFAC,,, | Performed by: UROLOGY

## 2019-10-21 PROCEDURE — 99204 OFFICE O/P NEW MOD 45 MIN: CPT | Mod: S$PBB,,, | Performed by: UROLOGY

## 2019-10-21 PROCEDURE — 81002 URINALYSIS NONAUTO W/O SCOPE: CPT | Mod: PBBFAC,PN | Performed by: UROLOGY

## 2019-10-21 PROCEDURE — 99204 PR OFFICE/OUTPT VISIT, NEW, LEVL IV, 45-59 MIN: ICD-10-PCS | Mod: S$PBB,,, | Performed by: UROLOGY

## 2019-10-21 PROCEDURE — 99214 OFFICE O/P EST MOD 30 MIN: CPT | Mod: PBBFAC,PN | Performed by: UROLOGY

## 2019-10-21 RX ORDER — TADALAFIL 20 MG/1
20 TABLET ORAL DAILY PRN
Qty: 12 TABLET | Refills: 11 | Status: SHIPPED | OUTPATIENT
Start: 2019-10-21 | End: 2019-10-24 | Stop reason: SDUPTHER

## 2019-10-21 NOTE — LETTER
October 21, 2019      Maryjane Farias MD  [address]  [facility] [address]           [facility]  [address], [facility] [address]  [facility] [address]  Phone: [phone]  Fax: [phone]          Patient: Jae Millan   MR Number: [id number]   YOB: 1950   Date of Visit: 10/21/2019       Dear Dr. Maryjane Farias:    Thank you for referring Jae Millan to me for evaluation. Attached you will find relevant portions of my assessment and plan of care.    If you have questions, please do not hesitate to call me. I look forward to following Jae Millan along with you.    Sincerely,    Loco Jaramillo MD    Enclosure  CC:  No Recipients    If you would like to receive this communication electronically, please contact [email or web address] or [phone] to request more information on [employer] access.    For providers and/or their staff who would like to refer a patient to [facility], please contact us through our one-stop-shop provider referral line, [facility], at [phone].    If you feel you have received this communication in error or would no longer like to receive these types of communications, please e-mail [email or web address]

## 2019-10-21 NOTE — PATIENT INSTRUCTIONS
Tadalafil tablets (Cialis)  What is this medicine?  TADALAFIL (roel DA la hector) is used to treat erection problems in men. It is also used for enlargement of the prostate gland in men, a condition called benign prostatic hyperplasia or BPH. This medicine improves urine flow and reduces BPH symptoms. This medicine can also treat both erection problems and BPH when they occur together.  How should I use this medicine?  Take this medicine by mouth with a glass of water. Follow the directions on the prescription label. You may take this medicine with or without meals. When this medicine is used for erection problems, your doctor may prescribe it to be taken once daily or as needed. If you are taking the medicine as needed, you may be able to have sexual activity 30 minutes after taking it and for up to 36 hours after taking it. Whether you are taking the medicine as needed or once daily, you should not take more than one dose per day. If you are taking this medicine for symptoms of benign prostatic hyperplasia (BPH) or to treat both BPH and an erection problem, take the dose once daily at about the same time each day. Do not take your medicine more often than directed.  Talk to your pediatrician regarding the use of this medicine in children. Special care may be needed.  What side effects may I notice from receiving this medicine?  Side effects that you should report to your doctor or health care professional as soon as possible:  · allergic reactions like skin rash, itching or hives, swelling of the face, lips, or tongue  · breathing problems  · changes in hearing  · changes in vision  · chest pain  · fast, irregular heartbeat  · prolonged or painful erection  · seizures  Side effects that usually do not require medical attention (report to your doctor or health care professional if they continue or are bothersome):  · back pain  · dizziness  · flushing  · headache  · indigestion  · muscle aches  · nausea  · stuffy or  runny nose  What may interact with this medicine?  Do not take this medicine with any of the following medications:  · nitrates like amyl nitrite, isosorbide dinitrate, isosorbide mononitrate, nitroglycerin  · other medicines for erectile dysfunction like avanafil, sildenafil, vardenafil  · other tadalafil products (Adcirca)  · riociguat  This medicine may also interact with the following medications:  · certain drugs for high blood pressure  · certain drugs for the treatment of HIV infection or AIDS  · certain drugs used for fungal or yeast infections, like fluconazole, itraconazole, ketoconazole, and voriconazole  · certain drugs used for seizures like carbamazepine, phenytoin, and phenobarbital  · grapefruit juice  · macrolide antibiotics like clarithromycin, erythromycin, troleandomycin  · medicines for prostate problems  · rifabutin, rifampin or rifapentine  What if I miss a dose?  If you are taking this medicine as needed for erection problems, this does not apply. If you miss a dose while taking this medicine once daily for an erection problem, benign prostatic hyperplasia, or both, take it as soon as you remember, but do not take more than one dose per day.  Where should I keep my medicine?  Keep out of the reach of children.  Store at room temperature between 15 and 30 degrees C (59 and 86 degrees F). Throw away any unused medicine after the expiration date.  What should I tell my health care provider before I take this medicine?  They need to know if you have any of these conditions:  · bleeding disorders  · eye or vision problems, including a rare inherited eye disease called retinitis pigmentosa  · anatomical deformation of the penis, Peyronie's disease, or history of priapism (painful and prolonged erection)  · heart disease, angina, a history of heart attack, irregular heart beats, or other heart problems  · high or low blood pressure  · history of blood diseases, like sickle cell anemia or  leukemia  · history of stomach bleeding  · kidney disease  · liver disease  · stroke  · an unusual or allergic reaction to tadalafil, other medicines, foods, dyes, or preservatives  · pregnant or trying to get pregnant  · breast-feeding  What should I watch for while using this medicine?  If you notice any changes in your vision while taking this drug, call your doctor or health care professional as soon as possible. Stop using this medicine and call your health care provider right away if you have a loss of sight in one or both eyes.  Contact your doctor or health care professional right away if the erection lasts longer than 4 hours or if it becomes painful. This may be a sign of serious problem and must be treated right away to prevent permanent damage.  If you experience symptoms of nausea, dizziness, chest pain or arm pain upon initiation of sexual activity after taking this medicine, you should refrain from further activity and call your doctor or health care professional as soon as possible.  Do not drink alcohol to excess (examples, 5 glasses of wine or 5 shots of whiskey) when taking this medicine. When taken in excess, alcohol can increase your chances of getting a headache or getting dizzy, increasing your heart rate or lowering your blood pressure.  Using this medicine does not protect you or your partner against HIV infection (the virus that causes AIDS) or other sexually transmitted diseases.  NOTE:This sheet is a summary. It may not cover all possible information. If you have questions about this medicine, talk to your doctor, pharmacist, or health care provider. Copyright© 2017 [employer]

## 2019-10-21 NOTE — PROGRESS NOTES
"Subjective:      Jae Millan is a 68 y.o. male who was self-referred for evaluation of left testicular pain.    [place]. During this time he has surgery for undescending right testicle. He has right testicular implant. He denies issues since the surgery.    He presents today with intermittent swelling around left testicle and groin. He reports that the swelling started over 2 years ago. Positional changes cause swelling . There is no skin break. Denies dysuria, hematuria, fever, chills, CVA tenderness.       No history of prostate cancer.   No history of kidney stones      Erectile Dysfunction  Patient complains of erectile dysfunction. Onset of dysfunction was 2 year ago and was gradual in onset.  Patient states the nature of difficulty is both attaining and maintaining erection. Full erections occur never. Partial erections occur with intercourse and with masturbation. Libido is affected. Risk factors for ED include cardiovascular disease, beta blocker use and antihypertensive medications, Patient denies history of cranial, spinal, or pelvic trauma and pelvic radiation. No previous treatment of ED per pt.      The following portions of the patient's history were reviewed and updated as appropriate: allergies, current medications, past family history, past medical history, past social history, past surgical history and problem list.    Review of Systems  Constitutional: no fever or chills  ENT: no nasal congestion or sore throat  Respiratory: no cough or shortness of breath  Cardiovascular: no chest pain or palpitations  Gastrointestinal: no nausea or vomiting, tolerating diet  Genitourinary: as per HPI  Hematologic/Lymphatic: no easy bruising or lymphadenopathy  Musculoskeletal: previous cva  Neurological: no seizures or tremors  Behavioral/Psych: no auditory or visual hallucinations     Objective:   Vitals: BP (!) 162/86   Pulse 77   Ht 5' 9" (1.753 m)   Wt 93.4 kg (206 " lb)   BMI 30.42 kg/m²     Physical Exam   General: alert and oriented, no acute distress  Head: normocephalic, atraumatic  Neck: supple, no lymphadenopathy, normal ROM, no masses  Respiratory: Symmetric expansion, non-labored breathing  Cardiovascular: regular rate and rhythm, nomal pulses, no peripheral edema  Abdomen: soft, non tender, non distended, no palpable masses, no hernias, no hepatomegaly or splenomegaly  Genitourinary:   Penis: normal, no lesions, patent orthotopic meatus, no plaques  Scrotum: no rashes or skin changes;   Testes: swelling: left, tender: left, surgically absent with testes prothesis: right, normal epididymides bilaterally, no hydroceles  Lymphatic: no inguinal nodes  Skin: normal coloration and turgor, no rashes, no suspicious skin lesions noted  Neuro: alert and oriented x3, no gross deficits  Psych: normal judgment and insight, normal mood/affect and non-anxious    Lab Review   Urinalysis demonstrates positive for leukocytes, red blood cells, glucose, protein (all trace)  Lab Results   Component Value Date    WBC 6.28 10/01/2019    HGB 13.7 (L) 10/01/2019    HCT 43.0 10/01/2019    MCV 84 10/01/2019     10/01/2019     Lab Results   Component Value Date    CREATININE 1.2 10/01/2019    BUN 15 10/01/2019     Lab Results   Component Value Date    PSA 0.09 04/12/2019     Imaging     Assessment:     1. Orchialgia    2. Erectile dysfunction, unspecified erectile dysfunction type        Plan:   --CT with oral contrast for suspected hernia  --F/U in 2-3 weeks; will refer to surgery for hernia repar if CT results confirm hernia  --Discussed conservative measures for relieving testicular pain - scrotal support, ibuprofen, scrotal elevation, ice packs    --Tracy of cialis     Prostate exam at next visit

## 2019-10-23 ENCOUNTER — CLINICAL SUPPORT (OUTPATIENT)
Dept: REHABILITATION | Facility: HOSPITAL | Age: 69
End: 2019-10-23
Attending: INTERNAL MEDICINE
Payer: MEDICARE

## 2019-10-23 DIAGNOSIS — Z74.1 SELF-CARE DEFICIT IN DRESSING: ICD-10-CM

## 2019-10-23 DIAGNOSIS — R47.1 DYSARTHRIA: ICD-10-CM

## 2019-10-23 DIAGNOSIS — Z74.09 IMPAIRED FUNCTIONAL MOBILITY, BALANCE, GAIT, AND ENDURANCE: Primary | ICD-10-CM

## 2019-10-23 DIAGNOSIS — R29.898 DECREASED STRENGTH OF UPPER EXTREMITY: ICD-10-CM

## 2019-10-23 DIAGNOSIS — M25.60 RANGE OF MOTION DEFICIT: ICD-10-CM

## 2019-10-23 DIAGNOSIS — R20.8 DECREASED SENSATION OF LOWER EXTREMITY: ICD-10-CM

## 2019-10-23 DIAGNOSIS — I69.354 HEMIPARESIS AFFECTING LEFT SIDE AS LATE EFFECT OF CEREBROVASCULAR ACCIDENT (CVA): ICD-10-CM

## 2019-10-23 DIAGNOSIS — R46.0 SELF-CARE DEFICIT FOR BATHING: ICD-10-CM

## 2019-10-23 DIAGNOSIS — M79.89 SWELLING OF LEFT HAND: ICD-10-CM

## 2019-10-23 PROCEDURE — 92526 ORAL FUNCTION THERAPY: CPT | Mod: PN | Performed by: SPEECH-LANGUAGE PATHOLOGIST

## 2019-10-23 PROCEDURE — 97140 MANUAL THERAPY 1/> REGIONS: CPT | Mod: PN

## 2019-10-23 PROCEDURE — 97112 NEUROMUSCULAR REEDUCATION: CPT | Mod: KX,PN,59

## 2019-10-23 PROCEDURE — 97110 THERAPEUTIC EXERCISES: CPT | Mod: KX,PN

## 2019-10-23 PROCEDURE — 97110 THERAPEUTIC EXERCISES: CPT | Mod: PN,59

## 2019-10-23 NOTE — PROGRESS NOTES
"  Occupational Therapy Daily Treatment Note     Date: 10/23/2019  Name: Jae Millan  Clinic Number: [id number]    Therapy Diagnosis:   Encounter Diagnoses   Name Primary?    Self-care deficit in dressing     Self-care deficit for bathing     Range of motion deficit     Swelling of left hand     Decreased strength of upper extremity      Physician: Maryjane Farias MD       Physician Orders: Eval and treat  Medical Diagnosis: Cerebrovascular accident (CVA) due to thrombosis of anterior cerebral artery, unspecified blood vessel laterality  Evaluation Date: 8/8/2019  Plan of Care Expiration Period: 8/8/19 to 10/31/19  Insurance Authorization period Expiration: 12/31/19  Date of Return to MD: DALIA  FOTO: 9/18/2019  47% Mobility  8/26/2019: 47% limited in Mobility    Visit # / Visits authorized: 18 / 25  Time In: 10:30am  Time Out: 11:15am  Total Billable Time: 45 minutes    Precautions:  Standard, Diabetes and HTN      Subjective     Pt reports: "Feeling 50%, not feeling great" post treatment "I feel a lot better. Feel loose and I can move a little better"  he was compliant with home exercise program given this session.   Response to previous treatment: positive  Functional change: ongoing    Pain:  3/10   Location: L shoulder    Objective        Jae received manual therapy for 15 minutes   - Supine PROM of L GH joint: FF/Abd/ER/IR  - General wrist stretches including               1. Scaphoid on radius               2. Increasing mobility of metacarpals               3. Carpal rolls               4. Increasing mobility towards radial deviation               5. Increasing mobility of wrist towards extension              6. Increasing mobility of wrist towards supination/pronation  - Side lying scap PROM in all planes      Jae participated in therapeutic exercises for 30 minutes  - UBE x5min forward x5min backwards res 4, Left hand secured set up and safe transfer facilitated by therapist   - AA Supine FF " with 4# dowel 2x25 with L hand secured, rest breaks needed  - AA Supine chest press with 4# dowel 2x25 with L hand secured, rest breaks needed      Home Exercises and Education Provided     Education provided:   - HEP review  - Progress towards goals     Written Home Exercises Provided: yes.  Exercises were reviewed and Jae was able to demonstrate them prior to the end of the session.  Jae demonstrated good  understanding of the HEP provided.   .   See EMR under Patient Instructions for exercises provided 8/12/2019.        Assessment       Jae had good tolerance to treatment again this date. His ROM is pain limited but slowly gets better with prolonged stretch. L UE is still flaccid and non-functional at this time. Focus on therapy has shifted to compensation strategies, prolonged stretch to prevent contracture, and restore PROM for increased ease during dressing. He continues with ROM and strength deficits limiting his full participation in ADL/IADL tasks.     Jae is progressing well towards his goals and there are no updates to goals at this time. Pt prognosis is Fair.     Pt will continue to benefit from skilled outpatient occupational therapy to address the deficits listed in the problem list on initial evaluation provide pt/family education and to maximize pt's level of independence in the home and community environment.     Anticipated barriers to occupational therapy: timeframe of current condition    Pt's spiritual, cultural and educational needs considered and pt agreeable to plan of care and goals.    Goals:  Short Term Goals: 6 weeks    Independent in HEP for self ROM left arm MET 8/19/2019   Pt to consistently elevate left hand to assist with edema when seated MET 8/19/2019   Pt will perform simple meal for self 3 days a week. MET 8/19/2019     LTG GOALS:  Time frame: 12 weeks  LB dressing will improve to 10 minutes completion MET 8/26/2019  Pt will improve his ability to cut using L UE as a  passive stablizer MET 10/9/2019    Bathing will improve by completion in 20 minutes     Simple meal prep will be performed 5 x week  MET 10/9/2019 (When sister is not home)  Simple home care will improve to one task 4 x week(sweep, mop, dust, clean sinks)    Plan   Cont. With established POC  Updates/Grading for next session:  Progress as tolerated      IRLANDA Galicia

## 2019-10-23 NOTE — PROGRESS NOTES
"Outpatient Neurological Rehabilitation   Speech and Language Therapy Daily Note  Date:  10/23/2019     Name: Jae Millan   MRN: [id number]   Therapy Diagnosis:   Encounter Diagnosis   Name Primary?    Dysarthria    Physician: Charis Salazar MD  Physician Orders: [id number] - Ambulatory consult to Speech Therapy  Medical Diagnosis: I69.354 (ICD-10-CM) - Hemiparesis affecting left side as late effect of cerebrovascular accident (CVA)  I63.329 (ICD-10-CM) - Cerebrovascular accident (CVA) due to thrombosis of anterior cerebral artery, unspecified blood vessel laterality  R47.1 (ICD-10-CM) - Dysarthria       Visit #/ Visits Authorized: 8/20  Date of Evaluation:  9/9/2019   Insurance Authorization Period: 09/09/2019-12/31/2019  Plan of Care Certification:    9/9/2019 to 11/4/19  Extended POC:  n/a  Progress Note: due 11/9/19   Visits Cancelled: 1  Visits No Show: 0    Time In:  11:14  Time Out: 11:43  Total Billable Time: 29 min    Precautions: Standard    Subjective:   Pt reports: Pt in pleasant spirits with no reported complaints. Pt reported that he recently talked to 3 people and they each commented on how good his speech was as compared to his speech prior to starting speech therapy. Pt states he has been noticing a reduction in coughing. Pt received a call from transportation with 15 minutes left in the session, pt requested to leave early to accommodate transportation as he typically must wait many hours for transportation services.  He was compliant to home exercise program. "I've been doing them"  Response to previous treatment: n/a  Pain Scale:  0/10 on VAS currently.   Pain Location: n/a  Objective:     UNTIMED  Procedure Min.   Speech- Language- Voice Therapy 0   Dysphagia Therapy 29   Total Timed Units: 0  Total Untimed Units: 1  Charges Billed/# of units: 1    Short Term Goals: (4 weeks) Current Progress:   1. Pt will rate his speech while reading as at least a 2 on a 3 point scale ( 1 = same as before " "beginning therapy, 2 =better but not best, 3 =best possible outcome) on  8 /10 trials.   Goal Met 10/9/19 / Discontinue      2. Pt will differentiate between his speech and error-free speech by giving specific examples of differences on  8 /10 trials.  Goal Met 10/16/19 / Discontinue      3. Pt will identify a self-cue for use of therapeutic speech and use it 5x per session independently    Progressing/ Not Met 10/23/2019   Not formally addressed     4. Pt will use motor speech strategies and answer questions in conversation with a self-rating of at least 2 on a 3 point scale ( 1 = same as before beginning therapy, 2 =better but not best, 3 =best possible outcome) on  8 /10 trials.     Progressing/ Not Met 10/23/2019   Not formally addressed     5. Pt will recall 3/4 clear speech strategies independently    Goal Met 10/7/19/ Discontinue      6. Pt will participate in MBSS to objectively assess his swallow, rule out silent aspiration, and determine the safest possible diet.     Goal Met 10/3/19 / Discontinue   Impressions/Recs:     "Impressions:  Patient presents with mild oral and moderate pharyngeal Dysphagia. See above.   Recommendations/Precautions:    1. Regular diet  2. Thin liquids  3. Medications buried in puree or crushed   4. Aspiration precautions:   · NO STRAWS  · Very small sips of liquid one at a time OR head turn to the left with all thin liquid swallows  · Excellent and frequent oral care  · Upright for all po intake and remain upright for 30 minutes after intake  · One bite at a time at a slow rate (allow time to complete second swallow per bite)  · Continue to monitor for signs and symptoms of aspiration and discontinue oral feeding should you notice any of the following: watery eyes, reddened facial area, wet vocal quality, increased work of breathing, change in respiratory status, increased congestion, coughing, fever, etc.  5. Initiate Dysphagia therapy with Outpatient Speech Therapy"     7. Pt " will complete oral motor exercises x 40 each focusing on labial and lingual muscles given min A to improve articulation.     Progressing/ Not Met 10/23/2019  Pt reports that he has been completing oral motor exercises independently 3x per day   8.  Pt will participate in tongue base retraction exercises x 30 given min A to improve swallow function.      Progressing/ Not Met 10/23/2019  - Hard Effortfull Swallow (incorporated thin liquids, applesauce, and dry swallows) x80   9. Pt will participate in laryngeal elevation exercises x 30 with min A to improve hyolaryngeal excursion.      Progressing/ Not Met 10/23/2019  -pitch glide /i/ high x50    -Mendelsohn x15 - pt able to complete given tactile cues   10. Pt will complete chin tuck against resistance (CTAR) hold x60 seconds x3 with min A to improve hyolaryngeal elevation / excursion.    Goal Met 10/23/19 / Ongoing - CTAR hold for 60 seconds 3x    11. Pt will complete chin tuck against resistance (CTAR) repetitions x30 with min A to improve hyolaryngeal elevation / excursion.    Goal Met 10/23/19 / Ongoing - CTAR 45 repetitions    12. Pt will recall and utilize aspiration precautions and safe swallow strategies independently (no straws, medications crushed/burried in puree, small bites/sips, eat/drink slowly, head turn to the left, frequent oral care, 1 bite at a time, upright for all PO intake/remain upright for all PO intake)    Goal Met 10/23/19 / Ongoing Ongoing discussion of safe swallow strategies and aspiration precautions completed.   Pt observed to take small sips of thin liquids and perform a head turn to the left indly on 95% of swallows   13. Pt will participate in po trials of regular consistencies and thin liquids with no overt signs/symptoms of aspiration.     Progressing/ Not Met 10/23/2019   -Pt observed consuming thin liquids (approximately 6 ounces) throughout session, pt with inconsistent throat clearing throughout session, pt with immediate  "defensive airway cough x1 (did not use head turn to left) throughout session        Patient Education/Response:   Discussed motor speech strategies, safe swallow strategies/aspiration precautions, and HEP. Pt verbalized understanding of all discussed.  Discussed sceduling, SLP offered pt 3 times to come next week as pt does not have another appointment scheduled for 2 more weeks and pt stated "I would rather keep it how it is."     Written Home Exercises Provided: Yes. Laryngeal elevation/excursion exercises, tongue base retraction exercises, and CTAR exercises. Pt instructed to repeat multisyllabic words 3x each at a fast rate (30 times) to improve speed and coordination of articulators. Pt instructed to complete OME 3x a day. Pt instructed to read sentences aloud (provided) using motor speech strategies.   Exercises were reviewed and Jae was able to demonstrate them prior to the end of the session.  Jae demonstrated good  understanding of the education provided.     Assessment:   Jae is progressing well towards his goals. Pt observed with thin liquids and puree textures, immediate cough noted x1 when patient did not perform a head turn to the left with thin liquids; otherwise, no additional overt s/s aspiration appreciated throughout session. Pt able to complete dysphagia exercises independently. Current goals remain appropriate. Goals to be updated as necessary.     Pt prognosis is Fair. Pt will continue to benefit from skilled outpatient speech and language therapy to address the deficits listed in the problem list on initial evaluation, provide pt/family education and to maximize pt's level of independence in the home and community environment.     Medical necessity is demonstrated by the following IMPAIRMENTS:  Reduced speech intelligibility limits functional communication with both familiar and unfamiliar communication partners. Pt's dysphagia puts him at risk for aspiration pneumonia.      Barriers " to Therapy: none identified   Pt's spiritual, cultural and educational needs considered and pt agreeable to plan of care and goals.  Plan:   Continue POC with focus on speech intelligibility and swallow function    LINCOLN Shirley, CF-SLP  Speech Language Pathologist   10/23/2019

## 2019-10-23 NOTE — PLAN OF CARE
Physical Therapy Daily Treatment Note     Name: Jae Millan  [facility] Number: [id number]    Therapy Diagnosis:   Encounter Diagnoses   Name Primary?    Hemiparesis affecting left side as late effect of cerebrovascular accident (CVA)     Decreased sensation of lower extremity     Impaired functional mobility, balance, gait, and endurance Yes     Physician: Maryjane Farias MD    Visit Date: 10/23/2019    Physician Orders: PT Eval, all treatment after initial eval requires auth  Medical Diagnosis from Referral: Cerebrovascular accident (CVA) due to thrombosis of anterior cerebral artery, unspecified blood vessel laterality  Evaluation Date: 6/6/2019  Authorization Period Expiration: 12/31/2019  NEW Plan of Care Expiration: 12/31/2019  Visit # / Visits authorized: 6/ 20 (total visits: 21) - KX modifier   POC due: 11/23/2019    Time In: 900  Time Out: 945  Total Billable Time: 45 minutes     Precautions: Standard and Fall    Subjective     Pt reports: Overall doing well this morning, stiffness in his L elbow. Feels as though his balance is better from starting but sharp turns and stopping quickly are still tough. He does not use the cane when ambulating indoors though feels like he needs it when walking outside.      He was compliant with HEP, reports walking for LE strengthening.    Response to previous treatment: no adverse effects to report  Functional change: ongoing    Pain: 2/10  Location: n/a    Objective       Jae received therapeutic exercises to develop strength, endurance, posture and core stabilization for  12 minutes including:    SciFit StepOne level 3.0 on hills sprint setting, 10 minutes  3 x 15 reps on Matrix knee extension with 15# applied    Jae received neuromuscular reeducation to develop balance, proprioception, and kinesthetic sense for 33 minutes including:       Evaluation 7/5/2019 8/5/2019 8/28/2019 9/25/2019 10/23/19   30 second Chair Rise 8 completed with no arms 9x no UE  10  with no UEs 11 without UE support 10 without UE supprot 12 without UE support   TUG 21.25 seconds 11.08 sec w/ SBQC  13.55 sec without AD 10.46 seconds with SBQC  12.5 seconds without AD 9.0 seconds with SBQC  11.6 seconds without AD 9.8 seconds with SBQC  10.1 seconds without AD 8.2 seconds without AD   FGA NT NT 14/30 (without AD) 17/30 (without AD) 20/30 (no AD) 21/30 (no AD)   SSWS NT NT NT .88 m/s with no AD (6m/6.8sec) 1.1 m/s with no AD (6m/5.7sec) 1.0 m/s with no AD (6m/5.9sec)   Fast Walking Speed NT NT NT NT NT 1.25 m/s  (6m/4.8 sec)      SLS R = 5 seconds (previous score = 8 seconds)  SLS L = 3 seconds (previous score = 1 seconds)    Functional Gait Assessment:   1. Gait on level surface =  2   (3) Normal: less than 5.5 sec, no A.D., no imbalance, normal gait pattern, deviates< 6in   (2) Mild impairment: 7-5.6 sec, uses A.D., mild gait deviations, or deviates 6-10 in   (1) Moderate impairment: > 7 sec, slow speed, imbalance, deviates 10-15 in.   (0) Severe impairment: needs assist, deviates >15 in, reach/touch wall  2. Change in Gait Speed = 3   (3) Normal: smooth change w/o loss of balance or gait deviation, deviates < 6 in, significant difference between speeds   (2) Mild impairment: changes speed, but demonstrates mild gait deviations, deviates 6-10 in, OR no deviations but unable to significantly speed, OR uses A.D.   (1) Moderate impairment: minor changes to speed, OR changes speed w/ significant deviations, deviates 10-15 in, OR  Changes speed , but loses balance & recovers   (0) Severe impairment: cannot change speed, deviates >15 in, or loses balance & needs assist  3. Gait with horizontal head turns  = 2   (3) Normal: no change in gait, deviates <6 in   (2) Mild impairment: slight change in speed, deviates 6-10 in, OR uses A.D.   (1) Moderate impairment: moderate change in speed, deviates 10-15 in   (0) Severe impairment: severe disruption of gait, deviates >15in  4. Gait with vertical head turns  = 2   (3) Normal: no change in gait, deviates <6 in   (2) Mild impairment: slight change in speed, deviates 6-10 in OR uses A.D.   (1) Moderate impairment: moderate change in speed, deviates 10-15 in   (0) Severe impairment: severe disruption of gait, deviates >15 in  5. Gait with pivot turns = 3   (3) Normal: performs safely in 3 sec, no LOB   (2) Mild impairment: performs in >3 sec & no LOB, OR turns safely & requires several steps to regain LOB   (1) Moderate impairment: turns slow, OR requires several small steps for balance following turn & stop   (0) Severe impairment: cannot turn safely, needs assist  6. Step over obstacle = 3   (3) Normal: steps over 2 stacked boxes w/o change in speed or LOB   (2) Mild impairment: able to step over 1 box w/o change in speed or LOB   (1) Moderate impairment: steps over 1 box but must slow down, may require VC   (0) Severe impairment: cannot perform w/o assist  7. Gait with Narrow NORA = 0   (3) Normal: 10 steps no staggering   (2) Mild impairment: 7-9 steps   (1) Moderate impairment: 4-7 steps   (0) Severe impairment: < 4 steps or cannot perform w/o assist  8. Gait with eyes closed = 2   (3) Normal: < 7 sec, no A.D., no LOB, normal gait pattern, deviates <6 in   (2) Mild impairment: 7.1-9 sec, mild gait deviations, deviates 6-10 in   (1) Moderate impairment: > 9 sec, abnormal pattern, LOB, deviates 10-15 in   (0) Severe impairment: cannot perform w/o assist, LOB, deviates >15in  9. Ambulating Backwards = 2   (3) Normal: no A.D., no LOB, normal gait pattern, deviates <6in   (2) Mild impairment: uses A.D., slower speed, mild gait deviations, deviates 6-10 in   (1) Moderate impairment: slow speed, abnormal gait pattern, LOB, deviates 10-15 in   (0) Severe impairment: severe gait deviations or LOB, deviates >15in  10. Steps = 2   (3) Normal: alternating feet, no rail   (2) Mild Impairment: alternating feet, uses rail   (1) Moderate impairment: step-to, uses rail   (0) Severe  impairment: cannot perform safely    Score 21/30 (previous score= 20/30)    Score:   <22/30 fall risk   <20/30 fall risk in older adults   <18/30 fall risk in Parkinsons     Home Exercises Provided and Patient Education Provided     Education provided:   - continue with HEP    Written Home Exercises Provided: Patient instructed to cont prior HEP.   Exercises were reviewed and Jae was able to demonstrate them prior to the end of the session.  Jae demonstrated good  understanding of the education provided.     See EMR under Patient Instructions for exercises provided 6/13/2019.    Assessment   Pt tolerated reassessment well today. He continues to make steady progress in balance, strength, and endurance. Pt's biggest limitations continue to lie in NBOS ambulation and SLS. He reports that he attempts ambulation without AD rarely outside of the home though he feels that his balance has improved since start of care. PT has addressed pt's goals and plan is to extend POC through 12/31/2019 in hopes of achieving remaining goals and addressing these final gait deviations and balance deficits.     Pt has verbalized his transportation issues and has stated he plans to schedule his transportation services a half an hour earlier than usual, which will help him be on time for remainder of appointments to continue progressing with therapy.   Jae is progressing well towards his goals.   Pt prognosis is Good.     Pt will continue to benefit from skilled outpatient physical therapy to address the deficits listed in the problem list box on initial evaluation, provide pt/family education and to maximize pt's level of independence in the home and community environment.     Pt's spiritual, cultural and educational needs considered and pt agreeable to plan of care and goals.     Anticipated barriers to physical therapy: none    Goals: 9/25/19  Short Term Goals: 5 weeks   1. Pt will be able to complete 10 sit to  30  seconds for increased mobility. MET 8/5/2019  2. Pt will be able to complete TUG in less than 18 seconds in order to decrease his risk for fall. MET 7/5/19- 11.08 sec with small based quad cane, 13.55 sec w/o AD  3. Pt will be compliant with HEP and medicine management. Ongoing, semicompliant  4. Pt will be able to ambulate >300' with/without AD and mod I. MET 7/5/19- pt can ambulate at least 300 ft with small based quad cane      Long Term Goals: 10 weeks   1. Pt will complete TUG in less than 15 seconds in order to decrease his risk for fall. MET 7/5/2019  2. Pt will be able to stand on B LE in SLS for >/= 5 seconds in order to decrease his risk for fall. Ongoing  3. Pt will be able to ascend/decend 5 stairs with 1 hand rail and supervision without cues for safety. MET 8/5/2019  4. Pt will tolerate standing activities for >10 minutes in order to increase his endurance. MET 7/5/2019  5. NEW GOAL: Pt will improve score on FGA to >/= 22 in order to decrease his risk for fall. Ongoing, improved (20/30)  6. NEW GOAL (8/28/2019): Pt will improve his SSWS to .95 without AD in order to normalize gait compared to peers of his age and gender. MET    Plan   NEW Plan of Care Expiration: 12/31/2019    Incorporate treadmill ambulation, increase SLS and NBOS ambulation performance. Strengthen glutes and quads to assist with better SLS.     Oc Schroeder, PT  10/23/2019

## 2019-10-24 ENCOUNTER — TELEPHONE (OUTPATIENT)
Dept: PHARMACY | Facility: CLINIC | Age: 69
End: 2019-10-24

## 2019-10-24 ENCOUNTER — TELEPHONE (OUTPATIENT)
Dept: UROLOGY | Facility: CLINIC | Age: 69
End: 2019-10-24

## 2019-10-24 DIAGNOSIS — N52.9 ERECTILE DYSFUNCTION, UNSPECIFIED ERECTILE DYSFUNCTION TYPE: ICD-10-CM

## 2019-10-24 RX ORDER — TADALAFIL 20 MG/1
20 TABLET ORAL DAILY PRN
Qty: 30 TABLET | Refills: 11 | Status: SHIPPED | OUTPATIENT
Start: 2019-10-24 | End: 2019-10-24

## 2019-10-24 RX ORDER — TADALAFIL 20 MG/1
20 TABLET ORAL DAILY PRN
Qty: 30 TABLET | Refills: 11 | Status: SHIPPED | OUTPATIENT
Start: 2019-10-24 | End: 2020-12-22

## 2019-10-24 NOTE — TELEPHONE ENCOUNTER
Returned call to Mr. Millan. He states that he called [facility] pharmacy and they do not have the RX for Cialis.  Will resubmit RX

## 2019-10-24 NOTE — TELEPHONE ENCOUNTER
----- Message from Aurelio Gomez sent at 10/23/2019  3:25 PM CDT -----  Mr Rowe would like to know if you can give him a call regarding a RX that was sent over to [facility].

## 2019-10-24 NOTE — TELEPHONE ENCOUNTER
Initial Epclusa consult completed on 10/24. Epclusa will be shipped on 10/28 to arrive at patient's home on 10/29 via [employer]. $5.05 copay. Patient will start Epclusa on 10/30. Address confirmed. Confirmed 2 patient identifiers - name and . Therapy Appropriate.     Epclusa 400/100mg- Take one tablet by mouth daily x 12 weeks  Counseling was reviewed:   1. Patient MUST take Epclusa at the SAME time every day.   2. Patient MUST avoid acid reducers without consulting with myself or provider first. Antacids are to be spaced out at least 4 hours apart from Epclusa.    3. Potential Side effects include: headaches and fatigue.   Headache: Patient may treat with OTC remedies. If Tylenol is used, dose should not exceed 2000mg per day.    4. Medication list reviewed. No DDIs or allergies noted. Patient MUST contact myself or provider prior to starting any new OTC, herbal, or prescription drugs to avoid potential DDIs.    DDI: Medication list reviewed and potential DDIs addressed.  Epclusa and Rosuvastatin - Coadministration of EPCLUSA with rosuvastatin may significantly increase the concentration of rosuvastatin, which is associated with increased risk of myopathy, including rhabdomyolysis. Rosuvastatin may be administered with EPCLUSA at a dose that does not exceed 10 mg. Provider switched patient to 10mg rosuvastatin while taking EPCLUSA. Shipping rosuvastatin with Epclusa. Reminded patient to take the 20mg and place it elsewhere, so that he does not take it while on the Epclusa. Very important that he does not take both 10mg and 20mg.     Discussed the importance of staying well hydrated while on therapy. Compliance stressed - patient to take missed doses as soon as remembered, but NOT to take 2 doses in one day. Patient will report questions or concerns to myself or practitioner. Patient verbalizes understanding. Patient plans to start Epclusa on 10/30.  Consultation included: indication; goals of treatment;  administration; storage and handling; side effects; how to handle side effects; the importance of compliance; how to handle missed doses; the importance of laboratory monitoring; the importance of keeping all follow up appointments.  Patient understands to report any medication changes to [facility] and provider. All questions answered and addressed to patients satisfaction. I will f/u with her in 1 week from start, [facility] to contact patient in 3 weeks for refills.

## 2019-10-24 NOTE — TELEPHONE ENCOUNTER
Spoke with  Monty about RX for Cialis that has been resubmitted to [facility] pharmacy. He will call the pharmacy to verify that the rx is ready and the cost.  We also reviewed his upcoming appts for f/u with me as well as physical and occupational therapy at the  so that he can arrange transportation.  He is aware that the price for Cialis at [facility] may be high; he will notify us if we need to resubmit to another pharmacy.

## 2019-10-31 ENCOUNTER — HOSPITAL ENCOUNTER (OUTPATIENT)
Dept: RADIOLOGY | Facility: HOSPITAL | Age: 69
Discharge: HOME | End: 2019-10-31
Attending: NURSE PRACTITIONER
Payer: MEDICARE

## 2019-10-31 DIAGNOSIS — N50.819 ORCHIALGIA: ICD-10-CM

## 2019-10-31 PROCEDURE — 25500020 PHARM REV CODE 255: Performed by: NURSE PRACTITIONER

## 2019-10-31 PROCEDURE — 74177 CT ABD & PELVIS W/CONTRAST: CPT | Mod: 26,,, | Performed by: RADIOLOGY

## 2019-10-31 PROCEDURE — 74177 CT ABDOMEN PELVIS WITH CONTRAST: ICD-10-PCS | Mod: 26,,, | Performed by: RADIOLOGY

## 2019-10-31 PROCEDURE — 74177 CT ABD & PELVIS W/CONTRAST: CPT | Mod: TC

## 2019-10-31 RX ADMIN — IOHEXOL 15 ML: 300 INJECTION, SOLUTION INTRAVENOUS at 09:10

## 2019-10-31 RX ADMIN — IOHEXOL 100 ML: 350 INJECTION, SOLUTION INTRAVENOUS at 10:10

## 2019-11-01 NOTE — PROGRESS NOTES
Physical Therapy Daily Treatment Note      Name: Jae Millan  Clinic Number: [id number]     Therapy Diagnosis:        Encounter Diagnoses   Name Primary?    Hemiparesis affecting left side as late effect of cerebrovascular accident (CVA)      Decreased sensation of lower extremity      Impaired functional mobility, balance, gait, and endurance Yes      Physician: Maryjane Farias MD     Visit Date: 10/23/2019     Physician Orders: PT Eval, all treatment after initial eval requires auth  Medical Diagnosis from Referral: Cerebrovascular accident (CVA) due to thrombosis of anterior cerebral artery, unspecified blood vessel laterality  Evaluation Date: 6/6/2019  Authorization Period Expiration: 12/31/2019  NEW Plan of Care Expiration: 12/31/2019  Visit # / Visits authorized: 7/ 20 (total visits: 22) - KX modifier              PN due: 11/23/2019     Time In: 932  Time Out: 1020  Total Billable Time: 45 minutes (pt takes phone call during session)     Precautions: Standard and Fall     Subjective      Pt reports: Overall doing well today. He is struggling with tasks outside of therapy, mostly housing.      He was compliant with HEP, reports walking for LE strengthening.     Response to previous treatment: no adverse effects to report  Functional change: ongoing     Pain: 1/10  Location: fingertips on L hand     Objective      Pt ambulates into and out of clinic with SBQC, using step through gait pattern. He demonstrates increased reliance on R LE for ambulation with antalgic gait.      Jae received therapeutic exercises to develop strength, endurance, posture and core stabilization for  15 minutes including:     SciFit StepOne level 4.0 on hills sprint setting, 10 minutes     Jae received neuromuscular reeducation to develop balance, proprioception, and kinesthetic sense for 30 minutes including:   3 x 30 SLS with opposing foot on ball, good balance  x10 L LE tap onto lg then med cone, fair balance  x10 R  "LE tap onto lg cone, fair balance  x5 R LE tap onto lg then med cone, fair- balance  x10 step over 6" step, stepping up with L LE and down with R LE lead  x10 side step over 6" step, fair balance      Home Exercises Provided and Patient Education Provided      Education provided:   - continue with HEP     Written Home Exercises Provided: Patient instructed to cont prior HEP.   Exercises were reviewed and Jae was able to demonstrate them prior to the end of the session.  Jae demonstrated good  understanding of the education provided.      See EMR under Patient Instructions for exercises provided 6/13/2019.     Assessment   Pt tolerated session well today. He was preoccupied with housing troubles, limiting his performance some in therapy. Pt tolerates SLS tasks fairly but continues to demonstrate decreased tolerance to SLS bilaterally. He remains appropriate for skilled PT services.      Pt has verbalized his transportation issues and has stated he plans to schedule his transportation services a half an hour earlier than usual, which will help him be on time for remainder of appointments to continue progressing with therapy.   Jae is progressing well towards his goals.   Pt prognosis is Good.      Pt will continue to benefit from skilled outpatient physical therapy to address the deficits listed in the problem list box on initial evaluation, provide pt/family education and to maximize pt's level of independence in the home and community environment.      Pt's spiritual, cultural and educational needs considered and pt agreeable to plan of care and goals.     Anticipated barriers to physical therapy: none     Goals: 10/23/19  Short Term Goals: 5 weeks   1. Pt will be able to complete 10 sit to  30 seconds for increased mobility. MET 8/5/2019  2. Pt will be able to complete TUG in less than 18 seconds in order to decrease his risk for fall. MET 7/5/19- 11.08 sec with small based quad cane, 13.55 sec w/o " AD  3. Pt will be compliant with HEP and medicine management. Ongoing, semicompliant  4. Pt will be able to ambulate >300' with/without AD and mod I. MET 7/5/19- pt can ambulate at least 300 ft with small based quad cane      Long Term Goals: 10 weeks   1. Pt will complete TUG in less than 15 seconds in order to decrease his risk for fall. MET 7/5/2019  2. Pt will be able to stand on B LE in SLS for >/= 5 seconds in order to decrease his risk for fall. Ongoing  3. Pt will be able to ascend/decend 5 stairs with 1 hand rail and supervision without cues for safety. MET 8/5/2019  4. Pt will tolerate standing activities for >10 minutes in order to increase his endurance. MET 7/5/2019  5. NEW GOAL: Pt will improve score on FGA to >/= 22 in order to decrease his risk for fall. Ongoing, improved (20/30)  6. NEW GOAL (8/28/2019): Pt will improve his SSWS to .95 without AD in order to normalize gait compared to peers of his age and gender. MET     Plan   NEW Plan of Care Expiration: 12/31/2019     Incorporate treadmill ambulation, increase SLS and NBOS ambulation performance. Strengthen glutes and quads to assist with better SLS.      Oc Schroeder, PT  10/23/2019

## 2019-11-04 ENCOUNTER — CLINICAL SUPPORT (OUTPATIENT)
Dept: REHABILITATION | Facility: HOSPITAL | Age: 69
End: 2019-11-04
Attending: INTERNAL MEDICINE
Payer: MEDICARE

## 2019-11-04 ENCOUNTER — EPISODE CHANGES (OUTPATIENT)
Dept: HEPATOLOGY | Facility: CLINIC | Age: 69
End: 2019-11-04

## 2019-11-04 ENCOUNTER — TELEPHONE (OUTPATIENT)
Dept: HEPATOLOGY | Facility: CLINIC | Age: 69
End: 2019-11-04

## 2019-11-04 DIAGNOSIS — M25.60 RANGE OF MOTION DEFICIT: ICD-10-CM

## 2019-11-04 DIAGNOSIS — M79.89 SWELLING OF LEFT HAND: ICD-10-CM

## 2019-11-04 DIAGNOSIS — R47.1 DYSARTHRIA: ICD-10-CM

## 2019-11-04 DIAGNOSIS — Z74.1 SELF-CARE DEFICIT IN DRESSING: ICD-10-CM

## 2019-11-04 DIAGNOSIS — Z74.09 IMPAIRED FUNCTIONAL MOBILITY, BALANCE, GAIT, AND ENDURANCE: Primary | ICD-10-CM

## 2019-11-04 DIAGNOSIS — R20.8 DECREASED SENSATION OF LOWER EXTREMITY: ICD-10-CM

## 2019-11-04 DIAGNOSIS — I69.354 HEMIPARESIS AFFECTING LEFT SIDE AS LATE EFFECT OF CEREBROVASCULAR ACCIDENT (CVA): ICD-10-CM

## 2019-11-04 DIAGNOSIS — R29.898 DECREASED STRENGTH OF UPPER EXTREMITY: ICD-10-CM

## 2019-11-04 DIAGNOSIS — R46.0 SELF-CARE DEFICIT FOR BATHING: ICD-10-CM

## 2019-11-04 DIAGNOSIS — B18.2 CHRONIC HEPATITIS C WITHOUT HEPATIC COMA: Primary | ICD-10-CM

## 2019-11-04 PROCEDURE — 92526 ORAL FUNCTION THERAPY: CPT | Mod: PN | Performed by: SPEECH-LANGUAGE PATHOLOGIST

## 2019-11-04 PROCEDURE — 97112 NEUROMUSCULAR REEDUCATION: CPT | Mod: KX,PN,59

## 2019-11-04 PROCEDURE — 97140 MANUAL THERAPY 1/> REGIONS: CPT | Mod: PN

## 2019-11-04 PROCEDURE — 97110 THERAPEUTIC EXERCISES: CPT | Mod: PN

## 2019-11-04 PROCEDURE — 97110 THERAPEUTIC EXERCISES: CPT | Mod: KX,PN

## 2019-11-04 NOTE — PROGRESS NOTES
"  Occupational Therapy Daily Treatment Note and Updated POC     Date: 11/4/2019  Name: Jae Millan  Clinic Number: [id number]    Therapy Diagnosis:   Encounter Diagnoses   Name Primary?    Self-care deficit in dressing     Self-care deficit for bathing     Range of motion deficit     Swelling of left hand     Decreased strength of upper extremity      Physician: Maryjane Farias MD       Physician Orders: Eval and treat  Medical Diagnosis: Cerebrovascular accident (CVA) due to thrombosis of anterior cerebral artery, unspecified blood vessel laterality  Evaluation Date: 8/8/2019  Plan of Care Expiration Period: 8/8/19 to 10/31/19 to 1/3/2020  Insurance Authorization period Expiration: 12/31/19  Date of Return to MD: DALIA  FOTO: 9/18/2019  47% Mobility  8/26/2019: 47% limited in Mobility    Visit # / Visits authorized: 19 / 25  Time In: 10:30am  Time Out: 11:15am  Total Billable Time: 45 minutes    Precautions:  Standard, Diabetes and HTN      Subjective     Pt reports: "Dealing with this housing thing and trying to play their game"  Pt was upset this date secondary to complications with his housing situation  he was compliant with home exercise program given this session.   Response to previous treatment: positive  Functional change: ongoing    Pain:  3/10   Location: L shoulder    Objective        Jae received manual therapy for 15 minutes   - Supine PROM of L GH joint: FF/Abd/ER/IR  - General wrist stretches including               1. Scaphoid on radius               2. Increasing mobility of metacarpals               3. Carpal rolls               4. Increasing mobility towards radial deviation               5. Increasing mobility of wrist towards extension              6. Increasing mobility of wrist towards supination/pronation  - Side lying scap PROM in all planes      Jae participated in therapeutic exercises for 30 minutes  - Supine FF with 5# dowel 2x25 L hand secured      Joint Evaluation  " AROM  8/8/2019 PROM   8/8/2019  AROM  11/4/2019 PROM  11/4/2019         Left Left Left   Left   Shoulder flex 0-180 0 100  55  165   Shoulder Abd 0-180 35 90  50  123   Shoulder ER 0-90 0 20  0  55   Shoulder IR 0-90 To belly n/t  WFL  WNL   Shoulder Extension 0-80 25 45  55  80   Shoulder Horizontal adduction 0-90 15 25  25  WFL   Elbow flex/ext 0-150 100/-40 130/-10  0-130  WNL   Wrist flex 0-80 0 20  30  55   Wrist ext 0-70 9 20  0  50   Supination 0-80 neutral 15  neutral  60   Pronation 0-80 Gravity assist full  WNL  full   UD 0 20  0  WNL   RD 0 5  0  20       Strength 8/8/2019 8/8/2019 11/4/2019   **within available ROM** Left Right Left   Shoulder flex 2/5 5/5 2/5   Shoulder abd 2+/5 5/5 2/5   Shoulder ER 0/5 5/5 1/5   Shoulder IR 3-/5 5/5 3-/5   Shoulder Extension 3-/5 5/5 3-/5   Shoulder Horizontal adduction 3-/5 5/5 3-/5   Elbow flex 3/5 5/5 4-/5   Elbow ext 2/5 5/5 4-/5   Wrist flex 0/5 5/5 1/5   Wrist ext 0/5 5/5 0/5   Supination 0/5 5/5 2/5   Pronation 0/5 5/5 2/5          Home Exercises and Education Provided     Education provided:   - HEP review  - Progress towards goals     Written Home Exercises Provided: yes.  Exercises were reviewed and Jae was able to demonstrate them prior to the end of the session.  Jae demonstrated good  understanding of the HEP provided.   .   See EMR under Patient Instructions for exercises provided 8/12/2019.        Assessment     Jae had good improvements in both AROM and PROM which has allowed him to use his L UE more in functional tasks. He will be moving into an apartment by himself soon which will require him to complete basic home care tasks by himself. He continues with ROM and strength deficits limiting his full participation in ADL/IADL tasks.     Jae is progressing well towards his goals and there are no updates to goals at this time. Pt prognosis is Fair.     Pt will continue to benefit from skilled outpatient occupational therapy to address the  deficits listed in the problem list on initial evaluation provide pt/family education and to maximize pt's level of independence in the home and community environment.     Anticipated barriers to occupational therapy: timeframe of current condition    Pt's spiritual, cultural and educational needs considered and pt agreeable to plan of care and goals.    Goals:  Short Term Goals: 6 weeks    Independent in HEP for self ROM left arm MET 8/19/2019   Pt to consistently elevate left hand to assist with edema when seated MET 8/19/2019   Pt will perform simple meal for self 3 days a week. MET 8/19/2019     LTG GOALS:  Time frame: 12 weeks  LB dressing will improve to 10 minutes completion MET 8/26/2019  Pt will improve his ability to cut using L UE as a passive stablizer MET 10/9/2019    Bathing will improve by completion in 20 minutes     Simple meal prep will be performed 5 x week  MET 10/9/2019 (When sister is not home)  Simple home care will improve to one task 4 x week(sweep, mop, dust, clean sinks)    Plan   Plan to see 2x week for 8 more weeks for increased functional independence at home (1/3/2020)  Updates/Grading for next session:  Progress as tolerated      IRLANDA Galicia

## 2019-11-04 NOTE — TELEPHONE ENCOUNTER
Pt beginning 12 weeks of Epclusa on 10/30  F2    Please schedule  - HCV RNA at week 6 - 12/10  - CMP and HCV RNA at SVR 12- 04/19    Thanks

## 2019-11-04 NOTE — PLAN OF CARE
Outpatient Therapy Updated Plan of Care     Visit Date: 11/4/2019  Name: Jae Millan  Clinic Number: [id number]    Therapy Diagnosis:   Encounter Diagnoses   Name Primary?    Self-care deficit in dressing     Self-care deficit for bathing     Range of motion deficit     Swelling of left hand     Decreased strength of upper extremity      Physician: Maryjane Farias MD    Physician Orders: OT Eval and Treat  Medical Diagnosis: Cerebrovascular accident (CVA) due to thrombosis of anterior cerebral artery, unspecified blood vessel laterality  Evaluation Date: 8/8/2019    Total Visits Received: 19  Cancelled Visits: 2  No Show Visits: 0    Current Certification Period:  8/8/2019 to 10/31/2019  Precautions:  Standard, Diabetes, HTN  Visits from Evaluation Date:  18  Functional Level Prior to Evaluation: Min- Mod A      Subjective     Update: PT. Reports improving hand edema and is trying to do more for himself at home    Objective     Update:   Joint Evaluation  AROM  8/8/2019 PROM   8/8/2019  AROM  11/4/2019 PROM  11/4/2019         Left Left Left   Left   Shoulder flex 0-180 0 100  55  165   Shoulder Abd 0-180 35 90  50  123   Shoulder ER 0-90 0 20  0  55   Shoulder IR 0-90 To belly n/t  WFL  WNL   Shoulder Extension 0-80 25 45  55  80   Shoulder Horizontal adduction 0-90 15 25  25  WFL   Elbow flex/ext 0-150 100/-40 130/-10  0-130  WNL   Wrist flex 0-80 0 20  30  55   Wrist ext 0-70 9 20  0  50   Supination 0-80 neutral 15  neutral  60   Pronation 0-80 Gravity assist full  WNL  full   UD 0 20  0  WNL   RD 0 5  0  20            Strength 8/8/2019 8/8/2019 11/4/2019   **within available ROM** Left Right Left   Shoulder flex 2/5 5/5 2/5   Shoulder abd 2+/5 5/5 2/5   Shoulder ER 0/5 5/5 1/5   Shoulder IR 3-/5 5/5 3-/5   Shoulder Extension 3-/5 5/5 3-/5   Shoulder Horizontal adduction 3-/5 5/5 3-/5   Elbow flex 3/5 5/5 4-/5   Elbow ext 2/5 5/5 4-/5   Wrist flex 0/5 5/5 1/5   Wrist ext 0/5 5/5 0/5   Supination 0/5  5/5 2/5   Pronation 0/5 5/5 2/5         Assessment     Update: Jae had good improvements in both AROM and PROM which has allowed him to use his L UE more in functional tasks. He will be moving into an apartment by himself soon which will require him to complete basic home care tasks by himself. He continues with ROM and strength deficits limiting his full participation in ADL/IADL tasks.        Previous Short Term Goals Status:   MET  New Short Term Goals Status:   Progressing towards  Long Term Goal Status:   continue per initial plan of care.  Reasons for Recertification of Therapy:   Increase functional independence     Plan     Updated Certification Period: 10/31/2019 to 1/3/59705  Recommended Treatment Plan: 2 times per week for 8 weeks: Electrical Stimulation L UE, Manual Therapy, Neuromuscular Re-ed, Paraffin, Patient Education, Self Care, Therapeutic Activites and Therapeutic Exercise  Other Recommendations: n/a    IRLANDA Galicia  11/4/2019      I CERTIFY THE NEED FOR THESE SERVICES FURNISHED UNDER THIS PLAN OF TREATMENT AND WHILE UNDER MY CARE    Physician's comments:        Physician's Signature: ___________________________________________________

## 2019-11-04 NOTE — PROGRESS NOTES
"Outpatient Neurological Rehabilitation   Speech and Language Therapy Daily Note  Date:  11/4/2019     Name: Jae Millan   MRN: [id number]   Therapy Diagnosis:   Encounter Diagnosis   Name Primary?    Dysarthria    Physician: Charis Salazar MD  Physician Orders: [id number] - Ambulatory consult to Speech Therapy  Medical Diagnosis: I69.354 (ICD-10-CM) - Hemiparesis affecting left side as late effect of cerebrovascular accident (CVA)  I63.329 (ICD-10-CM) - Cerebrovascular accident (CVA) due to thrombosis of anterior cerebral artery, unspecified blood vessel laterality  R47.1 (ICD-10-CM) - Dysarthria     Visit #/ Visits Authorized: 9/20  Date of Evaluation:  9/9/2019   Insurance Authorization Period: 09/09/2019-12/31/2019  Plan of Care Certification:    9/9/2019 -11/4/19   Extended POC:  1/4/20  Progress Note: due 11/9/19   Visits Cancelled: 2  Visits No Show: 0    Time In:  1:47  Time Out: 2:30  Total Billable Time: 42 min    Precautions: Standard    Subjective:   Pt reports: "I went to a conference this weekend in [place]."  Pt stated the following: "I think my speech is improving and its improving my confidence." Pt reported that he spoke with various people at the conference this past weekend, all who stated that they thought his speech was fine. Pt requesting to reduce frequency to 1x/week.   He was compliant to home exercise program. "I do them - it comes natural"  Response to previous treatment: n/a  Pain Scale:  0/10 on VAS currently.   Pain Location: n/a  Objective:     UNTIMED  Procedure Min.   Speech- Language- Voice Therapy 0   Dysphagia Therapy 42   Total Timed Units: 0  Total Untimed Units: 1   Charges Billed/# of units: 1    Short Term Goals: (4 weeks) Current Progress:   1. Pt will rate his speech while reading as at least a 2 on a 3 point scale ( 1 = same as before beginning therapy, 2 =better but not best, 3 =best possible outcome) on  8 /10 trials.   Goal Met 10/9/19 / Discontinue      2. Pt " "will differentiate between his speech and error-free speech by giving specific examples of differences on  8 /10 trials.  Goal Met 10/16/19 / Discontinue      3. Pt will identify a self-cue for use of therapeutic speech and use it 5x per session independently    Progressing/ Not Met 11/4/2019   Not formally addressed     4. Pt will use motor speech strategies and answer questions in conversation with a self-rating of at least 2 on a 3 point scale ( 1 = same as before beginning therapy, 2 =better but not best, 3 =best possible outcome) on  8 /10 trials.     Progressing/ Not Met 11/4/2019   Not formally addressed     5. Pt will recall 3/4 clear speech strategies independently    Goal Met 10/7/19/ Discontinue      6. Pt will participate in MBSS to objectively assess his swallow, rule out silent aspiration, and determine the safest possible diet.     Goal Met 10/3/19 / Discontinue   Impressions/Recs:     "Impressions:  Patient presents with mild oral and moderate pharyngeal Dysphagia. See above.   Recommendations/Precautions:    1. Regular diet  2. Thin liquids  3. Medications buried in puree or crushed   4. Aspiration precautions:   · NO STRAWS  · Very small sips of liquid one at a time OR head turn to the left with all thin liquid swallows  · Excellent and frequent oral care  · Upright for all po intake and remain upright for 30 minutes after intake  · One bite at a time at a slow rate (allow time to complete second swallow per bite)  · Continue to monitor for signs and symptoms of aspiration and discontinue oral feeding should you notice any of the following: watery eyes, reddened facial area, wet vocal quality, increased work of breathing, change in respiratory status, increased congestion, coughing, fever, etc.  5. Initiate Dysphagia therapy with Outpatient Speech Therapy"     7. Pt will complete oral motor exercises x 40 each focusing on labial and lingual muscles given min A to improve articulation.     Goal Met  " / Ongoing    Pt reports that he has been completing oral motor exercises 3x per day   8.  Pt will participate in tongue base retraction exercises x 30 given min A to improve swallow function.      Goal Met 11/5/19 / Ongoing  - Hard Effortfull Swallow (incorporated thin liquids, puree, and dry swallows) x65   9. Pt will participate in laryngeal elevation exercises x 30 with min A to improve hyolaryngeal excursion.      Goal Met 11/5/19 / Ongoing  -Mendelsohn x15 - pt able to complete given initial tactile cues   10. Pt will complete chin tuck against resistance (CTAR) hold x60 seconds x3 with min A to improve hyolaryngeal elevation / excursion.    Goal Met 10/23/19 / Ongoing - CTAR hold for 60 seconds 3x    11. Pt will complete chin tuck against resistance (CTAR) repetitions x30 with min A to improve hyolaryngeal elevation / excursion.    Goal Met 10/23/19 / Ongoing - CTAR 45 repetitions    12. Pt will recall and utilize aspiration precautions and safe swallow strategies independently (no straws, medications crushed/burried in puree, small bites/sips, eat/drink slowly, head turn to the left, frequent oral care, 1 bite at a time, upright for all PO intake/remain upright for all PO intake)    Goal Met 10/23/19 / Ongoing Ongoing discussion of safe swallow strategies and aspiration precautions completed. Pt observed to take small sips of thin liquids and perform a head turn to the left indly on 90% of observed swallows this session - pt observed with coughing when left head turn not performed    13. Pt will participate in po trials of regular consistencies and thin liquids with no overt signs/symptoms of aspiration.     Progressing/ Not Met 11/4/2019   -Pt observed consuming thin liquids (approximately 9 oz) and small bites of pudding throughout session, pt with inconsistent throat clearing throughout session and cough noted x6        Patient Education/Response:   Discussed safe swallow strategies, scheduling, patient's  progress and pt's HEP. Pt verbalized understanding of all discussed.      Written Home Exercises Provided: Yes. Laryngeal elevation/excursion exercises, tongue base retraction exercises, and CTAR exercises. Pt instructed to repeat multisyllabic words 3x each at a fast rate (30 times) to improve speed and coordination of articulators. Pt instructed to complete OME 3x a day. Pt instructed to read sentences aloud (provided) using motor speech strategies.   Exercises were reviewed and Jae was able to demonstrate them prior to the end of the session.  Jae demonstrated good  understanding of the education provided.     Assessment:   Jae is progressing well towards his goals. Pt observed with thin liquids and puree textures, patient with throat clearing and coughing throughout session with and without PO intake. Pt able to complete dysphagia exercises independently. Current goals remain appropriate. Goals to be updated as necessary. Pt prognosis is Fair. Pt will continue to benefit from skilled outpatient speech and language therapy to address the deficits listed in the problem list on initial evaluation, provide pt/family education and to maximize pt's level of independence in the home and community environment.     Medical necessity is demonstrated by the following IMPAIRMENTS:  Reduced speech intelligibility limits functional communication with both familiar and unfamiliar communication partners. Pt's dysphagia puts him at risk for aspiration pneumonia.      Barriers to Therapy: none identified   Pt's spiritual, cultural and educational needs considered and pt agreeable to plan of care and goals.  Plan:   Updated POC today. Continue to focus on improving swallow function    LINCOLN Shirley, CF-SLP  Speech Language Pathologist   11/4/2019

## 2019-11-04 NOTE — TELEPHONE ENCOUNTER
I spoke with patient.  Msg from BRIAN Bianchi relayed and mailed to him.  Labs scheduled; appt notices mailed.

## 2019-11-05 NOTE — PLAN OF CARE
Outpatient Neurological Rehabilitation Therapy  Updated POC     Date: 11/4/2019   Name: Jae Millan  Clinic Number: [id number]    Therapy Diagnosis:   Encounter Diagnosis   Name Primary?    Dysarthria      Physician: Charis Salazar MD   Physician Orders: [id number] - Ambulatory consult to Speech Therapy  Medical Diagnosis: I69.354 (ICD-10-CM) - Hemiparesis affecting left side as late effect of cerebrovascular accident (CVA)  I63.329 (ICD-10-CM) - Cerebrovascular accident (CVA) due to thrombosis of anterior cerebral artery, unspecified blood vessel laterality  R47.1 (ICD-10-CM) - Dysarthria    Visit #/ Visits Authorized: 9/20  Date of Evaluation:  9/9/2019  Insurance Authorization Period: 09/09/2019-12/31/2019  Plan of Care Certification:    9/9/2019 to 11/4/19   New POC Certification Period:  1/4/20  Cancelled Visits: 2  No Show Visits: 0    Total Visits Received: 9    Precautions:Standard     Subjective     Update: Pt states that he has significantly improved though his only concern regarding his speech skills is mild facial numbness that negatively affects his speech production skills per pt report. Pt continues to demonstrate s/s aspiration with thin liquids and puree textures though patient is able to complete dysphagia exercises independently.       Objective     Update: see follow up note dated 11/4/2019    Assessment     Update: Jae Millan presents to [facility] s/p medical diagnosis of  I69.354 (ICD-10-CM) - Hemiparesis affecting left side as late effect of cerebrovascular accident (CVA), I63.329 (ICD-10-CM) - Cerebrovascular accident (CVA) due to thrombosis of anterior cerebral artery, unspecified blood vessel laterality, R47.1 (ICD-10-CM) - Dysarthria. Demonstrates impairments including limitations as described in the problem list. Positive prognostic factors include pt motivation. Negative prognostic factors include none. He presents with mild dysarthria c/b imprecise  consonants and reduced speech intelligibility. He presents with mild oral c/b mild decreased lingual coordination resulting in moderate premature spillage of puree, dental soft, and solid trials pooling in the vallecula. He presents with moderate pharyngeal dysphagia c/b delayed pharyngeal swallow, decreased base of tongue retraction and mildly decreased hyolaryngeal elevation/excursion.  Barriers to therapy include transportation. Patient will benefit from skilled, outpatient neurological rehabilitation speech therapy.    RICH NOMS (National Outcome Measure System):  Motor Speech  LEVEL 6: The individual is successfully able to communicate intelligibly in most activities, but some limitations in intelligibility are still apparent in vocational, avocational, and social activities. The individual rarely requires minimal cueing to produce complex sentences/messages intelligibly. The individual usually uses compensatory strategies when encountering difficulty.     NOMS Swallowing  LEVEL 5: Swallowing is safe with minimal diet restriction and/or occasionally requires minimal cueing to use compensatory strategies. The individual may occasionally self-cue. All nutrition and hydration needs are met by mouth at mealtime     Rehab Potential: fair    Education: Plan of Care, role of SLP in care, aspiration precautions , motor speech strategies, scheduling/ cancellation policy and insurance limitations / visit limit  was discussed. Pt verbalized understanding of all discussed.     Previous Short Term Goals Status: 4 weeks   Current Progress:   1. Pt will rate his speech while reading as at least a 2 on a 3 point scale ( 1 = same as before beginning therapy, 2 =better but not best, 3 =best possible outcome) on  8 /10 trials.   Goal Met 10/9/19 / Discontinue    2. Pt will differentiate between his speech and error-free speech by giving specific examples of differences on  8 /10 trials.  Goal Met 10/16/19 / Discontinue      3. Pt  will identify a self-cue for use of therapeutic speech and use it 5x per session independently Goal Not Met / Discontinue      4. Pt will use motor speech strategies and answer questions in conversation with a self-rating of at least 2 on a 3 point scale ( 1 = same as before beginning therapy, 2 =better but not best, 3 =best possible outcome) on  8 /10 trials.  Goal Not Met / Continue     5. Pt will recall 3/4 clear speech strategies independently    Goal Met 10/7/19/ Discontinue      6. Pt will participate in MBSS to objectively assess his swallow, rule out silent aspiration, and determine the safest possible diet.   Goal Met 10/3/19 / Discontinue    7. Pt will complete oral motor exercises x 40 each focusing on labial and lingual muscles given min A to improve articulation.  Goal Met 11/4/19 / Discontinue      8.  Pt will participate in tongue base retraction exercises x 30 given min A to improve swallow function.   Goal Met 11/4/19 / Ongoing   9. Pt will participate in laryngeal elevation exercises x 30 with min A to improve hyolaryngeal excursion.   Goal Met 11/4/19 / Ongoing   10. Pt will complete chin tuck against resistance (CTAR) hold x60 seconds x3 with min A to improve hyolaryngeal elevation / excursion. Goal Met 11/4/19 / Ongoing   11. Pt will complete chin tuck against resistance (CTAR) repetitions x30 with min A to improve hyolaryngeal elevation / excursion. Goal Met 11/4/19 / Ongoing   12. Pt will recall and utilize aspiration precautions and safe swallow strategies independently (no straws, medications crushed/burried in puree, small bites/sips, eat/drink slowly, head turn to the left, frequent oral care, 1 bite at a time, upright for all PO intake/remain upright for all PO intake) Goal Met 10/23/19 / Ongoing   13. Pt will participate in po trials of regular consistencies and thin liquids with no overt signs/symptoms of aspiration.  Goal Not Met / Continue          New Short Term Goals: 4 weeks  Short  Term Goals: (4 weeks) Current Progress:   4. Pt will use motor speech strategies and answer questions in conversation with a self-rating of at least 2 on a 3 point scale ( 1 = same as before beginning therapy, 2 =better but not best, 3 =best possible outcome) on  8 /10 trials.  Goal Not Met / Continue     8.  Pt will participate in tongue base retraction exercises x 30 given min A to improve swallow function.   Goal Met 11/4/19 / Ongoing   9. Pt will participate in laryngeal elevation exercises x 30 with min A to improve hyolaryngeal excursion.   Goal Met 11/4/19 / Ongoing   10. Pt will complete chin tuck against resistance (CTAR) hold x60 seconds x3 with min A to improve hyolaryngeal elevation / excursion. Goal Met 11/4/19 / Ongoing   11. Pt will complete chin tuck against resistance (CTAR) repetitions x30 with min A to improve hyolaryngeal elevation / excursion. Goal Met 11/4/19 / Ongoing   12. Pt will recall and utilize aspiration precautions and safe swallow strategies independently (no straws, medications crushed/burried in puree, small bites/sips, eat/drink slowly, head turn to the left, frequent oral care, 1 bite at a time, upright for all PO intake/remain upright for all PO intake) Goal Met 10/23/19 / Ongoing   13. Pt will participate in po trials of regular consistencies and thin liquids with no overt signs/symptoms of aspiration.  Goal Not Met / Continue          Long Term Goal Status:  4 weeks  1. He will develop functional and intelligible speech and utilize compensatory strategies through the use of adequate labial and lingual function, increased articulatory precision and speech prosody.  2. He  will maintain adequate hydration/nutrition with optimum safety and efficiency of swallowing function on PO intake without overt signs and symptoms of aspiration for regular diet consistencies and thin liquids.  3.  He  will utilize compensatory strategies with optimum safety and efficiency of swallowing  function on PO intake without overt signs and symptoms of aspiration for regular diet consistencies and thin liquids.    Goals Previously Met:  - Pt will rate his speech while reading as at least a 2 on a 3 point scale ( 1 = same as before beginning therapy, 2 =better but not best, 3 =best possible outcome) on  8 /10 trials.  Goal Met 10/9/19 / Discontinue   - Pt will differentiate between his speech and error-free speech by giving specific examples of differences on  8 /10 trials. Goal Met 10/16/19 / Discontinue  - Pt will recall 3/4 clear speech strategies independently  Goal Met 10/7/19/ Discontinue   - Pt will participate in MBSS to objectively assess his swallow, rule out silent aspiration, and determine the safest possible diet.  Goal Met 10/3/19 / Discontinue   - Pt will complete oral motor exercises x 40 each focusing on labial and lingual muscles given min A to improve articulation. Goal Met 11/4/19 / Discontinue   - Pt will participate in tongue base retraction exercises x 30 given min A to improve swallow function.  Goal Met 11/4/19 / Ongoing  - Pt will participate in laryngeal elevation exercises x 30 with min A to improve hyolaryngeal excursion.  Goal Met 11/4/19 / Ongoing  - Pt will complete chin tuck against resistance (CTAR) hold x60 seconds x3 with min A to improve hyolaryngeal elevation / excursion. Goal Met 11/4/19 / Ongoing  -  Pt will complete chin tuck against resistance (CTAR) repetitions x30 with min A to improve hyolaryngeal elevation / excursion. Goal Met 11/4/19 / Ongoing  - Pt will recall and utilize aspiration precautions and safe swallow strategies independently (no straws, medications crushed/burried in puree, small bites/sips, eat/drink slowly, head turn to the left, frequent oral care, 1 bite at a time, upright for all PO intake/remain upright for all PO intake) Goal Met 10/23/19 / Ongoing    Reasons for Recertification of Therapy: pt continues to exhibit mildly dysarthric speech and  s/s aspiration.      Plan     Updated Certification Period: 11/4/2019 to 1/4/20  Recommended Treatment Plan: Patient will participate in the [facility] neurological rehabilitation program for speech therapy 1 times per week to address his  Motor Speech and Swallow deficits, to educate patient and their family, and to participate in a home exercise program.     Other recommendations: none at this time      Therapist's Name:  Tamera Crum CF-SLP   11/4/2019      I CERTIFY THE NEED FOR THESE SERVICES FURNISHED UNDER THIS PLAN OF TREATMENT AND WHILE UNDER MY CARE    Charis Salazar MD    Physician/Referring Practitioner     11/05/2019  Date of Signature

## 2019-11-06 ENCOUNTER — CLINICAL SUPPORT (OUTPATIENT)
Dept: REHABILITATION | Facility: HOSPITAL | Age: 69
End: 2019-11-06
Attending: INTERNAL MEDICINE
Payer: MEDICARE

## 2019-11-06 DIAGNOSIS — I69.354 HEMIPARESIS AFFECTING LEFT SIDE AS LATE EFFECT OF CEREBROVASCULAR ACCIDENT (CVA): ICD-10-CM

## 2019-11-06 DIAGNOSIS — M25.60 RANGE OF MOTION DEFICIT: ICD-10-CM

## 2019-11-06 DIAGNOSIS — Z74.09 IMPAIRED FUNCTIONAL MOBILITY, BALANCE, GAIT, AND ENDURANCE: Primary | ICD-10-CM

## 2019-11-06 DIAGNOSIS — Z74.1 SELF-CARE DEFICIT IN DRESSING: ICD-10-CM

## 2019-11-06 DIAGNOSIS — M79.89 SWELLING OF LEFT HAND: ICD-10-CM

## 2019-11-06 DIAGNOSIS — R20.8 DECREASED SENSATION OF LOWER EXTREMITY: ICD-10-CM

## 2019-11-06 DIAGNOSIS — R46.0 SELF-CARE DEFICIT FOR BATHING: ICD-10-CM

## 2019-11-06 DIAGNOSIS — R29.898 DECREASED STRENGTH OF UPPER EXTREMITY: ICD-10-CM

## 2019-11-06 PROCEDURE — 97110 THERAPEUTIC EXERCISES: CPT | Mod: PN

## 2019-11-06 PROCEDURE — 97140 MANUAL THERAPY 1/> REGIONS: CPT | Mod: PN

## 2019-11-06 PROCEDURE — G8979 MOBILITY GOAL STATUS: HCPCS | Mod: CJ,PN

## 2019-11-06 PROCEDURE — G8978 MOBILITY CURRENT STATUS: HCPCS | Mod: CK,PN

## 2019-11-06 PROCEDURE — 97112 NEUROMUSCULAR REEDUCATION: CPT | Mod: PN

## 2019-11-06 PROCEDURE — 97014 ELECTRIC STIMULATION THERAPY: CPT | Mod: PN

## 2019-11-06 NOTE — PROGRESS NOTES
"Physical Therapy Daily Treatment Note      Name: Jae Millan  Clinic Number: [id number]     Therapy Diagnosis:        Encounter Diagnoses   Name Primary?    Hemiparesis affecting left side as late effect of cerebrovascular accident (CVA)      Decreased sensation of lower extremity      Impaired functional mobility, balance, gait, and endurance Yes      Physician: Maryjane Farias MD     Visit Date: 10/23/2019     Physician Orders: PT Eval, all treatment after initial eval requires auth  Medical Diagnosis from Referral: Cerebrovascular accident (CVA) due to thrombosis of anterior cerebral artery, unspecified blood vessel laterality  Evaluation Date: 6/6/2019  Authorization Period Expiration: 12/31/2019  NEW Plan of Care Expiration: 12/31/2019  Visit # / Visits authorized: 8/ 20 (total visits: 23) - KX modifier              PN due: 11/23/2019     Time In: 940  Time Out: 1025  Total Billable Time: 45 minutes      Precautions: Standard and Fall     Subjective      Pt reports: Overall doing well today. He wants to reduce PT to 1x/week as he feels he is improving and working toward discharge.     He was compliant with HEP, reports walking for LE strengthening.     Response to previous treatment: no adverse effects to report  Functional change: ongoing     Pain: 1/10  Location: L arm, "stiffness"     Objective      Pt ambulates into and out of clinic with SBQC, using step through gait pattern. He demonstrates increased reliance on R LE for ambulation with antalgic gait.      Jae received therapeutic exercises to develop strength, endurance, posture and core stabilization for  15 minutes including:     SciFit StepOne level 4.0 on hills sprint setting, 10 minutes  3x30 seconds standing gastroc stretch     Jae received neuromuscular reeducation to develop balance, proprioception, and kinesthetic sense for 30 minutes including:   3 x 30 SLS with opposing foot on ball, fair+ balance, no UE support  x10 step over " "6" step, stepping up with L LE and down with R LE lead  x10 side step over 6" step with L LE lead, fair+ balance  x5 laps (~10') resisted ambulation fwd,bwd with blue sports cord      Home Exercises Provided and Patient Education Provided      Education provided:   - continue with HEP     Written Home Exercises Provided: Patient instructed to cont prior HEP.   Exercises were reviewed and Jae was able to demonstrate them prior to the end of the session.  Jae demonstrated good  understanding of the education provided.      See EMR under Patient Instructions for exercises provided 6/13/2019.     Assessment   Pt tolerated session well today. He required increased rest during today's session. He continues to struggle with SLS and tolerates high level standing balance activities well. He remains appropriate for skilled PT services.      Pt has verbalized his transportation issues and has stated he plans to schedule his transportation services a half an hour earlier than usual, which will help him be on time for remainder of appointments to continue progressing with therapy.   Jae is progressing well towards his goals.   Pt prognosis is Good.      Pt will continue to benefit from skilled outpatient physical therapy to address the deficits listed in the problem list box on initial evaluation, provide pt/family education and to maximize pt's level of independence in the home and community environment.      Pt's spiritual, cultural and educational needs considered and pt agreeable to plan of care and goals.     Anticipated barriers to physical therapy: none     Goals: 10/23/19  Short Term Goals: 5 weeks   1. Pt will be able to complete 10 sit to  30 seconds for increased mobility. MET 8/5/2019  2. Pt will be able to complete TUG in less than 18 seconds in order to decrease his risk for fall. MET 7/5/19- 11.08 sec with small based quad cane, 13.55 sec w/o AD  3. Pt will be compliant with HEP and medicine " management. Ongoing, semicompliant  4. Pt will be able to ambulate >300' with/without AD and mod I. MET 7/5/19- pt can ambulate at least 300 ft with small based quad cane      Long Term Goals: 10 weeks   1. Pt will complete TUG in less than 15 seconds in order to decrease his risk for fall. MET 7/5/2019  2. Pt will be able to stand on B LE in SLS for >/= 5 seconds in order to decrease his risk for fall. Ongoing  3. Pt will be able to ascend/decend 5 stairs with 1 hand rail and supervision without cues for safety. MET 8/5/2019  4. Pt will tolerate standing activities for >10 minutes in order to increase his endurance. MET 7/5/2019  5. NEW GOAL: Pt will improve score on FGA to >/= 22 in order to decrease his risk for fall. Ongoing, improved (20/30)  6. NEW GOAL (8/28/2019): Pt will improve his SSWS to .95 without AD in order to normalize gait compared to peers of his age and gender. MET     Plan   NEW Plan of Care Expiration: 12/31/2019     Incorporate treadmill ambulation, increase SLS and NBOS ambulation performance. Strengthen glutes and quads to assist with better SLS.      Oc Schroeder, PT  10/23/2019

## 2019-11-06 NOTE — PROGRESS NOTES
"  Occupational Therapy Daily Treatment Note and Updated POC     Date: 11/4/2019  Name: Jae Millan  Clinic Number: [id number]    Therapy Diagnosis:   Encounter Diagnoses   Name Primary?    Self-care deficit in dressing     Self-care deficit for bathing     Range of motion deficit     Swelling of left hand     Decreased strength of upper extremity      Physician: Maryjane Farias MD       Physician Orders: Eval and treat  Medical Diagnosis: Cerebrovascular accident (CVA) due to thrombosis of anterior cerebral artery, unspecified blood vessel laterality  Evaluation Date: 8/8/2019  Plan of Care Expiration Period: 8/8/19 to 10/31/19 to 1/3/2020  Insurance Authorization period Expiration: 12/31/19  Date of Return to MD: DALIA  FOTO: 11/6/2019 47% Mobility  9/18/2019  47% Mobility  8/26/2019: 47% limited in Mobility    Visit # / Visits authorized: 20 / 25  Time In: 10:30am  Time Out: 11:15am  Total Billable Time: 45 minutes    Precautions:  Standard, Diabetes and HTN      Subjective     Pt reports: "I am feeling a lot better and stronger. I want to cut back to 1x week"  he was compliant with home exercise program given this session.   Response to previous treatment: positive  Functional change: ongoing    Pain:  3/10   Location: L shoulder    Objective      Discussed HEP compliance importance if he wishes to come only 1x week    Jae received manual therapy for 15 minutes   - Supine PROM of L GH joint: FF/Abd/ER/IR  - General wrist stretches including               1. Scaphoid on radius               2. Increasing mobility of metacarpals               3. Carpal rolls               4. Increasing mobility towards radial deviation               5. Increasing mobility of wrist towards extension              6. Increasing mobility of wrist towards supination/pronation  - Side lying scap PROM in all planes      Jae participated in therapeutic exercises for 15 minutes  - UBE x6 min for x6 min back res 3.0, R hand " secured; for increased R Ue activation and functional endurance    Jae participated in unattended ESTIM for 15 minutes to his R long wrist extensors          Home Exercises and Education Provided     Education provided:   - HEP review  - Progress towards goals     Written Home Exercises Provided: yes.  Exercises were reviewed and Jae was able to demonstrate them prior to the end of the session.  Jae demonstrated good  understanding of the HEP provided.   .   See EMR under Patient Instructions for exercises provided 8/12/2019.        Assessment     Jae had good tolerance to treatment this date. He has improving functional performance of his R UE as a passive stabilizer which has made daily tasks easier to complete. He is appropriate to drop to 1x week as long as he keeps up with his HEP consistently. Will follow up to make sure. He continues with ROM and strength deficits limiting his full participation in ADL/IADL tasks.     Jae is progressing well towards his goals and there are no updates to goals at this time. Pt prognosis is Fair.     Pt will continue to benefit from skilled outpatient occupational therapy to address the deficits listed in the problem list on initial evaluation provide pt/family education and to maximize pt's level of independence in the home and community environment.     Anticipated barriers to occupational therapy: timeframe of current condition    Pt's spiritual, cultural and educational needs considered and pt agreeable to plan of care and goals.    Goals:  Short Term Goals: 6 weeks    Independent in HEP for self ROM left arm MET 8/19/2019   Pt to consistently elevate left hand to assist with edema when seated MET 8/19/2019   Pt will perform simple meal for self 3 days a week. MET 8/19/2019     LTG GOALS:  Time frame: 12 weeks  LB dressing will improve to 10 minutes completion MET 8/26/2019  Pt will improve his ability to cut using L UE as a passive stablizer MET 10/9/2019     Bathing will improve by completion in 20 minutes     Simple meal prep will be performed 5 x week  MET 10/9/2019 (When sister is not home)  Simple home care will improve to one task 4 x week(sweep, mop, dust, clean sinks)    Plan   Plan to see 2x week for 8 more weeks for increased functional independence at home (1/3/2020)  Updates/Grading for next session:  Progress as tolerated      IRLANDA Galicia

## 2019-11-11 ENCOUNTER — PATIENT OUTREACH (OUTPATIENT)
Dept: ADMINISTRATIVE | Facility: HOSPITAL | Age: 69
End: 2019-11-11

## 2019-11-12 NOTE — PROGRESS NOTES
"Physical Therapy Daily Treatment Note      Name: Jae Millan  Clinic Number: [id number]     Therapy Diagnosis:        Encounter Diagnoses   Name Primary?    Hemiparesis affecting left side as late effect of cerebrovascular accident (CVA)      Decreased sensation of lower extremity      Impaired functional mobility, balance, gait, and endurance Yes      Physician: Maryjane Farias MD     Visit Date: 10/23/2019     Physician Orders: PT Eval, all treatment after initial eval requires auth  Medical Diagnosis from Referral: Cerebrovascular accident (CVA) due to thrombosis of anterior cerebral artery, unspecified blood vessel laterality  Evaluation Date: 6/6/2019  Authorization Period Expiration: 12/31/2019  NEW Plan of Care Expiration: 12/31/2019  Visit # / Visits authorized: 1/15 (total visits: 24) - KX modifier              PN due: 11/23/2019     Time In: 945  Time Out: 1030  Total Billable Time: 45 minutes      Precautions: Standard and Fall     Subjective      Pt reports: Doing well today, his L leg is feeling stiff.      He was compliant with HEP, reports walking for LE strengthening.     Response to previous treatment: no adverse effects to report  Functional change: ongoing     Pain: 1/10  Location: L arm, "stiffness"     Objective      Pt ambulates into and out of clinic with SBQC, using step through gait pattern. He demonstrates increased reliance on R LE for ambulation with antalgic gait.      Jae received therapeutic exercises to develop strength, endurance, posture and core stabilization for  5 minutes including:    3x30 seconds standing gastroc stretch     Jae received neuromuscular reeducation to develop balance, proprioception, and kinesthetic sense for 20 minutes including:   3 x 30 SLS with opposing foot on ball, fair+ balance, no UE support  x10 tapping large and med cone with L LE (R SLS), fair+ balance  x10 tapping med cone with R LE (L SLS), fair balance    Jae participates in gait " training for 15 minutes includin minutes treadmill ambulation with emphasis on step length and heel strike, 0.8 speed, no incline, PT cues throughout ambulation for larger step length  x5 laps of ambulation over turf ~200' per lap, emphasis on larger step length with heel strike    Home Exercises Provided and Patient Education Provided      Education provided:   - continue with HEP     Written Home Exercises Provided: Patient instructed to cont prior HEP.   Exercises were reviewed and Jae was able to demonstrate them prior to the end of the session.  Jae demonstrated good  understanding of the education provided.      See EMR under Patient Instructions for exercises provided 2019.     Assessment   Pt tolerated session well today. He tolerates addition of treadmill for improved ambulation performance well today. He continues to require increased rest between activities and demonstrates poor ability to balance in SLS on B LE, especially L LE. He remains appropriate for skilled PT services.      Pt has verbalized his transportation issues and has stated he plans to schedule his transportation services a half an hour earlier than usual, which will help him be on time for remainder of appointments to continue progressing with therapy.   Jae is progressing well towards his goals.   Pt prognosis is Good.      Pt will continue to benefit from skilled outpatient physical therapy to address the deficits listed in the problem list box on initial evaluation, provide pt/family education and to maximize pt's level of independence in the home and community environment.      Pt's spiritual, cultural and educational needs considered and pt agreeable to plan of care and goals.     Anticipated barriers to physical therapy: none     Goals: 10/23/19  Short Term Goals: 5 weeks   1. Pt will be able to complete 10 sit to  30 seconds for increased mobility. MET 2019  2. Pt will be able to complete TUG in less  than 18 seconds in order to decrease his risk for fall. MET 7/5/19- 11.08 sec with small based quad cane, 13.55 sec w/o AD  3. Pt will be compliant with HEP and medicine management. Ongoing, semicompliant  4. Pt will be able to ambulate >300' with/without AD and mod I. MET 7/5/19- pt can ambulate at least 300 ft with small based quad cane      Long Term Goals: 10 weeks   1. Pt will complete TUG in less than 15 seconds in order to decrease his risk for fall. MET 7/5/2019  2. Pt will be able to stand on B LE in SLS for >/= 5 seconds in order to decrease his risk for fall. Ongoing  3. Pt will be able to ascend/decend 5 stairs with 1 hand rail and supervision without cues for safety. MET 8/5/2019  4. Pt will tolerate standing activities for >10 minutes in order to increase his endurance. MET 7/5/2019  5. NEW GOAL: Pt will improve score on FGA to >/= 22 in order to decrease his risk for fall. Ongoing, improved (20/30)  6. NEW GOAL (8/28/2019): Pt will improve his SSWS to .95 without AD in order to normalize gait compared to peers of his age and gender. MET     Plan   NEW Plan of Care Expiration: 12/31/2019     Incorporate treadmill ambulation, increase SLS and NBOS ambulation performance. Strengthen glutes and quads to assist with better SLS.      Oc Schroeder, PT  10/23/2019

## 2019-11-13 ENCOUNTER — PATIENT OUTREACH (OUTPATIENT)
Dept: ADMINISTRATIVE | Facility: OTHER | Age: 69
End: 2019-11-13

## 2019-11-13 ENCOUNTER — CLINICAL SUPPORT (OUTPATIENT)
Dept: REHABILITATION | Facility: HOSPITAL | Age: 69
End: 2019-11-13
Attending: INTERNAL MEDICINE
Payer: MEDICARE

## 2019-11-13 DIAGNOSIS — R20.8 DECREASED SENSATION OF LOWER EXTREMITY: ICD-10-CM

## 2019-11-13 DIAGNOSIS — R47.1 DYSARTHRIA: ICD-10-CM

## 2019-11-13 DIAGNOSIS — M25.60 RANGE OF MOTION DEFICIT: ICD-10-CM

## 2019-11-13 DIAGNOSIS — M79.89 SWELLING OF LEFT HAND: ICD-10-CM

## 2019-11-13 DIAGNOSIS — R46.0 SELF-CARE DEFICIT FOR BATHING: ICD-10-CM

## 2019-11-13 DIAGNOSIS — I69.354 HEMIPARESIS AFFECTING LEFT SIDE AS LATE EFFECT OF CEREBROVASCULAR ACCIDENT (CVA): ICD-10-CM

## 2019-11-13 DIAGNOSIS — R29.898 DECREASED STRENGTH OF UPPER EXTREMITY: ICD-10-CM

## 2019-11-13 DIAGNOSIS — Z74.1 SELF-CARE DEFICIT IN DRESSING: ICD-10-CM

## 2019-11-13 DIAGNOSIS — R13.12 OROPHARYNGEAL DYSPHAGIA: ICD-10-CM

## 2019-11-13 DIAGNOSIS — Z74.09 IMPAIRED FUNCTIONAL MOBILITY, BALANCE, GAIT, AND ENDURANCE: Primary | ICD-10-CM

## 2019-11-13 PROCEDURE — G8979 MOBILITY GOAL STATUS: HCPCS | Mod: CJ,PN

## 2019-11-13 PROCEDURE — 97112 NEUROMUSCULAR REEDUCATION: CPT | Mod: KX,PN

## 2019-11-13 PROCEDURE — 97116 GAIT TRAINING THERAPY: CPT | Mod: KX,PN

## 2019-11-13 PROCEDURE — 92508 TX SP LANG VOICE COMM GROUP: CPT | Mod: PN | Performed by: SPEECH-LANGUAGE PATHOLOGIST

## 2019-11-13 PROCEDURE — 97110 THERAPEUTIC EXERCISES: CPT | Mod: KX,PN

## 2019-11-13 PROCEDURE — G8978 MOBILITY CURRENT STATUS: HCPCS | Mod: CK,PN

## 2019-11-13 PROCEDURE — 92526 ORAL FUNCTION THERAPY: CPT | Mod: PN | Performed by: SPEECH-LANGUAGE PATHOLOGIST

## 2019-11-13 PROCEDURE — 97112 NEUROMUSCULAR REEDUCATION: CPT | Mod: PN

## 2019-11-13 PROCEDURE — 97140 MANUAL THERAPY 1/> REGIONS: CPT | Mod: PN

## 2019-11-13 NOTE — PROGRESS NOTES
Occupational Therapy Daily Treatment Note and Updated POC     Date: 11/4/2019  Name: Jae Millan  Clinic Number: [id number]    Therapy Diagnosis:   Encounter Diagnoses   Name Primary?    Self-care deficit in dressing     Self-care deficit for bathing     Range of motion deficit     Swelling of left hand     Decreased strength of upper extremity      Physician: Maryjane Farias MD       Physician Orders: Eval and treat  Medical Diagnosis: Cerebrovascular accident (CVA) due to thrombosis of anterior cerebral artery, unspecified blood vessel laterality  Evaluation Date: 8/8/2019  Plan of Care Expiration Period: 8/8/19 to 10/31/19 to 1/3/2020  Insurance Authorization period Expiration: 12/31/19  Date of Return to MD: DALIA  FOTO: 11/6/2019 47% Mobility  9/18/2019  47% Mobility  8/26/2019: 47% limited in Mobility    Visit # / Visits authorized: 20 / 25  Time In: 10:30am  Time Out: 11:00am  Total Billable Time: 30 minutes    Precautions:  Standard, Diabetes and HTN      Subjective     Pt reports: Pt. Received a phone call in the middle of the session with news about a family member being very ill. He requested to stop treatment early this date  he was compliant with home exercise program given this session.   Response to previous treatment: positive  Functional change: ongoing    Pain:  3/10   Location: L shoulder    Objective      Jae received manual therapy for 15 minutes   - Supine PROM of L GH joint: FF/Abd/ER/IR  - General wrist stretches including               1. Scaphoid on radius               2. Increasing mobility of metacarpals               3. Carpal rolls               4. Increasing mobility towards radial deviation               5. Increasing mobility of wrist towards extension              6. Increasing mobility of wrist towards supination/pronation  - Side lying scap PROM in all planes      Jae participated in neuro re-ed for 15 minutes:  - Quadruped with forward/backward shifts, focus on  prolonged WB into L UE  - Tall kneeling for core stability and improved balance/posture       Home Exercises and Education Provided     Education provided:   - HEP review  - Progress towards goals     Written Home Exercises Provided: yes.  Exercises were reviewed and Jae was able to demonstrate them prior to the end of the session.  Jae demonstrated good  understanding of the HEP provided.   .   See EMR under Patient Instructions for exercises provided 8/12/2019.        Assessment       Jae had good tolerance to treatment this date. PROM of L UE continues to improve slowly. He continues with ROM and strength deficits limiting his full participation in ADL/IADL tasks.     Jae is progressing well towards his goals and there are no updates to goals at this time. Pt prognosis is Fair.     Pt will continue to benefit from skilled outpatient occupational therapy to address the deficits listed in the problem list on initial evaluation provide pt/family education and to maximize pt's level of independence in the home and community environment.     Anticipated barriers to occupational therapy: timeframe of current condition    Pt's spiritual, cultural and educational needs considered and pt agreeable to plan of care and goals.    Goals:  Short Term Goals: 6 weeks    Independent in HEP for self ROM left arm MET 8/19/2019   Pt to consistently elevate left hand to assist with edema when seated MET 8/19/2019   Pt will perform simple meal for self 3 days a week. MET 8/19/2019     LTG GOALS:  Time frame: 12 weeks  LB dressing will improve to 10 minutes completion MET 8/26/2019  Pt will improve his ability to cut using L UE as a passive stablizer MET 10/9/2019    Bathing will improve by completion in 20 minutes     Simple meal prep will be performed 5 x week  MET 10/9/2019 (When sister is not home)  Simple home care will improve to one task 4 x week(sweep, mop, dust, clean sinks)    Plan   Plan to see 2x week for 8 more  weeks for increased functional independence at home (1/3/2020)  Updates/Grading for next session:  Progress as tolerated      IRLANDA Galicia

## 2019-11-18 ENCOUNTER — OFFICE VISIT (OUTPATIENT)
Dept: UROLOGY | Facility: CLINIC | Age: 69
End: 2019-11-18
Payer: MEDICARE

## 2019-11-18 VITALS
BODY MASS INDEX: 30.68 KG/M2 | WEIGHT: 207.13 LBS | SYSTOLIC BLOOD PRESSURE: 142 MMHG | HEART RATE: 79 BPM | DIASTOLIC BLOOD PRESSURE: 87 MMHG | HEIGHT: 69 IN | OXYGEN SATURATION: 98 %

## 2019-11-18 DIAGNOSIS — N50.819 ORCHIALGIA: ICD-10-CM

## 2019-11-18 DIAGNOSIS — K40.90 LEFT INGUINAL HERNIA: ICD-10-CM

## 2019-11-18 DIAGNOSIS — N52.9 ERECTILE DYSFUNCTION, UNSPECIFIED ERECTILE DYSFUNCTION TYPE: Primary | ICD-10-CM

## 2019-11-18 LAB
BILIRUB SERPL-MCNC: ABNORMAL MG/DL
BLOOD URINE, POC: 50
COLOR, POC UA: ABNORMAL
GLUCOSE UR QL STRIP: ABNORMAL
KETONES UR QL STRIP: ABNORMAL
LEUKOCYTE ESTERASE URINE, POC: ABNORMAL
NITRITE, POC UA: ABNORMAL
PH, POC UA: 5
PROTEIN, POC: 100
SPECIFIC GRAVITY, POC UA: 1.02
UROBILINOGEN, POC UA: ABNORMAL

## 2019-11-18 PROCEDURE — 99213 OFFICE O/P EST LOW 20 MIN: CPT | Mod: PBBFAC,PN | Performed by: NURSE PRACTITIONER

## 2019-11-18 PROCEDURE — 99999 PR PBB SHADOW E&M-EST. PATIENT-LVL III: CPT | Mod: PBBFAC,,, | Performed by: NURSE PRACTITIONER

## 2019-11-18 PROCEDURE — 81002 URINALYSIS NONAUTO W/O SCOPE: CPT | Mod: PBBFAC,PN | Performed by: NURSE PRACTITIONER

## 2019-11-18 PROCEDURE — 99214 PR OFFICE/OUTPT VISIT, EST, LEVL IV, 30-39 MIN: ICD-10-PCS | Mod: S$PBB,,, | Performed by: NURSE PRACTITIONER

## 2019-11-18 PROCEDURE — 99214 OFFICE O/P EST MOD 30 MIN: CPT | Mod: S$PBB,,, | Performed by: NURSE PRACTITIONER

## 2019-11-18 PROCEDURE — 99999 PR PBB SHADOW E&M-EST. PATIENT-LVL III: ICD-10-PCS | Mod: PBBFAC,,, | Performed by: NURSE PRACTITIONER

## 2019-11-18 NOTE — PROGRESS NOTES
"Subjective:      Jae Millan is a 68 y.o. male who returns today regarding his     Continues with left inguinal and scrotal discomfort.  CT scan shows a large hernia in this area.  Previous examination was somewhat difficult.  Patient has a history of right orchiectomy by another urologist and a right testicular prosthesis.  The left testis is atrophic on the CT scan.    No obstructive urinary symptoms.  He does have some urinary frequency.  He is on Januvia which may be contributing to this    The following portions of the patient's history were reviewed and updated as appropriate: allergies, current medications, past family history, past medical history, past social history, past surgical history and problem list.    Review of Systems  Pertinent items are noted in HPI.  A comprehensive multipoint review of systems was negative except as otherwise stated in the HPI.     Objective:   Vitals: BP (!) 142/87 (BP Location: Right arm, Patient Position: Sitting, BP Method: Large (Automatic))   Pulse 79   Ht 5' 9" (1.753 m)   Wt 94 kg (207 lb 2.1 oz)   SpO2 98%   BMI 30.59 kg/m²     Physical Exam   General: alert and oriented, no acute distress  Respiratory: Symmetric expansion, non-labored breathing  Cardiovascular: normal to inspection  Abdomen: non distended   Skin: normal coloration and turgor, no rashes, no suspicious skin lesions noted  Neuro: no gross deficits  Psych: normal judgment and insight, normal mood/affect and non-anxious  Prostate 25-30 g somewhat asymmetric no nodules  Right testicular prosthesis in place  Large reducible left inguinal hernia  Atrophic left testis    Physical Exam    Lab Review   Urinalysis demonstrates negative except trace leukocytes.  Microscopic urinalysis negative  Lab Results   Component Value Date    WBC 6.28 10/01/2019    HGB 13.7 (L) 10/01/2019    HCT 43.0 10/01/2019    MCV 84 10/01/2019     10/01/2019     Lab Results   Component Value Date    CREATININE 1.2 " 10/01/2019    BUN 15 10/01/2019       Imaging  Impression       Large fat containing left inguinal hernia as above.    Circumferential calcification of the right testicle. Diminutive left testicle.    No acute intra-abdominal process identified.      Electronically signed by: Joel Harrell MD  Date: 10/31/2019  Time: 11:27       Assessment and Plan:   Erectile dysfunction, unspecified erectile dysfunction type  Continue present management    Atrophic left testis    History of right orchiectomy with right testicular prosthesis    Left inguinal hernia  -     Ambulatory Referral to General Surgery, Dr Becker

## 2019-11-18 NOTE — PROGRESS NOTES
"Subjective:      Jae Millan is a 68 y.o. male who returns today regarding his ***.    left testicular pain.     He was incarcerated in senior care for many years. During this time he has surgery for undescending right testicle. He has right testicular implant. He denies issues since the surgery.     He presents today with intermittent swelling around left testicle and groin. He reports that the swelling started over 2 years ago. Positional changes cause swelling . There is no skin break. Denies dysuria, hematuria, fever, chills, CVA tenderness.         No history of prostate cancer.   No history of kidney stones                 Erectile Dysfunction  Patient complains of erectile dysfunction. Onset of dysfunction was 2 year ago and was gradual in onset.  Patient states the nature of difficulty is both attaining and maintaining erection. Full erections occur never. Partial erections occur with intercourse and with masturbation. Libido is affected. Risk factors for ED include cardiovascular disease, beta blocker use and antihypertensive medications, Patient denies history of cranial, spinal, or pelvic trauma and pelvic radiation. No previous treatment of ED per pt.      The following portions of the patient's history were reviewed and updated as appropriate: allergies, current medications, past family history, past medical history, past social history, past surgical history and problem list.    Review of Systems  {Ros; complete male:22522::"A comprehensive multipoint review of systems was negative except as otherwise stated in the HPI."}     Objective:   Vitals: There were no vitals taken for this visit.    Physical Exam   General: {pe gen appearance urology:139134::"alert and oriented, no acute distress"}  Respiratory: {pe lungs:596934::"Symmetric expansion, non-labored breathing"}  Cardiovascular: {pe heart brief:255775::"no peripheral edema","regular rate and rhythm"}  Abdomen: {abd exam:639311::"soft, non " "distended"}  Genitourinary: {male genital:335293::"no penile lesions or discharge, no testicular masses, normal scrotum"}  Rectal: {exam; rectal:82455262::"the prostate is *** gms and without nodules","normal rectal tone"}  Skin: {skin exam:575940::"normal coloration and turgor, no rashes, no suspicious skin lesions noted"}  Neuro: {PE NEURO-IM:05712::"no gross deficits"}  Psych: {PE PSYCH-IM:71759::"normal judgment and insight","normal mood/affect","non-anxious"}    Lab Review   Urinalysis demonstrates {ua dip:132653::"negative for all components"}  Lab Results   Component Value Date    WBC 6.28 10/01/2019    HGB 13.7 (L) 10/01/2019    HCT 43.0 10/01/2019    MCV 84 10/01/2019     10/01/2019     Lab Results   Component Value Date    CREATININE 1.2 10/01/2019    BUN 15 10/01/2019     Lab Results   Component Value Date    PSA 0.09 04/12/2019       Imaging ***    Assessment and Plan:   1. Erectile dysfunction, unspecified erectile dysfunction type  ***    2. Orchialgia  ***     "

## 2019-11-19 ENCOUNTER — PATIENT OUTREACH (OUTPATIENT)
Dept: ADMINISTRATIVE | Facility: OTHER | Age: 69
End: 2019-11-19

## 2019-11-20 ENCOUNTER — TELEPHONE (OUTPATIENT)
Dept: PHARMACY | Facility: CLINIC | Age: 69
End: 2019-11-20

## 2019-11-20 ENCOUNTER — CLINICAL SUPPORT (OUTPATIENT)
Dept: REHABILITATION | Facility: HOSPITAL | Age: 69
End: 2019-11-20
Attending: INTERNAL MEDICINE
Payer: MEDICARE

## 2019-11-20 DIAGNOSIS — R47.1 DYSARTHRIA: ICD-10-CM

## 2019-11-20 DIAGNOSIS — M25.60 RANGE OF MOTION DEFICIT: ICD-10-CM

## 2019-11-20 DIAGNOSIS — Z74.09 IMPAIRED FUNCTIONAL MOBILITY, BALANCE, GAIT, AND ENDURANCE: Primary | ICD-10-CM

## 2019-11-20 DIAGNOSIS — R29.898 DECREASED STRENGTH OF UPPER EXTREMITY: ICD-10-CM

## 2019-11-20 DIAGNOSIS — M79.89 SWELLING OF LEFT HAND: ICD-10-CM

## 2019-11-20 DIAGNOSIS — R46.0 SELF-CARE DEFICIT FOR BATHING: ICD-10-CM

## 2019-11-20 DIAGNOSIS — R13.12 OROPHARYNGEAL DYSPHAGIA: ICD-10-CM

## 2019-11-20 DIAGNOSIS — I69.354 HEMIPARESIS AFFECTING LEFT SIDE AS LATE EFFECT OF CEREBROVASCULAR ACCIDENT (CVA): ICD-10-CM

## 2019-11-20 DIAGNOSIS — Z74.1 SELF-CARE DEFICIT IN DRESSING: ICD-10-CM

## 2019-11-20 DIAGNOSIS — R20.8 DECREASED SENSATION OF LOWER EXTREMITY: ICD-10-CM

## 2019-11-20 PROCEDURE — 97112 NEUROMUSCULAR REEDUCATION: CPT | Mod: PN,59

## 2019-11-20 PROCEDURE — 97116 GAIT TRAINING THERAPY: CPT | Mod: KX,PN

## 2019-11-20 PROCEDURE — G8978 MOBILITY CURRENT STATUS: HCPCS | Mod: CK,PN

## 2019-11-20 PROCEDURE — 97110 THERAPEUTIC EXERCISES: CPT | Mod: PN

## 2019-11-20 PROCEDURE — 97112 NEUROMUSCULAR REEDUCATION: CPT | Mod: KX,PN

## 2019-11-20 PROCEDURE — G8979 MOBILITY GOAL STATUS: HCPCS | Mod: CJ,PN

## 2019-11-20 PROCEDURE — 92526 ORAL FUNCTION THERAPY: CPT | Mod: PN | Performed by: SPEECH-LANGUAGE PATHOLOGIST

## 2019-11-20 PROCEDURE — 97140 MANUAL THERAPY 1/> REGIONS: CPT | Mod: PN

## 2019-11-20 NOTE — PROGRESS NOTES
"Outpatient Neurological Rehabilitation   Speech and Language Therapy Daily Note  Date:  11/20/2019     Name: Jae Millan   MRN: [id number]   Therapy Diagnosis:   Encounter Diagnoses   Name Primary?    Oropharyngeal dysphagia     Dysarthria    Physician: Charis Salazar MD  Physician Orders: [id number] - Ambulatory consult to Speech Therapy  Medical Diagnosis: I69.354 (ICD-10-CM) - Hemiparesis affecting left side as late effect of cerebrovascular accident (CVA)  I63.329 (ICD-10-CM) - Cerebrovascular accident (CVA) due to thrombosis of anterior cerebral artery, unspecified blood vessel laterality  R47.1 (ICD-10-CM) - Dysarthria     Visit #/ Visits Authorized: 11/20  Date of Evaluation:  9/9/2019   Insurance Authorization Period: 09/09/2019-12/31/2019  Plan of Care Certification:    9/9/2019 -11/4/19   Extended POC:  1/4/20  Progress Note: due 12/9/19   Visits Cancelled: 2  Visits No Show: 0    Time In:  9:00  Time Out: 9:47  Total Billable Time: 47 min    Precautions: Standard  Subjective:   Pt reports: "nothing new" He reports less coughing. Reported one instance of difficulty getting a pill down but patient stated it was 2/2 to pill tasting badly.   He was compliant to home exercise program. Reports completing exercises 3-4 times per day.  Response to previous treatment: n/a  Pain Scale:  0/10 on VAS currently.   Pain Location: n/a  Objective:   UNTIMED  Procedure Min.   Speech- Language- Voice Therapy 0   Dysphagia Therapy 47   Total Timed Units: 0  Total Untimed Units: 1  Charges Billed/# of units: 1    Short Term Goals: (4 weeks) Current Progress:   1. Pt will rate his speech while reading as at least a 2 on a 3 point scale ( 1 = same as before beginning therapy, 2 =better but not best, 3 =best possible outcome) on  8 /10 trials.   Goal Met 10/9/19 / Discontinue      2. Pt will differentiate between his speech and error-free speech by giving specific examples of differences on  8 /10 trials.  Goal Met " "10/16/19 / Discontinue      3. Pt will identify a self-cue for use of therapeutic speech and use it 5x per session independently Goal Not Met / Discontinue      4. Pt will use motor speech strategies and answer questions in conversation with a self-rating of at least 2 on a 3 point scale ( 1 = same as before beginning therapy, 2 =better but not best, 3 =best possible outcome) on  8 /10 trials.  Goal Met 11/13/19 / Discontinue      5. Pt will recall 3/4 clear speech strategies independently    Goal Met 10/7/19/ Discontinue      6. Pt will participate in MBSS to objectively assess his swallow, rule out silent aspiration, and determine the safest possible diet.     Goal Met 10/3/19 / Discontinue   Impressions/Recs:     "Impressions:  Patient presents with mild oral and moderate pharyngeal Dysphagia. See above.   Recommendations/Precautions:    1. Regular diet  2. Thin liquids  3. Medications buried in puree or crushed   4. Aspiration precautions:   · NO STRAWS  · Very small sips of liquid one at a time OR head turn to the left with all thin liquid swallows  · Excellent and frequent oral care  · Upright for all po intake and remain upright for 30 minutes after intake  · One bite at a time at a slow rate (allow time to complete second swallow per bite)  · Continue to monitor for signs and symptoms of aspiration and discontinue oral feeding should you notice any of the following: watery eyes, reddened facial area, wet vocal quality, increased work of breathing, change in respiratory status, increased congestion, coughing, fever, etc.  5. Initiate Dysphagia therapy with Outpatient Speech Therapy"     7. Pt will complete oral motor exercises x 40 each focusing on labial and lingual muscles given min A to improve articulation.     Goal Met  / Ongoing    Pt reports completing oral motor exercises 3x per day in conjunction with dysphagia exercises    8.  Pt will participate in tongue base retraction exercises x 30 given min A " to improve swallow function.      Goal Met 11/5/19 / Ongoing  - Hard Effortfull Swallow (thin liquids, applesauce, and dry swallows) x80 (8 sets of 20 reps)   9. Pt will participate in laryngeal elevation exercises x 30 with min A to improve hyolaryngeal excursion.      Goal Met 11/5/19 / Ongoing  Mendelsohn maneuver x25  Pitch glides high /i/ x35   10. Pt will complete chin tuck against resistance (CTAR) hold x60 seconds x3 with min A to improve hyolaryngeal elevation / excursion.    Goal Met 10/23/19 / Ongoing - CTAR hold for 60 seconds x5 (total of 5 minutes)    11. Pt will complete chin tuck against resistance (CTAR) repetitions x30 with min A to improve hyolaryngeal elevation / excursion.    Goal Met 10/23/19 / Ongoing - CTAR 100 repetitions (3 sets of 30 + 1 set of 10)   12. Pt will recall and utilize aspiration precautions and safe swallow strategies independently (no straws, medications crushed/burried in puree, small bites/sips, eat/drink slowly, head turn to the left, frequent oral care, 1 bite at a time, upright for all PO intake/remain upright for all PO intake)    Goal Met 10/23/19 / Ongoing Pt verbalized aspiration precautions/safe swallow strategies independently. Pt observed to take small sips of thin liquids and perform a head turn to the left indly on 100% of observed liquid swallows this session    13. Pt will participate in po trials of regular consistencies and thin liquids with no overt signs/symptoms of aspiration.     Progressing/ Not Met 11/20/2019   -Pt observed consuming thin liquids (approximately 9 oz) and small bites of applesauce (1/2 container) throughout session - no overt s/s aspiration appreciated today, vocal quality remained clear throughout po trials, no coughing or throat clearing throughout po trials        Patient Education/Response:   Discussed safe swallow strategies/aspiration precautions, pt progress, and pt's HEP.  Pt verbalized understanding of all discussed.  Discussed  need for instrumental reassessment of swallow function, pt stated he is not interested in participating in MBSS despite education provided - he states he would rather continue performing dysphagia exercises at home. Discussed that if reassessment is completed and significant progress has been made then pt would not need to perform exercises anymore. Pt verbalized understanding but stated that he does not wish to participate in MBSS.   Written Home Exercises Provided: Yes. Laryngeal elevation/excursion exercises, tongue base retraction exercises, and CTAR exercises. Pt instructed to repeat multisyllabic words 3x each at a fast rate (30 times) to improve speed and coordination of articulators. Pt instructed to complete OME 3x a day. Pt instructed to read sentences aloud (provided) using motor speech strategies.   Exercises were reviewed and Jae was able to demonstrate them prior to the end of the session.  Jae demonstrated good  understanding of the education provided.     Assessment:   Jae is progressing well towards his goals. Pt observed with thin liquids (approximately 12 ounces) and puree textures (1/2 container of applesauce), no overt s/s aspiration appreciated this session with PO intake. Pt able to peform dysphagia exercises independently though he benefits from encouragement to complete a full set of each exercise. Pt performs head turn to left on 100% of liquid swallows as observed by SLP this session. Current goals remain appropriate. Goals to be updated as necessary. Pt prognosis is Fair. Pt will continue to benefit from skilled outpatient speech and language therapy to address the deficits listed in the problem list on initial evaluation, provide pt/family education and to maximize pt's level of independence in the home and community environment.     Medical necessity is demonstrated by the following IMPAIRMENTS:  Reduced speech intelligibility limits functional communication with both familiar  and unfamiliar communication partners. Pt's dysphagia puts him at risk for aspiration pneumonia.      Barriers to Therapy: none identified   Pt's spiritual, cultural and educational needs considered and pt agreeable to plan of care and goals.  Plan:   Continue POC with focus on improving swallow function. Discharge next session at pt's request.     LINCOLN Shirley, CF-SLP  Speech Language Pathologist   11/20/2019

## 2019-11-20 NOTE — PROGRESS NOTES
"  Occupational Therapy Daily Treatment Note     Date: 11/4/2019  Name: Jae Millan  Clinic Number: [id number]    Therapy Diagnosis:   Encounter Diagnoses   Name Primary?    Self-care deficit in dressing     Self-care deficit for bathing     Range of motion deficit     Swelling of left hand     Decreased strength of upper extremity      Physician: Maryjane Farias MD       Physician Orders: Eval and treat  Medical Diagnosis: Cerebrovascular accident (CVA) due to thrombosis of anterior cerebral artery, unspecified blood vessel laterality  Evaluation Date: 8/8/2019  Plan of Care Expiration Period: 8/8/19 to 10/31/19 to 1/3/2020  Insurance Authorization period Expiration: 12/31/19  Date of Return to MD: DALIA  FOTO: 11/6/2019 47% Mobility  9/18/2019  47% Mobility  8/26/2019: 47% limited in Mobility    Visit # / Visits authorized: 21 / 25  Time In: 9:45am  Time Out: 10:30am  Total Billable Time: 45 minutes    Precautions:  Standard, Diabetes and HTN      Subjective     Pt reports: "I am doing pretty good"  he was compliant with home exercise program given this session.   Response to previous treatment: positive  Functional change: ongoing    Pain:  3/10   Location: L shoulder    Objective      Jae received manual therapy for 15 minutes   - Supine PROM of L GH joint: FF/Abd/ER/IR/Sup  - General wrist stretches including               1. Scaphoid on radius               2. Increasing mobility of metacarpals               3. Carpal rolls               4. Increasing mobility towards radial deviation               5. Increasing mobility of wrist towards extension              6. Increasing mobility of wrist towards supination/pronation  - Side lying scap PROM in all planes      Jae participated in neuro re-ed for 15 minutes:  - Quadruped with forward/backward shifts, focus on prolonged WB into L UE  - Tall kneeling for core stability and improved balance/posture     Jae participated in therapeutic exercise " for 15 minutes:  - UBE res 3.0 x6min forward x6min backward for increased L UE activation and strength L hand secured, therapist's assistance needed for set up and safe transfer       Home Exercises and Education Provided     Education provided:   - HEP review  - Progress towards goals     Written Home Exercises Provided: yes.  Exercises were reviewed and Jae was able to demonstrate them prior to the end of the session.  Jae demonstrated good  understanding of the HEP provided.   .   See EMR under Patient Instructions for exercises provided 8/12/2019.        Assessment     Jae had good tolerance to treatment this date.Edema in L hand has been better and pain free ROM has grossly improved.  He continues with ROM and strength deficits limiting his full participation in ADL/IADL tasks.     Jae is progressing well towards his goals and there are no updates to goals at this time. Pt prognosis is Fair.     Pt will continue to benefit from skilled outpatient occupational therapy to address the deficits listed in the problem list on initial evaluation provide pt/family education and to maximize pt's level of independence in the home and community environment.     Anticipated barriers to occupational therapy: timeframe of current condition    Pt's spiritual, cultural and educational needs considered and pt agreeable to plan of care and goals.    Goals:  Short Term Goals: 6 weeks    Independent in HEP for self ROM left arm MET 8/19/2019   Pt to consistently elevate left hand to assist with edema when seated MET 8/19/2019   Pt will perform simple meal for self 3 days a week. MET 8/19/2019     LTG GOALS:  Time frame: 12 weeks  LB dressing will improve to 10 minutes completion MET 8/26/2019  Pt will improve his ability to cut using L UE as a passive stablizer MET 10/9/2019    Bathing will improve by completion in 20 minutes     Simple meal prep will be performed 5 x week  MET 10/9/2019 (When sister is not  home)  Simple home care will improve to one task 4 x week(sweep, mop, dust, clean sinks)    Plan   Plan to see 2x week for 8 more weeks for increased functional independence at home (1/3/2020)  Updates/Grading for next session:  Progress as tolerated      IRLANDA Galicia

## 2019-11-20 NOTE — PLAN OF CARE
"Physical Therapy Daily Treatment Note      Name: Jae Millan  Clinic Number: [id number]     Therapy Diagnosis:        Encounter Diagnoses   Name Primary?    Hemiparesis affecting left side as late effect of cerebrovascular accident (CVA)      Decreased sensation of lower extremity      Impaired functional mobility, balance, gait, and endurance Yes      Physician: Maryjane Farias MD     Visit Date: 10/23/2019     Physician Orders: PT Eval, all treatment after initial eval requires auth  Medical Diagnosis from Referral: Cerebrovascular accident (CVA) due to thrombosis of anterior cerebral artery, unspecified blood vessel laterality  Evaluation Date: 6/6/2019  Authorization Period Expiration: 12/31/2019  NEW Plan of Care Expiration: 12/31/2019  Visit # / Visits authorized: 2/15 (total visits: 24) - KX modifier              PN due: 11/23/2019     Time In: 815  Time Out: 900  Total Billable Time: 45 minutes      Precautions: Standard and Fall     Subjective      Pt reports:He knows he has gotten much better with balance and walking since starting. He continues to have "little things" that trip him up. Pt also reports that he is aware that some of his problems are psychological.      He was compliant with HEP, reports walking for LE strengthening.     Response to previous treatment: no adverse effects to report  Functional change: he walks around home without AD     Pain: 1/10  Location: L arm, "stiffness"     Objective      Pt ambulates into and out of clinic with SBQC, using step through gait pattern. He demonstrates increased reliance on R LE for ambulation with antalgic gait.      Jae received therapeutic exercises to develop strength, endurance, posture and core stabilization for  0 minutes including:         Jae received neuromuscular reeducation to develop balance, proprioception, and kinesthetic sense for 35 minutes including:     Evaluation 7/5/2019 8/5/2019 8/28/2019 9/25/2019 10/23/19 11/19/19 "   30 second Chair Rise 8 completed with no arms 9x no UE  10 with no UEs 11 without UE support 10 without UE supprot 12 without UE support 12 without UE support   TUG 21.25 seconds 11.08 sec w/ SBQC  13.55 sec without AD 10.46 seconds with SBQC  12.5 seconds without AD 9.0 seconds with SBQC  11.6 seconds without AD 9.8 seconds with SBQC  10.1 seconds without AD 8.2 seconds without AD 8.9 + 7.9 + 7.9 = 8.2 sec without AD   FGA NT NT 14/30 (without AD) 17/30 (without AD) 20/30 (no AD) 21/30 (no AD) 17/30  (no AD)   SSWS NT NT NT .88 m/s with no AD (6m/6.8sec) 1.1 m/s with no AD (6m/5.7sec) 1.0 m/s with no AD (6m/5.9sec) 1.0 m/s with no AD (6m/5.9sec)   Fast Walking Speed NT NT NT NT NT 1.25 m/s  (6m/4.8 sec) 1.3 m/s with no AD (6m/4.6sec)      SLS R = 5 seconds (previous score = 5 seconds)  SLS L = 3 seconds (previous score = 3 seconds)    Functional Gait Assessment:   1. Gait on level surface =  2   (3) Normal: less than 5.5 sec, no A.D., no imbalance, normal gait pattern, deviates< 6in   (2) Mild impairment: 7-5.6 sec, uses A.D., mild gait deviations, or deviates 6-10 in   (1) Moderate impairment: > 7 sec, slow speed, imbalance, deviates 10-15 in.   (0) Severe impairment: needs assist, deviates >15 in, reach/touch wall  2. Change in Gait Speed = 2   (3) Normal: smooth change w/o loss of balance or gait deviation, deviates < 6 in, significant difference between speeds   (2) Mild impairment: changes speed, but demonstrates mild gait deviations, deviates 6-10 in, OR no deviations but unable to significantly speed, OR uses A.D.   (1) Moderate impairment: minor changes to speed, OR changes speed w/ significant deviations, deviates 10-15 in, OR  Changes speed , but loses balance & recovers   (0) Severe impairment: cannot change speed, deviates >15 in, or loses balance & needs assist  3. Gait with horizontal head turns  = 3   (3) Normal: no change in gait, deviates <6 in   (2) Mild impairment: slight change in speed,  deviates 6-10 in, OR uses A.D.   (1) Moderate impairment: moderate change in speed, deviates 10-15 in   (0) Severe impairment: severe disruption of gait, deviates >15in  4. Gait with vertical head turns = 2   (3) Normal: no change in gait, deviates <6 in   (2) Mild impairment: slight change in speed, deviates 6-10 in OR uses A.D.   (1) Moderate impairment: moderate change in speed, deviates 10-15 in   (0) Severe impairment: severe disruption of gait, deviates >15 in  5. Gait with pivot turns = 2   (3) Normal: performs safely in 3 sec, no LOB   (2) Mild impairment: performs in >3 sec & no LOB, OR turns safely & requires several steps to regain LOB   (1) Moderate impairment: turns slow, OR requires several small steps for balance following turn & stop   (0) Severe impairment: cannot turn safely, needs assist  6. Step over obstacle = 2   (3) Normal: steps over 2 stacked boxes w/o change in speed or LOB   (2) Mild impairment: able to step over 1 box w/o change in speed or LOB   (1) Moderate impairment: steps over 1 box but must slow down, may require VC   (0) Severe impairment: cannot perform w/o assist  7. Gait with Narrow NORA = 0   (3) Normal: 10 steps no staggering   (2) Mild impairment: 7-9 steps   (1) Moderate impairment: 4-7 steps   (0) Severe impairment: < 4 steps or cannot perform w/o assist  8. Gait with eyes closed = 2   (3) Normal: < 7 sec, no A.D., no LOB, normal gait pattern, deviates <6 in   (2) Mild impairment: 7.1-9 sec, mild gait deviations, deviates 6-10 in   (1) Moderate impairment: > 9 sec, abnormal pattern, LOB, deviates 10-15 in   (0) Severe impairment: cannot perform w/o assist, LOB, deviates >15in  9. Ambulating Backwards = 2   (3) Normal: no A.D., no LOB, normal gait pattern, deviates <6in   (2) Mild impairment: uses A.D., slower speed, mild gait deviations, deviates 6-10 in   (1) Moderate impairment: slow speed, abnormal gait pattern, LOB, deviates 10-15 in   (0) Severe impairment: severe gait  deviations or LOB, deviates >15in  10. Steps = 2   (3) Normal: alternating feet, no rail   (2) Mild Impairment: alternating feet, uses rail   (1) Moderate impairment: step-to, uses rail   (0) Severe impairment: cannot perform safely    Score 17/30     Score:   <22/30 fall risk   <20/30 fall risk in older adults   <18/30 fall risk in Parkinsons       CMS Impairment/Limitation/Restriction for FOTO Status Survey    Therapist reviewed FOTO scores for Jae Millan on 2019.   FOTO documents entered into [employer] - see Media section.    Limitation Score: 47%  Category: Mobility    Current : CK = at least 40% but < 60% impaired, limited or restricted  Goal: CJ = at least 20% but < 40% impaired, limited or restricted             Jae participates in gait training for 10 minutes includin minutes treadmill ambulation with emphasis on step length and heel strike, 1.0 speed, no incline, PT cues throughout ambulation for larger step length      Home Exercises Provided and Patient Education Provided      Education provided:   - POC and possibility of d/c     Written Home Exercises Provided: Patient instructed to cont prior HEP.   Exercises were reviewed and Jae was able to demonstrate them prior to the end of the session.  Jae demonstrated good  understanding of the education provided.      See EMR under Patient Instructions for exercises provided 2019.     Assessment   Jae tolerates reassessment well today. He continues to demonstrate deficits in SLS bilaterally, ambulatory balance, and gait deviations. Pt struggles to consistently heel strike on L LE and tends to demonstrate decreased weight bearing on this LE. During this assessment period pt maintains scores on SLS, TUG, and 30 second chair rise. PT and pt discuss the possibility of discharging from PT if and when he feels more capable of maintaining his gains at home. Pt's goals have been updated and remain appropriate for the next month of skilled  care.      Jae is progressing well towards his goals.   Pt prognosis is Good.      Pt will continue to benefit from skilled outpatient physical therapy to address the deficits listed in the problem list box on initial evaluation, provide pt/family education and to maximize pt's level of independence in the home and community environment.      Pt's spiritual, cultural and educational needs considered and pt agreeable to plan of care and goals.     Anticipated barriers to physical therapy: none     Goals: 11/20/19  Short Term Goals: 5 weeks   1. Pt will be able to complete 10 sit to  30 seconds for increased mobility. MET 8/5/2019  2. Pt will be able to complete TUG in less than 18 seconds in order to decrease his risk for fall. MET 7/5/19- 11.08 sec with small based quad cane, 13.55 sec w/o AD  3. Pt will be compliant with HEP and medicine management. Ongoing, semicompliant  4. Pt will be able to ambulate >300' with/without AD and mod I. MET 7/5/19- pt can ambulate at least 300 ft with small based quad cane      Long Term Goals: 10 weeks   1. Pt will complete TUG in less than 15 seconds in order to decrease his risk for fall. MET 7/5/2019  2. Pt will be able to stand on B LE in SLS for >/= 5 seconds in order to decrease his risk for fall. Ongoing  3. Pt will be able to ascend/decend 5 stairs with 1 hand rail and supervision without cues for safety. MET 8/5/2019  4. Pt will tolerate standing activities for >10 minutes in order to increase his endurance. MET 7/5/2019  5. NEW GOAL: Pt will improve score on FGA to >/= 22 in order to decrease his risk for fall. Ongoing, improved (17/30)  6. NEW GOAL (8/28/2019): Pt will improve his SSWS to .95 without AD in order to normalize gait compared to peers of his age and gender. MET 9/25/2019     Plan   Plan of Care Expiration: 12/31/2019    Continue to perform treadmill ambulation, attempt SLS activities, NBOS ambulation. Improve eccentric control of L LE.      Oc  GARCIA Schroeder, PT  11/20/2019

## 2019-11-20 NOTE — TELEPHONE ENCOUNTER
Epclusa (2 of 3)  refill confirmed. We will ship Epclusa refill on  via fedex to arrive on . $0.00 copay- 004. Confirmed 2 patient identifiers - name and .     Patient has 3 doses of Epclusa remaining and takes it around 8:30-9:00 am daily.  Pt reports they are not having any side effects so far. No missed doses, no new medications, no new allergies or health conditions reported at this time. All questions answered and addressed to patients satisfaction. Pt verbalized understanding.   - Patient asked if [facility] could fill 60 day supply of Epclusa at one time. Patient advised [facility] cannot do that - patient verbalized understanding.

## 2019-11-21 ENCOUNTER — OFFICE VISIT (OUTPATIENT)
Dept: SURGERY | Facility: CLINIC | Age: 69
End: 2019-11-21
Payer: MEDICARE

## 2019-11-21 VITALS
BODY MASS INDEX: 30.86 KG/M2 | DIASTOLIC BLOOD PRESSURE: 84 MMHG | TEMPERATURE: 98 F | WEIGHT: 209 LBS | OXYGEN SATURATION: 95 % | RESPIRATION RATE: 16 BRPM | HEART RATE: 70 BPM | SYSTOLIC BLOOD PRESSURE: 139 MMHG

## 2019-11-21 DIAGNOSIS — K40.30 INGUINAL HERNIA OF LEFT SIDE WITH OBSTRUCTION: Primary | ICD-10-CM

## 2019-11-21 DIAGNOSIS — K40.90 INGUINAL HERNIA OF LEFT SIDE WITHOUT OBSTRUCTION OR GANGRENE: ICD-10-CM

## 2019-11-21 PROCEDURE — 1126F PR PAIN SEVERITY QUANTIFIED, NO PAIN PRESENT: ICD-10-PCS | Mod: ,,, | Performed by: SURGERY

## 2019-11-21 PROCEDURE — 99204 PR OFFICE/OUTPT VISIT, NEW, LEVL IV, 45-59 MIN: ICD-10-PCS | Mod: S$PBB,,, | Performed by: SURGERY

## 2019-11-21 PROCEDURE — 99999 PR PBB SHADOW E&M-EST. PATIENT-LVL V: CPT | Mod: PBBFAC,,, | Performed by: SURGERY

## 2019-11-21 PROCEDURE — 1159F MED LIST DOCD IN RCRD: CPT | Mod: ,,, | Performed by: SURGERY

## 2019-11-21 PROCEDURE — 99204 OFFICE O/P NEW MOD 45 MIN: CPT | Mod: S$PBB,,, | Performed by: SURGERY

## 2019-11-21 PROCEDURE — 1159F PR MEDICATION LIST DOCUMENTED IN MEDICAL RECORD: ICD-10-PCS | Mod: ,,, | Performed by: SURGERY

## 2019-11-21 PROCEDURE — 99999 PR PBB SHADOW E&M-EST. PATIENT-LVL V: ICD-10-PCS | Mod: PBBFAC,,, | Performed by: SURGERY

## 2019-11-21 PROCEDURE — 1126F AMNT PAIN NOTED NONE PRSNT: CPT | Mod: ,,, | Performed by: SURGERY

## 2019-11-21 PROCEDURE — 99215 OFFICE O/P EST HI 40 MIN: CPT | Mod: PBBFAC,PN | Performed by: SURGERY

## 2019-11-21 RX ORDER — ENOXAPARIN SODIUM 300 MG/3ML
40 INJECTION INTRAVENOUS; SUBCUTANEOUS
Status: CANCELLED | OUTPATIENT
Start: 2019-11-21

## 2019-11-21 NOTE — PROGRESS NOTES
History & Physical    SUBJECTIVE:     History of Present Illness:  Patient is a 68 y.o. male presents with a very large left inguinal hernia  States he noticed bulge and discomfort for years  He was incarcerated for last 40 years and has just recently been released a few years ago  He was seen by Urology for ED as well as history of undescended testicle  CT scan showed a very large inguinal hernia  He had a stroke a little over a year ago and has left sided weakness  Since the stroke the hernia has become much more obvious    Chief Complaint   Patient presents with    Consult     hernia       Review of patient's allergies indicates:  No Known Allergies    Current Outpatient Medications   Medication Sig Dispense Refill    amLODIPine (NORVASC) 10 MG tablet Take 1 tablet (10 mg total) by mouth once daily. 90 tablet 3    aspirin 81 MG Chew Take 1 tablet (81 mg total) by mouth once daily.      dorzolamide-timolol 2-0.5% (COSOPT) 22.3-6.8 mg/mL ophthalmic solution Place 1 drop into the left eye 2 (two) times daily. 10 mL 12    metFORMIN (GLUCOPHAGE) 1000 MG tablet Take 1 tablet (1,000 mg total) by mouth 2 (two) times daily with meals. 180 tablet 3    rosuvastatin (CRESTOR) 10 MG tablet Take 1 tablet (10 mg total) by mouth once daily. Take instead of Crestor 20mg while on Epclusa. 90 tablet 0    SITagliptin (JANUVIA) 100 MG Tab Take 1 tablet (100 mg total) by mouth once daily. 90 tablet 3    blood sugar diagnostic Strp Strips and lancets covered by insurance E11.9, Check glucose daily, accu check FREDERICK PLUS (Patient not taking: Reported on 11/21/2019) 100 each 3    blood-glucose meter kit Check glucose daily E11.9 meter covered by insurance (Patient not taking: Reported on 11/21/2019) 1 each 0    diclofenac sodium (VOLTAREN) 1 % Gel Apply 2 g topically once daily. (Patient not taking: Reported on 11/18/2019) 100 g 3    losartan-hydrochlorothiazide 100-12.5 mg (HYZAAR) 100-12.5 mg Tab Take 1 tablet by mouth once  daily.      rosuvastatin (CRESTOR) 20 MG tablet Take 1 tablet (20 mg total) by mouth once daily. (Patient not taking: Reported on 2019) 90 tablet 3    sofosbuvir-velpatasvir 400-100 mg Tab Take 1 tablet by mouth once daily. (Patient not taking: Reported on 2019) 28 tablet 2    tadalafil (CIALIS) 20 MG Tab Take 1 tablet (20 mg total) by mouth daily as needed. 30 tablet 11     No current facility-administered medications for this visit.        Past Medical History:   Diagnosis Date    Cataract     Diabetes mellitus     Glaucoma     Hypertension     Stroke      Past Surgical History:   Procedure Laterality Date    UNDESCENDED TESTICLE EXPLORATION Right     R testicle removed as it was undescended     Family History   Problem Relation Age of Onset    Heart disease Brother         cabg    Hypertension Sister     Amblyopia Neg Hx     Blindness Neg Hx     Cataracts Neg Hx     Glaucoma Neg Hx     Macular degeneration Neg Hx     Strabismus Neg Hx     Retinal detachment Neg Hx     Diabetes Neg Hx      Social History     Tobacco Use    Smoking status: Former Smoker     Packs/day: 1.00     Years: 18.00     Pack years: 18.00     Types: Cigarettes     Last attempt to quit: 1986     Years since quittin.6    Smokeless tobacco: Never Used   Substance Use Topics    Alcohol use: Never     Frequency: Never    Drug use: Never        Review of Systems:  Review of Systems   Constitutional: Negative for appetite change, fatigue, fever and unexpected weight change.   HENT: Negative for sore throat and trouble swallowing.    Eyes: Negative.    Respiratory: Negative for cough, shortness of breath and wheezing.    Cardiovascular: Negative for chest pain and leg swelling.   Gastrointestinal: Negative for abdominal distention, abdominal pain, blood in stool, constipation, diarrhea, nausea and vomiting.   Endocrine: Negative.    Genitourinary: Negative.    Musculoskeletal: Negative for back pain.    Skin: Negative.  Negative for rash.   Allergic/Immunologic: Negative.    Neurological: Negative.         Left sided weakness   Hematological: Negative.    Psychiatric/Behavioral: Negative for confusion.       OBJECTIVE:     Vital Signs (Most Recent)  Temp: 98.3 °F (36.8 °C) (11/21/19 1334)  Pulse: 70 (11/21/19 1334)  Resp: 16 (11/21/19 1334)  BP: 139/84 (11/21/19 1334)  SpO2: 95 % (11/21/19 1334)     94.8 kg (208 lb 15.9 oz)     Physical Exam:  Physical Exam   Constitutional: He is oriented to person, place, and time. He appears well-developed and well-nourished.   HENT:   Head: Normocephalic and atraumatic.   Eyes: EOM are normal.   Neck: Normal range of motion.   Cardiovascular: Normal rate and normal heart sounds.   Pulmonary/Chest: Effort normal.   Abdominal: Soft. Bowel sounds are normal. He exhibits no distension. There is no tenderness.   Musculoskeletal: Normal range of motion.   Neurological: He is alert and oriented to person, place, and time.   Skin: Skin is warm and dry. Capillary refill takes less than 2 seconds.        Psychiatric: He has a normal mood and affect. His behavior is normal.   Nursing note and vitals reviewed.      Laboratory  CBC: Reviewed  CMP: Reviewed  wnl    Diagnostic Results:  CT: Reviewed  Lrge left inguinal hernia    ASSESSMENT/PLAN:     68yM with left inguinal hernia    PLAN:Plan     Open repair on 12/11  Risks and benefits discussed

## 2019-11-21 NOTE — LETTER
November 21, 2019      Loco Jaramillo MD  [address]  [address]  [place] [facility] [address]           [facility]  [address], [facility] [address]  [facility] [address]  Phone: [phone]  Fax: [phone]          Patient: Jae Millan   MR Number: [id number]   YOB: 1950   Date of Visit: 11/21/2019       Dear Dr. Loco Jaramillo:    Thank you for referring Jae Millan to me for evaluation. Attached you will find relevant portions of my assessment and plan of care.    If you have questions, please do not hesitate to call me. I look forward to following Jae Millan along with you.    Sincerely,    Karsten Becker MD    Enclosure  CC:  No Recipients    If you would like to receive this communication electronically, please contact [email or web address] or [phone] to request more information on [employer] Link access.    For providers and/or their staff who would like to refer a patient to [facility], please contact us through our one-stop-shop provider referral line, [facility], at [phone].    If you feel you have received this communication in error or would no longer like to receive these types of communications, please e-mail [email or web address]

## 2019-11-25 ENCOUNTER — LAB VISIT (OUTPATIENT)
Dept: LAB | Facility: HOSPITAL | Age: 69
End: 2019-11-25
Attending: INTERNAL MEDICINE
Payer: MEDICARE

## 2019-11-25 ENCOUNTER — OFFICE VISIT (OUTPATIENT)
Dept: INTERNAL MEDICINE | Facility: CLINIC | Age: 69
End: 2019-11-25
Payer: MEDICARE

## 2019-11-25 VITALS
BODY MASS INDEX: 30.5 KG/M2 | SYSTOLIC BLOOD PRESSURE: 142 MMHG | OXYGEN SATURATION: 98 % | HEART RATE: 82 BPM | DIASTOLIC BLOOD PRESSURE: 82 MMHG | WEIGHT: 205.94 LBS | HEIGHT: 69 IN

## 2019-11-25 DIAGNOSIS — I10 ESSENTIAL HYPERTENSION: Primary | ICD-10-CM

## 2019-11-25 DIAGNOSIS — I63.329 CEREBROVASCULAR ACCIDENT (CVA) DUE TO THROMBOSIS OF ANTERIOR CEREBRAL ARTERY, UNSPECIFIED BLOOD VESSEL LATERALITY: ICD-10-CM

## 2019-11-25 DIAGNOSIS — I10 ESSENTIAL HYPERTENSION: ICD-10-CM

## 2019-11-25 DIAGNOSIS — B18.2 CHRONIC HEPATITIS C WITHOUT HEPATIC COMA: ICD-10-CM

## 2019-11-25 DIAGNOSIS — E78.2 HYPERLIPIDEMIA, MIXED: ICD-10-CM

## 2019-11-25 DIAGNOSIS — K21.9 GASTROESOPHAGEAL REFLUX DISEASE, ESOPHAGITIS PRESENCE NOT SPECIFIED: ICD-10-CM

## 2019-11-25 DIAGNOSIS — E11.40 TYPE 2 DIABETES MELLITUS WITH DIABETIC NEUROPATHY, WITHOUT LONG-TERM CURRENT USE OF INSULIN: ICD-10-CM

## 2019-11-25 LAB
ALBUMIN SERPL BCP-MCNC: 3.9 G/DL (ref 3.5–5.2)
ALP SERPL-CCNC: 53 U/L (ref 55–135)
ALT SERPL W/O P-5'-P-CCNC: 15 U/L (ref 10–44)
ANION GAP SERPL CALC-SCNC: 9 MMOL/L (ref 8–16)
AST SERPL-CCNC: 16 U/L (ref 10–40)
BASOPHILS # BLD AUTO: 0.04 K/UL (ref 0–0.2)
BASOPHILS NFR BLD: 0.5 % (ref 0–1.9)
BILIRUB SERPL-MCNC: 0.5 MG/DL (ref 0.1–1)
BUN SERPL-MCNC: 18 MG/DL (ref 8–23)
CALCIUM SERPL-MCNC: 9.4 MG/DL (ref 8.7–10.5)
CHLORIDE SERPL-SCNC: 108 MMOL/L (ref 95–110)
CO2 SERPL-SCNC: 23 MMOL/L (ref 23–29)
CREAT SERPL-MCNC: 1.3 MG/DL (ref 0.5–1.4)
DIFFERENTIAL METHOD: ABNORMAL
EOSINOPHIL # BLD AUTO: 0.1 K/UL (ref 0–0.5)
EOSINOPHIL NFR BLD: 1.2 % (ref 0–8)
ERYTHROCYTE [DISTWIDTH] IN BLOOD BY AUTOMATED COUNT: 13.9 % (ref 11.5–14.5)
EST. GFR  (AFRICAN AMERICAN): >60 ML/MIN/1.73 M^2
EST. GFR  (NON AFRICAN AMERICAN): 56.9 ML/MIN/1.73 M^2
GLUCOSE SERPL-MCNC: 61 MG/DL (ref 70–110)
HCT VFR BLD AUTO: 44.1 % (ref 40–54)
HGB BLD-MCNC: 14.3 G/DL (ref 14–18)
IMM GRANULOCYTES # BLD AUTO: 0.01 K/UL (ref 0–0.04)
IMM GRANULOCYTES NFR BLD AUTO: 0.1 % (ref 0–0.5)
LYMPHOCYTES # BLD AUTO: 1.6 K/UL (ref 1–4.8)
LYMPHOCYTES NFR BLD: 21.1 % (ref 18–48)
MCH RBC QN AUTO: 26.7 PG (ref 27–31)
MCHC RBC AUTO-ENTMCNC: 32.4 G/DL (ref 32–36)
MCV RBC AUTO: 82 FL (ref 82–98)
MONOCYTES # BLD AUTO: 0.5 K/UL (ref 0.3–1)
MONOCYTES NFR BLD: 7 % (ref 4–15)
NEUTROPHILS # BLD AUTO: 5.2 K/UL (ref 1.8–7.7)
NEUTROPHILS NFR BLD: 70.1 % (ref 38–73)
NRBC BLD-RTO: 0 /100 WBC
PLATELET # BLD AUTO: 246 K/UL (ref 150–350)
PMV BLD AUTO: 11.8 FL (ref 9.2–12.9)
POTASSIUM SERPL-SCNC: 3.8 MMOL/L (ref 3.5–5.1)
PROT SERPL-MCNC: 8.3 G/DL (ref 6–8.4)
RBC # BLD AUTO: 5.36 M/UL (ref 4.6–6.2)
SODIUM SERPL-SCNC: 140 MMOL/L (ref 136–145)
WBC # BLD AUTO: 7.39 K/UL (ref 3.9–12.7)

## 2019-11-25 PROCEDURE — 1159F MED LIST DOCD IN RCRD: CPT | Mod: ,,, | Performed by: INTERNAL MEDICINE

## 2019-11-25 PROCEDURE — 93010 ELECTROCARDIOGRAM REPORT: CPT | Mod: S$PBB,,, | Performed by: INTERNAL MEDICINE

## 2019-11-25 PROCEDURE — 36415 COLL VENOUS BLD VENIPUNCTURE: CPT

## 2019-11-25 PROCEDURE — 99999 PR PBB SHADOW E&M-EST. PATIENT-LVL IV: ICD-10-PCS | Mod: PBBFAC,,, | Performed by: INTERNAL MEDICINE

## 2019-11-25 PROCEDURE — 85025 COMPLETE CBC W/AUTO DIFF WBC: CPT

## 2019-11-25 PROCEDURE — 80053 COMPREHEN METABOLIC PANEL: CPT

## 2019-11-25 PROCEDURE — 93010 EKG 12-LEAD: ICD-10-PCS | Mod: S$PBB,,, | Performed by: INTERNAL MEDICINE

## 2019-11-25 PROCEDURE — 93005 ELECTROCARDIOGRAM TRACING: CPT | Mod: PBBFAC | Performed by: INTERNAL MEDICINE

## 2019-11-25 PROCEDURE — 99214 PR OFFICE/OUTPT VISIT, EST, LEVL IV, 30-39 MIN: ICD-10-PCS | Mod: S$PBB,,, | Performed by: INTERNAL MEDICINE

## 2019-11-25 PROCEDURE — 1126F AMNT PAIN NOTED NONE PRSNT: CPT | Mod: ,,, | Performed by: INTERNAL MEDICINE

## 2019-11-25 PROCEDURE — 99214 OFFICE O/P EST MOD 30 MIN: CPT | Mod: S$PBB,,, | Performed by: INTERNAL MEDICINE

## 2019-11-25 PROCEDURE — 99214 OFFICE O/P EST MOD 30 MIN: CPT | Mod: PBBFAC,25 | Performed by: INTERNAL MEDICINE

## 2019-11-25 PROCEDURE — 1159F PR MEDICATION LIST DOCUMENTED IN MEDICAL RECORD: ICD-10-PCS | Mod: ,,, | Performed by: INTERNAL MEDICINE

## 2019-11-25 PROCEDURE — 99999 PR PBB SHADOW E&M-EST. PATIENT-LVL IV: CPT | Mod: PBBFAC,,, | Performed by: INTERNAL MEDICINE

## 2019-11-25 PROCEDURE — 1126F PR PAIN SEVERITY QUANTIFIED, NO PAIN PRESENT: ICD-10-PCS | Mod: ,,, | Performed by: INTERNAL MEDICINE

## 2019-11-25 RX ORDER — PANTOPRAZOLE SODIUM 40 MG/1
40 TABLET, DELAYED RELEASE ORAL DAILY
Qty: 30 TABLET | Refills: 6 | Status: SHIPPED | OUTPATIENT
Start: 2019-11-25 | End: 2020-02-28 | Stop reason: SDUPTHER

## 2019-11-25 RX ORDER — LOSARTAN POTASSIUM AND HYDROCHLOROTHIAZIDE 12.5; 1 MG/1; MG/1
1 TABLET ORAL DAILY
Qty: 90 TABLET | Refills: 3 | Status: SHIPPED | OUTPATIENT
Start: 2019-11-25 | End: 2020-02-28 | Stop reason: SDUPTHER

## 2019-11-26 ENCOUNTER — TELEPHONE (OUTPATIENT)
Dept: INTERNAL MEDICINE | Facility: CLINIC | Age: 69
End: 2019-11-26

## 2019-11-26 NOTE — TELEPHONE ENCOUNTER
----- Message from Taylor Brewster sent at 11/26/2019  1:04 PM CST -----  Contact: patient [phone]  Patient has been to the pharmacy twice and is being told that protonix has not been ordered. They filled the losartan and you sent the protonix but they said it isn't there. Can you call it in to pharmacy so patient can pick it up soon?       [id number] [place] [address] [phone]      Please call patient to confirm that meds have been called in.

## 2019-11-26 NOTE — TELEPHONE ENCOUNTER
I spokw with  [facility] to confirm Rx for Pantoprozole / they state is has a drug interaction with another drug patient is getting from a different pharmacy. Name of other drug: Epclusa         Please Advise  Thank You

## 2019-12-02 NOTE — PROGRESS NOTES
Subjective:       Patient ID: Jae Millan is a 69 y.o. male.    Chief Complaint: Follow-up (BP Check)    HPIPt doing better - on hep C treatment.  No CP or SOB.  Has GERD symptoms.  Plans LIH repair next month.Pt denies any problems with anesthesia in the past. Pt reports no history of bleeding diathesis, no significant neck DJD, and no steroids in the last year.  Review of Systems   Respiratory: Negative for shortness of breath.    Cardiovascular: Negative for chest pain.   Gastrointestinal: Negative for abdominal pain, diarrhea, nausea and vomiting.   Genitourinary: Negative for dysuria.   Neurological: Negative for seizures, syncope and headaches.       Objective:      Physical Exam   Constitutional: He is oriented to person, place, and time. He appears well-developed and well-nourished. No distress.   HENT:   Mouth/Throat: Oropharynx is clear and moist.   Neck: Neck supple. No JVD present. No thyromegaly present.   Cardiovascular: Normal rate, regular rhythm, normal heart sounds and intact distal pulses. Exam reveals no gallop and no friction rub.   No murmur heard.  Pulmonary/Chest: Effort normal and breath sounds normal. He has no wheezes. He has no rales.   Abdominal: Soft. Bowel sounds are normal. He exhibits no distension and no mass. There is no tenderness. There is no rebound and no guarding.   Musculoskeletal: He exhibits no edema.   Lymphadenopathy:     He has no cervical adenopathy.   Neurological: He is alert and oriented to person, place, and time.   Skin: Skin is warm and dry.   Psychiatric: He has a normal mood and affect. His behavior is normal. Judgment and thought content normal.       Assessment:       1. Essential hypertension    2. Type 2 diabetes mellitus with diabetic neuropathy, without long-term current use of insulin    3. Hyperlipidemia, mixed    4. Chronic hepatitis C without hepatic coma    5. Cerebrovascular accident (CVA) due to thrombosis of anterior cerebral artery, unspecified  blood vessel laterality    6. Gastroesophageal reflux disease, esophagitis presence not specified        Plan:   Essential hypertension  -     EKG 12-lead  -     CBC auto differential; Future; Expected date: 11/25/2019  -     Comprehensive metabolic panel; Future; Expected date: 11/25/2019  Mildly uncontrolled - needs to bring all meds to see if he has everything  Type 2 diabetes mellitus with diabetic neuropathy, without long-term current use of insulin  Controlled - continue current meds    Hyperlipidemia, mixed  Controlled - continue current meds    Chronic hepatitis C without hepatic coma  On therapy  Cerebrovascular accident (CVA) due to thrombosis of anterior cerebral artery, unspecified blood vessel laterality  Unclear etiology  Gastroesophageal reflux disease, esophagitis presence not specified  Try PPI  Other orders  -     pantoprazole (PROTONIX) 40 MG tablet; Take 1 tablet (40 mg total) by mouth once daily.  Dispense: 30 tablet; Refill: 6  -     losartan-hydrochlorothiazide 100-12.5 mg (HYZAAR) 100-12.5 mg Tab; Take 1 tablet by mouth once daily.  Dispense: 90 tablet; Refill: 3    EKG - NSR, LAE, RBBB - no change  Echo - nl EF  CBC, CMP WNL  CXR 4/19 WNL    Pt is at acceptable risk for anesthesia and surgery and at low risk for cardio-pulmonary complications.

## 2019-12-05 NOTE — PROGRESS NOTES
"Outpatient Neurological Rehabilitation   Speech and Language Therapy Daily Note  Date:  12/6/2019     Name: Jae Millan   MRN: [id number]   Therapy Diagnosis:   Encounter Diagnoses   Name Primary?    Oropharyngeal dysphagia     Dysarthria    Physician: Charis Salazar MD  Physician Orders: [id number] - Ambulatory consult to Speech Therapy  Medical Diagnosis: I69.354 (ICD-10-CM) - Hemiparesis affecting left side as late effect of cerebrovascular accident (CVA)  I63.329 (ICD-10-CM) - Cerebrovascular accident (CVA) due to thrombosis of anterior cerebral artery, unspecified blood vessel laterality  R47.1 (ICD-10-CM) - Dysarthria     Visit #/ Visits Authorized: 12/20  Date of Evaluation:  9/9/2019   Insurance Authorization Period: 09/09/2019-12/31/2019  Plan of Care Certification:    9/9/2019 -11/4/19   Extended POC:  1/4/20  Progress Note: due 12/9/19   Visits Cancelled: 2  Visits No Show: 0    Time In:  10:35  Time Out: 11:15  Total Billable Time: 40 min    Precautions: Standard  Subjective:   Pt reports: that he's been good and had a good Thanksgiving. No coughing during Thanksgiving. States that he only occasionally coughs when he drinks something cold, dicussed that mixed consistencies (ice and liquid) can be a difficult thing to control during a swallow and how it might not be a problem with the liquids being "Cold"   He was compliant to home exercise program. Reports completing exercises 3 times per day and sometimes more.   Response to previous treatment: positive   Pain Scale:  0/10 on VAS currently.   Pain Location: n/a  Objective:   UNTIMED  Procedure Min.   Speech- Language- Voice Therapy 0   Dysphagia Therapy 40     Total Timed Units: 0  Total Untimed Units: 1  Charges Billed/# of units: 1    Short Term Goals: (4 weeks) Current Progress:   1. Pt will rate his speech while reading as at least a 2 on a 3 point scale ( 1 = same as before beginning therapy, 2 =better but not best, 3 =best possible outcome) " "on  8 /10 trials.   Goal Met 10/9/19 / Discontinue      2. Pt will differentiate between his speech and error-free speech by giving specific examples of differences on  8 /10 trials.  Goal Met 10/16/19 / Discontinue      3. Pt will identify a self-cue for use of therapeutic speech and use it 5x per session independently Goal Not Met / Discontinue      4. Pt will use motor speech strategies and answer questions in conversation with a self-rating of at least 2 on a 3 point scale ( 1 = same as before beginning therapy, 2 =better but not best, 3 =best possible outcome) on  8 /10 trials.  Goal Met 11/13/19 / Discontinue      5. Pt will recall 3/4 clear speech strategies independently    Goal Met 10/7/19/ Discontinue      6. Pt will participate in MBSS to objectively assess his swallow, rule out silent aspiration, and determine the safest possible diet.     Goal Met 10/3/19 / Discontinue   Impressions/Recs:     "Impressions:  Patient presents with mild oral and moderate pharyngeal Dysphagia. See above.   Recommendations/Precautions:    1. Regular diet  2. Thin liquids  3. Medications buried in puree or crushed   4. Aspiration precautions:   · NO STRAWS  · Very small sips of liquid one at a time OR head turn to the left with all thin liquid swallows  · Excellent and frequent oral care  · Upright for all po intake and remain upright for 30 minutes after intake  · One bite at a time at a slow rate (allow time to complete second swallow per bite)  · Continue to monitor for signs and symptoms of aspiration and discontinue oral feeding should you notice any of the following: watery eyes, reddened facial area, wet vocal quality, increased work of breathing, change in respiratory status, increased congestion, coughing, fever, etc.  5. Initiate Dysphagia therapy with Outpatient Speech Therapy"     7. Pt will complete oral motor exercises x 40 each focusing on labial and lingual muscles given min A to improve articulation.  Goal " Met / Discontinue      8.  Pt will participate in tongue base retraction exercises x 30 given min A to improve swallow function.      Goal Met 11/5/19 / Discontinue  - Hard Effortfull Swallow (thin liquids, applesauce, and dry swallows) x90 (9 sets of 10 reps)   9. Pt will participate in laryngeal elevation exercises x 30 with min A to improve hyolaryngeal excursion.      Goal Met 11/5/19 / Discontinue  Mendelsohn maneuver x20    10. Pt will complete chin tuck against resistance (CTAR) hold x60 seconds x3 with min A to improve hyolaryngeal elevation / excursion.    Goal Met 10/23/19 / Discontinue  - CTAR hold for 60 seconds x3 (total of 3 minutes)    11. Pt will complete chin tuck against resistance (CTAR) repetitions x30 with min A to improve hyolaryngeal elevation / excursion.    Goal Met 10/23/19 / Discontinue  - CTAR 90 repetitions (3 sets of 30)   12. Pt will recall and utilize aspiration precautions and safe swallow strategies independently (no straws, medications crushed/burried in puree, small bites/sips, eat/drink slowly, head turn to the left, frequent oral care, 1 bite at a time, upright for all PO intake/remain upright for all PO intake)    Goal Met 10/23/19 / Discontinue  Pt verbalized aspiration precautions/safe swallow strategies independently. Pt observed to take small sips of thin liquids and perform a head turn to the left indly on 90% of observed liquid swallows this session    13. Pt will participate in po trials of regular consistencies and thin liquids with no overt signs/symptoms of aspiration.     Goal Met 12/6/19 / Discontinue  -Pt observed consuming thin liquids (approximately 9 oz) and small bites of applesauce (1/2 container) throughout session - no overt s/s aspiration appreciated, vocal quality remained clear throughout po trials and no coughing or throat clearing noted throughout po trials        Patient Education/Response:   Discussed SLP's recommendation of repeat instrumental  swallow assessment to objectively assess his swallow, rule out silent aspiration, and determine the safest possible diet though pt states he does not want to participate in repeat assessment because he is no longer coughing with PO intake, therefore he believes his swallowing is WFL. Provided skilled education regarding importance/purpose of instrumental assessments. Discussed overt s/s aspiration, risks of aspiration, and aspiration prevautions. Answered/addressed pt's extensive questions regarding this matter though pt ultimately decided that he does not want to participate in MBSS currently but he will let his doctor know if overt s/s aspiration return or if swallowing difficulty arises.   Written Home Exercises Provided: Yes. Laryngeal elevation/excursion exercises, tongue base retraction exercises, and CTAR exercises. Pt instructed to repeat multisyllabic words 3x each at a fast rate (30 times) to improve speed and coordination of articulators. Pt instructed to complete OME 3x a day. Pt instructed to read sentences aloud (provided) using motor speech strategies.   Exercises were reviewed and Jae was able to demonstrate them prior to the end of the session.  Jae demonstrated good  understanding of the education provided.     Assessment:   Jae baltazar has met all short term goals. Pt tolerated thin liquids (approximately 9 ounces) and puree textures (1/2 container of applesauce) with no overt s/s aspiration. Pt demonstrated ability to peform dysphagia exercises independently. Pt performs head turn to left on approximately 90% of liquid swallows as observed by SLP this session. Pt with decrease in coughing with PO intake. Recommend repeat MBSS to objectively assess his swallow, rule out silent aspiration, and determine the safest possible diet. Pt does not wish to participate in MBSS despite education provided. Pt discharged from [facility] services.     Medical necessity is demonstrated by the following  IMPAIRMENTS:  Reduced speech intelligibility limits functional communication with both familiar and unfamiliar communication partners. Pt's dysphagia puts him at risk for aspiration pneumonia.      Barriers to Therapy: none identified   Pt's spiritual, cultural and educational needs considered and pt agreeable to plan of care and goals.  Plan:   Discharge pt from skilled  services    LINCOLN Shirley, CF-SLP  Speech Language Pathologist   12/6/2019

## 2019-12-06 ENCOUNTER — CLINICAL SUPPORT (OUTPATIENT)
Dept: REHABILITATION | Facility: HOSPITAL | Age: 69
End: 2019-12-06
Attending: INTERNAL MEDICINE
Payer: MEDICARE

## 2019-12-06 DIAGNOSIS — R13.12 OROPHARYNGEAL DYSPHAGIA: ICD-10-CM

## 2019-12-06 DIAGNOSIS — R20.8 DECREASED SENSATION OF LOWER EXTREMITY: ICD-10-CM

## 2019-12-06 DIAGNOSIS — M25.60 RANGE OF MOTION DEFICIT: ICD-10-CM

## 2019-12-06 DIAGNOSIS — R47.1 DYSARTHRIA: ICD-10-CM

## 2019-12-06 DIAGNOSIS — Z74.1 SELF-CARE DEFICIT IN DRESSING: ICD-10-CM

## 2019-12-06 DIAGNOSIS — R29.898 DECREASED STRENGTH OF UPPER EXTREMITY: ICD-10-CM

## 2019-12-06 DIAGNOSIS — I69.354 HEMIPARESIS AFFECTING LEFT SIDE AS LATE EFFECT OF CEREBROVASCULAR ACCIDENT (CVA): ICD-10-CM

## 2019-12-06 DIAGNOSIS — R46.0 SELF-CARE DEFICIT FOR BATHING: ICD-10-CM

## 2019-12-06 DIAGNOSIS — M79.89 SWELLING OF LEFT HAND: ICD-10-CM

## 2019-12-06 DIAGNOSIS — Z74.09 IMPAIRED FUNCTIONAL MOBILITY, BALANCE, GAIT, AND ENDURANCE: ICD-10-CM

## 2019-12-06 PROCEDURE — 92526 ORAL FUNCTION THERAPY: CPT | Mod: PN | Performed by: SPEECH-LANGUAGE PATHOLOGIST

## 2019-12-06 PROCEDURE — 97140 MANUAL THERAPY 1/> REGIONS: CPT | Mod: PN

## 2019-12-06 PROCEDURE — 97116 GAIT TRAINING THERAPY: CPT | Mod: KX,PN | Performed by: PHYSICAL THERAPIST

## 2019-12-06 PROCEDURE — 97112 NEUROMUSCULAR REEDUCATION: CPT | Mod: KX,PN | Performed by: PHYSICAL THERAPIST

## 2019-12-06 PROCEDURE — 97110 THERAPEUTIC EXERCISES: CPT | Mod: PN

## 2019-12-06 PROCEDURE — 97112 NEUROMUSCULAR REEDUCATION: CPT | Mod: PN

## 2019-12-06 NOTE — PROGRESS NOTES
"  Occupational Therapy Daily Treatment Note     Date: 11/4/2019  Name: Jae Millan  Clinic Number: [id number]    Therapy Diagnosis:   Encounter Diagnoses   Name Primary?    Self-care deficit in dressing     Self-care deficit for bathing     Range of motion deficit     Swelling of left hand     Decreased strength of upper extremity      Physician: Maryjane Farias MD       Physician Orders: Eval and treat  Medical Diagnosis: Cerebrovascular accident (CVA) due to thrombosis of anterior cerebral artery, unspecified blood vessel laterality  Evaluation Date: 8/8/2019  Plan of Care Expiration Period: 8/8/19 to 10/31/19 to 1/3/2020  Insurance Authorization period Expiration: 12/31/19  Date of Return to MD: DALIA  FOTO: 11/6/2019 47% Mobility  9/18/2019  47% Mobility  8/26/2019: 47% limited in Mobility    Visit # / Visits authorized: 22 / 25  Time In: 11:15am  Time Out: 12:00pm  Total Billable Time: 45 minutes    Precautions:  Standard, Diabetes and HTN      Subjective     Pt reports: "I feel pretty good after that stretching"   he was compliant with home exercise program given this session.   Response to previous treatment: positive  Functional change: ongoing    Pain:  2/10 with PROM  Location: L shoulder    Objective      Jae received manual therapy for 15 minutes   - Supine PROM of L GH joint: FF/Abd/ER/IR/Sup  - General wrist stretches including               1. Scaphoid on radius               2. Increasing mobility of metacarpals               3. Carpal rolls               4. Increasing mobility towards radial deviation               5. Increasing mobility of wrist towards extension              6. Increasing mobility of wrist towards supination/pronation  - Side lying scap PROM in all planes      Jae participated in neuro re-ed for 15 minutes:  - kinesio tape to L hand for tactile feedback for increased wrist extensor activation    Jae participated in therapeutic exercise for 15 minutes:  - UBE res " 3.0 x6min forward x6min backward for increased L UE activation and strength L hand secured, therapist's assistance needed for set up and safe transfer       Home Exercises and Education Provided     Education provided:   - HEP review  - Progress towards goals     Written Home Exercises Provided: yes.  Exercises were reviewed and Jae was able to demonstrate them prior to the end of the session.  Jae demonstrated good  understanding of the HEP provided.   .   See EMR under Patient Instructions for exercises provided 8/12/2019.        Assessment     Jae had good tolerance to treatment this date. Increased edema and hand slough noted this date in L UE.  Overall, his pain free movement and functional capabilities of his L UE seem to be plateauing. He continues with ROM and strength deficits limiting his full participation in ADL/IADL tasks.     Jae is progressing well towards his goals and there are no updates to goals at this time. Pt prognosis is Fair.     Pt will continue to benefit from skilled outpatient occupational therapy to address the deficits listed in the problem list on initial evaluation provide pt/family education and to maximize pt's level of independence in the home and community environment.     Anticipated barriers to occupational therapy: timeframe of current condition    Pt's spiritual, cultural and educational needs considered and pt agreeable to plan of care and goals.    Goals:  Short Term Goals: 6 weeks    Independent in HEP for self ROM left arm MET 8/19/2019   Pt to consistently elevate left hand to assist with edema when seated MET 8/19/2019   Pt will perform simple meal for self 3 days a week. MET 8/19/2019     LTG GOALS:  Time frame: 12 weeks  LB dressing will improve to 10 minutes completion MET 8/26/2019  Pt will improve his ability to cut using L UE as a passive stablizer MET 10/9/2019    Bathing will improve by completion in 20 minutes     Simple meal prep will be performed 5  x week  MET 10/9/2019 (When sister is not home)  Simple home care will improve to one task 4 x week(sweep, mop, dust, clean sinks)    Plan   Plan to see 2x week for 8 more weeks for increased functional independence at home (1/3/2020)  Updates/Grading for next session:  Progress as tolerated      IRLANDA Galicia

## 2019-12-06 NOTE — PLAN OF CARE
Outpatient Therapy Discharge Summary   Discharge Date: 12/6/2019   Name: Jae Millan  [facility] Number: [id number]  Therapy Diagnosis:   Encounter Diagnoses   Name Primary?    Oropharyngeal dysphagia     Dysarthria      Physician: Charis Salazar MD   Physician Orders: [id number] - Ambulatory consult to Speech Therapy  Medical Diagnosis: I69.354 (ICD-10-CM) - Hemiparesis affecting left side as late effect of cerebrovascular accident (CVA)  I63.329 (ICD-10-CM) - Cerebrovascular accident (CVA) due to thrombosis of anterior cerebral artery, unspecified blood vessel laterality  R47.1 (ICD-10-CM) - Dysarthria    Evaluation Date: 9/9/2019   Date of Last visit: 12/6/19  Total Visits Received: 12  Cancelled Visits: 3  No Show Visits: 0    Assessment    Assessment of Current Status: Pt has demonsrtated ability to compete dysphagia exercises indepednetn;y. Pt with sigfnicant reduction in coughing with PO intake, especially when safe swallow strategies are implemented. Recommended repeat instrumental swallow assessment to objectively   assess his  swallow, rule out silent aspiration, and determine the safest possible diet though pt states he does not want to participate in repeat assessment because he is no longer coughing with PO intake, therefore he believes his swallowing is WFL. Provided skilled education regarding importance/purpose of instrumental assessments. Discussed overt s/s aspiration and risks of aspiration. Answered/addressed pt's extensive questions regarding this matter though pt ultimately decided that he does not want to participate in MBSS currently but he will let his doctor know if overt s/s aspiration return or if swallowing difficulty arises.     Goals:   Short Term Goals: (4 weeks) Current Progress:   1. Pt will rate his speech while reading as at least a 2 on a 3 point scale ( 1 = same as before beginning therapy, 2 =better but not best, 3 =best possible outcome) on  8 /10 trials.   Goal Met 10/9/19  / Discontinue       2. Pt will differentiate between his speech and error-free speech by giving specific examples of differences on  8 /10 trials.  Goal Met 10/16/19 / Discontinue       3. Pt will identify a self-cue for use of therapeutic speech and use it 5x per session independently Goal Not Met / Discontinue       4. Pt will use motor speech strategies and answer questions in conversation with a self-rating of at least 2 on a 3 point scale ( 1 = same as before beginning therapy, 2 =better but not best, 3 =best possible outcome) on  8 /10 trials.  Goal Met 11/13/19 / Discontinue       5. Pt will recall 3/4 clear speech strategies independently  Goal Met 10/7/19/ Discontinue       6. Pt will participate in MBSS to objectively assess his swallow, rule out silent aspiration, and determine the safest possible diet.   Goal Met 10/3/19 / Discontinue    7. Pt will complete oralzmotor exercises x 40 each focusing on labial and lingual muscles given min A to improve articulation.  Goal Met / Discontinue       8.  Pt will participate in tongue base retraction exercises x 30 given min A to improve swallow function.   Goal Met 11/5/19 / Discontinue    9. Pt will participate in laryngeal elevation exercises x 30 with min A to improve hyolaryngeal excursion.   Goal Met 11/5/19 / Discontinue    10. Pt will complete chin tuck against resistance (CTAR) hold x60 seconds x3 with min A to improve hyolaryngeal elevation / excursion. Goal Met 10/23/19 / Discontinue    11. Pt will complete chin tuck against resistance (CTAR) repetitions x30 with min A to improve hyolaryngeal elevation / excursion. Goal Met 10/23/19 / Discontinue    12. Pt will recall and utilize aspiration precautions and safe swallow strategies independently (no straws, medications crushed/burried in puree, small bites/sips, eat/drink slowly, head turn to the left, frequent oral care, 1 bite at a time, upright for all PO intake/remain upright for all PO intake)  Goal  Met 10/23/19 / Discontinue    13. Pt will participate in po trials of regular consistencies and thin liquids with no overt signs/symptoms of aspiration.  Goal Met 12/6/19 / Discontinue         Long Term Goals:  1. He will develop functional and intelligible speech and utilize compensatory strategies through the use of adequate labial and lingual function, increased articulatory precision and speech prosody. Goal Met / Discontinue   2. He  will maintain adequate hydration/nutrition with optimum safety and efficiency of swallowing function on PO intake without overt signs and symptoms of aspiration for regular diet consistencies and thin liquids. Goal Met / Discontinue   3.  He  will utilize compensatory strategies with optimum safety and efficiency of swallowing function on PO intake without overt signs and symptoms of aspiration for regular diet consistencies and thin liquids. Goal Met / Discontinue     RICH NOMS (National Outcome Measure System)  NOMS Swallowing  LEVEL 6: Swallowing is safe, and the individual eats and drinks independently and may rarely require minimal cueing. The individual usually self-cues when difficulty occurs. May need to avoid specific food items (e.g., popcorn and nuts), or require additional time (due to dysphagia).    Discharge reason: Patient has met all of his goals    Plan   This patient is discharged from Speech Therapy    LINCOLN Shirley, CF-SLP  Speech Language Pathologist   12/6/2019

## 2019-12-09 ENCOUNTER — TELEPHONE (OUTPATIENT)
Dept: INTERNAL MEDICINE | Facility: CLINIC | Age: 69
End: 2019-12-09

## 2019-12-09 NOTE — TELEPHONE ENCOUNTER
----- Message from Charisse Conrad sent at 12/9/2019 11:19 AM CST -----  Contact: Yancy Shelley with [facility] [phone]  She faxed over a handicap form over to you in October to be filled out for the pt so he can get a handicap parking space at his apartment complex. She has not received this back yet. Please call her to discuss or fax it back to her at # [phone]

## 2019-12-11 PROBLEM — K40.90 INGUINAL HERNIA OF LEFT SIDE WITHOUT OBSTRUCTION OR GANGRENE: Status: ACTIVE | Noted: 2019-12-11

## 2019-12-18 ENCOUNTER — TELEPHONE (OUTPATIENT)
Dept: PHARMACY | Facility: CLINIC | Age: 69
End: 2019-12-18

## 2019-12-18 NOTE — TELEPHONE ENCOUNTER
Epclusa (3 of 3)  refill confirmed and reassessment complete. We will ship Epclusa refill on  via fedex to arrive on . $0.00 copay- 004. Confirmed 2 patient identifiers - name and . Therapy appropriate.     Patient has 5 doses of Epclusa remaining and takes it around 9:30-10am daily.  Pt reports they are not having any side effects so far. No missed doses reported at this time.     Patient confirmed a new medication started on 19 - Patient confirmed that PCP started him on PROTONIX on . Patient advised that Protonix and Epclusa are not recommended for coadministration. Advised patient to HOLD Protonix during the remainder of treatment. Advised patient to use on Tums, Maalox or Pepto Bismol to treat heartburn or indigestion. Advised patient that these antacids needed to be  from Epclusa by 4 hours. Patient states he takes his Epclusa around 9:30-10am. Advised patient that he MUST wait until 2pm before taking any antacids. Patient verbalized understanding. Will make provider aware.    Allergies reviewed and medication reconciliation complete (reviewed and documented in [facility] and [facility]).  Disease education reviewed (including transmission and prevention). Patient counseled on importance of maintaining adherence and keeping lab appointments which were scheduled. All questions answered and addressed to patients satisfaction. Advised to call [facility] and provider if any issues arise.  Pt verbalized understanding.

## 2019-12-23 ENCOUNTER — OFFICE VISIT (OUTPATIENT)
Dept: SURGERY | Facility: CLINIC | Age: 69
End: 2019-12-23
Payer: MEDICARE

## 2019-12-23 VITALS
RESPIRATION RATE: 16 BRPM | HEART RATE: 76 BPM | SYSTOLIC BLOOD PRESSURE: 150 MMHG | DIASTOLIC BLOOD PRESSURE: 102 MMHG | WEIGHT: 205 LBS | BODY MASS INDEX: 30.28 KG/M2

## 2019-12-23 DIAGNOSIS — E11.40 TYPE 2 DIABETES MELLITUS WITH DIABETIC NEUROPATHY, WITHOUT LONG-TERM CURRENT USE OF INSULIN: Primary | ICD-10-CM

## 2019-12-23 DIAGNOSIS — Z98.890 POST-OPERATIVE STATE: ICD-10-CM

## 2019-12-23 LAB — GLUCOSE SERPL-MCNC: 113 MG/DL (ref 70–110)

## 2019-12-23 PROCEDURE — 99214 OFFICE O/P EST MOD 30 MIN: CPT | Mod: PBBFAC,PN | Performed by: SURGERY

## 2019-12-23 PROCEDURE — 99999 PR PBB SHADOW E&M-EST. PATIENT-LVL IV: ICD-10-PCS | Mod: PBBFAC,,, | Performed by: SURGERY

## 2019-12-23 PROCEDURE — 82962 GLUCOSE BLOOD TEST: CPT | Mod: PBBFAC,PN | Performed by: SURGERY

## 2019-12-23 PROCEDURE — 99024 PR POST-OP FOLLOW-UP VISIT: ICD-10-PCS | Mod: POP,,, | Performed by: SURGERY

## 2019-12-23 PROCEDURE — 99024 POSTOP FOLLOW-UP VISIT: CPT | Mod: POP,,, | Performed by: SURGERY

## 2019-12-23 PROCEDURE — 99999 PR PBB SHADOW E&M-EST. PATIENT-LVL IV: CPT | Mod: PBBFAC,,, | Performed by: SURGERY

## 2019-12-23 RX ORDER — OXYCODONE AND ACETAMINOPHEN 7.5; 325 MG/1; MG/1
1 TABLET ORAL EVERY 6 HOURS PRN
Qty: 20 TABLET | Refills: 0 | Status: SHIPPED | OUTPATIENT
Start: 2019-12-23 | End: 2020-09-18

## 2019-12-23 RX ORDER — OXYCODONE AND ACETAMINOPHEN 7.5; 325 MG/1; MG/1
1 TABLET ORAL EVERY 6 HOURS PRN
Qty: 25 TABLET | Refills: 0 | Status: SHIPPED | OUTPATIENT
Start: 2019-12-23 | End: 2019-12-23

## 2019-12-23 RX ORDER — OXYCODONE AND ACETAMINOPHEN 7.5; 325 MG/1; MG/1
1 TABLET ORAL EVERY 6 HOURS PRN
Qty: 25 TABLET | Refills: 0 | Status: SHIPPED | OUTPATIENT
Start: 2019-12-23 | End: 2020-09-18

## 2019-12-23 RX ORDER — OXYCODONE AND ACETAMINOPHEN 7.5; 325 MG/1; MG/1
1 TABLET ORAL EVERY 6 HOURS PRN
Qty: 20 TABLET | Refills: 0 | Status: SHIPPED | OUTPATIENT
Start: 2019-12-23 | End: 2019-12-23

## 2019-12-26 NOTE — PROGRESS NOTES
Jae Millan is a 69 y.o. male patient.   1. Type 2 diabetes mellitus with diabetic neuropathy, without long-term current use of insulin     Two weeks status post inguinal hernia repair  Pain well controlled  No nausea vomiting  Still is some swelling and seroma in the groin area.  No signs of infections     Past Medical History:   Diagnosis Date    Cataract     Decreased sensation of lower extremity     Diabetes mellitus     Diabetic neuropathy     Dysarthria     Dysphagia     ED (erectile dysfunction)     Glaucoma     Hemiparesis     LEFT side post CVA    Hepatitis C     High cholesterol     Hypertension     Impaired functional mobility, balance, gait, and endurance     Stroke     Urinary frequency      No past surgical history pertinent negatives on file.  Scheduled Meds:  Continuous Infusions:  PRN Meds:    Review of patient's allergies indicates:  No Known Allergies  There are no hospital problems to display for this patient.    Blood pressure (!) 150/102, pulse 76, resp. rate 16, weight 93 kg (205 lb 0.4 oz).    Subjective:   Diet: Adequate intake.  Patient reports no nausea or vomiting.    Activity level: Returning to normal.    Pain control: Well controlled.      Objective:  Vital signs (most recent): Blood pressure (!) 150/102, pulse 76, resp. rate 16, weight 93 kg (205 lb 0.4 oz).  General appearance: Comfortable.    Lungs:  Normal effort.    Heart: Normal rate.    Abdomen: Abdomen is soft.    Bowel sounds:  Bowel sounds are normal.    Tenderness: There is no abdominal tenderness tenderness.    Wound:  Clean (Seroma and inguinal area    ).  Wound drainage description: Swelling and scrotal area, no drainage.    Extremities: There is normal range of motion.    Neurological: The patient is alert.       Assessment & Plan     69-year-old male status post left inguinal hernia repair  Monitor for improvement in seroma  Pain medicine given    Karsten Becker MD  12/26/2019

## 2020-01-03 ENCOUNTER — CLINICAL SUPPORT (OUTPATIENT)
Dept: REHABILITATION | Facility: HOSPITAL | Age: 70
End: 2020-01-03
Attending: INTERNAL MEDICINE
Payer: MEDICARE

## 2020-01-03 DIAGNOSIS — M79.89 SWELLING OF LEFT HAND: ICD-10-CM

## 2020-01-03 DIAGNOSIS — Z74.1 SELF-CARE DEFICIT IN DRESSING: ICD-10-CM

## 2020-01-03 DIAGNOSIS — R29.898 DECREASED STRENGTH OF UPPER EXTREMITY: ICD-10-CM

## 2020-01-03 DIAGNOSIS — R46.0 SELF-CARE DEFICIT FOR BATHING: ICD-10-CM

## 2020-01-03 DIAGNOSIS — M25.60 RANGE OF MOTION DEFICIT: ICD-10-CM

## 2020-01-03 PROCEDURE — 97014 ELECTRIC STIMULATION THERAPY: CPT | Mod: PN

## 2020-01-03 PROCEDURE — 97140 MANUAL THERAPY 1/> REGIONS: CPT | Mod: PN

## 2020-01-03 PROCEDURE — 97112 NEUROMUSCULAR REEDUCATION: CPT | Mod: PN

## 2020-01-03 NOTE — PROGRESS NOTES
"  Occupational Therapy Daily Treatment Note and D/C Summary     Date: 11/4/2019  Name: Jae Millan  Clinic Number: [id number]    Therapy Diagnosis:   Encounter Diagnoses   Name Primary?    Self-care deficit in dressing     Self-care deficit for bathing     Range of motion deficit     Swelling of left hand     Decreased strength of upper extremity      Physician: Maryjane Farias MD       Physician Orders: Eval and treat  Medical Diagnosis: Cerebrovascular accident (CVA) due to thrombosis of anterior cerebral artery, unspecified blood vessel laterality  Evaluation Date: 8/8/2019  Plan of Care Expiration Period: 8/8/19 to 10/31/19 to 1/3/2020  Insurance Authorization period Expiration: 12/31/19  Date of Return to MD: DALIA  FOTO: 11/6/2019 47% Mobility  9/18/2019  47% Mobility  8/26/2019: 47% limited in Mobility    Visit # / Visits authorized: 23 / 25  Time In: 10:30am  Time Out: 11:15am  Total Billable Time: 45 minutes    Precautions:  Standard, Diabetes and HTN      Subjective     Pt reports: "I have a new place now"  he was compliant with home exercise program given this session.   Response to previous treatment: positive  Functional change: ongoing    Pain:  2/10 with PROM  Location: L shoulder    Objective      Moist heat to L shoulder prior to treatment    Jae received manual therapy for 15 minutes   - Supine PROM of L GH joint: FF/Abd/ER/IR/Sup  - General wrist stretches including               1. Scaphoid on radius               2. Increasing mobility of metacarpals               3. Carpal rolls               4. Increasing mobility towards radial deviation               5. Increasing mobility of wrist towards extension              6. Increasing mobility of wrist towards supination/pronation  - Side lying scap PROM in all planes      Jae participated in neuro re-ed for 15 minutes:  - Quadruped WB into L UE while R hand reached for cones and stacked     Jae participated in ESTIM for 15 " minutes:  - [place] citizen 10/10 to L long wrist extensors       Home Exercises and Education Provided     Education provided:   - HEP review  - Progress towards goals     Written Home Exercises Provided: yes.  Exercises were reviewed and Jae was able to demonstrate them prior to the end of the session.  Jae demonstrated good  understanding of the HEP provided.   .   See EMR under Patient Instructions for exercises provided 8/12/2019.        Assessment       Jae had good tolerance to treatment this date. Increased edema this date in L UE. Functional capabilities of L UE have plateaued at this time and he is appropriate for d/c this date.       Jae is progressing well towards his goals and there are no updates to goals at this time. Pt prognosis is Fair.          Anticipated barriers to occupational therapy: timeframe of current condition    Pt's spiritual, cultural and educational needs considered and pt agreeable to plan of care and goals.    Goals:  Short Term Goals: 6 weeks    Independent in HEP for self ROM left arm MET 8/19/2019   Pt to consistently elevate left hand to assist with edema when seated MET 8/19/2019   Pt will perform simple meal for self 3 days a week. MET 8/19/2019     LTG GOALS:  Time frame: 12 weeks  LB dressing will improve to 10 minutes completion MET 8/26/2019  Pt will improve his ability to cut using L UE as a passive stablizer MET 10/9/2019    Bathing will improve by completion in 20 minutes     Simple meal prep will be performed 5 x week  MET 10/9/2019 (When sister is not home)  Simple home care will improve to one task 4 x week(sweep, mop, dust, clean sinks) NOT MET    Plan   D/C this date secondary to plateau of progress     IRLANDA Galicia

## 2020-01-07 ENCOUNTER — EPISODE CHANGES (OUTPATIENT)
Dept: HEPATOLOGY | Facility: CLINIC | Age: 70
End: 2020-01-07

## 2020-01-07 ENCOUNTER — TELEPHONE (OUTPATIENT)
Dept: INTERNAL MEDICINE | Facility: CLINIC | Age: 70
End: 2020-01-07

## 2020-01-07 NOTE — TELEPHONE ENCOUNTER
----- Message from Taylor Hester sent at 1/7/2020 10:04 AM CST -----  Contact: [facility]/ jaclyn/[phone]   called in regards to checking the status of the pt reasonable accommodation verification form. It was faxed to the office on 10-18-19. They would like a response ASAP.   Please advise

## 2020-01-14 ENCOUNTER — PATIENT OUTREACH (OUTPATIENT)
Dept: ADMINISTRATIVE | Facility: OTHER | Age: 70
End: 2020-01-14

## 2020-01-15 ENCOUNTER — LAB VISIT (OUTPATIENT)
Dept: LAB | Facility: HOSPITAL | Age: 70
End: 2020-01-15
Payer: MEDICARE

## 2020-01-15 DIAGNOSIS — B18.2 CHRONIC HEPATITIS C WITHOUT HEPATIC COMA: ICD-10-CM

## 2020-01-15 PROCEDURE — 36415 COLL VENOUS BLD VENIPUNCTURE: CPT

## 2020-01-15 PROCEDURE — 87522 HEPATITIS C REVRS TRNSCRPJ: CPT

## 2020-01-17 ENCOUNTER — OFFICE VISIT (OUTPATIENT)
Dept: SURGERY | Facility: CLINIC | Age: 70
End: 2020-01-17
Payer: MEDICARE

## 2020-01-17 VITALS
TEMPERATURE: 98 F | OXYGEN SATURATION: 98 % | BODY MASS INDEX: 30.85 KG/M2 | DIASTOLIC BLOOD PRESSURE: 103 MMHG | RESPIRATION RATE: 16 BRPM | SYSTOLIC BLOOD PRESSURE: 157 MMHG | WEIGHT: 208.88 LBS | HEART RATE: 71 BPM

## 2020-01-17 DIAGNOSIS — Z98.890 POST-OPERATIVE STATE: Primary | ICD-10-CM

## 2020-01-17 PROCEDURE — 99024 POSTOP FOLLOW-UP VISIT: CPT | Mod: POP,,, | Performed by: SURGERY

## 2020-01-17 PROCEDURE — 99999 PR PBB SHADOW E&M-EST. PATIENT-LVL IV: CPT | Mod: PBBFAC,,, | Performed by: SURGERY

## 2020-01-17 PROCEDURE — 99999 PR PBB SHADOW E&M-EST. PATIENT-LVL IV: ICD-10-PCS | Mod: PBBFAC,,, | Performed by: SURGERY

## 2020-01-17 PROCEDURE — 99024 PR POST-OP FOLLOW-UP VISIT: ICD-10-PCS | Mod: POP,,, | Performed by: SURGERY

## 2020-01-17 PROCEDURE — 99214 OFFICE O/P EST MOD 30 MIN: CPT | Mod: PBBFAC,PN | Performed by: SURGERY

## 2020-01-17 NOTE — PROGRESS NOTES
Jae Millan is a 69 y.o. male patient.   Patient is 4 weeks status post open inguinal hernia repair on left  He has a medium sized seroma in the area  Discussed monitoring for resolution for now  Will rtc in 8 weeks for follow up  No diagnosis found.  Past Medical History:   Diagnosis Date    Cataract     Decreased sensation of lower extremity     Diabetes mellitus     Diabetic neuropathy     Dysarthria     Dysphagia     ED (erectile dysfunction)     Glaucoma     Hemiparesis     LEFT side post CVA    Hepatitis C     High cholesterol     Hypertension     Impaired functional mobility, balance, gait, and endurance     Stroke     Urinary frequency      No past surgical history pertinent negatives on file.  Scheduled Meds:  Continuous Infusions:  PRN Meds:    Review of patient's allergies indicates:  No Known Allergies  There are no hospital problems to display for this patient.    Blood pressure (!) 157/103, pulse 71, temperature 98.4 °F (36.9 °C), resp. rate 16, weight 94.7 kg (208 lb 14.2 oz), SpO2 98 %.    Subjective:   Diet: Adequate intake.  Patient reports no nausea.    Activity level: Returning to normal.    Pain control: Well controlled.      Objective:  Vital signs (most recent): Blood pressure (!) 157/103, pulse 71, temperature 98.4 °F (36.9 °C), resp. rate 16, weight 94.7 kg (208 lb 14.2 oz), SpO2 98 %.  General appearance: Comfortable.    Lungs:  Normal effort.    Heart: Normal rate.    Abdomen: Abdomen is soft.    Bowel sounds:  Bowel sounds are normal.    Tenderness: There is no abdominal tenderness tenderness.    Wound:  Clean.     Seroma to groin.  At least 10 x 10 cm firm at the moment     Assessment:   Condition: In stable condition.          69-year-old male 4 weeks status post open inguinal hernia repair  Patient has a seroma, will monitor for now    Karsten Becker MD  1/17/2020

## 2020-01-18 LAB
HCV RNA SERPL NAA+PROBE-LOG IU: <1.08 LOG (10) IU/ML
HCV RNA SERPL QL NAA+PROBE: NOT DETECTED IU/ML
HCV RNA SPEC NAA+PROBE-ACNC: <12 IU/ML

## 2020-01-21 ENCOUNTER — TELEPHONE (OUTPATIENT)
Dept: HEPATOLOGY | Facility: CLINIC | Age: 70
End: 2020-01-21

## 2020-01-21 ENCOUNTER — EPISODE CHANGES (OUTPATIENT)
Dept: HEPATOLOGY | Facility: CLINIC | Age: 70
End: 2020-01-21

## 2020-01-21 NOTE — TELEPHONE ENCOUNTER
HCV LAB REVIEW  Week 11 of Epclusa, planning on 12 weeks treatment  F2    Pertinent labs:  HCV RNA: <12    pls call pt:  HCV is too low for the lab to count. He should have 1 week of meds left. We will repeat labs in 13 weeks to see if hep C is cured  Thanks     Next labs due / pls schedule:  - CMP and HCV RNA at [facility] 12- 04/19

## 2020-01-26 ENCOUNTER — PATIENT OUTREACH (OUTPATIENT)
Dept: ADMINISTRATIVE | Facility: OTHER | Age: 70
End: 2020-01-26

## 2020-01-27 ENCOUNTER — OFFICE VISIT (OUTPATIENT)
Dept: PHYSICAL MEDICINE AND REHAB | Facility: CLINIC | Age: 70
End: 2020-01-27
Payer: MEDICARE

## 2020-01-27 VITALS
WEIGHT: 208.88 LBS | SYSTOLIC BLOOD PRESSURE: 176 MMHG | DIASTOLIC BLOOD PRESSURE: 90 MMHG | HEIGHT: 70 IN | BODY MASS INDEX: 29.9 KG/M2 | HEART RATE: 69 BPM

## 2020-01-27 DIAGNOSIS — I63.329 CEREBROVASCULAR ACCIDENT (CVA) DUE TO THROMBOSIS OF ANTERIOR CEREBRAL ARTERY, UNSPECIFIED BLOOD VESSEL LATERALITY: ICD-10-CM

## 2020-01-27 DIAGNOSIS — I69.354 HEMIPARESIS AFFECTING LEFT SIDE AS LATE EFFECT OF CEREBROVASCULAR ACCIDENT (CVA): Primary | ICD-10-CM

## 2020-01-27 DIAGNOSIS — Z74.09 IMPAIRED FUNCTIONAL MOBILITY, BALANCE, GAIT, AND ENDURANCE: ICD-10-CM

## 2020-01-27 DIAGNOSIS — R47.1 DYSARTHRIA: ICD-10-CM

## 2020-01-27 PROCEDURE — 99999 PR PBB SHADOW E&M-EST. PATIENT-LVL III: ICD-10-PCS | Mod: PBBFAC,,, | Performed by: PHYSICAL MEDICINE & REHABILITATION

## 2020-01-27 PROCEDURE — 1125F PR PAIN SEVERITY QUANTIFIED, PAIN PRESENT: ICD-10-PCS | Mod: ,,, | Performed by: PHYSICAL MEDICINE & REHABILITATION

## 2020-01-27 PROCEDURE — 99999 PR PBB SHADOW E&M-EST. PATIENT-LVL III: CPT | Mod: PBBFAC,,, | Performed by: PHYSICAL MEDICINE & REHABILITATION

## 2020-01-27 PROCEDURE — 1159F MED LIST DOCD IN RCRD: CPT | Mod: ,,, | Performed by: PHYSICAL MEDICINE & REHABILITATION

## 2020-01-27 PROCEDURE — 99214 OFFICE O/P EST MOD 30 MIN: CPT | Mod: S$PBB,,, | Performed by: PHYSICAL MEDICINE & REHABILITATION

## 2020-01-27 PROCEDURE — 99213 OFFICE O/P EST LOW 20 MIN: CPT | Mod: PBBFAC | Performed by: PHYSICAL MEDICINE & REHABILITATION

## 2020-01-27 PROCEDURE — 99214 PR OFFICE/OUTPT VISIT, EST, LEVL IV, 30-39 MIN: ICD-10-PCS | Mod: S$PBB,,, | Performed by: PHYSICAL MEDICINE & REHABILITATION

## 2020-01-27 PROCEDURE — 1125F AMNT PAIN NOTED PAIN PRSNT: CPT | Mod: ,,, | Performed by: PHYSICAL MEDICINE & REHABILITATION

## 2020-01-27 PROCEDURE — 1159F PR MEDICATION LIST DOCUMENTED IN MEDICAL RECORD: ICD-10-PCS | Mod: ,,, | Performed by: PHYSICAL MEDICINE & REHABILITATION

## 2020-01-27 RX ORDER — DICLOFENAC SODIUM 10 MG/G
2 GEL TOPICAL DAILY
Qty: 3 TUBE | Refills: 2 | Status: SHIPPED | OUTPATIENT
Start: 2020-01-27 | End: 2020-06-02 | Stop reason: SDUPTHER

## 2020-01-27 NOTE — PROGRESS NOTES
Subjective:       Patient ID: Jae Millan is a 69 y.o. male.    Chief Complaint: No chief complaint on file.    HPI     Mr. Millan is a 69-year-old right-handed black male with past medical history of hypertension, diabetes mellitus and ischemic stroke with left hemiparesis, dysarthria and dysphagia in 08/2018.  He had some associated dysarthria and dysphagia.  He presented to the [facility] on 08/27/2019 for stroke management.  He was given a prescription for a narrow base quad cane and a left cock-up wrist splint.  He was already going to physical and occupation therapy.  He was referred to speech therapy.    The patient is coming to the clinic for follow-up.  Since last visit he has been medically stable.  He underwent abdominal hernia repair in 12/2018.    The patient moved away from his sister's house.  He is currently living in a 7th floor apartment with elevator access in a senior citizen complex.  He is independent with feeding, dressing, grooming, toileting and bathing with a shower chair and grab bars.  He requires assistance for meal preparation and homemaking.  Some friends come and help him.  He finished his course of physical, occupational and speech therapy.  He reports subjective improvement in his strength and function.  He denies any dysphagia.  His speech is slightly slurred but it improved.    He complains of intermittent left elbow and left knee pain.  He uses Voltaren gel with some relief.  He was given a prescription for oxycodone/APAP after his abdominal hernia repair.  He has not been taking any for the last few weeks.      Review of Systems   Constitutional: Positive for fatigue. Negative for chills and fever.   HENT: Negative for trouble swallowing.    Eyes: Positive for visual disturbance.   Respiratory: Negative for shortness of breath.    Cardiovascular: Negative for chest pain.   Gastrointestinal: Negative for constipation, nausea and vomiting.    Genitourinary: Positive for difficulty urinating and urgency.   Musculoskeletal: Positive for arthralgias (left elbow, left knee), gait problem and neck stiffness. Negative for back pain and neck pain.   Neurological: Positive for weakness and headaches. Negative for dizziness.   Psychiatric/Behavioral: Positive for sleep disturbance. Negative for behavioral problems.       Objective:      Physical Exam   Constitutional: He appears well-developed and well-nourished. No distress.   HENT:   Head: Normocephalic and atraumatic.   Eyes: EOM are normal.   Neck: Normal range of motion.   Musculoskeletal:   BUE:  ROM:   RUE: full.   LUE: full. Increased tone (modified Estefanía 2). Mild left hand swelling.  Strength:    RUE: 5/5 at shoulder abduction, 5 elbow flexion, 5 elbow extension, 5 hand .   LUE: 2+/5 at shoulder abduction, 3- elbow flexion, 3- elbow extension, 3 hand .  Sensation to pinprick:   RUE: intact.   LUE: intact.  DTR:    RUE: +1 biceps, +1 triceps.   LUE:  +2 biceps, +2 triceps.    BLE:  ROM:   RLE: full.   LLE: full.Increased tone (modified Estefanía 2)  Knee crepitus:   RLE: -ve.   LLE: -ve.   Strength:    RLE: 5/5 at hip flexion, 5 knee extension, 5 ankle DF, 5 PF.   LLE: 3+/5 at hip flexion, 5 knee extension, 5 ankle DF, 5 PF.  Sensation to pinprick:     RLE: intact.      LLE: intact.   DTR:     RLE: +1 knee, +1 ankle.    LLE: +1 knee, +1 ankle.      Gait: slow alysha, using a straight cane in Rt hand.     Neurological: He is alert.   Skin: Skin is warm.   Psychiatric: He has a normal mood and affect. His behavior is normal.   Vitals reviewed.      Assessment:       1. Hemiparesis affecting left side as late effect of cerebrovascular accident (CVA)    2. Cerebrovascular accident (CVA) due to thrombosis of anterior cerebral artery, unspecified blood vessel laterality    3. Dysarthria    4. Impaired functional mobility, balance, gait, and endurance        Plan:         - Continue stroke  prevention care (control HTN, DM, hyperlipidemia; ASA).  - Regular home exercise program was encouraged.  - Continue Voltaren gel as needed for arthralgias.  He was asked to avoid taking Percocet.  - Follow up in about 5 months (around 6/27/2020).      This was a 25 minute visit, 50% of which was spent educating the patient about the diagnosis and the treatment plan.    This note was partly generated with [employer] voice recognition software. I apologize for any possible typographical errors.

## 2020-01-28 ENCOUNTER — EPISODE CHANGES (OUTPATIENT)
Dept: HEPATOLOGY | Facility: CLINIC | Age: 70
End: 2020-01-28

## 2020-02-10 ENCOUNTER — DOCUMENTATION ONLY (OUTPATIENT)
Dept: REHABILITATION | Facility: HOSPITAL | Age: 70
End: 2020-02-10

## 2020-02-10 NOTE — PROGRESS NOTES
Outpatient Therapy Discharge Summary     Name: Jae Millan  [facility] Number: [id number]    Therapy Diagnosis:   Encounter Diagnoses   Name Primary?    Hemiparesis affecting left side as late effect of cerebrovascular accident (CVA)      Decreased sensation of lower extremity      Impaired functional mobility, balance, gait, and endurance Yes      Physician: Maryjane Farias MD       Physician Orders: PT Eval, all treatment after initial eval requires auth  Medical Diagnosis from Referral: Cerebrovascular accident (CVA) due to thrombosis of anterior cerebral artery, unspecified blood vessel laterality    Evaluation Date: 6/6/2019      Date of Last visit: 12/6/2019  Total Visits Received: 34      OBJECTIVE     ROM:   UPPER EXTREMITY--AROM/PROM  Refer to OT report for details          RANGE OF MOTION--LOWER EXTREMITIES  NT formally as pt did not attend last scheduled PT visit.     Strength: manual muscle test grades below   Upper Extremity Strength  Refer to OT report for details     Lower Extremity Strength  NT, pt did not attend last scheduled PT visit      Evaluation 11/19/19   30 second Chair Rise 8 completed with no arms 12 without UE support   TUG 21.25 seconds 8.9 + 7.9 + 7.9 = 8.2 sec without AD   FGA NT 17/30  (no AD)   SSWS NT 1.0 m/s with no AD (6m/5.9sec)   Fast Walking Speed NT 1.3 m/s with no AD (6m/4.6sec)          Gait Assessment:   - AD used: small based quad cane   - Assistance: mod I  - Distance: limited community  - Curb: Mod I  - Ramp:  Mod I      Functional Mobility (Bed mobility, transfers)  Bed mobility: Mod I  Supine to sit: Mod I  Sit to supine: Mod I  Rolling: Mod I  Transfers to bed: Mod I  Transfers to toilet: Mod I  Sit to stand:  Mod I  Stand pivot:  Mod I  Car transfers: NT  Wheelchair mobility: NA  Floor transfers: NT      Assessment    69 year old male with diagnosis of Hemiparesis affecting left side as late effect of cerebrovascular accident (CVA), impaired functional  mobility, balance, gait, and endurance, decreased sensation of lower extremity referred to [facility] received skilled outpatient PT 6/6/2019 to 12/6/2019. Pt did well with PT, meeting 3/4 short term goals and 4/6 long term goal set. Pt demonstrated improvements in general speed of gait and mobility. He also demonstrated a good improvement in general LE strength. Pt scored within normal ranges for TUG without use of assistive device and just below normal for diagnosis in gait velocity. Pt progressed to being able to negotiate house hold distances without an assistive device. Per Functional Gait Assessment, pt continued to present as a risk for falls, but this was not retested prior to d/c as pt did not attend last scheduled PT session. Pt reported that he was not as consistent with HEP as PT recommended, limiting progress and ability to maintain progress during PT sessions. Pt denied any significant falls prior to d/c. Pt d/c due to a planned hernia repair surgery. Pt may return to PT if he experiences a functional decline.       Goals:  Short Term Goals:   1. Pt will be able to complete 10 sit to  30 seconds for increased mobility. MET 8/5/2019  2. Pt will be able to complete TUG in less than 18 seconds in order to decrease his risk for fall. MET 7/5/19- 11.08 sec with small based quad cane, 13.55 sec w/o AD  3. Pt will be compliant with HEP and medicine management. Partially met  4. Pt will be able to ambulate >300' with/without AD and mod I. MET 7/5/19- pt can ambulate at least 300 ft with small based quad cane      Long Term Goals:   1. Pt will complete TUG in less than 15 seconds in order to decrease his risk for fall. MET 7/5/2019  2. Pt will be able to stand on B LE in SLS for >/= 5 seconds in order to decrease his risk for fall. Partially met  3. Pt will be able to ascend/decend 5 stairs with 1 hand rail and supervision without cues for safety. MET 8/5/2019  4. Pt will tolerate  standing activities for >10 minutes in order to increase his endurance. MET 7/5/2019  5. NEW GOAL: Pt will improve score on FGA to >/= 22 in order to decrease his risk for fall. Improved/ not met  6. NEW GOAL (8/28/2019): Pt will improve his SSWS to .95 without AD in order to normalize gait compared to peers of his age and gender. MET 9/25/2019    Discharge reason: Other:  D/c due to hernia repair    Plan   This patient is discharged from Physical Therapy

## 2020-02-28 DIAGNOSIS — B18.2 CHRONIC HEPATITIS C WITHOUT HEPATIC COMA: ICD-10-CM

## 2020-02-28 NOTE — TELEPHONE ENCOUNTER
----- Message from Graham Traore sent at 2/28/2020  1:58 PM CST -----  Contact: Patient [phone]  Patient is returning a phone call.  Who left a message for the patient: Dr beckford   Does patient know what this is regarding:  Previous message request  Comments:  When called stating accidentally pressed the wrong button trying to answer call.    Please call an advise  Thank you

## 2020-02-28 NOTE — TELEPHONE ENCOUNTER
----- Message from Catalina Berumen sent at 2/28/2020 12:15 PM CST -----  Contact: [phone]  Patient is requesting a call from the doctor regarding he lost all his medications. Patient stated he needs all of them refill.      Patient sated the office will be receiving a call from [facility] regarding him.    Please advise, thank you.

## 2020-03-02 DIAGNOSIS — E11.9 TYPE 2 DIABETES MELLITUS WITHOUT COMPLICATION: ICD-10-CM

## 2020-03-02 NOTE — TELEPHONE ENCOUNTER
----- Message from Yamila Sanford sent at 3/2/2020  9:41 AM CST -----  Contact: Self [phone]  Patient is returning a phone call.  Who left a message for the patient: Giovanni  Does patient know what this is regarding:  Need all Meds Refill  Comments:

## 2020-03-03 NOTE — TELEPHONE ENCOUNTER
----- Message from Wanda Gardiner sent at 3/3/2020 11:43 AM CST -----  Contact: Monty [phone]  Rx Refill/Request     Is this a Refill or New Rx:  Refill     Rx Name and Strength:    ALL MEDICATIONS. HE LOST EVERYTHING WHILE MOVING      Preferred Pharmacy with phone number:   [id number] - [place] [address] [phone] (Phone)  [phone] (Fax)       Communication Preference: Monty [phone]    Additional Information:   Mr. Millan states that he has been waiting on a refill for all his medications since Friday. Mr. Millan is out of medication and is requesting to get a refill as soon as possible. Mr. Millan states that he is not sure of which one the provider prescribe for him but he needs everything in his chart.       HE IS REQUESTING A CALL BACK

## 2020-03-08 RX ORDER — VELPATASVIR AND SOFOSBUVIR 100; 400 MG/1; MG/1
1 TABLET, FILM COATED ORAL DAILY
Qty: 28 TABLET | Refills: 2 | OUTPATIENT
Start: 2020-03-08

## 2020-03-08 RX ORDER — METFORMIN HYDROCHLORIDE 1000 MG/1
1000 TABLET ORAL 2 TIMES DAILY WITH MEALS
Qty: 180 TABLET | Refills: 3 | Status: SHIPPED | OUTPATIENT
Start: 2020-03-08 | End: 2020-04-14 | Stop reason: SDUPTHER

## 2020-03-08 RX ORDER — ROSUVASTATIN CALCIUM 10 MG/1
10 TABLET, COATED ORAL DAILY
Qty: 90 TABLET | Refills: 0 | Status: SHIPPED | OUTPATIENT
Start: 2020-03-08 | End: 2020-04-14 | Stop reason: SDUPTHER

## 2020-03-08 RX ORDER — LOSARTAN POTASSIUM AND HYDROCHLOROTHIAZIDE 12.5; 1 MG/1; MG/1
1 TABLET ORAL DAILY
Qty: 90 TABLET | Refills: 3 | Status: SHIPPED | OUTPATIENT
Start: 2020-03-08 | End: 2020-04-14

## 2020-03-08 RX ORDER — PANTOPRAZOLE SODIUM 40 MG/1
40 TABLET, DELAYED RELEASE ORAL DAILY
Qty: 30 TABLET | Refills: 6 | Status: SHIPPED | OUTPATIENT
Start: 2020-03-08 | End: 2020-04-14 | Stop reason: SDUPTHER

## 2020-03-08 RX ORDER — AMLODIPINE BESYLATE 10 MG/1
10 TABLET ORAL DAILY
Qty: 90 TABLET | Refills: 3 | Status: SHIPPED | OUTPATIENT
Start: 2020-03-08 | End: 2020-04-14 | Stop reason: SDUPTHER

## 2020-03-11 ENCOUNTER — PATIENT OUTREACH (OUTPATIENT)
Dept: ADMINISTRATIVE | Facility: OTHER | Age: 70
End: 2020-03-11

## 2020-03-13 ENCOUNTER — OFFICE VISIT (OUTPATIENT)
Dept: SURGERY | Facility: CLINIC | Age: 70
End: 2020-03-13
Payer: MEDICARE

## 2020-03-13 ENCOUNTER — OFFICE VISIT (OUTPATIENT)
Dept: UROLOGY | Facility: CLINIC | Age: 70
End: 2020-03-13
Payer: MEDICARE

## 2020-03-13 VITALS
RESPIRATION RATE: 16 BRPM | BODY MASS INDEX: 29.57 KG/M2 | TEMPERATURE: 98 F | OXYGEN SATURATION: 95 % | SYSTOLIC BLOOD PRESSURE: 151 MMHG | HEART RATE: 74 BPM | DIASTOLIC BLOOD PRESSURE: 92 MMHG | WEIGHT: 203.13 LBS

## 2020-03-13 DIAGNOSIS — N52.9 ERECTILE DYSFUNCTION, UNSPECIFIED ERECTILE DYSFUNCTION TYPE: Primary | ICD-10-CM

## 2020-03-13 DIAGNOSIS — Z98.890 POST-OPERATIVE STATE: Primary | ICD-10-CM

## 2020-03-13 PROCEDURE — 99024 PR POST-OP FOLLOW-UP VISIT: ICD-10-PCS | Mod: POP,,, | Performed by: SURGERY

## 2020-03-13 PROCEDURE — 99999 PR PBB SHADOW E&M-EST. PATIENT-LVL III: CPT | Mod: PBBFAC,,, | Performed by: SURGERY

## 2020-03-13 PROCEDURE — 99213 PR OFFICE/OUTPT VISIT, EST, LEVL III, 20-29 MIN: ICD-10-PCS | Mod: S$PBB,,, | Performed by: NURSE PRACTITIONER

## 2020-03-13 PROCEDURE — 99999 PR PBB SHADOW E&M-EST. PATIENT-LVL III: ICD-10-PCS | Mod: PBBFAC,,, | Performed by: NURSE PRACTITIONER

## 2020-03-13 PROCEDURE — 99213 OFFICE O/P EST LOW 20 MIN: CPT | Mod: PBBFAC,PN | Performed by: NURSE PRACTITIONER

## 2020-03-13 PROCEDURE — 99999 PR PBB SHADOW E&M-EST. PATIENT-LVL III: ICD-10-PCS | Mod: PBBFAC,,, | Performed by: SURGERY

## 2020-03-13 PROCEDURE — 99213 OFFICE O/P EST LOW 20 MIN: CPT | Mod: PBBFAC,27,PN | Performed by: SURGERY

## 2020-03-13 PROCEDURE — 99213 OFFICE O/P EST LOW 20 MIN: CPT | Mod: S$PBB,,, | Performed by: NURSE PRACTITIONER

## 2020-03-13 PROCEDURE — 99024 POSTOP FOLLOW-UP VISIT: CPT | Mod: POP,,, | Performed by: SURGERY

## 2020-03-13 PROCEDURE — 99999 PR PBB SHADOW E&M-EST. PATIENT-LVL III: CPT | Mod: PBBFAC,,, | Performed by: NURSE PRACTITIONER

## 2020-03-13 RX ORDER — SILDENAFIL 100 MG/1
100 TABLET, FILM COATED ORAL DAILY PRN
Qty: 5 TABLET | Refills: 0 | Status: SHIPPED | OUTPATIENT
Start: 2020-03-13 | End: 2020-04-14

## 2020-03-13 NOTE — PATIENT INSTRUCTIONS
Erectile Dysfunction: Erectile Aids and Other Treatments  If you have erectile dysfunction (ED), treatment can help. Certain treatments work directly on your penis. Talk to your doctor about the pros and cons of each. And be sure to learn the correct technique.  Vacuum pump  · You slip a tube over your penis. A simple pump then creates a vacuum that pulls blood into your penis. This causes an erection. You then put a tension ring around the base of your penis to hold in the blood. You then remove the tube. The tension ring must not stay on for more than 30 minutes.  · Risks and complications may include pain in your penis or scrotum. The penis may also feel cool or change color during your erection.    A vacuum pump draws blood into the penis, causing an erection.      Transurethral medicine  You insert the end of a small applicator into your urethra to place a tiny, soft medicine  pellet. The medicine is absorbed into your penis. The drugs relax blood vessels so they can fill with blood. Within about 10 minutes, your penis can become rigid enough for sexual intercourse. To keep your erection, you   Using Vacuum Erection Therapy  The accompanying steps show how to gain and maintain an erection with a vacuum erection therapy system.      Getting started  · Place the rubber tension ring on the open end of the cylinder.  · Apply water-based lubricant to the end of the cylinder.  · Put your penis into the cylinder. Hold the cylinder firmly against your abdomen to create a seal, but take care not to pinch the scrotum.  Gaining an erection  · Squeeze the hand pump or turn on the electric pump. Blood will be drawn into your penis and your penis will become erect and firm.  · Follow the instructions youve been given for using your particular brand of pump.  · It may take some practice before you get optimum erections.  Using the tension ring  · When your penis is fully erect (usually less than 5 minutes), roll the  tension ring off the cylinder onto the base of your penis. The tension ring holds blood in your penis, creating an erection. The area behind the ring remains soft and flexible.  · Remove the cylinder from your penis.  · After no longer than 30 minutes, remove the tension ring from your penis by grasping the tabs on the ring and pulling to stretch the ring.  When to call your healthcare provider  · A very cold penis during erection (some coolness is normal)  · A black-and-blue or significantly darkened penis (some discoloration is normal)  · Pain while using the vacuum device or tension ring  · Lack of an erection or loss of an erection before the tension ring is removed  Note: The tension ring may block ejaculation during orgasm. This is harmless, but will not prevent pregnancy.   Date Last Reviewed: 1/1/2017 © 2000-2017 [employer]. [address] [place] [address]. All rights reserved. This information is not intended as a substitute for professional medical care. Always follow your healthcare professional's instructions.      · may need to use a tension ring.  · Risks and complications may include pain and irritation of your urethra. Get medical help right away if you have an erection that lasts for longer than 4 hours.    A medicated pellet put into the urethra causes an erection.      Self-injections  · You inject a special drug into your penis. The drug relaxes blood vessels so they can fill with blood. Within about 10 minutes, your penis can become rigid enough for sexual intercourse. A tension ring is not needed to maintain your erection. Your doctor can explain the injection process to you.  · Risks and complications may include pain, bleeding, bruising, or scarring. Get medical help right away if you have an erection that lasts for longer than 4 hours.    Medication injected into the side of the penis causes an erection.      Tension ring  · A tension ring is usually used along with  another type of treatment. Once enough blood has flowed into your penis to cause an erection, the tension ring will keep the blood from flowing out again. This maintains the erection. The ring must not stay on for more than 30 minutes. A tension ring is also called a constriction ring or venous flow controller.  · Risks and complications may include pinched, bruised, or irritated skin. If you have an allergic reaction to latex rings, try a silicone ring.    A tension ring keeps blood from flowing out of the erect penis.      Date Last Reviewed: 1/1/2017  © 8913-1783 [employer]. [address] [place] [address], [place] [address]. All rights reserved. This information is not intended as a substitute for professional medical care. Always follow your healthcare professional's instructions.

## 2020-03-13 NOTE — LETTER
March 13, 2020      Karsten Becker MD  [address]  [address]  [place] [address]           [facility]  [address], [facility] [address]  [place] [address]  Phone: [phone]  Fax: [phone]          Patient: Jae Millan   MR Number: [id number]   YOB: 1950   Date of Visit: 3/13/2020       Dear Dr. Karsten Becker:    Thank you for referring Jae Millan to me for evaluation. Attached you will find relevant portions of my assessment and plan of care.    If you have questions, please do not hesitate to call me. I look forward to following Jae Millan along with you.    Sincerely,    Naya Soria, MARLON    Enclosure  CC:  No Recipients    If you would like to receive this communication electronically, please contact [email or web address] or [phone] to request more information on [employer] [facility] access.    For providers and/or their staff who would like to refer a patient to [facility], please contact us through our one-stop-shop provider referral line, [facility], at [phone].    If you feel you have received this communication in error or would no longer like to receive these types of communications, please e-mail [email or web address]

## 2020-03-13 NOTE — PROGRESS NOTES
"Subjective:      Jae Millan is a 69 y.o. male who was presents to discuss his ED.    Erectile Dysfunction  Patient complains of erectile dysfunction. Onset of dysfunction was several years ago and was gradual in onset.  Patient states the nature of difficulty is both attaining and maintaining erection. Full erections occur never. Partial erections occur with masturbation, on awakening and while asleep. Libido is not affected. Risk factors for ED include cardiovascular disease, diabetes mellitus and antihypertensive medications,. Patient denies history of neurologic disease, cranial, spinal, or pelvic trauma, pelvic radiation and beta blocker use. He report long history of DM and HTN. Reports that he was incarcerated for a "very long time"  and was not able to address this concern while in alf. Previous treatment of ED includes cialis.    The following portions of the patient's history were reviewed and updated as appropriate: allergies, current medications, past family history, past medical history, past social history, past surgical history and problem list.    Review of Systems  Constitutional: no fever or chills  ENT: no nasal congestion or sore throat  Respiratory: no cough or shortness of breath  Cardiovascular: no chest pain or palpitations  Gastrointestinal: no nausea or vomiting, tolerating diet  Genitourinary: as per HPI  Hematologic/Lymphatic: no easy bruising or lymphadenopathy  Musculoskeletal: no arthralgias or myalgias  Neurological: no seizures or tremors  Behavioral/Psych: no auditory or visual hallucinations     Objective:   Vitals: There were no vitals taken for this visit.    Physical Exam   General: alert and oriented, no acute distress  Head: normocephalic, atraumatic  Neck: supple, no lymphadenopathy, normal ROM, no masses  Respiratory: Symmetric expansion, non-labored breathing  Cardiovascular: regular rate and rhythm, nomal pulses, no peripheral edema  Abdomen: soft, non tender, non " distended, no palpable masses, no hernias, no hepatomegaly or splenomegaly  Genitourinary: deferred   Lymphatic: no inguinal nodes  Skin: normal coloration and turgor, no rashes, no suspicious skin lesions noted  Neuro: alert and oriented x3, no gross deficits  Psych: normal judgment and insight, normal mood/affect and non-anxious    Physical Exam    Lab Review   Urinalysis demonstrates no specimen   Lab Results   Component Value Date    WBC 7.39 11/25/2019    HGB 14.3 11/25/2019    HCT 44.1 11/25/2019    MCV 82 11/25/2019     11/25/2019     Lab Results   Component Value Date    CREATININE 1.3 11/25/2019    BUN 18 11/25/2019     Lab Results   Component Value Date    PSA 0.09 04/12/2019     Imaging    Assessment:     1. Erectile dysfunction, unspecified erectile dysfunction type        Plan:       --Has tried Cialis in past without success; will trial Viagra per pt request. We discuss that this medication may not be effective given that cilais did not produce erections firm enough for intercourse.   Reviewed pathophysiology and differential diagnosis of erectile dysfunction with the patient. Treatment options, including relative risks and benefits, were discussed. All questions were answered.  Discontinue potentially causitive medications.    Will begin Viagra. No contraindication, Vacuum devices prescribed and use instruction given, We also discussed Penile self-injection as a plan if viagra does not produce desired response, Appropriate warnings and instruction discussed with the patient who verbalizes understanding.  Follow-up in 1 month.

## 2020-03-16 NOTE — PROGRESS NOTES
History & Physical    SUBJECTIVE:     History of Present Illness:  Patient is a 69 y.o. male presents with follow-up from left inguinal hernia repair 2 months ago.  He had a seroma postop but that has since resolved on its own.  Still complains of some discomfort in the area, but no severe pain.  He is having no trouble with bowel movements urination or diet.      Chief Complaint   Patient presents with    Follow-up       Review of patient's allergies indicates:  No Known Allergies    Current Outpatient Medications   Medication Sig Dispense Refill    amLODIPine (NORVASC) 10 MG tablet Take 1 tablet (10 mg total) by mouth once daily. 90 tablet 3    aspirin 81 MG Chew Take 1 tablet (81 mg total) by mouth once daily.      losartan-hydrochlorothiazide 100-12.5 mg (HYZAAR) 100-12.5 mg Tab Take 1 tablet by mouth once daily. 90 tablet 3    metFORMIN (GLUCOPHAGE) 1000 MG tablet Take 1 tablet (1,000 mg total) by mouth 2 (two) times daily with meals. 180 tablet 3    pantoprazole (PROTONIX) 40 MG tablet Take 1 tablet (40 mg total) by mouth once daily. 30 tablet 6    rosuvastatin (CRESTOR) 10 MG tablet Take 1 tablet (10 mg total) by mouth once daily. Take instead of Crestor 20mg while on Epclusa. 90 tablet 0    SITagliptin (JANUVIA) 100 MG Tab Take 1 tablet (100 mg total) by mouth once daily. 90 tablet 3    blood sugar diagnostic Strp Strips and lancets covered by insurance E11.9, Check glucose daily, accu check FREDERICK PLUS (Patient not taking: Reported on 11/21/2019) 100 each 3    blood-glucose meter kit Check glucose daily E11.9 meter covered by insurance (Patient not taking: Reported on 11/21/2019) 1 each 0    diclofenac sodium (VOLTAREN) 1 % Gel Apply 2 g topically once daily. 3 Tube 2    dorzolamide-timolol 2-0.5% (COSOPT) 22.3-6.8 mg/mL ophthalmic solution Place 1 drop into the left eye 2 (two) times daily. (Patient not taking: Reported on 1/17/2020) 10 mL 12    oxyCODONE-acetaminophen (PERCOCET) 5-325 mg per  tablet Take 1 tablet by mouth every 6 (six) hours as needed for Pain. (Patient not taking: Reported on 12/23/2019) 25 tablet 0    oxyCODONE-acetaminophen (PERCOCET) 7.5-325 mg per tablet Take 1 tablet by mouth every 6 (six) hours as needed for Pain. (Patient not taking: Reported on 3/13/2020) 20 tablet 0    oxyCODONE-acetaminophen (PERCOCET) 7.5-325 mg per tablet Take 1 tablet by mouth every 6 (six) hours as needed for Pain. (Patient not taking: Reported on 3/13/2020) 25 tablet 0    sildenafiL (VIAGRA) 100 MG tablet Take 1 tablet (100 mg total) by mouth daily as needed for Erectile Dysfunction. 5 tablet 0    sofosbuvir-velpatasvir 400-100 mg Tab Take 1 tablet by mouth once daily. 28 tablet 2    tadalafil (CIALIS) 20 MG Tab Take 1 tablet (20 mg total) by mouth daily as needed. 30 tablet 11     No current facility-administered medications for this visit.        Past Medical History:   Diagnosis Date    Cataract     Decreased sensation of lower extremity     Diabetes mellitus     Diabetic neuropathy     Dysarthria     Dysphagia     ED (erectile dysfunction)     Glaucoma     Hemiparesis     LEFT side post CVA    Hepatitis C     High cholesterol     Hypertension     Impaired functional mobility, balance, gait, and endurance     Stroke     Urinary frequency      Past Surgical History:   Procedure Laterality Date    INGUINAL HERNIA REPAIR Left 12/11/2019    UNDESCENDED TESTICLE EXPLORATION Right     R testicle removed as it was undescended     Family History   Problem Relation Age of Onset    Heart disease Brother         cabg    Hypertension Sister     Amblyopia Neg Hx     Blindness Neg Hx     Cataracts Neg Hx     Glaucoma Neg Hx     Macular degeneration Neg Hx     Strabismus Neg Hx     Retinal detachment Neg Hx     Diabetes Neg Hx      Social History     Tobacco Use    Smoking status: Former Smoker     Packs/day: 1.00     Years: 18.00     Pack years: 18.00     Types: Cigarettes     Last  attempt to quit: 1986     Years since quittin.9    Smokeless tobacco: Never Used   Substance Use Topics    Alcohol use: Never     Frequency: Never    Drug use: Never        Review of Systems:  Review of Systems   Constitutional: Negative for appetite change, fatigue, fever and unexpected weight change.   HENT: Negative for sore throat and trouble swallowing.    Eyes: Negative.    Respiratory: Negative for cough, shortness of breath and wheezing.    Cardiovascular: Negative for chest pain and leg swelling.   Gastrointestinal: Negative for abdominal distention, abdominal pain, blood in stool, constipation, diarrhea, nausea and vomiting.   Endocrine: Negative.    Genitourinary: Negative.    Musculoskeletal: Negative for back pain.   Skin: Negative.  Negative for rash.   Allergic/Immunologic: Negative.    Neurological: Negative.    Hematological: Negative.    Psychiatric/Behavioral: Negative for confusion.       OBJECTIVE:     Vital Signs (Most Recent)  Temp: 97.7 °F (36.5 °C) (20 1017)  Pulse: 74 (20 1017)  Resp: 16 (20 101)  BP: (!) 151/92 (20 1017)  SpO2: 95 % (20 1017)     92.1 kg (203 lb 2.5 oz)     Physical Exam:  Physical Exam   Constitutional: He is oriented to person, place, and time. He appears well-developed and well-nourished.   HENT:   Head: Normocephalic and atraumatic.   Eyes: EOM are normal.   Neck: Normal range of motion.   Cardiovascular: Normal rate and normal heart sounds.   Pulmonary/Chest: Effort normal.   Abdominal: Soft. Bowel sounds are normal. He exhibits no distension. There is no tenderness.   Well-healed left inguinal incision   Musculoskeletal: Normal range of motion.   Neurological: He is alert and oriented to person, place, and time.   Skin: Skin is warm and dry. Capillary refill takes less than 2 seconds.   Psychiatric: He has a normal mood and affect. His behavior is normal.   Nursing note and vitals reviewed.      ASSESSMENT/PLAN:     Postop  status post left inguinal hernia repair, seroma resolution    PLAN:Plan   Return to clinic p.r.n.    Consult urology for discussion of erectile dysfunction

## 2020-04-07 ENCOUNTER — PATIENT OUTREACH (OUTPATIENT)
Dept: ADMINISTRATIVE | Facility: OTHER | Age: 70
End: 2020-04-07

## 2020-04-14 ENCOUNTER — OFFICE VISIT (OUTPATIENT)
Dept: INTERNAL MEDICINE | Facility: CLINIC | Age: 70
End: 2020-04-14
Payer: MEDICARE

## 2020-04-14 DIAGNOSIS — I10 ESSENTIAL HYPERTENSION: Primary | ICD-10-CM

## 2020-04-14 DIAGNOSIS — E55.9 MILD VITAMIN D DEFICIENCY: ICD-10-CM

## 2020-04-14 DIAGNOSIS — E78.5 HYPERLIPIDEMIA, UNSPECIFIED HYPERLIPIDEMIA TYPE: ICD-10-CM

## 2020-04-14 DIAGNOSIS — E11.40 TYPE 2 DIABETES MELLITUS WITH DIABETIC NEUROPATHY, WITHOUT LONG-TERM CURRENT USE OF INSULIN: ICD-10-CM

## 2020-04-14 DIAGNOSIS — N40.0 BENIGN PROSTATIC HYPERPLASIA, UNSPECIFIED WHETHER LOWER URINARY TRACT SYMPTOMS PRESENT: ICD-10-CM

## 2020-04-14 DIAGNOSIS — H40.32X3 TRAUMATIC GLAUCOMA, LEFT, SEVERE STAGE: ICD-10-CM

## 2020-04-14 DIAGNOSIS — E29.1 HYPOGONADISM IN MALE: ICD-10-CM

## 2020-04-14 DIAGNOSIS — B18.2 CHRONIC HEPATITIS C WITHOUT HEPATIC COMA: ICD-10-CM

## 2020-04-14 DIAGNOSIS — Z12.5 ENCOUNTER FOR SCREENING FOR MALIGNANT NEOPLASM OF PROSTATE: ICD-10-CM

## 2020-04-14 PROCEDURE — 99442 PR PHYSICIAN TELEPHONE EVALUATION 11-20 MIN: ICD-10-PCS | Mod: 95,,, | Performed by: INTERNAL MEDICINE

## 2020-04-14 PROCEDURE — 99442 PR PHYSICIAN TELEPHONE EVALUATION 11-20 MIN: CPT | Mod: 95,,, | Performed by: INTERNAL MEDICINE

## 2020-04-14 RX ORDER — LOSARTAN POTASSIUM AND HYDROCHLOROTHIAZIDE 12.5; 1 MG/1; MG/1
TABLET ORAL
Qty: 90 TABLET | Refills: 3 | Status: SHIPPED | OUTPATIENT
Start: 2020-04-14 | End: 2020-11-13

## 2020-04-14 RX ORDER — METFORMIN HYDROCHLORIDE 1000 MG/1
1000 TABLET ORAL 2 TIMES DAILY WITH MEALS
Qty: 180 TABLET | Refills: 3 | Status: SHIPPED | OUTPATIENT
Start: 2020-04-14 | End: 2021-07-22 | Stop reason: SDUPTHER

## 2020-04-14 RX ORDER — ROSUVASTATIN CALCIUM 10 MG/1
10 TABLET, COATED ORAL DAILY
Qty: 90 TABLET | Refills: 0 | Status: SHIPPED | OUTPATIENT
Start: 2020-04-14 | End: 2020-09-18

## 2020-04-14 RX ORDER — AMLODIPINE BESYLATE 10 MG/1
10 TABLET ORAL DAILY
Qty: 90 TABLET | Refills: 3 | Status: SHIPPED | OUTPATIENT
Start: 2020-04-14 | End: 2021-06-22 | Stop reason: SDUPTHER

## 2020-04-14 RX ORDER — PANTOPRAZOLE SODIUM 40 MG/1
40 TABLET, DELAYED RELEASE ORAL DAILY
Qty: 90 TABLET | Refills: 3 | Status: SHIPPED | OUTPATIENT
Start: 2020-04-14 | End: 2021-07-22 | Stop reason: SDUPTHER

## 2020-04-14 NOTE — TELEPHONE ENCOUNTER
Please reschedule the lab 4/20/2020 to mid to late May 2020 please add the lab I ordered to it today as well thanks.    Please mail lab as well as his future appointments, thanks.

## 2020-04-15 ENCOUNTER — TELEPHONE (OUTPATIENT)
Dept: HEPATOLOGY | Facility: CLINIC | Age: 70
End: 2020-04-15

## 2020-04-15 NOTE — TELEPHONE ENCOUNTER
----- Message from Leia Corbin RN sent at 4/14/2020  2:17 PM CDT -----  Contact: Maryjane Farias MD  F2 fibrosis patient.  [facility] shipped Eplcusa 3 of 3 refill on 12/18/19.  Last note from BRIAN Bianchi stated that patient needed to have SVR 12 lab drawn on April 4/20/20.  SVR 12 lab moved to 5/20/20 I presume because of CV19.  I spoke with patient.  He states that he didn't complete treatment but states that he doesn't have any Epclusa left.  I told him that you would review SVR 12 results, provide us with additional orders and would determine if he needs to return to clinic for a visit.    ----- Message -----  From: Maryjane Farias MD  Sent: 4/14/2020  10:43 AM CDT  To: Leia Corbin RN    Hi,    Mr. Millan did not complete his Hep C treatment can we schedule him an appointment in May to get back on the track. Thanks so much - please stay safe and well !    Dr. Farias

## 2020-04-15 NOTE — TELEPHONE ENCOUNTER
----- Message from Rebecca Negrete sent at 4/15/2020  3:02 PM CDT -----  Contact: self     Requesting an RX refill or new RX.  Is this a refill or new RX:  Refill  RX name and strength: dorzolamide-timolol 2-0.5% (COSOPT) 22.3-6.8 mg/mL ophthalmic solution  Directions (copy/paste from chart):   Place 1 drop into the left eye 2 (two) times daily. - Left Eye  Is this a 30 day or 90 day RX:30  Local pharmacy or mail order pharmacy: local    Pharmacy name and phone # (copy/paste from chart):  [facility]  [phone] (Phone)  [phone] (Fax)  Comments:

## 2020-04-16 ENCOUNTER — TELEPHONE (OUTPATIENT)
Dept: INTERNAL MEDICINE | Facility: CLINIC | Age: 70
End: 2020-04-16

## 2020-04-16 RX ORDER — DORZOLAMIDE HYDROCHLORIDE AND TIMOLOL MALEATE 20; 5 MG/ML; MG/ML
1 SOLUTION/ DROPS OPHTHALMIC 2 TIMES DAILY
Qty: 10 ML | Refills: 2 | Status: SHIPPED | OUTPATIENT
Start: 2020-04-16 | End: 2020-10-09 | Stop reason: SDUPTHER

## 2020-04-18 ENCOUNTER — TELEPHONE (OUTPATIENT)
Dept: INTERNAL MEDICINE | Facility: CLINIC | Age: 70
End: 2020-04-18

## 2020-04-18 NOTE — PROGRESS NOTES
Subjective:       Patient ID: Jae Millan is a 69 y.o. male.    Chief Complaint: FOllow up HTN, DM  HPI The patient location is: home, [place]  The chief complaint leading to consultation is:  FOllow up HTN, DM  Visit type: audio only  Total time spent with patient:14 minutes  Each patient to whom he or she provides medical services by telemedicine is:  (1) informed of the relationship between the physician and patient and the respective role of any other health care provider with respect to management of the patient; and (2) notified that he or she may decline to receive medical services by telemedicine and may withdraw from such care at any time.    Notes:Pt is in his own apartment now.  No CP or SOB.  No GI issues.  Needs to complete his Hep C treatment.  Has hypogonadism as reason for his ED.  Review of Systems   Respiratory: Negative for shortness of breath.    Cardiovascular: Negative for chest pain.   Gastrointestinal: Negative for abdominal pain, diarrhea, nausea and vomiting.   Genitourinary: Negative for dysuria.   Neurological: Negative for seizures, syncope and headaches.       Objective:      Physical Exam  No exam performed  Assessment:       1. Essential hypertension    2. Type 2 diabetes mellitus with diabetic neuropathy, without long-term current use of insulin    3. Hyperlipidemia, unspecified hyperlipidemia type    4. Mild vitamin D deficiency    5. Benign prostatic hyperplasia, unspecified whether lower urinary tract symptoms present    6. Encounter for screening for malignant neoplasm of prostate     7. Hypogonadism in male    8. Chronic hepatitis C without hepatic coma        Plan:   Essential hypertension  -     CBC auto differential; Future; Expected date: 04/14/2020  -     Comprehensive metabolic panel; Future; Expected date: 04/14/2020  Questionable control - needs to check at home if possible    Type 2 diabetes mellitus with diabetic neuropathy, without long-term current use of insulin  -      TSH; Future; Expected date: 04/14/2020  -     Hemoglobin A1c; Future; Expected date: 04/14/2020    Hyperlipidemia, unspecified hyperlipidemia type  -     Lipid panel; Future; Expected date: 04/14/2020    Mild vitamin D deficiency  -     Vitamin D; Future; Expected date: 04/14/2020    Benign prostatic hyperplasia, unspecified whether lower urinary tract symptoms present  -     PSA, Screening; Future; Expected date: 04/14/2020    Encounter for screening for malignant neoplasm of prostate   -     PSA, Screening; Future; Expected date: 04/14/2020    Hypogonadism in male  -     Ambulatory referral/consult to Endocrinology; Future; Expected date: 04/21/2020    Chronic hepatitis C without hepatic coma  -     rosuvastatin (CRESTOR) 10 MG tablet; Take 1 tablet (10 mg total) by mouth once daily. Take instead of Crestor 20mg while on Epclusa for cholesterol  Dispense: 90 tablet; Refill: 0    Other orders  -     blood sugar diagnostic Strp; Strips and lancets covered by insurance E11.9, Check glucose daily, accu check FREDERICK PLUS  Dispense: 100 each; Refill: 3  -     losartan-hydrochlorothiazide 100-12.5 mg (HYZAAR) 100-12.5 mg Tab; One daily for HTN  Dispense: 90 tablet; Refill: 3  -     metFORMIN (GLUCOPHAGE) 1000 MG tablet; Take 1 tablet (1,000 mg total) by mouth 2 (two) times daily with meals. For diabetes  Dispense: 180 tablet; Refill: 3  -     amLODIPine (NORVASC) 10 MG tablet; Take 1 tablet (10 mg total) by mouth once daily. For HTN  Dispense: 90 tablet; Refill: 3  -     SITagliptin (JANUVIA) 100 MG Tab; Take 1 tablet (100 mg total) by mouth once daily. For diabetes  Dispense: 90 tablet; Refill: 3  -     pantoprazole (PROTONIX) 40 MG tablet; Take 1 tablet (40 mg total) by mouth once daily. For stomach  Dispense: 90 tablet; Refill: 3

## 2020-05-20 ENCOUNTER — LAB VISIT (OUTPATIENT)
Dept: LAB | Facility: HOSPITAL | Age: 70
End: 2020-05-20
Payer: MEDICARE

## 2020-05-20 DIAGNOSIS — I10 ESSENTIAL HYPERTENSION: ICD-10-CM

## 2020-05-20 DIAGNOSIS — E78.5 HYPERLIPIDEMIA, UNSPECIFIED HYPERLIPIDEMIA TYPE: ICD-10-CM

## 2020-05-20 DIAGNOSIS — N40.0 BENIGN PROSTATIC HYPERPLASIA, UNSPECIFIED WHETHER LOWER URINARY TRACT SYMPTOMS PRESENT: ICD-10-CM

## 2020-05-20 DIAGNOSIS — B18.2 CHRONIC HEPATITIS C WITHOUT HEPATIC COMA: ICD-10-CM

## 2020-05-20 DIAGNOSIS — E55.9 MILD VITAMIN D DEFICIENCY: ICD-10-CM

## 2020-05-20 DIAGNOSIS — Z12.5 ENCOUNTER FOR SCREENING FOR MALIGNANT NEOPLASM OF PROSTATE: ICD-10-CM

## 2020-05-20 DIAGNOSIS — E11.40 TYPE 2 DIABETES MELLITUS WITH DIABETIC NEUROPATHY, WITHOUT LONG-TERM CURRENT USE OF INSULIN: ICD-10-CM

## 2020-05-20 LAB
25(OH)D3+25(OH)D2 SERPL-MCNC: 14 NG/ML (ref 30–96)
ALBUMIN SERPL BCP-MCNC: 3.7 G/DL (ref 3.5–5.2)
ALBUMIN SERPL BCP-MCNC: 3.7 G/DL (ref 3.5–5.2)
ALP SERPL-CCNC: 66 U/L (ref 55–135)
ALP SERPL-CCNC: 66 U/L (ref 55–135)
ALT SERPL W/O P-5'-P-CCNC: 16 U/L (ref 10–44)
ALT SERPL W/O P-5'-P-CCNC: 16 U/L (ref 10–44)
ANION GAP SERPL CALC-SCNC: 7 MMOL/L (ref 8–16)
ANION GAP SERPL CALC-SCNC: 7 MMOL/L (ref 8–16)
AST SERPL-CCNC: 15 U/L (ref 10–40)
AST SERPL-CCNC: 15 U/L (ref 10–40)
BASOPHILS # BLD AUTO: 0.03 K/UL (ref 0–0.2)
BASOPHILS NFR BLD: 0.5 % (ref 0–1.9)
BILIRUB SERPL-MCNC: 0.5 MG/DL (ref 0.1–1)
BILIRUB SERPL-MCNC: 0.5 MG/DL (ref 0.1–1)
BUN SERPL-MCNC: 16 MG/DL (ref 8–23)
BUN SERPL-MCNC: 16 MG/DL (ref 8–23)
CALCIUM SERPL-MCNC: 9.2 MG/DL (ref 8.7–10.5)
CALCIUM SERPL-MCNC: 9.2 MG/DL (ref 8.7–10.5)
CHLORIDE SERPL-SCNC: 108 MMOL/L (ref 95–110)
CHLORIDE SERPL-SCNC: 108 MMOL/L (ref 95–110)
CHOLEST SERPL-MCNC: 160 MG/DL (ref 120–199)
CHOLEST/HDLC SERPL: 3.6 {RATIO} (ref 2–5)
CO2 SERPL-SCNC: 24 MMOL/L (ref 23–29)
CO2 SERPL-SCNC: 24 MMOL/L (ref 23–29)
COMPLEXED PSA SERPL-MCNC: 0.3 NG/ML (ref 0–4)
CREAT SERPL-MCNC: 1.4 MG/DL (ref 0.5–1.4)
CREAT SERPL-MCNC: 1.4 MG/DL (ref 0.5–1.4)
DIFFERENTIAL METHOD: ABNORMAL
EOSINOPHIL # BLD AUTO: 0.1 K/UL (ref 0–0.5)
EOSINOPHIL NFR BLD: 2 % (ref 0–8)
ERYTHROCYTE [DISTWIDTH] IN BLOOD BY AUTOMATED COUNT: 14.6 % (ref 11.5–14.5)
EST. GFR  (AFRICAN AMERICAN): 59.7 ML/MIN/1.73 M^2
EST. GFR  (AFRICAN AMERICAN): 59.7 ML/MIN/1.73 M^2
EST. GFR  (NON AFRICAN AMERICAN): 51.6 ML/MIN/1.73 M^2
EST. GFR  (NON AFRICAN AMERICAN): 51.6 ML/MIN/1.73 M^2
ESTIMATED AVG GLUCOSE: 163 MG/DL (ref 68–131)
GLUCOSE SERPL-MCNC: 165 MG/DL (ref 70–110)
GLUCOSE SERPL-MCNC: 165 MG/DL (ref 70–110)
HBA1C MFR BLD HPLC: 7.3 % (ref 4–5.6)
HCT VFR BLD AUTO: 38.9 % (ref 40–54)
HDLC SERPL-MCNC: 45 MG/DL (ref 40–75)
HDLC SERPL: 28.1 % (ref 20–50)
HGB BLD-MCNC: 12.9 G/DL (ref 14–18)
IMM GRANULOCYTES # BLD AUTO: 0.01 K/UL (ref 0–0.04)
IMM GRANULOCYTES NFR BLD AUTO: 0.2 % (ref 0–0.5)
LDLC SERPL CALC-MCNC: 92.2 MG/DL (ref 63–159)
LYMPHOCYTES # BLD AUTO: 1.3 K/UL (ref 1–4.8)
LYMPHOCYTES NFR BLD: 22.9 % (ref 18–48)
MCH RBC QN AUTO: 26.9 PG (ref 27–31)
MCHC RBC AUTO-ENTMCNC: 33.2 G/DL (ref 32–36)
MCV RBC AUTO: 81 FL (ref 82–98)
MONOCYTES # BLD AUTO: 0.4 K/UL (ref 0.3–1)
MONOCYTES NFR BLD: 6.6 % (ref 4–15)
NEUTROPHILS # BLD AUTO: 3.7 K/UL (ref 1.8–7.7)
NEUTROPHILS NFR BLD: 67.8 % (ref 38–73)
NONHDLC SERPL-MCNC: 115 MG/DL
NRBC BLD-RTO: 0 /100 WBC
PLATELET # BLD AUTO: 242 K/UL (ref 150–350)
PMV BLD AUTO: 11.3 FL (ref 9.2–12.9)
POTASSIUM SERPL-SCNC: 3.9 MMOL/L (ref 3.5–5.1)
POTASSIUM SERPL-SCNC: 3.9 MMOL/L (ref 3.5–5.1)
PROT SERPL-MCNC: 7.9 G/DL (ref 6–8.4)
PROT SERPL-MCNC: 7.9 G/DL (ref 6–8.4)
RBC # BLD AUTO: 4.79 M/UL (ref 4.6–6.2)
SODIUM SERPL-SCNC: 139 MMOL/L (ref 136–145)
SODIUM SERPL-SCNC: 139 MMOL/L (ref 136–145)
TRIGL SERPL-MCNC: 114 MG/DL (ref 30–150)
TSH SERPL DL<=0.005 MIU/L-ACNC: 1.51 UIU/ML (ref 0.4–4)
WBC # BLD AUTO: 5.46 K/UL (ref 3.9–12.7)

## 2020-05-20 PROCEDURE — 83036 HEMOGLOBIN GLYCOSYLATED A1C: CPT

## 2020-05-20 PROCEDURE — 36415 COLL VENOUS BLD VENIPUNCTURE: CPT

## 2020-05-20 PROCEDURE — 80053 COMPREHEN METABOLIC PANEL: CPT

## 2020-05-20 PROCEDURE — 80061 LIPID PANEL: CPT

## 2020-05-20 PROCEDURE — 87522 HEPATITIS C REVRS TRNSCRPJ: CPT

## 2020-05-20 PROCEDURE — 84153 ASSAY OF PSA TOTAL: CPT

## 2020-05-20 PROCEDURE — 85025 COMPLETE CBC W/AUTO DIFF WBC: CPT

## 2020-05-20 PROCEDURE — 82306 VITAMIN D 25 HYDROXY: CPT

## 2020-05-20 PROCEDURE — 84443 ASSAY THYROID STIM HORMONE: CPT

## 2020-05-21 ENCOUNTER — PATIENT OUTREACH (OUTPATIENT)
Dept: ADMINISTRATIVE | Facility: OTHER | Age: 70
End: 2020-05-21

## 2020-05-25 ENCOUNTER — EPISODE CHANGES (OUTPATIENT)
Dept: HEPATOLOGY | Facility: CLINIC | Age: 70
End: 2020-05-25

## 2020-05-25 NOTE — PATIENT INSTRUCTIONS
Erectile Dysfunction: Erectile Aids and Other Treatments  If you have erectile dysfunction (ED), treatment can help. Certain treatments work directly on your penis. Talk to your doctor about the pros and cons of each. And be sure to learn the correct technique.  Vacuum pump  · You slip a tube over your penis. A simple pump then creates a vacuum that pulls blood into your penis. This causes an erection. You then put a tension ring around the base of your penis to hold in the blood. You then remove the tube. The tension ring must not stay on for more than 30 minutes.  · Risks and complications may include pain in your penis or scrotum. The penis may also feel cool or change color during your erection.    A vacuum pump draws blood into the penis, causing an erection.      Transurethral medicine  · You insert the end of a small applicator into your urethra to place a tiny, soft medicine  pellet. The medicine is absorbed into your penis. The drugs relax blood vessels so they can fill with blood. Within about 10 minutes, your penis can become rigid enough for sexual intercourse. To keep your erection, you may need to use a tension ring.  · Risks and complications may include pain and irritation of your urethra. Get medical help right away if you have an erection that lasts for longer than 4 hours.    A medicated pellet put into the urethra causes an erection.      Self-injections  · You inject a special drug into your penis. The drug relaxes blood vessels so they can fill with blood. Within about 10 minutes, your penis can become rigid enough for sexual intercourse. A tension ring is not needed to maintain your erection. Your doctor can explain the injection process to you.  · Risks and complications may include pain, bleeding, bruising, or scarring. Get medical help right away if you have an erection that lasts for longer than 4 hours.    Medication injected into the side of the penis causes an erection.      Tension  ring  · A tension ring is usually used along with another type of treatment. Once enough blood has flowed into your penis to cause an erection, the tension ring will keep the blood from flowing out again. This maintains the erection. The ring must not stay on for more than 30 minutes. A tension ring is also called a constriction ring or venous flow controller.  · Risks and complications may include pinched, bruised, or irritated skin. If you have an allergic reaction to latex rings, try a silicone ring.    A tension ring keeps blood from flowing out of the erect penis.      Date Last Reviewed: 1/1/2017 © 2000-2017 [employer]. [address] [place] [address], [place] [address]. All rights reserved. This information is not intended as a substitute for professional medical care. Always follow your healthcare professional's instructions.        Using Vacuum Erection Therapy  The accompanying steps show how to gain and maintain an erection with a vacuum erection therapy system.      Getting started  · Place the rubber tension ring on the open end of the cylinder.  · Apply water-based lubricant to the end of the cylinder.  · Put your penis into the cylinder. Hold the cylinder firmly against your abdomen to create a seal, but take care not to pinch the scrotum.  Gaining an erection  · Squeeze the hand pump or turn on the electric pump. Blood will be drawn into your penis and your penis will become erect and firm.  · Follow the instructions youve been given for using your particular brand of pump.  · It may take some practice before you get optimum erections.  Using the tension ring  · When your penis is fully erect (usually less than 5 minutes), roll the tension ring off the cylinder onto the base of your penis. The tension ring holds blood in your penis, creating an erection. The area behind the ring remains soft and flexible.  · Remove the cylinder from your penis.  · After no longer than 30 minutes, remove the  tension ring from your penis by grasping the tabs on the ring and pulling to stretch the ring.  When to call your healthcare provider  · A very cold penis during erection (some coolness is normal)  · A black-and-blue or significantly darkened penis (some discoloration is normal)  · Pain while using the vacuum device or tension ring  · Lack of an erection or loss of an erection before the tension ring is removed  Note: The tension ring may block ejaculation during orgasm. This is harmless, but will not prevent pregnancy.   Date Last Reviewed: 1/1/2017  © 4587-3595 [employer]. [address] [place] [id number]. All rights reserved. This information is not intended as a substitute for professional medical care. Always follow your healthcare professional's instructions.

## 2020-05-25 NOTE — PROGRESS NOTES
"Subjective:      Jae Millan is a 69 y.o. male who returns today regarding his ED.     Erectile Dysfunction  Patient complains of erectile dysfunction. Onset of dysfunction was several years ago and was gradual in onset.  Patient states the nature of difficulty is both attaining and maintaining erection. Full erections occur never. Partial erections occur with masturbation, on awakening and while asleep. Libido is not affected. Risk factors for ED include cardiovascular disease, diabetes mellitus and antihypertensive medications,. Patient denies history of neurologic disease, cranial, spinal, or pelvic trauma, pelvic radiation and beta blocker use. He report long history of DM and HTN. Reports that he was incarcerated for a "very long time"  and was not able to address this concern while in intermediate. Previous treatment of ED includes Cialis and Viagra. He has not tried ADALID.  Of note, he has hx of right orchiectomy with right testicular prosthesis. Ct scan ordered last year to assess hernia revealed atrophic left testis. He questions whether he has low testosterone leading to ED, however reports strong sexual desire, denies weight gain, denies changes in mood.       The following portions of the patient's history were reviewed and updated as appropriate: allergies, current medications, past family history, past medical history, past social history, past surgical history and problem list.    Review of Systems  A comprehensive multipoint review of systems was negative except as otherwise stated in the HPI.     Objective:   Vitals: BP (!) 157/82 (BP Location: Right arm, Patient Position: Sitting, BP Method: Large (Automatic))   Pulse 74   Temp 98.3 °F (36.8 °C)   Wt 96.6 kg (213 lb 1.2 oz)   SpO2 97%   BMI 31.01 kg/m²     Physical Exam   General: alert and oriented, no acute distress  Respiratory: Symmetric expansion, non-labored breathing  Cardiovascular: regular rate and rhythm, no peripheral edema  Abdomen: " soft, non distended  Genitourinary: no penile lesions or discharge, no testicular masses, normal scrotum  Rectal: defer   Skin: normal coloration and turgor, no rashes, no suspicious skin lesions noted  **edema noted to left arm and left leg  Neuro: no gross deficits  Psych: normal judgment and insight, normal mood/affect and non-anxious    Lab Review   Urinalysis demonstrates no sample  Lab Results   Component Value Date    WBC 5.46 05/20/2020    HGB 12.9 (L) 05/20/2020    HCT 38.9 (L) 05/20/2020    MCV 81 (L) 05/20/2020     05/20/2020     Lab Results   Component Value Date    CREATININE 1.4 05/20/2020    CREATININE 1.4 05/20/2020    BUN 16 05/20/2020    BUN 16 05/20/2020     Lab Results   Component Value Date    PSA 0.30 05/20/2020       Imaging   Impression       Large fat containing left inguinal hernia as above.    Circumferential calcification of the right testicle. Diminutive left testicle.    No acute intra-abdominal process identified.      Electronically signed by: Joel Harrell MD  Date: 10/31/2019  Time: 11:27       Assessment and Plan:   1. Erectile dysfunction, unspecified erectile dysfunction type    2. Atrophic testicle    3. History of unilateral orchiectomy      We discussed ED and the contributing factors. We reviewed his personal factors that contribute to ED. Patient was educated on ED treatments. We discussed PDE-5 inhibitors, ADALID, and IPP.  Medications can increase the risk of erections lasting longer than 4 hours. This is known as a priapism and is a medical emergency. This requires immediate medical attention. Pt expresses frustration with oral medications not working. He does not wish to try injection sat this time. Will trial ADALID.   --Pt has testosterone panel 4/19, results were 301-- this lab was not collected prior to 10 am therefore cannot be used to determine if testosterone is consistent with hypogonadism. May consider additional labs after trail of ADALID.   --FU with PCP for  left arm edema  --FU with me in 1 month to discuss results of ADALID

## 2020-05-31 ENCOUNTER — TELEPHONE (OUTPATIENT)
Dept: HEPATOLOGY | Facility: CLINIC | Age: 70
End: 2020-05-31

## 2020-05-31 DIAGNOSIS — Z86.19 HISTORY OF HEPATITIS C: ICD-10-CM

## 2020-05-31 PROBLEM — B18.2 CHRONIC HEPATITIS C WITHOUT HEPATIC COMA: Status: RESOLVED | Noted: 2019-09-05 | Resolved: 2020-05-31

## 2020-06-01 ENCOUNTER — PATIENT OUTREACH (OUTPATIENT)
Dept: ADMINISTRATIVE | Facility: OTHER | Age: 70
End: 2020-06-01

## 2020-06-01 ENCOUNTER — OFFICE VISIT (OUTPATIENT)
Dept: UROLOGY | Facility: CLINIC | Age: 70
End: 2020-06-01
Payer: MEDICARE

## 2020-06-01 VITALS
DIASTOLIC BLOOD PRESSURE: 82 MMHG | TEMPERATURE: 98 F | HEART RATE: 74 BPM | SYSTOLIC BLOOD PRESSURE: 157 MMHG | WEIGHT: 213.06 LBS | OXYGEN SATURATION: 97 % | BODY MASS INDEX: 31.01 KG/M2

## 2020-06-01 DIAGNOSIS — N50.0 ATROPHIC TESTICLE: ICD-10-CM

## 2020-06-01 DIAGNOSIS — Z90.79 HISTORY OF UNILATERAL ORCHIECTOMY: ICD-10-CM

## 2020-06-01 DIAGNOSIS — N52.9 ERECTILE DYSFUNCTION, UNSPECIFIED ERECTILE DYSFUNCTION TYPE: Primary | ICD-10-CM

## 2020-06-01 PROCEDURE — 99213 OFFICE O/P EST LOW 20 MIN: CPT | Mod: S$PBB,,, | Performed by: NURSE PRACTITIONER

## 2020-06-01 PROCEDURE — 99999 PR PBB SHADOW E&M-EST. PATIENT-LVL IV: CPT | Mod: PBBFAC,,, | Performed by: NURSE PRACTITIONER

## 2020-06-01 PROCEDURE — 99213 PR OFFICE/OUTPT VISIT, EST, LEVL III, 20-29 MIN: ICD-10-PCS | Mod: S$PBB,,, | Performed by: NURSE PRACTITIONER

## 2020-06-01 PROCEDURE — 99999 PR PBB SHADOW E&M-EST. PATIENT-LVL IV: ICD-10-PCS | Mod: PBBFAC,,, | Performed by: NURSE PRACTITIONER

## 2020-06-01 PROCEDURE — 99214 OFFICE O/P EST MOD 30 MIN: CPT | Mod: PBBFAC,PN | Performed by: NURSE PRACTITIONER

## 2020-06-01 NOTE — TELEPHONE ENCOUNTER
HCV LAB REVIEW  Completed 12 weeks of Epclusa, 1/2020  F2    SVR12 labs not done 4/2020    Pertinent labs: 5/20/20  HCV RNA: <12, not detected    These labs document SVR following successful HCV treatment with Epclusa    please tell patient:  1.) Lab test shows there is NO Hepatitis C in the blood. This means the Hepatitis C is cured!!  We do not expect the virus to return.  This does not give protection from Hepatitis C and patient could be infected again if ever exposed to the virus again.      Please schedule   - HCV RNA in 6 months

## 2020-06-02 ENCOUNTER — OFFICE VISIT (OUTPATIENT)
Dept: PHYSICAL MEDICINE AND REHAB | Facility: CLINIC | Age: 70
End: 2020-06-02
Payer: MEDICARE

## 2020-06-02 VITALS
DIASTOLIC BLOOD PRESSURE: 96 MMHG | WEIGHT: 213.88 LBS | HEART RATE: 69 BPM | HEIGHT: 70 IN | SYSTOLIC BLOOD PRESSURE: 179 MMHG | BODY MASS INDEX: 30.62 KG/M2

## 2020-06-02 DIAGNOSIS — R47.1 DYSARTHRIA: ICD-10-CM

## 2020-06-02 DIAGNOSIS — Z74.09 IMPAIRED MOBILITY AND ADLS: ICD-10-CM

## 2020-06-02 DIAGNOSIS — I69.354 HEMIPARESIS AFFECTING LEFT SIDE AS LATE EFFECT OF CEREBROVASCULAR ACCIDENT (CVA): Primary | ICD-10-CM

## 2020-06-02 DIAGNOSIS — M25.50 ARTHRALGIA, UNSPECIFIED JOINT: ICD-10-CM

## 2020-06-02 DIAGNOSIS — M79.2 NEUROPATHIC PAIN: ICD-10-CM

## 2020-06-02 DIAGNOSIS — I63.329 CEREBROVASCULAR ACCIDENT (CVA) DUE TO THROMBOSIS OF ANTERIOR CEREBRAL ARTERY, UNSPECIFIED BLOOD VESSEL LATERALITY: ICD-10-CM

## 2020-06-02 DIAGNOSIS — Z78.9 IMPAIRED MOBILITY AND ADLS: ICD-10-CM

## 2020-06-02 PROCEDURE — 99213 OFFICE O/P EST LOW 20 MIN: CPT | Mod: PBBFAC | Performed by: PHYSICAL MEDICINE & REHABILITATION

## 2020-06-02 PROCEDURE — 99214 PR OFFICE/OUTPT VISIT, EST, LEVL IV, 30-39 MIN: ICD-10-PCS | Mod: S$PBB,,, | Performed by: PHYSICAL MEDICINE & REHABILITATION

## 2020-06-02 PROCEDURE — 99999 PR PBB SHADOW E&M-EST. PATIENT-LVL III: ICD-10-PCS | Mod: PBBFAC,,, | Performed by: PHYSICAL MEDICINE & REHABILITATION

## 2020-06-02 PROCEDURE — 99214 OFFICE O/P EST MOD 30 MIN: CPT | Mod: S$PBB,,, | Performed by: PHYSICAL MEDICINE & REHABILITATION

## 2020-06-02 PROCEDURE — 99999 PR PBB SHADOW E&M-EST. PATIENT-LVL III: CPT | Mod: PBBFAC,,, | Performed by: PHYSICAL MEDICINE & REHABILITATION

## 2020-06-02 RX ORDER — DICLOFENAC SODIUM 10 MG/G
2 GEL TOPICAL 3 TIMES DAILY PRN
Qty: 5 TUBE | Refills: 2 | Status: SHIPPED | OUTPATIENT
Start: 2020-06-02 | End: 2023-09-25

## 2020-06-02 RX ORDER — GABAPENTIN 300 MG/1
300 CAPSULE ORAL NIGHTLY
Qty: 90 CAPSULE | Refills: 2 | Status: SHIPPED | OUTPATIENT
Start: 2020-06-02 | End: 2020-10-06 | Stop reason: SDUPTHER

## 2020-06-02 NOTE — PROGRESS NOTES
Subjective:       Patient ID: Jae Millan is a 69 y.o. male.    Chief Complaint: No chief complaint on file.    HPI     Mr. Millan is a 69-year-old right-handed black male with past medical history of hypertension, diabetes mellitus and ischemic stroke with left hemiparesis, dysarthria and dysphagia in 08/2018.  He is followed up in the [facility] for his stroke care and joint pain.  His last visit was on 01/27/2020.  He was maintained on Voltaren gel.    The patient is coming to the clinic for follow-up.  Since last visit he has been medically stable.  However, he still complains of impaired activities of daily living including eating a long time for dressing, grooming, toileting and bathing.  He also has trouble with word finding.    Has been complaining of left-sided body pain, recently worse.  It is an almost constant sensation of aching and numbness.  It is worse in his shoulder, elbow, hand and knee It is aggravated by lying down and better with gentle movement.  His maximum pain is 8-9/10 and minimum 5-6/10.  Today it is 7/10.  He denies any worsening weakness or sensory impairment.  He has been ambulating with a straight cane.    He is currently taking Tylenol 500 mg tablets, 2 tablets once per day.  He uses Voltaren gel as needed with good relief.        Past Medical History:   Diagnosis Date    Cataract     Decreased sensation of lower extremity     Diabetes mellitus     Diabetic neuropathy     Dysarthria     Dysphagia     ED (erectile dysfunction)     Glaucoma     Hemiparesis     LEFT side post CVA    High cholesterol     History of hepatitis C, s/p successful Rx w/ cure (SVR12) 4/2020     Hypertension     Impaired functional mobility, balance, gait, and endurance     Stroke     Urinary frequency          Review of Systems   Constitutional: Positive for fatigue. Negative for chills and fever.   HENT: Negative for trouble swallowing and voice change.    Eyes: Positive for  visual disturbance.   Respiratory: Negative for shortness of breath.    Cardiovascular: Negative for chest pain.   Gastrointestinal: Negative for constipation, nausea and vomiting.   Genitourinary: Positive for difficulty urinating and urgency.   Musculoskeletal: Positive for arthralgias (left elbow, left knee), gait problem and neck stiffness. Negative for back pain and neck pain.   Neurological: Positive for weakness and headaches. Negative for dizziness.   Psychiatric/Behavioral: Positive for sleep disturbance. Negative for behavioral problems.       Objective:      Physical Exam   Constitutional: He appears well-developed and well-nourished. No distress.   HENT:   Head: Normocephalic and atraumatic.   Eyes: EOM are normal.   Neck: Normal range of motion.   Musculoskeletal:   BUE:  ROM:   RUE: full.   LUE: full. Increased tone (modified Estefanía 2). Mild hand swelling.  Strength:    RUE: 5/5 at shoulder abduction, 5 elbow flexion, 5 elbow extension, 5 hand .   LUE: 2+/5 at shoulder abduction, 3- elbow flexion, 3- elbow extension, 3 hand .  Sensation to pinprick:   RUE: intact.   LUE: intact.  DTR:    RUE: +1 biceps, +1 triceps.   LUE:  +2 biceps, +2 triceps.    BLE:  ROM:   RLE: full.   LLE: full.Increased tone (modified Estefanía 2)  Knee crepitus:   RLE: -ve.   LLE: -ve.   Strength:    RLE: 5/5 at hip flexion, 5 knee extension, 5 ankle DF, 5 PF.   LLE: 3+/5 at hip flexion, 5- knee extension, 5- ankle DF, 5- PF.  Sensation to pinprick:     RLE: intact.      LLE: intact.   DTR:     RLE: +1 knee, +1 ankle.    LLE: +1 knee, +1 ankle.      Gait: slow alysha, using a straight cane in Rt hand.     Neurological: He is alert.   Skin: Skin is warm.   Psychiatric: He has a normal mood and affect. His behavior is normal.   Vitals reviewed.      Assessment:       1. Hemiparesis affecting left side as late effect of cerebrovascular accident (CVA)    2. Cerebrovascular accident (CVA) due to thrombosis of anterior  cerebral artery, unspecified blood vessel laterality    3. Dysarthria    4. Neuropathic pain    5. Impaired mobility and ADLs    6. Arthralgia, unspecified joint        Plan:         - Continue stroke prevention care (control HTN, DM, hyperlipidemia; ASA).  - Left-sided pain is likely neuropathic due to CVA.  Will start: gabapentin (NEURONTIN) 300 MG capsule; Take 1 capsule (300 mg total) by mouth every evening. In 1 wk, if no relief, increase to twice daily.In another wk, may increase to 3 times.  - Continue diclofenac sodium (VOLTAREN) 1 % Gel; Apply 2 g topically 3 (three) times daily as needed. Apply to painful joints  - Ambulatory referral/consult to Speech Therapy; Future  - Ambulatory referral/consult to Physical/Occupational Therapy  - Follow up in about 5 months (around 11/2/2020).    This was a 25 minute visit, 50% of which was spent educating the patient about the diagnosis and the treatment plan.    This note was partly generated with [employer] voice recognition software. I apologize for any possible typographical errors.

## 2020-06-02 NOTE — PROGRESS NOTES
Spoke with patient verified appointment.  Spoke with patient, patient states he is running late but should be here within an hour or 30 mins.

## 2020-06-04 ENCOUNTER — TELEPHONE (OUTPATIENT)
Dept: ENDOCRINOLOGY | Facility: CLINIC | Age: 70
End: 2020-06-04

## 2020-06-04 ENCOUNTER — OFFICE VISIT (OUTPATIENT)
Dept: ENDOCRINOLOGY | Facility: CLINIC | Age: 70
End: 2020-06-04
Payer: MEDICARE

## 2020-06-04 VITALS
BODY MASS INDEX: 30.38 KG/M2 | TEMPERATURE: 99 F | RESPIRATION RATE: 18 BRPM | SYSTOLIC BLOOD PRESSURE: 156 MMHG | WEIGHT: 212.19 LBS | HEIGHT: 70 IN | DIASTOLIC BLOOD PRESSURE: 94 MMHG

## 2020-06-04 DIAGNOSIS — N52.9 ERECTILE DYSFUNCTION, UNSPECIFIED ERECTILE DYSFUNCTION TYPE: ICD-10-CM

## 2020-06-04 DIAGNOSIS — E29.1 HYPOGONADISM IN MALE: ICD-10-CM

## 2020-06-04 DIAGNOSIS — E11.40 TYPE 2 DIABETES MELLITUS WITH DIABETIC NEUROPATHY, WITHOUT LONG-TERM CURRENT USE OF INSULIN: Primary | ICD-10-CM

## 2020-06-04 PROBLEM — E11.49 TYPE 2 DIABETES MELLITUS WITH NEUROLOGIC COMPLICATION, WITHOUT LONG-TERM CURRENT USE OF INSULIN: Chronic | Status: ACTIVE | Noted: 2019-04-12

## 2020-06-04 PROCEDURE — 99999 PR PBB SHADOW E&M-EST. PATIENT-LVL V: ICD-10-PCS | Mod: PBBFAC,,, | Performed by: INTERNAL MEDICINE

## 2020-06-04 PROCEDURE — 99214 PR OFFICE/OUTPT VISIT, EST, LEVL IV, 30-39 MIN: ICD-10-PCS | Mod: S$PBB,,, | Performed by: INTERNAL MEDICINE

## 2020-06-04 PROCEDURE — 99215 OFFICE O/P EST HI 40 MIN: CPT | Mod: PBBFAC | Performed by: INTERNAL MEDICINE

## 2020-06-04 PROCEDURE — 99214 OFFICE O/P EST MOD 30 MIN: CPT | Mod: S$PBB,,, | Performed by: INTERNAL MEDICINE

## 2020-06-04 PROCEDURE — 99999 PR PBB SHADOW E&M-EST. PATIENT-LVL V: CPT | Mod: PBBFAC,,, | Performed by: INTERNAL MEDICINE

## 2020-06-04 NOTE — ASSESSMENT & PLAN NOTE
He has not had any benefit from two different ED pills. He plans to get a vacuum pump, which was recommended by his urologist. See above re: testosterone replacement.

## 2020-06-04 NOTE — TELEPHONE ENCOUNTER
Spoke with patient. His transportation is running behind. Instructed patient to still come in for his appointment. He will be seen by a provider.    Patient verbalized understanding and will arrive ASAP    ----- Message from Ирина Obregon sent at 6/4/2020  6:37 AM CDT -----  Contact: pt  Pt called his transportation is running late and he would like to know if he can still come in today pt states second time he has had issues    Vincenzo broussard reached at [phone]

## 2020-06-04 NOTE — ASSESSMENT & PLAN NOTE
Previous labs were done at [facility], so we can't say for sure if his testosterone is deficient. 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     I discussed the options for testosterone replacement if it were to be proven that he has low testosterone, including injections, topical, or Testopel.  Discussed the questionable increased risk of ischemic stroke with testosterone replacement.    He says that if a pill form of testosterone available, that he would be interested.  However, he does not want to do injections or topical treatments.  He plans to discuss with the urologist regarding other treatments for erectile dysfunction, and he will let me know at some point later on if he would like to pursue testosterone replacement.  Given that he is not interested in replacement, I do not see a reason to recheck his testosterone level at this time.  If he decides to per to pursue testosterone placement later on, we will check an 08:00 testosterone panel paired with an FSH and LH.

## 2020-06-04 NOTE — PROGRESS NOTES
Subjective:      Chief Complaint: Erectile Dysfunction and Diabetes    HPI: Jae Millan is a 69 y.o. male who is here for an initial evaluation for hypogonadism and type 2 diabetes.    Patient presents for evaluation of male hypogonadism and diabetes    Medical problems include:  Past Medical History:   Diagnosis Date    Cataract     Decreased sensation of lower extremity     Diabetes mellitus     Diabetic neuropathy     Dysarthria     Dysphagia     ED (erectile dysfunction)     Glaucoma     Hemiparesis     LEFT side post CVA    High cholesterol     History of hepatitis C, s/p successful Rx w/ cure (SVR12) 4/2020     Hypertension     Impaired functional mobility, balance, gait, and endurance     Stroke     Urinary frequency        Developmental history:  Normal testis descent?: No, had undescended testicle surgery (possibly torsion?) while in custodial a few years ago; removed one testicle and replaced with prosthetic.    Family history of hypogonadism?: not sure  Sense of smell: Intact  Peripheral vision issues: no  Gynecomastia: no    Recent severe/critical illness?: Stroke 2 years ago.  STD's?: had an STD as a teenager - not sure which one  Orchitis or hx of orchiectomy?: Orchiectomy x 1  Mumps?: no  Radiation exposure?: no  Secondary exposure to partner's vaginal estrogen?: no    Chronic corticosteroid or opiate use?:   Marijuana?: no  Lavender or tea tree oil?: no  Anabolic steroids?: no  Excessive EtOH?: no    Depression?: no   Excessive daytime sleepiness?: no    Libido?:   AM erections?: No  Erection at time of intercourse?: Has stimulation but has trouble getting a full erection; he is working with the urologist for this  Maintains erections to ejaculation?: no  Osteoporosis, height loss or history of fractures?: no  Hot flashes or night sweats?: no   Fertility issues?: Two children; never actively pursued fertility.    Breast tenderness, discharge or gynecomastia?: no  Headaches or blurry  vision?: no  Nausea/vomiting, weight loss or dizziness/lightheadedness?: no  Head trauma?: no        He is not interested in pursuing testosterone replacement at this time.    With regards to the diabetes:  The patient was initially diagnosed with Type 2 diabetes mellitus:  Many years ago.    He reports chronic left sided pain in the arms and legs, which started after his stroke 2 years ago. He was in nursing home for 42 years    Known diabetic complications: Cardiovascular disease, nephropathy  Cardiovascular risk factors: advanced age (older than 55 for men, 65 for women), diabetes mellitus, dyslipidemia, hypertension and male gender  Current diabetic medications include:   1. Januvia 100 mg daily  2. Metformin 1000 mg BID    Last visit with diabetes education: never  Current diet: Not following a strict diet.  Eats when he gets a chance. Eats a lot of fast food; fried chicken mostly.  Drinks water, iced tea (sweet), ~three soft drinks/week  Current exercise: none    Current monitoring regimen: none; he has tried several times to get a glucometer and test strips, but his insurance is denying payment and he can't afford it.    Diabetic Health Maintenance:          BP Readings from Last 3 Encounters:   06/04/20 (!) 156/94   06/02/20 (!) 179/96   06/01/20 (!) 157/82           Low dose ASA?: Yes    Wt Readings from Last 10 Encounters:   06/04/20 96.2 kg (212 lb 3.1 oz)   06/02/20 97 kg (213 lb 13.5 oz)   06/01/20 96.6 kg (213 lb 1.2 oz)   03/13/20 92.1 kg (203 lb 2.5 oz)   01/27/20 94.7 kg (208 lb 14.2 oz)   01/17/20 94.7 kg (208 lb 14.2 oz)   12/23/19 93 kg (205 lb 0.4 oz)   11/25/19 93.4 kg (205 lb 14.6 oz)   11/21/19 94.8 kg (208 lb 15.9 oz)   11/18/19 94 kg (207 lb 2.1 oz)       Diabetes Management Status    Statin: Taking  ACE/ARB: Taking    Screening or Prevention Patient's value Goal Complete/Controlled?   HgA1C Testing and Control   Lab Results   Component Value Date    HGBA1C 7.3 (H) 05/20/2020      Annually/Less  than 8% Yes   Lipid profile : 05/20/2020 Annually Yes   LDL control Lab Results   Component Value Date    LDLCALC 92.2 05/20/2020    Annually/Less than 100 mg/dl  Yes   Nephropathy screening Lab Results   Component Value Date    LABMICR 231.0 04/12/2019     Lab Results   Component Value Date    PROTEINUA 2+ (A) 09/30/2019    Annually Yes   Blood pressure BP Readings from Last 1 Encounters:   06/04/20 (!) 156/94    Less than 140/90 No   Dilated retinal exam : 09/12/2019 Annually Yes   Foot exam   Referred to podiatry Annually No        Lab Results   Component Value Date    HGBA1C 7.3 (H) 05/20/2020    HGBA1C 7.3 (H) 08/22/2019    HGBA1C 7.5 (H) 04/12/2019         Reviewed past medical, family, social history and updated as appropriate.    Review of Systems   Constitutional: Negative for unexpected weight change.   Eyes: Negative for visual disturbance.   Respiratory: Negative for shortness of breath.    Cardiovascular: Negative for chest pain.   Gastrointestinal: Negative for abdominal pain.   Genitourinary: Negative for urgency.   Musculoskeletal: Negative for arthralgias.   Skin: Negative for wound.   Neurological: Negative for headaches.   Hematological: Does not bruise/bleed easily.   Psychiatric/Behavioral: Negative for sleep disturbance.     Objective:     Vitals:    06/04/20 0909   BP: (!) 156/94   Resp: 18   Temp: 98.5 °F (36.9 °C)     BP Readings from Last 5 Encounters:   06/04/20 (!) 156/94   06/02/20 (!) 179/96   06/01/20 (!) 157/82   03/13/20 (!) 151/92   01/27/20 (!) 176/90       Physical Exam   Constitutional: He is oriented to person, place, and time. He appears well-developed.   HENT:   Right Ear: External ear normal.   Left Ear: External ear normal.   Nose: Nose normal.   Hearing grossly normal  Dentition grossly normal   Neck: No tracheal deviation present. No thyromegaly present.   Cardiovascular: Normal rate.   No murmur heard.  Pulses:       Dorsalis pedis pulses are 1+ on the right side.         Posterior tibial pulses are 1+ on the right side.   Pulmonary/Chest: Effort normal and breath sounds normal.   Abdominal: Soft. He exhibits no mass. There is no tenderness.   Musculoskeletal: He exhibits no edema.   No digital clubbing or extremity cyanosis   Feet:   Right Foot:   Protective Sensation: 7 sites tested. 7 sites sensed.   Skin Integrity: Positive for callus. Negative for ulcer, blister, skin breakdown, erythema, warmth or dry skin.   Neurological: He is alert and oriented to person, place, and time. Coordination normal.   Skin: No rash noted.   No subcutaneous nodules noted.   Psychiatric: He has a normal mood and affect. His behavior is normal.   Alert and oriented to person, place, and situation.   Nursing note and vitals reviewed.     Note: Patient only allowed me to examine his right foot. He says he prefers to have the left one looked at by a podiatrist to have his toenails trimmed.    Wt Readings from Last 10 Encounters:   06/04/20 0909 96.2 kg (212 lb 3.1 oz)   06/02/20 1105 97 kg (213 lb 13.5 oz)   06/01/20 0819 96.6 kg (213 lb 1.2 oz)   03/13/20 1017 92.1 kg (203 lb 2.5 oz)   01/27/20 1054 94.7 kg (208 lb 14.2 oz)   01/17/20 0922 94.7 kg (208 lb 14.2 oz)   12/23/19 0959 93 kg (205 lb 0.4 oz)   11/25/19 1107 93.4 kg (205 lb 14.6 oz)   11/21/19 1334 94.8 kg (208 lb 15.9 oz)   11/18/19 1318 94 kg (207 lb 2.1 oz)       Lab Results   Component Value Date    HGBA1C 7.3 (H) 05/20/2020     Lab Results   Component Value Date    CHOL 160 05/20/2020    HDL 45 05/20/2020    LDLCALC 92.2 05/20/2020    TRIG 114 05/20/2020    CHOLHDL 28.1 05/20/2020     Lab Results   Component Value Date     05/20/2020     05/20/2020    K 3.9 05/20/2020    K 3.9 05/20/2020     05/20/2020     05/20/2020    CO2 24 05/20/2020    CO2 24 05/20/2020     (H) 05/20/2020     (H) 05/20/2020    BUN 16 05/20/2020    BUN 16 05/20/2020    CREATININE 1.4 05/20/2020    CREATININE 1.4 05/20/2020     CALCIUM 9.2 05/20/2020    CALCIUM 9.2 05/20/2020    PROT 7.9 05/20/2020    PROT 7.9 05/20/2020    ALBUMIN 3.7 05/20/2020    ALBUMIN 3.7 05/20/2020    BILITOT 0.5 05/20/2020    BILITOT 0.5 05/20/2020    ALKPHOS 66 05/20/2020    ALKPHOS 66 05/20/2020    AST 15 05/20/2020    AST 15 05/20/2020    ALT 16 05/20/2020    ALT 16 05/20/2020    ANIONGAP 7 (L) 05/20/2020    ANIONGAP 7 (L) 05/20/2020    ESTGFRAFRICA 59.7 (A) 05/20/2020    ESTGFRAFRICA 59.7 (A) 05/20/2020    EGFRNONAA 51.6 (A) 05/20/2020    EGFRNONAA 51.6 (A) 05/20/2020    TSH 1.509 05/20/2020      Lab Results   Component Value Date    MICALBCREAT 292.4 (H) 04/12/2019       Assessment/Plan:     Erectile dysfunction  He has not had any benefit from two different ED pills. He plans to get a vacuum pump, which was recommended by his urologist. See above re: testosterone replacement.    Type 2 diabetes mellitus with neurologic complication, without long-term current use of insulin  Reviewed goals of therapy are to get the best control we can without hypoglycemia    Referred to education today; I believe with dietary changes (avoid sweet tea, fast food (especially fried foods)), we can get his A1c to goal without additional drug therapy. If we are not successful, we can consider additional medications.    Medication changes:   Continue:    3. Metformin 1000 mg bid  4. Januvia 100 mg daily    GFR is borderline ~60, but I think we can continue on the full doses for now    Advised frequent self blood glucose monitoring. Patient encouraged to document glucose results and bring them to every clinic visit. We provided a sample glucometer with 20 strips. He knows how to use the glucometer, as he previously was checking his blood sugars [place] in [facility]. I also recommended looking into the Relion brand at [facility], which is over the counter and tends to be cheaper than other brands since his insurance copay is too high for him to afford.    Close adherence to lifestyle changes  recommended. We discussed dietary changes at length.    Eyes: Due for retinopathy screening in September, 2020.  Feet: I attempted to complete a foot exam, but he did not want to take off his left shoe because of neuropathic pain and difficulty getting his shoe on/off. He is agreeable to see podiatry to get his nails debrided.  Kidneys: Urine microalbumin/Cr ordered today; on losartan  HbA1c: Ordered for 3 months  Lipids: On crestor  ASA: Yes  Hypertension: BP high today; being managed through primary care.    Lab Results   Component Value Date    HGBA1C 7.3 (H) 05/20/2020    HGBA1C 7.3 (H) 08/22/2019    HGBA1C 7.5 (H) 04/12/2019         Hypogonadism in male  Previous labs were done at [facility], so we can't say for sure if his testosterone is deficient. 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     I discussed the options for testosterone replacement if it were to be proven that he has low testosterone, including injections, topical, or Testopel.  Discussed the questionable increased risk of ischemic stroke with testosterone replacement.    He says that if a pill form of testosterone available, that he would be interested.  However, he does not want to do injections or topical treatments.  He plans to discuss with the urologist regarding other treatments for erectile dysfunction, and he will let me know at some point later on if he would like to pursue testosterone replacement.  Given that he is not interested in replacement, I do not see a reason to recheck his testosterone level at this time.  If he decides to per to pursue testosterone placement later on, we will check an 08:00 testosterone panel paired with an FSH and LH.      RTC in 6 months. Labs in 3 months.

## 2020-06-04 NOTE — ASSESSMENT & PLAN NOTE
Reviewed goals of therapy are to get the best control we can without hypoglycemia    Referred to education today; I believe with dietary changes (avoid sweet tea, fast food (especially fried foods)), we can get his A1c to goal without additional drug therapy. If we are not successful, we can consider additional medications.    Medication changes:   Continue:    1. Metformin 1000 mg bid  2. Januvia 100 mg daily    GFR is borderline ~60, but I think we can continue on the full doses for now    Advised frequent self blood glucose monitoring. Patient encouraged to document glucose results and bring them to every clinic visit. We provided a sample glucometer with 20 strips. He knows how to use the glucometer, as he previously was checking his blood sugars while in [facility]. I also recommended looking into the Relion brand at [facility], which is over the counter and tends to be cheaper than other brands since his insurance copay is too high for him to afford.    Close adherence to lifestyle changes recommended. We discussed dietary changes at length.    Eyes: Due for retinopathy screening in September, 2020.  Feet: I attempted to complete a foot exam, but he did not want to take off his left shoe because of neuropathic pain and difficulty getting his shoe on/off. He is agreeable to see podiatry to get his nails debrided.  Kidneys: Urine microalbumin/Cr ordered today; on losartan  HbA1c: Ordered for 3 months  Lipids: On crestor  ASA: Yes  Hypertension: BP high today; being managed through primary care.    Lab Results   Component Value Date    HGBA1C 7.3 (H) 05/20/2020    HGBA1C 7.3 (H) 08/22/2019    HGBA1C 7.5 (H) 04/12/2019

## 2020-06-04 NOTE — LETTER
June 4, 2020      Maryjane Farias MD  [address]  [facility] [address]           [facility] End[facility]in[facility] 6th [place]  [address]  [facility] [address]  Phone: [phone]  Fax: [phone]          Patient: Jae Millan   MR Number: [id number]   YOB: 1950   Date of Visit: 6/4/2020       Dear Dr. Maryjane Farias:    Thank you for referring Jae Millan to me for evaluation. Attached you will find relevant portions of my assessment and plan of care.    If you have questions, please do not hesitate to call me. I look forward to following Jae Millan along with you.    Sincerely,    Lopez Wasserman MD    Enclosure  CC:  No Recipients    If you would like to receive this communication electronically, please contact [email or web address] or [phone] to request more information on [employer] access.    For providers and/or their staff who would like to refer a patient to [facility], please contact us through our one-stop-shop provider referral line, [facility] [place], at [phone].    If you feel you have received this communication in error or would no longer like to receive these types of communications, please e-mail [email or web address]

## 2020-06-05 NOTE — PROGRESS NOTES
Please see full speech therapy evaluation in POC section.     LINCOLN Shirley, CCC-SLP  Speech Language Pathologist   6/8/2020

## 2020-06-08 ENCOUNTER — CLINICAL SUPPORT (OUTPATIENT)
Dept: REHABILITATION | Facility: HOSPITAL | Age: 70
End: 2020-06-08
Attending: PHYSICAL MEDICINE & REHABILITATION
Payer: MEDICARE

## 2020-06-08 ENCOUNTER — CLINICAL SUPPORT (OUTPATIENT)
Dept: REHABILITATION | Facility: HOSPITAL | Age: 70
End: 2020-06-08
Payer: MEDICARE

## 2020-06-08 DIAGNOSIS — Z74.09 IMPAIRED FUNCTIONAL MOBILITY, BALANCE, GAIT, AND ENDURANCE: Primary | ICD-10-CM

## 2020-06-08 DIAGNOSIS — M25.612 DECREASED ROM OF LEFT SHOULDER: ICD-10-CM

## 2020-06-08 DIAGNOSIS — R47.1 DYSARTHRIA: ICD-10-CM

## 2020-06-08 DIAGNOSIS — R53.1 DECREASED STRENGTH: ICD-10-CM

## 2020-06-08 PROCEDURE — 92522 EVALUATE SPEECH PRODUCTION: CPT | Mod: PN | Performed by: SPEECH-LANGUAGE PATHOLOGIST

## 2020-06-08 PROCEDURE — 97162 PT EVAL MOD COMPLEX 30 MIN: CPT | Mod: PN | Performed by: PHYSICAL THERAPIST

## 2020-06-08 NOTE — PROGRESS NOTES
[facility]  Physical Therapy Neurological Rehabilitation Initial Evaluation    Name: Jae Millan  Clinic Number: [id number]    Therapy Diagnosis: No diagnosis found.  Physician: Charis Salazar MD    Physician Orders: PT Eval and Treat   Medical Diagnosis from Referral: Z74.09 (ICD-10-CM) - Impaired mobility and ADLs  Evaluation Date: 6/8/2020  Authorization Period Expiration: 12/31/2020  Plan of Care Expiration: ***  Visit # / Visits authorized: 1/ 10    Time In: ***  Time Out: ***  Total Billable Time: *** minutes    Precautions: {IP WOUND PRECAUTIONS OHS:61458}    Subjective   Date of onset: ***  History of current condition - Jae reports: ***     Medical History:   Past Medical History:   Diagnosis Date    Cataract     Decreased sensation of lower extremity     Diabetes mellitus     Diabetic neuropathy     Dysarthria     Dysphagia     ED (erectile dysfunction)     Glaucoma     Hemiparesis     LEFT side post CVA    High cholesterol     History of hepatitis C, s/p successful Rx w/ cure (SVR12) 4/2020     Hypertension     Impaired functional mobility, balance, gait, and endurance     Stroke     Urinary frequency        Surgical History:   Jae Millan  has a past surgical history that includes Undescended testicle exploration (Right) and Inguinal hernia repair (Left, 12/11/2019).    Medications:   Jae has a current medication list which includes the following prescription(s): amlodipine, aspirin, blood sugar diagnostic, blood-glucose meter, diclofenac sodium, dorzolamide-timolol 2-0.5%, gabapentin, losartan-hydrochlorothiazide 100-12.5 mg, metformin, oxycodone-acetaminophen, oxycodone-acetaminophen, oxycodone-acetaminophen, pantoprazole, rosuvastatin, sitagliptin, and tadalafil.    Allergies:   Review of patient's allergies indicates:  No Known Allergies     Imaging, {Mri/ctscan/bone scan:54480}: ***    Prior Therapy: ***  Social History: *** {LIVES  "WITH:17862}  Falls: ***   DME: {AMB OT NEURO DME:30559} ***   Home Environment: ***   Exercise Routine / History: ***   Family Present at time of Eval: ***   Occupation: ***  Prior Level of Function: ***  Current Level of Function: ***    Pain:  Current {0-10:20507::"0"}/10, worst {0-10:20507::"0"}/10, best {0-10:20507::"0"}/10   Location: {RIGHT LEFT BILATERAL:85227} {LOCATION ON BODY:49362}  Description: {Pain Description:33162}  Aggravating Factors: {Causes; Pain:09454}  Easing Factors: {Pain (activities that relieve):47810}    Pts goals: ***    Objective     - Follows commands: ***   - Speech: no deficits ***    Mental status: {pe mental status_general use:770699::"alert, oriented to person, place, and time"}  Appearance: {appearance:20772}  Behavior:  {behavior:97173}  Attention Span and Concentration:  {Att/con:20774}    Dominant hand:  {DOMINANT HAND:94748}     Posture Alignment :{AMB PT VESTIBULAR POSTURE ALIGNMENT:36000}    Skin integrity:  {INTACT:93585}    Sensation:  Light Touch: {INTACT:79875}           Proprioception:   {INTACT:23334}    Tone: {AMB OT MODIFIED JERI SCALE:35880}  Limbs/muscles affected: ***    Cranial Nerve Assessment:   CN II: Visual fields full ***            Right visual field deficit: ***            Left visual field deficit: ***  CN III, IV, VI: Pupils are equal, round, and reactive to light. ***                        Extraocular motions are normal. ***                        Right pupil: Shape: regular.*** Accommodation: intact. ***                        Left pupil: Shape: regular. *** Accommodation: intact. ***            Nystagmus: ***              Nystagmus type: ***  CN V: Facial sensation intact bilaterally ***  CN VII: Facial expression full, strong, symmetric. ***  CN VIII: hearing: intact ***  CN IX, X: palate: symmetric ***  CN XI: Right sternocleidomastoid strength: normal ***              Left sternocleidomastoid strength: normal ***              Right trapezius " strength: normal ***              Left trapezius strength: normal ***  CN XII: Tongue deviation: none- normal ***    Visual/Auditory: denies changes ***  Tracking:{INTACT:29941}  Saccades: {INTACT:22393}  Acuity:{INTACT:38122}  R/L discrimination: {INTACT:74563}    Coordination:   - fine motor: ***  - UE coordination: finger to nose: ***                       Pronation/ supination: ***  - LE coordination:  alternating toe taps: ***                                Heel to shin: ***    ROM:   UPPER EXTREMITY--AROM/PROM  (R) UE: {AMB PT VESTIBULAR ROM:57902}  (L) UE: {AMB PT VESTIBULAR ROM:99864}           RANGE OF MOTION--LOWER EXTREMITIES  (R) LE Hip: {rom:925169}   Knee: {rom:238811}   Ankle: {rom:815101}    (L) LE: Hip: {rom:924230}   Knee: {rom:510541}   Ankle: {rom:277719}    Strength: manual muscle test grades below   Upper Extremity Strength   RUE LUE   Shoulder Flexion: {AMB PT VESTIBULAR STRENGTH:80684} {AMB PT VESTIBULAR STRENGTH:64054}   Shoulder Abduction: {AMB PT VESTIBULAR STRENGTH:20692} {AMB PT VESTIBULAR STRENGTH:28401}   Shoulder Extension: {AMB PT VESTIBULAR STRENGTH:95863} {AMB PT VESTIBULAR STRENGTH:93309}   Shoulder External  Rotation:   {AMB PT VESTIBULAR STRENGTH:94381} {AMB PT VESTIBULAR STRENGTH:32594   Shoulder Internal  Rotation:   {AMB PT VESTIBULAR STRENGTH:63148} {AMB PT VESTIBULAR STRENGTH:01431   Elbow Flexion:     {AMB PT VESTIBULAR STRENGTH:63403} {AMB PT VESTIBULAR STRENGTH:22725}   Elbow Extension: {AMB PT VESTIBULAR STRENGTH:33728} {AMB PT VESTIBULAR STRENGTH:91716}   Wrist Flexion: {AMB PT VESTIBULAR STRENGTH:86150} {AMB PT VESTIBULAR STRENGTH:45392}   Wrist Extension: {AMB PT VESTIBULAR STRENGTH:62966} {AMB PT VESTIBULAR STRENGTH:29233}   : *** ***     Lower Extremity Strength   RLE LLE   Hip Flexion: {AMB PT VESTIBULAR STRENGTH:82683} {AMB PT VESTIBULAR STRENGTH:98484}   Hip Extension:  {AMB PT VESTIBULAR STRENGTH:28530} {AMB PT VESTIBULAR STRENGTH:22191}   Hip Abduction:  {AMB PT VESTIBULAR STRENGTH:28709} {AMB PT VESTIBULAR STRENGTH:42883}   Hip Adduction: {AMB PT VESTIBULAR STRENGTH:50219} {AMB PT VESTIBULAR STRENGTH:07602}   Knee Extension: {AMB PT VESTIBULAR STRENGTH:63631} {AMB PT VESTIBULAR STRENGTH:89376}   Knee Flexion: {AMB PT VESTIBULAR STRENGTH:31474} {AMB PT VESTIBULAR STRENGTH:64120}   Ankle Dorsiflexion: {AMB PT VESTIBULAR STRENGTH:64106} {AMB PT VESTIBULAR STRENGTH:87943}   Ankle Plantarflexion: {AMB PT VESTIBULAR STRENGTH:96385} {AMB PT VESTIBULAR STRENGTH:66516}   Ankle Inversion: {AMB PT VESTIBULAR STRENGTH:73287} {AMB PT VESTIBULAR STRENGTH:68850}   Ankle Eversion: {AMB PT VESTIBULAR STRENGTH:50470} {AMB PT VESTIBULAR STRENGTH:30486}     Abdominal Strength: {AMB PT VESTIBULAR STRENGTH:10752}       Evaluation   Single Limb Stance R LE ***  (<10 sec = HIGH FALL RISK)   Single Limb Stance L LE ***  (<10 sec = HIGH FALL RISK)   5 times sit-stand ***seconds  >12 sec= fall risk for general elderly  >16 sec= fall risk for Parkinson's disease  >10 sec= balance/vestibular dysfunction (<59 y/o)  >14.2 sec= balance/vestibular dysfunction (>59 y/o)  >12 sec= fall risk for CVA   Rivas/ FGA/ Tinetti ***     Postural control:  MCTSIB:  1. Eyes Open/feet together/Firm: *** seconds  2. Eyes Closed/feet together/Firm: *** seconds  3. Eyes Open/feet together/Foam: *** seconds  4. Eyes Closed/feet together/Foam: *** seconds    Gait Assessment:   - AD used: ***  - Assistance: ***  - Distance: ***  - Curb: {AMB OT NEURO ASSISTANCE:74949}  - Ramp:  {AMB OT NEURO ASSISTANCE:41686}  - Stairs: {AMB OT NEURO ASSISTANCE:23392}      GAIT DEVIATIONS:  Gait component performance:   Bilateral weight shift - ***  Transition from bilateral to unilateral stance on less affected limb - ***  Transition from bilateral to unilateral stance on more affected limb -  ***  Able to correctly position foot for load onto more affected limb from swing - ***  Stable in single leg stance on more affected limb -  "***  Weight shift in single leg stance on affected limb - ***  Ability to move more affected limb through full swing range - ***  * Above impairments indicate decreased gait safety and increased fall risk.      Impairments contributing to deviations: impaired motor control, ***    Endurance Deficit: ***      Evaluation   Timed Up and Go *** sec  < 20 sec safe for independent transfers,     < 30 sec assist required for transfers   6 meter walk test *** m/sec (6m/***s)   6 min walk test *** ft     Functional Mobility (Bed mobility, transfers)  Bed mobility: {AMB OT NEURO ASSISTANCE:68909}  Supine to sit: {AMB OT NEURO ASSISTANCE:05715}  Sit to supine: {AMB OT NEURO ASSISTANCE:07002}  Rolling: {AMB OT NEURO ASSISTANCE:60546}  Transfers to bed: {AMB OT NEURO ASSISTANCE:97856}  Transfers to toilet: {AMB OT NEURO ASSISTANCE:23929}  Sit to stand:  {AMB OT NEURO ASSISTANCE:33259}  Stand pivot:  {AMB OT NEURO ASSISTANCE:09025}  Car transfers: {AMB OT NEURO ASSISTANCE:52981}  Wheelchair mobility: {AMB OT NEURO ASSISTANCE:99105}  Floor transfers: {AMB OT NEURO ASSISTANCE:20310}       CMS Impairment/Limitation/Restriction for FOTO *** Survey    Therapist reviewed FOTO scores for Jae Millan on 6/8/2020.   FOTO documents entered into [employer] - see Media section.    Limitation Score: ***%  Category: {Blank single:56700::"Other","Self Care","Body Position","Carrying","Mobility"}         TREATMENT   Treatment Time In: ***  Treatment Time Out: ***  Total Treatment time separate from Evaluation: *** minutes    Jae received therapeutic exercises to develop {AMB PT PROGRESS OBJECTIVE:80833} for *** minutes including:  ***    Jae participated in neuromuscular re-education activities to improve: {AMB PT PROGRESS NEURO RE-ED:11679} for *** minutes. The following activities were included:  ***    Jae participated in gait training to improve functional mobility and safety for ***  minutes, including:  ***    ***     Home Exercises " "and Patient Education Provided    Education provided:   - ***    Written Home Exercises Provided: {Blank single:76891::"yes","Patient instructed to cont prior HEP"}.  Exercises were reviewed and Jae was able to demonstrate them prior to the end of the session.  Jae demonstrated {Desc; good/fair/poor:29119} understanding of the education provided.     See EMR under {Blank single:76044::"Media","Patient Instructions"} for exercises provided {Blank single:26407::"6/8/2020","prior visit"}.    Assessment   Jae is a 69 y.o. male referred to outpatient Physical Therapy with a medical diagnosis of ***. Pt presents with ***    Pt prognosis is {REHAB PROGNOSIS OHS:26800}.   Pt will benefit from skilled outpatient Physical Therapy to address the deficits stated above and in the chart below, provide pt/family education, and to maximize pt's level of independence.     Plan of care discussed with patient: {YES:55728}  Pt's spiritual, cultural and educational needs considered and patient is agreeable to the plan of care and goals as stated below:     Anticipated Barriers for therapy: ***    Medical Necessity is demonstrated by the following  History  Co-morbidities and personal factors that may impact the plan of care Co-morbidities:   {Co-morbidities:65174}    Personal Factors:   {Personal Factors:27854}     {Desc; low/moderate/high:116063}   Examination  Body Structures and Functions, activity limitations and participation restrictions that may impact the plan of care Body Regions:   {Body Regions:63860}    Body Systems:    {Body Systems:49664}    Participation Restrictions:   ***    Activity limitations:   Learning and applying knowledge  {Learning and applying knowledge:84971}    General Tasks and Commands  {Gen tasks and commands:51811}    Communication  {Communication:43258}    Mobility  {Mobility:58126}    Self care  {Self Care:39687}    Domestic Life  {Domestic " Life:09964}    Interactions/Relationships  {Interactions/Relationships:17865}    Life Areas  {Life Areas:62450}    Community and Social Life  {Community/Social Life:36210}         {Desc; low/moderate/high:759613}   Clinical Presentation {Clinical Presentation :43389} {Desc; low/moderate/high:424394}   Decision Making/ Complexity Score: {Desc; low/moderate/high:283629}     Goals:  Short Term Goals: *** weeks   ***    Long Term Goals: *** weeks   ***    Plan   Plan of care Certification: 6/8/2020 to ***.    Outpatient Physical Therapy {NUMBERS 1-5:09326} times weekly for {NUMBER 1-16:73021} weeks to include the following interventions: {TX PLAN:01038}.     Tamera Gallagher, PT

## 2020-06-11 DIAGNOSIS — I69.354 HEMIPARESIS AFFECTING LEFT SIDE AS LATE EFFECT OF CEREBROVASCULAR ACCIDENT (CVA): Primary | ICD-10-CM

## 2020-06-11 DIAGNOSIS — Z74.09 IMPAIRED MOBILITY AND ADLS: ICD-10-CM

## 2020-06-11 DIAGNOSIS — M79.2 NEUROPATHIC PAIN: ICD-10-CM

## 2020-06-11 DIAGNOSIS — Z78.9 IMPAIRED MOBILITY AND ADLS: ICD-10-CM

## 2020-06-11 DIAGNOSIS — I63.329 CEREBROVASCULAR ACCIDENT (CVA) DUE TO THROMBOSIS OF ANTERIOR CEREBRAL ARTERY, UNSPECIFIED BLOOD VESSEL LATERALITY: ICD-10-CM

## 2020-06-11 DIAGNOSIS — R47.1 DYSARTHRIA: ICD-10-CM

## 2020-06-11 PROBLEM — M25.612 DECREASED ROM OF LEFT SHOULDER: Status: ACTIVE | Noted: 2020-06-11

## 2020-06-11 PROBLEM — R53.1 DECREASED STRENGTH: Status: ACTIVE | Noted: 2020-06-11

## 2020-06-11 NOTE — PLAN OF CARE
[facility]  Physical Therapy Neurological Rehabilitation Initial Evaluation    Name: Jae Millan  Clinic Number: [id number]    Therapy Diagnosis:   1. Impaired functional mobility, balance, gait, and endurance     2. Decreased strength     3. Decreased ROM of left shoulder        Physician: Charis Salazar MD    Physician Orders: PT Eval and Treat neuro program  Medical Diagnosis from Referral: (I69.354) Hemiparesis affecting left side as late effect of cerebrovascular accident (CVA)  (primary encounter diagnosis)  (I63.329) Cerebrovascular accident (CVA) due to thrombosis of anterior cerebral artery, unspecified blood vessel laterality  (R47.1) Dysarthria  (M79.2) Neuropathic pain  (Z74.09) Impaired mobility and ADLs  Evaluation Date: 6/8/2020  Authorization Period Expiration: 12/31/2020  Plan of Care Expiration: 8/14/2020  Visit # / Visits authorized: 1/ 10    Time In: 1358  Time Out: 1450  Total Billable Time: 52 minutes    Precautions: Standard    Subjective   Date of onset: 2018, declined in function 1/2020  History of current condition - Jae reports: I think I have a lot of arthritis. I have pain in my L shoulder and R hand. Pt reports imbalance with sudden turns. Pt reports he can walk a total of ~8 city blocks. Can walk in the grocery store for ~30 min with shopping cart. Pt had a CVA in 2018, began receiving PT in 2019 to address impairments. PMHx: hernia repair surgery (12/2019)     Medical History:   Past Medical History:   Diagnosis Date    Cataract     Decreased sensation of lower extremity     Diabetes mellitus     Diabetic neuropathy     Dysarthria     Dysphagia     ED (erectile dysfunction)     Glaucoma     Hemiparesis     LEFT side post CVA    High cholesterol     History of hepatitis C, s/p successful Rx w/ cure (SVR12) 4/2020     Hypertension     Impaired functional mobility, balance, gait, and endurance     Stroke     Urinary frequency         Surgical History:   Jae Millan  has a past surgical history that includes Undescended testicle exploration (Right) and Inguinal hernia repair (Left, 12/11/2019).    Medications:   Jae has a current medication list which includes the following prescription(s): amlodipine, aspirin, blood sugar diagnostic, blood-glucose meter, diclofenac sodium, dorzolamide-timolol 2-0.5%, gabapentin, losartan-hydrochlorothiazide 100-12.5 mg, metformin, oxycodone-acetaminophen, oxycodone-acetaminophen, oxycodone-acetaminophen, pantoprazole, rosuvastatin, sitagliptin, and tadalafil.    Allergies:   Review of patient's allergies indicates:  No Known Allergies     Imaging, none    Prior Therapy: last outpatient PT 12/6/2019, d/c due to upcoming hernia surgery  Social History:  lives alone  Falls: none   DME: Straight cane and Small based Quad cane   Home Environment: 7th floor apartment, elevator. Must participate in fire drills using stairs- pt reports he uses elevator   Exercise Routine/ History: occasional attempt to close L hand for exercise, walking daily ~12 blocks max/ walls around apartment complex  Family Present at time of Eval: none   Occupation: NA  Prior Level of Function: independent gait without assistive device for unlimited distances, no significant reports of pain, normal functional use of LUE  Current Level of Function: gait with small based quad cane for community distances, abnormal gait pattern, unable to negotiate several flights of stairs (for fire drills), unable to use LUE, LUE pain     Pain:Current 8/10, worst 9/10, best 8/10   Location: left arms  Description: Aching and Dull  Aggravating Factors: edema  Easing Factors: pain gel    Pts goals: to have use of my L arm, be relieved of pain    Objective     - Follows commands: yes   - Speech: no deficits       Mental status: alert, oriented to person, place, and time  Appearance: Casually dressed  Behavior:  calm and cooperative  Attention Span and  "Concentration:  Normal    Dominant hand:  right     Posture Alignment :rounded shoulders, L arm inferior sublux 1.5 finger widths    Skin integrity:  Intact. Moderate edema L hand/ UE    Sensation:  Light Touch: Impaired: tingling in fingers and toes on L           Proprioception:   Intact    Tone: 0 - No increase in muscle tone  Limbs/muscles affected: NA    Cranial Nerve Assessment:   CN II: Visual fields: slight limitation on L (per pt has glaucoma)  CN III, IV, VI: Pupils are equal, round, and reactive to light.                         Extraocular motions are normal.                         Right pupil: Shape: regular. Accommodation: intact.                         Left pupil: Shape: regular.  Accommodation: intact.             Nystagmus: none              Nystagmus type: NA  CN V: Facial sensation: "different" sensation reported on L  CN VII: Facial expression: NT due to mask  CN VIII: hearing: NT due to noise in room  CN IX, X: palate: NT due to mask  CN XI: Right sternocleidomastoid strength: normal               Left sternocleidomastoid strength: normal               Right trapezius strength: normal               Left trapezius strength: normal   CN XII: Tongue deviation: NT due to mask    Visual/Auditory: denies changes   Tracking:Intact  Saccades: Intact    Coordination:   - fine motor: thumb to fingertip: unable on L   - UE coordination: finger to nose: unable on L                       Pronation/ supination: unable on L   - LE coordination:  alternating toe taps: slower speed on L. 10 sec: R= 18x, L=14x                                Heel to shin: fair ROM bilaterally    ROM:   UPPER EXTREMITY--AROM/PROM (sitting)  (R) UE: limited as follows: active range of motion ER= 68 deg, all other motions WFLs  (L) UE: limited as follows: active range of motion: flexion= 61 deg, abduction= 48 deg, ER= 15 deg. Tightness in forearm, wrists and fingers           RANGE OF MOTION--LOWER EXTREMITIES  (R) LE Hip: passive " range of motion SLR= 70 deg   Knee: equal bilaterally   Ankle: equal bilaterally    (L) LE: Hip: active assisted range of motion SLR= 60 deg   Knee: equal bilaterally   Ankle: equal bilaterally    Strength: manual muscle test grades below   Upper Extremity Strength  L Shoulder grossly 2+/5  L elbow grossly 2+/5  Wrist and fingers trace to 2-/5    Lower Extremity Strength   RLE LLE   Hip Flexion: 4/5 4-/5   Hip Extension:  4+/5 3+/5   Hip Abduction: 3+/5 2+/5   Hip Adduction: NT NT   Knee Extension: 5/5 4+/5   Knee Flexion: 5/5 3+/5   Ankle Dorsiflexion: 5/5 4+/5   Ankle Plantarflexion: 4-/5 3-/5   Ankle Inversion: NT NT   Ankle Eversion: NT NT     Abdominal Strength: NT       Evaluation   Single Limb Stance R LE NT  (<10 sec = HIGH FALL RISK)   Single Limb Stance L LE NT  (<10 sec = HIGH FALL RISK)   5 times sit-stand 15 seconds without UE support  >12 sec= fall risk for general elderly  >16 sec= fall risk for Parkinson's disease  >10 sec= balance/vestibular dysfunction (<61 y/o)  >14.2 sec= balance/vestibular dysfunction (>61 y/o)  >12 sec= fall risk for CVA   FGA To be performed at follow up     Postural control:  Static standing balance= good  Dynamic standing balance without UE support= good    Gait Assessment:   - AD used: small based quad cane   - Assistance: mod I  - Distance: community  - Curb: Mod I- small based quad cane   - Ramp:  Mod I- small based quad cane   - Stairs: NT    GAIT DEVIATIONS:  Gait component performance:   L heel scuff during mid swing, decreased L arm swing, shorter stride, requires >3 steps to turn    Impairments contributing to deviations: impaired motor control, decreased strength    Endurance Deficit: good for eval, good tolerance to gait reported by pt      Evaluation   Timed Up and Go 11 sec without AD  < 20 sec safe for independent transfers,     < 30 sec assist required for transfers   6 meter walk test NT   6 min walk test NT     Functional Mobility (Bed mobility,  transfers)  Bed mobility: Mod I  Supine to sit: Mod I  Sit to supine: Mod I  Rolling: Mod I  Transfers to bed: Mod I  Transfers to toilet: Mod I- per t report  Sit to stand:  Mod I  Stand pivot:  Mod I  Car transfers: Mod I  Wheelchair mobility: NA  Floor transfers: NT      CMS Impairment/Limitation/Restriction for FOTO NOC- musculoskeletal disorders Survey    Therapist reviewed FOTO scores for Jae Millan on 6/8/2020.   FOTO documents entered into EPIC - see Media section.    Limitation Score: 58%         TREATMENT     No treatment provided today, evaluation only.       Home Exercises and Patient Education Provided    Education provided:   - role of PT/ POC  - review of assessment outcomes    Written Home Exercises Provided: will be provided at follow up.    Jae demonstrated good  understanding of the education provided.     See EMR under NA for exercises provided to be provided.    Assessment   Jae is a 69 y.o. male referred to outpatient Physical Therapy with a medical diagnosis of Hemiparesis affecting left side, CVA, Dysarthria, Neuropathic pain, and Impaired mobility and ADLs. Pt presents with decreased active range of motion of LUE and inability to functionally use limb. Pt also presents with a gross decrease in LLE strength. Motor control of L lower extremitie is 22% slower than R. Tightness is noted in L hamstrings. Pt can ambulate with modified independence with small based quad cane. Pt presents with the following gait impairments: L heel scuff during mid swing, decreased L arm swing, and shorter stride. Pt requires requires >3 steps to turn around. Pt demonstrates slightly slower mobility and lower activity tolerance for diagnosis. Pt reports severe pain in LUE, with minimal relief. Pt can benefit from skilled PT services to address gait, balance, flexibility, and activity tolerance to improve his ease of mobility. Pt reports impairments have worsened since not participating in therapy.     Pt  prognosis is Good.   Pt will benefit from skilled outpatient Physical Therapy to address the deficits stated above and in the chart below, provide pt/family education, and to maximize pt's level of independence.     Plan of care discussed with patient: Yes  Pt's spiritual, cultural and educational needs considered and patient is agreeable to the plan of care and goals as stated below:     Anticipated Barriers for therapy: length of time since onset    Medical Necessity is demonstrated by the following  History  Co-morbidities and personal factors that may impact the plan of care Co-morbidities:   diabetes and neuropathy, HTN    Personal Factors:   no deficits     high   Examination  Body Structures and Functions, activity limitations and participation restrictions that may impact the plan of care Body Regions:   lower extremities  upper extremities    Body Systems:    gross symmetry  ROM  strength  gross coordinated movement  balance  gait  transfers  transitions  motor control  edema  skin integrity    Participation Restrictions:   Impaired gait- requires assistive device  Impaired balance  Cannot negotiate several flights of stairs (for fire drills in apartment)  Impaired motor control  Pain  Requires skilled PT to issue and progress HEP as needed     Activity limitations:   Learning and applying knowledge  no deficits    General Tasks and Commands  no deficits    Communication  no deficits    Mobility  lifting and carrying objects  fine hand use (grasping/picking up)  walking  driving (bike, car, motorcycle)    Self care  washing oneself (bathing, drying, washing hands)    Domestic Life  doing house work (cleaning house, washing dishes, laundry)    Interactions/Relationships  no deficits    Life Areas  no deficits    Community and Social Life  community life  recreation and leisure         high   Clinical Presentation evolving clinical presentation with changing clinical characteristics moderate   Decision  Making/ Complexity Score: moderate     Goals:  Short Term Goals: 5 weeks   1. Pt will perform HEP for strengthening and range of Motion at least 2x/week to improve carryover of progress.   2. Pt will demonstrate improved speed of mobility and activity tolerance by performing 5 sit<>stands without UE support in 10 sec.   3. Pt will demonstrate improved L hamstring passive range of motion to 70 deg straight leg raise to improve flexibility for all mobility.   4. Pt will demonstrate improved L shoulder ER active range of motion to 30 degrees to improve shoulder alignment to decrease pain.   5. Assess Functional Gait Assessment and set goals as needed.   6. Pt will negotiate 10 steps with 1 handrail to demonstrate improve ability to negotiate a flight of stairs in case of a fire (drill).     Long Term Goals: 10 weeks   1. Pt will demonstrate improved L shoulder ER active range of motion to 60 degrees to improve ability to flex shoulder and decrease pain.   2. Pt will demonstrate improved strength of L plantar flexion to 4-/5 to improve foot clearance during swing phase.   3. Pt will demonstrate improved L hip extension strength to 4/5 to improve standing balance and gait ability.   4. Pt will negotiate 20 steps with handrail and mod I to demonstrate improved ability to negotiate stairs in case of emergency at apartment.     Plan   Plan of care Certification: 6/8/2020 to 8/14/2020.    Outpatient Physical Therapy 2 times weekly for 10 weeks to include the following interventions: Gait Training, Neuromuscular Re-ed, Patient Education, Therapeutic Activites and Therapeutic Exercise.     Naya Obregon, PT

## 2020-06-11 NOTE — PLAN OF CARE
Outpatient Neurological Rehabilitation  Speech and Language Therapy Evaluation    Date: 6/8/2020     Name: Jae Millan   MRN: [id number]    Therapy Diagnosis:   Encounter Diagnosis   Name Primary?    Dysarthria    Physician: Charis Salazar MD  Physician Orders: [id number] - Ambulatory referral/consult to Speech Therapy  Medical Diagnosis: R47.1 (ICD-10-CM) - Dysarthria    Visit # / Visits Authorized:  1/10   Date of Evaluation:  6/8/2020   Insurance Authorization Period: 06/02/2020-12/31/2020  Plan of Care Certification:  6/8/2020 to 8/8/20     Time In: 1:15  Time Out: 2:00   Procedure Min.   Evaluation of Speech Sound Production  35   Swallow and Oral Function Evaluation   10     Precautions:Standard  Subjective   Date of Onset: 1 month ago   History of Current Condition: Patient familiar to this clinic and this SLP. Pt received ST services with this SLP from 9/10/19-12/06/19 to address dysarthria and dysphagia.  Pt reports that he is having problems with people understanding him over the phone and in person and has to repeat himself several times to be understood. Reports he saw his doctor who suggested to trial speech therapy again to determine if improvement can be made. Pt reports no new medical complications or new deficits. Denies dysphagia, denies coughing/choking with foods/liquids and no longer requires use of head turn to the left when swallowing thin liquids. Pt reports specific difficulty when on the phone with medical professionals, such as pharmacists, when he is ordering medication or making medical appointments or when under high pressure situations.   Past Medical History: Jae Millan  has a past medical history of Cataract, Decreased sensation of lower extremity, Diabetes mellitus, Diabetic neuropathy, Dysarthria, Dysphagia, ED (erectile dysfunction), Glaucoma, Hemiparesis, High cholesterol, History of hepatitis C, s/p successful Rx w/ cure (SVR12) 4/2020, Hypertension, Impaired functional  "mobility, balance, gait, and endurance, Stroke, and Urinary frequency.  Jae Millan  has a past surgical history that includes Undescended testicle exploration (Right) and Inguinal hernia repair (Left, 12/11/2019).  Medical Hx and Allergies: Jae has a current medication list which includes the following prescription(s): amlodipine, aspirin, blood sugar diagnostic, blood-glucose meter, diclofenac sodium, dorzolamide-timolol 2-0.5%, gabapentin, losartan-hydrochlorothiazide 100-12.5 mg, metformin, oxycodone-acetaminophen, oxycodone-acetaminophen, oxycodone-acetaminophen, pantoprazole, rosuvastatin, sitagliptin, and tadalafil. Review of patient's allergies indicates:  No Known Allergies  Prior Therapy:  Prior ST/PT/OT at this clinic.   Social History: Pt lives by himself. Pt is not currently working.   Prior Level of Function: Patient did not demonstrate any deficits with speech intelligibility prior to onset of CVA.    Current Level of Function:  Pain:  8/10  Pain Location / Description: whole left side   Nutrition: regular/thin   Patient's Therapy Goals: "To make my words clearer... Especially on the phone"  Objective   Formal Assessment:    Auditory Comprehension: No concerns reported or observed; WFL for the purpose of assessment and conversation.    Verbal Expression: No concerns reported or observed; WFL for the purpose of assessment and conversation.     Cognition: No concerns reported or observed. Pt and alert and cooperative throughout evaluation, was oriented x 4 independently. Pt did not require cues to attend to evaluation tasks throughout session. Pt demonstrated adequate topic maintenance and reasoning skills throughout evaluation. Cognitive-linguistic skills WFL for the purpose of assessment and conversation.      Motor Speech/Fluency/Voice: Pt's motor speech, fluency, and voice were informally assessed. OME performed within Cranial Nerve Assessment detailed below. Motor speech skills were assessed " and were not functional for daily communication with a variety of communication partners (i.e. Family, friends, medical personnel). Pt read sentences out loud (blind to clinician) with 80% speech intelligibility; 100% speech intelligibility given a repetition. Pt reports specific difficulty verbalizing numbers clearly. Pt's speech c/b imprecise consonants and decreased breath-speech coordination.   Respiration / Phonation:   # of reps/ 5s Norms/ # reps per second Maximum Phon. Time (ah)   P^ 24 5.0-7.1 4.8 Trial 1: 11   T^ 21  4.8-7.1 4.2  Trial 2: 10    K^ 20  4.4-7.4 4.0     P^T^K^ 6  3.6-7.5 1.2  Avg: 10.5             Norm (F 10-26, M 13-34.6)        Phonation Conversation   Stimulus Sustained ah:    Singing up scale:    Counting 1 to highest # on one breath: History and Conversation   Quality hoarse hoarse   Duration  10.5 seconds  N/a   Loudness  WFL WFL   Steadiness varied pitch varied pitch       Swallowing:    Clinical Swallow Exam/ Oral Mechanism Exam:  Mandibular Strength and Mobility - Trigeminal Nerve (CNV) Rest: WFL   Lateralization: WFL  Protrusion: WFL  Retraction:  WFL  Involuntary Movement: absent    Oral Labial Strength and Mobility -  Facial Nerve (CN VII)  Rest: WFL   Protrusion: strength WFL, reduced ROM  Retraction:  WFL  Involuntary Movement: absent    Lingual Strength and Mobility- Hypoglossal Nerve (CN XII)  Rest: WFL   Lateralization: strength WFL, reduced ROM  Protrusion: strength WFL, reduced ROM  Elevation:  strength WFL, reduced ROM  Involuntary Movement: absent    Velar Elevation -   Glossopharyngeal Nerve (CN IX) and Vagus (CN X) Rest: WFL   Symmetry: WFL   Elevation: WFL   Sustained elevation: WFL   Involuntary movement: absent     Buccal Strength and Mobility -   Facial Nerve (CN VII)  WFL    Facial Sensation and Movement -   Facial Nerve (CN VII) Symmetrical at rest: yes  Wrinkle forehead: yes  Able to close eyes tightly: yes  Taste to Anterior 2/3 of Tongue: yes     Structure  Abnormalities: no  Dentition: Adequate, scattered    Secretion Management: adequate, some reports of drooling at nighttime that has mostly resolved   Mucosal Quality: adequate   Volitional Cough: Strong   Volitional Swallow: WFL  Voice Prior to PO Intake: hoarse but clear     [place] Swallow Protocol:  PASSED  [place] Swallow Protocol dictates pt remain NPO if fail screener; (Tanmayr et al. 2014) however, as objective swallow assessment is not available for greater than a week, pt will remain on current diet until objective assessment is completed unless otherwise indicated.     Clinical Swallow Examination:   Pt presented with:   THIN:- 3 oz water test and self regulated thin liquid via cup x5     DESCRIPTION: Oral Phase: Adequate labial seal maintained evidenced by no anterior bolus loss. Pharyngeal phase: Laryngeal lift present upon manual palpation. Bolus transit time from presentation of bolus to laryngeal lift appeared timely. No overt s/s aspiration appreciated. No coughing or throat clearing throughout or post PO trials. Pt's vocal quality remained clear following po trials. Please note silent aspiration cannot be ruled out in this setting.     Recommend MBSS: no    Hearing / Vision: No concerns reported or observed regarding pt's hearing acuity or visual acuity skills.      RICH National Outcome Measures System (NOMS):   Motor Speech  LEVEL 6: The individual is successfully able to communicate intelligibly in most activities, but some limitations in intelligibility are still apparent in vocational, avocational, and social activities. The individual rarely requires minimal cueing to produce complex sentences/messages intelligibly. The individual usually uses compensatory strategies when encountering difficulty.      Treatment   Treatment Time In: n/a  Treatment Time Out: n/a  Total Treatment Time: n/a  no treatment performed 2/2 time to complete evaluation.    Education: Plan of Care, role of SLP in care, motor  speech strategies, course of medical disease affect on therapy diagnosis , scheduling/ cancellation policy and insurance limitations / visit limit  was discussed with pt. Patient expressed understanding of all discussed.     Home Program: Oral motor exercises, speech agility drills  Assessment   Jae presents to [facility] s/p medical diagnosis of dysarthria.  Demonstrates impairments including limitations as described in the problem list. He presents with mild dysarthria c/b imprecise consonants and decreased breath-speech coordination; reduced speech intelligibility at the sentence level. Positive prognostic factors include pt motivation. Negative prognostic factors include time since onset of CVA. No barriers to therapy identified. Patient will benefit from skilled, outpatient neurological rehabilitation speech therapy.    Rehab Potential: good  Pt's spiritual, cultural and educational needs considered and pt agreeable to plan of care and goals.    Short Term Goals: (4 weeks)    1. Pt will produce multi syllabic words with bilabial sounds with 90% acc independently    2. Pt will produce multi syllabic words with labio dental sounds with 90% acc independently    3. Pt will produce multi syllabic words with interdental sounds with 90% acc independently    4. Pt will producde multi syllabic words with alveolar sounds with 90% acc independently    5. Pt will produce multi syllabic words with alveolar-palatal sounds with 90% acc independently    6. Pt will produce multi syllabic words with alveolar-palatal sounds with 90% acc independently    7. Pt will produce multi syllabic words with velar sounds with 90% acc independently    8. Pt will utilize motor speech strategies to verbalize names of his medications clearly on 9/10 trials in order to improve speech intelligibility      9. Pt will utilize motor speech strategies to verbalize phone numbers clearly on 9/10 trials in order to  improve speech intelligibility      10. Pt will rate their speech while reading as at least a 2 on a 3 point scale ( 1 = same as before beginning therapy, 2 =better but not best, 3 =best possible outcome) on  8 /10 trials.     11. Pt will use motor speech strategies and answer questions in conversation with a self-rating of at least 2 on a 3 point scale ( 1 = same as before beginning therapy, 2 =better but not best, 3 =best possible outcome) on  8 /10 trials.     12. Pt will utilize motor speech strategies to verbalize functional phrases on the phone/in person clearly on 9/10 trials         Long Term Goals (8 weeks):   1. He  will develop functional and intelligible speech and utilize compensatory strategies through the use of adequate labial and lingual function, increased articulatory precision and speech prosody.    Plan     Plan of Care Certification Period: 6/8/2020  to 8/8/2020    Recommended Treatment Plan:  Patient will participate in the [facility] neurological rehabilitation program for speech therapy 1 times per week to address his  Motor Speech deficits, to educate patient and their family, and to participate in a home exercise program.    Other Recommendations: none at this time     Therapist's Name:   LINCOLN Shirley, CCC-SLP  Speech Language Pathologist   6/8/2020      Charis Salazar MD    Physician/Referring Practitioner     06/11/2020  Date of Signature

## 2020-06-12 NOTE — PROGRESS NOTES
"Outpatient Neurological Rehabilitation   Speech and Language Therapy Treatment Note  Date:  6/15/2020     Name: Jea Millan   MRN: [id number]   Therapy Diagnosis:   Encounter Diagnoses   Name Primary?    Oropharyngeal dysphagia     Dysarthria       Physician: Charis Salazar MD  Physician Orders: [id number] - Ambulatory referral/consult to Speech Therapy  Medical Diagnosis: R47.1 (ICD-10-CM) - Dysarthria     Visit # / Visits Authorized:  2/10   Date of Evaluation:  6/8/2020   Insurance Authorization Period: 06/02/2020-12/31/2020  Plan of Care Certification:  6/8/2020 to 8/8/20  Extended POC:  n/a   Progress Note: due 7/15/20   Visits Cancelled: 0  Visits No Show: 0    Time In: 2:00  Time Out:  2:40  Total Billable Time: 40 min     Precautions: Standard  Subjective:   Pt reports: that he is completing HEP at least once a day. Finding time in day to complete exercises.  He is going out of town this weekend for a birthday party but will be back in time to come to [facility] on Monday. Requesting for SLP to call transportation services for him as his phone is not working.   He was compliant to home exercise program.   Response to previous treatment: "good"   Pain Scale:  0/10 on VAS currently.   Pain Location: n/a  Objective:   TIMED  Procedure Min.   Cognitive Therapeutic Interventions, first 15 minutes CPT 64520  0   Cognitive Therapeutic Interventions, each additional 15 minutes CPT 60808 0         UNTIMED  Procedure Min.   Speech- Language- Voice Therapy  40   Total Timed Units: 0  Total Untimed Units: 1  Charges Billed/# of units: 1    Short Term Goals: (4 weeks)  Current Progress:   1. Pt will produce multi syllabic words with bilabial sounds with 90% acc independently  Progressing/ Not Met 6/15/2020   Pt produced multi syllabic words with bilabial sounds with 96% accuracy independently METx1   2. Pt will produce multi syllabic words with labio dental sounds with 90% acc independently  Progressing/ Not Met 6/15/2020   "  Pt produced multi syllabic words with labio dental sounds with 100% acc independently METx1   3. Pt will produce multi syllabic words with interdental sounds with 90% acc independently  Progressing/ Not Met 6/15/2020    Pt produced multi syllabic words with interdental sounds with 90% acc independently METx1   4. Pt will producde multi syllabic words with alveolar sounds with 90% acc independently  Progressing/ Not Met 6/15/2020   Pt produced multi syllabic words with alveolar sounds with 100% acc independently METx1   5. Pt will produce multi syllabic words with alveolar-palatal sounds with 90% acc independently  Progressing/ Not Met 6/15/2020   Pt produced multi syllabic words with alveolar-palatal sounds with 96% acc independently METx1   6. Pt will produce multi syllabic words with alveolar-palatal sounds with 90% acc independently   -Entered in error    7. Pt will produce multi syllabic words with velar sounds with 90% acc independently  Progressing/ Not Met 6/15/2020    Pt produced multi syllabic words with velar sounds with 100% acc independently METx1   8. Pt will utilize motor speech strategies to verbalize names of his medications clearly on 9/10 trials in order to improve speech intelligibility    Progressing/ Not Met 6/15/2020    Not formally addressed     9. Pt will utilize motor speech strategies to verbalize phone numbers clearly on 9/10 trials in order to improve speech intelligibility  Progressing/ Not Met 6/15/2020    Not formally addressed     10. Pt will rate their speech while reading as at least a 2 on a 3 point scale ( 1 = same as before beginning therapy, 2 =better but not best, 3 =best possible outcome) on  8 /10 trials.   Progressing/ Not Met 6/15/2020    Not formally addressed     11. Pt will use motor speech strategies and answer questions in conversation with a self-rating of at least 2 on a 3 point scale ( 1 = same as before beginning therapy, 2 =better but not best, 3 =best possible  outcome) on  8 /10 trials.   Progressing/ Not Met 6/15/2020    Not formally addressed     12. Pt will utilize motor speech strategies to verbalize functional phrases on the phone/in person clearly on 9/10 trials  Progressing/ Not Met 6/15/2020     Not formally addressed          Patient Education/Response:   Discussed motor speech strategies and HEP.  Pt verbalized understanding of all discussed.     Written Home Exercises Provided: Patient instructed to cont prior HEP.  Exercises were reviewed and Jae was able to demonstrate them prior to the end of the session.  Jae demonstrated good  understanding of the education provided.     See EMR under Patient Instructions for exercises provided prior visit.  Assessment:   Jae is progressing well towards his goals. Pt with >90% accuracy producing multi-syllabic words at the single word level. Pt with good ability to utilize motor speech strategies at single word level but requires cues to utilize strategies at conversation level. Current goals remain appropriate. Goals to be updated as necessary.     Pt prognosis is Good. Pt will continue to benefit from skilled outpatient speech and language therapy to address the deficits listed in the problem list on initial evaluation, provide pt/family education and to maximize pt's level of independence in the home and community environment.   Medical necessity is demonstrated by the following IMPAIRMENTS:  Patient with decreased speech intelligibility. 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  Plan:   Continue POC with focus on improving speech intelligibility     LINCOLN Shirley, CCC-SLP  Speech Language Pathologist   6/15/2020

## 2020-06-15 ENCOUNTER — CLINICAL SUPPORT (OUTPATIENT)
Dept: REHABILITATION | Facility: HOSPITAL | Age: 70
End: 2020-06-15
Attending: PHYSICAL MEDICINE & REHABILITATION
Payer: MEDICARE

## 2020-06-15 DIAGNOSIS — R47.1 DYSARTHRIA: ICD-10-CM

## 2020-06-15 DIAGNOSIS — R13.12 OROPHARYNGEAL DYSPHAGIA: ICD-10-CM

## 2020-06-15 PROCEDURE — 92507 TX SP LANG VOICE COMM INDIV: CPT | Mod: PN | Performed by: SPEECH-LANGUAGE PATHOLOGIST

## 2020-06-16 PROBLEM — R13.12 OROPHARYNGEAL DYSPHAGIA: Status: RESOLVED | Noted: 2019-11-13 | Resolved: 2020-06-16

## 2020-06-18 ENCOUNTER — CLINICAL SUPPORT (OUTPATIENT)
Dept: REHABILITATION | Facility: HOSPITAL | Age: 70
End: 2020-06-18
Attending: PHYSICAL MEDICINE & REHABILITATION
Payer: MEDICARE

## 2020-06-18 DIAGNOSIS — M25.612 DECREASED ROM OF LEFT SHOULDER: ICD-10-CM

## 2020-06-18 DIAGNOSIS — R53.1 DECREASED STRENGTH: ICD-10-CM

## 2020-06-18 PROCEDURE — 97110 THERAPEUTIC EXERCISES: CPT | Mod: PN | Performed by: PHYSICAL THERAPIST

## 2020-06-18 PROCEDURE — 97112 NEUROMUSCULAR REEDUCATION: CPT | Mod: PN | Performed by: PHYSICAL THERAPIST

## 2020-06-18 NOTE — PATIENT INSTRUCTIONS
Hold onto counter or chair for all exercises!    ABDUCTION: Standing (Active)        Use green band. Stand, feet flat. Lift right leg out to side.   Complete 2 sets of 10 repetitions. Perform 1 sessions per day.     [email or web address]   EXTENSION: Standing (Active)        Use green band. Stand, both feet flat. Draw right leg behind body as far as possible.   Complete 2 sets of 10 repetitions. Perform 1 sessions per day.  Copyright © [employer]. All rights reserved.     Heel Raise: Bilateral (Standing)        Rise on balls of feet, lift Right foot up and try to keep left heel off of the ground  Repeat 10 times per set. Do 2 sets per session. Do 1 sessions per day.     [email or web address]     Copyright © [employer]. All rights reserved.

## 2020-06-18 NOTE — PROGRESS NOTES
Physical Therapy Daily Treatment Note     Name: Jae Millan  [facility] Number: [id number]    Therapy Diagnosis:   Encounter Diagnoses   Name Primary?    Decreased strength     Decreased ROM of left shoulder      Physician: Charis Salazar MD    Visit Date: 6/18/2020    Physician Orders: Charis Salazar MD     Physician Orders: PT Eval and Treat neuro program  Medical Diagnosis from Referral: (I69.354) Hemiparesis affecting left side as late effect of cerebrovascular accident (CVA)  (primary encounter diagnosis)  (I63.329) Cerebrovascular accident (CVA) due to thrombosis of anterior cerebral artery, unspecified blood vessel laterality  (R47.1) Dysarthria  (M79.2) Neuropathic pain  (Z74.09) Impaired mobility and ADLs  Evaluation Date: 6/8/2020  Authorization Period Expiration: 12/31/2020  Plan of Care Expiration: 8/14/2020  Visit # / Visits authorized: 2/ 10     Time In: 1105  Time Out: 1150  Total Billable Time: 45 minutes    Precautions: Standard    Subjective     Pt reports: no new complaints.  He will be provided home exercise program.  Response to previous treatment: no adverse effects from eval reported  Functional change: ongoing    Pain: 7/10, 6/10 at conclusion of session  Location: left arms and lower legs     Objective     Jae received therapeutic exercises to develop strength, endurance and core stabilization for 15 minutes including:    recumbent stepper L4 BUE/LE (L hand wrapped) x 8 min    Standing with BUE support on mat: heel raise with attempt to maintain on L 10x   Hip abduction Green thera band 10x   Hip extension Green thera band 10x    Jae participated in neuromuscular re-education activities to improve: Balance, Coordination and mobility for 30 minutes. The following activities were included:    L shoulder external rotation stretch with scapular add and trunk rotation    Restoration of arches of L hand  6 basic wrist stretches: 1. P-A glide of scaphoid on radius, 2. Radial  deviation,    3. Increasing mobility of metacarpals, 4. Carpal roll, 5. Movement of forearm on  hand towards wrist extension, 6. Movement of forearm on the hand toward  supination/ pronation  Paddle splint applied to L hand  45 wrist ext brace applied to L    Standing at mat: tandem stance 2 x 30 sec= poor to poor+ balance   Static standing on foam, horizontal head turns 2 x 30 sec= fair- balance   Static standing on foam, vertical head turns 2 x 30 sec= poor+ to fair- balance   Static standing on foam, eyes closed 2 x 30 sec= fair- balance    Functional Gait Assessment:   1. Gait on level surface =  2, 6.8 sec   (3) Normal: less than 5.5 sec, no A.D., no imbalance, normal gait pattern, deviates< 6in   (2) Mild impairment: 7-5.6 sec, uses A.D., mild gait deviations, or deviates 6-10 in   (1) Moderate impairment: > 7 sec, slow speed, imbalance, deviates 10-15 in.   (0) Severe impairment: needs assist, deviates >15 in, reach/touch wall  2. Change in Gait Speed = 3   (3) Normal: smooth change w/o loss of balance or gait deviation, deviates < 6 in, significant difference between speeds   (2) Mild impairment: changes speed, but demonstrates mild gait deviations, deviates 6-10 in, OR no deviations but unable to significantly speed, OR uses A.D.   (1) Moderate impairment: minor changes to speed, OR changes speed w/ significant deviations, deviates 10-15 in, OR  Changes speed , but loses balance & recovers   (0) Severe impairment: cannot change speed, deviates >15 in, or loses balance & needs assist  3. Gait with horizontal head turns  = 2   (3) Normal: no change in gait, deviates <6 in   (2) Mild impairment: slight change in speed, deviates 6-10 in, OR uses A.D.   (1) Moderate impairment: moderate change in speed, deviates 10-15 in   (0) Severe impairment: severe disruption of gait, deviates >15in  4. Gait with vertical head turns = 3   (3) Normal: no change in gait, deviates <6 in   (2) Mild impairment: slight change in  speed, deviates 6-10 in OR uses A.D.   (1) Moderate impairment: moderate change in speed, deviates 10-15 in   (0) Severe impairment: severe disruption of gait, deviates >15 in  5. Gait with pivot turns = 2   (3) Normal: performs safely in 3 sec, no LOB   (2) Mild impairment: performs in >3 sec & no LOB, OR turns safely & requires several steps to regain LOB   (1) Moderate impairment: turns slow, OR requires several small steps for balance following turn & stop   (0) Severe impairment: cannot turn safely, needs assist  6. Step over obstacle = 2   (3) Normal: steps over 2 stacked boxes w/o change in speed or LOB   (2) Mild impairment: able to step over 1 box w/o change in speed or LOB   (1) Moderate impairment: steps over 1 box but must slow down, may require VC   (0) Severe impairment: cannot perform w/o assist  7. Gait with Narrow NORA = 0   (3) Normal: 10 steps no staggering   (2) Mild impairment: 7-9 steps   (1) Moderate impairment: 4-7 steps   (0) Severe impairment: < 4 steps or [id number] perform w/o assist  8. Gait with eyes closed = 1   (3) Normal: < 7 sec, no A.D., no LOB, normal gait pattern, deviates <6 in   (2) Mild impairment: 7.1-9 sec, mild gait deviations, deviates 6-10 in   (1) Moderate impairment: > 9 sec, abnormal pattern, LOB, deviates 10-15 in   (0) Severe impairment: cannot perform w/o assist, LOB, deviates >15in  9. Ambulating Backwards = 2   (3) Normal: no A.D., no LOB, normal gait pattern, deviates <6in   (2) Mild impairment: uses A.D., slower speed, mild gait deviations, deviates 6-10 in   (1) Moderate impairment: slow speed, abnormal gait pattern, LOB, deviates 10-15 in   (0) Severe impairment: severe gait deviations or LOB, deviates >15in  10. Steps = 3   (3) Normal: alternating feet, no rail   (2) Mild Impairment: alternating feet, uses rail   (1) Moderate impairment: step-to, uses rail   (0) Severe impairment: cannot perform safely  Score 20/30     Score:   <22/30 fall risk   <20/30 fall risk  in older adults   <18/30 fall risk in Parkinsons       Home Exercises Provided and Patient Education Provided     Education provided:   - issued HEP for standing exercises  - purpose of paddle splint    Written Home Exercises Provided: yes.  Exercises were reviewed and Jae was able to demonstrate them prior to the end of the session.  Jae demonstrated good  understanding of the education provided.     See EMR under Patient Instructions for exercises provided 6/18/2020.    Assessment     Jae tolerated first PT session well. Functional Gait Assessment was performed with pt demonstrating a slight fall risk. Pt had difficulty balancing with head turns, backwards stepping, narrow Base of support, and with eyes closed. Paddle splint and 45 deg wrist extension brace was utilized during the session to decrease edema. Pt tolerated well. Skin integrity of L hand was intact after removal of splint, no adverse reactions noted.  PT provided pt with HEP for standing LE strengthening. Pt can benefit from continued skilled PT to address impairments to improve mobility and balance.     Jae is progressing well towards his goals.   Pt prognosis is Good.     Pt will continue to benefit from skilled outpatient physical therapy to address the deficits listed in the problem list box on initial evaluation, provide pt/family education and to maximize pt's level of independence in the home and community environment.     Pt's spiritual, cultural and educational needs considered and pt agreeable to plan of care and goals.     Anticipated barriers to physical therapy: length of time since onset    Goals:   Short Term Goals: 5 weeks   1. Pt will perform HEP for strengthening and range of Motion at least 2x/week to improve carryover of progress. ongoing  2. Pt will demonstrate improved speed of mobility and activity tolerance by performing 5 sit<>stands without UE support in 10 sec.  ongoing  3. Pt will demonstrate improved L  hamstring passive range of motion to 70 deg straight leg raise to improve flexibility for all mobility.  ongoing  4. Pt will demonstrate improved L shoulder ER active range of motion to 30 degrees to improve shoulder alignment to decrease pain.  ongoing  5. Assess Functional Gait Assessment and set goals as needed.   6. Pt will negotiate 10 steps with 1 handrail to demonstrate improve ability to negotiate a flight of stairs in case of a fire (drill).  ongoing     Long Term Goals: 10 weeks   1. Pt will demonstrate improved L shoulder ER active range of motion to 60 degrees to improve ability to flex shoulder and decrease pain.  ongoing  2. Pt will demonstrate improved strength of L plantar flexion to 4-/5 to improve foot clearance during swing phase.  ongoing  3. Pt will demonstrate improved L hip extension strength to 4/5 to improve standing balance and gait ability.  ongoing  4. Pt will negotiate 20 steps with handrail and mod I to demonstrate improved ability to negotiate stairs in case of emergency at apartment.  ongoing    Plan     continue with use of paddle splint and standing LE exercises.     Naya Obregon, PT

## 2020-06-22 ENCOUNTER — PATIENT OUTREACH (OUTPATIENT)
Dept: ADMINISTRATIVE | Facility: OTHER | Age: 70
End: 2020-06-22

## 2020-06-24 ENCOUNTER — OFFICE VISIT (OUTPATIENT)
Dept: PODIATRY | Facility: CLINIC | Age: 70
End: 2020-06-24
Payer: MEDICARE

## 2020-06-24 VITALS
SYSTOLIC BLOOD PRESSURE: 156 MMHG | WEIGHT: 213 LBS | BODY MASS INDEX: 30.49 KG/M2 | HEIGHT: 70 IN | DIASTOLIC BLOOD PRESSURE: 82 MMHG

## 2020-06-24 DIAGNOSIS — E11.40 TYPE 2 DIABETES MELLITUS WITH DIABETIC NEUROPATHY, WITHOUT LONG-TERM CURRENT USE OF INSULIN: ICD-10-CM

## 2020-06-24 DIAGNOSIS — R60.0 BILATERAL EDEMA OF LOWER EXTREMITY: ICD-10-CM

## 2020-06-24 DIAGNOSIS — B35.3 TINEA PEDIS, UNSPECIFIED LATERALITY: ICD-10-CM

## 2020-06-24 DIAGNOSIS — M20.40 HAMMER TOE, UNSPECIFIED LATERALITY: Primary | ICD-10-CM

## 2020-06-24 PROCEDURE — 99213 OFFICE O/P EST LOW 20 MIN: CPT | Mod: PBBFAC,PO | Performed by: PODIATRIST

## 2020-06-24 PROCEDURE — 99213 OFFICE O/P EST LOW 20 MIN: CPT | Mod: S$PBB,,, | Performed by: PODIATRIST

## 2020-06-24 PROCEDURE — 99213 PR OFFICE/OUTPT VISIT, EST, LEVL III, 20-29 MIN: ICD-10-PCS | Mod: S$PBB,,, | Performed by: PODIATRIST

## 2020-06-24 PROCEDURE — 99999 PR PBB SHADOW E&M-EST. PATIENT-LVL III: CPT | Mod: PBBFAC,,, | Performed by: PODIATRIST

## 2020-06-24 PROCEDURE — 99999 PR PBB SHADOW E&M-EST. PATIENT-LVL III: ICD-10-PCS | Mod: PBBFAC,,, | Performed by: PODIATRIST

## 2020-06-24 RX ORDER — KETOCONAZOLE 20 MG/G
CREAM TOPICAL DAILY
Qty: 1 TUBE | Refills: 3 | Status: SHIPPED | OUTPATIENT
Start: 2020-06-24 | End: 2023-09-25

## 2020-06-24 NOTE — LETTER
June 24, 2020      Lopez Wasserman MD  [address]  [place] [address]           [facility]  [address]  [place] [address]  Phone: [phone]          Patient: Jae Millan   MR Number: [id number]   YOB: 1950   Date of Visit: 6/24/2020       Dear Dr. Lopez Wasserman:    Thank you for referring Jae Millan to me for evaluation. Attached you will find relevant portions of my assessment and plan of care.    If you have questions, please do not hesitate to call me. I look forward to following Jae Millan along with you.    Sincerely,    Huyen Singh, DPPHYLICIA    Enclosure  CC:  No Recipients    If you would like to receive this communication electronically, please contact [email or web address] or [phone] to request more information on "[email or web address] Link access.    For providers and/or their staff who would like to refer a patient to [facility], please contact us through our one-stop-shop provider referral line, [facility] , at [phone].    If you feel you have received this communication in error or would no longer like to receive these types of communications, please e-mail [email or web address]

## 2020-06-24 NOTE — PROGRESS NOTES
Subjective:      Patient ID: Jae Millan is a 69 y.o. male.    Chief Complaint: Diabetes Mellitus (PCP Dr. Farias 04/14/2020), Foot Pain, and Foot Problem    Jae is a 69 y.o. male who presents to the clinic upon referral from Dr. Wasserman  for evaluation and treatment of diabetic feet. Jae has a past medical history of Cataract, Decreased sensation of lower extremity, Diabetes mellitus, Diabetic neuropathy, Dysarthria, Dysphagia, ED (erectile dysfunction), Glaucoma, Hemiparesis, High cholesterol, History of hepatitis C, s/p successful Rx w/ cure (SVR12) 4/2020, Hypertension, Impaired functional mobility, balance, gait, and endurance, Stroke, and Urinary frequency. Presents for diabetic foot risk assessment. Reports diffuse pain B/L legs and hands. Currently followed by pain management.       PCP: Maryjane Farias MD    Date Last Seen by PCP: None found    Current shoe gear: Tennis shoes    Hemoglobin A1C   Date Value Ref Range Status   05/20/2020 7.3 (H) 4.0 - 5.6 % Final     Comment:     ADA Screening Guidelines:  5.7-6.4%  Consistent with prediabetes  >or=6.5%  Consistent with diabetes  High levels of fetal hemoglobin interfere with the HbA1C  assay. Heterozygous hemoglobin variants (HbS, HgC, etc)do  not significantly interfere with this assay.   However, presence of multiple variants may affect accuracy.     08/22/2019 7.3 (H) 4.0 - 5.6 % Final     Comment:     ADA Screening Guidelines:  5.7-6.4%  Consistent with prediabetes  >or=6.5%  Consistent with diabetes  High levels of fetal hemoglobin interfere with the HbA1C  assay. Heterozygous hemoglobin variants (HbS, HgC, etc)do  not significantly interfere with this assay.   However, presence of multiple variants may affect accuracy.     04/12/2019 7.5 (H) 4.0 - 5.6 % Final     Comment:     ADA Screening Guidelines:  5.7-6.4%  Consistent with prediabetes  >or=6.5%  Consistent with diabetes  High levels of fetal hemoglobin interfere with the  HbA1C  assay. Heterozygous hemoglobin variants (HbS, HgC, etc)do  not significantly interfere with this assay.   However, presence of multiple variants may affect accuracy.             Review of Systems   Constitution: Negative for chills, diaphoresis and fever.   Cardiovascular: Negative for claudication, cyanosis, leg swelling and syncope.   Respiratory: Negative for cough and shortness of breath.    Skin: Positive for color change, nail changes and suspicious lesions.   Musculoskeletal: Negative for falls, joint pain, muscle cramps and muscle weakness.   Gastrointestinal: Negative for diarrhea, nausea and vomiting.   Neurological: Negative for disturbances in coordination, numbness, paresthesias, sensory change, tremors and weakness.   Psychiatric/Behavioral: Negative for altered mental status.           Objective:      Physical Exam   Constitutional: He appears well-developed. He is cooperative.   Oriented to time, place, and person.   Cardiovascular:   DP and PT pulses are palpable bilaterally. 3 sec capillary refill time and toes and feet are warm to touch proximally .  There is  hair growth on the feet and toes b/l. There is no edema b/l. No spider veins or varicosities present b/l.      Musculoskeletal:   Equinus noted b/l ankles with < 10 deg DF noted. MMT 5/5 in DF/PF/Inv/Ev resistance with no reproduction of pain in any direction. Passive range of motion of ankle and pedal joints is painless b/l.    Decreased stride, station of gait.  apropulsive toe off.  Increased angle and base of gait.      Patient has hammertoes of digits 2-5 bilateral partially reducible without symptom today.     Visible and palpable bunion without pain at dorsomedial 1st metatarsal head right and left.  Hallux abducted right and left partially reducible, tracks laterally without being track bound.  No ecchymosis, erythema, edema, or cardinal signs infection or signs of trauma same foot.     Fat pad atrophy to heels and met heads  bilateral     Feet:   Right Foot:   Skin Integrity: Negative for callus or dry skin.   Left Foot:   Skin Integrity: Negative for callus or dry skin.   Lymphadenopathy:   Negative lymphadenopathy bilateral popliteal fossa and tarsal tunnel.   Neurological: He is alert.   Light touch, proprioception, and sharp/dull sensation are all intact bilaterally. Protective threshold with the Wilson-Wienstein monofilament is intact bilaterally.  Subjective paresthesias with no clearly identifiable source or trigger.      Skin:   No open lesions, lacerations or wounds noted.Interdigital spaces clean, dry and intact b/l. No erythema noted to b/l foot.  Toenails 1-5 bilaterally are elongated by 2-3 mm, thickened by 2-3 mm, discolored/yellowed, dystrophic, brittle with subungual debris.    Scaling dryness in a moccasin distribution is noted to the bilateral lower extremities with associated erythema.             Psychiatric: He has a normal mood and affect.             Assessment:       Encounter Diagnoses   Name Primary?    Type 2 diabetes mellitus with diabetic neuropathy, without long-term current use of insulin     Hammer toe, unspecified laterality Yes    Bilateral edema of lower extremity          Plan:       Jae was seen today for diabetes mellitus, foot pain and foot problem.    Diagnoses and all orders for this visit:    Hammer toe, unspecified laterality    Type 2 diabetes mellitus with diabetic neuropathy, without long-term current use of insulin  -     Ambulatory referral/consult to Podiatry  -     DIABETIC SHOES FOR HOME USE    Bilateral edema of lower extremity  -     COMPRESSION STOCKINGS    Other orders  -     ketoconazole (NIZORAL) 2 % cream; Apply topically once daily.      I counseled the patient on his conditions, their implications and medical management.    - Shoe inspection. Diabetic Foot Education. Patient reminded of the importance of good nutrition and blood sugar control to help prevent podiatric  complications of diabetes. Patient instructed on proper foot hygeine. We discussed wearing proper shoe gear, daily foot inspections, never walking without protective shoe gear, never putting sharp instruments to feet, routine podiatric nail visits every 6-12 months.     - With patient's permission, nails were aggressively reduced and debrided x 10 to their soft tissue attachment mechanically and with electric , removing all offending nail and debris. Patient relates relief following the procedure. He will continue to monitor the areas daily, inspect his feet, wear protective shoe gear when ambulatory, moisturizer to maintain skin integrity and follow in this office in approximately 6-12 months, sooner p.r.n.     Discussed the use of compression stockings b/l to help control edema. Tubigrip applied today. Discussed proper and consistent elevation of lower extremities, above the level of the heart, while at rest, to help control/improve edema.       Rx Voltaren gel to be applied to affected area up to 3-4 x daily as needed for pain- continue     Rx diabetic shoes with custom molded inserts to be worn at all times while ambulating. Prescription provided with list of local retailers.     Rx. Compression socks.     Ketoconazole 2% topical cream prescribed for treatment of aforementioned tinea pedis. Patient will use this medication as directed in addition to thourougly drying between toes daily, and applying powder as needed      F/u 6-12 months DM foot exam sooner PRN

## 2020-06-25 ENCOUNTER — CLINICAL SUPPORT (OUTPATIENT)
Dept: REHABILITATION | Facility: HOSPITAL | Age: 70
End: 2020-06-25
Attending: PHYSICAL MEDICINE & REHABILITATION
Payer: MEDICARE

## 2020-06-25 DIAGNOSIS — M25.612 DECREASED ROM OF LEFT SHOULDER: ICD-10-CM

## 2020-06-25 DIAGNOSIS — R53.1 DECREASED STRENGTH: ICD-10-CM

## 2020-06-25 PROCEDURE — 97110 THERAPEUTIC EXERCISES: CPT | Mod: PN | Performed by: PHYSICAL THERAPIST

## 2020-06-25 PROCEDURE — 97112 NEUROMUSCULAR REEDUCATION: CPT | Mod: PN | Performed by: PHYSICAL THERAPIST

## 2020-06-25 NOTE — PROGRESS NOTES
Physical Therapy Daily Treatment Note     Name: Jae Millan  [facility] Number: [id number]    Therapy Diagnosis:   Encounter Diagnoses   Name Primary?    Decreased strength     Decreased ROM of left shoulder      Physician: Charis Salazar MD    Visit Date: 6/25/2020    Physician Orders: Charis Salazar MD     Physician Orders: PT Eval and Treat neuro program  Medical Diagnosis from Referral: (I69.354) Hemiparesis affecting left side as late effect of cerebrovascular accident (CVA)  (primary encounter diagnosis)  (I63.329) Cerebrovascular accident (CVA) due to thrombosis of anterior cerebral artery, unspecified blood vessel laterality  (R47.1) Dysarthria  (M79.2) Neuropathic pain  (Z74.09) Impaired mobility and ADLs  Evaluation Date: 6/8/2020  Authorization Period Expiration: 12/31/2020  Plan of Care Expiration: 8/14/2020  Visit # / Visits authorized: 4/ 10     Time In: 1147  Time Out: 1246  Total Billable Time: 48 minutes    Precautions: Standard    Subjective     Pt reports: I broke that band that you gave me  He was compliant with home exercise program.  Response to previous treatment: no adverse effects reported  Functional change: ongoing    Pain: 8/10  Location: left arms and lower legs     Objective     Jae received therapeutic exercises to develop strength, endurance and core stabilization for 20 minutes including:    recumbent stepper L4 BUE/LE (L hand wrapped) x 9 min    Standing with BUE support on mat: heel raise with attempt to maintain on L 10x   Hip abduction blue thera band 2 x 10   Hip extension blue thera band 2 x 10    Jae participated in neuromuscular re-education activities to improve: Balance, Coordination and mobility for 28 minutes. The following activities were included:    L shoulder external rotation stretch with scapular add and trunk rotation    Restoration of arches of L hand  6 basic wrist stretches: 1. P-A glide of scaphoid on radius, 2. Radial deviation,    3.  Increasing mobility of metacarpals, 4. Carpal roll, 5. Movement of forearm on  hand towards wrist extension, 6. Movement of forearm on the hand toward  supination/ pronation  Paddle splint applied to L hand  90 deg wrist ext brace applied to L  active assisted range of motion L wrist extension- minimal first digit extension noted.       Standing at mat: tandem stance 2 x 30 sec= poor+ balance   Romberg on foam, horizontal head turns 2 x 30 sec= fair+ balance   Romberg on foam, vertical head turns 2 x 30 sec= fair+ balance   romberg on foam with eyes closed 2 x 30 sec= fair+ balance       Home Exercises Provided and Patient Education Provided     Education provided:   - continue with HEP, provided with new band    Written Home Exercises Provided: Patient instructed to cont prior HEP.  Exercises were reviewed and Jae was able to demonstrate them prior to the end of the session.  Jae demonstrated good  understanding of the education provided.     See EMR under Patient Instructions for exercises provided 6/18/2020.    Assessment     Jae tolerated PT session well. Pt reported that L hand felt better for a short time after last PT session. Today, pt reported that L hand felt loser after removal of paddle splint. Improved balance noted in tandem and on foam today. Pt tolerated increased duration of strengthening and endurance exercises. Skin integrity of L hand was intact after removal of splint, no adverse reactions noted. Minimal first digit finger extension noted after removal of splint.  Pt can benefit from continued skilled PT to address impairments to improve mobility and balance.     Jae is progressing well towards his goals.   Pt prognosis is Good.     Pt will continue to benefit from skilled outpatient physical therapy to address the deficits listed in the problem list box on initial evaluation, provide pt/family education and to maximize pt's level of independence in the home and community  environment.     Pt's spiritual, cultural and educational needs considered and pt agreeable to plan of care and goals.     Anticipated barriers to physical therapy: length of time since onset    Goals:   Short Term Goals: 5 weeks   1. Pt will perform HEP for strengthening and range of Motion at least 2x/week to improve carryover of progress. ongoing  2. Pt will demonstrate improved speed of mobility and activity tolerance by performing 5 sit<>stands without UE support in 10 sec.  ongoing  3. Pt will demonstrate improved L hamstring passive range of motion to 70 deg straight leg raise to improve flexibility for all mobility.  ongoing  4. Pt will demonstrate improved L shoulder ER active range of motion to 30 degrees to improve shoulder alignment to decrease pain.  ongoing  5. Assess Functional Gait Assessment and set goals as needed. Met 6/18/2020- 20/30  6. Pt will negotiate 10 steps with 1 handrail to demonstrate improve ability to negotiate a flight of stairs in case of a fire (drill).  ongoing     Long Term Goals: 10 weeks   1. Pt will demonstrate improved L shoulder ER active range of motion to 60 degrees to improve ability to flex shoulder and decrease pain.  ongoing  2. Pt will demonstrate improved strength of L plantar flexion to 4-/5 to improve foot clearance during swing phase.  ongoing  3. Pt will demonstrate improved L hip extension strength to 4/5 to improve standing balance and gait ability.  ongoing  4. Pt will negotiate 20 steps with handrail and mod I to demonstrate improved ability to negotiate stairs in case of emergency at apartment.  Ongoing  5. New 6/25/2020: pt will demonstrate decreased fall risk and improved balance during gait by scoring 23/30 on Functional Gait Assessment. ongoing    Plan     continue with use of paddle splint and standing LE exercises. Increase resistance on stepper     Naya Obregon, PT

## 2020-06-26 NOTE — PROGRESS NOTES
"  Subjective:      Jae Millan is a 69 y.o. male who returns today regarding his ED.     He presents today to discuss trial of ADALID, however he has not purchased device yet.    Erectile Dysfunction  Patient complains of erectile dysfunction. Onset of dysfunction was several years ago and was gradual in onset.  Patient states the nature of difficulty is both attaining and maintaining erection. Full erections occur never. Partial erections occur with masturbation, on awakening and while asleep. Libido is not affected. Risk factors for ED include cardiovascular disease, diabetes mellitus and antihypertensive medications,. Patient denies history of neurologic disease, cranial, spinal, or pelvic trauma, pelvic radiation and beta blocker use. He report long history of DM and HTN. Reports that he was incarcerated for a "very long time"  and was not able to address this concern while in [facility]. Previous treatment of ED includes Cialis and Viagra.  Of note, he has hx of right orchiectomy with right testicular prosthesis. Ct scan ordered last year to assess hernia revealed atrophic left testis. He questions whether he has low testosterone leading to ED, however reports strong sexual desire, denies weight gain, denies changes in mood.       The following portions of the patient's history were reviewed and updated as appropriate: allergies, current medications, past family history, past medical history, past social history, past surgical history and problem list.    Review of Systems  A comprehensive multipoint review of systems was negative except as otherwise stated in the HPI.     Objective:   Vitals: BP (!) 164/87   Pulse 75   Resp 18   Ht 5' 10" (1.778 m)   Wt 96.5 kg (212 lb 10.1 oz)   SpO2 99%   BMI 30.51 kg/m²     Physical Exam   General: alert and oriented, no acute distress  Respiratory: Symmetric expansion, non-labored breathing  Cardiovascular: regular rate and rhythm, no peripheral edema  Abdomen: soft, non " distended  Genitourinary: no penile lesions or discharge, no testicular masses, normal scrotum  Rectal: defer   Skin: normal coloration and turgor, no rashes, no suspicious skin lesions noted  **edema noted to left arm and left leg  Neuro: no gross deficits  Psych: normal judgment and insight, normal mood/affect and non-anxious    Lab Review   Urinalysis demonstrates no sample  Lab Results   Component Value Date    WBC 5.46 05/20/2020    HGB 12.9 (L) 05/20/2020    HCT 38.9 (L) 05/20/2020    MCV 81 (L) 05/20/2020     05/20/2020     Lab Results   Component Value Date    CREATININE 1.4 05/20/2020    CREATININE 1.4 05/20/2020    BUN 16 05/20/2020    BUN 16 05/20/2020     Lab Results   Component Value Date    PSA 0.30 05/20/2020       Imaging   Impression       Large fat containing left inguinal hernia as above.    Circumferential calcification of the right testicle. Diminutive left testicle.    No acute intra-abdominal process identified.      Electronically signed by: Joel Harrell MD  Date: 10/31/2019  Time: 11:27       Assessment and Plan:   1. Erectile dysfunction, unspecified erectile dysfunction type    2. Atrophic testicle    3. History of unilateral orchiectomy    --I personally looked up prices for ADALID and locations that he can purchase ADALID. This ws printed out for pt at his request.   We discussed ED and the contributing factors. We reviewed his personal factors that contribute to ED. Patient was educated on ED treatments. We discussed PDE-5 inhibitors, ADALID, and IPP.  Medications can increase the risk of erections lasting longer than 4 hours. This is known as a priapism and is a medical emergency. This requires immediate medical attention. Pt expresses frustration with oral medications not working. He does not wish to try injection sat this time. Will trial ADALID.   --Pt has testosterone panel 4/19, results were 301-- this lab was not collected prior to 10 am therefore cannot be used to determine if  testosterone is consistent with hypogonadism. May consider additional labs after trail of ADALID.   --FU with PCP for left arm edema  --FU with me in 3 month to discuss results of ADALID

## 2020-06-30 ENCOUNTER — OFFICE VISIT (OUTPATIENT)
Dept: UROLOGY | Facility: CLINIC | Age: 70
End: 2020-06-30
Payer: MEDICARE

## 2020-06-30 VITALS
DIASTOLIC BLOOD PRESSURE: 87 MMHG | OXYGEN SATURATION: 99 % | SYSTOLIC BLOOD PRESSURE: 164 MMHG | HEIGHT: 70 IN | RESPIRATION RATE: 18 BRPM | HEART RATE: 75 BPM | WEIGHT: 212.63 LBS | BODY MASS INDEX: 30.44 KG/M2

## 2020-06-30 DIAGNOSIS — N52.9 ERECTILE DYSFUNCTION, UNSPECIFIED ERECTILE DYSFUNCTION TYPE: Primary | ICD-10-CM

## 2020-06-30 PROCEDURE — 99212 PR OFFICE/OUTPT VISIT, EST, LEVL II, 10-19 MIN: ICD-10-PCS | Mod: S$PBB,,, | Performed by: NURSE PRACTITIONER

## 2020-06-30 PROCEDURE — 99215 OFFICE O/P EST HI 40 MIN: CPT | Mod: PBBFAC,PN | Performed by: NURSE PRACTITIONER

## 2020-06-30 PROCEDURE — 99999 PR PBB SHADOW E&M-EST. PATIENT-LVL V: ICD-10-PCS | Mod: PBBFAC,,, | Performed by: NURSE PRACTITIONER

## 2020-06-30 PROCEDURE — 99999 PR PBB SHADOW E&M-EST. PATIENT-LVL V: CPT | Mod: PBBFAC,,, | Performed by: NURSE PRACTITIONER

## 2020-06-30 PROCEDURE — 99212 OFFICE O/P EST SF 10 MIN: CPT | Mod: S$PBB,,, | Performed by: NURSE PRACTITIONER

## 2020-06-30 NOTE — PATIENT INSTRUCTIONS
Using Vacuum Erection Therapy  The accompanying steps show how to gain and maintain an erection with a vacuum erection therapy system.      Getting started  · Place the rubber tension ring on the open end of the cylinder.  · Apply water-based lubricant to the end of the cylinder.  · Put your penis into the cylinder. Hold the cylinder firmly against your abdomen to create a seal, but take care not to pinch the scrotum.  Gaining an erection  · Squeeze the hand pump or turn on the electric pump. Blood will be drawn into your penis and your penis will become erect and firm.  · Follow the instructions youve been given for using your particular brand of pump.  · It may take some practice before you get optimum erections.  Using the tension ring  · When your penis is fully erect (usually less than 5 minutes), roll the tension ring off the cylinder onto the base of your penis. The tension ring holds blood in your penis, creating an erection. The area behind the ring remains soft and flexible.  · Remove the cylinder from your penis.  · After no longer than 30 minutes, remove the tension ring from your penis by grasping the tabs on the ring and pulling to stretch the ring.  When to call your healthcare provider  · A very cold penis during erection (some coolness is normal)  · A black-and-blue or significantly darkened penis (some discoloration is normal)  · Pain while using the vacuum device or tension ring  · Lack of an erection or loss of an erection before the tension ring is removed  Note: The tension ring may block ejaculation during orgasm. This is harmless, but will not prevent pregnancy.   Date Last Reviewed: 1/1/2017  © 4179-2208 [employer]. [address] [place] [address], [place] [address]. All rights reserved. This information is not intended as a substitute for professional medical care. Always follow your healthcare professional's instructions.

## 2020-07-06 ENCOUNTER — CLINICAL SUPPORT (OUTPATIENT)
Dept: REHABILITATION | Facility: HOSPITAL | Age: 70
End: 2020-07-06
Attending: PHYSICAL MEDICINE & REHABILITATION
Payer: MEDICARE

## 2020-07-06 DIAGNOSIS — R53.1 DECREASED STRENGTH: ICD-10-CM

## 2020-07-06 DIAGNOSIS — M25.612 DECREASED ROM OF LEFT SHOULDER: ICD-10-CM

## 2020-07-06 DIAGNOSIS — Z74.09 IMPAIRED FUNCTIONAL MOBILITY, BALANCE, GAIT, AND ENDURANCE: Primary | ICD-10-CM

## 2020-07-06 PROCEDURE — 97112 NEUROMUSCULAR REEDUCATION: CPT | Mod: PN | Performed by: PHYSICAL THERAPIST

## 2020-07-06 PROCEDURE — 97110 THERAPEUTIC EXERCISES: CPT | Mod: PN | Performed by: PHYSICAL THERAPIST

## 2020-07-06 NOTE — PLAN OF CARE
"  Physical Therapy Daily Treatment Note     Name: Jae Millan  Clinic Number: [id number]    Therapy Diagnosis:   1. Impaired functional mobility, balance, gait, and endurance     2. Decreased strength     3. Decreased ROM of left shoulder          Physician: Charis Salazar MD    Visit Date: 7/6/2020    Physician Orders: Charis Salazar MD     Physician Orders: PT Eval and Treat neuro program  Medical Diagnosis from Referral: (I69.354) Hemiparesis affecting left side as late effect of cerebrovascular accident (CVA)  (primary encounter diagnosis)  (I63.329) Cerebrovascular accident (CVA) due to thrombosis of anterior cerebral artery, unspecified blood vessel laterality  (R47.1) Dysarthria  (M79.2) Neuropathic pain  (Z74.09) Impaired mobility and ADLs    Evaluation Date: 6/8/2020  Authorization Period Expiration: 12/31/2020  Plan of Care Expiration: 8/14/2020  Visit # / Visits authorized: 5/ 10     Time In: 0946  Time Out: 1033  Total Billable Time: 47 minutes    Precautions: Standard    Subjective     Pt reports: pain is a little better today.  How can I prevent swelling in L hand/ leg?   He was intermittently compliant with home exercise program.  Response to previous treatment: no adverse effects reported  Functional change: ongoing    Pain: 7/10 prior to session, 0/10 at conclusion of session. "stiffness" in LUE at end of session  Location: left arms and toes    Objective     Jae received therapeutic exercises to develop strength, endurance and core stabilization for 10 minutes including:   eval 7/6/2020   L shoulder AROM ER 15 deg 20 deg   L hamstrings (SLR) 60 deg 67 deg     2- 6" stairs- 5x without UE support, reciprocal ascent, reciprocal/ step to descent, 1 loss of balance with min assist to correct      Jae participated in neuromuscular re-education activities to improve: Balance, Coordination and mobility for 37 minutes. The following activities were included:   Evaluation 7/6/2020   5 " "times sit-stand 15 seconds without UE support  >12 sec= fall risk for general elderly  >16 sec= fall risk for Parkinson's disease  >10 sec= balance/vestibular dysfunction (<59 y/o)  >14.2 sec= balance/vestibular dysfunction (>59 y/o)  >12 sec= fall risk for CVA 13 sec without UE support   FGA 20/30 (6/18/20) NT today        L shoulder external rotation stretch with scapular add and trunk rotation    Restoration of arches of L hand  6 basic wrist stretches: 1. P-A glide of scaphoid on radius, 2. Radial deviation,    3. Increasing mobility of metacarpals, 4. Carpal roll, 5. Movement of forearm on  hand towards wrist extension, 6. Movement of forearm on the hand toward  supination/ pronation  Paddle splint applied to L hand  90 deg wrist ext brace applied to L  active assisted range of motion L wrist extension- moderate first digit extension noted.     LEE scapular adduction 10x, mod facilitation L  1/2 stand from mat, 2" step under L hand, CGA 5 sec hold 10x    Parallel keven: tandem stance 3 x 30 sec= R forward= fair balance, L forward= poor+ to fair- balance   Romberg on foam, horizontal head turns 2 x 30 sec= good balance   Romberg on foam, vertical head turns 2 x 30 sec= good balance   romberg on foam with eyes closed x 30 sec= good balance   bosu ball (flat side up)- static balance 2 x 30 sec= fair- balance   bosu ball (flat side up)- vertical and horizontal head turns x 30 sec= poor+ to fair- balance   bosu ball (round side up)- single leg stance with R foot on dome 2 x 30 sec= fair- balance    Home Exercises Provided and Patient Education Provided     Education provided:   - continue with HEP  - elevate L hand and foot to decrease edema  - perform PROM of R hand and ankle to decrease edema  - may obtain compression socks and/ or glove (with MD prescription) to address edema    Written Home Exercises Provided: Patient instructed to cont prior HEP.  Exercises were reviewed and Jae was able to demonstrate them " prior to the end of the session.  Jae demonstrated good  understanding of the education provided.     See EMR under Patient Instructions for exercises provided 6/18/2020.    Assessment   Assessment period: 6/8/2020 to 7/6/2020   Jae tolerates PT sessions well and is demonstrating good progress towards goals set. Pt demonstrates improved L hamstring and shoulder external rotation range of Motion, enabling for increased functional use of limbs. Shoulder external rotation active range of motion is still significantly limited, impairing pt's reaching ability and can be an influencer of LUE pain. Pt is participating in interventions to improve balance strategies and ability to equally use L extremities. Per Functional Gait Assessment, pt is at risk for falls. Pt has difficulty balancing with head turns, backwards stepping, narrow Base of support, and with eyes closed. L paddle splint and 90 degree wrist extension brace are being used to decreased muscle tone and edema in hand. Pt reported decreased pain in LUE at conclusion of today's session. Skin integrity of L hand was intact after removal of splint, no adverse reactions noted.  Pt can benefit from continued skilled PT to address impairments to improve mobility and balance.     Jae is progressing well towards his goals.   Pt prognosis is Good.     Pt will continue to benefit from skilled outpatient physical therapy to address the deficits listed in the problem list box on initial evaluation, provide pt/family education and to maximize pt's level of independence in the home and community environment.     Pt's spiritual, cultural and educational needs considered and pt agreeable to plan of care and goals.     Anticipated barriers to physical therapy: length of time since onset    Goals:   Formal status as of 7/6/2020  Short Term Goals: 5 weeks   1. Pt will perform HEP for strengthening and range of Motion at least 2x/week to improve carryover of progress.  ongoing  2. Pt will demonstrate improved speed of mobility and activity tolerance by performing 5 sit<>stands without UE support in 10 sec.  Improved- 13 sec, no UE support  3. Pt will demonstrate improved L hamstring passive range of motion to 70 deg straight leg raise to improve flexibility for all mobility.  Improved- 67 deg  4. Pt will demonstrate improved L shoulder ER active range of motion to 30 degrees to improve shoulder alignment to decrease pain.  Improved- 20 deg  5. Assess Functional Gait Assessment and set goals as needed. Met 6/18/2020- 20/30  6. Pt will negotiate 10 steps with 1 handrail to demonstrate improve ability to negotiate a flight of stairs in case of a fire (drill).  Improved- 10 steps without handrail, partial reciprocal/ step to pattern, 1 loss of balance with assist to correct     Long Term Goals: 10 weeks   1. Pt will demonstrate improved L shoulder ER active range of motion to 60 degrees to improve ability to flex shoulder and decrease pain.  ongoing  2. Pt will demonstrate improved strength of L plantar flexion to 4-/5 to improve foot clearance during swing phase.  ongoing  3. Pt will demonstrate improved L hip extension strength to 4/5 to improve standing balance and gait ability.  ongoing  4. Pt will negotiate 20 steps with handrail and mod I to demonstrate improved ability to negotiate stairs in case of emergency at apartment.  Ongoing  5. New 6/25/2020: pt will demonstrate decreased fall risk and improved balance during gait by scoring 23/30 on Functional Gait Assessment. ongoing    Plan     continue with use of paddle splint and standing LE exercises. Increase resistance on stepper. May attempt quadruped for improved core strengthening and motor control.     Naya Obregon, PT

## 2020-07-08 ENCOUNTER — PATIENT OUTREACH (OUTPATIENT)
Dept: ADMINISTRATIVE | Facility: OTHER | Age: 70
End: 2020-07-08

## 2020-07-08 NOTE — PROGRESS NOTES
Requested updates within Care Everywhere.  Patient's chart was reviewed for overdue PEDRO LUIS topics.  Immunizations reconciled.

## 2020-07-09 ENCOUNTER — CLINICAL SUPPORT (OUTPATIENT)
Dept: DIABETES | Facility: CLINIC | Age: 70
End: 2020-07-09
Payer: MEDICARE

## 2020-07-09 DIAGNOSIS — E11.40 TYPE 2 DIABETES MELLITUS WITH DIABETIC NEUROPATHY, WITHOUT LONG-TERM CURRENT USE OF INSULIN: Primary | ICD-10-CM

## 2020-07-09 PROCEDURE — G0108 DIAB MANAGE TRN  PER INDIV: HCPCS | Mod: PBBFAC,PN | Performed by: DIETITIAN, REGISTERED

## 2020-07-09 NOTE — PROGRESS NOTES
Diabetes Education  Author: Vi Woodson RD  Date: 7/9/2020    Diabetes Care Management Summary  Discussed diabetes self management w emphasis on CHO awareness/counting, meal planning/label reading, SMBG, exercise, and meds.Diabetes Education Record Assessment/Progress: Initial  Current Diabetes Risk Level: Moderate     Last A1c:   Lab Results   Component Value Date    HGBA1C 7.3 (H) 05/20/2020     Last visit with Diabetes Educator: : 07/09/2020      Diabetes Type  Diabetes Type : Type II    Diabetes History  Diabetes Diagnosis: >10 years  Current Treatment: Oral Medication, Diet  Reviewed Problem List with Patient: Yes    Health Maintenance was reviewed today with patient. Discussed with patient importance of routine eye exams, foot exams/foot care, blood work (i.e.: A1c, microalbumin, and lipid), dental visits, yearly flu vaccine, and pneumonia vaccine as indicated by PCP. Patient verbalized understanding.     Health Maintenance Topics with due status: Not Due       Topic Last Completion Date    Colorectal Cancer Screening 03/01/2018    Eye Exam 09/12/2019    Influenza Vaccine 09/30/2019    Lipid Panel 05/20/2020    Hemoglobin A1c 05/20/2020    Foot Exam 06/04/2020    High Dose Statin 06/30/2020     Health Maintenance Due   Topic Date Due    TETANUS VACCINE  12/01/1970    Shingles Vaccine (1 of 2) 12/01/2002    Pneumococcal Vaccine (65+ Low/Medium Risk) (2 of 2 - PPSV23) 04/12/2020       Nutrition  Meal Planning: drinks regular soda, water, 3 meals per day, eats out often, snacks between meal, artificial sweeteners  What type of sweetener do you use?: sugar  What type of beverages do you drink?: regular soda/tea, diet soda/tea, juice, water  Meal Plan 24 Hour Recall - Breakfast: 1 whole large bagel, 2 eggs scrambled, water  Meal Plan 24 Hour Recall - Lunch: leftovers or green salad w meat or shrimp, 8-12 crackers or Burger and ffires, drinks either unsweet tea or small reg soda  Meal Plan 24 Hour Recall -  Dinner: 2 cups red beans,1 cup rice, occ cornbread and greens, occ reg soda  or just 6 pieces of fruit as supper  Meal Plan 24 Hour Recall - Snack: loves milk regino bars,  ffries    Monitoring   Monitoring: Other  Self Monitoring : once daily taken by friend since pt has limited use of left hand  Blood Glucose Logs: No(states BG usually 130-150)  Do you use a personal continuous glucose monitor?: No  In the last month, how often have you had a low blood sugar reaction?: never  Can you tell when your blood sugar is too high?: no    Exercise   Exercise Type: none(limited due to CVA but does attend PT,OT regularly)    Current Diabetes Treatment   Current Treatment: Oral Medication, Diet    Social History  Preferred Learning Method: Face to Face  Primary Support: Self, Friends  Educational Level: High School  Occupation: disabled  Smoking Status: Ex Smoker  Alcohol Use: Rarely  Barriers to Change  Barriers to Change: Functional limitation(CVA affecting left side of body)  Learning Challenges : None    Readiness to Learn   Readiness to Learn : Acceptance    Cultural Influences  Cultural Influences: No    Diabetes Education Assessment/Progress  Diabetes Disease Process (diabetes disease process and treatment options): Written Materials Provided, Discussion, Individual Session, Comprehends Key Points, Instructed  -Discussed qualifying parameters of diabetes dx. Reviewed/Instructed on disease process and pt's most likely causes of recently 7.3 %A1c. Discussed current treatment options, especially dietary and lifestyle changes as well as medication additions and/or changes. Patient verbalizes understanding of received information/education.     Nutrition (Incorporating nutritional management into one's lifestyle): Written Materials Provided, Discussion, Individual Session, Comprehends Key Points, Instructed  -pt has limited food preparation skills due to CVA affecting left side. He tends to eat quick to prepare items,or eats  out. Pt has recently reduced amt of simple carbs in meals but continues to consume large amt as some meals. This carb inconsistency mainly due to carb unawareness, lack of label reading or portion control as well as eating fast food meals often  -Discussed carb vs non-carb foods, appropriate amount of carbs to have at meals/snacks and acceptiable serving sizes of individual carb items.  - Instructed on appropriate label reading and serving sizes of specific carb containing foods., portion control (hand) and using the plate method of meal planning.   -Encouraged reduce simple carb intake belia in beverages; avoid SSB or added sugar; reduce amt of regino candy bars consumed. Suggested  Adding fresh fruit and non-starchy veg to snacks   -Discussed meal plans and snack ideas amenable to pt.  -Instructed pt to aim for 3 evenly-spaced meals with 45-60 gm carb/meal and 0-15 gm at snacks.  -Pt Verbalized will reduce portion sizes of CHO foods at meals    Physical Activity (incorporating physical activity into one's lifestyle): Discussion  Medications (states correct name, dose, onset, peak, duration, side effects & timing of meds): Discussion, Individual Session, Comprehends Key Points, Instructed  -takes meds as directed; no problems reported     Monitoring (monitoring blood glucose/other parameters & using results): Written Materials Provided, Discussion, Individual Session, Comprehends Key Points, Instructed  -pt requires assistance w BG checking due to limited use of left hand. He states he tries to check once daily and usually BG in 130-150 range  -Educated regarding purpose of monitoring blood sugar. Discussed goal Blood sugars for different times of day and in relation to meals.  -Reviewed A1c goal of 7% or less and BGgoals of  pre meal/fasting, <180 2hrpp.   Discussed BG readings and how to use data in DM self management; rec'd continue SMBG 1x daily, fasting or 2hrpp. Pt asked to keep log and bring to clinic  appts/ copy log sheet provided.  -Patient verbalizes understanding of received information/education.    Acute Complications (preventing, detecting, and treating acute complications): Discussion, Written Materials Provided, Comprehends Key Points, Instructed  Chronic Complications (preventing, detecting, and treating chronic complications): Written Materials Provided, Discussion, Individual Session, Comprehends Key Points, Instructed  Clinical (diabetes, other pertinent medical history, and relevant comorbidities reviewed during visit): Written Materials Provided, Discussion, Individual Session, Comprehends Key Points, Instructed  Cognitive (knowledge of self-management skills, functional health literacy): Discussion, Individual Session, Comprehends Key Points  -arrives with litte specific knowledge of diabetes management. Leaves with increased knowledge base - will benefit from f/u in 3 month    Psychosocial (emotional response to diabetes): Discussion, Individual Session  Diabetes Distress and Support Systems: Discussion, Individual Session  Behavioral (readiness for change, lifestyle practices, self-care behaviors): Discussion, Individual Session, Comprehends Key Points  -Pt indicated importance of changing present behaviors to allow for improved health long term and pt appears  motivated to make some of the recommended changes      Goals  Patient has selected/evaluated goals during today's session: Yes, selected  Healthy Eating: Set(start carb counting and limiting carbs to 45-60 g/each meal)  Start Date: 07/09/20  Target Date: 08/17/20  Monitoring: Set(SMBG once  daily)  Start Date: 07/09/20  Target Date: 08/10/20    Diabetes Care Plan/Intervention  Education Plan/Intervention: Individual Follow-Up DSMT    Diabetes Meal Plan  Restrictions: Restricted Carbohydrate  Carbohydrate Per Meal: 45-60g  Carbohydrate Per Snack : 15-20g    Today's Self-Management Care Plan was developed with the patient's input and is  based on barriers identified during today's assessment.    The long and short-term goals in the care plan were written with the patient/caregiver's input. The patient has agreed to work toward these goals to improve his overall diabetes control.      The patient received a copy of today's self-management plan and verbalized understanding of the care plan, goals, and all of today's instructions.      The patient was encouraged to communicate with his physician and care team regarding his condition(s) and treatment.  I provided the patient with my contact information today and encouraged him to contact me via phone or patient portal as needed.     Education Units of Time   Time Spent: 60 min

## 2020-07-09 NOTE — PROGRESS NOTES
"Outpatient Neurological Rehabilitation   Speech and Language Therapy Treatment Note  Date:  7/13/2020     Name: Jae Millan   MRN: [id number]   Therapy Diagnosis:   Encounter Diagnosis   Name Primary?    Dysarthria Yes      Physician: Charis Salazar MD  Physician Orders: [id number] - Ambulatory referral/consult to Speech Therapy  Medical Diagnosis: R47.1 (ICD-10-CM) - Dysarthria     Visit # / Visits Authorized:  3/10   Date of Evaluation:  6/8/2020   Insurance Authorization Period: 06/02/2020-12/31/2020  Plan of Care Certification:  6/8/2020 to 8/8/20  Extended POC:  n/a   Progress Note: due 8/13/20   Visits Cancelled: 0  Visits No Show: 0    Time In: 11:00   Time Out:  11:45  Total Billable Time: 45 min     Precautions: Standard  Subjective:   Pt reports: that he is not sure why no new speech therapy appointments were added.  Reports that he's been trying to stay inside. Reports that speech is improving, and that communication partners are not asking "what did you say?" over the phone as much.   He was compliant to home exercise program. Still doing oral motor exercises 2-3x times per day.   Response to previous treatment: "good"   Pain Scale:  0/10 on VAS currently.   Pain Location: n/a  Objective:   TIMED  Procedure Min.   Cognitive Therapeutic Interventions, first 15 minutes CPT 78373  0   Cognitive Therapeutic Interventions, each additional 15 minutes CPT 09705 0         UNTIMED  Procedure Min.   Speech- Language- Voice Therapy 45    Total Timed Units: 0  Total Untimed Units: 1  Charges Billed/# of units: 1    Short Term Goals: (4 weeks)  Current Progress:   1. Pt will produce multi syllabic words with bilabial sounds with 90% acc independently Pt produced multi syllabic words with bilabial sounds with 100% accuracy independently METx2 Goal Met / Discontinue    2. Pt will produce multi syllabic words with labio dental sounds with 90% acc independently  Pt produced multi syllabic words with labio dental " sounds with 100% acc independently METx2 Goal Met / Discontinue    3. Pt will produce multi syllabic words with interdental sounds with 90% acc independently    Pt produced multi syllabic words with interdental sounds with 90% acc independently METx2 Goal Met / Discontinue    4. Pt will producde multi syllabic words with alveolar sounds with 90% acc independently Pt produced multi syllabic words with alveolar sounds with 100% acc independently METx2 Goal Met / Discontinue    5. Pt will produce multi syllabic words with alveolar-palatal sounds with 90% acc independently   Pt produced multi syllabic words with alveolar-palatal sounds with 100% acc independently METx2 Goal Met / Discontinue      6. Pt will produce multi syllabic words with alveolar-palatal sounds with 90% acc independently   -Entered in error    7. Pt will produce multi syllabic words with velar sounds with 90% acc independently  Pt produced multi syllabic words with velar sounds with 100% acc independently METx2 Goal Met / Discontinue    8. Pt will utilize motor speech strategies to verbalize names of his medications clearly on 9/10 trials in order to improve speech intelligibility    Progressing/ Not Met 7/13/2020   Pt verbalized medication names clearly on 8/10 trials given mod cues to over exaggerate    9. Pt will utilize motor speech strategies to verbalize phone numbers clearly on 9/10 trials in order to improve speech intelligibility  Progressing/ Not Met 7/13/2020   Pt read 10 digit phone numbers with 98% acc independently METx1   10. Pt will rate their speech while reading as at least a 2 on a 3 point scale ( 1 = same as before beginning therapy, 2 =better but not best, 3 =best possible outcome) on  8 /10 trials.   Progressing/ Not Met 7/13/2020    Not formally addressed     11. Pt will use motor speech strategies and answer questions in conversation with a self-rating of at least 2 on a 3 point scale ( 1 = same as before beginning therapy, 2  =better but not best, 3 =best possible outcome) on  8 /10 trials.   Progressing/ Not Met 7/13/2020    Not formally addressed     12. Pt will utilize motor speech strategies to verbalize functional phrases on the phone/in person clearly on 9/10 trials  Progressing/ Not Met 7/13/2020    1. Hey, how are you doing?  2. I love you  3. I'll call you later   4. Have a blessed day  5. Hoping you're staying away from this corona-19  6. Hope your family is staying safe   7. When's my next appointment?  8. My appointment is for speech therapy  9. [address], [place] [address]  10. How long will it be before you get here?  11. Can you call me 2-3 minutes before you get here?      Pt verbalized phrases clearly on 6/10 trails independently; 10/10 trials clearly given direct instruction to slow rate of speech. Pt given list of phrases to practice saying at home        Patient Education/Response:   Discussed motor speech strategies at length and HEP tasks to complete 2-3x per day.  Pt verbalized understanding of all discussed.     Written Home Exercises Provided: Patient instructed to cont prior HEP.  Exercises were reviewed and Jae was able to demonstrate them prior to the end of the session.  Jae demonstrated good  understanding of the education provided.     See EMR under Patient Instructions for exercises provided prior visit.  Assessment:   Jae is progressing well towards his goals. Pt with >90% accuracy producing multi-syllabic words at the single word level, meeting short term goals 1-7.  Pt with good ability to utilize motor speech strategies at single word level but continues to demonstrate the need for cues to utilize strategies at phrase and conversation level. Pt required moderate cues to verbalize medications clearly. No difficulty verbalizing 10+ digit phone numbers clearly. Some difficulty verbalizing functional phrases clearly, but when provided with cues to slow rate of speech, pt's intelligibly did  improve. Pt requires moderate cues to overexaggerate and slow rate of speech at conversational level during spontaneous speech. Current goals remain appropriate. Goals to be updated as necessary.     Pt prognosis is Good. Pt will continue to benefit from skilled outpatient speech and language therapy to address the deficits listed in the problem list on initial evaluation, provide pt/family education and to maximize pt's level of independence in the home and community environment.   Medical necessity is demonstrated by the following IMPAIRMENTS:  Patient with decreased speech intelligibility. 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  Plan:   Continue POC with focus on improving speech intelligibility     LINCOLN Shirley, CCC-SLP  Speech Language Pathologist   7/13/2020

## 2020-07-10 ENCOUNTER — CLINICAL SUPPORT (OUTPATIENT)
Dept: REHABILITATION | Facility: HOSPITAL | Age: 70
End: 2020-07-10
Attending: PHYSICAL MEDICINE & REHABILITATION
Payer: MEDICARE

## 2020-07-10 DIAGNOSIS — R29.898 DECREASED STRENGTH OF UPPER EXTREMITY: ICD-10-CM

## 2020-07-10 DIAGNOSIS — R46.0 SELF-CARE DEFICIT FOR BATHING: ICD-10-CM

## 2020-07-10 DIAGNOSIS — M25.60 RANGE OF MOTION DEFICIT: ICD-10-CM

## 2020-07-10 DIAGNOSIS — M79.89 SWELLING OF LEFT HAND: ICD-10-CM

## 2020-07-10 DIAGNOSIS — I69.354 HEMIPARESIS AFFECTING LEFT SIDE AS LATE EFFECT OF CEREBROVASCULAR ACCIDENT (CVA): ICD-10-CM

## 2020-07-10 DIAGNOSIS — Z74.1 SELF-CARE DEFICIT IN DRESSING: Primary | ICD-10-CM

## 2020-07-10 PROCEDURE — 97140 MANUAL THERAPY 1/> REGIONS: CPT | Mod: PN

## 2020-07-10 PROCEDURE — 97165 OT EVAL LOW COMPLEX 30 MIN: CPT | Mod: PN

## 2020-07-13 ENCOUNTER — CLINICAL SUPPORT (OUTPATIENT)
Dept: REHABILITATION | Facility: HOSPITAL | Age: 70
End: 2020-07-13
Attending: PHYSICAL MEDICINE & REHABILITATION
Payer: MEDICARE

## 2020-07-13 DIAGNOSIS — R47.1 DYSARTHRIA: Primary | ICD-10-CM

## 2020-07-13 DIAGNOSIS — R53.1 DECREASED STRENGTH: ICD-10-CM

## 2020-07-13 DIAGNOSIS — M25.612 DECREASED ROM OF LEFT SHOULDER: ICD-10-CM

## 2020-07-13 PROBLEM — R46.0 SELF-CARE DEFICIT FOR BATHING: Status: RESOLVED | Noted: 2019-08-08 | Resolved: 2020-07-13

## 2020-07-13 PROBLEM — Z74.1 SELF-CARE DEFICIT IN DRESSING: Status: RESOLVED | Noted: 2019-08-08 | Resolved: 2020-07-13

## 2020-07-13 PROCEDURE — 97110 THERAPEUTIC EXERCISES: CPT | Mod: PN | Performed by: PHYSICAL THERAPIST

## 2020-07-13 PROCEDURE — 97112 NEUROMUSCULAR REEDUCATION: CPT | Mod: PN,59 | Performed by: PHYSICAL THERAPIST

## 2020-07-13 PROCEDURE — 92507 TX SP LANG VOICE COMM INDIV: CPT | Mod: PN | Performed by: SPEECH-LANGUAGE PATHOLOGIST

## 2020-07-13 NOTE — PLAN OF CARE
[facility] Occupational Therapy  Initial Neurological Evaluation     Date: 7/10/2020  Patient: Jae Millan  Chart Number: [id number]    Therapy Diagnosis:   Encounter Diagnoses   Name Primary?    Hemiparesis affecting left side as late effect of cerebrovascular accident (CVA)     Self-care deficit in dressing Yes    Self-care deficit for bathing     Range of motion deficit     Swelling of left hand     Decreased strength of upper extremity      Physician: Charis Salazar MD    Physician Orders: OT eval and treat  Medical Diagnosis: CVA  Evaluation Date: 7/10/2020  Plan of Care Expiration Period: 9/18/20  Insurance Authorization period Expiration: 12/31/20  Date of Return to MD: DALIA  Visit # / Visits Authorized: 1 / 20  FOTO: 5th visit     Time In:9:25am (10 min late)  Time Out: 10:00am  Total Billable (one on one) Time: 35 minutes    Precautions: Standard    Subjective     History of Current Condition: Initial stroke Aug 2018. Returning to therapy for regression of symptoms and function. Increased swelling in L hand. Not wearing his resting hand splint regularly. Numbness has increased and so has L shoulder pain. 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    Functional Limitations/Social History:    Prior Level of Function: I  Current Level of Function:Mod I but with increasing difficulty     Home/Living environment : lives  alone  Home Access: 7th floor apartment   DME: ADL bathroom     Leisure: relaxing     Driving: Not currently    Past Medical History/Physical Systems Review:     Past Medical History:  Jae Millan  has a past medical history of Cataract, Decreased sensation of lower extremity, Diabetes mellitus, Diabetic neuropathy, Dysarthria, Dysphagia, ED (erectile dysfunction), Glaucoma, Hemiparesis, High cholesterol, History of hepatitis C, s/p successful Rx w/ cure (SVR12) 4/2020, Hypertension, Impaired functional mobility, balance, gait, and endurance, Stroke, and Urinary frequency.    Past Surgical History:  Jae Millan  has a past surgical history that includes Undescended testicle exploration (Right) and Inguinal hernia repair (Left, 12/11/2019).    Current Medications:  Jae has a current medication list which includes the following prescription(s): amlodipine, aspirin, blood sugar diagnostic, blood-glucose meter, diclofenac sodium, dorzolamide-timolol 2-0.5%, gabapentin, ketoconazole, losartan-hydrochlorothiazide 100-12.5 mg, metformin, oxycodone-acetaminophen, oxycodone-acetaminophen, oxycodone-acetaminophen, pantoprazole, rosuvastatin, rosuvastatin, sitagliptin, and tadalafil.    Allergies:  Review of patient's allergies indicates:  No Known Allergies     Objective     Cognitive Exam:  Oriented: Person, Place, Time and Situation  Behaviors: normal, cooperative  Follows Commands/attention: Follows multistep  commands  Communication: clear/fluent  Memory: No Deficits noted as determined by 3 word recall after 1 minute and 3 minutes  Safety awareness/insight to disability: aware of diagnosis, treatment, and prognosis  Coping skills/emotional control: Appropriate to situation    Visual/Perceptual:  Tracking: intact   Saccades: intact      Acuity: corrected by glasses  Convergence: WFL  Nystagmus: NEG   R/L discrimination: intact  Visual field: intact  Motor Planning Praxis: intact      Physical Exam:  Postural  examination/scapula alignment: Rounded shoulder, Head forward and Affected scapula upwardly rotated  Joint integrity: firm end feel  Skin integrity: warm/dry  Edema: Mild L hand/UE  Palpation: TTP L GH joint    R ROM and strength WFL all planes    Joint Evaluation  AROM  7/10/2020 PROM   7/10/2020    Left Left   Shoulder flex 0-180 70 145   Shoulder Abd 0-180 55 115   Shoulder ER 0-90 To neautral 35   Shoulder IR 0-90 60 WNL   Shoulder Extension 0-80 30 45   Shoulder Horizontal adduction 0-90 25 WFL   Elbow flex/ext 0-150 10/105 WFL   Wrist flex 0-80 0 70   Wrist ext 0-70 0 40   Supination 0-80 30 WNL   Pronation 0-80 WNL (tone) WNL   UD ~15 WNL   RD 0 WNL     Fist: increased edema and tone      Strength 7/10/2020   **within available ROM** Left   Shoulder flex 3+/5   Shoulder abd 3+/5   Shoulder ER 3/5   Shoulder IR 3+/5   Shoulder Extension 4-/5   Shoulder Horizontal adduction 4-/5   Elbow flex 4-/5   Elbow ext 4/5   Wrist flex 2-/5   Wrist ext 2-/5   Supination 2-/5   Pronation 3/5   UD 3-/5   RD 1/5      Strength: (HERBERT Dynamometer in lbs.) Average 3 trials, Position II:     7/10/2020 7/10/2020    Left Right   Rung II NT# NT#     Pinch Strength (Measured in psi)     7/10/2020 7/10/2020    Left Right   Key Pinch NT psi NT psi   3pt Pinch NT psi NT psi   2pt Pinch NT psi NT psi     Fine Motor Coordination: 9 Hole Peg Test  Left 7/10/2020 Right 7/10/2020   NT NT     Gross motor coordination:   - SHEILA (Rapid Alternating Movements): slowed, impaired L  - Finger to Nose (5 times): impaired L  - Finger Flicks (coordination moving from digit flexion to digit extension): impaired L    Tone:  Modified Estefanía Scale:   1-  Slight increase in muscle tone, manifested by a catch and release or by minimal resistance at the end of the range of motino when the affected part(s) is moved in flexion or extension    Comments: increased hand edema     Sensation:  Jae  reports numbness in L hand  [id number]}    Balance:   Static  Sit - NORMAL: No deviations seen in posture held statically  Dynamic sit- NORMAL: No deviations seen in posture held statically  Static Stand - NT  Dynamic stand - NT    Endurance Deficit: none                    Functional Status      Functional Mobility:  Bed mobility: Mod I  Roll to left: Mod I  Roll to right: Mod I  Supine to sit: Mod I  Sit to supine: Mod I  Transfers to bed: Mod I  Transfers to toilet: Mod I  Car transfers: Mod I  Wheelchair mobility: n/a    ADL's:  Feeding: I  Grooming: Mod I  Hygiene: Mod I  UB Dressing: Mod I  LB Dressing: Mod I  Toileting: Mod I  Bathing: Mod I    IADL's:  Homecare: Mod I  Cooking: Mod I  Laundry: Mod I  Yard work: n/a  Use of telephone:  I  Money management: I  Medication management: Mod I  Handwriting:I  Technology Use:I    Comments:      CMS Impairment/Limitation/Restriction for FOTO Neuro Survey  FOTO not set up           Treatment     Treatment Time In: 9:50am  Treatment Time Out: 10:00am  Total Treatment time separate from Evaluation time:10      Jae received the following manual therapy techniques: Joint mobilizations were applied to the: L GH joint for 10 minutes, including:  - Gentle PROM to reduce tone in L UE FF/abd/ER/Sup/Pro (care to approximate GH joint)    Home Exercises and Patient Education Provided    Education provided:   -role of OT, goals for OT, scheduling/cancellations, insurance limitations with patient.  -Additional Education provided: importance of getting his resting hand splint    Written Home Exercises Provided: Patient instructed to cont prior HEP.  Exercises were reviewed and Jae was able to demonstrate them prior to the end of the session.    Jae demonstrated good  understanding of the education provided.     Assessment     Jae Millan is a 69 y.o. male referred to outpatient occupational therapy and presents with a medical diagnosis of CVA, resulting in pain, decreased flexibility, decreased range of motion, decreased muscle  strength, impaired function and decreased work ability and demonstrates limitations as described in the chart below. Following medical record review it is determined that patient will benefit from occupational therapy services in order to maximize pain free and/or functional use of left UE.    Patient prognosis is Good due to  motivation  Patient will benefit from skilled outpatient Occupational Therapy to address the deficits stated above and in the chart below, provide patient/family education, and to maximize patient's level of independence.     Plan of care discussed with patient: Yes  Patient's spiritual, cultural and educational needs considered and patient is agreeable to the plan of care and goals as stated below:     Anticipated Barriers for therapy: time since stroke    Medical Necessity is demonstrated by the following  Profile and History Assessment of Occupational Performance Level of Clinical Decision Making Complexity Score   Occupational Profile:   Jae Millan is a 69 y.o. male who lives alone and is currently disabled. Jae Millan has difficulty with  bathing  housework/household chores  affecting his/her daily functional abilities. His/her main goal for therapy is reduce hand pain and improve ROM.     Comorbidities:   see chart    Medical and Therapy History Review:   Brief               Performance Deficits    Physical:  Joint Mobility  Muscle Power/Strength  Muscle Endurance  Edema  Control of Voluntary Movement   Strength  Pinch Strength  Gross Motor Coordination  Fine Motor Coordination  Muscle Tone  Pain    Cognitive:  No Deficits    Psychosocial:    No Deficits     Clinical Decision Making:  low    Assessment Process:  Problem-Focused Assessments    Modification/Need for Assistance:  Not Necessary    Intervention Selection:  Limited Treatment Options       low  Based on PMHX, co morbidities , data from assessments and functional level of assistance required with task and clinical  presentation directly impacting function.       The following goals were discussed with the patient and patient is in agreement with them as to be addressed in the treatment plan.     Goals:  Short Term Goals: 5 weeks   - Pt. Will be instructed on self ROM exercises  - Pt. Will be instructed on edema management at home  - Pt. Will have improved L shoulder FF and ABd of at least 10 degrees for increased functional mobility for dressing  - Pt. Will correctly don/doff resting night splint  - Pt. Will wear resting night splint at least 3x week    Long Term Goals:  By D/C  - Pt. Will be independent with HEP  - Pt. Will increase AROM of L shoulder FF and ABD of at least 15 degrees for increased functional mobility for dressing and bathing  - Pt. Will wear resting night splint at least 5x week at d/c date    More goals to be established as appropriate     Plan   Certification Period/Plan of care expiration: 7/10/2020 to 9/18/20.    Outpatient Occupational Therapy 2 times weekly for 10 weeks to include the following interventions: Electrical Stimulation of L UE, Manual Therapy, Moist Heat/ Ice, Neuromuscular Re-ed, Orthotic Management and Training, Paraffin, Patient Education, Self Care, Therapeutic Activites and Therapeutic Exercise.    IRLANDA Galicia      I agree with above assessment and plan.    Charis Salazar MD    Physician/Referring Practitioner     02/03/2016  Date of Signature

## 2020-07-13 NOTE — PROGRESS NOTES
Physical Therapy Daily Treatment Note      Name: Jae Millan  Clinic Number: [id number]     Therapy Diagnosis:   1. Impaired functional mobility, balance, gait, and endurance      2. Decreased strength      3. Decreased ROM of left shoulder            Physician: Charis Salazar MD     Visit Date: 7/13/2020      Physician Orders: Charis Salazar MD     Physician Orders: PT Eval and Treat neuro program  Medical Diagnosis from Referral: (I69.354) Hemiparesis affecting left side as late effect of cerebrovascular accident (CVA)  (primary encounter diagnosis)  (I63.329) Cerebrovascular accident (CVA) due to thrombosis of anterior cerebral artery, unspecified blood vessel laterality  (R47.1) Dysarthria  (M79.2) Neuropathic pain  (Z74.09) Impaired mobility and ADLs     Evaluation Date: 6/8/2020  Authorization Period Expiration: 12/31/2020  Plan of Care Expiration: 8/14/2020  Visit # / Visits authorized: 2/ 10     Time In: 1146  Time Out: 1230  Total Billable Time: 44 minutes     Precautions: Standard     Subjective      Pt reports: the swelling in my hand may be slightly better  He was compliant with home exercise program.  Response to previous treatment: no adverse effects reported  Functional change: ongoing     Pain: 7/10 prior to session,   Location: left arms      Objective      Jae received therapeutic exercises to develop strength, endurance and core stabilization for 8 minutes including:     Recumbent stepper L5 BUE/LE x 8 min, L hand wrapped       Jae participated in neuromuscular re-education activities to improve: Balance, Coordination and mobility for 36 minutes. The following activities were included:     L shoulder external rotation stretch with scapular add and trunk rotation     Restoration of arches of L hand  6 basic wrist stretches: 1. P-A glide of scaphoid on radius, 2. Radial deviation,               3. Increasing mobility of metacarpals, 4. Carpal roll, 5. Movement of forearm on     "hand towards wrist extension, 6. Movement of forearm on the hand toward        supination/ pronation  Paddle splint applied to L hand  90 deg wrist ext brace applied to L  active assisted range of motion L wrist extension- moderate first digit extension noted.      LEE scapular adduction 5x, min facilitation L  1/2 stand from mat, 2" step under L hand, CGA 5 sec hold 10x     Parallel keven: tandem stance 3 x 30 sec= R forward= poor+ to fair balance, L forward= fair- to fair balance              bosu ball (flat side up)- static balance 2 x 30 sec= fair- balance              bosu ball (flat side up)- vertical and horizontal head turns x 30 sec= poor+ to fair- balance              bosu ball (round side up)- single leg stance with R foot on dome 2 x 30 sec= fair- balance     Home Exercises Provided and Patient Education Provided      Education provided:   - continue with HEP     Written Home Exercises Provided: Patient instructed to cont prior HEP.  Exercises were reviewed and Jae was able to demonstrate them prior to the end of the session.  Jae demonstrated good  understanding of the education provided.      See EMR under Patient Instructions for exercises provided 6/18/2020.     Assessment     Jae tolerated PT session well. Pt's L hand is still edematous, but slightly better than last session (per observation). Pt agrees. Pt demonstrates more equal use of L extremities with 1/2 standing practice. Improved balance reactions noted in tandem stance. Pt continues to lack accurate hip and ankle strategies to maintain a narrow Base of support. Skin integrity of L hand was intact after removal of splint, no adverse reactions noted.     Pt can benefit from continued skilled PT to address impairments to improve mobility and balance.      Jae is progressing well towards his goals.   Pt prognosis is Good.      Pt will continue to benefit from skilled outpatient physical therapy to address the deficits listed in the " problem list box on initial evaluation, provide pt/family education and to maximize pt's level of independence in the home and community environment.      Pt's spiritual, cultural and educational needs considered and pt agreeable to plan of care and goals.     Anticipated barriers to physical therapy: length of time since onset     Goals:   Formal status as of 7/6/2020  Short Term Goals: 5 weeks   1. Pt will perform HEP for strengthening and range of Motion at least 2x/week to improve carryover of progress. ongoing  2. Pt will demonstrate improved speed of mobility and activity tolerance by performing 5 sit<>stands without UE support in 10 sec.  Improved- 13 sec, no UE support  3. Pt will demonstrate improved L hamstring passive range of motion to 70 deg straight leg raise to improve flexibility for all mobility.  Improved- 67 deg  4. Pt will demonstrate improved L shoulder ER active range of motion to 30 degrees to improve shoulder alignment to decrease pain.  Improved- 20 deg  5. Assess Functional Gait Assessment and set goals as needed. Met 6/18/2020- 20/30  6. Pt will negotiate 10 steps with 1 handrail to demonstrate improve ability to negotiate a flight of stairs in case of a fire (drill).  Improved- 10 steps without handrail, partial reciprocal/ step to pattern, 1 loss of balance with assist to correct     Long Term Goals: 10 weeks   1. Pt will demonstrate improved L shoulder ER active range of motion to 60 degrees to improve ability to flex shoulder and decrease pain.  ongoing  2. Pt will demonstrate improved strength of L plantar flexion to 4-/5 to improve foot clearance during swing phase.  ongoing  3. Pt will demonstrate improved L hip extension strength to 4/5 to improve standing balance and gait ability.  ongoing  4. Pt will negotiate 20 steps with handrail and mod I to demonstrate improved ability to negotiate stairs in case of emergency at apartment.  Ongoing  5. New 6/25/2020: pt will demonstrate  decreased fall risk and improved balance during gait by scoring 23/30 on Functional Gait Assessment. ongoing     Plan      continue with use of paddle splint and standing LE exercises.May attempt quadruped for improved core strengthening and motor control.      Naya Obregon, PT

## 2020-07-16 ENCOUNTER — CLINICAL SUPPORT (OUTPATIENT)
Dept: REHABILITATION | Facility: HOSPITAL | Age: 70
End: 2020-07-16
Attending: PHYSICAL MEDICINE & REHABILITATION
Payer: MEDICARE

## 2020-07-16 DIAGNOSIS — M79.89 SWELLING OF LEFT HAND: ICD-10-CM

## 2020-07-16 DIAGNOSIS — R53.1 DECREASED STRENGTH: ICD-10-CM

## 2020-07-16 DIAGNOSIS — M25.60 RANGE OF MOTION DEFICIT: Primary | ICD-10-CM

## 2020-07-16 DIAGNOSIS — R29.898 DECREASED STRENGTH OF UPPER EXTREMITY: ICD-10-CM

## 2020-07-16 DIAGNOSIS — M25.612 DECREASED ROM OF LEFT SHOULDER: ICD-10-CM

## 2020-07-16 PROCEDURE — 97112 NEUROMUSCULAR REEDUCATION: CPT | Mod: PN | Performed by: PHYSICAL THERAPIST

## 2020-07-16 PROCEDURE — 97112 NEUROMUSCULAR REEDUCATION: CPT | Mod: PN

## 2020-07-16 PROCEDURE — 97140 MANUAL THERAPY 1/> REGIONS: CPT | Mod: PN

## 2020-07-16 PROCEDURE — 97014 ELECTRIC STIMULATION THERAPY: CPT | Mod: PN

## 2020-07-16 PROCEDURE — 97110 THERAPEUTIC EXERCISES: CPT | Mod: PN | Performed by: PHYSICAL THERAPIST

## 2020-07-16 NOTE — PROGRESS NOTES
"    Occupational Therapy Treatment Note     Date: 7/16/2020  Name: Jae Millan  [facility] Number: [id number]    Therapy Diagnosis:   Encounter Diagnoses   Name Primary?    Range of motion deficit Yes    Swelling of left hand     Decreased strength of upper extremity      Physician: Maryjane Farias MD    Physician Orders: OT eval and treat  Medical Diagnosis: CVA  Evaluation Date: 7/10/2020  Plan of Care Expiration Period: 9/18/20  Insurance Authorization period Expiration: 12/31/20  Date of Return to MD: TBD  Visit # / Visits Authorized: 2 / 20  FOTO: 5th visit      Time In: 1:15pm  Time Out: 2:00pm  Total Billable (one on one) Time: 45 minutes     Precautions: Standard    Subjective     Pt reports: "I really needed to come back and see you. You push me"  he was  Somewhat compliant with home exercise program given last session.   Response to previous treatment:first treat  Functional change: ongoing    Pain: 2/10  Location: left shoulder      Objective       Jae received the following manual therapy techniques for 10 minutes:   - Supine PROM of L GH joint: FF/Abd/ER/IR/Sup  - General wrist stretches including               1. Scaphoid on radius               2. Increasing mobility of metacarpals               3. Carpal rolls               4. Increasing mobility towards radial deviation               5. Increasing mobility of wrist towards extension              6. Increasing mobility of wrist towards supination/pronation  - Side lying scap mobs in all planes    Jae participated in neuromuscular re-education activities to improve: Coordination, Kinesthetic, Sense, Proprioception and Posture for 20 minutes. The following activities were included:  - Quadruped with forward/backward shifts, focus on prolonged WB into L UE while R UE placed sqigz on mirror and removed x2 trials  - Tall kneeling for core stability and improved balance/posture     Jae participated in Unattended ESTIM (NMES) for 15 min:  - " Macedonian 10/10 to L wrist extensors, verbal cues for muscle activation when NMES active (open hand and extend wrist)    Home Exercises and Education Provided     Education provided:   - wear schedule for resting hand orthotic   - Progress towards goals     Written Home Exercises Provided: not at this time       Assessment     Pt would continue to benefit from skilled OT. Jae had good tolerance to his first treat. Better tolerated PROM to L UE noted over previous visits. Edema appeared grossly improved after PROM. WB well tolerated with encouragement and [place] assist. HE continues with significant L side weakness affecting his ability to complete ADL/IALDs.      Jae is progressing well towards his goals and there are no updates to goals at this time. Pt prognosis is Good.     Pt will continue to benefit from skilled outpatient occupational therapy to address the deficits listed in the problem list on initial evaluation provide pt/family education and to maximize pt's level of independence in the home and community environment.     Anticipated barriers to occupational therapy: time since stroke     Pt's spiritual, cultural and educational needs considered and pt agreeable to plan of care and goals.    Goals:  Short Term Goals: 5 weeks   - Pt. Will be instructed on self ROM exercises  - Pt. Will be instructed on edema management at home  - Pt. Will have improved L shoulder FF and ABd of at least 10 degrees for increased functional mobility for dressing  - Pt. Will correctly don/doff resting night splint  - Pt. Will wear resting night splint at least 3x week     Long Term Goals:  By D/C  - Pt. Will be independent with HEP  - Pt. Will increase AROM of L shoulder FF and ABD of at least 15 degrees for increased functional mobility for dressing and bathing  - Pt. Will wear resting night splint at least 5x week at d/c date     More goals to be established as appropriate      Plan   Certification Period/Plan of care  expiration: 7/10/2020 to 9/18/20.     Outpatient Occupational Therapy 2 times weekly for 10 weeks to include the following interventions: Electrical Stimulation of L UE, Manual Therapy, Moist Heat/ Ice, Neuromuscular Re-ed, Orthotic Management and Training, Paraffin, Patient Education, Self Care, Therapeutic Activites and Therapeutic Exercise.     IRLANDA Galicia       Updates/Grading for next session: as tolerated       IRLANDA Galicia

## 2020-07-16 NOTE — PROGRESS NOTES
"  Physical Therapy Daily Treatment Note      Name: Jae Millan  Clinic Number: [id number]     Therapy Diagnosis:   1. Impaired functional mobility, balance, gait, and endurance      2. Decreased strength      3. Decreased ROM of left shoulder            Physician: Charis Salazar MD     Visit Date: 7/16/2020      Physician Orders: Charis Salazar MD     Physician Orders: PT Eval and Treat neuro program  Medical Diagnosis from Referral: (I69.354) Hemiparesis affecting left side as late effect of cerebrovascular accident (CVA)  (primary encounter diagnosis)  (I63.329) Cerebrovascular accident (CVA) due to thrombosis of anterior cerebral artery, unspecified blood vessel laterality  (R47.1) Dysarthria  (M79.2) Neuropathic pain  (Z74.09) Impaired mobility and ADLs     Evaluation Date: 6/8/2020  Authorization Period Expiration: 12/31/2020  Plan of Care Expiration: 8/14/2020  Visit # / Visits authorized: 3/ 10     Time In: 1449  Time Out: 1532  Total Billable Time: 43 minutes     Precautions: Standard     Subjective      Pt reports: Ta worked my hand out good.  He was intermittently compliant with home exercise program.  Response to previous treatment: felt good  Functional change: ongoing     Pain: 0/10 prior to session,   Location: left arms      Objective      Jae received therapeutic exercises to develop strength, endurance and core stabilization for 14 minutes including:     Recumbent stepper L5 BUE/LE x 8 min, L hand wrapped    Parallel bars: Step ups L up 6" 3 x 10   DF stretch on wedge 2 x 30 sec     Jae participated in neuromuscular re-education activities to improve: Balance, Coordination and mobility for 29 minutes. The following activities were included:     L shoulder external rotation stretch with scapular add and trunk rotation     Restoration of arches of L hand  6 basic wrist stretches: 1. P-A glide of scaphoid on radius, 2. Radial deviation,               3. Increasing mobility of " metacarpals, 4. Carpal roll, 5. Movement of forearm on    hand towards wrist extension, 6. Movement of forearm on the hand toward        supination/ pronation  Paddle splint applied to L hand  90 deg wrist ext brace applied to L        Parallel bar: tandem gait, intermittent UE support- 3 laps              bosu ball (flat side up)- static balance 2 x 30 sec= fair balance              bosu ball (flat side up)- vertical and horizontal head turns x 30 sec= fair- balance              bosu ball (round side up)- single leg stance with R foot on dome 2 x 30 sec= fair- balance     Home Exercises Provided and Patient Education Provided      Education provided:   - continue with HEP     Written Home Exercises Provided: Patient instructed to cont prior HEP.  Exercises were reviewed and Jae was able to demonstrate them prior to the end of the session.  Jae demonstrated good  understanding of the education provided.      See EMR under Patient Instructions for exercises provided 6/18/2020.     Assessment     Jae tolerated PT session well. Pt demonstrated improved balance on Bosu, demonstrating improved use of ankle and hip strategies for balance. Step ups with LLE practiced to improve strength of limb. Pt's L hand and wrist extensors stretched via positioning in paddle splint to decrease muscle tone and edema. Skin integrity of L hand was intact after removal of splint, no adverse reactions noted.  Pt can benefit from continued skilled PT to address impairments to improve mobility and balance.      Jae is progressing well towards his goals.   Pt prognosis is Good.      Pt will continue to benefit from skilled outpatient physical therapy to address the deficits listed in the problem list box on initial evaluation, provide pt/family education and to maximize pt's level of independence in the home and community environment.      Pt's spiritual, cultural and educational needs considered and pt agreeable to plan of care  and goals.     Anticipated barriers to physical therapy: length of time since onset     Goals:   Formal status as of 7/6/2020  Short Term Goals: 5 weeks   1. Pt will perform HEP for strengthening and range of Motion at least 2x/week to improve carryover of progress. ongoing  2. Pt will demonstrate improved speed of mobility and activity tolerance by performing 5 sit<>stands without UE support in 10 sec.  Improved- 13 sec, no UE support  3. Pt will demonstrate improved L hamstring passive range of motion to 70 deg straight leg raise to improve flexibility for all mobility.  Improved- 67 deg  4. Pt will demonstrate improved L shoulder ER active range of motion to 30 degrees to improve shoulder alignment to decrease pain.  Improved- 20 deg  5. Assess Functional Gait Assessment and set goals as needed. Met 6/18/2020- 20/30  6. Pt will negotiate 10 steps with 1 handrail to demonstrate improve ability to negotiate a flight of stairs in case of a fire (drill).  Improved- 10 steps without handrail, partial reciprocal/ step to pattern, 1 loss of balance with assist to correct     Long Term Goals: 10 weeks   1. Pt will demonstrate improved L shoulder ER active range of motion to 60 degrees to improve ability to flex shoulder and decrease pain.  ongoing  2. Pt will demonstrate improved strength of L plantar flexion to 4-/5 to improve foot clearance during swing phase.  ongoing  3. Pt will demonstrate improved L hip extension strength to 4/5 to improve standing balance and gait ability.  ongoing  4. Pt will negotiate 20 steps with handrail and mod I to demonstrate improved ability to negotiate stairs in case of emergency at apartment.  Ongoing  5. New 6/25/2020: pt will demonstrate decreased fall risk and improved balance during gait by scoring 23/30 on Functional Gait Assessment. ongoing     Plan     continue with use of paddle splint and standing LE exercises.May attempt quadruped for improved core strengthening and motor  control.      Naya Obregon, PT

## 2020-07-21 ENCOUNTER — CLINICAL SUPPORT (OUTPATIENT)
Dept: REHABILITATION | Facility: HOSPITAL | Age: 70
End: 2020-07-21
Attending: PHYSICAL MEDICINE & REHABILITATION
Payer: MEDICARE

## 2020-07-21 DIAGNOSIS — R29.898 DECREASED STRENGTH OF UPPER EXTREMITY: ICD-10-CM

## 2020-07-21 DIAGNOSIS — R53.1 DECREASED STRENGTH: ICD-10-CM

## 2020-07-21 DIAGNOSIS — M25.60 RANGE OF MOTION DEFICIT: Primary | ICD-10-CM

## 2020-07-21 DIAGNOSIS — M79.89 SWELLING OF LEFT HAND: ICD-10-CM

## 2020-07-21 DIAGNOSIS — M25.612 DECREASED ROM OF LEFT SHOULDER: ICD-10-CM

## 2020-07-21 PROCEDURE — 97014 ELECTRIC STIMULATION THERAPY: CPT | Mod: PN

## 2020-07-21 PROCEDURE — 97112 NEUROMUSCULAR REEDUCATION: CPT | Mod: PN | Performed by: PHYSICAL THERAPIST

## 2020-07-21 PROCEDURE — 97110 THERAPEUTIC EXERCISES: CPT | Mod: PN | Performed by: PHYSICAL THERAPIST

## 2020-07-21 PROCEDURE — 97140 MANUAL THERAPY 1/> REGIONS: CPT | Mod: PN

## 2020-07-21 PROCEDURE — 97112 NEUROMUSCULAR REEDUCATION: CPT | Mod: PN

## 2020-07-21 NOTE — PROGRESS NOTES
"Outpatient Neurological Rehabilitation   Speech and Language Therapy Treatment Note  Date:  7/24/2020     Name: Jae Millan   MRN: [id number]   Therapy Diagnosis:   Encounter Diagnosis   Name Primary?    Dysarthria Yes      Physician: Charis Salazar MD  Physician Orders: [id number] - Ambulatory referral/consult to Speech Therapy  Medical Diagnosis: R47.1 (ICD-10-CM) - Dysarthria     Visit # / Visits Authorized:  4/10   Date of Evaluation:  6/8/2020   Insurance Authorization Period: 06/02/2020-12/31/2020  Plan of Care Expiration:  6/8/2020 to 8/8/20  Extended POC:  n/a   Progress Note: due 8/13/20   Visits Cancelled: 0  Visits No Show: 0    Time In: 9:18  Time Out:  9:56  Total Billable Time: 38 min     Precautions: Standard  Subjective:   Pt reports: that he is making good progress and is proud of himself for taking it upon himself to improve his speech.   He was compliant to home exercise program. Still doing oral motor exercises 2-3x times per day usually; but at least 1x per day.   Response to previous treatment: "fine"   Pain Scale:  0/10 on VAS currently.   Pain Location: n/a  Objective:   TIMED  Procedure Min.   Cognitive Therapeutic Interventions, first 15 minutes CPT 33805 0   Cognitive Therapeutic Interventions, each additional 15 minutes CPT 96759 0         UNTIMED  Procedure Min.   Speech- Language- Voice Therapy 38    Total Timed Units: 0  Total Untimed Units: 1  Charges Billed/# of units: 1    Short Term Goals: (4 weeks)  Current Progress:   1. Pt will produce multi syllabic words with bilabial sounds with 90% acc independently Goal Met / Discontinue    2. Pt will produce multi syllabic words with labio dental sounds with 90% acc independently Goal Met / Discontinue    3. Pt will produce multi syllabic words with interdental sounds with 90% acc independently   Goal Met / Discontinue    4. Pt will producde multi syllabic words with alveolar sounds with 90% acc independently Goal Met / Discontinue  "   5. Pt will produce multi syllabic words with alveolar-palatal sounds with 90% acc independently   Goal Met / Discontinue      6. Pt will produce multi syllabic words with alveolar-palatal sounds with 90% acc independently   -Entered in error Discontinue   7. Pt will produce multi syllabic words with velar sounds with 90% acc independently Goal Met / Discontinue    8. Pt will utilize motor speech strategies to verbalize names of his medications clearly on 9/10 trials in order to improve speech intelligibility    Progressing/ Not Met 7/24/2020   Pt verbalized medication names clearly on 7/10 trials independently; 9/10 trials given mod cues to over exaggerate    9. Pt will utilize motor speech strategies to verbalize phone numbers clearly on 9/10 trials in order to improve speech intelligibility Pt read 10 digit phone numbers with 97% acc independently METx2 Goal Met / Discontinue    10. Pt will rate their speech while reading as at least a 2 on a 3 point scale ( 1 = same as before beginning therapy, 2 =better but not best, 3 =best possible outcome) on  8 /10 trials.   Progressing/ Not Met 7/24/2020   Pt rated his speech while reading sentences on a three point scale as a:  - 1/3 on 1/10 trials  - 2/3 on 8/10 trials  - 3/3 on 1/10 trials    METx1   11. Pt will use motor speech strategies and answer questions in conversation with a self-rating of at least 2 on a 3 point scale ( 1 = same as before beginning therapy, 2 =better but not best, 3 =best possible outcome) on  8 /10 trials.   Progressing/ Not Met 7/24/2020    Not formally addressed     12. Pt will utilize motor speech strategies to verbalize functional phrases on the phone/in person clearly on 9/10 trials  Progressing/ Not Met 7/24/2020    1. Hey, how are you doing?  2. I love you  3. I'll call you later   4. Have a blessed day  5. Hoping you're staying away from this corona-19  6. Hope your family is staying safe   7. When's my next appointment?  8. My  appointment is for speech therapy  9. [address] [facility] [place] [address]  10. How long will it be before you get here?  11. Can you call me 2-3 minutes before you get here?    Pt verbalized phrases clearly on 9/10 trails independently; 10/10 trials clearly given direct instruction to slow rate of speech.   METx1        Patient Education/Response:   Discussed motor speech strategies, progress, and HEP.  Pt verbalized understanding of all discussed.     Written Home Exercises Provided: Patient instructed to cont prior HEP.  Exercises were reviewed and Jae was able to demonstrate them prior to the end of the session.  Jae demonstrated good  understanding of the education provided.     See EMR under Patient Instructions for exercises provided prior visit.  Assessment:   Jae is progressing well towards his goals. Pt required min cues to slow rate of speech during unstructured conversation. Rated his speech as a 2/3 on at least 8/10 trials during reading task. Pt verbalized medications with 70% accuracy; able to increase accuracy given min cues. Good performance verbalizing functional phrases clearly. Current goals remain appropriate. Goals to be updated as necessary.     Pt prognosis is Good. Pt will continue to benefit from skilled outpatient speech and language therapy to address the deficits listed in the problem list on initial evaluation, provide pt/family education and to maximize pt's level of independence in the home and community environment.   Medical necessity is demonstrated by the following IMPAIRMENTS:  Patient with decreased speech intelligibility. 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  Plan:   Continue POC with focus on  improving speech intelligibility     LINCOLN Shirley, CCC-SLP  Speech Language Pathologist   7/24/2020

## 2020-07-21 NOTE — PROGRESS NOTES
"    Occupational Therapy Treatment Note     Date: 7/21/2020  Name: Jae Millan  [facility] Number: [id number]    Therapy Diagnosis:   Encounter Diagnoses   Name Primary?    Range of motion deficit Yes    Swelling of left hand     Decreased strength of upper extremity      Physician: Maryjane Farias MD    Physician Orders: OT eval and treat  Medical Diagnosis: CVA  Evaluation Date: 7/10/2020  Plan of Care Expiration Period: 9/18/20  Insurance Authorization period Expiration: 12/31/20  Date of Return to MD: TBD  Visit # / Visits Authorized: 3 / 20  FOTO: 5th visit      Time In: 10:30am  Time Out: 11:15am  Total Billable (one on one) Time: 45 minutes     Precautions: Standard    Subjective     Pt reports: "I haven't been stretching as much as I should be"  he was  Somewhat compliant with home exercise program given last session.   Response to previous treatment:first treat  Functional change: ongoing    Pain: 2/10  Location: left shoulder      Objective       Jae received the following manual therapy techniques for 15 minutes:   - Supine PROM of L GH joint: FF/Abd/ER/IR/Sup  - General wrist stretches including               1. Scaphoid on radius               2. Increasing mobility of metacarpals               3. Carpal rolls               4. Increasing mobility towards radial deviation               5. Increasing mobility of wrist towards extension              6. Increasing mobility of wrist towards supination/pronation  - Side lying scap mobs in all planes    Jae participated in neuromuscular re-education activities to improve: Coordination, Kinesthetic, Sense, Proprioception and Posture for 15 minutes. The following activities were included:  - Quadruped with forward/backward shifts, focus on prolonged WB into L UE while R UE stacked cones  - Tall kneeling for core stability and improved balance/posture     Jae participated in Unattended ESTIM (NMES) for 15 min:  - Swiss 10/10 to L wrist extensors, " verbal cues for muscle activation when NMES active (open hand and extend wrist)    Moist heat applied to L GH joint post tx    Home Exercises and Education Provided     Education provided:   - wear schedule for resting hand orthotic   - Progress towards goals     Written Home Exercises Provided: not at this time       Assessment     Jae had good tolerance to tx this date. He wa sin good spirits and was eager to participate. L scapula with increased tone noted this date as scap mobs were very difficult to complete. Tone reduced post weight bearing and moist heat. He continues with significant L side weakness affecting his ability to complete ADL/IALDs.      Jae is progressing well towards his goals and there are no updates to goals at this time. Pt prognosis is Good.     Pt will continue to benefit from skilled outpatient occupational therapy to address the deficits listed in the problem list on initial evaluation provide pt/family education and to maximize pt's level of independence in the home and community environment.     Anticipated barriers to occupational therapy: time since stroke     Pt's spiritual, cultural and educational needs considered and pt agreeable to plan of care and goals.    Goals:  Short Term Goals: 5 weeks   - Pt. Will be instructed on self ROM exercises  - Pt. Will be instructed on edema management at home  - Pt. Will have improved L shoulder FF and ABd of at least 10 degrees for increased functional mobility for dressing  - Pt. Will correctly don/doff resting night splint  - Pt. Will wear resting night splint at least 3x week     Long Term Goals:  By D/C  - Pt. Will be independent with HEP  - Pt. Will increase AROM of L shoulder FF and ABD of at least 15 degrees for increased functional mobility for dressing and bathing  - Pt. Will wear resting night splint at least 5x week at d/c date     More goals to be established as appropriate      Plan   Certification Period/Plan of care  expiration: 7/10/2020 to 9/18/20.     Outpatient Occupational Therapy 2 times weekly for 10 weeks to include the following interventions: Electrical Stimulation of L UE, Manual Therapy, Moist Heat/ Ice, Neuromuscular Re-ed, Orthotic Management and Training, Paraffin, Patient Education, Self Care, Therapeutic Activites and Therapeutic Exercise.     IRLANDA Galicia       Updates/Grading for next session: as tolerated       IRLANDA Galicia

## 2020-07-21 NOTE — PROGRESS NOTES
"  Physical Therapy Daily Treatment Note      Name: Jae Millan  Clinic Number: [id number]     Therapy Diagnosis:   1. Impaired functional mobility, balance, gait, and endurance      2. Decreased strength      3. Decreased ROM of left shoulder            Physician: Charis Salazar MD     Visit Date: 7/21/2020      Physician Orders: Charis Salazar MD     Physician Orders: PT Eval and Treat neuro program  Medical Diagnosis from Referral: (I69.354) Hemiparesis affecting left side as late effect of cerebrovascular accident (CVA)  (primary encounter diagnosis)  (I63.329) Cerebrovascular accident (CVA) due to thrombosis of anterior cerebral artery, unspecified blood vessel laterality  (R47.1) Dysarthria  (M79.2) Neuropathic pain  (Z74.09) Impaired mobility and ADLs     Evaluation Date: 6/8/2020  Authorization Period Expiration: 12/31/2020  Plan of Care Expiration: 8/14/2020  Visit # / Visits authorized: 4/ 10     Time In: 1007  Time Out: 1030  Total Billable Time: 23 minutes     Precautions: Standard     Subjective      Pt reports: my transportation did not have me on the schedule today.   He was compliant with home exercise program.  Response to previous treatment: felt good  Functional change: ongoing     Pain: 0/10 prior to session,   Location: left arms      Objective      Jae received therapeutic exercises to develop strength, endurance and core stabilization for 12 minutes including:     Recumbent stepper L5 BUE/LE x 6 min, L hand wrapped    Parallel bars: Step ups L up 6" 3 x 10   Sit<>stands from chair height without UE support-20x     Jae participated in neuromuscular re-education activities to improve: Balance, Coordination and mobility for 11 minutes. The following activities were included:     Parallel bar: tandem gait, forwards/ backwards, intermittent UE support- 3 laps              tandem stance 2 x 30 sec: fair- balance              bosu ball (round side up)- single leg stance with R foot " on dome 2 x 30 sec= fair balance   R foot cone tap (large) 10x, fair- balance     Home Exercises Provided and Patient Education Provided      Education provided:   - continue with HEP     Written Home Exercises Provided: Patient instructed to cont prior HEP.  Exercises were reviewed and Jae was able to demonstrate them prior to the end of the session.  Jae demonstrated good  understanding of the education provided.      See EMR under Patient Instructions for exercises provided 6/18/2020.     Assessment     Jae tolerated PT session well. Session was limited due to pt arriving late. Pt demonstrates improved strength and power in LLE for step ups and sit<>stands without UE support. Pt required 2 verbal cues during sit<>stands without UE support to improve equal use of LLE (would bring the R foot more under his body). Improved balance on LLE noted when R foot placed on unstable surface. PT progressed pt to less stable and higher surface with R foot taps to improve balance challenge in L single leg stance.  Pt can benefit from continued skilled PT to address impairments to improve mobility and balance.      Jae is progressing well towards his goals.   Pt prognosis is Good.      Pt will continue to benefit from skilled outpatient physical therapy to address the deficits listed in the problem list box on initial evaluation, provide pt/family education and to maximize pt's level of independence in the home and community environment.      Pt's spiritual, cultural and educational needs considered and pt agreeable to plan of care and goals.     Anticipated barriers to physical therapy: length of time since onset     Goals:   Formal status as of 7/6/2020  Short Term Goals: 5 weeks   1. Pt will perform HEP for strengthening and range of Motion at least 2x/week to improve carryover of progress. ongoing  2. Pt will demonstrate improved speed of mobility and activity tolerance by performing 5 sit<>stands without UE  support in 10 sec.  Improved- 13 sec, no UE support  3. Pt will demonstrate improved L hamstring passive range of motion to 70 deg straight leg raise to improve flexibility for all mobility.  Improved- 67 deg  4. Pt will demonstrate improved L shoulder ER active range of motion to 30 degrees to improve shoulder alignment to decrease pain.  Improved- 20 deg  5. Assess Functional Gait Assessment and set goals as needed. Met 6/18/2020- 20/30  6. Pt will negotiate 10 steps with 1 handrail to demonstrate improve ability to negotiate a flight of stairs in case of a fire (drill).  Improved- 10 steps without handrail, partial reciprocal/ step to pattern, 1 loss of balance with assist to correct     Long Term Goals: 10 weeks   1. Pt will demonstrate improved L shoulder ER active range of motion to 60 degrees to improve ability to flex shoulder and decrease pain.  ongoing  2. Pt will demonstrate improved strength of L plantar flexion to 4-/5 to improve foot clearance during swing phase.  ongoing  3. Pt will demonstrate improved L hip extension strength to 4/5 to improve standing balance and gait ability.  ongoing  4. Pt will negotiate 20 steps with handrail and mod I to demonstrate improved ability to negotiate stairs in case of emergency at apartment.  Ongoing  5. New 6/25/2020: pt will demonstrate decreased fall risk and improved balance during gait by scoring 23/30 on Functional Gait Assessment. ongoing     Plan     continue with use of paddle splint and standing LE exercises.May attempt quadruped for improved core strengthening and motor control.      Naya Obregon, PT   7/21/2020

## 2020-07-23 ENCOUNTER — CLINICAL SUPPORT (OUTPATIENT)
Dept: REHABILITATION | Facility: HOSPITAL | Age: 70
End: 2020-07-23
Attending: PHYSICAL MEDICINE & REHABILITATION
Payer: MEDICARE

## 2020-07-23 DIAGNOSIS — R53.1 DECREASED STRENGTH: ICD-10-CM

## 2020-07-23 DIAGNOSIS — M25.612 DECREASED ROM OF LEFT SHOULDER: ICD-10-CM

## 2020-07-23 DIAGNOSIS — R29.898 DECREASED STRENGTH OF UPPER EXTREMITY: ICD-10-CM

## 2020-07-23 DIAGNOSIS — M79.89 SWELLING OF LEFT HAND: ICD-10-CM

## 2020-07-23 DIAGNOSIS — M25.60 RANGE OF MOTION DEFICIT: Primary | ICD-10-CM

## 2020-07-23 PROCEDURE — 97112 NEUROMUSCULAR REEDUCATION: CPT | Mod: PN | Performed by: PHYSICAL THERAPIST

## 2020-07-23 PROCEDURE — 97140 MANUAL THERAPY 1/> REGIONS: CPT | Mod: PN

## 2020-07-23 PROCEDURE — 97110 THERAPEUTIC EXERCISES: CPT | Mod: PN | Performed by: PHYSICAL THERAPIST

## 2020-07-23 PROCEDURE — 97112 NEUROMUSCULAR REEDUCATION: CPT | Mod: PN

## 2020-07-23 NOTE — PROGRESS NOTES
"    Occupational Therapy Treatment Note     Date: 7/23/2020  Name: Jae Millan  Clinic Number: [id number]    Therapy Diagnosis:   Encounter Diagnoses   Name Primary?    Range of motion deficit Yes    Swelling of left hand     Decreased strength of upper extremity      Physician: Maryjane Farias MD    Physician Orders: OT eval and treat  Medical Diagnosis: CVA  Evaluation Date: 7/10/2020  Plan of Care Expiration Period: 9/18/20  Insurance Authorization period Expiration: 12/31/20  Date of Return to MD: TBD  Visit # / Visits Authorized: 4 / 20  FOTO: 5th visit      Time In: 10:15am  Time Out: 11:00am  Total Billable (one on one) Time: 45 minutes     Precautions: Standard    Subjective     Pt reports: "The stretching really helps"  he was  somewhat compliant with home exercise program given last session.   Response to previous treatment: positive  Functional change: ongoing    Pain: 2/10  Location: left shoulder      Objective       Jae received the following manual therapy techniques for 20 minutes:   - Supine PROM of L GH joint: FF/Abd/ER/IR/Sup  - General wrist stretches including               1. Scaphoid on radius               2. Increasing mobility of metacarpals               3. Carpal rolls               4. Increasing mobility towards radial deviation               5. Increasing mobility of wrist towards extension              6. Increasing mobility of wrist towards supination/pronation  - Side lying scap mobs in all planes    Jae participated in neuromuscular re-education activities to improve: Coordination, Kinesthetic, Sense, Proprioception and Posture for 25 minutes. The following activities were included:  - Modified quadruped with closed chain weight bearing into L UE while R UE reached across midline to squigz and placed in L upper quadrant on mirror. Verbal and tactile cues needed for weight shifts      Home Exercises and Education Provided     Education provided:   - wear schedule for " resting hand orthotic   - Progress towards goals     Written Home Exercises Provided: not at this time       Assessment     Jae had increased tone in L shoulder complex and hand. Increased time taken to address with scap mobs and tone management. Quadruped tolerated well. Tone reduced post session and Pt. Had grossly improved shoulder AROM.  He continues with significant L side weakness affecting his ability to complete ADL/IALDs.      Jae is progressing well towards his goals and there are no updates to goals at this time. Pt prognosis is Good.     Pt will continue to benefit from skilled outpatient occupational therapy to address the deficits listed in the problem list on initial evaluation provide pt/family education and to maximize pt's level of independence in the home and community environment.     Anticipated barriers to occupational therapy: time since stroke     Pt's spiritual, cultural and educational needs considered and pt agreeable to plan of care and goals.    Goals:  Short Term Goals: 5 weeks   - Pt. Will be instructed on self ROM exercises  - Pt. Will be instructed on edema management at home  - Pt. Will have improved L shoulder FF and ABd of at least 10 degrees for increased functional mobility for dressing  - Pt. Will correctly don/doff resting night splint  - Pt. Will wear resting night splint at least 3x week     Long Term Goals:  By D/C  - Pt. Will be independent with HEP  - Pt. Will increase AROM of L shoulder FF and ABD of at least 15 degrees for increased functional mobility for dressing and bathing  - Pt. Will wear resting night splint at least 5x week at d/c date     More goals to be established as appropriate      Plan   Certification Period/Plan of care expiration: 7/10/2020 to 9/18/20.     Outpatient Occupational Therapy 2 times weekly for 10 weeks to include the following interventions: Electrical Stimulation of L UE, Manual Therapy, Moist Heat/ Ice, Neuromuscular Re-ed, Orthotic  Management and Training, Paraffin, Patient Education, Self Care, Therapeutic Activites and Therapeutic Exercise.     IRLANDA Galicia       Updates/Grading for next session: as tolerated       IRLANDA Galicia

## 2020-07-23 NOTE — PROGRESS NOTES
"  Physical Therapy Daily Treatment Note      Name: Jae Millan  Clinic Number: [id number]     Therapy Diagnosis:   1. Impaired functional mobility, balance, gait, and endurance      2. Decreased strength      3. Decreased ROM of left shoulder            Physician: Charis Salazar MD     Visit Date: 7/23/2020      Physician Orders: Charis Salazar MD     Physician Orders: PT Eval and Treat neuro program  Medical Diagnosis from Referral: (I69.354) Hemiparesis affecting left side as late effect of cerebrovascular accident (CVA)  (primary encounter diagnosis)  (I63.329) Cerebrovascular accident (CVA) due to thrombosis of anterior cerebral artery, unspecified blood vessel laterality  (R47.1) Dysarthria  (M79.2) Neuropathic pain  (Z74.09) Impaired mobility and ADLs     Evaluation Date: 6/8/2020  Authorization Period Expiration: 12/31/2020  Plan of Care Expiration: 8/14/2020  Visit # / Visits authorized: 5/ 10     Time In: 1151  Time Out: 1231  Total Billable Time: 40 minutes     Precautions: Standard     Subjective      Pt reports: no new complaints  He was compliant with home exercise program.  Response to previous treatment: felt pretty good  Functional change: ongoing     Pain: 0/10 prior to session,   Location: left arms      Objective      Jae received therapeutic exercises to develop strength, endurance and core stabilization for 23 minutes including:     Recumbent stepper L5 BUE/LE x 8 min, L hand wrapped    Parallel bars: Step ups L up 6" 3 x 10   Sit<>stands from chair height without UE support-20x   Side stepping with Green thera band, no UE support- 5 laps    Sitting: hamstring stretch with step 3 x 30 sec     Jae participated in neuromuscular re-education activities to improve: Balance, Coordination and mobility for 17 minutes. The following activities were included:     Parallel bar: tandem gait, forwards/ backwards, intermittent UE support- 3 laps              bosu ball (flat side up), " static standing 2 x 30 sec= fair- to fair balance   bosu ball (flat side up), horizontal head turns 2 x 30 sec= fair- balance   bosu ball (flat side up), vertical 2 x 30 sec= poor+ to fair- balance   R foot cone tap (large) 10x, fair- balance   R foot cone tap (large) 10x 10 sec hold, fair- balance        Home Exercises Provided and Patient Education Provided      Education provided:   - continue with HEP     Written Home Exercises Provided: Patient instructed to cont prior HEP.  Exercises were reviewed and Jae was able to demonstrate them prior to the end of the session.  Jae demonstrated good  understanding of the education provided.      See EMR under Patient Instructions for exercises provided 6/18/2020.     Assessment     Jae tolerated PT session well. Pt demonstrated improved ankle strategy for maintaining balance in narrow Base of support and on compliant surfaces. Pt reported increased muscle tone in L hamstrings after performing L single leg stance activities. L single leg stance activities progressed for increased duration and difficulty. Pt required at least 3 sitting rest breaks during session demonstrating decreased tolerance to standing activities.  Pt can benefit from continued skilled PT to address impairments to improve mobility and balance.      Jae is progressing well towards his goals.   Pt prognosis is Good.      Pt will continue to benefit from skilled outpatient physical therapy to address the deficits listed in the problem list box on initial evaluation, provide pt/family education and to maximize pt's level of independence in the home and community environment.      Pt's spiritual, cultural and educational needs considered and pt agreeable to plan of care and goals.     Anticipated barriers to physical therapy: length of time since onset     Goals:   Formal status as of 7/6/2020  Short Term Goals: 5 weeks   1. Pt will perform HEP for strengthening and range of Motion at least  2x/week to improve carryover of progress. ongoing  2. Pt will demonstrate improved speed of mobility and activity tolerance by performing 5 sit<>stands without UE support in 10 sec.  Improved- 13 sec, no UE support  3. Pt will demonstrate improved L hamstring passive range of motion to 70 deg straight leg raise to improve flexibility for all mobility.  Improved- 67 deg  4. Pt will demonstrate improved L shoulder ER active range of motion to 30 degrees to improve shoulder alignment to decrease pain.  Improved- 20 deg  5. Assess Functional Gait Assessment and set goals as needed. Met 6/18/2020- 20/30  6. Pt will negotiate 10 steps with 1 handrail to demonstrate improve ability to negotiate a flight of stairs in case of a fire (drill).  Improved- 10 steps without handrail, partial reciprocal/ step to pattern, 1 loss of balance with assist to correct     Long Term Goals: 10 weeks   1. Pt will demonstrate improved L shoulder ER active range of motion to 60 degrees to improve ability to flex shoulder and decrease pain.  ongoing  2. Pt will demonstrate improved strength of L plantar flexion to 4-/5 to improve foot clearance during swing phase.  ongoing  3. Pt will demonstrate improved L hip extension strength to 4/5 to improve standing balance and gait ability.  ongoing  4. Pt will negotiate 20 steps with handrail and mod I to demonstrate improved ability to negotiate stairs in case of emergency at apartment.  Ongoing  5. New 6/25/2020: pt will demonstrate decreased fall risk and improved balance during gait by scoring 23/30 on Functional Gait Assessment. ongoing     Plan     continue with use of paddle splint and standing LE exercises.May attempt quadruped for improved core strengthening and motor control.      Naya Obregon, PT   7/23/2020

## 2020-07-24 ENCOUNTER — CLINICAL SUPPORT (OUTPATIENT)
Dept: REHABILITATION | Facility: HOSPITAL | Age: 70
End: 2020-07-24
Attending: PHYSICAL MEDICINE & REHABILITATION
Payer: MEDICARE

## 2020-07-24 DIAGNOSIS — R47.1 DYSARTHRIA: Primary | ICD-10-CM

## 2020-07-24 PROCEDURE — 92507 TX SP LANG VOICE COMM INDIV: CPT | Mod: PN | Performed by: SPEECH-LANGUAGE PATHOLOGIST

## 2020-07-24 NOTE — PROGRESS NOTES
"Outpatient Neurological Rehabilitation   Speech and Language Therapy Treatment Note  Date:  7/27/2020     Name: Jae Millan   MRN: [id number]   Therapy Diagnosis:   Encounter Diagnosis   Name Primary?    Dysarthria Yes      Physician: Charis Salazar MD  Physician Orders: [id number] - Ambulatory referral/consult to Speech Therapy  Medical Diagnosis: R47.1 (ICD-10-CM) - Dysarthria     Visit # / Visits Authorized: 5/10   Date of Evaluation:  6/8/2020   Insurance Authorization Period: 06/02/2020-12/31/2020  Plan of Care Expiration:  6/8/2020 to 8/8/20  Extended POC:  n/a   Progress Note: due 8/13/20   Visits Cancelled: 0  Visits No Show: 0    Time In: 8:00   Time Out:  8:38   Total Billable Time: 38 min     Precautions: Standard  Subjective:   Pt reports: that he was in [place] this weekend and that his speech is sounding much better.    He was compliant to home exercise program. Still doing oral motor exercises usually 2-3x times per day; but at least 1x per day when busy.   Response to previous treatment: "good"   Pain Scale:  0/10 on VAS currently.   Pain Location: n/a  Objective:   TIMED  Procedure Min.   Cognitive Therapeutic Interventions, first 15 minutes CPT 08348 0   Cognitive Therapeutic Interventions, each additional 15 minutes CPT 55871 0         UNTIMED  Procedure Min.   Speech- Language- Voice Therapy 38     Total Timed Units: 0  Total Untimed Units: 1  Charges Billed/# of units: 1    Short Term Goals: (4 weeks)  Current Progress:   1. Pt will produce multi syllabic words with bilabial sounds with 90% acc independently Goal Met / Discontinue    2. Pt will produce multi syllabic words with labio dental sounds with 90% acc independently Goal Met / Discontinue    3. Pt will produce multi syllabic words with interdental sounds with 90% acc independently   Goal Met / Discontinue    4. Pt will producde multi syllabic words with alveolar sounds with 90% acc independently Goal Met / Discontinue    5. Pt will " produce multi syllabic words with alveolar-palatal sounds with 90% acc independently   Goal Met / Discontinue      6. Pt will produce multi syllabic words with alveolar-palatal sounds with 90% acc independently   -Entered in error Discontinue   7. Pt will produce multi syllabic words with velar sounds with 90% acc independently Goal Met / Discontinue    8. Pt will utilize motor speech strategies to verbalize names of his medications clearly on 9/10 trials in order to improve speech intelligibility    Progressing/ Not Met 7/27/2020   Pt verbalized medication names clearly on 9/10 trials independently METx1   9. Pt will utilize motor speech strategies to verbalize phone numbers clearly on 9/10 trials in order to improve speech intelligibility Goal Met / Discontinue    10. Pt will rate their speech while reading as at least a 2 on a 3 point scale ( 1 = same as before beginning therapy, 2 =better but not best, 3 =best possible outcome) on  8 /10 trials.    Pt rated his speech while reading sentences on a three point scale as a:  - 1/3 on 0/10 trials  - 2/3 on 8/10 trials  - 3/3 on 2/10 trials  METx2 Goal Met / Discontinue    11. Pt will use motor speech strategies and answer questions in conversation with a self-rating of at least 2 on a 3 point scale ( 1 = same as before beginning therapy, 2 =better but not best, 3 =best possible outcome) on  8 /10 trials.   Progressing/ Not Met 7/27/2020   Pt rated his speech throughout conversation on a three point scale as a:  - 1/3 on 0/10 trials  - 2/3 on 3/10 trials  - 3/3 on 7/10 trials  METx1   12. Pt will utilize motor speech strategies to verbalize functional phrases on the phone/in person clearly on 9/10 trials.    1. Hey, how are you doing?  2. I love you  3. I'll call you later   4. Have a blessed day  5. Hoping you're staying away from this corona-19  6. Hope your family is staying safe   7. When's my next appointment?  8. My appointment is for speech therapy  [address]. [phone] [place] [address]  10. How long will it be before you get here?  11. Can you call me 2-3 minutes before you get here?  Pt verbalized phrases clearly on 10/10 trails independently  METx2 Goal Met / Discontinue         Patient Education/Response:   Provided skilled education re: motor speech strategies, pt's progress thus far, and HEP. Discussed discharging from ST next session as pt has almost met all short term and long term goals.  Pt verbalized understanding of all discussed.     Written Home Exercises Provided: Patient instructed to cont prior HEP.  Exercises were reviewed and Jae was able to demonstrate them prior to the end of the session.  Jae demonstrated good  understanding of the education provided.     See EMR under Patient Instructions for exercises provided prior visit.  Assessment:   Jae is progressing well towards his goals. Pt rated his speech as a 2/3 on at least 8/10 trials during reading task and as a 2/3 on at least 8/10 trials during unstructured conversation; met short term goal 10. Pt verbalized medications clearly on 9/10 trials. Good performance verbalizing functional phrases clearly, meeting short term goal 12 . Current goals remain appropriate. Goals to be updated as necessary.     Pt prognosis is Good. Pt will continue to benefit from skilled outpatient speech and language therapy to address the deficits listed in the problem list on initial evaluation, provide pt/family education and to maximize pt's level of independence in the home and community environment.   Medical necessity is demonstrated by the following IMPAIRMENTS:  Patient with decreased speech intelligibility. 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  Plan:   Continue POC with focus on improving speech intelligibility     LINCOLN Shirley, CCC-SLP  Speech Language Pathologist   7/27/2020

## 2020-07-27 ENCOUNTER — CLINICAL SUPPORT (OUTPATIENT)
Dept: REHABILITATION | Facility: HOSPITAL | Age: 70
End: 2020-07-27
Attending: PHYSICAL MEDICINE & REHABILITATION
Payer: MEDICARE

## 2020-07-27 DIAGNOSIS — M25.612 DECREASED ROM OF LEFT SHOULDER: ICD-10-CM

## 2020-07-27 DIAGNOSIS — R47.1 DYSARTHRIA: Primary | ICD-10-CM

## 2020-07-27 DIAGNOSIS — M25.60 RANGE OF MOTION DEFICIT: Primary | ICD-10-CM

## 2020-07-27 DIAGNOSIS — R29.898 DECREASED STRENGTH OF UPPER EXTREMITY: ICD-10-CM

## 2020-07-27 DIAGNOSIS — R53.1 DECREASED STRENGTH: Primary | ICD-10-CM

## 2020-07-27 DIAGNOSIS — M79.89 SWELLING OF LEFT HAND: ICD-10-CM

## 2020-07-27 PROCEDURE — 92507 TX SP LANG VOICE COMM INDIV: CPT | Mod: PN | Performed by: SPEECH-LANGUAGE PATHOLOGIST

## 2020-07-27 PROCEDURE — 97110 THERAPEUTIC EXERCISES: CPT | Mod: PN,CQ

## 2020-07-27 PROCEDURE — 97112 NEUROMUSCULAR REEDUCATION: CPT | Mod: PN,59

## 2020-07-27 PROCEDURE — 97140 MANUAL THERAPY 1/> REGIONS: CPT | Mod: PN

## 2020-07-27 PROCEDURE — 97112 NEUROMUSCULAR REEDUCATION: CPT | Mod: PN,CQ

## 2020-07-27 NOTE — PROGRESS NOTES
"  Physical Therapy Daily Treatment Note      Name: Jae Millan  Clinic Number: [id number]     Therapy Diagnosis:   1. Impaired functional mobility, balance, gait, and endurance      2. Decreased strength      3. Decreased ROM of left shoulder            Physician: Charis Salazar MD     Visit Date: 7/27/2020      Physician Orders: Charis Salazar MD     Physician Orders: PT Eval and Treat neuro program  Medical Diagnosis from Referral: (I69.354) Hemiparesis affecting left side as late effect of cerebrovascular accident (CVA)  (primary encounter diagnosis)  (I63.329) Cerebrovascular accident (CVA) due to thrombosis of anterior cerebral artery, unspecified blood vessel laterality  (R47.1) Dysarthria  (M79.2) Neuropathic pain  (Z74.09) Impaired mobility and ADLs     Evaluation Date: 6/8/2020  Authorization Period Expiration: 12/31/2020  Plan of Care Expiration: 8/14/2020  Visit # / Visits authorized: 6/ 10     Time In: 0900  Time Out: 0945  Total Billable Time: 45 minutes     Precautions: Standard     Subjective      Pt reports: no new complaints  He was compliant with home exercise program.  Response to previous treatment: felt pretty good  Functional change: ongoing     Pain: 0/10 prior to session,   Location: left arms      Objective      Jae received therapeutic exercises to develop strength, endurance and core stabilization for 23 minutes including:     Recumbent stepper L5.2 BUE/LE x 8 min, L hand wrapped    Parallel bars: Step ups L up 6" 3 x 10 (on stairs followed by right foot stepping up)   Sit<>stands from chair height without UE support x20  2'' step under R foot    Side stepping with Green thera band, no UE support- 5 laps    Sitting: hamstring stretch with step 3 x 30 sec     Jae participated in neuromuscular re-education activities to improve: Balance, Coordination and mobility for 17 minutes. The following activities were included:     Parallel bar: tandem gait, forwards/ backwards, " intermittent UE support- 3 laps              bosu ball (flat side up), static standing 2 x 30 sec=   fair balance   bosu ball (flat side up), horizontal head turns 2 x 30 sec= fair- balance   bosu ball (flat side up), vertical 2 x 30 sec= poor+ to fair- balance              Bosu ball (flat side up), EC 2x30 sec = poor balance   R foot cone tap (large) 10x, fair- balance   R foot cone tap (large) 10x 10 sec hold, fair- balance        Home Exercises Provided and Patient Education Provided      Education provided:   - continue with HEP     Written Home Exercises Provided: Patient instructed to cont prior HEP.  Exercises were reviewed and Jae was able to demonstrate them prior to the end of the session.  Jae demonstrated good  understanding of the education provided.      See EMR under Patient Instructions for exercises provided 6/18/2020.     Assessment     Jae tolerated PT session very well today. // bars occupied for step ups, performed today on stairs with reciprocal step up with R foot. Pt showed improved balance and coordination with LLE stepping down, no UE support. Pt improved with eyes open bosu balance today, was challenged with eyes closed.       Jae is progressing well towards his goals.   Pt prognosis is Good.      Pt will continue to benefit from skilled outpatient physical therapy to address the deficits listed in the problem list box on initial evaluation, provide pt/family education and to maximize pt's level of independence in the home and community environment.      Pt's spiritual, cultural and educational needs considered and pt agreeable to plan of care and goals.     Anticipated barriers to physical therapy: length of time since onset     Goals:   Formal status as of 7/6/2020  Short Term Goals: 5 weeks   1. Pt will perform HEP for strengthening and range of Motion at least 2x/week to improve carryover of progress. ongoing  2. Pt will demonstrate improved speed of mobility and activity  tolerance by performing 5 sit<>stands without UE support in 10 sec.  Improved- 13 sec, no UE support  3. Pt will demonstrate improved L hamstring passive range of motion to 70 deg straight leg raise to improve flexibility for all mobility.  Improved- 67 deg  4. Pt will demonstrate improved L shoulder ER active range of motion to 30 degrees to improve shoulder alignment to decrease pain.  Improved- 20 deg  5. Assess Functional Gait Assessment and set goals as needed. Met 6/18/2020- 20/30  6. Pt will negotiate 10 steps with 1 handrail to demonstrate improve ability to negotiate a flight of stairs in case of a fire (drill).  Improved- 10 steps without handrail, partial reciprocal/ step to pattern, 1 loss of balance with assist to correct     Long Term Goals: 10 weeks   1. Pt will demonstrate improved L shoulder ER active range of motion to 60 degrees to improve ability to flex shoulder and decrease pain.  ongoing  2. Pt will demonstrate improved strength of L plantar flexion to 4-/5 to improve foot clearance during swing phase.  ongoing  3. Pt will demonstrate improved L hip extension strength to 4/5 to improve standing balance and gait ability.  ongoing  4. Pt will negotiate 20 steps with handrail and mod I to demonstrate improved ability to negotiate stairs in case of emergency at apartment.  Ongoing  5. New 6/25/2020: pt will demonstrate decreased fall risk and improved balance during gait by scoring 23/30 on Functional Gait Assessment. ongoing     Plan     continue with use of paddle splint and standing LE exercises.May attempt quadruped for improved core strengthening and motor control.      Lucho Sorto, PTA  7/27/2020

## 2020-07-27 NOTE — PROGRESS NOTES
"    Occupational Therapy Treatment Note     Date: 7/27/2020  Name: Jae Millan  [facility] Number: [id number]    Therapy Diagnosis:   Encounter Diagnoses   Name Primary?    Range of motion deficit Yes    Swelling of left hand     Decreased strength of upper extremity      Physician: Maryjane Farias MD    Physician Orders: OT eval and treat  Medical Diagnosis: CVA  Evaluation Date: 7/10/2020  Plan of Care Expiration Period: 9/18/20  Insurance Authorization period Expiration: 12/31/20  Date of Return to MD: TBD  Visit # / Visits Authorized: 5 / 20  FOTO: 5th visit      Time In: 10:15am  Time Out: 11:00am  Total Billable (one on one) Time: 45 minutes     Precautions: Standard    Subjective     Pt reports: "I did a little bit more stretching at home but not a lot"  he was  somewhat compliant with home exercise program given last session.   Response to previous treatment: positive  Functional change: ongoing    Pain: 2/10  Location: left shoulder      Objective       Jae received the following manual therapy techniques for 20 minutes:   - Supine PROM of L GH joint: FF/Abd/ER/IR/Sup  - General wrist stretches including               1. Scaphoid on radius               2. Increasing mobility of metacarpals               3. Carpal rolls               4. Increasing mobility towards radial deviation               5. Increasing mobility of wrist towards extension              6. Increasing mobility of wrist towards supination/pronation  - Side lying scap mobs in all planes    Jae participated in neuromuscular re-education activities to improve: Coordination, Kinesthetic, Sense, Proprioception and Posture for 25 minutes. The following activities were included:  - Scapular activation for improved postural alignment, elevation and retraction 1x10 with 5 sec holds each   - Quadruped closed chain weight bearing into L UE while R UE reached across midline to squigz and placed in L upper quadrant on mirror. Verbal and " tactile cues needed for weight shifts, transitioned to seated secondary to L wrist pain      Home Exercises and Education Provided     Education provided:   - wear schedule for resting hand orthotic   - Progress towards goals     Written Home Exercises Provided: not at this time       Assessment     Jae had good tolerance to treatment this date. He was willing to try new weight bearing strategies with fair success. WB shanged to seated secondary to wrist discomfort. Increased tone an ongoing issue. He continues with significant L side weakness affecting his ability to complete ADL/IALDs.      Jae is progressing well towards his goals and there are no updates to goals at this time. Pt prognosis is Good.     Pt will continue to benefit from skilled outpatient occupational therapy to address the deficits listed in the problem list on initial evaluation provide pt/family education and to maximize pt's level of independence in the home and community environment.     Anticipated barriers to occupational therapy: time since stroke     Pt's spiritual, cultural and educational needs considered and pt agreeable to plan of care and goals.    Goals:  Short Term Goals: 5 weeks   - Pt. Will be instructed on self ROM exercises  - Pt. Will be instructed on edema management at home  - Pt. Will have improved L shoulder FF and ABd of at least 10 degrees for increased functional mobility for dressing  - Pt. Will correctly don/doff resting night splint  - Pt. Will wear resting night splint at least 3x week     Long Term Goals:  By D/C  - Pt. Will be independent with HEP  - Pt. Will increase AROM of L shoulder FF and ABD of at least 15 degrees for increased functional mobility for dressing and bathing  - Pt. Will wear resting night splint at least 5x week at d/c date     More goals to be established as appropriate      Plan   Certification Period/Plan of care expiration: 7/10/2020 to 9/18/20.     Outpatient Occupational Therapy 2  times weekly for 10 weeks to include the following interventions: Electrical Stimulation of L UE, Manual Therapy, Moist Heat/ Ice, Neuromuscular Re-ed, Orthotic Management and Training, Paraffin, Patient Education, Self Care, Therapeutic Activites and Therapeutic Exercise.     IRLANDA Galicia       Updates/Grading for next session: as tolerated       IRLANDA Galicia

## 2020-07-28 ENCOUNTER — DOCUMENTATION ONLY (OUTPATIENT)
Dept: REHABILITATION | Facility: HOSPITAL | Age: 70
End: 2020-07-28

## 2020-07-28 NOTE — PROGRESS NOTES
PT/PTA STAFFING      Name: Jae Millan  [facility] Number: [id number]    Date of staffin2020      DME/ orders needed: No    Changes to POC: strengthening for L hip and plantar flexors. Practice balance with narrow Base of support and on Bosu Ball. Practice single leg stance on L.      Continue with POC: Yes    Naya Obregon,DPT  Lucho Sorto, PTA  2020

## 2020-07-29 ENCOUNTER — CLINICAL SUPPORT (OUTPATIENT)
Dept: REHABILITATION | Facility: HOSPITAL | Age: 70
End: 2020-07-29
Attending: PHYSICAL MEDICINE & REHABILITATION
Payer: MEDICARE

## 2020-07-29 DIAGNOSIS — R53.1 DECREASED STRENGTH: Primary | ICD-10-CM

## 2020-07-29 DIAGNOSIS — M79.89 SWELLING OF LEFT HAND: ICD-10-CM

## 2020-07-29 DIAGNOSIS — M25.60 RANGE OF MOTION DEFICIT: Primary | ICD-10-CM

## 2020-07-29 DIAGNOSIS — R29.898 DECREASED STRENGTH OF UPPER EXTREMITY: ICD-10-CM

## 2020-07-29 DIAGNOSIS — M25.612 DECREASED ROM OF LEFT SHOULDER: ICD-10-CM

## 2020-07-29 PROCEDURE — 97112 NEUROMUSCULAR REEDUCATION: CPT | Mod: PN,CQ

## 2020-07-29 PROCEDURE — 97112 NEUROMUSCULAR REEDUCATION: CPT | Mod: PN

## 2020-07-29 PROCEDURE — 97110 THERAPEUTIC EXERCISES: CPT | Mod: PN,CQ

## 2020-07-29 PROCEDURE — 97140 MANUAL THERAPY 1/> REGIONS: CPT | Mod: PN

## 2020-07-29 NOTE — PROGRESS NOTES
"  Physical Therapy Daily Treatment Note      Name: Jae Millan  Clinic Number: [id number]     Therapy Diagnosis:   1. Impaired functional mobility, balance, gait, and endurance      2. Decreased strength      3. Decreased ROM of left shoulder            Physician: Charis Salazar MD     Visit Date: 7/29/2020      Physician Orders: Charis Salazar MD     Physician Orders: PT Eval and Treat neuro program  Medical Diagnosis from Referral: (I69.354) Hemiparesis affecting left side as late effect of cerebrovascular accident (CVA)  (primary encounter diagnosis)  (I63.329) Cerebrovascular accident (CVA) due to thrombosis of anterior cerebral artery, unspecified blood vessel laterality  (R47.1) Dysarthria  (M79.2) Neuropathic pain  (Z74.09) Impaired mobility and ADLs     Evaluation Date: 6/8/2020  Authorization Period Expiration: 12/31/2020  Plan of Care Expiration: 8/14/2020  Visit # / Visits authorized: 7/ 10     Time In: 0910  Time Out: 1000  Total Billable Time: 45 minutes     Precautions: Standard     Subjective      Pt reports: I'm doing ok, very tired.  He was compliant with home exercise program.  Response to previous treatment: felt pretty good  Functional change: ongoing     Pain: 0/10 prior to session,   Location: left arms      Objective      Jae received therapeutic exercises to develop strength, endurance and core stabilization for 23 minutes including:     Bold practiced   Recumbent stepper L5.2 BUE/LE x 8 min, L hand wrapped    Parallel bars: Step ups L up 8" 3 x 10 (on stairs followed by right foot stepping up)   Sit<>stands from chair height without UE support x20  2'' step under R foot    Side stepping with Green thera band, no UE support- 5 laps         Jae participated in neuromuscular re-education activities to improve: Balance, Coordination and mobility for 17 minutes. The following activities were included:     Parallel bar: tandem gait, forwards/ backwards, intermittent UE support- " 3 laps              bosu ball (flat side up), static standing 2 x 30 sec=   fair balance   bosu ball (flat side up), horizontal head turns 2 x 30 sec= fair- balance   bosu ball (flat side up), vertical 2 x 30 sec= poor+ to fair- balance              Bosu ball (flat side up), EC 2x30 sec = poor balance   R foot cone tap (large) 10x, fair- balance   R foot cone tap (large) 10x 10 sec hold, fair- balance  Forward/backward gait on turf x1 lap  Airex pad standing and toe tap RLE onto 6'' cone x10 &12'' cone x20  Lunge onto bosu with LLE x15  Side lunge onto bosu LLE x15  Airex standing with balloon toss x2 minutes      Home Exercises Provided and Patient Education Provided      Education provided:   - continue with HEP     Written Home Exercises Provided: Patient instructed to cont prior HEP.  Exercises were reviewed and Jae was able to demonstrate them prior to the end of the session.  Jae demonstrated good  understanding of the education provided.      See EMR under Patient Instructions for exercises provided 6/18/2020.     Assessment     Jae tolerated PT session well today. Pt entered clinic fatigued today. Added new balance activities to work on SLS on LLE and stability of LLE with bosu ball work. Pt did well with balloon toss, no loss of balance and good lateral weight shifts. Pt was appropriately challenged with increase on step ups.      Jae is progressing well towards his goals.   Pt prognosis is Good.      Pt will continue to benefit from skilled outpatient physical therapy to address the deficits listed in the problem list box on initial evaluation, provide pt/family education and to maximize pt's level of independence in the home and community environment.      Pt's spiritual, cultural and educational needs considered and pt agreeable to plan of care and goals.     Anticipated barriers to physical therapy: length of time since onset     Goals:   Formal status as of 7/6/2020  Short Term Goals: 5  weeks   1. Pt will perform HEP for strengthening and range of Motion at least 2x/week to improve carryover of progress. ongoing  2. Pt will demonstrate improved speed of mobility and activity tolerance by performing 5 sit<>stands without UE support in 10 sec.  Improved- 13 sec, no UE support  3. Pt will demonstrate improved L hamstring passive range of motion to 70 deg straight leg raise to improve flexibility for all mobility.  Improved- 67 deg  4. Pt will demonstrate improved L shoulder ER active range of motion to 30 degrees to improve shoulder alignment to decrease pain.  Improved- 20 deg  5. Assess Functional Gait Assessment and set goals as needed. Met 6/18/2020- 20/30  6. Pt will negotiate 10 steps with 1 handrail to demonstrate improve ability to negotiate a flight of stairs in case of a fire (drill).  Improved- 10 steps without handrail, partial reciprocal/ step to pattern, 1 loss of balance with assist to correct     Long Term Goals: 10 weeks   1. Pt will demonstrate improved L shoulder ER active range of motion to 60 degrees to improve ability to flex shoulder and decrease pain.  ongoing  2. Pt will demonstrate improved strength of L plantar flexion to 4-/5 to improve foot clearance during swing phase.  ongoing  3. Pt will demonstrate improved L hip extension strength to 4/5 to improve standing balance and gait ability.  ongoing  4. Pt will negotiate 20 steps with handrail and mod I to demonstrate improved ability to negotiate stairs in case of emergency at apartment.  Ongoing  5. New 6/25/2020: pt will demonstrate decreased fall risk and improved balance during gait by scoring 23/30 on Functional Gait Assessment. ongoing     Plan     continue with use of paddle splint and standing LE exercises.May attempt quadruped for improved core strengthening and motor control.      Lucho Sorto, PTA  7/29/2020

## 2020-07-29 NOTE — PROGRESS NOTES
"    Occupational Therapy Treatment Note     Date: 7/29/2020  Name: Jae Millan  Clinic Number: [id number]    Therapy Diagnosis:   Encounter Diagnoses   Name Primary?    Range of motion deficit Yes    Swelling of left hand     Decreased strength of upper extremity      Physician: Maryjane Farias MD    Physician Orders: OT eval and treat  Medical Diagnosis: CVA  Evaluation Date: 7/10/2020  Plan of Care Expiration Period: 9/18/20  Insurance Authorization period Expiration: 12/31/20  Date of Return to MD: TBD  Visit # / Visits Authorized: 6 / 20  FOTO: 5th visit      Time In: 10:20am  Time Out: 11:00pm  Total Billable (one on one) Time: 40 minutes     Precautions: Standard    Subjective     Pt reports: "I really feel better after we work"  he was  somewhat compliant with home exercise program given last session.   Response to previous treatment: positive  Functional change: ongoing    Pain: 2/10  Location: left shoulder      Objective     Moist heat post tx    Jae received the following manual therapy techniques for 10 minutes:   - Supine PROM of L GH joint: FF/Abd/ER/IR/Sup  - General wrist stretches including               1. Scaphoid on radius               2. Increasing mobility of metacarpals               3. Carpal rolls               4. Increasing mobility towards radial deviation               5. Increasing mobility of wrist towards extension              6. Increasing mobility of wrist towards supination/pronation  - Side lying scap mobs in all planes    Jae participated in neuromuscular re-education activities to improve: Coordination, Kinesthetic, Sense, Proprioception and Posture for 30 minutes. The following activities were included:  - Scapular activation for improved postural alignment, elevation and retraction 1x10 with 5 sec holds each   - Standing closed chain weight bearing into L UE while R UE reached across midline to squigz and placed in L upper quadrant on mirror. Verbal and " tactile cues needed for weight shifts,     Home Exercises and Education Provided     Education provided:   - wear schedule for resting hand orthotic   - Progress towards goals     Written Home Exercises Provided: not at this time       Assessment     Jae had great tolerance again to tx this date. He pushes himself regularly and has been compliant with stretching and night splint wear schedule. Will review full PROM HEP next visit. Improved tone noted in L UE post weight bearing. He continues with L side weakness affecting his ability to complete ADL/IALDs.      Jae is progressing well towards his goals and there are no updates to goals at this time. Pt prognosis is Good.     Pt will continue to benefit from skilled outpatient occupational therapy to address the deficits listed in the problem list on initial evaluation provide pt/family education and to maximize pt's level of independence in the home and community environment.     Anticipated barriers to occupational therapy: time since stroke     Pt's spiritual, cultural and educational needs considered and pt agreeable to plan of care and goals.    Goals:  Short Term Goals: 5 weeks   - Pt. Will be instructed on self ROM exercises  - Pt. Will be instructed on edema management at home  - Pt. Will have improved L shoulder FF and ABD of at least 10 degrees for increased functional mobility for dressing  - Pt. Will correctly don/doff resting night splint MET  - Pt. Will wear resting night splint at least 3x week MET     Long Term Goals:  By D/C  - Pt. Will be independent with HEP  - Pt. Will increase AROM of L shoulder FF and ABD of at least 15 degrees for increased functional mobility for dressing and bathing  - Pt. Will wear resting night splint at least 5x week at d/c date     More goals to be established as appropriate      Plan   Certification Period/Plan of care expiration: 7/10/2020 to 9/18/20.     Outpatient Occupational Therapy 2 times weekly for 10 weeks  to include the following interventions: Electrical Stimulation of L UE, Manual Therapy, Moist Heat/ Ice, Neuromuscular Re-ed, Orthotic Management and Training, Paraffin, Patient Education, Self Care, Therapeutic Activites and Therapeutic Exercise.     IRLANDA Galicia       Updates/Grading for next session: as tolerated       IRLANDA Galicia

## 2020-08-03 ENCOUNTER — CLINICAL SUPPORT (OUTPATIENT)
Dept: REHABILITATION | Facility: HOSPITAL | Age: 70
End: 2020-08-03
Attending: PHYSICAL MEDICINE & REHABILITATION
Payer: MEDICARE

## 2020-08-03 DIAGNOSIS — R53.1 DECREASED STRENGTH: ICD-10-CM

## 2020-08-03 DIAGNOSIS — R29.898 DECREASED STRENGTH OF UPPER EXTREMITY: ICD-10-CM

## 2020-08-03 DIAGNOSIS — R47.1 DYSARTHRIA: Primary | ICD-10-CM

## 2020-08-03 DIAGNOSIS — M25.60 RANGE OF MOTION DEFICIT: Primary | ICD-10-CM

## 2020-08-03 DIAGNOSIS — Z74.09 IMPAIRED FUNCTIONAL MOBILITY, BALANCE, GAIT, AND ENDURANCE: Primary | ICD-10-CM

## 2020-08-03 DIAGNOSIS — M25.612 DECREASED ROM OF LEFT SHOULDER: ICD-10-CM

## 2020-08-03 DIAGNOSIS — M79.89 SWELLING OF LEFT HAND: ICD-10-CM

## 2020-08-03 PROCEDURE — 97140 MANUAL THERAPY 1/> REGIONS: CPT | Mod: PN

## 2020-08-03 PROCEDURE — 92507 TX SP LANG VOICE COMM INDIV: CPT | Mod: PN | Performed by: SPEECH-LANGUAGE PATHOLOGIST

## 2020-08-03 PROCEDURE — 97014 ELECTRIC STIMULATION THERAPY: CPT | Mod: PN

## 2020-08-03 PROCEDURE — 97112 NEUROMUSCULAR REEDUCATION: CPT | Mod: PN

## 2020-08-03 PROCEDURE — 97112 NEUROMUSCULAR REEDUCATION: CPT | Mod: PN | Performed by: PHYSICAL THERAPIST

## 2020-08-03 PROCEDURE — 97110 THERAPEUTIC EXERCISES: CPT | Mod: PN | Performed by: PHYSICAL THERAPIST

## 2020-08-03 NOTE — PROGRESS NOTES
"  Physical Therapy Daily Treatment Note      Name: Jae Millan  Clinic Number: [id number]     Therapy Diagnosis:   1. Impaired functional mobility, balance, gait, and endurance      2. Decreased strength      3. Decreased ROM of left shoulder            Physician: Charis Salazar MD     Visit Date: 8/3/2020      Physician Orders: Charis Salazar MD     Physician Orders: PT Eval and Treat neuro program  Medical Diagnosis from Referral: (I69.354) Hemiparesis affecting left side as late effect of cerebrovascular accident (CVA)  (primary encounter diagnosis)  (I63.329) Cerebrovascular accident (CVA) due to thrombosis of anterior cerebral artery, unspecified blood vessel laterality  (R47.1) Dysarthria  (M79.2) Neuropathic pain  (Z74.09) Impaired mobility and ADLs     Evaluation Date: 6/8/2020  Authorization Period Expiration: 12/31/2020  Plan of Care Expiration: 8/14/2020  Visit # / Visits authorized: 8/ 10- kx modifier     Time In: 1018  Time Out: 1100  Total Billable Time: 42 minutes     Precautions: Standard     Subjective      Pt reports: I'm doing ok, very tired.  He was compliant with home exercise program.  Response to previous treatment: felt pretty good  Functional change: ongoing     Pain: 0/10 prior to session, a little stiffness in LUE  Location: left arms      Objective      Bold= new or progression  Jae received therapeutic exercises to develop strength, endurance and core stabilization for 17 minutes including:    Recumbent stepper L5.5 BUE/LE x 8 min, L hand wrapped    Parallel bars: Step ups L up 8" 3 x 10 (on stairs followed by right foot stepping up), no UE support     Sitting: L hamstring stretch with belt 2 x 30 sec        Jae participated in neuromuscular re-education activities to improve: Balance, Coordination and mobility for 25 minutes. The following activities were included:     Parallel bar: tandem gait, forwards/ backwards, intermittent UE support- 3 laps   Lunge onto bosu " with alt. LE x20, intermittent UE support              bosu ball (flat side up), static standing 2 x 30 sec= fair balance   bosu ball (flat side up), horizontal head turns 2 x 30 sec= fair- to fair balance   bosu ball (flat side up), vertical 2 x 30 sec= fair- balance   R foot cone tap (large) 10x 10 sec hold, fair- balance   Airex pad standing and toe tap RLE onto 12'' cone x 10   Airex pad standing and toe tap RLE onto 12'', 10 sec hold cone x 10- poor= balance        Home Exercises Provided and Patient Education Provided      Education provided:   - continue with HEP     Written Home Exercises Provided: Patient instructed to cont prior HEP.  Exercises were reviewed and Jae was able to demonstrate them prior to the end of the session.  Jae demonstrated good  understanding of the education provided.      See EMR under Patient Instructions for exercises provided 6/18/2020.     Assessment     Jae tolerated PT session well today. Pt had difficulty maintaining balance during lunges on Bosu ball. Balance responses for maintaining standing on flat surface of Bosu have improved. Fair use of L ankle strategy noted when pt was performing single leg stance activities on foam. Pt may benefit from addition of LUE support during lunges to improve quality of lunge. Pt can benefit from continued skilled PT to address LLE motor control and strength to improve balance and gait quality to normalize mobility and decrease fall risk.      Jae is progressing well towards his goals.   Pt prognosis is Good.      Pt will continue to benefit from skilled outpatient physical therapy to address the deficits listed in the problem list box on initial evaluation, provide pt/family education and to maximize pt's level of independence in the home and community environment.      Pt's spiritual, cultural and educational needs considered and pt agreeable to plan of care and goals.     Anticipated barriers to physical therapy: length of  time since onset     Goals:   Formal status as of 7/6/2020  Short Term Goals: 5 weeks   1. Pt will perform HEP for strengthening and range of Motion at least 2x/week to improve carryover of progress. ongoing  2. Pt will demonstrate improved speed of mobility and activity tolerance by performing 5 sit<>stands without UE support in 10 sec.  Improved- 13 sec, no UE support  3. Pt will demonstrate improved L hamstring passive range of motion to 70 deg straight leg raise to improve flexibility for all mobility.  Improved- 67 deg  4. Pt will demonstrate improved L shoulder ER active range of motion to 30 degrees to improve shoulder alignment to decrease pain.  Improved- 20 deg  5. Assess Functional Gait Assessment and set goals as needed. Met 6/18/2020- 20/30  6. Pt will negotiate 10 steps with 1 handrail to demonstrate improve ability to negotiate a flight of stairs in case of a fire (drill).  Improved- 10 steps without handrail, partial reciprocal/ step to pattern, 1 loss of balance with assist to correct     Long Term Goals: 10 weeks   1. Pt will demonstrate improved L shoulder ER active range of motion to 60 degrees to improve ability to flex shoulder and decrease pain.  ongoing  2. Pt will demonstrate improved strength of L plantar flexion to 4-/5 to improve foot clearance during swing phase.  ongoing  3. Pt will demonstrate improved L hip extension strength to 4/5 to improve standing balance and gait ability.  ongoing  4. Pt will negotiate 20 steps with handrail and mod I to demonstrate improved ability to negotiate stairs in case of emergency at apartment.  Ongoing  5. New 6/25/2020: pt will demonstrate decreased fall risk and improved balance during gait by scoring 23/30 on Functional Gait Assessment. ongoing     Plan     Use LUE support during lunging to improve lunge quality-     Naya Obregon, DPT, NCS, CBIS   8/3/2020

## 2020-08-03 NOTE — PROGRESS NOTES
"    Occupational Therapy Treatment Note     Date: 8/3/2020  Name: Jae Millan  Clinic Number: [id number]    Therapy Diagnosis:   Encounter Diagnoses   Name Primary?    Range of motion deficit Yes    Swelling of left hand     Decreased strength of upper extremity      Physician: Maryjane Farias MD    Physician Orders: OT eval and treat  Medical Diagnosis: CVA  Evaluation Date: 7/10/2020  Plan of Care Expiration Period: 9/18/20  Insurance Authorization period Expiration: 12/31/20  Date of Return to MD: TBD  Visit # / Visits Authorized: 7 / 20  FOTO: 5th visit      Time In: 9:30am  Time Out: 10:15am  Total Billable (one on one) Time: 45 minutes     Precautions: Standard    Subjective     Pt reports: "It is hard to keep my balance doing this"  he was  somewhat compliant with home exercise program given last session.   Response to previous treatment: positive  Functional change: ongoing    Pain: 2/10  Location: left shoulder      Objective     Jae received the following manual therapy techniques for 15 minutes:   - Supine PROM of L GH joint: FF/Abd/ER/IR/Sup  - General wrist stretches including               1. Scaphoid on radius               2. Increasing mobility of metacarpals               3. Carpal rolls               4. Increasing mobility towards radial deviation               5. Increasing mobility of wrist towards extension              6. Increasing mobility of wrist towards supination/pronation  - Side lying scap mobs in all planes    Jae participated in neuromuscular re-education activities to improve: Coordination, Kinesthetic, Sense, Proprioception and Posture for 20 minutes. The following activities were included:  - Scapular activation for improved postural alignment, elevation and retraction 1x10 with 5 sec holds each   - Standing closed chain weight bearing into L UE on elbow while R UE reached across midline to cones for stacking/unstacking Verbal and tactile cues needed for weight " shifts,     Jae received Unattended ESTIM (NMES) for 10 minutes:  - Citizen of [place] 10/10 to L long wrist extensors, cues to extend when he feels contraction       Home Exercises and Education Provided     Education provided:   - wear schedule for resting hand orthotic   - Progress towards goals     Written Home Exercises Provided: not at this time       Assessment     Jae had good tolerance to tx this date. Increased reports of feeling off balance with quad. Modified to quad on elbows for increased stabilization with good results. Tone better managed post. PROM exercises given this date with good understanding. He continues with L side weakness affecting his ability to complete ADL/IALDs.      Jae is progressing well towards his goals and there are no updates to goals at this time. Pt prognosis is Good.     Pt will continue to benefit from skilled outpatient occupational therapy to address the deficits listed in the problem list on initial evaluation provide pt/family education and to maximize pt's level of independence in the home and community environment.     Anticipated barriers to occupational therapy: time since stroke     Pt's spiritual, cultural and educational needs considered and pt agreeable to plan of care and goals.    Goals:  Short Term Goals: 5 weeks   - Pt. Will be instructed on self ROM exercises  - Pt. Will be instructed on edema management at home  - Pt. Will have improved L shoulder FF and ABD of at least 10 degrees for increased functional mobility for dressing  - Pt. Will correctly don/doff resting night splint MET  - Pt. Will wear resting night splint at least 3x week MET     Long Term Goals:  By D/C  - Pt. Will be independent with HEP  - Pt. Will increase AROM of L shoulder FF and ABD of at least 15 degrees for increased functional mobility for dressing and bathing  - Pt. Will wear resting night splint at least 5x week at d/c date     More goals to be established as appropriate      Plan    Certification Period/Plan of care expiration: 7/10/2020 to 9/18/20.     Outpatient Occupational Therapy 2 times weekly for 10 weeks to include the following interventions: Electrical Stimulation of L UE, Manual Therapy, Moist Heat/ Ice, Neuromuscular Re-ed, Orthotic Management and Training, Paraffin, Patient Education, Self Care, Therapeutic Activites and Therapeutic Exercise.     IRLANDA Galicia       Updates/Grading for next session: as tolerated       IRLANDA Galicia

## 2020-08-03 NOTE — PROGRESS NOTES
"  Outpatient Neurological Rehabilitation   Speech and Language Therapy Treatment Note  Date:  8/3/2020     Name: Jae Millan   MRN: [id number]   Therapy Diagnosis:   Encounter Diagnosis   Name Primary?    Dysarthria Yes      Physician: Charis Salazar MD  Physician Orders: [id number] - Ambulatory referral/consult to Speech Therapy  Medical Diagnosis: R47.1 (ICD-10-CM) - Dysarthria     Visit # / Visits Authorized: 6/10   Date of Evaluation:  6/8/2020   Insurance Authorization Period: 06/02/2020-12/31/2020  Plan of Care Expiration:  6/8/2020 to 8/8/20  Extended POC:  n/a   Progress Note: due 8/13/20   Visits Cancelled: 2  Visits No Show: 0    Time In: 11:05  Time Out: 11:37  Total Billable Time: 32 min     Precautions: Standard  Subjective:   Pt reports: that he didn't bring his worksheets today.  He is agreeable to discharge from speech therapy and states that he is very grateful to have been able to participate in speech therapy services   He was compliant to home exercise program. Still doing oral motor exercises usually 2-3x times per day; but at least 1x per day when busy.   Response to previous treatment: "good"   Pain Scale:  0/10 on VAS currently.   Pain Location: n/a  Objective:   TIMED  Procedure Min.   Cognitive Therapeutic Interventions, first 15 minutes CPT 21146 0   Cognitive Therapeutic Interventions, each additional 15 minutes CPT 65538 0         UNTIMED  Procedure Min.   Speech- Language- Voice Therapy 32   Total Timed Units: 0  Total Untimed Units: 1  Charges Billed/# of units: 1    Short Term Goals: (4 weeks)  Current Progress:   1. Pt will produce multi syllabic words with bilabial sounds with 90% acc independently Goal Met / Discontinue    2. Pt will produce multi syllabic words with labio dental sounds with 90% acc independently Goal Met / Discontinue    3. Pt will produce multi syllabic words with interdental sounds with 90% acc independently   Goal Met / Discontinue    4. Pt will producde " multi syllabic words with alveolar sounds with 90% acc independently Goal Met / Discontinue    5. Pt will produce multi syllabic words with alveolar-palatal sounds with 90% acc independently   Goal Met / Discontinue      6. Pt will produce multi syllabic words with alveolar-palatal sounds with 90% acc independently   -Entered in error Discontinue   7. Pt will produce multi syllabic words with velar sounds with 90% acc independently Goal Met / Discontinue    8. Pt will utilize motor speech strategies to verbalize names of his medications clearly on 9/10 trials in order to improve speech intelligibility   Pt verbalized medication names clearly on 10/10 trials independently METx2 Goal Met / Discontinue      9. Pt will utilize motor speech strategies to verbalize phone numbers clearly on 9/10 trials in order to improve speech intelligibility Goal Met / Discontinue    10. Pt will rate their speech while reading as at least a 2 on a 3 point scale ( 1 = same as before beginning therapy, 2 =better but not best, 3 =best possible outcome) on  8 /10 trials.     Goal Met / Discontinue    11. Pt will use motor speech strategies and answer questions in conversation with a self-rating of at least 2 on a 3 point scale ( 1 = same as before beginning therapy, 2 =better but not best, 3 =best possible outcome) on  8 /10 trials.  Pt rated his speech throughout conversation on a three point scale as a:  - 1/3 on 0/10 trials  - 2/3 on 1/10 trials  - 3/3 on 9/10 trials  METx2 Goal Met / Discontinue      12. Pt will utilize motor speech strategies to verbalize functional phrases on the phone/in person clearly on 9/10 trials.    Goal Met / Discontinue         Patient Education/Response:   Provided skilled education re: adherence to HEP 2-3 days per week to maintain improved speech. Discussed motor speech strategies and communication strategies when speaking over the phone (using blue tooth headset to be able to be hands free, write down all  questions before hand, take the call in a private/quiet room, etc).  Pt verbalized understanding of all discussed.     Written Home Exercises Provided: Patient instructed to cont prior HEP.  Exercises were reviewed and Jae was able to demonstrate them prior to the end of the session.  Jae demonstrated good  understanding of the education provided.     See EMR under Patient Instructions for exercises provided prior visit.  Assessment:   Jae is progressing well towards his goals. Pt rated his speech as at least a 2/3 on 10/10 trials, meeting short term goal 11. Pt able to verbalize the names of his medications clearly on 10/10 trials independently, meeting short term goal 8. Pt reports that he feels much more confident with his speech intelligibility and that communication partners are not asking him to repeat himself over and over again.  Pt has met all short term goals and long term goals. Pt is discharged from speech therapy services. Pt is agreeable to discharge.     Medical necessity is demonstrated by the following IMPAIRMENTS:  Patient with decreased speech intelligibility. 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  Plan:   Pt is d/c from speech therapy services at this time.      LINCOLN Shirley, CCC-SLP  Speech Language Pathologist   8/3/2020

## 2020-08-03 NOTE — PLAN OF CARE
Outpatient Therapy Discharge Summary   Discharge Date: 8/3/2020   Name: Jae Millan  [facility] Number: [id number]  Therapy Diagnosis:   Encounter Diagnosis   Name Primary?    Dysarthria Yes     Physician: Charis Salazar MD  Physician Orders: [id number] - Ambulatory referral/consult to Speech Therapy  Medical Diagnosis: R47.1 (ICD-10-CM) - Dysarthria  Evaluation Date: 6/8/2020    Date of Last visit: 8/3/2020   Total Visits Received: 6  Cancelled Visits: 2  No Show Visits: 0    Assessment    Assessment of Current Status: Pt has made significant improvement to speech intelligibility. His speech is 100% intelligible at conversation level independently. He reports increased success when speaking over the phone. He reports that communication partners do not ask him to repeat himself nearly as much as they used to prior to initiating this plan of care. Pt reports significantly higher confidence in regards to his speech intelligibility.     Goals:   Short Term Goals: (4 weeks)    1. Pt will produce multi syllabic words with bilabial sounds with 90% acc independently Goal Met / Discontinue    2. Pt will produce multi syllabic words with labio dental sounds with 90% acc independently Goal Met / Discontinue    3. Pt will produce multi syllabic words with interdental sounds with 90% acc independently    Goal Met / Discontinue    4. Pt will producde multi syllabic words with alveolar sounds with 90% acc independently Goal Met / Discontinue    5. Pt will produce multi syllabic words with alveolar-palatal sounds with 90% acc independently    Goal Met / Discontinue       6. Pt will produce multi syllabic words with alveolar-palatal sounds with 90% acc independently   -Entered in error Discontinue   7. Pt will produce multi syllabic words with velar sounds with 90% acc independently Goal Met / Discontinue    8. Pt will utilize motor speech strategies to verbalize names of his medications clearly on 9/10 trials in order to improve  speech intelligibility   Goal Met / Discontinue       9. Pt will utilize motor speech strategies to verbalize phone numbers clearly on 9/10 trials in order to improve speech intelligibility Goal Met / Discontinue    10. Pt will rate their speech while reading as at least a 2 on a 3 point scale ( 1 = same as before beginning therapy, 2 =better but not best, 3 =best possible outcome) on  8 /10 trials.      Goal Met / Discontinue    11. Pt will use motor speech strategies and answer questions in conversation with a self-rating of at least 2 on a 3 point scale ( 1 = same as before beginning therapy, 2 =better but not best, 3 =best possible outcome) on  8 /10 trials.  Goal Met / Discontinue       12. Pt will utilize motor speech strategies to verbalize functional phrases on the phone/in person clearly on 9/10 trials.    Goal Met / Discontinue          Long Term Goals:  1. He  will develop functional and intelligible speech and utilize compensatory strategies through the use of adequate labial and lingual function, increased articulatory precision and speech prosody. Goal Met / Discontinue        RICH NOMS (National Outcome Measure System)  Motor Speech  LEVEL 7: The individual¢s ability to successfully and independently participate in vocational, avocational, or social activities is not limited by speech production.  Independent functioning may occasionally include the use of compensatory techniques    Discharge reason: Patient has met all of his goals    Plan   This patient is discharged from Speech Therapy    LINCOLN Shirley, CCC-SLP  Speech Language Pathologist   8/3/2020

## 2020-08-04 NOTE — PROGRESS NOTES
"    Occupational Therapy Treatment Note     Date: 8/5/2020  Name: Jae Millan  Clinic Number: [id number]    Therapy Diagnosis:   Encounter Diagnoses   Name Primary?    Range of motion deficit Yes    Swelling of left hand     Decreased strength of upper extremity      Physician: Maryjane Farias MD    Physician Orders: OT eval and treat  Medical Diagnosis: CVA  Evaluation Date: 7/10/2020  Plan of Care Expiration Period: 9/18/20  Insurance Authorization period Expiration: 12/31/20  Date of Return to MD: TBD  Visit # / Visits Authorized: 8 / 20  FOTO: 5th visit      Time In: 8:45am  Time Out: 9:30am  Total Billable (one on one) Time: 45 minutes     Precautions: Standard    Subjective     Pt reports: "Feels much looser"   he was  somewhat compliant with home exercise program given last session.   Response to previous treatment: positive  Functional change: ongoing    Pain: 2/10  Location: left shoulder      Objective     Moist heat prior to tx session    Jae received the following manual therapy techniques for 15 minutes:   - Supine PROM of L GH joint: FF/Abd/ER/IR/Sup  - General wrist stretches including               1. Scaphoid on radius               2. Increasing mobility of metacarpals               3. Carpal rolls               4. Increasing mobility towards radial deviation               5. Increasing mobility of wrist towards extension              6. Increasing mobility of wrist towards supination/pronation  - Side lying scap mobs in all planes    Jae participated in neuromuscular re-education activities to improve: Coordination, Kinesthetic, Sense, Proprioception and Posture for 30 minutes. The following activities were included:  - Scapular activation for improved postural alignment, elevation and retraction 2x10 with 5 sec holds each   - Modified quad closed chain weight bearing into L UE  while R UE reached across midline to place and remove squigz from mirror Verbal and tactile cues needed " for weight shifts along with Habematolel assist from therapist to maintain L hand placement    - seated yoga ball roll outs all directions x25 for with Weight bearing into L hand, Self Habematolel needed    Jae received Unattended ESTIM (NMES) for 0 minutes:  - Indonesian 10/10 to L long wrist extensors, cues to extend when he feels contraction       Home Exercises and Education Provided     Education provided:   - wear schedule for resting hand orthotic   - Progress towards goals     Written Home Exercises Provided: not at this time       Assessment     Jae had good tolerance to tx this date. Modified quad well tolerated this date vs last session. Some L wrist discomfort noted but passed with rest breaks. He continues with L side weakness affecting his ability to complete ADL/IALDs.      Jae is progressing well towards his goals and there are no updates to goals at this time. Pt prognosis is Good.     Pt will continue to benefit from skilled outpatient occupational therapy to address the deficits listed in the problem list on initial evaluation provide pt/family education and to maximize pt's level of independence in the home and community environment.     Anticipated barriers to occupational therapy: time since stroke     Pt's spiritual, cultural and educational needs considered and pt agreeable to plan of care and goals.    Goals:  Short Term Goals: 5 weeks   - Pt. Will be instructed on self ROM exercises MET  - Pt. Will be instructed on edema management at home  - Pt. Will have improved L shoulder FF and ABD of at least 10 degrees for increased functional mobility for dressing  - Pt. Will correctly don/doff resting night splint MET  - Pt. Will wear resting night splint at least 3x week MET     Long Term Goals:  By D/C  - Pt. Will be independent with HEP  - Pt. Will increase AROM of L shoulder FF and ABD of at least 15 degrees for increased functional mobility for dressing and bathing  - Pt. Will wear resting night splint  at least 5x week at d/c date MET     More goals to be established as appropriate      Plan   Certification Period/Plan of care expiration: 7/10/2020 to 9/18/20.     Outpatient Occupational Therapy 2 times weekly for 10 weeks to include the following interventions: Electrical Stimulation of L UE, Manual Therapy, Moist Heat/ Ice, Neuromuscular Re-ed, Orthotic Management and Training, Paraffin, Patient Education, Self Care, Therapeutic Activites and Therapeutic Exercise.     IRLANDA Galicia       Updates/Grading for next session: as tolerated       IRLANDA Galicia

## 2020-08-05 ENCOUNTER — CLINICAL SUPPORT (OUTPATIENT)
Dept: REHABILITATION | Facility: HOSPITAL | Age: 70
End: 2020-08-05
Attending: PHYSICAL MEDICINE & REHABILITATION
Payer: MEDICARE

## 2020-08-05 DIAGNOSIS — M25.612 DECREASED ROM OF LEFT SHOULDER: ICD-10-CM

## 2020-08-05 DIAGNOSIS — R29.898 DECREASED STRENGTH OF UPPER EXTREMITY: ICD-10-CM

## 2020-08-05 DIAGNOSIS — Z74.09 IMPAIRED FUNCTIONAL MOBILITY, BALANCE, GAIT, AND ENDURANCE: Primary | ICD-10-CM

## 2020-08-05 DIAGNOSIS — R53.1 DECREASED STRENGTH: ICD-10-CM

## 2020-08-05 DIAGNOSIS — M79.89 SWELLING OF LEFT HAND: ICD-10-CM

## 2020-08-05 DIAGNOSIS — M25.60 RANGE OF MOTION DEFICIT: Primary | ICD-10-CM

## 2020-08-05 PROCEDURE — 97112 NEUROMUSCULAR REEDUCATION: CPT | Mod: PN

## 2020-08-05 PROCEDURE — 97110 THERAPEUTIC EXERCISES: CPT | Mod: KX,PN | Performed by: PHYSICAL THERAPIST

## 2020-08-05 PROCEDURE — 97140 MANUAL THERAPY 1/> REGIONS: CPT | Mod: PN

## 2020-08-05 PROCEDURE — 97112 NEUROMUSCULAR REEDUCATION: CPT | Mod: KX,PN | Performed by: PHYSICAL THERAPIST

## 2020-08-13 ENCOUNTER — CLINICAL SUPPORT (OUTPATIENT)
Dept: REHABILITATION | Facility: HOSPITAL | Age: 70
End: 2020-08-13
Attending: PHYSICAL MEDICINE & REHABILITATION
Payer: MEDICARE

## 2020-08-13 DIAGNOSIS — M79.89 SWELLING OF LEFT HAND: ICD-10-CM

## 2020-08-13 DIAGNOSIS — R29.898 DECREASED STRENGTH OF UPPER EXTREMITY: ICD-10-CM

## 2020-08-13 DIAGNOSIS — M25.60 RANGE OF MOTION DEFICIT: Primary | ICD-10-CM

## 2020-08-13 PROCEDURE — 97110 THERAPEUTIC EXERCISES: CPT | Mod: PN

## 2020-08-13 PROCEDURE — 97140 MANUAL THERAPY 1/> REGIONS: CPT | Mod: PN

## 2020-08-21 ENCOUNTER — CLINICAL SUPPORT (OUTPATIENT)
Dept: REHABILITATION | Facility: HOSPITAL | Age: 70
End: 2020-08-21
Attending: PHYSICAL MEDICINE & REHABILITATION
Payer: MEDICARE

## 2020-08-21 DIAGNOSIS — R53.1 DECREASED STRENGTH: ICD-10-CM

## 2020-08-21 DIAGNOSIS — M25.612 DECREASED ROM OF LEFT SHOULDER: ICD-10-CM

## 2020-08-21 DIAGNOSIS — M79.89 SWELLING OF LEFT HAND: ICD-10-CM

## 2020-08-21 DIAGNOSIS — M25.60 RANGE OF MOTION DEFICIT: Primary | ICD-10-CM

## 2020-08-21 DIAGNOSIS — Z74.09 IMPAIRED FUNCTIONAL MOBILITY, BALANCE, GAIT, AND ENDURANCE: Primary | ICD-10-CM

## 2020-08-21 DIAGNOSIS — R29.898 DECREASED STRENGTH OF UPPER EXTREMITY: ICD-10-CM

## 2020-08-21 PROCEDURE — 97014 ELECTRIC STIMULATION THERAPY: CPT | Mod: PN

## 2020-08-21 PROCEDURE — 97110 THERAPEUTIC EXERCISES: CPT | Mod: PN,CQ

## 2020-08-21 PROCEDURE — 97140 MANUAL THERAPY 1/> REGIONS: CPT | Mod: PN

## 2020-08-21 PROCEDURE — 97110 THERAPEUTIC EXERCISES: CPT | Mod: PN

## 2020-08-21 PROCEDURE — 97112 NEUROMUSCULAR REEDUCATION: CPT | Mod: PN,CQ

## 2020-08-21 NOTE — PROGRESS NOTES
"    Occupational Therapy Treatment Note     Date: 8/21/2020  Name: Jae Millan  [facility] Number: [id number]    Therapy Diagnosis:   Encounter Diagnoses   Name Primary?    Range of motion deficit Yes    Swelling of left hand     Decreased strength of upper extremity      Physician: Charis Salazar MD    Physician Orders: OT eval and treat  Medical Diagnosis: CVA  Evaluation Date: 7/10/2020  Plan of Care Expiration Period: 9/18/20  Insurance Authorization period Expiration: 12/31/20  Date of Return to MD: TBD  Visit # / Visits Authorized: 10 / 20  FOTO: 5th visit      Time In: 11:30am  Time Out: 12:15pm  Total Billable (one on one) Time: 45 minutes     Precautions: Standard    Subjective     Pt reports: "I am tired today"  he was  somewhat compliant with home exercise program given last session.   Response to previous treatment: positive  Functional change: ongoing    Pain: 2/10  Location: left shoulder      Objective       Jae received the following manual therapy techniques for 15 minutes:   - Supine PROM of L GH joint: FF/Abd/ER/IR/Sup  - General wrist stretches including               1. Scaphoid on radius               2. Increasing mobility of metacarpals               3. Carpal rolls               4. Increasing mobility towards radial deviation               5. Increasing mobility of wrist towards extension              6. Increasing mobility of wrist towards supination/pronation  - Side lying scap mobs in all planes    Jae participated in therapeutic exercise for 10 minutes:  - UBE x10 min with reversal half way for increased UE activation and strength     Jae participated in neuromuscular re-education activities to improve: Coordination, Kinesthetic, Sense, Proprioception and Posture for 5 minutes. The following activities were included:  - Scapular activation for improved postural alignment, elevation and retraction 2x10 with 5 sec holds each     Jae received Unattended ESTIM (NMES) for 10 " minutes:  - Kyrgyz 10/10 to L long wrist extensors, cues to extend when he feels contraction       Home Exercises and Education Provided     Education provided:   - wear schedule for resting hand orthotic   - Progress towards goals     Written Home Exercises Provided: not at this time       Assessment     Jae had good tolerance to therapy this date. He requested no skip weight bearing this date due to fatigue. NMES was completed instead. PROM grossly improving and tone is well managed at home. Dycem given this date to limit hand slipping during weight bearing at home. He continues with L side weakness affecting his ability to complete ADL/IALDs.      Jae is progressing well towards his goals and there are no updates to goals at this time. Pt prognosis is Good.     Pt will continue to benefit from skilled outpatient occupational therapy to address the deficits listed in the problem list on initial evaluation provide pt/family education and to maximize pt's level of independence in the home and community environment.     Anticipated barriers to occupational therapy: time since stroke     Pt's spiritual, cultural and educational needs considered and pt agreeable to plan of care and goals.    Goals:  Short Term Goals: 5 weeks   - Pt. Will be instructed on self ROM exercises MET  - Pt. Will be instructed on edema management at home MET, self massage/elevating   - Pt. Will have improved L shoulder FF and ABD of at least 10 degrees for increased functional mobility for dressing MET  - Pt. Will correctly don/doff resting night splint MET  - Pt. Will wear resting night splint at least 3x week MET     Long Term Goals:  By D/C  - Pt. Will be independent with HEP MET  - Pt. Will increase AROM of L shoulder FF and ABD of at least 15 degrees for increased functional mobility for dressing and bathing MET  - Pt. Will wear resting night splint at least 5x week at d/c date MET  - Pt. Will have improved supportive use of L hand  during simple meal prep     More goals to be established as appropriate      Plan   Certification Period/Plan of care expiration: 7/10/2020 to 9/18/20.     Outpatient Occupational Therapy 2 times weekly for 10 weeks to include the following interventions: Electrical Stimulation of L UE, Manual Therapy, Moist Heat/ Ice, Neuromuscular Re-ed, Orthotic Management and Training, Paraffin, Patient Education, Self Care, Therapeutic Activites and Therapeutic Exercise.     RILANDA Galicia       Updates/Grading for next session: as tolerated       IRLANDA Galicia

## 2020-08-21 NOTE — PROGRESS NOTES
"  Physical Therapy Daily Treatment Note      Name: Jae Millan  Clinic Number: [id number]     Therapy Diagnosis:   1. Impaired functional mobility, balance, gait, and endurance      2. Decreased strength      3. Decreased ROM of left shoulder            Physician: Charis Salazar MD     Visit Date: 8/21/2020      Physician Orders: Charis Salazar MD     Physician Orders: PT Eval and Treat neuro program  Medical Diagnosis from Referral: (I69.354) Hemiparesis affecting left side as late effect of cerebrovascular accident (CVA)  (primary encounter diagnosis)  (I63.329) Cerebrovascular accident (CVA) due to thrombosis of anterior cerebral artery, unspecified blood vessel laterality  (R47.1) Dysarthria  (M79.2) Neuropathic pain  (Z74.09) Impaired mobility and ADLs     Evaluation Date: 6/8/2020  Authorization Period Expiration: 12/31/2020  Plan of Care Expiration: 8/14/2020  Visit # / Visits authorized: 9/ 10- kx modifier     Time In: 1230  Time Out: 1320  Total Billable Time: 42 minutes     Precautions: Standard     Subjective      Pt reports: no new complaints  He was compliant with home exercise program.  Response to previous treatment: felt pretty good  Functional change: ongoing     Pain: 0/10 prior to session, a little stiffness in LUE  Location: left arms      Objective      Bold= new or progression  Jae received therapeutic exercises to develop strength, endurance and core stabilization for 17 minutes including:    Recumbent stepper L5.5 BUE/LE x 8 min, L hand wrapped  Matrix knee extension 10# 2x15  Matrix hip abduction 25# 2x15    Parallel bars: Step ups L up 8" 3 x 10 (on stairs followed by right foot stepping up), no UE support     Supine: L hamstring stretch [id number]               L IR stretch 4x30               L quad stretch [id number] (supine)        Jae participated in neuromuscular re-education activities to improve: Balance, Coordination and mobility for 25 minutes. The following " "activities were included:     Parallel bar: tandem gait, forwards/ backwards, intermittent UE support- 3 laps   Lunge onto bosu with alt. LE x20, intermittent UE support              bosu ball (flat side up), static standing 2 x 30 sec= fair balance   bosu ball (flat side up), horizontal head turns 2 x 30 sec= fair- to fair balance   bosu ball (flat side up), vertical 2 x 30 sec= fair- balance   R foot cone tap (large) 10x 10 sec hold, fair- balance   Step up to 8'' box from airex pad 2x10 each   Airex pad standing and toe tap RLE onto 12'', 10 sec hold cone x 10- poor= balance              STS from lowest level of high/low mat 4'' step under RLE 3x10              Forward and backwards gait on turf x1 lap each         Home Exercises Provided and Patient Education Provided      Education provided:   - continue with HEP     Written Home Exercises Provided: Patient instructed to cont prior HEP.  Exercises were reviewed and Jae was able to demonstrate them prior to the end of the session.  Jae demonstrated good  understanding of the education provided.      See EMR under Patient Instructions for exercises provided 6/18/2020.     Assessment     Jae tolerated PT session well today. Stretching performed on LLE 2* to "stiffness" in leg. Pt reported that he felt like he could walk better after the stretching. Pt was challenged with balance activities today, difficulty balancing on LLE on airex pad. Matrix machines added to improve strengthening in BLE.      Jae is progressing well towards his goals.   Pt prognosis is Good.      Pt will continue to benefit from skilled outpatient physical therapy to address the deficits listed in the problem list box on initial evaluation, provide pt/family education and to maximize pt's level of independence in the home and community environment.      Pt's spiritual, cultural and educational needs considered and pt agreeable to plan of care and goals.     Anticipated barriers to " physical therapy: length of time since onset     Goals:   Formal status as of 7/6/2020  Short Term Goals: 5 weeks   1. Pt will perform HEP for strengthening and range of Motion at least 2x/week to improve carryover of progress. ongoing  2. Pt will demonstrate improved speed of mobility and activity tolerance by performing 5 sit<>stands without UE support in 10 sec.  Improved- 13 sec, no UE support  3. Pt will demonstrate improved L hamstring passive range of motion to 70 deg straight leg raise to improve flexibility for all mobility.  Improved- 67 deg  4. Pt will demonstrate improved L shoulder ER active range of motion to 30 degrees to improve shoulder alignment to decrease pain.  Improved- 20 deg  5. Assess Functional Gait Assessment and set goals as needed. Met 6/18/2020- 20/30  6. Pt will negotiate 10 steps with 1 handrail to demonstrate improve ability to negotiate a flight of stairs in case of a fire (drill).  Improved- 10 steps without handrail, partial reciprocal/ step to pattern, 1 loss of balance with assist to correct     Long Term Goals: 10 weeks   1. Pt will demonstrate improved L shoulder ER active range of motion to 60 degrees to improve ability to flex shoulder and decrease pain.  ongoing  2. Pt will demonstrate improved strength of L plantar flexion to 4-/5 to improve foot clearance during swing phase.  ongoing  3. Pt will demonstrate improved L hip extension strength to 4/5 to improve standing balance and gait ability.  ongoing  4. Pt will negotiate 20 steps with handrail and mod I to demonstrate improved ability to negotiate stairs in case of emergency at apartment.  Ongoing  5. New 6/25/2020: pt will demonstrate decreased fall risk and improved balance during gait by scoring 23/30 on Functional Gait Assessment. ongoing     Plan     Use LUE support during lunging to improve lunge quality-     Lucho Sorto,PTA  8/21/2020

## 2020-08-25 ENCOUNTER — TELEPHONE (OUTPATIENT)
Dept: INTERNAL MEDICINE | Facility: CLINIC | Age: 70
End: 2020-08-25

## 2020-08-25 DIAGNOSIS — E55.9 MILD VITAMIN D DEFICIENCY: ICD-10-CM

## 2020-08-25 DIAGNOSIS — D64.9 ANEMIA, UNSPECIFIED TYPE: Primary | ICD-10-CM

## 2020-08-26 ENCOUNTER — DOCUMENTATION ONLY (OUTPATIENT)
Dept: REHABILITATION | Facility: HOSPITAL | Age: 70
End: 2020-08-26

## 2020-08-26 NOTE — PROGRESS NOTES
"  Physical Therapy Daily Treatment Note      Name: Jae Millan  Clinic Number: [id number]     Therapy Diagnosis:   1. Impaired functional mobility, balance, gait, and endurance      2. Decreased strength      3. Decreased ROM of left shoulder            Physician: Charis Salazar MD     Visit Date: 8/27/2020      Physician Orders: Charis Salazar MD     Physician Orders: PT Eval and Treat neuro program  Medical Diagnosis from Referral: (I69.354) Hemiparesis affecting left side as late effect of cerebrovascular accident (CVA)  (primary encounter diagnosis)  (I63.329) Cerebrovascular accident (CVA) due to thrombosis of anterior cerebral artery, unspecified blood vessel laterality  (R47.1) Dysarthria  (M79.2) Neuropathic pain  (Z74.09) Impaired mobility and ADLs     Evaluation Date: 6/8/2020  Authorization Period Expiration: 12/31/2020  Plan of Care Expiration: 8/14/2020  Visit # / Visits authorized: 10/ 10- kx modifier     Time In: 1015  Time Out: 1100  Total Billable Time: 42 minutes     Precautions: Standard     Subjective      Pt reports: no new complaints   He was compliant with home exercise program.  Response to previous treatment: felt pretty good  Functional change: ongoing     Pain: 0/10 prior to session, a little stiffness in LUE  Location: left arms      Objective      Bold= new or progression  Jae received therapeutic exercises to develop strength, endurance and core stabilization for 17 minutes including:    Recumbent stepper L5.5 BUE/LE x 8 min, L hand wrapped  Matrix knee extension 10# 3x10 LLE only   Matrix hip abduction 25# 3x10    Parallel bars: Step ups L up 8" 3 x 10  no UE support     Supine: L hamstring stretch [id number]               L IR stretch 4x30               L quad stretch [id number] (supine)        Jae participated in neuromuscular re-education activities to improve: Balance, Coordination and mobility for 25 minutes. The following activities were included:     Parallel bar: " tandem gait, forwards/ backwards, intermittent UE support- 3 laps   Lunge onto bosu with alt. LE x20, intermittent UE support              bosu ball (flat side up), static standing 2 x 30 sec= fair balance   bosu ball (flat side up), horizontal head turns 2 x 30 sec= fair- to fair balance   bosu ball (flat side up), vertical 2 x 30 sec= fair- balance   R foot cone tap (large) 10x 10 sec hold, fair- balance   Step up to 8'' box from airex pad 2x10 each   Airex pad standing and toe tap RLE onto 12'', 10 sec hold cone x 10- poor= balance              STS from lowest level of high/low mat 4'' step under RLE 3x10              Forward and backwards gait on turf x1 lap each    Ladder on turf forward x3 laps     Home Exercises Provided and Patient Education Provided      Education provided:   - continue with HEP     Written Home Exercises Provided: Patient instructed to cont prior HEP.  Exercises were reviewed and Jae was able to demonstrate them prior to the end of the session.  Jae demonstrated good  understanding of the education provided.      See EMR under Patient Instructions for exercises provided 6/18/2020.     Assessment     Jae tolerated PT session well today.  Pt doing well with matrix strengthening exercises for hips and L quad. Pt performed gait with ladder today, difficulty with balance at beginning but improved as he went along.  Pt has slight hemiplegic gait pattern ambulating without AD but is improving. Pt challenged with lunge onto bosu today with frequent small LOB coming off of ball.      Jae is progressing well towards his goals.   Pt prognosis is Good.      Pt will continue to benefit from skilled outpatient physical therapy to address the deficits listed in the problem list box on initial evaluation, provide pt/family education and to maximize pt's level of independence in the home and community environment.      Pt's spiritual, cultural and educational needs considered and pt agreeable  to plan of care and goals.     Anticipated barriers to physical therapy: length of time since onset     Goals:   Formal status as of 7/6/2020  Short Term Goals: 5 weeks   1. Pt will perform HEP for strengthening and range of Motion at least 2x/week to improve carryover of progress. ongoing  2. Pt will demonstrate improved speed of mobility and activity tolerance by performing 5 sit<>stands without UE support in 10 sec.  Improved- 13 sec, no UE support  3. Pt will demonstrate improved L hamstring passive range of motion to 70 deg straight leg raise to improve flexibility for all mobility.  Improved- 67 deg  4. Pt will demonstrate improved L shoulder ER active range of motion to 30 degrees to improve shoulder alignment to decrease pain.  Improved- 20 deg  5. Assess Functional Gait Assessment and set goals as needed. Met 6/18/2020- 20/30  6. Pt will negotiate 10 steps with 1 handrail to demonstrate improve ability to negotiate a flight of stairs in case of a fire (drill).  Improved- 10 steps without handrail, partial reciprocal/ step to pattern, 1 loss of balance with assist to correct     Long Term Goals: 10 weeks   1. Pt will demonstrate improved L shoulder ER active range of motion to 60 degrees to improve ability to flex shoulder and decrease pain.  ongoing  2. Pt will demonstrate improved strength of L plantar flexion to 4-/5 to improve foot clearance during swing phase.  ongoing  3. Pt will demonstrate improved L hip extension strength to 4/5 to improve standing balance and gait ability.  ongoing  4. Pt will negotiate 20 steps with handrail and mod I to demonstrate improved ability to negotiate stairs in case of emergency at apartment.  Ongoing  5. New 6/25/2020: pt will demonstrate decreased fall risk and improved balance during gait by scoring 23/30 on Functional Gait Assessment. ongoing     Plan     Use LUE support during lunging to improve lunge quality-     Lucho Sorto,PTA  8/27/2020

## 2020-08-26 NOTE — PROGRESS NOTES
Missed Visit/Cancellation      Date: 8/26/2020         Canceled Number: 1  No Show Number: 0                                                                                                                Pt initially had visit scheduled for 8/25/2020 for 0900.   Reason for cancellation: transportation.    Pt's next scheduled physical therapy visit is 8/27/2020.    Naya Obregon,SELWYNT  8/26/2020

## 2020-08-27 ENCOUNTER — CLINICAL SUPPORT (OUTPATIENT)
Dept: REHABILITATION | Facility: HOSPITAL | Age: 70
End: 2020-08-27
Attending: PHYSICAL MEDICINE & REHABILITATION
Payer: MEDICARE

## 2020-08-27 ENCOUNTER — DOCUMENTATION ONLY (OUTPATIENT)
Dept: REHABILITATION | Facility: HOSPITAL | Age: 70
End: 2020-08-27

## 2020-08-27 DIAGNOSIS — Z74.09 IMPAIRED FUNCTIONAL MOBILITY, BALANCE, GAIT, AND ENDURANCE: Primary | ICD-10-CM

## 2020-08-27 DIAGNOSIS — M25.60 RANGE OF MOTION DEFICIT: Primary | ICD-10-CM

## 2020-08-27 DIAGNOSIS — M25.612 DECREASED ROM OF LEFT SHOULDER: ICD-10-CM

## 2020-08-27 DIAGNOSIS — R29.898 DECREASED STRENGTH OF UPPER EXTREMITY: ICD-10-CM

## 2020-08-27 DIAGNOSIS — R53.1 DECREASED STRENGTH: ICD-10-CM

## 2020-08-27 DIAGNOSIS — M79.89 SWELLING OF LEFT HAND: ICD-10-CM

## 2020-08-27 PROCEDURE — 97140 MANUAL THERAPY 1/> REGIONS: CPT | Mod: PN

## 2020-08-27 PROCEDURE — 97110 THERAPEUTIC EXERCISES: CPT | Mod: PN

## 2020-08-27 PROCEDURE — 97112 NEUROMUSCULAR REEDUCATION: CPT | Mod: PN

## 2020-08-27 PROCEDURE — 97110 THERAPEUTIC EXERCISES: CPT | Mod: PN,CQ

## 2020-08-27 PROCEDURE — 97112 NEUROMUSCULAR REEDUCATION: CPT | Mod: PN,CQ

## 2020-08-27 NOTE — PROGRESS NOTES
PT/PTA STAFFING      Name: Jae Millan  [facility] Number: [id number]    Date of staffin2020      DME/ orders needed: No    Changes to POC: address balance with narrow Base of support, improve L ankle strategy, gait without AD     Continue with POC: Yes    Naya Obregon,DPT  Lucho Sorto, PTA  2020

## 2020-08-27 NOTE — PROGRESS NOTES
"    Occupational Therapy Treatment Note     Date: 8/27/2020  Name: Jae Millan  [facility] Number: [id number]    Therapy Diagnosis:   Encounter Diagnoses   Name Primary?    Range of motion deficit Yes    Swelling of left hand     Decreased strength of upper extremity      Physician: Charis Salazar MD    Physician Orders: OT eval and treat  Medical Diagnosis: CVA  Evaluation Date: 7/10/2020  Plan of Care Expiration Period: 9/18/20  Insurance Authorization period Expiration: 12/31/20  Date of Return to MD: TBD  Visit # / Visits Authorized: 11 / 20  FOTO: 5th visit      Time In: 9:30am  Time Out: 10:15am  Total Billable (one on one) Time: 45 minutes     Precautions: Standard    Subjective     Pt reports: "  I almost didn't come today. Transportation canceled last time"   he was  somewhat compliant with home exercise program given last session.   Response to previous treatment: positive  Functional change: ongoing    Pain: 2/10  Location: left shoulder      Objective       Jae received the following manual therapy techniques for 15 minutes:   - Supine PROM of L GH joint: FF/Abd/ER/IR/Sup  - General wrist stretches including               1. Scaphoid on radius               2. Increasing mobility of metacarpals               3. Carpal rolls               4. Increasing mobility towards radial deviation               5. Increasing mobility of wrist towards extension              6. Increasing mobility of wrist towards supination/pronation  - Side lying scap mobs in all planes    Jae participated in therapeutic exercise for 10 minutes:  - UBE x10 min with reversal half way for increased UE activation and strength     Jae participated in neuromuscular re-education activities to improve: Coordination, Kinesthetic, Sense, Proprioception and Posture for 20 minutes. The following activities were included:  - Scapular activation for improved postural alignment, elevation and retraction 2x10 with 5 sec holds each "   - Modified quad weight bearing into L UE, R Ue crossed midline to place squigz on mirror. Assist needed to maintain weight bearing           Home Exercises and Education Provided     Education provided:   - wear schedule for resting hand orthotic   - Progress towards goals     Written Home Exercises Provided: not at this time       Assessment     Jae had good tolerance to therapy this date. Weight bearing went well with only mild discomfort noted. One break needed for gentle wrist mobs. He continues with L side weakness affecting his ability to complete ADL/IALDs.      Jae is progressing well towards his goals and there are no updates to goals at this time. Pt prognosis is Good.     Pt will continue to benefit from skilled outpatient occupational therapy to address the deficits listed in the problem list on initial evaluation provide pt/family education and to maximize pt's level of independence in the home and community environment.     Anticipated barriers to occupational therapy: time since stroke     Pt's spiritual, cultural and educational needs considered and pt agreeable to plan of care and goals.    Goals:  Short Term Goals: 5 weeks   - Pt. Will be instructed on self ROM exercises MET  - Pt. Will be instructed on edema management at home MET, self massage/elevating   - Pt. Will have improved L shoulder FF and ABD of at least 10 degrees for increased functional mobility for dressing MET  - Pt. Will correctly don/doff resting night splint MET  - Pt. Will wear resting night splint at least 3x week MET     Long Term Goals:  By D/C  - Pt. Will be independent with HEP MET  - Pt. Will increase AROM of L shoulder FF and ABD of at least 15 degrees for increased functional mobility for dressing and bathing MET  - Pt. Will wear resting night splint at least 5x week at d/c date MET  - Pt. Will have improved supportive use of L hand during simple meal prep     More goals to be established as appropriate      Plan    Certification Period/Plan of care expiration: 7/10/2020 to 9/18/20.     Outpatient Occupational Therapy 2 times weekly for 10 weeks to include the following interventions: Electrical Stimulation of L UE, Manual Therapy, Moist Heat/ Ice, Neuromuscular Re-ed, Orthotic Management and Training, Paraffin, Patient Education, Self Care, Therapeutic Activites and Therapeutic Exercise.     IRLANDA Galicia       Updates/Grading for next session: as tolerated       IRLANDA Galicia

## 2020-09-01 ENCOUNTER — CLINICAL SUPPORT (OUTPATIENT)
Dept: REHABILITATION | Facility: HOSPITAL | Age: 70
End: 2020-09-01
Attending: PHYSICAL MEDICINE & REHABILITATION
Payer: MEDICARE

## 2020-09-01 DIAGNOSIS — Z74.09 IMPAIRED FUNCTIONAL MOBILITY, BALANCE, GAIT, AND ENDURANCE: Primary | ICD-10-CM

## 2020-09-01 DIAGNOSIS — R29.898 DECREASED STRENGTH OF UPPER EXTREMITY: ICD-10-CM

## 2020-09-01 DIAGNOSIS — M25.612 DECREASED ROM OF LEFT SHOULDER: ICD-10-CM

## 2020-09-01 DIAGNOSIS — M79.89 SWELLING OF LEFT HAND: ICD-10-CM

## 2020-09-01 DIAGNOSIS — R53.1 DECREASED STRENGTH: ICD-10-CM

## 2020-09-01 DIAGNOSIS — M25.60 RANGE OF MOTION DEFICIT: Primary | ICD-10-CM

## 2020-09-01 PROCEDURE — 97140 MANUAL THERAPY 1/> REGIONS: CPT | Mod: PN

## 2020-09-01 PROCEDURE — 97530 THERAPEUTIC ACTIVITIES: CPT | Mod: PN

## 2020-09-01 PROCEDURE — 97110 THERAPEUTIC EXERCISES: CPT | Mod: PN

## 2020-09-01 PROCEDURE — 97110 THERAPEUTIC EXERCISES: CPT | Mod: KX,PN | Performed by: PHYSICAL THERAPIST

## 2020-09-01 PROCEDURE — 97112 NEUROMUSCULAR REEDUCATION: CPT | Mod: KX,PN | Performed by: PHYSICAL THERAPIST

## 2020-09-01 NOTE — PROGRESS NOTES
"    Occupational Therapy Treatment Note     Date: 9/1/2020  Name: Jae Millan  [facility] Number: [id number]    Therapy Diagnosis:  Encounter Diagnoses   Name Primary?    Range of motion deficit Yes    Swelling of left hand     Decreased strength of upper extremity      Physician: Charis Salazar MD    Physician Orders: OT eval and treat  Medical Diagnosis: CVA  Evaluation Date: 7/10/2020  Plan of Care Expiration Period: 9/18/20  Insurance Authorization period Expiration: 12/31/20  Date of Return to MD: TBD  Visit # / Visits Authorized: 12 / 20  FOTO: 5th visit      Time In: 9:45am  Time Out: 10:30am  Total Billable (one on one) Time: 45 minutes     Precautions: Standard    Subjective     Pt reports: "I fell the other day. Chair slid out"  he was  compliant with home exercise program given last session.   Response to previous treatment: positive  Functional change: ongoing    Pain: 2/10  Location: left shoulder      Objective       Jae received the following manual therapy techniques for 15 minutes:   - Supine PROM of L GH joint: FF/Abd/ER/IR/Sup  - General wrist stretches including               1. Scaphoid on radius               2. Increasing mobility of metacarpals               3. Carpal rolls               4. Increasing mobility towards radial deviation               5. Increasing mobility of wrist towards extension              6. Increasing mobility of wrist towards supination/pronation  - Side lying scap mobs in all planes    Jae participated in therapeutic exercise for 10 minutes:  - UBE x10 min with reversal half way for increased UE activation and strength     Jae participated in neuromuscular re-education activities to improve: Coordination, Kinesthetic, Sense, Proprioception and Posture for 5 minutes. The following activities were included:  - Scapular activation for improved postural alignment, elevation and retraction 2x10 with 5 sec holds each   - (NP) Standing closed chain weight " bearing into L UE, R UE crossed midline to stack cones. Assist needed to maintain weight bearing     Jae participated in therapeutic activities for 15 minutes:  - Functional reaching tasks   - balloon bounce   - punching bag   - focus ext/FF/Abd over ball with visual cue not to touch pole          Home Exercises and Education Provided     Education provided:   - wear schedule for resting hand orthotic   - Progress towards goals     Written Home Exercises Provided: not at this time       Assessment       Jae had good tolerance to therapy this date. Functional reaching task especially with the balloon was a good challnge for him. Cues needed to limit internal rotation with forward reach. Task was changed to include small pole as a visual cue to limit internal rotation during forward reaching task with good success. He continues with L side weakness affecting his ability to complete ADL/IALDs.      Jae is progressing well towards his goals and there are no updates to goals at this time. Pt prognosis is Good.     Pt will continue to benefit from skilled outpatient occupational therapy to address the deficits listed in the problem list on initial evaluation provide pt/family education and to maximize pt's level of independence in the home and community environment.     Anticipated barriers to occupational therapy: time since stroke     Pt's spiritual, cultural and educational needs considered and pt agreeable to plan of care and goals.    Goals:  Short Term Goals: 5 weeks   - Pt. Will be instructed on self ROM exercises MET  - Pt. Will be instructed on edema management at home MET, self massage/elevating   - Pt. Will have improved L shoulder FF and ABD of at least 10 degrees for increased functional mobility for dressing MET  - Pt. Will correctly don/doff resting night splint MET  - Pt. Will wear resting night splint at least 3x week MET     Long Term Goals:  By D/C  - Pt. Will be independent with HEP MET  - Pt.  Will increase AROM of L shoulder FF and ABD of at least 15 degrees for increased functional mobility for dressing and bathing MET  - Pt. Will wear resting night splint at least 5x week at d/c date MET  - Pt. Will have improved supportive use of L hand during simple meal prep     More goals to be established as appropriate      Plan   Certification Period/Plan of care expiration: 7/10/2020 to 9/18/20.     Outpatient Occupational Therapy 2 times weekly for 10 weeks to include the following interventions: Electrical Stimulation of L UE, Manual Therapy, Moist Heat/ Ice, Neuromuscular Re-ed, Orthotic Management and Training, Paraffin, Patient Education, Self Care, Therapeutic Activites and Therapeutic Exercise.     IRLANDA Galicia       Updates/Grading for next session: as tolerated       IRLANDA Galicia

## 2020-09-01 NOTE — PROGRESS NOTES
"  Physical Therapy Daily Treatment Note      Name: Jae Millan  Clinic Number: [id number]     Therapy Diagnosis:   1. Impaired functional mobility, balance, gait, and endurance      2. Decreased strength      3. Decreased ROM of left shoulder            Physician: Charis Salazar MD     Visit Date: 9/1/2020      Physician Orders: Charis Salazar MD     Physician Orders: PT Eval and Treat neuro program  Medical Diagnosis from Referral: (I69.354) Hemiparesis affecting left side as late effect of cerebrovascular accident (CVA)  (primary encounter diagnosis)  (I63.329) Cerebrovascular accident (CVA) due to thrombosis of anterior cerebral artery, unspecified blood vessel laterality  (R47.1) Dysarthria  (M79.2) Neuropathic pain  (Z74.09) Impaired mobility and ADLs     Evaluation Date: 6/8/2020  Authorization Period Expiration: 12/31/2020  New POC: 8/15/2020 to 10/9/2020  Visit # / Visits authorized: 2/20- kx modifier     Time In: 1029  Time Out: 1116  Total Billable Time: 47 minutes     Precautions: Standard     Subjective      Pt reports: pt fell when a chair slid out from under him when he was attempting to sit down. "took a while" to get back up. Pt denies injury  He was compliant with home exercise program.  Response to previous treatment: no adverse effects reported  Functional change: ongoing     Pain: 0/10 prior to session, a little stiffness in LUE  Location: left arms      Objective      Bold= new or progression  Jae received therapeutic exercises to develop strength, endurance and core stabilization for 17 minutes including:    Recumbent stepper L6 BUE/LE x 8 min, L hand wrapped    Shuttle: L single leg squat 50# 3 x 10              L heel raise 12.5# 3 x 10     At stairs: L hamstring stretch [id number] x 2                      Jae participated in neuromuscular re-education activities to improve: Balance, Coordination and mobility for 30 minutes. The following activities were included:     Parallel " "bar: tandem stance 2 x 30 sec= L front= fair+ to good balance, R front= fair to fair+ balance   tandem gait, forwards/ backwards, intermittent UE support- 3 laps   Lunge with alt. LE x20, LUE support   Short hurdles, reciprocal, no UE support- 3 laps    R foot cone tap (large) 10x 10 sec hold, fair balance    STS from 19" mat 6'' step under RLE 2x10    Forward (fast walk) and backwards gait on turf 20 ft x 3        Home Exercises Provided and Patient Education Provided      Education provided:   - continue with HEP     Written Home Exercises Provided: Patient instructed to cont prior HEP.  Exercises were reviewed and Jae was able to demonstrate them prior to the end of the session.  Jae demonstrated good  understanding of the education provided.      See EMR under Patient Instructions for exercises provided 6/18/2020.     Assessment     Jae tolerated PT session well today. Pt reports that he had difficulty rising from the floor after a fall from chair height. He required UE support on the couch to complete. Pt denies any injury. Pt demonstrates improved balance on LLE with cone tap on R. Pt tolerated sit<>stand practice with increased height of step under RLE demonstrating improved LLE strength. Pt continues to demonstrate decreased LLE stability with lunging. Pt can benefit from continued skilled PT services to improve functional use of LLE to normalize balance and mobility.       Jae is progressing well towards his goals.   Pt prognosis is Good.      Pt will continue to benefit from skilled outpatient physical therapy to address the deficits listed in the problem list box on initial evaluation, provide pt/family education and to maximize pt's level of independence in the home and community environment.      Pt's spiritual, cultural and educational needs considered and pt agreeable to plan of care and goals.     Anticipated barriers to physical therapy: length of time since onset     Goals:   Formal " status as of 8/5/2020  Short Term Goals: 5 weeks   1. Pt will perform HEP for strengthening and range of Motion at least 2x/week to improve carryover of progress. Met 8/5/20  2. Pt will demonstrate improved speed of mobility and activity tolerance by performing 5 sit<>stands without UE support in 10 sec.  Improved- 12 sec, no UE support  3. Pt will demonstrate improved L hamstring passive range of motion to 70 deg straight leg raise to improve flexibility for all mobility.  same- 64 deg  4. Pt will demonstrate improved L shoulder ER active range of motion to 30 degrees to improve shoulder alignment to decrease pain.  met 8/5/2020- 43  deg  5. Assess Functional Gait Assessment and set goals as needed. Met 6/18/2020- 20/30  6. Pt will negotiate 10 steps with 1 handrail to demonstrate improve ability to negotiate a flight of stairs in case of a fire (drill).  met 8/5/2020- 20 steps without handrail,  reciprocal pattern     Long Term Goals: 10 weeks   1. Pt will demonstrate improved L shoulder ER active range of motion to 60 degrees to improve ability to flex shoulder and decrease pain.  improved  2. Pt will demonstrate improved strength of L plantar flexion to 4-/5 to improve foot clearance during swing phase.  improved  3. Pt will demonstrate improved L hip extension strength to 4/5 to improve standing balance and gait ability.  ongoing  4. Pt will negotiate 20 steps with handrail and mod I to demonstrate improved ability to negotiate stairs in case of emergency at apartment.  Ongoing  5. New 6/25/2020: pt will demonstrate decreased fall risk and improved balance during gait by scoring 23/30 on Functional Gait Assessment. ongoing     Plan     Continue to address L single leg stance and L motor control/ strength. May begin treadmill use as warm up to improve gait without AD. Reassess next visit.     Naya Obregon, DPT, NCS, CBIS   9/1/2020

## 2020-09-03 ENCOUNTER — CLINICAL SUPPORT (OUTPATIENT)
Dept: REHABILITATION | Facility: HOSPITAL | Age: 70
End: 2020-09-03
Attending: PHYSICAL MEDICINE & REHABILITATION
Payer: MEDICARE

## 2020-09-03 DIAGNOSIS — R29.898 DECREASED STRENGTH OF UPPER EXTREMITY: ICD-10-CM

## 2020-09-03 DIAGNOSIS — R53.1 DECREASED STRENGTH: ICD-10-CM

## 2020-09-03 DIAGNOSIS — M25.612 DECREASED ROM OF LEFT SHOULDER: ICD-10-CM

## 2020-09-03 DIAGNOSIS — Z74.09 IMPAIRED FUNCTIONAL MOBILITY, BALANCE, GAIT, AND ENDURANCE: Primary | ICD-10-CM

## 2020-09-03 DIAGNOSIS — M79.89 SWELLING OF LEFT HAND: ICD-10-CM

## 2020-09-03 DIAGNOSIS — M25.60 RANGE OF MOTION DEFICIT: Primary | ICD-10-CM

## 2020-09-03 PROCEDURE — 97014 ELECTRIC STIMULATION THERAPY: CPT | Mod: PN

## 2020-09-03 PROCEDURE — 97140 MANUAL THERAPY 1/> REGIONS: CPT | Mod: PN

## 2020-09-03 PROCEDURE — 97116 GAIT TRAINING THERAPY: CPT | Mod: KX,PN | Performed by: PHYSICAL THERAPIST

## 2020-09-03 PROCEDURE — 97112 NEUROMUSCULAR REEDUCATION: CPT | Mod: PN

## 2020-09-03 PROCEDURE — 97110 THERAPEUTIC EXERCISES: CPT | Mod: KX,PN | Performed by: PHYSICAL THERAPIST

## 2020-09-03 NOTE — PROGRESS NOTES
"    Occupational Therapy Treatment Note     Date: 9/3/2020  Name: Jae Millan  [facility] Number: [id number]    Therapy Diagnosis:  Encounter Diagnoses   Name Primary?    Range of motion deficit Yes    Swelling of left hand     Decreased strength of upper extremity      Physician: Charis Salazar MD    Physician Orders: OT eval and treat  Medical Diagnosis: CVA  Evaluation Date: 7/10/2020  Plan of Care Expiration Period: 9/18/20  Insurance Authorization period Expiration: 12/31/20  Date of Return to MD: TBD  Visit # / Visits Authorized: 13 / 20  FOTO: 5th visit      Time In: 10:30am  Time Out: 11:15am  Total Billable (one on one) Time: 45 minutes     Precautions: Standard    Subjective     Pt reports: "Wearing the splint has become a routine for me"   he was  compliant with home exercise program given last session.   Response to previous treatment: positive  Functional change: ongoing    Pain: 2/10  Location: left shoulder      Objective       Jae received the following manual therapy techniques for 15 minutes:   - Supine PROM of L GH joint: FF/Abd/ER/IR/Sup  - General wrist stretches including               1. Scaphoid on radius               2. Increasing mobility of metacarpals               3. Carpal rolls               4. Increasing mobility towards radial deviation               5. Increasing mobility of wrist towards extension              6. Increasing mobility of wrist towards supination/pronation  - Side lying scap mobs in all planes    Jae participated in therapeutic exercise for ) minutes:  - (NP) UBE x10 min with reversal half way for increased UE activation and strength     Jae participated in neuromuscular re-education activities to improve: Coordination, Kinesthetic, Sense, Proprioception and Posture for 20 minutes. The following activities were included:  - Scapular activation for improved postural alignment, elevation and retraction 2x10 with 5 sec holds each   - Standing closed chain " weight bearing into L UE, R UE crossed midline to place squigz on mirror. Assist needed to maintain weight bearing     Jae participated in therapeutic activities for 0 minutes:  - (NP) Functional reaching tasks   - balloon bounce   - punching bag   - focus ext/FF/Abd over ball with visual cue not to touch pole    Jae received unattended ESTIM (NMES) for 10 minutes:  - Czech 10/10 to L long wrist extensors       Home Exercises and Education Provided     Education provided:   - wear schedule for resting hand orthotic   - Progress towards goals     Written Home Exercises Provided: not at this time       Assessment       Jae had good tolerance to therapy this date. He has consistently been wearing his resting hand splint. He is knowledgeable and consistent with PROM of his L UE. He continues with L side weakness affecting his ability to complete ADL/IALDs.      Jae is progressing well towards his goals and there are no updates to goals at this time. Pt prognosis is Good.     Pt will continue to benefit from skilled outpatient occupational therapy to address the deficits listed in the problem list on initial evaluation provide pt/family education and to maximize pt's level of independence in the home and community environment.     Anticipated barriers to occupational therapy: time since stroke     Pt's spiritual, cultural and educational needs considered and pt agreeable to plan of care and goals.    Goals:  Short Term Goals: 5 weeks   - Pt. Will be instructed on self ROM exercises MET  - Pt. Will be instructed on edema management at home MET, self massage/elevating   - Pt. Will have improved L shoulder FF and ABD of at least 10 degrees for increased functional mobility for dressing MET  - Pt. Will correctly don/doff resting night splint MET  - Pt. Will wear resting night splint at least 3x week MET     Long Term Goals:  By D/C  - Pt. Will be independent with HEP MET  - Pt. Will increase AROM of L shoulder  FF and ABD of at least 15 degrees for increased functional mobility for dressing and bathing MET  - Pt. Will wear resting night splint at least 5x week at d/c date MET  - Pt. Will have improved supportive use of L hand during simple meal prep     More goals to be established as appropriate      Plan   Certification Period/Plan of care expiration: 7/10/2020 to 9/18/20.     Outpatient Occupational Therapy 2 times weekly for 10 weeks to include the following interventions: Electrical Stimulation of L UE, Manual Therapy, Moist Heat/ Ice, Neuromuscular Re-ed, Orthotic Management and Training, Paraffin, Patient Education, Self Care, Therapeutic Activites and Therapeutic Exercise.     IRLANDA Galicia       Updates/Grading for next session: as tolerated       IRLANDA Galicia

## 2020-09-03 NOTE — PLAN OF CARE
Physical Therapy Daily Treatment Note      Name: Jae Millan  Clinic Number: [id number]     Therapy Diagnosis:   1. Impaired functional mobility, balance, gait, and endurance      2. Decreased strength      3. Decreased ROM of left shoulder            Physician: Charis Salazar MD     Visit Date: 9/3/2020      Physician Orders: Charis Salazar MD     Physician Orders: PT Eval and Treat neuro program  Medical Diagnosis from Referral: (I69.354) Hemiparesis affecting left side as late effect of cerebrovascular accident (CVA)  (primary encounter diagnosis)  (I63.329) Cerebrovascular accident (CVA) due to thrombosis of anterior cerebral artery, unspecified blood vessel laterality  (R47.1) Dysarthria  (M79.2) Neuropathic pain  (Z74.09) Impaired mobility and ADLs     Evaluation Date: 6/8/2020  Authorization Period Expiration: 12/31/2020  New POC: 8/15/2020 to 10/9/2020  Visit # / Visits authorized: 3/20- kx modifier     Time In: 1029  Time Out: 1116  Total Billable Time: 47 minutes     Precautions: Standard     Subjective      Pt reports: no new complaints. Reports he used the stairs at his apartment complex (7 flights) ~2x since last visit, holds rail only to descend.   He was compliant with home exercise program.  Response to previous treatment: no adverse effects reported  Functional change: improved functional balance     Pain: 0/10 prior to session, a little stiffness in LUE  Location: left arms      Objective      Bold= new or progression  Jae received therapeutic exercises to develop strength, endurance and core stabilization for 25 minutes including:   eval 7/6/20 8/5/20 9/3/20   L shoulder AROM ER 15 deg 20 deg 43 deg NT   L hamstrings (SLR) 60 deg 67 deg 64 deg 65 deg      Lower Extremity Strength    RLE eval LLE eval LLE 8/5/20 LLE 9/3/20   Hip Flexion: 4/5 4-/5 NT 4/5   Hip Extension:  4+/5 3+/5 4-/5 3+/5   Hip Abduction: 3+/5 2+/5 NT 3+/5   Hip Adduction: NT NT NT NT   Knee Extension: 5/5 4+/5  "NT 4+/5   Knee Flexion: 5/5 3+/5 NT 3 to 3+/5   Ankle Dorsiflexion: 5/5 4+/5 NT 5/5   Ankle Plantarflexion: 4-/5 3-/5 3+/5 3+/5   Ankle Inversion: NT NT NT NT   Ankle Eversion: NT NT NT NT     Matrix: L hamstring curls 25# 3 x 8   Hip abduction 35# 3 x 8    Sit<>stand from 18"box + foam mat 6'' step under RLE x10  Sit<>stand from 18"box + foam mat 8'' step under RLE x10     Sitting: L hamstring stretch [id number]   Standing: DF stretch on wedge 2 x 30 sec                     Jae participated in gait training to improve functional mobility and safety for 22  minutes, including:        Evaluation 7/6/2020 8/5/20 9/3/20   5 times sit-stand 15 seconds without UE support  >12 sec= fall risk for general elderly  >16 sec= fall risk for Parkinson's disease  >10 sec= balance/vestibular dysfunction (<59 y/o)  >14.2 sec= balance/vestibular dysfunction (>59 y/o)  >12 sec= fall risk for CVA 13 sec without UE support 12 sec w/o UE support 14 sec w/o UE support   FGA 20/30 (6/18/20) NT today 20/30 22/30        Functional Gait Assessment:   1. Gait on level surface =  1, 7.6 sec   (3) Normal: less than 5.5 sec, no A.D., no imbalance, normal gait pattern, deviates< 6in   (2) Mild impairment: 7-5.6 sec, uses A.D., mild gait deviations, or deviates 6-10 in   (1) Moderate impairment: > 7 sec, slow speed, imbalance, deviates 10-15 in.   (0) Severe impairment: needs assist, deviates >15 in, reach/touch wall  2. Change in Gait Speed = 3   (3) Normal: smooth change w/o loss of balance or gait deviation, deviates < 6 in, significant difference between speeds   (2) Mild impairment: changes speed, but demonstrates mild gait deviations, deviates 6-10 in, OR no deviations but unable to significantly speed, OR uses A.D.   (1) Moderate impairment: minor changes to speed, OR changes speed w/ significant deviations, deviates 10-15 in, OR  Changes speed , but loses balance & recovers   (0) Severe impairment: cannot change speed, deviates >15 in, or " loses balance & needs assist  3. Gait with horizontal head turns  = 3   (3) Normal: no change in gait, deviates <6 in   (2) Mild impairment: slight change in speed, deviates 6-10 in, OR uses A.D.   (1) Moderate impairment: moderate change in speed, deviates 10-15 in   (0) Severe impairment: severe disruption of gait, deviates >15in  4. Gait with vertical head turns = 3   (3) Normal: no change in gait, deviates <6 in   (2) Mild impairment: slight change in speed, deviates 6-10 in OR uses A.D.   (1) Moderate impairment: moderate change in speed, deviates 10-15 in   (0) Severe impairment: severe disruption of gait, deviates >15 in  5. Gait with pivot turns = 2   (3) Normal: performs safely in 3 sec, no LOB   (2) Mild impairment: performs in >3 sec & no LOB, OR turns safely & requires several steps to regain LOB   (1) Moderate impairment: turns slow, OR requires several small steps for balance following turn & stop   (0) Severe impairment: cannot turn safely, needs assist  6. Step over obstacle = 3   (3) Normal: steps over 2 stacked boxes w/o change in speed or LOB   (2) Mild impairment: able to step over 1 box w/o change in speed or LOB   (1) Moderate impairment: steps over 1 box but must slow down, may require VC   (0) Severe impairment: cannot perform w/o assist  7. Gait with Narrow NORA = 0   (3) Normal: 10 steps no staggering   (2) Mild impairment: 7-9 steps   (1) Moderate impairment: 4-7 steps   (0) Severe impairment: < 4 steps or cannot perform w/o assist  8. Gait with eyes closed = 2   (3) Normal: < 7 sec, no A.D., no LOB, normal gait pattern, deviates <6 in   (2) Mild impairment: 7.1-9 sec, mild gait deviations, deviates 6-10 in   (1) Moderate impairment: > 9 sec, abnormal pattern, LOB, deviates 10-15 in   (0) Severe impairment: cannot perform w/o assist, LOB, deviates >15in  9. Ambulating Backwards = 2   (3) Normal: no A.D., no LOB, normal gait pattern, deviates <6in   (2) Mild impairment: uses A.D., slower  speed, mild gait deviations, deviates 6-10 in   (1) Moderate impairment: slow speed, abnormal gait pattern, LOB, deviates 10-15 in   (0) Severe impairment: severe gait deviations or LOB, deviates >15in  10. Steps = 3   (3) Normal: alternating feet, no rail   (2) Mild Impairment: alternating feet, uses rail   (1) Moderate impairment: step-to, uses rail   (0) Severe impairment: cannot perform safely    Score 22/30     Score:   <22/30 fall risk   <20/30 fall risk in older adults   <18/30 fall risk in Parkinsons      Treadmill up to 1.6 mph, 1-2 min warm up/cool down at 1-1.1 mph x 11 min, BUE support   Mod verbal cues to increase stride            Home Exercises Provided and Patient Education Provided      Education provided:   - continue with HEP     Written Home Exercises Provided: Patient instructed to cont prior HEP.  Exercises were reviewed and Jae was able to demonstrate them prior to the end of the session.  Jae demonstrated good  understanding of the education provided.      See EMR under Patient Instructions for exercises provided 6/18/2020.     Assessment   Assessment period: 8/21/2020 to 9/3/2020   Jae tolerates PT sessions well and is demonstrating improvements in functional balance. Pt reports that he is able to negotiate 7 flights of stairs to his apartment in case of emergency (I.e. fire). Pt demonstrates improved ability to vary gait speed and improved ability to step over obstacles. Self selected walking speed without assistive device is ~same. Pt continues to demonstrate weakness and decreased motor control in LLE. Areas of more significant weakness are glutes, hamstrings, and plantar flexors. Pt is showing improvement in strength of L PF ( can now lift heel off of floor in LEE limb standing), but not enough for increased grading criteria. Pt continues to report weakness, heaviness, and stiffness in LLE.   Pt can benefit from continued skilled PT services to improve functional use of LLE to  normalize balance and mobility.       Jae is progressing well towards his goals.   Pt prognosis is Good.      Pt will continue to benefit from skilled outpatient physical therapy to address the deficits listed in the problem list box on initial evaluation, provide pt/family education and to maximize pt's level of independence in the home and community environment.      Pt's spiritual, cultural and educational needs considered and pt agreeable to plan of care and goals.     Anticipated barriers to physical therapy: length of time since onset     Goals:   Formal status as of 8/5/2020  Short Term Goals: 5 weeks   1. Pt will perform HEP for strengthening and range of Motion at least 2x/week to improve carryover of progress. Met 8/5/20  2. Pt will demonstrate improved speed of mobility and activity tolerance by performing 5 sit<>stands without UE support in 10 sec.  varies- 14 sec, no UE support  3. Pt will demonstrate improved L hamstring passive range of motion to 70 deg straight leg raise to improve flexibility for all mobility.  ~same- 64 deg  4. Pt will demonstrate improved L shoulder ER active range of motion to 30 degrees to improve shoulder alignment to decrease pain.  met 8/5/2020- 43  deg  5. Assess Functional Gait Assessment and set goals as needed. Met 6/18/2020- 20/30  6. Pt will negotiate 10 steps with 1 handrail to demonstrate improve ability to negotiate a flight of stairs in case of a fire (drill).  met 8/5/2020- 20 steps without handrail,  reciprocal pattern     Long Term Goals: 10 weeks   1. Pt will demonstrate improved L shoulder ER active range of motion to 60 degrees to improve ability to flex shoulder and decrease pain.  delete goal- refer to OT  2. Pt will demonstrate improved strength of L plantar flexion to 4-/5 to improve foot clearance during swing phase.  improved  3. Pt will demonstrate improved L hip extension strength to 4/5 to improve standing balance and gait ability.  ongoing  4. Pt  will negotiate 20 steps with handrail and mod I to demonstrate improved ability to negotiate stairs in case of emergency at apartment.  met 9/3/20- pt reports ability to negotiate stairs to apartment on 7th floor.   5. New 6/25/2020: pt will demonstrate decreased fall risk and improved balance during gait by scoring 23/30 on Functional Gait Assessment. improved- 22/30     Plan   Continue with POC.     treadmill use as warm up to improve gait without AD. Use resistance machines/ shuttle for LLE strengthening. Continue with balance in narrow Base of support     Naya Obregon, DPT, NCS, CBIS   9/3/2020

## 2020-09-04 ENCOUNTER — LAB VISIT (OUTPATIENT)
Dept: LAB | Facility: HOSPITAL | Age: 70
End: 2020-09-04
Attending: INTERNAL MEDICINE
Payer: MEDICARE

## 2020-09-04 DIAGNOSIS — D64.9 ANEMIA, UNSPECIFIED TYPE: ICD-10-CM

## 2020-09-04 DIAGNOSIS — E55.9 MILD VITAMIN D DEFICIENCY: ICD-10-CM

## 2020-09-04 DIAGNOSIS — E11.40 TYPE 2 DIABETES MELLITUS WITH DIABETIC NEUROPATHY, WITHOUT LONG-TERM CURRENT USE OF INSULIN: ICD-10-CM

## 2020-09-04 LAB
25(OH)D3+25(OH)D2 SERPL-MCNC: 12 NG/ML (ref 30–96)
BASOPHILS # BLD AUTO: 0.04 K/UL (ref 0–0.2)
BASOPHILS NFR BLD: 0.7 % (ref 0–1.9)
DIFFERENTIAL METHOD: ABNORMAL
EOSINOPHIL # BLD AUTO: 0.1 K/UL (ref 0–0.5)
EOSINOPHIL NFR BLD: 2.5 % (ref 0–8)
ERYTHROCYTE [DISTWIDTH] IN BLOOD BY AUTOMATED COUNT: 13.7 % (ref 11.5–14.5)
ESTIMATED AVG GLUCOSE: 166 MG/DL (ref 68–131)
FERRITIN SERPL-MCNC: 38 NG/ML (ref 20–300)
HBA1C MFR BLD HPLC: 7.4 % (ref 4–5.6)
HCT VFR BLD AUTO: 39.1 % (ref 40–54)
HGB BLD-MCNC: 13.1 G/DL (ref 14–18)
IMM GRANULOCYTES # BLD AUTO: 0.01 K/UL (ref 0–0.04)
IMM GRANULOCYTES NFR BLD AUTO: 0.2 % (ref 0–0.5)
IRON SERPL-MCNC: 61 UG/DL (ref 45–160)
LYMPHOCYTES # BLD AUTO: 1.2 K/UL (ref 1–4.8)
LYMPHOCYTES NFR BLD: 21.2 % (ref 18–48)
MCH RBC QN AUTO: 27 PG (ref 27–31)
MCHC RBC AUTO-ENTMCNC: 33.5 G/DL (ref 32–36)
MCV RBC AUTO: 81 FL (ref 82–98)
MONOCYTES # BLD AUTO: 0.4 K/UL (ref 0.3–1)
MONOCYTES NFR BLD: 7 % (ref 4–15)
NEUTROPHILS # BLD AUTO: 3.8 K/UL (ref 1.8–7.7)
NEUTROPHILS NFR BLD: 68.4 % (ref 38–73)
NRBC BLD-RTO: 0 /100 WBC
PLATELET # BLD AUTO: 247 K/UL (ref 150–350)
PMV BLD AUTO: 11.3 FL (ref 9.2–12.9)
RBC # BLD AUTO: 4.86 M/UL (ref 4.6–6.2)
SATURATED IRON: 19 % (ref 20–50)
TOTAL IRON BINDING CAPACITY: 321 UG/DL (ref 250–450)
TRANSFERRIN SERPL-MCNC: 217 MG/DL (ref 200–375)
WBC # BLD AUTO: 5.61 K/UL (ref 3.9–12.7)

## 2020-09-04 PROCEDURE — 83036 HEMOGLOBIN GLYCOSYLATED A1C: CPT

## 2020-09-04 PROCEDURE — 82306 VITAMIN D 25 HYDROXY: CPT

## 2020-09-04 PROCEDURE — 85025 COMPLETE CBC W/AUTO DIFF WBC: CPT

## 2020-09-04 PROCEDURE — 36415 COLL VENOUS BLD VENIPUNCTURE: CPT

## 2020-09-04 PROCEDURE — 82728 ASSAY OF FERRITIN: CPT

## 2020-09-04 PROCEDURE — 83540 ASSAY OF IRON: CPT

## 2020-09-08 ENCOUNTER — TELEPHONE (OUTPATIENT)
Dept: ENDOCRINOLOGY | Facility: HOSPITAL | Age: 70
End: 2020-09-08

## 2020-09-08 NOTE — TELEPHONE ENCOUNTER
Please call patient to let him know his A1c is about the same, but there was an increased amount of protein in his urine. Please reschedule him to see me sometime in the next 2-4 weeks so we can discuss. I prefer not to wait until December. Thanks!

## 2020-09-09 ENCOUNTER — CLINICAL SUPPORT (OUTPATIENT)
Dept: REHABILITATION | Facility: HOSPITAL | Age: 70
End: 2020-09-09
Attending: PHYSICAL MEDICINE & REHABILITATION
Payer: MEDICARE

## 2020-09-09 DIAGNOSIS — M25.60 RANGE OF MOTION DEFICIT: Primary | ICD-10-CM

## 2020-09-09 DIAGNOSIS — M25.612 DECREASED ROM OF LEFT SHOULDER: ICD-10-CM

## 2020-09-09 DIAGNOSIS — M79.89 SWELLING OF LEFT HAND: ICD-10-CM

## 2020-09-09 DIAGNOSIS — Z74.09 IMPAIRED FUNCTIONAL MOBILITY, BALANCE, GAIT, AND ENDURANCE: Primary | ICD-10-CM

## 2020-09-09 DIAGNOSIS — R29.898 DECREASED STRENGTH OF UPPER EXTREMITY: ICD-10-CM

## 2020-09-09 DIAGNOSIS — R53.1 DECREASED STRENGTH: ICD-10-CM

## 2020-09-09 PROCEDURE — 97116 GAIT TRAINING THERAPY: CPT | Mod: KX,PN | Performed by: PHYSICAL THERAPIST

## 2020-09-09 PROCEDURE — 97112 NEUROMUSCULAR REEDUCATION: CPT | Mod: PN

## 2020-09-09 PROCEDURE — 97140 MANUAL THERAPY 1/> REGIONS: CPT | Mod: PN

## 2020-09-09 PROCEDURE — 97110 THERAPEUTIC EXERCISES: CPT | Mod: KX,PN | Performed by: PHYSICAL THERAPIST

## 2020-09-09 NOTE — PROGRESS NOTES
"Physical Therapy Daily Treatment Note      Name: Jae Millan  Clinic Number: [id number]     Therapy Diagnosis:   1. Impaired functional mobility, balance, gait, and endurance      2. Decreased strength      3. Decreased ROM of left shoulder            Physician: Charis Salazar MD     Visit Date: 9/9/2020      Physician Orders: Charis Salazar MD     Physician Orders: PT Eval and Treat neuro program  Medical Diagnosis from Referral: (I69.354) Hemiparesis affecting left side as late effect of cerebrovascular accident (CVA)  (primary encounter diagnosis)  (I63.329) Cerebrovascular accident (CVA) due to thrombosis of anterior cerebral artery, unspecified blood vessel laterality  (R47.1) Dysarthria  (M79.2) Neuropathic pain  (Z74.09) Impaired mobility and ADLs     Evaluation Date: 6/8/2020  Authorization Period Expiration: 12/31/2020  New POC: 8/15/2020 to 10/9/2020  Visit # / Visits authorized: 4/20- kx modifier     Time In: 1045  Time Out: 1129  Total Billable Time: 44 minutes     Precautions: Standard     Subjective      Pt reports: no new complaints.   He was compliant with home exercise program.  Response to previous treatment: no adverse effects reported  Functional change: improved functional balance     Pain: 0/10 prior to session, a little stiffness in LUE  Location: left arms      Objective      Bold= new or progression  Jae received therapeutic exercises to develop strength, endurance and core stabilization for 33 minutes including:     Matrix: L knee extension 10# 3 x 8   L hamstring curls 25# 3 x 8              Hip abduction 35# 3 x 8    Shuttle: L single leg squat 50# 3 x 10              L heel raise 12.5# 3 x 10     Sit<>stand from 18"box + foam mat 8'' step under RLE 2 x10                Sitting: L hamstring stretch [id number]   Standing: DF stretch on wedge 3 x 30 sec   Lunges with BUE support 10x                               Jae participated in gait training to improve functional " "mobility and safety for 11  minutes, including:       Treadmill up to 1.6 mph, 1-2 min warm up/cool down at 1.4 mph x 11 min, BUE support              Mod verbal cues to increase stride        Home Exercises Provided and Patient Education Provided      Education provided:   - continue with HEP, including stair negotiation as exercise     Written Home Exercises Provided: Patient instructed to cont prior HEP.  Exercises were reviewed and Jae was able to demonstrate them prior to the end of the session.  Jae demonstrated good  understanding of the education provided.      See EMR under Patient Instructions for exercises provided 6/18/2020.     Assessment   Jae tolerated PT session well. Pt continues to require moderate verbal cues to improve R step length to increase L stance time for an equal stride when ambulating on the treadmill. Pt tolerated increased repetitions of sit<>stand with 8" step under RLE for improved LLE weight bearing.   Pt can benefit from continued skilled PT services to improve functional use of LLE to normalize balance and mobility.       Jae is progressing well towards his goals.   Pt prognosis is Good.      Pt will continue to benefit from skilled outpatient physical therapy to address the deficits listed in the problem list box on initial evaluation, provide pt/family education and to maximize pt's level of independence in the home and community environment.      Pt's spiritual, cultural and educational needs considered and pt agreeable to plan of care and goals.     Anticipated barriers to physical therapy: length of time since onset     Goals:   Formal status as of 8/5/2020  Short Term Goals: 5 weeks   1. Pt will perform HEP for strengthening and range of Motion at least 2x/week to improve carryover of progress. Met 8/5/20  2. Pt will demonstrate improved speed of mobility and activity tolerance by performing 5 sit<>stands without UE support in 10 sec.  varies- 14 sec, no UE " support  3. Pt will demonstrate improved L hamstring passive range of motion to 70 deg straight leg raise to improve flexibility for all mobility.  ~same- 64 deg  4. Pt will demonstrate improved L shoulder ER active range of motion to 30 degrees to improve shoulder alignment to decrease pain.  met 8/5/2020- 43  deg  5. Assess Functional Gait Assessment and set goals as needed. Met 6/18/2020- 20/30  6. Pt will negotiate 10 steps with 1 handrail to demonstrate improve ability to negotiate a flight of stairs in case of a fire (drill).  met 8/5/2020- 20 steps without handrail,  reciprocal pattern     Long Term Goals: 10 weeks   1. Pt will demonstrate improved L shoulder ER active range of motion to 60 degrees to improve ability to flex shoulder and decrease pain.  delete goal- refer to OT  2. Pt will demonstrate improved strength of L plantar flexion to 4-/5 to improve foot clearance during swing phase.  improved  3. Pt will demonstrate improved L hip extension strength to 4/5 to improve standing balance and gait ability.  ongoing  4. Pt will negotiate 20 steps with handrail and mod I to demonstrate improved ability to negotiate stairs in case of emergency at apartment.  met 9/3/20- pt reports ability to negotiate stairs to apartment on 7th floor.   5. New 6/25/2020: pt will demonstrate decreased fall risk and improved balance during gait by scoring 23/30 on Functional Gait Assessment. improved- 22/30      Plan   treadmill use as warm up to improve gait without AD. Use resistance machines/ shuttle for LLE strengthening. Continue with balance in narrow Base of support. Increase reps for single leg squats and heel raises     Naya Obregon, DPT, NCS, CBIS   9/9/2020

## 2020-09-09 NOTE — PROGRESS NOTES
"    Occupational Therapy Treatment Note     Date: 9/9/2020  Name: Jae Millan  [facility] Number: [id number]    Therapy Diagnosis:  Encounter Diagnoses   Name Primary?    Range of motion deficit Yes    Swelling of left hand     Decreased strength of upper extremity      Physician: Charis Salazar MD    Physician Orders: OT eval and treat  Medical Diagnosis: CVA  Evaluation Date: 7/10/2020  Plan of Care Expiration Period: 9/18/20  Insurance Authorization period Expiration: 12/31/20  Date of Return to MD: TBD  Visit # / Visits Authorized: 14 / 20  FOTO: 5th visit      Time In: 10:00am  Time Out: 10:45am  Total Billable (one on one) Time: 45 minutes     Precautions: Standard    Subjective     Pt reports: "The splint has been helping"   he was  compliant with home exercise program given last session.   Response to previous treatment: positive  Functional change: ongoing    Pain: 2/10  Location: left shoulder      Objective       Jae received the following manual therapy techniques for 15 minutes:   - Supine PROM of L GH joint: FF/Abd/ER/IR/Sup  - General wrist stretches including               1. Scaphoid on radius               2. Increasing mobility of metacarpals               3. Carpal rolls               4. Increasing mobility towards radial deviation               5. Increasing mobility of wrist towards extension              6. Increasing mobility of wrist towards supination/pronation  - Side lying scap mobs in all planes    Jae participated in therapeutic exercise for 0 minutes:  - (NP) UBE x10 min with reversal half way for increased UE activation and strength     Jae participated in neuromuscular re-education activities to improve: Coordination, Kinesthetic, Sense, Proprioception and Posture for 30 minutes. The following activities were included:  - Scapular activation for improved postural alignment, elevation and retraction 2x10 with 5 sec holds each   - Standing closed chain weight bearing " into L UE, R UE crossed midline to place squigz on mirror. Assist needed to maintain weight bearing     Jae participated in therapeutic activities for 0 minutes:  - (NP) Functional reaching tasks   - balloon bounce   - punching bag   - focus ext/FF/Abd over ball with visual cue not to touch pole    Jae received unattended ESTIM (NMES) for 0 minutes:  - (NP) Ecuadorean 10/10 to L long wrist extensors       Home Exercises and Education Provided     Education provided:   - wear schedule for resting hand orthotic   - Progress towards goals     Written Home Exercises Provided: not at this time       Assessment       Jae had good tolerance to therapy this date. Standing weight bearing was a good challenge for him. Increased PROM noted post. He continues with L side weakness affecting his ability to complete ADL/IALDs.      Jae is progressing well towards his goals and there are no updates to goals at this time. Pt prognosis is Good.     Pt will continue to benefit from skilled outpatient occupational therapy to address the deficits listed in the problem list on initial evaluation provide pt/family education and to maximize pt's level of independence in the home and community environment.     Anticipated barriers to occupational therapy: time since stroke     Pt's spiritual, cultural and educational needs considered and pt agreeable to plan of care and goals.    Goals:  Short Term Goals: 5 weeks   - Pt. Will be instructed on self ROM exercises MET  - Pt. Will be instructed on edema management at home MET, self massage/elevating   - Pt. Will have improved L shoulder FF and ABD of at least 10 degrees for increased functional mobility for dressing MET  - Pt. Will correctly don/doff resting night splint MET  - Pt. Will wear resting night splint at least 3x week MET     Long Term Goals:  By D/C  - Pt. Will be independent with HEP MET  - Pt. Will increase AROM of L shoulder FF and ABD of at least 15 degrees for  increased functional mobility for dressing and bathing MET  - Pt. Will wear resting night splint at least 5x week at d/c date MET  - Pt. Will have improved supportive use of L hand during simple meal prep     More goals to be established as appropriate      Plan   Certification Period/Plan of care expiration: 7/10/2020 to 9/18/20.     Outpatient Occupational Therapy 2 times weekly for 10 weeks to include the following interventions: Electrical Stimulation of L UE, Manual Therapy, Moist Heat/ Ice, Neuromuscular Re-ed, Orthotic Management and Training, Paraffin, Patient Education, Self Care, Therapeutic Activites and Therapeutic Exercise.     IRLANDA Galicia       Updates/Grading for next session: as tolerated       IRLANDA Galicia

## 2020-09-09 NOTE — TELEPHONE ENCOUNTER
Spoke with patient , gave patient lab results also scheduled patient Sep. 18 @ 8 am. Patient approved of time and date.

## 2020-09-11 ENCOUNTER — CLINICAL SUPPORT (OUTPATIENT)
Dept: REHABILITATION | Facility: HOSPITAL | Age: 70
End: 2020-09-11
Attending: PHYSICAL MEDICINE & REHABILITATION
Payer: MEDICARE

## 2020-09-11 DIAGNOSIS — M79.89 SWELLING OF LEFT HAND: ICD-10-CM

## 2020-09-11 DIAGNOSIS — Z74.09 IMPAIRED FUNCTIONAL MOBILITY, BALANCE, GAIT, AND ENDURANCE: Primary | ICD-10-CM

## 2020-09-11 DIAGNOSIS — R29.898 DECREASED STRENGTH OF UPPER EXTREMITY: ICD-10-CM

## 2020-09-11 DIAGNOSIS — R53.1 DECREASED STRENGTH: ICD-10-CM

## 2020-09-11 DIAGNOSIS — M25.612 DECREASED ROM OF LEFT SHOULDER: ICD-10-CM

## 2020-09-11 DIAGNOSIS — M25.60 RANGE OF MOTION DEFICIT: Primary | ICD-10-CM

## 2020-09-11 PROCEDURE — 97112 NEUROMUSCULAR REEDUCATION: CPT | Mod: PN

## 2020-09-11 PROCEDURE — 97140 MANUAL THERAPY 1/> REGIONS: CPT | Mod: PN

## 2020-09-11 PROCEDURE — 97110 THERAPEUTIC EXERCISES: CPT | Mod: PN

## 2020-09-11 PROCEDURE — 97110 THERAPEUTIC EXERCISES: CPT | Mod: KX,PN | Performed by: PHYSICAL THERAPIST

## 2020-09-11 PROCEDURE — 97014 ELECTRIC STIMULATION THERAPY: CPT | Mod: PN

## 2020-09-11 PROCEDURE — 97116 GAIT TRAINING THERAPY: CPT | Mod: KX,PN | Performed by: PHYSICAL THERAPIST

## 2020-09-11 NOTE — PROGRESS NOTES
"Physical Therapy Daily Treatment Note      Name: Jae Millan  Clinic Number: [id number]     Therapy Diagnosis:   1. Impaired functional mobility, balance, gait, and endurance      2. Decreased strength      3. Decreased ROM of left shoulder            Physician: Charis Salazar MD     Visit Date: 9/11/2020      Physician Orders: Charis Salazar MD     Physician Orders: PT Eval and Treat neuro program  Medical Diagnosis from Referral: (I69.354) Hemiparesis affecting left side as late effect of cerebrovascular accident (CVA)  (primary encounter diagnosis)  (I63.329) Cerebrovascular accident (CVA) due to thrombosis of anterior cerebral artery, unspecified blood vessel laterality  (R47.1) Dysarthria  (M79.2) Neuropathic pain  (Z74.09) Impaired mobility and ADLs     Evaluation Date: 6/8/2020  Authorization Period Expiration: 12/31/2020  New POC: 8/15/2020 to 10/9/2020  Visit # / Visits authorized: 5/20- kx modifier     Time In: 1021  Time Out: 1104  Total Billable Time: 43 minutes     Precautions: Standard     Subjective      Pt reports: no new complaints.   He was compliant with home exercise program.  Response to previous treatment: tired but OK  Functional change: improved functional balance     Pain: 0/10 prior to session, a little stiffness in LUE  Location: left arms      Objective      Bold= new or progression  Jae received therapeutic exercises to develop strength, endurance and core stabilization for 31 minutes including:     Matrix: L knee extension 15# 3 x 8   L hamstring curls 25# 3 x 8              Hip abduction 35# 3 x 8    Shuttle: L single leg squat 50# 3 x 15              L heel raise 12.5# 3 x 15     Sit<>stand from 18"box + foam mat 8'' step under RLE 2 x10                Sitting: L hamstring stretch [id number]   Standing: DF stretch on wedge 3 x 30 sec   Lunges with BUE support 10x                               Jae participated in gait training to improve functional mobility and safety " for 12 minutes, including:       Treadmill up to 1.6 mph, 1-2 min warm up/cool down at 1.4 mph x 12 min, BUE support              Min verbal cues to increase stride        Home Exercises Provided and Patient Education Provided      Education provided:   - continue with HEP, including stair negotiation as exercise     Written Home Exercises Provided: Patient instructed to cont prior HEP.  Exercises were reviewed and Jae was able to demonstrate them prior to the end of the session.  Jae demonstrated good  understanding of the education provided.      See EMR under Patient Instructions for exercises provided 6/18/2020.     Assessment   Jae tolerated PT session well. Pt required less verbal cues to improve R step length to increase L stance time when ambulating on the treadmill. Increased resistance for L hamstring curls tolerated. Increased repetitions for shuttle exercises today.  Improved quality of lunges noted in parallel bars. Pt can benefit from continued skilled PT services to improve functional use of LLE to normalize balance and mobility.       Jae is progressing well towards his goals.   Pt prognosis is Good.      Pt will continue to benefit from skilled outpatient physical therapy to address the deficits listed in the problem list box on initial evaluation, provide pt/family education and to maximize pt's level of independence in the home and community environment.      Pt's spiritual, cultural and educational needs considered and pt agreeable to plan of care and goals.     Anticipated barriers to physical therapy: length of time since onset     Goals:   Formal status as of 8/5/2020  Short Term Goals: 5 weeks   1. Pt will perform HEP for strengthening and range of Motion at least 2x/week to improve carryover of progress. Met 8/5/20  2. Pt will demonstrate improved speed of mobility and activity tolerance by performing 5 sit<>stands without UE support in 10 sec.  varies- 14 sec, no UE support  3.  Pt will demonstrate improved L hamstring passive range of motion to 70 deg straight leg raise to improve flexibility for all mobility.  ~same- 64 deg  4. Pt will demonstrate improved L shoulder ER active range of motion to 30 degrees to improve shoulder alignment to decrease pain.  met 8/5/2020- 43  deg  5. Assess Functional Gait Assessment and set goals as needed. Met 6/18/2020- 20/30  6. Pt will negotiate 10 steps with 1 handrail to demonstrate improve ability to negotiate a flight of stairs in case of a fire (drill).  met 8/5/2020- 20 steps without handrail,  reciprocal pattern     Long Term Goals: 10 weeks   1. Pt will demonstrate improved L shoulder ER active range of motion to 60 degrees to improve ability to flex shoulder and decrease pain.  delete goal- refer to OT  2. Pt will demonstrate improved strength of L plantar flexion to 4-/5 to improve foot clearance during swing phase.  improved  3. Pt will demonstrate improved L hip extension strength to 4/5 to improve standing balance and gait ability.  ongoing  4. Pt will negotiate 20 steps with handrail and mod I to demonstrate improved ability to negotiate stairs in case of emergency at apartment.  met 9/3/20- pt reports ability to negotiate stairs to apartment on 7th floor.   5. New 6/25/2020: pt will demonstrate decreased fall risk and improved balance during gait by scoring 23/30 on Functional Gait Assessment. improved- 22/30      Plan   treadmill use as warm up to improve gait without AD. Use resistance machines/ shuttle for LLE strengthening. Continue with balance in narrow Base of support.        Naya Obregon, DPT, NCS, CBIS   9/11/2020

## 2020-09-11 NOTE — PROGRESS NOTES
"    Occupational Therapy Treatment Note     Date: 9/11/2020  Name: Jae Millan  [facility] Number: [id number]    Therapy Diagnosis:  Encounter Diagnoses   Name Primary?    Range of motion deficit Yes    Swelling of left hand     Decreased strength of upper extremity      Physician: Charis Salazar MD    Physician Orders: OT eval and treat  Medical Diagnosis: CVA  Evaluation Date: 7/10/2020  Plan of Care Expiration Period: 9/18/20  Insurance Authorization period Expiration: 12/31/20  Date of Return to MD: TBD  Visit # / Visits Authorized: 15 / 20  FOTO: 5th visit     Time In: 9:05am  Time Out: 10:00am  Total Billable (one on one) Time: 55 minutes     Precautions: Standard    Subjective     Pt reports: "I really want this hand to work more"   he was  compliant with home exercise program given last session.   Response to previous treatment: positive  Functional change: ongoing    Pain: 6/10  Location: left hand    Objective       Jae received the following manual therapy techniques for 15 minutes:   - Supine PROM of L GH joint: FF/Abd/ER/IR/Sup  - General wrist stretches including               1. Scaphoid on radius               2. Increasing mobility of metacarpals               3. Carpal rolls               4. Increasing mobility towards radial deviation               5. Increasing mobility of wrist towards extension              6. Increasing mobility of wrist towards supination/pronation  - Side lying scap mobs in all planes    Jae participated in therapeutic exercise for 10 minutes:  -  UBE res 3.0 x10 min with reversal half way for increased UE activation and strength     Jae participated in neuromuscular re-education activities to improve: Coordination, Kinesthetic, Sense, Proprioception and Posture for 15 minutes. The following activities were included:  - Scapular activation for improved postural alignment, elevation and retraction 2x10 with 5 sec holds each   - modified quad closed chain weight " bearing into L UE, R UE crossed midline to place squigz on mirror. Assist needed to maintain weight bearing     Jae participated in therapeutic activities for 0 minutes:  - (NP) Functional reaching tasks   - balloon bounce   - punching bag   - focus ext/FF/Abd over ball with visual cue not to touch pole    Jae received unattended ESTIM (NMES) for 15 minutes:  - Montenegrin 10/10 to L long wrist extensors       Home Exercises and Education Provided     Education provided:   - wear schedule for resting hand orthotic   - Progress towards goals     Written Home Exercises Provided: not at this time       Assessment     Jae had good tolerance to therapy this date. Modified quad tolerated well. Distal hand pain noted which seems to be nerve related. He continues with L side weakness affecting his ability to complete ADL/IALDs.      Jae is progressing well towards his goals and there are no updates to goals at this time. Pt prognosis is Good.     Pt will continue to benefit from skilled outpatient occupational therapy to address the deficits listed in the problem list on initial evaluation provide pt/family education and to maximize pt's level of independence in the home and community environment.     Anticipated barriers to occupational therapy: time since stroke     Pt's spiritual, cultural and educational needs considered and pt agreeable to plan of care and goals.    Goals:  Short Term Goals: 5 weeks   - Pt. Will be instructed on self ROM exercises MET  - Pt. Will be instructed on edema management at home MET, self massage/elevating   - Pt. Will have improved L shoulder FF and ABD of at least 10 degrees for increased functional mobility for dressing MET  - Pt. Will correctly don/doff resting night splint MET  - Pt. Will wear resting night splint at least 3x week MET     Long Term Goals:  By D/C  - Pt. Will be independent with HEP MET  - Pt. Will increase AROM of L shoulder FF and ABD of at least 15 degrees for  increased functional mobility for dressing and bathing MET  - Pt. Will wear resting night splint at least 5x week at d/c date MET  - Pt. Will have improved supportive use of L hand during simple meal prep     More goals to be established as appropriate      Plan   Certification Period/Plan of care expiration: 7/10/2020 to 9/18/20.     Outpatient Occupational Therapy 2 times weekly for 10 weeks to include the following interventions: Electrical Stimulation of L UE, Manual Therapy, Moist Heat/ Ice, Neuromuscular Re-ed, Orthotic Management and Training, Paraffin, Patient Education, Self Care, Therapeutic Activites and Therapeutic Exercise.     IRLANDA Galicia       Updates/Grading for next session: as tolerated       IRLANDA Galicia

## 2020-09-14 ENCOUNTER — CLINICAL SUPPORT (OUTPATIENT)
Dept: REHABILITATION | Facility: HOSPITAL | Age: 70
End: 2020-09-14
Attending: PHYSICAL MEDICINE & REHABILITATION
Payer: MEDICARE

## 2020-09-14 DIAGNOSIS — M25.60 RANGE OF MOTION DEFICIT: Primary | ICD-10-CM

## 2020-09-14 DIAGNOSIS — Z74.09 IMPAIRED FUNCTIONAL MOBILITY, BALANCE, GAIT, AND ENDURANCE: Primary | ICD-10-CM

## 2020-09-14 DIAGNOSIS — M79.89 SWELLING OF LEFT HAND: ICD-10-CM

## 2020-09-14 DIAGNOSIS — R29.898 DECREASED STRENGTH OF UPPER EXTREMITY: ICD-10-CM

## 2020-09-14 DIAGNOSIS — R53.1 DECREASED STRENGTH: ICD-10-CM

## 2020-09-14 DIAGNOSIS — M25.612 DECREASED ROM OF LEFT SHOULDER: ICD-10-CM

## 2020-09-14 PROCEDURE — 97110 THERAPEUTIC EXERCISES: CPT | Mod: PN

## 2020-09-14 PROCEDURE — 97110 THERAPEUTIC EXERCISES: CPT | Mod: KX,PN | Performed by: PHYSICAL THERAPIST

## 2020-09-14 PROCEDURE — 97140 MANUAL THERAPY 1/> REGIONS: CPT | Mod: PN

## 2020-09-14 PROCEDURE — 97116 GAIT TRAINING THERAPY: CPT | Mod: KX,PN | Performed by: PHYSICAL THERAPIST

## 2020-09-14 NOTE — PROGRESS NOTES
"    Occupational Therapy Treatment/Progress Note and updated POC     Date: 9/14/2020  Name: Jae Millan  Clinic Number: [id number]    Therapy Diagnosis:  Encounter Diagnoses   Name Primary?    Range of motion deficit Yes    Swelling of left hand     Decreased strength of upper extremity      Physician: Charis Salazar MD    Physician Orders: OT eval and treat  Medical Diagnosis: CVA  Evaluation Date: 7/10/2020  Plan of Care Expiration Period: 9/18/20 to 10/1/20  Insurance Authorization period Expiration: 12/31/20  Date of Return to MD: TBD  Visit # / Visits Authorized: 15 / 20  FOTO: 5th visit     Time In: 10:45am  Time Out: 11:30am  Total Billable (one on one) Time: 55 minutes     Precautions: Standard    Subjective     Pt reports: "I am doing good, just wish I could get the same stretch you do with me at home"   he was  compliant with home exercise program given last session.   Response to previous treatment: positive  Functional change: ongoing    Pain: 6/10  Location: left hand    Objective       Jae received the following manual therapy techniques for 15 minutes:   - Supine PROM of L GH joint: FF/Abd/ER/IR/Sup  - General wrist stretches including               1. Scaphoid on radius               2. Increasing mobility of metacarpals               3. Carpal rolls               4. Increasing mobility towards radial deviation               5. Increasing mobility of wrist towards extension              6. Increasing mobility of wrist towards supination/pronation  - Side lying scap mobs in all planes    Jae participated in therapeutic exercise for 30 minutes:  -  UBE res 4.0 x10 min with reversal half way for increased UE activation and strength   Cognitive Exam:  Oriented: Person, Place, Time and Situation  Behaviors: normal, cooperative  Follows Commands/attention: Follows multistep  commands  Communication: clear/fluent  Memory: No Deficits noted as determined by 3 word recall after 1 minute and 3 " minutes  Safety awareness/insight to disability: aware of diagnosis, treatment, and prognosis  Coping skills/emotional control: Appropriate to situation     Visual/Perceptual:  Tracking: intact   Saccades: intact       Acuity: corrected by glasses  Convergence: WFL  Nystagmus: NEG   R/L discrimination: intact  Visual field: intact  Motor Planning Praxis: intact        Physical Exam:  Postural examination/scapula alignment: Rounded shoulder, Head forward and Affected scapula upwardly rotated  Joint integrity: firm end feel  Skin integrity: warm/dry  Edema: Mild L hand/UE  Palpation: TTP L GH joint     R ROM and strength WFL all planes     Joint Evaluation  AROM  7/10/2020 PROM   7/10/2020 AROM  8/13/20 PROM  8/13/20 AROM  9/14/20 PROM  9/14/20     Left Left left left Left Left   Shoulder flex 0-180 70 145 90 160 95 155   Shoulder Abd 0-180 55 115 72 140 85 145   Shoulder ER 0-90 To neautral 35 Neutral  45 neutral 55   Shoulder IR 0-90 60 WNL WFL WNL WFL WNL   Shoulder Extension 0-80 30 45 40 73 50 72   Shoulder Horizontal adduction 0-90 25 WFL 25 WFL 25 WFL   Elbow flex/ext 0-150 10/105 WFL 0/105 WNL 0/120 WNL   Wrist flex 0-80 0 70 ~5 55 32 70   Wrist ext 0-70 0 40 neutral 50 neutral 52   Supination 0-80 30 WNL 30 WNL 30 WNL   Pronation 0-80 WNL (tone) WNL WNL WNL WNL WNL   UD ~15 WNL ~15 WNL ~15 WNL   RD 0 WNL 0 WNL 0 WNL      Fist: increased edema and tone        Strength 7/10/2020 8/13/20 9/14/20   **within available ROM** Left  Left Left   Shoulder flex 3+/5 3+/5 3+   Shoulder abd 3+/5 3+/5 3+   Shoulder ER 3/5 3//5 3   Shoulder IR 3+/5 3+/5 3+   Shoulder Extension 4-/5 4-/5 4-   Shoulder Horizontal adduction 4-/5 4-/5 4-   Elbow flex 4-/5 4-/5 4-   Elbow ext 4/5 4/5 4   Wrist flex 2-/5 2-/5 2-   Wrist ext 2-/5 2-/5 2-   Supination 2-/5 2-/5 2-   Pronation 3/5 3/5 3   UD 3-/5 3-/5 3-   RD 1/5 1/5 1      Gross motor coordination:   · SHEILA (Rapid Alternating Movements): slowed, impaired L  · Finger to Nose (5  times): impaired L  · Finger Flicks (coordination moving from digit flexion to digit extension): impaired L     Tone:  Modified Estefanía Scale:   1-  Slight increase in muscle tone, manifested by a catch and release or by minimal resistance at the end of the range of motino when the affected part(s) is moved in flexion or extension     Comments: increased hand edema      Sensation:  Jae  reports numbness in L hand  [id number]}     Balance:   Static Sit - NORMAL: No deviations seen in posture held statically  Dynamic sit- NORMAL: No deviations seen in posture held statically  Static Stand - NT  Dynamic stand - NT     Endurance Deficit: none                    Functional Status      Functional Mobility:  Bed mobility: Mod I  Roll to left: Mod I  Roll to right: Mod I  Supine to sit: Mod I  Sit to supine: Mod I  Transfers to bed: Mod I  Transfers to toilet: Mod I  Car transfers: Mod I  Wheelchair mobility: n/a     ADL's:  Feeding: I  Grooming: Mod I  Hygiene: Mod I  UB Dressing: Mod I  LB Dressing: Mod I  Toileting: Mod I  Bathing: Mod I     IADL's:  Homecare: Mod I  Cooking: Mod I  Laundry: Mod I  Yard work: n/a  Use of telephone:  I  Money management: I  Medication management: Mod I  Handwriting:I  Technology Use:I         Jae participated in neuromuscular re-education activities to improve: Coordination, Kinesthetic, Sense, Proprioception and Posture for 0 minutes. The following activities were included:  - Scapular activation for improved postural alignment, elevation and retraction 2x10 with 5 sec holds each   - modified quad closed chain weight bearing into L UE, R UE crossed midline to place squigz on mirror. Assist needed to maintain weight bearing     Jae participated in therapeutic activities for 0 minutes:  - (NP) Functional reaching tasks   - balloon bounce   - punching bag   - focus ext/FF/Abd over ball with visual cue not to touch pole    Jae received unattended ESTIM (NMES) for 0 minutes:  -  Russian 10/10 to L long wrist extensors       Home Exercises and Education Provided     Education provided:   - wear schedule for resting hand orthotic   - Progress towards goals     Written Home Exercises Provided: not at this time       Assessment     Jae had fair changes in AROM in flex, ext, and Abd. He has improved over initial baseline but has started to plateau. Plan to see for remainder of the month to review HEP.    Jae is progressing well towards his goals and there are no updates to goals at this time. Pt prognosis is Good.     Pt will continue to benefit from skilled outpatient occupational therapy to address the deficits listed in the problem list on initial evaluation provide pt/family education and to maximize pt's level of independence in the home and community environment.     Anticipated barriers to occupational therapy: time since stroke     Pt's spiritual, cultural and educational needs considered and pt agreeable to plan of care and goals.    Goals:  Short Term Goals: 5 weeks   - Pt. Will be instructed on self ROM exercises MET  - Pt. Will be instructed on edema management at home MET, self massage/elevating   - Pt. Will have improved L shoulder FF and ABD of at least 10 degrees for increased functional mobility for dressing MET  - Pt. Will correctly don/doff resting night splint MET  - Pt. Will wear resting night splint at least 3x week MET     Long Term Goals:  By D/C  - Pt. Will be independent with HEP MET  - Pt. Will increase AROM of L shoulder FF and ABD of at least 15 degrees for increased functional mobility for dressing and bathing MET  - Pt. Will wear resting night splint at least 5x week at d/c date MET  - Pt. Will have improved supportive use of L hand during simple meal prep     More goals to be established as appropriate      Plan   Certification Period/Plan of care expiration: 7/10/2020 to 9/18/20 to 10/1/20     Outpatient Occupational Therapy 2 times weekly for 10 weeks to  include the following interventions: Electrical Stimulation of L UE, Manual Therapy, Moist Heat/ Ice, Neuromuscular Re-ed, Orthotic Management and Training, Paraffin, Patient Education, Self Care, Therapeutic Activites and Therapeutic Exercise.     IRLANDA Galicia       Updates/Grading for next session: as tolerated       IRLANDA Galicia

## 2020-09-14 NOTE — PROGRESS NOTES
"Physical Therapy Daily Treatment Note      Name: Jae Millan  Clinic Number: [id number]     Therapy Diagnosis:   1. Impaired functional mobility, balance, gait, and endurance      2. Decreased strength      3. Decreased ROM of left shoulder            Physician: Charis Salazar MD     Visit Date: 9/14/2020      Physician Orders: Charis Salazar MD     Physician Orders: PT Eval and Treat neuro program  Medical Diagnosis from Referral: (I69.354) Hemiparesis affecting left side as late effect of cerebrovascular accident (CVA)  (primary encounter diagnosis)  (I63.329) Cerebrovascular accident (CVA) due to thrombosis of anterior cerebral artery, unspecified blood vessel laterality  (R47.1) Dysarthria  (M79.2) Neuropathic pain  (Z74.09) Impaired mobility and ADLs     Evaluation Date: 6/8/2020  Authorization Period Expiration: 12/31/2020  New POC: 8/15/2020 to 10/9/2020  Visit # / Visits authorized: 6/20- kx modifier     Time In: 0954  Time Out: 1045  Total Billable Time: 51 minutes     Precautions: Standard     Subjective      Pt reports: no new complaints.   He was compliant with home exercise program.  Response to previous treatment: felt fine, no significant soreness  Functional change: ongoing     Pain: 0/10 prior to session, a little stiffness in LUE  Location: left arms      Objective      Bold= new or progression  Jae received therapeutic exercises to develop strength, endurance and core stabilization for 37 minutes including:     Matrix: L knee extension 15# 3 x 8   L hamstring curls 30# 3 x 8              Hip abduction 35# 4 x 8   Hip adduction 50# 4 x 8    Shuttle: L single leg squat 50# 3 x 15              L heel raise 12.5# 3 x 15     Sit<>stand from 18"box + foam mat 8'' step under RLE 2 x10                Sitting: L hamstring stretch [id number]   Standing:    Lunges with BUE support 10x                               Jae participated in gait training to improve functional mobility and safety for " 14 minutes, including:       Treadmill up to 1.7 mph, 2 min warm up/cool down at 1.4 mph x 11 min, BUE support              Min verbal cues to increase stride    Parallel bars: tandem stance 2 x 30 sec= fair+ balance        Home Exercises Provided and Patient Education Provided      Education provided:   - continue with HEP, including stair negotiation as exercise     Written Home Exercises Provided: Patient instructed to cont prior HEP.  Exercises were reviewed and Jae was able to demonstrate them prior to the end of the session.  Jae demonstrated good  understanding of the education provided.      See EMR under Patient Instructions for exercises provided 6/18/2020.     Assessment   Jae tolerated PT session well. Pt was able to tolerate increased speed on treadmill with decreased verbal cues for step length. Pt tolerated increased resistance/ repetitions for resistance training. Improved stability noted in tandem stance.  Pt can benefit from continued skilled PT services to improve functional use of LLE to normalize balance and mobility.       Jae is progressing well towards his goals.   Pt prognosis is Good.      Pt will continue to benefit from skilled outpatient physical therapy to address the deficits listed in the problem list box on initial evaluation, provide pt/family education and to maximize pt's level of independence in the home and community environment.      Pt's spiritual, cultural and educational needs considered and pt agreeable to plan of care and goals.     Anticipated barriers to physical therapy: length of time since onset     Goals:   Short Term Goals: 5 weeks   1. Pt will perform HEP for strengthening and range of Motion at least 2x/week to improve carryover of progress. Met 8/5/20  2. Pt will demonstrate improved speed of mobility and activity tolerance by performing 5 sit<>stands without UE support in 10 sec.  varies- 14 sec, no UE support  3. Pt will demonstrate improved L  hamstring passive range of motion to 70 deg straight leg raise to improve flexibility for all mobility.  ~same- 64 deg  4. Pt will demonstrate improved L shoulder ER active range of motion to 30 degrees to improve shoulder alignment to decrease pain.  met 8/5/2020- 43  deg  5. Assess Functional Gait Assessment and set goals as needed. Met 6/18/2020- 20/30  6. Pt will negotiate 10 steps with 1 handrail to demonstrate improve ability to negotiate a flight of stairs in case of a fire (drill).  met 8/5/2020- 20 steps without handrail,  reciprocal pattern     Long Term Goals: 10 weeks   1. Pt will demonstrate improved L shoulder ER active range of motion to 60 degrees to improve ability to flex shoulder and decrease pain.  delete goal- refer to OT  2. Pt will demonstrate improved strength of L plantar flexion to 4-/5 to improve foot clearance during swing phase.  improved  3. Pt will demonstrate improved L hip extension strength to 4/5 to improve standing balance and gait ability.  ongoing  4. Pt will negotiate 20 steps with handrail and mod I to demonstrate improved ability to negotiate stairs in case of emergency at apartment.  met 9/3/20- pt reports ability to negotiate stairs to apartment on 7th floor.   5. New 6/25/2020: pt will demonstrate decreased fall risk and improved balance during gait by scoring 23/30 on Functional Gait Assessment. improved- 22/30      Plan   treadmill use as warm up to improve gait without AD. Use resistance machines/ shuttle for LLE strengthening- increase sets/ reps next use. Continue with balance in narrow Base of support.        Naya Obregon, DPT, NCS, CBIS   9/14/2020

## 2020-09-16 ENCOUNTER — CLINICAL SUPPORT (OUTPATIENT)
Dept: REHABILITATION | Facility: HOSPITAL | Age: 70
End: 2020-09-16
Attending: PHYSICAL MEDICINE & REHABILITATION
Payer: MEDICARE

## 2020-09-16 DIAGNOSIS — Z74.09 IMPAIRED FUNCTIONAL MOBILITY, BALANCE, GAIT, AND ENDURANCE: Primary | ICD-10-CM

## 2020-09-16 DIAGNOSIS — M25.612 DECREASED ROM OF LEFT SHOULDER: ICD-10-CM

## 2020-09-16 DIAGNOSIS — M79.89 SWELLING OF LEFT HAND: ICD-10-CM

## 2020-09-16 DIAGNOSIS — M25.60 RANGE OF MOTION DEFICIT: Primary | ICD-10-CM

## 2020-09-16 DIAGNOSIS — R53.1 DECREASED STRENGTH: ICD-10-CM

## 2020-09-16 DIAGNOSIS — R29.898 DECREASED STRENGTH OF UPPER EXTREMITY: ICD-10-CM

## 2020-09-16 PROCEDURE — 97140 MANUAL THERAPY 1/> REGIONS: CPT | Mod: PN

## 2020-09-16 PROCEDURE — 97116 GAIT TRAINING THERAPY: CPT | Mod: KX,PN | Performed by: PHYSICAL THERAPIST

## 2020-09-16 PROCEDURE — 97110 THERAPEUTIC EXERCISES: CPT | Mod: KX,PN | Performed by: PHYSICAL THERAPIST

## 2020-09-16 PROCEDURE — 97014 ELECTRIC STIMULATION THERAPY: CPT | Mod: PN

## 2020-09-16 NOTE — PROGRESS NOTES
"    Occupational Therapy Treatment Note     Date: 9/16/2020  Name: Jae Millan  [facility] Number: [id number]    Therapy Diagnosis:  Encounter Diagnoses   Name Primary?    Range of motion deficit Yes    Swelling of left hand     Decreased strength of upper extremity      Physician: Charis Salazar MD    Physician Orders: OT eval and treat  Medical Diagnosis: CVA  Evaluation Date: 7/10/2020  Plan of Care Expiration Period: 9/18/20 to 10/1/20  Insurance Authorization period Expiration: 12/31/20  Date of Return to MD: TBD  Visit # / Visits Authorized: 16 / 20  FOTO: 5th visit     Time In: 11:00am (Pt. Required rest break and bathroom break after PT)  Time Out: 11:30am  Total Billable (one on one) Time: 30 minutes     Precautions: Standard    Subjective     Pt reports: "I really need a break. I am tired"  he was  compliant with home exercise program given last session.   Response to previous treatment: positive  Functional change: ongoing    Pain: 5/10  Location: left hand    Objective       Jae received the following manual therapy techniques for 10 minutes:   - Supine PROM of L GH joint: FF/Abd/ER/IR/Sup  - General wrist stretches including               1. Scaphoid on radius               2. Increasing mobility of metacarpals               3. Carpal rolls               4. Increasing mobility towards radial deviation               5. Increasing mobility of wrist towards extension              6. Increasing mobility of wrist towards supination/pronation  - Side lying scap mobs in all planes    Jae participated in therapeutic exercise for 0 minutes:  -  UBE res 4.0 x10 min with reversal half way for increased UE activation and strength       Jae participated in neuromuscular re-education activities to improve: Coordination, Kinesthetic, Sense, Proprioception and Posture for 5 minutes. The following activities were included:  - Scapular activation for improved postural alignment, elevation and retraction " 2x10 with 5 sec holds each   - (NP) modified quad closed chain weight bearing into L UE, R UE crossed midline to place squigz on mirror. Assist needed to maintain weight bearing     Jae participated in therapeutic activities for 0 minutes:  - (NP) Functional reaching tasks   - balloon bounce   - punching bag   - focus ext/FF/Abd over ball with visual cue not to touch pole    Jae received unattended ESTIM (NMES) for 15 minutes:  - Kazakh 10/10 to L long wrist extensors       Home Exercises and Education Provided     Education provided:   - wear schedule for resting hand orthotic   - Progress towards goals     Written Home Exercises Provided: not at this time       Assessment     Jae had good tolerance to shortened session this date. Increased fatigue post PT appointment requiring rest break prior to tx with OT. Pt. Requested to skip weight bearing activities this date.     Jae is progressing well towards his goals and there are no updates to goals at this time. Pt prognosis is Good.     Pt will continue to benefit from skilled outpatient occupational therapy to address the deficits listed in the problem list on initial evaluation provide pt/family education and to maximize pt's level of independence in the home and community environment.     Anticipated barriers to occupational therapy: time since stroke     Pt's spiritual, cultural and educational needs considered and pt agreeable to plan of care and goals.    Goals:  Short Term Goals: 5 weeks   - Pt. Will be instructed on self ROM exercises MET  - Pt. Will be instructed on edema management at home MET, self massage/elevating   - Pt. Will have improved L shoulder FF and ABD of at least 10 degrees for increased functional mobility for dressing MET  - Pt. Will correctly don/doff resting night splint MET  - Pt. Will wear resting night splint at least 3x week MET     Long Term Goals:  By D/C  - Pt. Will be independent with HEP MET  - Pt. Will increase AROM  of L shoulder FF and ABD of at least 15 degrees for increased functional mobility for dressing and bathing MET  - Pt. Will wear resting night splint at least 5x week at d/c date MET  - Pt. Will have improved supportive use of L hand during simple meal prep     More goals to be established as appropriate      Plan   Certification Period/Plan of care expiration: 7/10/2020 to 9/18/20 to 10/1/20     Outpatient Occupational Therapy 2 times weekly for 10 weeks to include the following interventions: Electrical Stimulation of L UE, Manual Therapy, Moist Heat/ Ice, Neuromuscular Re-ed, Orthotic Management and Training, Paraffin, Patient Education, Self Care, Therapeutic Activites and Therapeutic Exercise.     IRLANDA Galicia       Updates/Grading for next session: as tolerated       IRLANDA Galicia

## 2020-09-16 NOTE — PROGRESS NOTES
Physical Therapy Daily Treatment Note      Name: Jae Millan  Clinic Number: [id number]     Therapy Diagnosis:   1. Impaired functional mobility, balance, gait, and endurance      2. Decreased strength      3. Decreased ROM of left shoulder            Physician: Charis Salazar MD     Visit Date: 9/16/2020      Physician Orders: Charis Salazar MD     Physician Orders: PT Eval and Treat neuro program  Medical Diagnosis from Referral: (I69.354) Hemiparesis affecting left side as late effect of cerebrovascular accident (CVA)  (primary encounter diagnosis)  (I63.329) Cerebrovascular accident (CVA) due to thrombosis of anterior cerebral artery, unspecified blood vessel laterality  (R47.1) Dysarthria  (M79.2) Neuropathic pain  (Z74.09) Impaired mobility and ADLs     Evaluation Date: 6/8/2020  Authorization Period Expiration: 12/31/2020  New POC: 8/15/2020 to 10/9/2020  Visit # / Visits authorized: 7/20- kx modifier     Time In: 1000  Time Out: 1045  Total Billable Time: 45 minutes     Precautions: Standard     Subjective      Pt reports: no new complaints.   He was compliant with home exercise program.  Response to previous treatment: no adverse effects reported  Functional change: ongoing     Pain: 0/10 prior to session, a little stiffness in LUE  Location: left arms      Objective      Bold= new or progression  Jae received therapeutic exercises to develop strength, endurance and core stabilization for 31 minutes including:     Matrix: L knee extension 15# 3 x 10   L hamstring curls 30# 3 x 10              Hip abduction 35# 3 x 10   Hip adduction 50# 3 x 10    Shuttle: L single leg squat 50# 3 x 15              L heel raise 12.5# 3 x 15                 Sitting: L hamstring stretch [id number]   Standing:    Lunges with BUE support 10x                               Jae participated in gait training to improve functional mobility and safety for 14 minutes, including:       Treadmill up to 1.6 mph, 2 min warm  up/cool down at 1.4 mph x 11 min, BUE support              Min verbal cues to increase stride    Parallel bars: tandem gait- 2 laps, min verbal cues for weight shift        Home Exercises Provided and Patient Education Provided      Education provided:   - continue with HEP, including stair negotiation as exercise     Written Home Exercises Provided: Patient instructed to cont prior HEP.  Exercises were reviewed and Jae was able to demonstrate them prior to the end of the session.  Jae demonstrated good  understanding of the education provided.      See EMR under Patient Instructions for exercises provided 6/18/2020.     Assessment   Jae tolerated PT session well. Pt tolerated increased repetitions for resistance exercises today. Pt reported difficulty completing L knee flexion. He was not able to tolerate the same speed on treadmill due to reported stiffness of LLE. Slight improvement noted with ability to advance limb with tandem stance after verbal cues were provided for advancing hips over forward limb. Pt can benefit from continued skilled PT services to improve functional use of LLE to normalize balance and mobility. PT discussed planning d/c at the end of this month as pt is reporting improved gait and stair negotiation ability. He requires more practice with tandem gait/ stance and lunges during PT sessions in order to finalize/ progress HEP.       Jae is progressing well towards his goals.   Pt prognosis is Good.      Pt will continue to benefit from skilled outpatient physical therapy to address the deficits listed in the problem list box on initial evaluation, provide pt/family education and to maximize pt's level of independence in the home and community environment.      Pt's spiritual, cultural and educational needs considered and pt agreeable to plan of care and goals.     Anticipated barriers to physical therapy: length of time since onset     Goals:   Short Term Goals: 5 weeks   1. Pt  will perform HEP for strengthening and range of Motion at least 2x/week to improve carryover of progress. Met 8/5/20  2. Pt will demonstrate improved speed of mobility and activity tolerance by performing 5 sit<>stands without UE support in 10 sec.  varies- 14 sec, no UE support  3. Pt will demonstrate improved L hamstring passive range of motion to 70 deg straight leg raise to improve flexibility for all mobility.  ~same- 64 deg  4. Pt will demonstrate improved L shoulder ER active range of motion to 30 degrees to improve shoulder alignment to decrease pain.  met 8/5/2020- 43  deg  5. Assess Functional Gait Assessment and set goals as needed. Met 6/18/2020- 20/30  6. Pt will negotiate 10 steps with 1 handrail to demonstrate improve ability to negotiate a flight of stairs in case of a fire (drill).  met 8/5/2020- 20 steps without handrail,  reciprocal pattern     Long Term Goals: 10 weeks   1. Pt will demonstrate improved L shoulder ER active range of motion to 60 degrees to improve ability to flex shoulder and decrease pain.  delete goal- refer to OT  2. Pt will demonstrate improved strength of L plantar flexion to 4-/5 to improve foot clearance during swing phase.  improved  3. Pt will demonstrate improved L hip extension strength to 4/5 to improve standing balance and gait ability.  ongoing  4. Pt will negotiate 20 steps with handrail and mod I to demonstrate improved ability to negotiate stairs in case of emergency at apartment.  met 9/3/20- pt reports ability to negotiate stairs to apartment on 7th floor.   5. New 6/25/2020: pt will demonstrate decreased fall risk and improved balance during gait by scoring 23/30 on Functional Gait Assessment. improved- 22/30      Plan   treadmill use as warm up to improve gait without AD. Use resistance machines/ shuttle for LLE strengthening- . Continue with balance in narrow Base of support.        Naya Obregon, DPT, NCS, CBIS   9/16/2020

## 2020-09-17 ENCOUNTER — PATIENT OUTREACH (OUTPATIENT)
Dept: ADMINISTRATIVE | Facility: OTHER | Age: 70
End: 2020-09-17

## 2020-09-17 NOTE — PROGRESS NOTES
Care Everywhere: updated  Immunization: updated  Health Maintenance: updated  Media Review: review for outside eye exam report   Legacy Review:   Order placed:   Upcoming appts:

## 2020-09-18 ENCOUNTER — OFFICE VISIT (OUTPATIENT)
Dept: ENDOCRINOLOGY | Facility: CLINIC | Age: 70
End: 2020-09-18
Payer: MEDICARE

## 2020-09-18 VITALS
WEIGHT: 209.44 LBS | BODY MASS INDEX: 29.98 KG/M2 | TEMPERATURE: 99 F | HEIGHT: 70 IN | OXYGEN SATURATION: 97 % | DIASTOLIC BLOOD PRESSURE: 82 MMHG | RESPIRATION RATE: 18 BRPM | HEART RATE: 76 BPM | SYSTOLIC BLOOD PRESSURE: 164 MMHG

## 2020-09-18 DIAGNOSIS — N52.9 ERECTILE DYSFUNCTION, UNSPECIFIED ERECTILE DYSFUNCTION TYPE: ICD-10-CM

## 2020-09-18 DIAGNOSIS — I10 ESSENTIAL HYPERTENSION: ICD-10-CM

## 2020-09-18 DIAGNOSIS — E29.1 HYPOGONADISM IN MALE: ICD-10-CM

## 2020-09-18 DIAGNOSIS — E11.40 TYPE 2 DIABETES MELLITUS WITH DIABETIC NEUROPATHY, WITHOUT LONG-TERM CURRENT USE OF INSULIN: Primary | ICD-10-CM

## 2020-09-18 PROCEDURE — 99214 PR OFFICE/OUTPT VISIT, EST, LEVL IV, 30-39 MIN: ICD-10-PCS | Mod: S$PBB,,, | Performed by: INTERNAL MEDICINE

## 2020-09-18 PROCEDURE — 99999 PR PBB SHADOW E&M-EST. PATIENT-LVL V: ICD-10-PCS | Mod: PBBFAC,,, | Performed by: INTERNAL MEDICINE

## 2020-09-18 PROCEDURE — 99999 PR PBB SHADOW E&M-EST. PATIENT-LVL V: CPT | Mod: PBBFAC,,, | Performed by: INTERNAL MEDICINE

## 2020-09-18 PROCEDURE — 99214 OFFICE O/P EST MOD 30 MIN: CPT | Mod: S$PBB,,, | Performed by: INTERNAL MEDICINE

## 2020-09-18 PROCEDURE — 99215 OFFICE O/P EST HI 40 MIN: CPT | Mod: PBBFAC | Performed by: INTERNAL MEDICINE

## 2020-09-18 RX ORDER — EMPAGLIFLOZIN 10 MG/1
10 TABLET, FILM COATED ORAL DAILY
Qty: 90 TABLET | Refills: 4 | Status: SHIPPED | OUTPATIENT
Start: 2020-09-18 | End: 2021-07-22 | Stop reason: SDUPTHER

## 2020-09-18 NOTE — PROGRESS NOTES
Subjective:      Chief Complaint: Erectile Dysfunction and Diabetes    HPI: Jae Millan is a 69 y.o. male who is here for follow-up evaluation for hypogonadism and type 2 diabetes.    Patient presents for evaluation of male hypogonadism and diabetes    Medical problems include:  Past Medical History:   Diagnosis Date    Cataract     Decreased sensation of lower extremity     Diabetes mellitus     Diabetic neuropathy     Dysarthria     Dysphagia     ED (erectile dysfunction)     Glaucoma     Hemiparesis     LEFT side post CVA    High cholesterol     History of hepatitis C, s/p successful Rx w/ cure (SVR12) 4/2020     Hypertension     Impaired functional mobility, balance, gait, and endurance     Stroke     Urinary frequency        Developmental history:  Normal testis descent?: No, had undescended testicle surgery (possibly torsion?) while in correction a few years ago; removed one testicle and replaced with prosthetic.    Family history of hypogonadism?: not sure  Sense of smell: Intact  Peripheral vision issues: no  Gynecomastia: no    Recent severe/critical illness?: Stroke 2 years ago.  STD's?: had an STD as a teenager - not sure which one  Orchitis or hx of orchiectomy?: Orchiectomy x 1  Mumps?: no  Radiation exposure?: no  Secondary exposure to partner's vaginal estrogen?: no    Chronic corticosteroid or opiate use?:   Marijuana?: no  Lavender or tea tree oil?: no  Anabolic steroids?: no  Excessive EtOH?: no    Depression?: no   Excessive daytime sleepiness?: no    Libido?:   AM erections?: No  Erection at time of intercourse?: Has stimulation but has trouble getting a full erection; he is working with the urologist for this  Maintains erections to ejaculation?: no  Osteoporosis, height loss or history of fractures?: no  Hot flashes or night sweats?: no   Fertility issues?: Two children; never actively pursued fertility.    Breast tenderness, discharge or gynecomastia?: no  Headaches or blurry  vision?: no  Nausea/vomiting, weight loss or dizziness/lightheadedness?: no  Head trauma?: no        He is not interested in pursuing testosterone replacement at this time.    With regards to the diabetes:  The patient was initially diagnosed with Type 2 diabetes mellitus:  Many years ago.    He reports chronic left sided pain in the arms and legs, which started after his stroke 2 years ago. He was in retirement for 42 years     Known diabetic complications: Cardiovascular disease, nephropathy  Cardiovascular risk factors: advanced age (older than 55 for men, 65 for women), diabetes mellitus, dyslipidemia, hypertension and male gender  Current diabetic medications include:   1. Januvia 100 mg daily  2. Metformin 1000 mg BID    Last visit with diabetes education: never  Current diet: Not following a strict diet.  He does say that he has cut out sweet tea, but that he still has room to improve his diet.  Eats when he gets a chance. Eats a lot of fast food; fried chicken mostly.   Drinks water,  ~three soft drinks/week  Current exercise:  Walking one mile a few times per week.  Also working with therapy.    Current monitoring regimen:  He is not monitoring his blood sugars per    Diabetic Health Maintenance:          BP Readings from Last 3 Encounters:   09/18/20 (!) 164/82   06/30/20 (!) 164/87   06/24/20 (!) 156/82           Low dose ASA?: Yes    Wt Readings from Last 10 Encounters:   09/18/20 95 kg (209 lb 7 oz)   06/30/20 96.5 kg (212 lb 10.1 oz)   06/24/20 96.6 kg (213 lb)   06/04/20 96.2 kg (212 lb 3.1 oz)   06/02/20 97 kg (213 lb 13.5 oz)   06/01/20 96.6 kg (213 lb 1.2 oz)   03/13/20 92.1 kg (203 lb 2.5 oz)   01/27/20 94.7 kg (208 lb 14.2 oz)   01/17/20 94.7 kg (208 lb 14.2 oz)   12/23/19 93 kg (205 lb 0.4 oz)     Diabetes Management Status    Statin: Taking  ACE/ARB: Taking    Screening or Prevention Patient's value Goal Complete/Controlled?   HgA1C Testing and Control   Lab Results   Component Value Date    HGBA1C  7.4 (H) 09/04/2020      Annually/Less than 8% Yes   Lipid profile : 05/20/2020 Annually Yes   LDL control Lab Results   Component Value Date    LDLCALC 92.2 05/20/2020    Annually/Less than 100 mg/dl  Yes   Nephropathy screening Lab Results   Component Value Date    LABMICR 1504.0 09/04/2020     Lab Results   Component Value Date    PROTEINUA 2+ (A) 09/30/2019    Annually Yes   Blood pressure BP Readings from Last 1 Encounters:   09/18/20 (!) 164/82    Less than 140/90 No   Dilated retinal exam : 09/12/2019 Annually Yes   Foot exam   : 09/18/2020 Annually Yes         Lab Results   Component Value Date    HGBA1C 7.4 (H) 09/04/2020    HGBA1C 7.3 (H) 05/20/2020    HGBA1C 7.3 (H) 08/22/2019    HGBA1C 7.5 (H) 04/12/2019         Reviewed past medical, family, social history and updated as appropriate.    Review of Systems   Constitutional: Negative for unexpected weight change.   Eyes: Negative for visual disturbance.   Respiratory: Negative for shortness of breath.    Cardiovascular: Negative for chest pain.   Gastrointestinal: Negative for abdominal pain.   Genitourinary: Negative for urgency.   Musculoskeletal: Negative for arthralgias.   Skin: Negative for wound.   Neurological: Negative for headaches.   Hematological: Does not bruise/bleed easily.   Psychiatric/Behavioral: Negative for sleep disturbance.     Objective:     Vitals:    09/18/20 0804   BP: (!) 164/82   Pulse: 76   Resp: 18   Temp: 98.6 °F (37 °C)     BP Readings from Last 5 Encounters:   09/18/20 (!) 164/82   06/30/20 (!) 164/87   06/24/20 (!) 156/82   06/04/20 (!) 156/94   06/02/20 (!) 179/96       Physical Exam  Vitals signs and nursing note reviewed.   Constitutional:       General: He is not in acute distress.     Appearance: He is well-developed.   HENT:      Head: Normocephalic and atraumatic.   Eyes:      General:         Right eye: No discharge.         Left eye: No discharge.      Conjunctiva/sclera: Conjunctivae normal.   Neck:      Thyroid:  No thyromegaly.      Trachea: No tracheal deviation.   Cardiovascular:      Rate and Rhythm: Normal rate.   Pulmonary:      Effort: Pulmonary effort is normal. No respiratory distress.   Musculoskeletal:      Comments: No digital clubbing or extremity cyanosis   Neurological:      Mental Status: He is alert and oriented to person, place, and time.      Coordination: Coordination normal.   Psychiatric:         Behavior: Behavior normal.         Wt Readings from Last 10 Encounters:   09/18/20 0804 95 kg (209 lb 7 oz)   06/30/20 1058 96.5 kg (212 lb 10.1 oz)   06/24/20 0918 96.6 kg (213 lb)   06/04/20 0909 96.2 kg (212 lb 3.1 oz)   06/02/20 1105 97 kg (213 lb 13.5 oz)   06/01/20 0819 96.6 kg (213 lb 1.2 oz)   03/13/20 1017 92.1 kg (203 lb 2.5 oz)   01/27/20 1054 94.7 kg (208 lb 14.2 oz)   01/17/20 0922 94.7 kg (208 lb 14.2 oz)   12/23/19 0959 93 kg (205 lb 0.4 oz)       Lab Results   Component Value Date    HGBA1C 7.4 (H) 09/04/2020     Lab Results   Component Value Date    CHOL 160 05/20/2020    HDL 45 05/20/2020    LDLCALC 92.2 05/20/2020    TRIG 114 05/20/2020    CHOLHDL 28.1 05/20/2020     Lab Results   Component Value Date     05/20/2020     05/20/2020    K 3.9 05/20/2020    K 3.9 05/20/2020     05/20/2020     05/20/2020    CO2 24 05/20/2020    CO2 24 05/20/2020     (H) 05/20/2020     (H) 05/20/2020    BUN 16 05/20/2020    BUN 16 05/20/2020    CREATININE 1.4 05/20/2020    CREATININE 1.4 05/20/2020    CALCIUM 9.2 05/20/2020    CALCIUM 9.2 05/20/2020    PROT 7.9 05/20/2020    PROT 7.9 05/20/2020    ALBUMIN 3.7 05/20/2020    ALBUMIN 3.7 05/20/2020    BILITOT 0.5 05/20/2020    BILITOT 0.5 05/20/2020    ALKPHOS 66 05/20/2020    ALKPHOS 66 05/20/2020    AST 15 05/20/2020    AST 15 05/20/2020    ALT 16 05/20/2020    ALT 16 05/20/2020    ANIONGAP 7 (L) 05/20/2020    ANIONGAP 7 (L) 05/20/2020    ESTGFRAFRICA 59.7 (A) 05/20/2020    ESTGFRAFRICA 59.7 (A) 05/20/2020    EGFRNONAA 51.6 (A)  05/20/2020    EGFRNONAA 51.6 (A) 05/20/2020    TSH 1.509 05/20/2020      Lab Results   Component Value Date    MICALBCREAT 1175.0 (H) 09/04/2020       Assessment/Plan:     Type 2 diabetes mellitus with neurologic complication, without long-term current use of insulin  Reviewed goals of therapy are to get the best control we can without hypoglycemia    He was seen by education, but has not made all the changes he needs to his diet in order to adequately control his blood sugar.    Medication changes:   Continue:    3. Metformin 1000 mg bid  4. Januvia 100 mg daily  Start:  1. Jardiance (empagliflozin) 10 mg daily    Discussed side-effects of Jardiance (empagliflozin), including genitourinary infections, dehydration, and rare association with necrotizing genital infections. Advised frequent hydration with at least one tall glass or bottle of water with each meal.  Advised good genital hygiene; clean under foreskin daily.  Advised to contact me directly if they develops any discharge or urinary symptoms for treatment.    Advised frequent self blood glucose monitoring. Patient encouraged to document glucose results and bring them to every clinic visit. We provided a sample glucometer with 20 strips at last visit, but he has not been using it.    Close adherence to lifestyle changes recommended. We discussed dietary changes at length.    Eyes:  Referred to Ophthalmology  Feet:  Seen by Podiatry recently for toenail trimming and routine foot exam.  Kidneys: Urine microalbumin/Cr elevated; on losartan.  Add SGLT-2 inhibitor which has been shown to decrease progression of diabetic kidney disease with albuminuria.  Check creatinine in three months.  HbA1c: Ordered for 3 months  Lipids: On crestor  ASA: Yes  Hypertension: BP consistently elevated.    Lab Results   Component Value Date    HGBA1C 7.4 (H) 09/04/2020    HGBA1C 7.3 (H) 05/20/2020    HGBA1C 7.3 (H) 08/22/2019         Essential hypertension  Discussed the importance  of better control of his blood pressure in order to prevent progression of kidney disease and decrease stroke risk.  Discussed low-salt diet and weight loss as methods to improve blood pressure control.  Advised to  a blood pressure monitor for home use.  Referred to Nephrology.    Erectile dysfunction  He has not had any benefit from two different ED pills. He plans to get a vacuum pump, which was recommended by his urologist. See above re: testosterone replacement.    Hypogonadism in male  Previous labs were done at [facility]P, so we can't say for sure if his testosterone is deficient. 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     I discussed the options for testosterone replacement if it were to be proven that he has low testosterone, including injections, topical, or Testopel.  Discussed the questionable increased risk of ischemic stroke with testosterone replacement.    He says that if a pill form of testosterone available, that he would be interested.  However, he does not want to do injections or topical treatments.  He plans to discuss with the urologist regarding other treatments for erectile dysfunction, and he will let me know at some point later on if he would like to pursue testosterone replacement.  Given that he is not interested in replacement, I do not see a reason to recheck his testosterone level at this time.  If he decides to per to pursue testosterone placement later on, we will check an 08:00 testosterone panel paired with an FSH and LH.      RTC in 3 months. Labs in 3 months.

## 2020-09-18 NOTE — ASSESSMENT & PLAN NOTE
Discussed the importance of better control of his blood pressure in order to prevent progression of kidney disease and decrease stroke risk.  Discussed low-salt diet and weight loss as methods to improve blood pressure control.  Advised to  a blood pressure monitor for home use.  Referred to Nephrology.

## 2020-09-18 NOTE — ASSESSMENT & PLAN NOTE
Reviewed goals of therapy are to get the best control we can without hypoglycemia    He was seen by education, but has not made all the changes he needs to his diet in order to adequately control his blood sugar.    Medication changes:   Continue:    1. Metformin 1000 mg bid  2. Januvia 100 mg daily  Start:  1. Jardiance (empagliflozin) 10 mg daily    Discussed side-effects of Jardiance (empagliflozin), including genitourinary infections, dehydration, and rare association with necrotizing genital infections. Advised frequent hydration with at least one tall glass or bottle of water with each meal.  Advised good genital hygiene; clean under foreskin daily.  Advised to contact me directly if they develops any discharge or urinary symptoms for treatment.    Advised frequent self blood glucose monitoring. Patient encouraged to document glucose results and bring them to every clinic visit. We provided a sample glucometer with 20 strips at last visit, but he has not been using it.    Close adherence to lifestyle changes recommended. We discussed dietary changes at length.    Eyes:  Referred to Ophthalmology  Feet:  Seen by Podiatry recently for toenail trimming and routine foot exam.  Kidneys: Urine microalbumin/Cr elevated; on losartan.  Add SGLT-2 inhibitor which has been shown to decrease progression of diabetic kidney disease with albuminuria.  Check creatinine in three months.  HbA1c: Ordered for 3 months  Lipids: On crestor  ASA: Yes  Hypertension: BP consistently elevated.    Lab Results   Component Value Date    HGBA1C 7.4 (H) 09/04/2020    HGBA1C 7.3 (H) 05/20/2020    HGBA1C 7.3 (H) 08/22/2019

## 2020-09-21 ENCOUNTER — CLINICAL SUPPORT (OUTPATIENT)
Dept: REHABILITATION | Facility: HOSPITAL | Age: 70
End: 2020-09-21
Attending: PHYSICAL MEDICINE & REHABILITATION
Payer: MEDICARE

## 2020-09-21 DIAGNOSIS — R53.1 DECREASED STRENGTH: ICD-10-CM

## 2020-09-21 DIAGNOSIS — R29.898 DECREASED STRENGTH OF UPPER EXTREMITY: ICD-10-CM

## 2020-09-21 DIAGNOSIS — M79.89 SWELLING OF LEFT HAND: ICD-10-CM

## 2020-09-21 DIAGNOSIS — M25.612 DECREASED ROM OF LEFT SHOULDER: ICD-10-CM

## 2020-09-21 DIAGNOSIS — Z74.09 IMPAIRED FUNCTIONAL MOBILITY, BALANCE, GAIT, AND ENDURANCE: Primary | ICD-10-CM

## 2020-09-21 DIAGNOSIS — M25.60 RANGE OF MOTION DEFICIT: Primary | ICD-10-CM

## 2020-09-21 PROCEDURE — 97112 NEUROMUSCULAR REEDUCATION: CPT | Mod: PN

## 2020-09-21 PROCEDURE — 97140 MANUAL THERAPY 1/> REGIONS: CPT | Mod: PN

## 2020-09-21 PROCEDURE — 97116 GAIT TRAINING THERAPY: CPT | Mod: KX,PN | Performed by: PHYSICAL THERAPIST

## 2020-09-21 PROCEDURE — 97110 THERAPEUTIC EXERCISES: CPT | Mod: KX,PN | Performed by: PHYSICAL THERAPIST

## 2020-09-21 PROCEDURE — 97014 ELECTRIC STIMULATION THERAPY: CPT | Mod: PN

## 2020-09-21 NOTE — PROGRESS NOTES
"Physical Therapy Daily Treatment Note      Name: Jae Millan  Clinic Number: [id number]     Therapy Diagnosis:   1. Impaired functional mobility, balance, gait, and endurance      2. Decreased strength      3. Decreased ROM of left shoulder            Physician: Charis Salazar MD     Visit Date: 9/21/2020      Physician Orders: Charis Salazar MD     Physician Orders: PT Eval and Treat neuro program  Medical Diagnosis from Referral: (I69.354) Hemiparesis affecting left side as late effect of cerebrovascular accident (CVA)  (primary encounter diagnosis)  (I63.329) Cerebrovascular accident (CVA) due to thrombosis of anterior cerebral artery, unspecified blood vessel laterality  (R47.1) Dysarthria  (M79.2) Neuropathic pain  (Z74.09) Impaired mobility and ADLs     Evaluation Date: 6/8/2020  Authorization Period Expiration: 12/31/2020  New POC: 8/15/2020 to 10/9/2020  Visit # / Visits authorized: 8/20- kx modifier     Time In: 1001  Time Out: 1045  Total Billable Time: 44 minutes     Precautions: Standard     Subjective      Pt reports: no new complaints. I almost had to cancel due to transportation  He was compliant with home exercise program.  Response to previous treatment: no adverse effects reported  Functional change: ongoing     Pain: 0/10 prior to session, a little stiffness in LUE  Location: left arms      Objective      Bold= new or progression  Jae received therapeutic exercises to develop strength, endurance and core stabilization for 27 minutes including:     Matrix: L knee extension 15# 3 x 10   L hamstring curls 25# 3 x 10              Hip adduction 50# 3 x 10    Sit<>stand from 18"box + foam mat 8'' step under RLE 10x  Sit<>stand from 18"box + foam mat 12'' step under RLE 10x                 Sitting: L hamstring stretch with foot on step [id number]     Standing:    Lunges with BUE support 10x                               Jae participated in gait training to improve functional mobility and " safety for 17 minutes, including:       Treadmill up to 1.6 mph, 2 min warm up/cool down at 1.4 mph x 12 min, BUE support- 0.29 mi              Min verbal cues to increase stride    Parallel bars: tandem gait- 2 laps, min verbal cues for weight shift        Home Exercises Provided and Patient Education Provided      Education provided:   - continue with HEP, including stair negotiation as exercise     Written Home Exercises Provided: Patient instructed to cont prior HEP.  Exercises were reviewed and Jae was able to demonstrate them prior to the end of the session.  Jae demonstrated good  understanding of the education provided.      See EMR under Patient Instructions for exercises provided 6/18/2020.     Assessment   Jae tolerated PT session well. Pt continues to report stiffness in LLE upon initially arriving to appointment. Resistance for hamstring curls reduced today per pt tolerance. Pt was able to tolerate performing sit<>stand with R foot placed on a higher surface to improve weight bearing/ use of LLE. Pt continues to demonstrate difficulty during tandem gait with weight shifting onto LLE to advance RLE. He requires more practice with tandem gait/ stance and lunges during PT sessions in order to finalize/ progress HEP.       Jae is progressing well towards his goals.   Pt prognosis is Good.      Pt will continue to benefit from skilled outpatient physical therapy to address the deficits listed in the problem list box on initial evaluation, provide pt/family education and to maximize pt's level of independence in the home and community environment.      Pt's spiritual, cultural and educational needs considered and pt agreeable to plan of care and goals.     Anticipated barriers to physical therapy: length of time since onset     Goals:   Short Term Goals: 5 weeks   1. Pt will perform HEP for strengthening and range of Motion at least 2x/week to improve carryover of progress. Met 8/5/20  2. Pt will  demonstrate improved speed of mobility and activity tolerance by performing 5 sit<>stands without UE support in 10 sec.  varies- 14 sec, no UE support  3. Pt will demonstrate improved L hamstring passive range of motion to 70 deg straight leg raise to improve flexibility for all mobility.  ~same- 64 deg  4. Pt will demonstrate improved L shoulder ER active range of motion to 30 degrees to improve shoulder alignment to decrease pain.  met 8/5/2020- 43  deg  5. Assess Functional Gait Assessment and set goals as needed. Met 6/18/2020- 20/30  6. Pt will negotiate 10 steps with 1 handrail to demonstrate improve ability to negotiate a flight of stairs in case of a fire (drill).  met 8/5/2020- 20 steps without handrail,  reciprocal pattern     Long Term Goals: 10 weeks   1. Pt will demonstrate improved L shoulder ER active range of motion to 60 degrees to improve ability to flex shoulder and decrease pain.  delete goal- refer to OT  2. Pt will demonstrate improved strength of L plantar flexion to 4-/5 to improve foot clearance during swing phase.  improved  3. Pt will demonstrate improved L hip extension strength to 4/5 to improve standing balance and gait ability.  ongoing  4. Pt will negotiate 20 steps with handrail and mod I to demonstrate improved ability to negotiate stairs in case of emergency at apartment.  met 9/3/20- pt reports ability to negotiate stairs to apartment on 7th floor.   5. New 6/25/2020: pt will demonstrate decreased fall risk and improved balance during gait by scoring 23/30 on Functional Gait Assessment. improved- 22/30      Plan   treadmill use as warm up to improve gait without AD. Use shuttle for LLE strengthening next visit, address backwards gait.      Naya Obregon, DPT, NCS, CBIS   9/21/2020

## 2020-09-21 NOTE — PROGRESS NOTES
"    Occupational Therapy Treatment Note     Date: 9/21/2020  Name: Jae Millan  [facility] Number: [id number]    Therapy Diagnosis:  Encounter Diagnoses   Name Primary?    Range of motion deficit Yes    Swelling of left hand     Decreased strength of upper extremity      Physician: Charis Salazar MD    Physician Orders: OT eval and treat  Medical Diagnosis: CVA  Evaluation Date: 7/10/2020  Plan of Care Expiration Period: 9/18/20 to 10/1/20  Insurance Authorization period Expiration: 12/31/20  Date of Return to MD: TBD  Visit # / Visits Authorized: 17 / 20  FOTO: 5th visit     Time In: 10:45am  Time Out: 11:30am  Total Billable (one on one) Time: 45 minutes     Precautions: Standard    Subjective     Pt reports: "Can you help me open up these pill bottles. I still have a hard time with them"   he was  compliant with home exercise program given last session.   Response to previous treatment: positive  Functional change: ongoing    Pain: 5/10  Location: left hand    Objective       Jae received the following manual therapy techniques for 10 minutes:   - Supine PROM of L GH joint: FF/Abd/ER/IR/Sup  - General wrist stretches including               1. Scaphoid on radius               2. Increasing mobility of metacarpals               3. Carpal rolls               4. Increasing mobility towards radial deviation               5. Increasing mobility of wrist towards extension              6. Increasing mobility of wrist towards supination/pronation  - Side lying scap mobs in all planes    Jae participated in therapeutic exercise for 0 minutes:  -  UBE res 4.0 x10 min with reversal half way for increased UE activation and strength       Jae participated in neuromuscular re-education activities to improve: Coordination, Kinesthetic, Sense, Proprioception and Posture for 20 minutes. The following activities were included:  - Scapular activation for improved postural alignment, elevation and retraction 2x10 with " 5 sec holds each   -  Standing closed chain weight bearing into L UE, R UE crossed midline to bounce balloon to rehab tech. Assist needed to maintain weight bearing   - Standing closed chain weight bearing into L UE, R UE crossed midline to stack cones x20. Assist needed to maintain weight bearing     Jae received unattended ESTIM (NMES) for 15 minutes:  - Bhutanese 10/10 to L long wrist extensors       Home Exercises and Education Provided     Education provided:   - wear schedule for resting hand orthotic   - Progress towards goals     Written Home Exercises Provided: not at this time       Assessment     Jae had good tolerance to treatment this date. Less fatigue noted post PT session. Some reports of increased tone at night that resolves with stretching. Better tolerance to weight bearing this date. Standing weigh bearing is a good challenge for him.     Jae is progressing well towards his goals and there are no updates to goals at this time. Pt prognosis is Good.     Pt will continue to benefit from skilled outpatient occupational therapy to address the deficits listed in the problem list on initial evaluation provide pt/family education and to maximize pt's level of independence in the home and community environment.     Anticipated barriers to occupational therapy: time since stroke     Pt's spiritual, cultural and educational needs considered and pt agreeable to plan of care and goals.    Goals:  Short Term Goals: 5 weeks   - Pt. Will be instructed on self ROM exercises MET  - Pt. Will be instructed on edema management at home MET, self massage/elevating   - Pt. Will have improved L shoulder FF and ABD of at least 10 degrees for increased functional mobility for dressing MET  - Pt. Will correctly don/doff resting night splint MET  - Pt. Will wear resting night splint at least 3x week MET     Long Term Goals:  By D/C  - Pt. Will be independent with HEP MET  - Pt. Will increase AROM of L shoulder FF and  ABD of at least 15 degrees for increased functional mobility for dressing and bathing MET  - Pt. Will wear resting night splint at least 5x week at d/c date MET  - Pt. Will have improved supportive use of L hand during simple meal prep     More goals to be established as appropriate      Plan   Certification Period/Plan of care expiration: 7/10/2020 to 9/18/20 to 10/1/20     Outpatient Occupational Therapy 2 times weekly for 10 weeks to include the following interventions: Electrical Stimulation of L UE, Manual Therapy, Moist Heat/ Ice, Neuromuscular Re-ed, Orthotic Management and Training, Paraffin, Patient Education, Self Care, Therapeutic Activites and Therapeutic Exercise.     IRLANDA Galicia       Updates/Grading for next session: as tolerated       IRLANDA Galicia

## 2020-09-22 ENCOUNTER — PATIENT OUTREACH (OUTPATIENT)
Dept: ADMINISTRATIVE | Facility: HOSPITAL | Age: 70
End: 2020-09-22

## 2020-09-23 ENCOUNTER — CLINICAL SUPPORT (OUTPATIENT)
Dept: REHABILITATION | Facility: HOSPITAL | Age: 70
End: 2020-09-23
Attending: PHYSICAL MEDICINE & REHABILITATION
Payer: MEDICARE

## 2020-09-23 DIAGNOSIS — M25.612 DECREASED ROM OF LEFT SHOULDER: ICD-10-CM

## 2020-09-23 DIAGNOSIS — M79.89 SWELLING OF LEFT HAND: ICD-10-CM

## 2020-09-23 DIAGNOSIS — R53.1 DECREASED STRENGTH: ICD-10-CM

## 2020-09-23 DIAGNOSIS — R29.898 DECREASED STRENGTH OF UPPER EXTREMITY: ICD-10-CM

## 2020-09-23 DIAGNOSIS — Z74.09 IMPAIRED FUNCTIONAL MOBILITY, BALANCE, GAIT, AND ENDURANCE: Primary | ICD-10-CM

## 2020-09-23 DIAGNOSIS — M25.60 RANGE OF MOTION DEFICIT: Primary | ICD-10-CM

## 2020-09-23 PROCEDURE — 97110 THERAPEUTIC EXERCISES: CPT | Mod: PN

## 2020-09-23 PROCEDURE — 97110 THERAPEUTIC EXERCISES: CPT | Mod: KX,PN | Performed by: PHYSICAL THERAPIST

## 2020-09-23 PROCEDURE — 97116 GAIT TRAINING THERAPY: CPT | Mod: KX,PN | Performed by: PHYSICAL THERAPIST

## 2020-09-23 PROCEDURE — 97140 MANUAL THERAPY 1/> REGIONS: CPT | Mod: PN

## 2020-09-23 PROCEDURE — 97530 THERAPEUTIC ACTIVITIES: CPT | Mod: PN

## 2020-09-23 NOTE — PROGRESS NOTES
Physical Therapy Daily Treatment Note      Name: Jae Millan  Clinic Number: [id number]     Therapy Diagnosis:   1. Impaired functional mobility, balance, gait, and endurance      2. Decreased strength      3. Decreased ROM of left shoulder            Physician: Charis Salazar MD     Visit Date: 9/23/2020      Physician Orders: Charis Salazar MD     Physician Orders: PT Eval and Treat neuro program  Medical Diagnosis from Referral: (I69.354) Hemiparesis affecting left side as late effect of cerebrovascular accident (CVA)  (primary encounter diagnosis)  (I63.329) Cerebrovascular accident (CVA) due to thrombosis of anterior cerebral artery, unspecified blood vessel laterality  (R47.1) Dysarthria  (M79.2) Neuropathic pain  (Z74.09) Impaired mobility and ADLs     Evaluation Date: 6/8/2020  Authorization Period Expiration: 12/31/2020  New POC: 8/15/2020 to 10/9/2020  Visit # / Visits authorized: 9/20- kx modifier     Time In: 1139  Time Out: 1225   Total Billable Time: 46 minutes     Precautions: Standard     Subjective      Pt reports: no new complaints.   He was compliant with home exercise program.  Response to previous treatment: no adverse effects reported  Functional change: ongoing     Pain: 0/10 prior to session, stiffness in LLE  Location: left arms      Objective      Bold= new or progression  Jae received therapeutic exercises to develop strength, endurance and core stabilization for 32 minutes including:     Matrix: L knee extension 15# 3 x 10   L hamstring curls 25# 3 x 10              Hip adduction 50# 3 x 10   Hip abduction 35# 3 x 10    Shuttle: L single leg squat 50# 3 x 15              L heel raise 25# 3 x 15                   Sitting: L hamstring stretch with foot on step [id number]     Standing:    Lunges with BUE support 10x                               Jae participated in gait training to improve functional mobility and safety for 14 minutes, including:       Treadmill up to 1.6  mph, 2 min warm up/cool down at 1.4 mph x 12 min, BUE support- 0.29 mi              Min verbal cues to increase stride    Parallel bars: tandem gait- 2 laps, min verbal cues for weight shift        Home Exercises Provided and Patient Education Provided      Education provided:   - continue with HEP, including stair negotiation as exercise     Written Home Exercises Provided: Patient instructed to cont prior HEP.  Exercises were reviewed and Jae was able to demonstrate them prior to the end of the session.  Jae demonstrated good  understanding of the education provided.      See EMR under Patient Instructions for exercises provided 6/18/2020.     Assessment   Jae tolerated PT session well. Pt demonstrated good tolerance to increased resistance with L plantar flexion on shuttle. Improved quality of lunging noted. Pt contiues to demonstrate poor ankle strategy for performing tandem stance. PT discussed adding lunging and tandem stance to HEP to address remaining impairments.      Jae is progressing well towards his goals.   Pt prognosis is Good.      Pt will continue to benefit from skilled outpatient physical therapy to address the deficits listed in the problem list box on initial evaluation, provide pt/family education and to maximize pt's level of independence in the home and community environment.      Pt's spiritual, cultural and educational needs considered and pt agreeable to plan of care and goals.     Anticipated barriers to physical therapy: length of time since onset     Goals:   Short Term Goals: 5 weeks   1. Pt will perform HEP for strengthening and range of Motion at least 2x/week to improve carryover of progress. Met 8/5/20  2. Pt will demonstrate improved speed of mobility and activity tolerance by performing 5 sit<>stands without UE support in 10 sec.  varies- 14 sec, no UE support  3. Pt will demonstrate improved L hamstring passive range of motion to 70 deg straight leg raise to improve  flexibility for all mobility.  ~same- 64 deg  4. Pt will demonstrate improved L shoulder ER active range of motion to 30 degrees to improve shoulder alignment to decrease pain.  met 8/5/2020- 43  deg  5. Assess Functional Gait Assessment and set goals as needed. Met 6/18/2020- 20/30  6. Pt will negotiate 10 steps with 1 handrail to demonstrate improve ability to negotiate a flight of stairs in case of a fire (drill).  met 8/5/2020- 20 steps without handrail,  reciprocal pattern     Long Term Goals: 10 weeks   1. Pt will demonstrate improved L shoulder ER active range of motion to 60 degrees to improve ability to flex shoulder and decrease pain.  delete goal- refer to OT  2. Pt will demonstrate improved strength of L plantar flexion to 4-/5 to improve foot clearance during swing phase.  improved  3. Pt will demonstrate improved L hip extension strength to 4/5 to improve standing balance and gait ability.  ongoing  4. Pt will negotiate 20 steps with handrail and mod I to demonstrate improved ability to negotiate stairs in case of emergency at apartment.  met 9/3/20- pt reports ability to negotiate stairs to apartment on 7th floor.   5. New 6/25/2020: pt will demonstrate decreased fall risk and improved balance during gait by scoring 23/30 on Functional Gait Assessment. improved- 22/30      Plan   D/c assessments next session     Naya Obregon, DPT, NCS, CBIS   9/23/2020

## 2020-09-23 NOTE — PROGRESS NOTES
"    Occupational Therapy Treatment Note     Date: 9/23/2020  Name: Jae Millan  Clinic Number: [id number]    Therapy Diagnosis:  Encounter Diagnoses   Name Primary?    Range of motion deficit Yes    Swelling of left hand     Decreased strength of upper extremity      Physician: Charis Salazar MD    Physician Orders: OT eval and treat  Medical Diagnosis: CVA  Evaluation Date: 7/10/2020  Plan of Care Expiration Period: 9/18/20 to 10/1/20  Insurance Authorization period Expiration: 12/31/20  Date of Return to MD: TBD  Visit # / Visits Authorized: 18 / 20  FOTO: 5th visit     Time In: 10:45am  Time Out: 11:30am  Total Billable (one on one) Time: 45 minutes     Precautions: Standard    Subjective     Pt reports: "I am so tired"   he was  compliant with home exercise program given last session.   Response to previous treatment: positive  Functional change: ongoing    Pain: 5/10  Location: left hand    Objective       Jae received the following manual therapy techniques for 15 minutes:   - Supine PROM of L GH joint: FF/Abd/ER/IR/Sup  - General wrist stretches including               1. Scaphoid on radius               2. Increasing mobility of metacarpals               3. Carpal rolls               4. Increasing mobility towards radial deviation               5. Increasing mobility of wrist towards extension              6. Increasing mobility of wrist towards supination/pronation  - Side lying scap mobs in all planes    Jae participated in therapeutic exercise for 8 minutes:  -  UBE res 4.0 x8 min with reversal half way for increased UE activation and strength       Jae participated in therapeutic activities for 15 minutes  - Practiced using L UE as a support during container opening/closing, dycem utilized to maintain better grasp   - small bottle, medium bottle, and large container x5 trials each (some difficulty with the large container)      Jae participated in neuromuscular re-education " activities to improve: Coordination, Kinesthetic, Sense, Proprioception and Posture for 7 minutes. The following activities were included:  - Scapular activation for improved postural alignment, elevation and retraction 2x10 with 5 sec holds each       Home Exercises and Education Provided     Education provided:   - wear schedule for resting hand orthotic   - Progress towards goals     Written Home Exercises Provided: not at this time.        Assessment     Jae had good use supportive use with his L UE with the use of dycem. Getting hand open was most difficult for him. However, he was mod I with opening containers in clinic this date. Written HEP for self-PROM to given and reviewed next date with planned d/c then.       Jae is progressing well towards his goals and there are no updates to goals at this time. Pt prognosis is Good.     Pt will continue to benefit from skilled outpatient occupational therapy to address the deficits listed in the problem list on initial evaluation provide pt/family education and to maximize pt's level of independence in the home and community environment.     Anticipated barriers to occupational therapy: time since stroke     Pt's spiritual, cultural and educational needs considered and pt agreeable to plan of care and goals.    Goals:  Short Term Goals: 5 weeks   - Pt. Will be instructed on self ROM exercises MET  - Pt. Will be instructed on edema management at home MET, self massage/elevating   - Pt. Will have improved L shoulder FF and ABD of at least 10 degrees for increased functional mobility for dressing MET  - Pt. Will correctly don/doff resting night splint MET  - Pt. Will wear resting night splint at least 3x week MET     Long Term Goals:  By D/C  - Pt. Will be independent with HEP MET  - Pt. Will increase AROM of L shoulder FF and ABD of at least 15 degrees for increased functional mobility for dressing and bathing MET  - Pt. Will wear resting night splint at least 5x  week at d/c date MET  - Pt. Will have improved supportive use of L hand during simple meal prep     More goals to be established as appropriate      Plan   Certification Period/Plan of care expiration: 7/10/2020 to 9/18/20 to 10/1/20     Outpatient Occupational Therapy 2 times weekly for 10 weeks to include the following interventions: Electrical Stimulation of L UE, Manual Therapy, Moist Heat/ Ice, Neuromuscular Re-ed, Orthotic Management and Training, Paraffin, Patient Education, Self Care, Therapeutic Activites and Therapeutic Exercise.     IRLANDA Galicia       Updates/Grading for next session: as tolerated       IRLANDA Galicia

## 2020-09-24 ENCOUNTER — TELEPHONE (OUTPATIENT)
Dept: UROLOGY | Facility: CLINIC | Age: 70
End: 2020-09-24

## 2020-09-24 NOTE — TELEPHONE ENCOUNTER
----- Message from [address] [place] [address]. Ciera sent at 9/2/2020  8:52 PM CDT -----  Regarding: Other  Contact: [phone]  Dr. Sakina Card on yesterday you asked me twice if I felt dizzy or lightheaded and twice I answered no.  Today as I experience bending over sever Spoke with pt. Pt states he needs to cancel his appt that is scheduled for 9/30/20. He states he will call to reschedule once he knows when he will be able to come back in. All questions answered. Pt verbalized understanding.

## 2020-09-24 NOTE — TELEPHONE ENCOUNTER
----- Message from Shaunna Mancia sent at 9/24/2020 12:20 PM CDT -----  Regarding: pt advice  Pt would like to speak to someone in clinic regarding his appt with Naya Soria. Please give pt a call back at [phone]

## 2020-09-28 ENCOUNTER — CLINICAL SUPPORT (OUTPATIENT)
Dept: REHABILITATION | Facility: HOSPITAL | Age: 70
End: 2020-09-28
Attending: PHYSICAL MEDICINE & REHABILITATION
Payer: MEDICARE

## 2020-09-28 DIAGNOSIS — M25.60 RANGE OF MOTION DEFICIT: Primary | ICD-10-CM

## 2020-09-28 DIAGNOSIS — R29.898 DECREASED STRENGTH OF UPPER EXTREMITY: ICD-10-CM

## 2020-09-28 DIAGNOSIS — M25.612 DECREASED ROM OF LEFT SHOULDER: ICD-10-CM

## 2020-09-28 DIAGNOSIS — R53.1 DECREASED STRENGTH: ICD-10-CM

## 2020-09-28 DIAGNOSIS — M79.89 SWELLING OF LEFT HAND: ICD-10-CM

## 2020-09-28 DIAGNOSIS — Z74.09 IMPAIRED FUNCTIONAL MOBILITY, BALANCE, GAIT, AND ENDURANCE: Primary | ICD-10-CM

## 2020-09-28 PROCEDURE — 97110 THERAPEUTIC EXERCISES: CPT | Mod: KX,PN | Performed by: PHYSICAL THERAPIST

## 2020-09-28 PROCEDURE — 97140 MANUAL THERAPY 1/> REGIONS: CPT | Mod: PN

## 2020-09-28 PROCEDURE — 97110 THERAPEUTIC EXERCISES: CPT | Mod: PN

## 2020-09-28 NOTE — PATIENT INSTRUCTIONS
Anterior Lunge        Stand with equal weight on both feet. Hold on for support. Lunge with right leg along A direction and return 10 times. Repeat with the left leg.  10 reps 3-4 times per week.  [email or web address]   Copyright © [employer]. All rights reserved.     Heel Raise: Bilateral (Standing)        Rise on balls of feet.  Repeat 10 times per set. Do 3 sets per session. Do 1 sessions every other day.   [email or web address] With A [email or web address]   Copyright © [employer]. All rights reserved.     HIP / KNEE: Extension - Sit to Stand        Sitting, lean chest forward, raise hips up from surface. Straighten hips and knees. Weight bear equally on left and right sides. Backs of legs should not push off surface.  10 reps per set, 1 sets per day, 3-4 days per week Use assistive device as needed.  Copyright © [employer]. All rights reserved.     Band Walk: Side Stepping        Tie grey band around legs, just above knees. Step 10 feet/ steps to one side, then step back to start.  Repeat 3 per session.     Note: Small towel between band and skin eases rubbing.  [email or web address] With A [email or web address]   Copyright © [employer]. All rights reserved.     Achilles / Gastroc, Standing        Stand, right foot behind, heel on floor and turned slightly out, leg straight, forward leg bent. Move hips forward. Hold 30 seconds.  Repeat 2-3 times per session. Do 1 sessions per day.  Copyright © [employer]. All rights reserved.        Chair Sitting        Sit at edge of seat, spine straight, one leg extended. Put a hand on each thigh and bend forward from the hip, keeping spine straight. Allow hand on extended leg to reach toward toes. Support upper body with other arm. Hold 30 seconds.  Repeat 3 times per session. Do 1-2 sessions per day.  Copyright © [employer]. All rights reserved.        Tandem Walking        Walk with each foot directly in front of other, heel of one foot touching toes of other foot with each step. Both feet straight ahead.  May also try to hold your balance in this position for 30  seconds with each foot forward.   Copyright © I. All rights reserved.

## 2020-09-28 NOTE — PROGRESS NOTES
"    Occupational Therapy Treatment Note and D/C summary     Date: 9/28/2020  Name: Jae Millan  Clinic Number: [id number]    Therapy Diagnosis:  Encounter Diagnoses   Name Primary?    Range of motion deficit Yes    Swelling of left hand     Decreased strength of upper extremity      Physician: Charis Salazar MD    Physician Orders: OT eval and treat  Medical Diagnosis: CVA  Evaluation Date: 7/10/2020  Plan of Care Expiration Period: 9/18/20 to 10/1/20  Insurance Authorization period Expiration: 12/31/20  Date of Return to MD: TBD  Visit # / Visits Authorized: 19 / 20  FOTO: 5th visit     Time In: 10:50am  Time Out: 11:30am  Total Billable (one on one) Time: 40 minutes     Precautions: Standard    Subjective     Pt reports: "I am still stiff in the shoulders and hand. IS there a machine I could use for it?"  he was  Somewhat compliant with home exercise program given last session.   Response to previous treatment: positive  Functional change: ongoing    Pain: 5/10  Location: left hand    Objective       Jae received the following manual therapy techniques for 8 minutes:   - Supine PROM of L GH joint: FF/Abd/ER/IR/Sup  - General wrist stretches including               1. Scaphoid on radius               2. Increasing mobility of metacarpals               3. Carpal rolls               4. Increasing mobility towards radial deviation               5. Increasing mobility of wrist towards extension              6. Increasing mobility of wrist towards supination/pronation  - Side lying scap mobs in all planes    Jae participated in therapeutic exercise for 37 minutes:  - Reviewed HEP given 8/3/20   - Seated PROM FF 1x10 with 15 sec holds   - Seated Abd 1x10 with 15 se cholds   - Seated ER 1x10 with 15 sec holds   - seated elbow ext 1x10 with 15 sec holds   - Seated wrist ext/weight bearing 1x10 with 30 sec holds   - Seated supination x10 with 15 sec holds   - scapular elevation/retraction 1x10 each with 5 " sec holds      Home Exercises and Education Provided     Education provided:   - wear schedule for resting hand orthotic   - Progress towards goals     Written Home Exercises Provided: not at this time.        Assessment     Jae had good understanding of reviewed self PROM exercises. He was instructed to complete them more regularly to limit tightness. He verbalize understanding. He is using his L arm as a support while opening bottles/containers but it is difficult for him. He has met all goals and is appropriate for d/c at this time.      Jae is progressing well towards his goals and there are no updates to goals at this time. Pt prognosis is Good.       Anticipated barriers to occupational therapy: time since stroke     Pt's spiritual, cultural and educational needs considered and pt agreeable to plan of care and goals.    Goals:  Short Term Goals: 5 weeks   - Pt. Will be instructed on self ROM exercises MET  - Pt. Will be instructed on edema management at home MET, self massage/elevating   - Pt. Will have improved L shoulder FF and ABD of at least 10 degrees for increased functional mobility for dressing MET  - Pt. Will correctly don/doff resting night splint MET  - Pt. Will wear resting night splint at least 3x week MET     Long Term Goals:  By D/C  - Pt. Will be independent with HEP MET  - Pt. Will increase AROM of L shoulder FF and ABD of at least 15 degrees for increased functional mobility for dressing and bathing MET  - Pt. Will wear resting night splint at least 5x week at d/c date MET  - Pt. Will have improved supportive use of L hand during simple meal prep MET     More goals to be established as appropriate      Plan   Certification Period/Plan of care expiration: 7/10/2020 to 9/18/20 to 10/1/20     Pt. D/c from therapy th date secondary to goals being met.       IRLANDA Galicia

## 2020-09-29 NOTE — PLAN OF CARE
Outpatient Therapy Discharge Summary     Name: Jae Millan  Clinic Number: [id number]    Therapy Diagnosis:   Encounter Diagnoses   Name Primary?    Decreased strength     Decreased ROM of left shoulder     Impaired functional mobility, balance, gait, and endurance Yes     Physician: Charis Salazar MD    Physician Orders: PT Eval and Treat neuro program  Medical Diagnosis from Referral: (I69.354) Hemiparesis affecting left side as late effect of cerebrovascular accident (CVA)  (primary encounter diagnosis)  (I63.329) Cerebrovascular accident (CVA) due to thrombosis of anterior cerebral artery, unspecified blood vessel laterality  (R47.1) Dysarthria  (M79.2) Neuropathic pain  (Z74.09) Impaired mobility and ADLs    Date of eval: 6/8/2020  Date of Last visit: 9/28/2020   Total Visits Received: 23      Authorization Period Expiration: 12/31/2020  New POC: 8/15/2020 to 10/9/2020  Visit # / Visits authorized: 9/20- kx modifier     Time In: 1000  Time Out: 1045  Total Billable Time: 45 minutes     Precautions: Standard        CMS Impairment/Limitation/Restriction for FOTO NOC-musculo-skeletal disorder Survey    Therapist reviewed FOTO scores for Jae Millan on 9/28/2020.   FOTO documents entered into [employer] - see Media section.    Limitation Score: 47%  Category: Mobility         OBJECTIVE     ROM:   UPPER EXTREMITY--AROM/PROM  Refer to OT report for details         eval D/c   L shoulder AROM ER 15 deg 45 deg     RANGE OF MOTION--LOWER EXTREMITIES  Grossly WFLs   eval D/c   L hamstrings (SLR) 60 deg 68 deg     Strength: manual muscle test grades below   Upper Extremity Strength  Refer to OT report for details        Lower Extremity Strength    RLE eval RLE D/C  LLE eval LLE d/c   Hip Flexion: 4/5 4+/5 4-/5 4/5   Hip Extension:  4+/5 5/5 3+/5 4+/5   Hip Abduction: 3+/5 4-/5 2+/5 3+ to 4-/5   Hip Adduction: NT NT NT NT   Knee Extension: 5/5 5/5 4+/5 5/5   Knee Flexion: 5/5 5/5 3+/5 4+/5   Ankle Dorsiflexion: 5/5  5/5 4+/5 5/5   Ankle Plantarflexion: 4-/5 4/5 3-/5 4-/5      Abdominal Strength: NT       Evaluation D/C   5 times sit-stand 15 seconds without UE support  >12 sec= fall risk for general elderly  >16 sec= fall risk for Parkinson's disease  >10 sec= balance/vestibular dysfunction (<59 y/o)  >14.2 sec= balance/vestibular dysfunction (>59 y/o)  >12 sec= fall risk for CVA 15 sec without UE support   FGA 20/30 (6/18/20) 22/30       Gait Assessment:   - AD used: SPC- none  - Assistance: mod I  - Distance: community- household  - Curb: Mod I with SPC  - Ramp:  Mod I with SPC     eval  D/C   Timed Up and Go 11 sec without AD 12.93 sec- without AD   Self Selected Walking Speed 0.88 m/s without AD (6/18/20) 0.88 m/sec (6m/6.8s)- without AD    TUG without AD= 15.1 sec + 13.1 sec + 10.6 sec    Functional Gait Assessment:   1. Gait on level surface =  2, 6.8 sec   (3) Normal: less than 5.5 sec, no A.D., no imbalance, normal gait pattern, deviates< 6in   (2) Mild impairment: 7-5.6 sec, uses A.D., mild gait deviations, or deviates 6-10 in   (1) Moderate impairment: > 7 sec, slow speed, imbalance, deviates 10-15 in.   (0) Severe impairment: needs assist, deviates >15 in, reach/touch wall  2. Change in Gait Speed = 3   (3) Normal: smooth change w/o loss of balance or gait deviation, deviates < 6 in, significant difference between speeds   (2) Mild impairment: changes speed, but demonstrates mild gait deviations, deviates 6-10 in, OR no deviations but unable to significantly speed, OR uses A.D.   (1) Moderate impairment: minor changes to speed, OR changes speed w/ significant deviations, deviates 10-15 in, OR  Changes speed , but loses balance & recovers   (0) Severe impairment: cannot change speed, deviates >15 in, or loses balance & needs assist  3. Gait with horizontal head turns  = 2   (3) Normal: no change in gait, deviates <6 in   (2) Mild impairment: slight change in speed, deviates 6-10 in, OR uses A.D.   (1) Moderate  impairment: moderate change in speed, deviates 10-15 in   (0) Severe impairment: severe disruption of gait, deviates >15in  4. Gait with vertical head turns = 3   (3) Normal: no change in gait, deviates <6 in   (2) Mild impairment: slight change in speed, deviates 6-10 in OR uses A.D.   (1) Moderate impairment: moderate change in speed, deviates 10-15 in   (0) Severe impairment: severe disruption of gait, deviates >15 in  5. Gait with pivot turns = 3   (3) Normal: performs safely in 3 sec, no LOB   (2) Mild impairment: performs in >3 sec & no LOB, OR turns safely & requires several steps to regain LOB   (1) Moderate impairment: turns slow, OR requires several small steps for balance following turn & stop   (0) Severe impairment: cannot turn safely, needs assist  6. Step over obstacle = 3   (3) Normal: steps over 2 stacked boxes w/o change in speed or LOB   (2) Mild impairment: able to step over 1 box w/o change in speed or LOB   (1) Moderate impairment: steps over 1 box but must slow down, may require VC   (0) Severe impairment: cannot perform w/o assist  7. Gait with Narrow NORA = 0   (3) Normal: 10 steps no staggering   (2) Mild impairment: 7-9 steps   (1) Moderate impairment: 4-7 steps   (0) Severe impairment: < 4 steps or cannot perform w/o assist  8. Gait with eyes closed = 2   (3) Normal: < 7 sec, no A.D., no LOB, normal gait pattern, deviates <6 in   (2) Mild impairment: 7.1-9 sec, mild gait deviations, deviates 6-10 in   (1) Moderate impairment: > 9 sec, abnormal pattern, LOB, deviates 10-15 in   (0) Severe impairment: cannot perform w/o assist, LOB, deviates >15in  9. Ambulating Backwards = 1   (3) Normal: no A.D., no LOB, normal gait pattern, deviates <6in   (2) Mild impairment: uses A.D., slower speed, mild gait deviations, deviates 6-10 in   (1) Moderate impairment: slow speed, abnormal gait pattern, LOB, deviates 10-15 in   (0) Severe impairment: severe gait deviations or LOB, deviates >15in  10. Steps =  3   (3) Normal: alternating feet, no rail   (2) Mild Impairment: alternating feet, uses rail   (1) Moderate impairment: step-to, uses rail   (0) Severe impairment: cannot perform safely  Score 22/30     Score:   <22/30 fall risk   <20/30 fall risk in older adults   <18/30 fall risk in Parkinsons       Functional Mobility (Bed mobility, transfers)  Bed mobility: Mod I  Supine to sit: Mod I  Sit to supine: Mod I  Rolling: Mod I  Transfers to bed: I  Transfers to toilet: Mod I  Sit to stand:  I  Stand pivot:  I  Car transfers: Mod I  Wheelchair mobility: NA  Floor transfers: NT    Jae received therapeutic exercises to develop strength, endurance and flexibility for 45 minutes including:    D/c assessments    Review of finalized HEP:   Parallel bar: lunging, BUE support 10x   Heel raises with BUE support 3 x 10   Sit<>stands from chair without UE 10x   Side stepping without UE support, grey theraband- 3 laps   Sitting hamstring stretch 2 x 30 sec   Runner's Dorsi flexion stretch 2 x 30 sec   Tandem gait 2 laps        Assessment    69 year old male with diagnosis of hemiparesis, history of CVA, neuropathic pain, and impaired mobility/ ADLs referred to [facility] received skilled outpatient PT 6/8/2020 to 9/28/2020. Pt demonstrated good progress with PT meeting 5/6 short term goals and 3/4 long term goal set. Pt demonstrated a good improvement in strength of LLE, especially for plantar flexion and in glutes. Pt reports improved stair negotiation and is able to negotiate 7 flights of stairs (apartment on 7th floor) 1-2 times/ week as part of HEP. Improved L hamstring length noted since evaluation, but pt continues to report increased tightness on a daily basis. Pt instructed pt that he will need to continue daily stretching to address muscle tone. Improved functional ambulatory balance also noted. Pt continued to present with impairments in L single leg stance and balance in narrow Base of support due  to decreased use of L ankle strategy. Pt reported many functional improvements with this episode of PT. Pt reported improved ability to participate in social/ family functions, ability to carry a bag of groceries, ability to perform household chores, and walking. HEP was updated at this visit to address remaining impairments and to maintain gains noted. Pt is d/c due to a plateau in functional progress at this time.     Goals:   Short Term Goals:  1. Pt will perform HEP for strengthening and range of Motion at least 2x/week to improve carryover of progress. Met   2. Pt will demonstrate improved speed of mobility and activity tolerance by performing 5 sit<>stands without UE support in 10 sec.  not met  3. Pt will demonstrate improved L hamstring passive range of motion to 70 deg straight leg raise to improve flexibility for all mobility.  improved/ not met  4. Pt will demonstrate improved L shoulder ER active range of motion to 30 degrees to improve shoulder alignment to decrease pain.  met   5. Assess Functional Gait Assessment and set goals as needed. Met   6. Pt will negotiate 10 steps with 1 handrail to demonstrate improve ability to negotiate a flight of stairs in case of a fire (drill).  met      Long Term Goals: 10 weeks   1. Pt will demonstrate improved L shoulder ER active range of motion to 60 degrees to improve ability to flex shoulder and decrease pain.  delete goal- refer to OT  2. Pt will demonstrate improved strength of L plantar flexion to 4-/5 to improve foot clearance during swing phase.  met  3. Pt will demonstrate improved L hip extension strength to 4/5 to improve standing balance and gait ability.  met  4. Pt will negotiate 20 steps with handrail and mod I to demonstrate improved ability to negotiate stairs in case of emergency at apartment.  met   5. New 6/25/2020: pt will demonstrate decreased fall risk and improved balance during gait by scoring 23/30 on Functional Gait Assessment. improved/  not met    Discharge reason: Patient has reached the maximum rehab potential for the present time    Plan   This patient is discharged from Physical Therapy    Naya Obregon, SELWYNT, NCS, CBIS   9/28/2020

## 2020-09-29 NOTE — PROGRESS NOTES
"Subjective:       Patient ID: Jae Millan is a 69 y.o. AA male who presents for new evaluation of   No chief complaint on file.      HPI     Patient is new to me. New to clinic.  Prior pertinent chart reviewed since this is patient's first appointment with me.    Patient presents for new evaluation of CKD and albuminuria.  Baseline creatinine slowly trending upward; 1.1 in April 2019 to 1.4 May 2020. Need repeat labs.     Significant hx includes CVA c left hemiparesis, HTN >15 years, HLD, HCV s/p cure, DM2 > 15 years.    Home BPs: does not take BPs at home; does not add salt to food.    The patient denies taking NSAIDs, herbal supplements, or new antibiotics, recreational drugs, recent episode of dehydration, diarrhea, nausea or vomiting, acute illness, hospitalization or exposure to IV radiocontrast. On PPI.    Significant family hx includes: HTN, heart disease; no known renal disease    Last renal US: none in EMR  Last abdominal US: 9/19/19, reviewed; cyst noted on right kidney    Review of Systems   Constitutional: Negative for fatigue.   HENT: Negative for facial swelling and mouth sores.    Respiratory: Negative for shortness of breath.    Cardiovascular: Positive for leg swelling (to feet). Negative for chest pain and palpitations.   Gastrointestinal: Negative for diarrhea, nausea and vomiting.   Genitourinary: Positive for frequency (at night) and urgency (at night). Negative for difficulty urinating, dysuria, flank pain and hematuria.        Nocturia x 8 last night; does not believe he fully empties bladder at night   Musculoskeletal: Negative for back pain.        Swelling to left arm    Skin: Negative for rash.   Neurological: Positive for weakness (to left side) and numbness (to left side in shoulder, fingers, leg, toes). Negative for dizziness.        Left hemiparesis   Psychiatric/Behavioral: Negative for sleep disturbance.       Objective:       Blood pressure (!) 160/84, pulse 78, height 5' 10" " (1.778 m), weight 97.3 kg (214 lb 8.1 oz), SpO2 98 %.  Physical Exam  Vitals signs reviewed.   Constitutional:       General: He is not in acute distress.     Appearance: He is well-developed. He is obese. He is not diaphoretic.   Neck:      Musculoskeletal: Neck supple.      Vascular: No JVD.   Cardiovascular:      Rate and Rhythm: Normal rate and regular rhythm.      Heart sounds: Normal heart sounds. No murmur. No friction rub. No gallop.    Pulmonary:      Effort: Pulmonary effort is normal. No respiratory distress.      Breath sounds: Rales (RLL) present. No wheezing.   Abdominal:      General: Bowel sounds are normal. There is no distension.      Palpations: Abdomen is soft.      Tenderness: There is no abdominal tenderness. There is no right CVA tenderness or left CVA tenderness.   Musculoskeletal:      Right lower leg: No edema.      Left lower leg: No edema.   Skin:     General: Skin is warm and dry.      Findings: No lesion or rash.   Neurological:      Mental Status: He is alert and oriented to person, place, and time.   Psychiatric:         Mood and Affect: Mood normal.         Behavior: Behavior normal.         Thought Content: Thought content normal.         Judgment: Judgment normal.           Lab Results   Component Value Date    CREATININE 1.4 05/20/2020    CREATININE 1.4 05/20/2020     No results found for: UTPCR  Lab Results   Component Value Date     05/20/2020     05/20/2020    K 3.9 05/20/2020    K 3.9 05/20/2020    CO2 24 05/20/2020    CO2 24 05/20/2020     05/20/2020     05/20/2020     Lab Results   Component Value Date    CALCIUM 9.2 05/20/2020    CALCIUM 9.2 05/20/2020     Lab Results   Component Value Date    HGB 13.1 (L) 09/04/2020    WBC 5.61 09/04/2020    HCT 39.1 (L) 09/04/2020      Lab Results   Component Value Date    HGBA1C 7.4 (H) 09/04/2020     09/04/2020    BUN 16 05/20/2020    BUN 16 05/20/2020     Lab Results   Component Value Date    LDLCALC 92.2  05/20/2020         Assessment:       1. CKD (chronic kidney disease) stage 3, GFR 30-59 ml/min    2. Hypertension, unspecified type    3. Proteinuria, unspecified type    4. Type 2 diabetes mellitus with diabetic neuropathy, without long-term current use of insulin    5. Cerebrovascular accident (CVA) due to thrombosis of anterior cerebral artery, unspecified blood vessel laterality        Plan:   CKD stage 3A c eGFR 59 mL/min - likely due to DM nephropathy (albuminuria) coupled with nephrosclerosis (CVA, HTN, HLD). Proteinuric. sCr has been rising since April 2019. Need repeat labs to determine current level but expect to see a rise in sCr of 25-30% after starting Jardiance last month.   Educated patient to control BP, BG, remain well-hydrated, and avoid NSAIDs to prevent progression of CKD.    UPCR Has ~ 1 gram albuminuria; will obtain UPCR. Jardiance started on 9/18/2020. On losartan 100 mg.   Acid-base WNL   Renal osteodystrophy Ca okay. Will obtain phos, PTH for next visit.   Anemia Hgb at goal for CKD   DM A1c slightly above goal. On jardiance, metformin.   Lipid Management On statin.   ESRD planning Defer     HTN - High on amlodipine 10 mg, losartan-Hctz 100-12.5 mg; goal is BP < 130/80; Pt to take BP for two weeks. Will call in 3 weeks to obtain readings and possibly increase Hctz or consider chlorthalidone. Encouraged low salt diet; pt has been eating a lot of deli meat.    Renal cysts - will obtain renal US    All questions patient had were answered.  Asked if further questions. None. F/u in clinic 1 mos  with labs and urine prior to next visit or sooner if needed.  ER for emergency concerns.    Summary of Plan:  1. UA, UPCR  2. RFP  3. Renal US  4. Agree with Jardiance  5. RTC in 1 mos for BP management

## 2020-10-02 ENCOUNTER — LAB VISIT (OUTPATIENT)
Dept: LAB | Facility: HOSPITAL | Age: 70
End: 2020-10-02
Payer: MEDICARE

## 2020-10-02 ENCOUNTER — OFFICE VISIT (OUTPATIENT)
Dept: NEPHROLOGY | Facility: CLINIC | Age: 70
End: 2020-10-02
Payer: MEDICARE

## 2020-10-02 VITALS
DIASTOLIC BLOOD PRESSURE: 84 MMHG | HEART RATE: 78 BPM | OXYGEN SATURATION: 98 % | BODY MASS INDEX: 30.71 KG/M2 | WEIGHT: 214.5 LBS | HEIGHT: 70 IN | SYSTOLIC BLOOD PRESSURE: 160 MMHG

## 2020-10-02 DIAGNOSIS — E11.40 TYPE 2 DIABETES MELLITUS WITH DIABETIC NEUROPATHY, WITHOUT LONG-TERM CURRENT USE OF INSULIN: ICD-10-CM

## 2020-10-02 DIAGNOSIS — N28.1 RENAL CYST: ICD-10-CM

## 2020-10-02 DIAGNOSIS — N18.31 STAGE 3A CHRONIC KIDNEY DISEASE: ICD-10-CM

## 2020-10-02 DIAGNOSIS — N18.31 STAGE 3A CHRONIC KIDNEY DISEASE: Primary | ICD-10-CM

## 2020-10-02 DIAGNOSIS — I10 HYPERTENSION, UNSPECIFIED TYPE: ICD-10-CM

## 2020-10-02 DIAGNOSIS — R80.9 PROTEINURIA, UNSPECIFIED TYPE: ICD-10-CM

## 2020-10-02 DIAGNOSIS — I63.329 CEREBROVASCULAR ACCIDENT (CVA) DUE TO THROMBOSIS OF ANTERIOR CEREBRAL ARTERY, UNSPECIFIED BLOOD VESSEL LATERALITY: ICD-10-CM

## 2020-10-02 LAB
ALBUMIN SERPL BCP-MCNC: 3.9 G/DL (ref 3.5–5.2)
ANION GAP SERPL CALC-SCNC: 10 MMOL/L (ref 8–16)
BUN SERPL-MCNC: 18 MG/DL (ref 8–23)
CALCIUM SERPL-MCNC: 9.2 MG/DL (ref 8.7–10.5)
CHLORIDE SERPL-SCNC: 107 MMOL/L (ref 95–110)
CO2 SERPL-SCNC: 24 MMOL/L (ref 23–29)
CREAT SERPL-MCNC: 1.7 MG/DL (ref 0.5–1.4)
EST. GFR  (AFRICAN AMERICAN): 47.2 ML/MIN/1.73 M^2
EST. GFR  (NON AFRICAN AMERICAN): 40.8 ML/MIN/1.73 M^2
GLUCOSE SERPL-MCNC: 178 MG/DL (ref 70–110)
PHOSPHATE SERPL-MCNC: 3.3 MG/DL (ref 2.7–4.5)
POTASSIUM SERPL-SCNC: 4.5 MMOL/L (ref 3.5–5.1)
SODIUM SERPL-SCNC: 141 MMOL/L (ref 136–145)

## 2020-10-02 PROCEDURE — 99215 OFFICE O/P EST HI 40 MIN: CPT | Mod: PBBFAC | Performed by: NURSE PRACTITIONER

## 2020-10-02 PROCEDURE — 99999 PR PBB SHADOW E&M-EST. PATIENT-LVL V: ICD-10-PCS | Mod: PBBFAC,,, | Performed by: NURSE PRACTITIONER

## 2020-10-02 PROCEDURE — 99214 OFFICE O/P EST MOD 30 MIN: CPT | Mod: S$PBB,,, | Performed by: NURSE PRACTITIONER

## 2020-10-02 PROCEDURE — 36415 COLL VENOUS BLD VENIPUNCTURE: CPT

## 2020-10-02 PROCEDURE — 99214 PR OFFICE/OUTPT VISIT, EST, LEVL IV, 30-39 MIN: ICD-10-PCS | Mod: S$PBB,,, | Performed by: NURSE PRACTITIONER

## 2020-10-02 PROCEDURE — 80069 RENAL FUNCTION PANEL: CPT

## 2020-10-02 PROCEDURE — 99999 PR PBB SHADOW E&M-EST. PATIENT-LVL V: CPT | Mod: PBBFAC,,, | Performed by: NURSE PRACTITIONER

## 2020-10-02 RX ORDER — ACETAMINOPHEN 500 MG
1 TABLET ORAL DAILY
Qty: 1 EACH | Refills: 0 | Status: SHIPPED | OUTPATIENT
Start: 2020-10-02 | End: 2020-10-02 | Stop reason: CLARIF

## 2020-10-02 RX ORDER — ACETAMINOPHEN 500 MG
1 TABLET ORAL DAILY
COMMUNITY
Start: 2020-10-02 | End: 2023-09-25

## 2020-10-02 NOTE — PATIENT INSTRUCTIONS
Get Blood pressure cuff for home. Start taking blood pressures every day. Write them down and bring your log to appointments.    Get kidney ultrasound and bloodwork and urine.  Continue low sodium diet.  Avoid NSAID (ibuprofen, advil, motrin, aleve, naprosyn, naproxen, Stanback, Goody's Powder). Tylenol is okay.  Avoid bactrim/ IV contrast/ gadolinium/ aminoglycoside where possible.  Avoid herbal supplements.  Stay hydrated.  Return to clinic in 1 months with labs (bloodwork and urine) or sooner if needed.  Go to the ER with any emergency concerns.      Chronic Kidney Disease (CKD)     The role of the kidneys is to remove waste products and extra water from the blood.  When the kidneys do not work as they should, waste products begin to build up in the blood. This is called chronic kidney disease (CKD). CKD means that you have kidney damage or a decrease in kidney function lasting at least 3 months. CKD allows extra water, waste, and toxins to build up in the body. This can eventually become life-threatening. You might need dialysis or a kidney transplant to stay alive. This most severe form is called end stage renal disease.  Diabetes is the leading causes of chronic renal failure. Other causes include high blood pressure, hardening of the arteries (atherosclerosis), lupus, inflammation of the blood vessels (vasculitis), and past viral or bacterial infections. Certain over-the-counter pain medicines can cause renal failure when taken often over a long period of time. These include aspirin, ibuprofen, and related anti-inflammatory medicines called NSAIDs (nonsteroidal anti-inflammatory drugs).  Home care  The following guidelines will help you care for yourself at home:  · If you have diabetes, talk with your healthcare provider about keeping your blood sugar under control. Ask if you need to make and changes to your diet, lifestyle, or medicines.  · If you have high blood pressure:  ¨ Take prescribed medicine to  lower your blood pressure to the recommended goal of less than 130/80.  ¨ Start a regular exercise program that you enjoy. Check with your healthcare provider to be sure your planned exercise program is right for you.  ¨ Eat less salt (sodium). Your healthcare provider can tell you how much salt per day is safe for you.  · If you are overweight, talk with your healthcare provider about a weight loss plan.  · If you smoke, you must quit. Smoking makes kidney disease worse. Talk with your healthcare provider about ways to help you quit.  For more information, visit the following links:  ¨ www.smokefree.gov/sites/default/files/pdf/clearing-the-air-accessible.pdf  ¨ www.smokefree.gov  ¨ www.cancer.org/healthy/stayawayfromtobacco/guidetoquittingsmoking/  · Most people with CKD need to follow a special diet.  Be sure you understand yours. In general, you will need to limit protein, salt, potassium, and phosphorus. You also need to limit how much fluid you drink.   · CKD is a risk factor for heart disease. Talk with your healthcare provider about any other risk factors you might have and what you can do to lessen them.  · Talk with your healthcare provider about any medicines you are taking to find out if they need to be reduced or stopped.  · Don't use the following over-the-counter medicines, or consult your healthcare provider before using:  ¨ Aspirin and NSAIDs such as ibuprofen or naproxen. Using acetaminophen for fever or pain is OK.  ¨ Laxatives and antacids containing magnesium or aluminum  ¨ Fleet or phospho soda enemas containing phosphorus  ¨ Certain stomach acid-blocking medicine such as cimetidine or ranitidine   ¨ Decongestants containing pseudoephedrine   ¨ Herbal supplements  Follow-up care  Follow up with your healthcare provider, or as advised. Contact one of the following for more information:  · American Association of Kidney Patients 578-600-5471 www.aakp.org  · National Kidney Foundation 007-056-1720  www.kidney.org  · American Kidney Fund 075-141-6515 www.kidneyfund.org  · National Kidney Disease Education Program 866-4KIDNEY www.nkdep.nih.gov  If an X-ray, ECG (cardiogram), or other diagnostic test was taken, you will be told of any new findings that may affect your care.  Call 911  Call 911 if you have any of the following:  · Severe weakness, dizziness, fainting, drowsiness, or confusion  · Chest pain or shortness of breath  · Heart beating fast, slow, or irregularly  When to seek medical advice  Call your healthcare provider right away if any of these occur:  · Nausea or vomiting  · Fever of 100.4°F (38°C) or higher, or as directed by your healthcare provider  · Unexpected weight gain or swelling in the legs, ankles, or around the eyes  · Decrease or absent urine output  Date Last Reviewed: 9/1/2016 © 2000-2017 [employer]. [address] [place] [address]. All rights reserved. This information is not intended as a substitute for professional medical care. Always follow your healthcare professional's instructions.      Low-Salt Choices  Eating salt (sodium) can make your body retain too much water. Excess water makes your heart work harder. Canned, packaged, and frozen foods are easy to prepare, but they are often high in sodium. Here are some ideas for low-salt foods you can easily prepare yourself.    For breakfast  · Fruit or 100% fruit juice  · Whole-wheat bread or an English muffin. Compare sodium content on labels.  · Low-fat milk or yogurt  · Unsalted eggs  · Shredded wheat  · Corn tortillas  · Unsalted steamed rice  · Regular (not instant) hot cereal, made without salt  Stay away from:  · Sausage, lucio, and ham  · Flour tortillas  · Packaged muffins, pancakes, and biscuits  · Instant hot cereals  · Cottage cheese  For lunch and dinner  · Fresh fish, chicken, turkey, or meat--baked, broiled, or roasted without salt  · Dry beans, cooked without salt  · Tofu, stir-fried without  salt  · Unsalted fresh fruit and vegetables, or frozen or canned fruit and vegetables with no added salt  Stay away from:  · Lunch or deli meat that is cured or smoked  · Cheese  · Tomato juice and catsup  · Canned vegetables, soups, and fish not labeled as no-salt-added or reduced sodium  · Packaged gravies and sauces  · Olives, pickles, and relish  · Bottled salad dressings  For snacks and desserts  · Yogurt  · Unsalted, air popped popcorn  · Unsalted nuts or seeds  Stay away from:  · Pies and cakes  · Packaged dessert mixes  · Pizza  · Canned and packaged puddings  · Pretzels, chips, crackers, and nuts--unless the label says unsalted  Date Last Reviewed: 6/17/2015  © 1211-6975 [employer]. [address] [place] [address], [place] [address]. All rights reserved. This information is not intended as a substitute for professional medical care. Always follow your healthcare professional's instructions.

## 2020-10-02 NOTE — LETTER
October 2, 2020      Lopez Wasserman MD  [address] [facility] [address]           [facility] - Nephrology [facility]  [address]  [facility] [address]  Phone: [phone]  Fax: [phone]          Patient: Jae Millan   MR Number: [id number]   YOB: 1950   Date of Visit: 10/2/2020       Dear Dr. Loepz Wasserman:    Thank you for referring Jae Millan to me for evaluation. Attached you will find relevant portions of my assessment and plan of care.    If you have questions, please do not hesitate to call me. I look forward to following Jae Millan along with you.    Sincerely,    Orquidea Koehler, MARLON    Enclosure  CC:  No Recipients    If you would like to receive this communication electronically, please contact [email or web address] or [phone] to request more information on [address] Link access.    For providers and/or their staff who would like to refer a patient to [facility], please contact us through our one-stop-shop provider referral line, [facility], at [phone].    If you feel you have received this communication in error or would no longer like to receive these types of communications, please e-mail [email or web address]

## 2020-10-06 ENCOUNTER — OFFICE VISIT (OUTPATIENT)
Dept: INTERNAL MEDICINE | Facility: CLINIC | Age: 70
End: 2020-10-06
Payer: MEDICARE

## 2020-10-06 ENCOUNTER — HOSPITAL ENCOUNTER (OUTPATIENT)
Dept: RADIOLOGY | Facility: HOSPITAL | Age: 70
Discharge: HOME | End: 2020-10-06
Attending: INTERNAL MEDICINE
Payer: MEDICARE

## 2020-10-06 VITALS
WEIGHT: 212.5 LBS | DIASTOLIC BLOOD PRESSURE: 92 MMHG | HEIGHT: 70 IN | BODY MASS INDEX: 30.42 KG/M2 | HEART RATE: 81 BPM | OXYGEN SATURATION: 98 % | TEMPERATURE: 97 F | SYSTOLIC BLOOD PRESSURE: 148 MMHG

## 2020-10-06 DIAGNOSIS — E11.40 TYPE 2 DIABETES MELLITUS WITH DIABETIC NEUROPATHY, WITHOUT LONG-TERM CURRENT USE OF INSULIN: Primary | Chronic | ICD-10-CM

## 2020-10-06 DIAGNOSIS — R39.15 URINARY URGENCY: ICD-10-CM

## 2020-10-06 DIAGNOSIS — M79.2 NEUROPATHIC PAIN: ICD-10-CM

## 2020-10-06 DIAGNOSIS — I10 ESSENTIAL HYPERTENSION: ICD-10-CM

## 2020-10-06 DIAGNOSIS — R41.3 MEMORY LOSS: ICD-10-CM

## 2020-10-06 DIAGNOSIS — M79.641 RIGHT HAND PAIN: ICD-10-CM

## 2020-10-06 PROCEDURE — 87086 URINE CULTURE/COLONY COUNT: CPT

## 2020-10-06 PROCEDURE — 99999 PR PBB SHADOW E&M-EST. PATIENT-LVL V: ICD-10-PCS | Mod: PBBFAC,,, | Performed by: INTERNAL MEDICINE

## 2020-10-06 PROCEDURE — 73130 X-RAY EXAM OF HAND: CPT | Mod: TC,RT

## 2020-10-06 PROCEDURE — 99214 PR OFFICE/OUTPT VISIT, EST, LEVL IV, 30-39 MIN: ICD-10-PCS | Mod: S$PBB,,, | Performed by: INTERNAL MEDICINE

## 2020-10-06 PROCEDURE — 99214 OFFICE O/P EST MOD 30 MIN: CPT | Mod: S$PBB,,, | Performed by: INTERNAL MEDICINE

## 2020-10-06 PROCEDURE — G0009 ADMIN PNEUMOCOCCAL VACCINE: HCPCS | Mod: PBBFAC

## 2020-10-06 PROCEDURE — 73130 XR HAND COMPLETE 3 VIEW RIGHT: ICD-10-PCS | Mod: 26,RT,, | Performed by: RADIOLOGY

## 2020-10-06 PROCEDURE — 99215 OFFICE O/P EST HI 40 MIN: CPT | Mod: PBBFAC,25 | Performed by: INTERNAL MEDICINE

## 2020-10-06 PROCEDURE — 99999 PR PBB SHADOW E&M-EST. PATIENT-LVL V: CPT | Mod: PBBFAC,,, | Performed by: INTERNAL MEDICINE

## 2020-10-06 PROCEDURE — 73130 X-RAY EXAM OF HAND: CPT | Mod: 26,RT,, | Performed by: RADIOLOGY

## 2020-10-06 RX ORDER — GABAPENTIN 300 MG/1
300 CAPSULE ORAL NIGHTLY
Qty: 90 CAPSULE | Refills: 3 | Status: SHIPPED | OUTPATIENT
Start: 2020-10-06 | End: 2020-10-30

## 2020-10-06 RX ORDER — CARVEDILOL 3.12 MG/1
3.12 TABLET ORAL 2 TIMES DAILY WITH MEALS
Qty: 60 TABLET | Refills: 6 | Status: SHIPPED | OUTPATIENT
Start: 2020-10-06 | End: 2021-01-08

## 2020-10-06 NOTE — PROGRESS NOTES
Subjective:       Patient ID: Jae Millan is a 69 y.o. male.    Chief Complaint: Follow-up    HPI  Pt is doing okay - no CP or SOB.  Has persistent neuropathic pain.  Pt reports some change in his memory.  Review of Systems   Respiratory: Negative for shortness of breath.    Cardiovascular: Negative for chest pain.   Gastrointestinal: Negative for abdominal pain, diarrhea, nausea and vomiting.   Genitourinary: Negative for dysuria.   Neurological: Negative for seizures, syncope and headaches.       Objective:      Physical Exam  Constitutional:       General: He is not in acute distress.     Appearance: He is well-developed.   Eyes:      Pupils: Pupils are equal, round, and reactive to light.   Neck:      Musculoskeletal: Neck supple.      Thyroid: No thyromegaly.      Vascular: No JVD.   Cardiovascular:      Rate and Rhythm: Normal rate and regular rhythm.      Heart sounds: Normal heart sounds. No murmur. No friction rub. No gallop.    Pulmonary:      Effort: Pulmonary effort is normal.      Breath sounds: Normal breath sounds. No wheezing or rales.   Abdominal:      General: Bowel sounds are normal. There is no distension.      Palpations: Abdomen is soft. There is no mass.      Tenderness: There is no abdominal tenderness. There is no guarding or rebound.   Lymphadenopathy:      Cervical: No cervical adenopathy.   Skin:     General: Skin is warm and dry.   Neurological:      Mental Status: He is alert and oriented to person, place, and time.   Psychiatric:         Behavior: Behavior normal.         Thought Content: Thought content normal.         Judgment: Judgment normal.         Assessment:       1. Type 2 diabetes mellitus with diabetic neuropathy, without long-term current use of insulin    2. Neuropathic pain    3. Essential hypertension    4. Urinary urgency    5. Right hand pain    6. Memory loss        Plan:   Type 2 diabetes mellitus with diabetic neuropathy, without long-term current use of  insulin  -     Diabetes Digital Medicine (DDMP) Enrollment Order  -     Diabetes Digital Medicine (DDMP): Assign Onboarding Questionnaires  -     NURSING COMMUNICATION: Create [facility] Account    Neuropathic pain  -    gabapentin (NEURONTIN) 300 MG capsule; Take 1 capsule (300 mg total) by mouth every evening. In 1 wk, if no relief, increase to twice daily.In another wk, may increase to 3 times.  Dispense: 90 capsule; Refill: 3    Essential hypertension  -     NURSING COMMUNICATION: Create [employer] Account  -     Hypertension Digital Medicine (HDM) Enrollment Order  -     Hypertension Digital Medicine ([facility]): Assign Onboarding Questionnaires    Urinary urgency  -     Ambulatory referral/consult to Urology; Future; Expected date: 10/13/2020  -     Urinalysis  -     Urine culture    Right hand pain  -     X-Ray Hand 3 view Right; Future; Expected date: 10/06/2020  -     Ambulatory referral/consult to Orthopedics; Future; Expected date: 10/13/2020    Memory loss  -     Vitamin B12; Future; Expected date: 11/06/2020  -     Methylmalonic Acid, Serum; Future; Expected date: 10/06/2020  -     RPR; Future; Expected date: 10/06/2020    Other orders  -     (In Office Administered) Pneumococcal Polysaccharide Vaccine (23 Valent) (SQ/IM)    HTN  -     Add carvediloL (COREG) 3.125 MG tablet; Take 1 tablet (3.125 mg total) by mouth 2 (two) times daily with meals.  Dispense: 60 tablet; Refill: 6

## 2020-10-07 NOTE — PROGRESS NOTES
Administered pneumo 23 to the left deltoid, tolerated well, no c/o pain noted, no adverse reaction noted.

## 2020-10-08 LAB — BACTERIA UR CULT: NO GROWTH

## 2020-10-09 ENCOUNTER — OFFICE VISIT (OUTPATIENT)
Dept: OPTOMETRY | Facility: CLINIC | Age: 70
End: 2020-10-09
Payer: MEDICARE

## 2020-10-09 DIAGNOSIS — H40.32X3 TRAUMATIC GLAUCOMA, LEFT, SEVERE STAGE: ICD-10-CM

## 2020-10-09 DIAGNOSIS — H25.13 NUCLEAR SCLEROSIS OF BOTH EYES: ICD-10-CM

## 2020-10-09 DIAGNOSIS — H52.4 PRESBYOPIA: ICD-10-CM

## 2020-10-09 DIAGNOSIS — E11.40 TYPE 2 DIABETES MELLITUS WITH DIABETIC NEUROPATHY, WITHOUT LONG-TERM CURRENT USE OF INSULIN: Primary | ICD-10-CM

## 2020-10-09 PROCEDURE — 92014 COMPRE OPH EXAM EST PT 1/>: CPT | Mod: S$PBB,,, | Performed by: OPTOMETRIST

## 2020-10-09 PROCEDURE — 99212 OFFICE O/P EST SF 10 MIN: CPT | Mod: PBBFAC | Performed by: OPTOMETRIST

## 2020-10-09 PROCEDURE — 99999 PR PBB SHADOW E&M-EST. PATIENT-LVL II: CPT | Mod: PBBFAC,,, | Performed by: OPTOMETRIST

## 2020-10-09 PROCEDURE — 92014 PR EYE EXAM, EST PATIENT,COMPREHESV: ICD-10-PCS | Mod: S$PBB,,, | Performed by: OPTOMETRIST

## 2020-10-09 PROCEDURE — 99999 PR PBB SHADOW E&M-EST. PATIENT-LVL II: ICD-10-PCS | Mod: PBBFAC,,, | Performed by: OPTOMETRIST

## 2020-10-09 RX ORDER — DORZOLAMIDE HYDROCHLORIDE AND TIMOLOL MALEATE 20; 5 MG/ML; MG/ML
1 SOLUTION/ DROPS OPHTHALMIC 2 TIMES DAILY
Qty: 10 ML | Refills: 2 | Status: SHIPPED | OUTPATIENT
Start: 2020-10-09 | End: 2021-07-22 | Stop reason: SDUPTHER

## 2020-10-09 NOTE — LETTER
October 9, 2020      Lopez Wasserman MD  [address]  [facility] [address]           [facility] - Optometry 1st Fl  [address]  [facility] [address]  Phone: [phone]  Fax: [phone]          Patient: Jae Millan   MR Number: [id number]   YOB: 1950   Date of Visit: 10/9/2020       Dear Dr. Lopez Wasserman:    Thank you for referring Jae Millan to me for evaluation. Attached you will find relevant portions of my assessment and plan of care.    If you have questions, please do not hesitate to call me. I look forward to following Jae Millan along with you.    Sincerely,    Albina Erwin, OD    Enclosure  CC:  No Recipients    If you would like to receive this communication electronically, please contact [email or web address] or [phone] to request more information on [employer] Link access.    For providers and/or their staff who would like to refer a patient to [facility], please contact us through our one-stop-shop provider referral line, [facility] , at [phone].    If you feel you have received this communication in error or would no longer like to receive these types of communications, please e-mail [email or web address]

## 2020-10-09 NOTE — PROGRESS NOTES
"  Subjective:      Jae Millan is a 69 y.o. male who returns today regarding his ED.    Mr. Millan presents today to discuss ED refractory to Cialis and Viagra.  He is unable to achieve partial/full erections with either medication.  He wishes to try IV ED but it is cost prohibitive at this point.  He reports an increase in urinary urgency.  He denies hematuria, dysuria, frequency.  He denies fever, chills, nausea, vomiting    The following portions of the patient's history were reviewed and updated as appropriate: allergies, current medications, past family history, past medical history, past social history, past surgical history and problem list.    Review of Systems  A comprehensive multipoint review of systems was negative except as otherwise stated in the HPI.     Objective:   Vitals: /83 (BP Location: Left arm, Patient Position: Sitting, BP Method: Small (Automatic))   Pulse 73   Ht 5' 10" (1.778 m)   Wt 97.7 kg (215 lb 4.5 oz)   BMI 30.89 kg/m²     Physical Exam   General: alert and oriented, no acute distress  Respiratory: Symmetric expansion, non-labored breathing  Cardiovascular: regular rate and rhythm, no peripheral edema  Abdomen: soft, non distended  Genitourinary: defer  Skin: normal coloration and turgor, no rashes, no suspicious skin lesions noted  Neuro: no gross deficits  Psych: normal judgment and insight, normal mood/affect and non-anxious    Lab Review   Urinalysis demonstrates positive for red blood cells, protein  Lab Results   Component Value Date    WBC 5.61 09/04/2020    HGB 13.1 (L) 09/04/2020    HCT 39.1 (L) 09/04/2020    MCV 81 (L) 09/04/2020     09/04/2020     Lab Results   Component Value Date    CREATININE 1.7 (H) 10/02/2020    BUN 18 10/02/2020     Lab Results   Component Value Date    PSA 0.30 05/20/2020       Assessment:     1. Erectile dysfunction, unspecified erectile dysfunction type    2. Urinary urgency        Plan:   --will send urine for culture and " notify patient of results  --trial of Levitra sent to pharmacy  --ADALID pamphlet with purchasing options provided to patient

## 2020-10-09 NOTE — PROGRESS NOTES
HPI     Last eye exam was 9/12/19 with Naina.   Pt here for routine eye exam.    Pt states he is having some problems with reading, and would like to know   what power readers to get.   Pt states that he needs a lot of light to see to read.  Pt states his distance vision is doing fine, does not think he needs   glasses for distance  No eye drops, and no eye sx.      Last edited by Tammy Cohen on 10/9/2020  1:14 PM. (History)            Assessment /Plan     For exam results, see Encounter Report.    Type 2 diabetes mellitus with diabetic neuropathy, without long-term current use of insulin  -     Ambulatory referral/consult to Ophthalmology    Traumatic glaucoma, left, severe stage  -     dorzolamide-timolol 2-0.5% (COSOPT) 22.3-6.8 mg/mL ophthalmic solution; Place 1 drop into the left eye 2 (two) times daily.  Dispense: 10 mL; Refill: 2    Nuclear sclerosis of both eyes    Presbyopia            1.  No retinopathy--monitor yearly.  BS control.    2.  Patient has not used drop in several months.  Restart Cosopt bid OS only.  Follow-up scheduled with Dr. Chew.  3.  Educated on cataracts and affects on vision.  Patient happy with vision.  Monitor.  4.  Recommend +2.75 OTC readers.

## 2020-10-13 ENCOUNTER — OFFICE VISIT (OUTPATIENT)
Dept: UROLOGY | Facility: CLINIC | Age: 70
End: 2020-10-13
Payer: MEDICARE

## 2020-10-13 ENCOUNTER — TELEPHONE (OUTPATIENT)
Dept: UROLOGY | Facility: CLINIC | Age: 70
End: 2020-10-13

## 2020-10-13 VITALS
BODY MASS INDEX: 30.82 KG/M2 | HEIGHT: 70 IN | SYSTOLIC BLOOD PRESSURE: 136 MMHG | WEIGHT: 215.25 LBS | HEART RATE: 73 BPM | DIASTOLIC BLOOD PRESSURE: 83 MMHG

## 2020-10-13 DIAGNOSIS — N52.9 ERECTILE DYSFUNCTION, UNSPECIFIED ERECTILE DYSFUNCTION TYPE: Primary | ICD-10-CM

## 2020-10-13 DIAGNOSIS — R39.15 URINARY URGENCY: ICD-10-CM

## 2020-10-13 LAB
BILIRUB SERPL-MCNC: ABNORMAL MG/DL
BLOOD URINE, POC: ABNORMAL
CLARITY, POC UA: ABNORMAL
COLOR, POC UA: YELLOW
GLUCOSE UR QL STRIP: 1000
KETONES UR QL STRIP: ABNORMAL
LEUKOCYTE ESTERASE URINE, POC: ABNORMAL
NITRITE, POC UA: ABNORMAL
PH, POC UA: 5
PROTEIN, POC: ABNORMAL
SPECIFIC GRAVITY, POC UA: 1.01
UROBILINOGEN, POC UA: NORMAL

## 2020-10-13 PROCEDURE — 81002 URINALYSIS NONAUTO W/O SCOPE: CPT | Mod: PBBFAC,PN | Performed by: NURSE PRACTITIONER

## 2020-10-13 PROCEDURE — 99999 PR PBB SHADOW E&M-EST. PATIENT-LVL V: CPT | Mod: PBBFAC,,, | Performed by: NURSE PRACTITIONER

## 2020-10-13 PROCEDURE — 99999 PR PBB SHADOW E&M-EST. PATIENT-LVL V: ICD-10-PCS | Mod: PBBFAC,,, | Performed by: NURSE PRACTITIONER

## 2020-10-13 PROCEDURE — 87086 URINE CULTURE/COLONY COUNT: CPT

## 2020-10-13 PROCEDURE — 99215 OFFICE O/P EST HI 40 MIN: CPT | Mod: PBBFAC,PN | Performed by: NURSE PRACTITIONER

## 2020-10-13 PROCEDURE — 99214 PR OFFICE/OUTPT VISIT, EST, LEVL IV, 30-39 MIN: ICD-10-PCS | Mod: S$PBB,,, | Performed by: NURSE PRACTITIONER

## 2020-10-13 PROCEDURE — 99214 OFFICE O/P EST MOD 30 MIN: CPT | Mod: S$PBB,,, | Performed by: NURSE PRACTITIONER

## 2020-10-13 RX ORDER — VARDENAFIL HYDROCHLORIDE 5 MG/1
5 TABLET ORAL
Qty: 5 TABLET | Refills: 0 | Status: SHIPPED | OUTPATIENT
Start: 2020-10-13 | End: 2020-10-26 | Stop reason: SDUPTHER

## 2020-10-13 NOTE — TELEPHONE ENCOUNTER
Pt states he could not talk at this moment he was taking the bus home and could not hear. Pt stated he would call back when he got home.

## 2020-10-13 NOTE — TELEPHONE ENCOUNTER
----- Message from Kamille Ruth sent at 10/13/2020  3:47 PM CDT -----  Regarding: [facility]  Pt medicine is unavailable at pharmacy they wants to know if you can change the prescription to another brand name viagra, or cialis

## 2020-10-13 NOTE — PATIENT INSTRUCTIONS
Erectile Dysfunction: Erectile Aids and Other Treatments  If you have erectile dysfunction (ED), treatment can help. Certain treatments work directly on your penis. Talk to your doctor about the pros and cons of each. And be sure to learn the correct technique.  Vacuum pump  You slip a tube over your penis. A simple pump then creates a vacuum that pulls blood into your penis. This causes an erection. You then put a tension ring around the base of your penis to hold in the blood. You then remove the tube. The tension ring must not stay on for more th  Vardenafil tablets  What is this medicine?  VARDENAFIL (adelso den a hector) is used to treat erection problems in men.  How should I use this medicine?  Take this medicine by mouth with a glass of water. Follow the directions on the prescription label. You may take this medicine with or without meals. The dose is usually taken 1 hour before sexual activity. You should not take this dose more than once per day. Do not take your medicine more often than directed.  Talk to your pediatrician regarding the use of this medicine in children. Special care may be needed.  What side effects may I notice from receiving this medicine?  Side effects that you should report to your doctor or health care professional as soon as possible.  allergic reactions like skin rash, itching or hives, swelling of the face, lips, or tongue  breathing problems  changes in hearing  changes in vision  chest pain  fast, irregular heartbeat  prolonged or painful erection  seizures  Side effects that usually do not require medical attention (report to your doctor or health care professional if they continue or are bothersome):  back pain  dizziness  flushing  headache  indigestion  muscle aches  nausea  stuffy or runny nose  What may interact with this medicine?  Do not take this medicine with any of the following medications:  bepridil  certain medicines for fungal infections like fluconazole,  itraconazole, ketoconazole, posaconazole, voriconazole  cisapride  droperidol  grepafloxacin  medicines for irregular heartbeat like dronedarone, dofetilide  methscopolamine nitrate  nitrates like amyl nitrite, isosorbide dinitrate, isosorbide mononitrate, nitroglycerin  nitroprusside  other medicines for erectile dysfunction like avanafil, sildenafil, tadalafil  pimozide  riociguat  thioridazine  ziprasidone  This medicine may also interact with the following medications:  antiviral medicines for HIV or AIDS  certain antibiotics like erythromycin and clarithromycin  certain drugs for high blood pressure  medicines for prostate problems  other medicines that prolong the QT interval (cause an abnormal heart rhythm)  What if I miss a dose?  This does not apply.  Where should I keep my medicine?  Keep out of the reach of children.  Store at room temperature between 15 and 30 degrees C (59 and 86 degrees F). Throw away any unused medicine after the expiration date.  What should I tell my health care provider before I take this medicine?  They need to know if you have any of these conditions:  bleeding disorders  eye or vision problems, including a rare inherited eye disease called retinitis pigmentosa  anatomical deformation of the penis, Peyronie's disease, or history of priapism (painful and prolonged erection)  heart disease, angina, a history of heart attack, irregular heart beats, or other heart problems  high or low blood pressure  history of blood diseases, like sickle cell anemia or leukemia  history of stomach bleeding  kidney disease  liver disease  stroke  an unusual or allergic reaction to vardenafil, other medicines, foods, dyes, or preservatives  pregnant or trying to get pregnant  breast-feeding  What should I watch for while using this medicine?  If you notice any changes in your vision while taking this drug, call your doctor or health care professional as soon as possible. Stop using this medicine and  call your health care provider right away if you have a loss of sight in one or both eyes.  Contact your doctor or health care professional right away if the erection lasts longer than 4 hours or if it becomes painful. This may be a sign of serious problem and must be treated right away to prevent permanent damage.  If you experience symptoms of nausea, dizziness, chest pain or arm pain upon initiation of sexual activity after taking this medicine, you should refrain from further activity and call your doctor or health care professional as soon as possible.  Do not drink alcohol to excess (examples, 5 glasses of wine or 5 shots of whiskey) when taking this medicine. When taken in excess, alcohol can increase your chances of getting a headache or getting dizzy, increasing your heart rate or lowering your blood pressure.  Using this medicine does not protect you or your partner against HIV infection (the virus that causes AIDS) or other sexually transmitted diseases.  NOTE:This sheet is a summary. It may not cover all possible information. If you have questions about this medicine, talk to your doctor, pharmacist, or health care provider. Copyright© 2017 [employer]      · an 30 minutes.  · Risks and complications may include pain in your penis or scrotum. The penis may also feel cool or change color during your erection.    A vacuum pump draws blood into the penis, causing an erection.      Transurethral medicine  · You insert the end of a small applicator into your urethra to place a tiny, soft medicine  pellet. The medicine is absorbed into your penis. The drugs relax blood vessels so they can fill with blood. Within about 10 minutes, your penis can become rigid enough for sexual intercourse. To keep your erection, you may need to use a tension ring.  · Risks and complications may include pain and irritation of your urethra. Get medical help right away if you have an erection that lasts for longer than 4 hours.     A medicated pellet put into the urethra causes an erection.      Self-injections  · You inject a special drug into your penis. The drug relaxes blood vessels so they can fill with blood. Within about 10 minutes, your penis can become rigid enough for sexual intercourse. A tension ring is not needed to maintain your erection. Your doctor can explain the injection process to you.  · Risks and complications may include pain, bleeding, bruising, or scarring. Get medical help right away if you have an erection that lasts for longer than 4 hours.    Medication injected into the side of the penis causes an erection.      Tension ring  · A tension ring is usually used along with another type of treatment. Once enough blood has flowed into your penis to cause an erection, the tension ring will keep the blood from flowing out again. This maintains the erection. The ring must not stay on for more than 30 minutes. A tension ring is also called a constriction ring or venous flow controller.  · Risks and complications may include pinched, bruised, or irritated skin. If you have an allergic reaction to latex rings, try a silicone ring.    A tension ring keeps blood from flowing out of the erect penis.      Date Last Reviewed: 1/1/2017 © 2000-2017 [employer]. [address] [place] [address], [place] [address]. All rights reserved. This information is not intended as a substitute for professional medical care. Always follow your healthcare professional's instructions.      Vardenafil tablets  What is this medicine?  VARDENAFIL (adelso den a hector) is used to treat erection problems in men.  How should I use this medicine?  Take this medicine by mouth with a glass of water. Follow the directions on the prescription label. You may take this medicine with or without meals. The dose is usually taken 1 hour before sexual activity. You should not take this dose more than once per day. Do not take your medicine more often than  directed.  Talk to your pediatrician regarding the use of this medicine in children. Special care may be needed.  What side effects may I notice from receiving this medicine?  Side effects that you should report to your doctor or health care professional as soon as possible.  · allergic reactions like skin rash, itching or hives, swelling of the face, lips, or tongue  · breathing problems  · changes in hearing  · changes in vision  · chest pain  · fast, irregular heartbeat  · prolonged or painful erection  · seizures  Side effects that usually do not require medical attention (report to your doctor or health care professional if they continue or are bothersome):  · back pain  · dizziness  · flushing  · headache  · indigestion  · muscle aches  · nausea  · stuffy or runny nose  What may interact with this medicine?  Do not take this medicine with any of the following medications:  · bepridil  · certain medicines for fungal infections like fluconazole, itraconazole, ketoconazole, posaconazole, voriconazole  · cisapride  · droperidol  · grepafloxacin  · medicines for irregular heartbeat like dronedarone, dofetilide  · methscopolamine nitrate  · nitrates like amyl nitrite, isosorbide dinitrate, isosorbide mononitrate, nitroglycerin  · nitroprusside  · other medicines for erectile dysfunction like avanafil, sildenafil, tadalafil  · pimozide  · riociguat  · thioridazine  · ziprasidone  This medicine may also interact with the following medications:  · antiviral medicines for HIV or AIDS  · certain antibiotics like erythromycin and clarithromycin  · certain drugs for high blood pressure  · medicines for prostate problems  · other medicines that prolong the QT interval (cause an abnormal heart rhythm)  What if I miss a dose?  This does not apply.  Where should I keep my medicine?  Keep out of the reach of children.  Store at room temperature between 15 and 30 degrees C (59 and 86 degrees F). Throw away any unused medicine  after the expiration date.  What should I tell my health care provider before I take this medicine?  They need to know if you have any of these conditions:  · bleeding disorders  · eye or vision problems, including a rare inherited eye disease called retinitis pigmentosa  · anatomical deformation of the penis, Peyronie's disease, or history of priapism (painful and prolonged erection)  · heart disease, angina, a history of heart attack, irregular heart beats, or other heart problems  · high or low blood pressure  · history of blood diseases, like sickle cell anemia or leukemia  · history of stomach bleeding  · kidney disease  · liver disease  · stroke  · an unusual or allergic reaction to vardenafil, other medicines, foods, dyes, or preservatives  · pregnant or trying to get pregnant  · breast-feeding  What should I watch for while using this medicine?  If you notice any changes in your vision while taking this drug, call your doctor or health care professional as soon as possible. Stop using this medicine and call your health care provider right away if you have a loss of sight in one or both eyes.  Contact your doctor or health care professional right away if the erection lasts longer than 4 hours or if it becomes painful. This may be a sign of serious problem and must be treated right away to prevent permanent damage.  If you experience symptoms of nausea, dizziness, chest pain or arm pain upon initiation of sexual activity after taking this medicine, you should refrain from further activity and call your doctor or health care professional as soon as possible.  Do not drink alcohol to excess (examples, 5 glasses of wine or 5 shots of whiskey) when taking this medicine. When taken in excess, alcohol can increase your chances of getting a headache or getting dizzy, increasing your heart rate or lowering your blood pressure.  Using this medicine does not protect you or your partner against HIV infection (the virus  that causes AIDS) or other sexually transmitted diseases.  NOTE:This sheet is a summary. It may not cover all possible information. If you have questions about this medicine, talk to your doctor, pharmacist, or health care provider. Copyright© 2017 [employer]

## 2020-10-13 NOTE — LETTER
October 13, 2020      Maryjane Farias MD  [address]  [facility] [address]           [facility]  [address], [facility] [address]  [facility] [address]  Phone: [phone]  Fax: [phone]          Patient: Jae Millan   MR Number: [id number]   YOB: 1950   Date of Visit: 10/13/2020       Dear Dr. Maryjane Farias:    Thank you for referring Jae Millan to me for evaluation. Attached you will find relevant portions of my assessment and plan of care.    If you have questions, please do not hesitate to call me. I look forward to following Jae Millan along with you.    Sincerely,    Naya Soria, NP    Enclosure  CC:  No Recipients    If you would like to receive this communication electronically, please contact [email or web address] or [phone] to request more information on [employer] Link access.    For providers and/or their staff who would like to refer a patient to [facility], please contact us through our one-stop-shop provider referral line, [facility], at [phone].    If you feel you have received this communication in error or would no longer like to receive these types of communications, please e-mail [email or web address]

## 2020-10-16 ENCOUNTER — TELEPHONE (OUTPATIENT)
Dept: UROLOGY | Facility: CLINIC | Age: 70
End: 2020-10-16

## 2020-10-16 LAB — BACTERIA UR CULT: NO GROWTH

## 2020-10-16 NOTE — TELEPHONE ENCOUNTER
----- Message from Naya Soria NP sent at 10/16/2020 11:46 AM CDT -----  Please call patient and let him know that urine culture is negative for infection and antibiotics are not needed at this time.    Thanks,  Naya

## 2020-10-16 NOTE — PROGRESS NOTES
Please call patient and let him know that urine culture is negative for infection and antibiotics are not needed at this time.    Thanks,  Naya

## 2020-10-20 ENCOUNTER — PATIENT OUTREACH (OUTPATIENT)
Dept: ADMINISTRATIVE | Facility: OTHER | Age: 70
End: 2020-10-20

## 2020-10-20 ENCOUNTER — TELEPHONE (OUTPATIENT)
Dept: INTERNAL MEDICINE | Facility: CLINIC | Age: 70
End: 2020-10-20

## 2020-10-20 NOTE — PROGRESS NOTES
Health Maintenance Due   Topic Date Due    TETANUS VACCINE  12/01/1970    Shingles Vaccine (1 of 2) 12/01/2002     Updates were requested from care everywhere.  Chart was reviewed for overdue Proactive [facility] Encounters (PEDRO [facility]) topics (CRS, Breast Cancer Screening, Eye exam)  Health Maintenance has been updated.  LINKS immunization registry triggered.  Immunizations were reconciled.

## 2020-10-21 ENCOUNTER — OFFICE VISIT (OUTPATIENT)
Dept: ORTHOPEDICS | Facility: CLINIC | Age: 70
End: 2020-10-21
Payer: MEDICARE

## 2020-10-21 VITALS
HEART RATE: 67 BPM | WEIGHT: 215 LBS | BODY MASS INDEX: 30.78 KG/M2 | SYSTOLIC BLOOD PRESSURE: 159 MMHG | DIASTOLIC BLOOD PRESSURE: 93 MMHG | HEIGHT: 70 IN

## 2020-10-21 DIAGNOSIS — M65.341 TRIGGER RING FINGER OF RIGHT HAND: ICD-10-CM

## 2020-10-21 PROCEDURE — 20550 NJX 1 TENDON SHEATH/LIGAMENT: CPT | Mod: PBBFAC | Performed by: PHYSICIAN ASSISTANT

## 2020-10-21 PROCEDURE — 99214 PR OFFICE/OUTPT VISIT, EST, LEVL IV, 30-39 MIN: ICD-10-PCS | Mod: 25,S$PBB,ICN, | Performed by: PHYSICIAN ASSISTANT

## 2020-10-21 PROCEDURE — 99999 PR PBB SHADOW E&M-EST. PATIENT-LVL IV: CPT | Mod: PBBFAC,,, | Performed by: PHYSICIAN ASSISTANT

## 2020-10-21 PROCEDURE — 20550 NJX 1 TENDON SHEATH/LIGAMENT: CPT | Mod: S$PBB,F8,ICN, | Performed by: PHYSICIAN ASSISTANT

## 2020-10-21 PROCEDURE — 99214 OFFICE O/P EST MOD 30 MIN: CPT | Mod: 25,S$PBB,ICN, | Performed by: PHYSICIAN ASSISTANT

## 2020-10-21 PROCEDURE — 99214 OFFICE O/P EST MOD 30 MIN: CPT | Mod: PBBFAC,25 | Performed by: PHYSICIAN ASSISTANT

## 2020-10-21 PROCEDURE — 99999 PR PBB SHADOW E&M-EST. PATIENT-LVL IV: ICD-10-PCS | Mod: PBBFAC,,, | Performed by: PHYSICIAN ASSISTANT

## 2020-10-21 PROCEDURE — 20550 PR INJECT TENDON SHEATH/LIGAMENT: ICD-10-PCS | Mod: S$PBB,F8,ICN, | Performed by: PHYSICIAN ASSISTANT

## 2020-10-21 RX ORDER — DEXAMETHASONE SODIUM PHOSPHATE 4 MG/ML
4 INJECTION, SOLUTION INTRA-ARTICULAR; INTRALESIONAL; INTRAMUSCULAR; INTRAVENOUS; SOFT TISSUE
Status: COMPLETED | OUTPATIENT
Start: 2020-10-21 | End: 2020-10-21

## 2020-10-21 RX ORDER — LIDOCAINE HYDROCHLORIDE 10 MG/ML
0.5 INJECTION, SOLUTION EPIDURAL; INFILTRATION; INTRACAUDAL; PERINEURAL ONCE
Status: COMPLETED | OUTPATIENT
Start: 2020-10-21 | End: 2020-10-21

## 2020-10-21 RX ADMIN — DEXAMETHASONE SODIUM PHOSPHATE 4 MG: 4 INJECTION INTRA-ARTICULAR; INTRALESIONAL; INTRAMUSCULAR; INTRAVENOUS; SOFT TISSUE at 10:10

## 2020-10-21 RX ADMIN — LIDOCAINE HYDROCHLORIDE 5 MG: 10 INJECTION, SOLUTION EPIDURAL; INFILTRATION; INTRACAUDAL; PERINEURAL at 11:10

## 2020-10-21 NOTE — LETTER
October 21, 2020      Maryjane Farias MD  [address]  [facility] [address]           [place] [address]  [address], [address]  [facility] [address]  Phone: [phone]          Patient: Jae Millan   MR Number: [id number]   YOB: 1950   Date of Visit: 10/21/2020       Dear Dr. Maryjane Farias:    Thank you for referring Jae Millan to me for evaluation. Attached you will find relevant portions of my assessment and plan of care.    If you have questions, please do not hesitate to call me. I look forward to following Jae Millan along with you.    Sincerely,    Christine العلي PA-C    Enclosure  CC:  No Recipients    If you would like to receive this communication electronically, please contact [email or web address] or [phone] to request more information on [employer] Link access.    For providers and/or their staff who would like to refer a patient to [facility], please contact us through our one-stop-shop provider referral line, [facility] , at [phone].    If you feel you have received this communication in error or would no longer like to receive these types of communications, please e-mail [email or web address]

## 2020-10-21 NOTE — PROGRESS NOTES
Subjective:      Patient ID: Jae Millan is a 69 y.o. male.    Chief Complaint: Pain of the Right Hand and Pain and Numbness of the Left Hand      HPI  Jae Millan is a right hand dominant 69 y.o. male presenting today for right hand pain. He reports pain and decreased motion of the right ring finger. Pain is over the A1 pulley. Denies triggering. Denies numbness. He has not yet tried anything for this. He had a prior stroke which affected the LUE, he has little use of the LUE.     Review of patient's allergies indicates:  No Known Allergies      Current Outpatient Medications   Medication Sig Dispense Refill    amLODIPine (NORVASC) 10 MG tablet Take 1 tablet (10 mg total) by mouth once daily. For HTN 90 tablet 3    aspirin 81 MG Chew Take 1 tablet (81 mg total) by mouth once daily.      blood pressure test kit-wrist Kit 1 Units by NOT APPLICABLE route once daily.      carvediloL (COREG) 3.125 MG tablet Take 1 tablet (3.125 mg total) by mouth 2 (two) times daily with meals. 60 tablet 6    diclofenac sodium (VOLTAREN) 1 % Gel Apply 2 g topically 3 (three) times daily as needed. Apply to painful joints 5 Tube 2    dorzolamide-timolol 2-0.5% (COSOPT) 22.3-6.8 mg/mL ophthalmic solution Place 1 drop into the left eye 2 (two) times daily. 10 mL 2    empagliflozin (JARDIANCE) 10 mg tablet Take 1 tablet (10 mg total) by mouth once daily. 90 tablet 4    gabapentin (NEURONTIN) 300 MG capsule Take 1 capsule (300 mg total) by mouth every evening. In 1 wk, if no relief, increase to twice daily.In another wk, may increase to 3 times. 90 capsule 3    ketoconazole (NIZORAL) 2 % cream Apply topically once daily. 1 Tube 3    losartan-hydrochlorothiazide 100-12.5 mg (HYZAAR) 100-12.5 mg Tab One daily for HTN 90 tablet 3    metFORMIN (GLUCOPHAGE) 1000 MG tablet Take 1 tablet (1,000 mg total) by mouth 2 (two) times daily with meals. For diabetes 180 tablet 3    pantoprazole (PROTONIX) 40 MG tablet Take 1 tablet (40 mg  total) by mouth once daily. For stomach 90 tablet 3    rosuvastatin (CRESTOR) 20 MG tablet TAKE 1 TABLET(20 MG) BY MOUTH EVERY DAY 90 tablet 3    SITagliptin (JANUVIA) 100 MG Tab Take 1 tablet (100 mg total) by mouth once daily. For diabetes 90 tablet 3    vardenafiL (LEVITRA) 5 MG tablet Take 1 tablet (5 mg total) by mouth as needed for Erectile Dysfunction. 5 tablet 0    tadalafil (CIALIS) 20 MG Tab Take 1 tablet (20 mg total) by mouth daily as needed. (Patient not taking: Reported on 10/2/2020) 30 tablet 11     Current Facility-Administered Medications   Medication Dose Route Frequency Provider Last Rate Last Dose    dexamethasone injection 4 mg  4 mg Other 1 time in Clinic/HOD Christine العلي PA-C        lidocaine (PF) 10 mg/ml (1%) injection 5 mg  0.5 mL Other Once Christine العلي PA-C           Past Medical History:   Diagnosis Date    Cataract     Decreased sensation of lower extremity     Diabetes mellitus     Diabetic neuropathy     Dysarthria     Dysphagia     ED (erectile dysfunction)     Glaucoma     Hemiparesis     LEFT side post CVA    High cholesterol     History of hepatitis C, s/p successful Rx w/ cure (SVR12) 4/2020     Hypertension     Impaired functional mobility, balance, gait, and endurance     Stroke     Urinary frequency        Past Surgical History:   Procedure Laterality Date    INGUINAL HERNIA REPAIR Left 12/11/2019    UNDESCENDED TESTICLE EXPLORATION Right     R testicle removed as it was undescended       Review of Systems:  Constitutional: Negative for chills and fever.   Respiratory: Negative for cough and shortness of breath.    Gastrointestinal: Negative for nausea and vomiting.   Skin: Negative for rash.   Neurological: Negative for dizziness and headaches.   Psychiatric/Behavioral: Negative for depression.   MSK as in HPI       OBJECTIVE:     PHYSICAL EXAM:  BP (!) 159/93 (BP Location: Right arm, Patient Position: Sitting, BP Method: Large (Automatic))   Pulse  "67   Ht 5' 10" (1.778 m)   Wt 97.5 kg (215 lb)   BMI 30.85 kg/m²     GEN:  NAD, well-developed, well-groomed.  NEURO: Awake, alert, and oriented. Normal attention and concentration.    PSYCH: Normal mood and affect. Behavior is normal.  HEENT: No cervical lymphadenopathy noted.  CARDIOVASCULAR: Radial pulses 2+ bilaterally. No LE edema noted.  PULMONARY: Breath sounds normal. No respiratory distress.  SKIN: Intact, no rashes.      MSK:   RUE:  Good active ROM of the wrist and fingers. There is ttp over the ring A1 pulley with mild edema, palpable nodule present. No triggering but pt does have decreased full flexion of the finger. AIN/PIN/Radial/Median/Ulnar Nerves assessed in isolation without deficit. Radial & Ulnar arteries palpated 2+. Capillary Refill <3s.      RADIOGRAPHS:  Xray right hand 10/6/20  FINDINGS:  Three views right: No fracture dislocation bone destruction seen.  Comments: I have personally reviewed the imaging and I agree with the above radiologist's report.    ASSESSMENT/PLAN:       ICD-10-CM ICD-9-CM   1. Trigger ring finger of right hand  M65.341 727.03       Orders Placed This Encounter    lidocaine (PF) 10 mg/ml (1%) injection 5 mg    dexamethasone injection 4 mg     Plan:   -pt wishes to proceed with a right ring trigger finger injection   -RTC 6 wks if symptoms persist       PROCEDURE:  I have explained the risks, benefits, and alternatives of the procedure in detail.  The patient voices understanding and all questions have been answered.  The patient agrees to proceed as planned. US used.So after a sterile prep of the skin in the normal fashion the right ring flexor tendon sheath is injected from the volar  approach using a 25 gauge needle with a combination of 0.5cc 1% plain lidocaine and 4 mg of dexamethasone.  The patient is cautioned and immediate relief of pain is secondary to the local anesthetic and will be temporary.  After the anesthetic wears off there may be a increase in " pain that may last for a few hours or a few days and they should use ice to help alleviate this flair up of pain. Patient tolerated the procedure well.       The patient indicates understanding of these issues and agrees to the plan.    Christine العلي PA-C  [facility]   [facility]  [place], [place]

## 2020-10-26 DIAGNOSIS — N52.9 ERECTILE DYSFUNCTION, UNSPECIFIED ERECTILE DYSFUNCTION TYPE: ICD-10-CM

## 2020-10-26 RX ORDER — VARDENAFIL HYDROCHLORIDE 5 MG/1
5 TABLET ORAL
Qty: 5 TABLET | Refills: 0 | Status: SHIPPED | OUTPATIENT
Start: 2020-10-26 | End: 2023-03-03

## 2020-10-26 NOTE — TELEPHONE ENCOUNTER
Spoke with pt. Pt states the pharmacy still does not have the medication we sent in. Pt is requesting the medication be sent to [facility] to see if they have the medication in stock. Advised pt I would send a message to Naya Soria NP to see if she could send it over. All questions answered. Pt verbalized understanding.    H&P was completed/Contractions pattern was reviewed/Order was written/FHR was reviewed/Induction / Augmentation was discussed

## 2020-10-26 NOTE — TELEPHONE ENCOUNTER
----- Message from Garima Torres sent at 10/26/2020  4:12 PM CDT -----  Regarding: pt  Pt is calling to speak with the nurse pt said he received a call from his pharmacy to see if there is another medication she will like to order the medication that was called in was out of stock. Can you please call pt at [phone].    JASWINDER

## 2020-10-30 ENCOUNTER — OFFICE VISIT (OUTPATIENT)
Dept: PHYSICAL MEDICINE AND REHAB | Facility: CLINIC | Age: 70
End: 2020-10-30
Payer: MEDICARE

## 2020-10-30 ENCOUNTER — TELEPHONE (OUTPATIENT)
Dept: UROLOGY | Facility: CLINIC | Age: 70
End: 2020-10-30

## 2020-10-30 VITALS
BODY MASS INDEX: 30.89 KG/M2 | WEIGHT: 215.75 LBS | SYSTOLIC BLOOD PRESSURE: 154 MMHG | DIASTOLIC BLOOD PRESSURE: 85 MMHG | HEART RATE: 75 BPM | HEIGHT: 70 IN

## 2020-10-30 DIAGNOSIS — R47.1 DYSARTHRIA: ICD-10-CM

## 2020-10-30 DIAGNOSIS — Z74.09 IMPAIRED MOBILITY AND ADLS: ICD-10-CM

## 2020-10-30 DIAGNOSIS — M79.2 NEUROPATHIC PAIN: ICD-10-CM

## 2020-10-30 DIAGNOSIS — M25.50 ARTHRALGIA, UNSPECIFIED JOINT: ICD-10-CM

## 2020-10-30 DIAGNOSIS — Z78.9 IMPAIRED MOBILITY AND ADLS: ICD-10-CM

## 2020-10-30 DIAGNOSIS — I63.329 CEREBROVASCULAR ACCIDENT (CVA) DUE TO THROMBOSIS OF ANTERIOR CEREBRAL ARTERY, UNSPECIFIED BLOOD VESSEL LATERALITY: ICD-10-CM

## 2020-10-30 DIAGNOSIS — I69.354 HEMIPARESIS AFFECTING LEFT SIDE AS LATE EFFECT OF CEREBROVASCULAR ACCIDENT (CVA): Primary | ICD-10-CM

## 2020-10-30 PROCEDURE — 99214 OFFICE O/P EST MOD 30 MIN: CPT | Mod: S$PBB,,, | Performed by: PHYSICAL MEDICINE & REHABILITATION

## 2020-10-30 PROCEDURE — 99214 PR OFFICE/OUTPT VISIT, EST, LEVL IV, 30-39 MIN: ICD-10-PCS | Mod: S$PBB,,, | Performed by: PHYSICAL MEDICINE & REHABILITATION

## 2020-10-30 PROCEDURE — 99999 PR PBB SHADOW E&M-EST. PATIENT-LVL II: ICD-10-PCS | Mod: PBBFAC,,, | Performed by: PHYSICAL MEDICINE & REHABILITATION

## 2020-10-30 PROCEDURE — 99212 OFFICE O/P EST SF 10 MIN: CPT | Mod: PBBFAC | Performed by: PHYSICAL MEDICINE & REHABILITATION

## 2020-10-30 PROCEDURE — 99999 PR PBB SHADOW E&M-EST. PATIENT-LVL II: CPT | Mod: PBBFAC,,, | Performed by: PHYSICAL MEDICINE & REHABILITATION

## 2020-10-30 RX ORDER — GABAPENTIN 300 MG/1
300 CAPSULE ORAL 3 TIMES DAILY
Qty: 120 CAPSULE | Refills: 3
Start: 2020-10-30 | End: 2021-04-05 | Stop reason: SDUPTHER

## 2020-10-30 RX ORDER — ACETAMINOPHEN 500 MG
500-1000 TABLET ORAL 3 TIMES DAILY PRN
Refills: 0 | COMMUNITY
Start: 2020-10-30 | End: 2020-12-18

## 2020-10-30 NOTE — TELEPHONE ENCOUNTER
I believe there is an order form on the wall that he can get one through mail delivery. You might have to mail him the order...I believe he has limited transportation.   Thanks so much!   Have a great weekend!   M

## 2020-10-30 NOTE — TELEPHONE ENCOUNTER
Spoke with pt. Pt states the medication was too expensive for him. Pt states he thinks he would like to try the pump. Advised pt I would let Naya Soria NP know and be in touch with him with her recommendations. All questions answered. Pt verbalized understanding.

## 2020-10-30 NOTE — PROGRESS NOTES
Subjective:       Patient ID: Jae Millan is a 69 y.o. male.    Chief Complaint: No chief complaint on file.    HPI     Mr. Millan is a 69-year-old right-handed black male with past medical history of hypertension, diabetes mellitus and ischemic stroke with left hemiparesis, dysarthria and dysphagia in 08/2018.  He is followed up in the Physical Medicine Clinic for his stroke care and pain management.  His last visit was on 06/02/2020.  He was started on gabapentin and maintained on Voltaren gel.  He was referred to physical, occupational and speech therapy.    The patient worked with physical, occupational and speech therapy.  He was discharged from OT and PT on 09/29/2022 to meeting his goals.  His last visit to speech therapy was on 08/03 with improvement noted.    Since last visit, he has also been followed up at [facility].  On 10/21/2020 underwent right ring finger injection for trigger finger.  He notes some improvement.    The patient is coming to the clinic for follow-up.  Since last visit he has been medically stable.  His left-sided body pain has been slightly better.  It is an almost constant sensation of aching and numbness.  It is worse in his shoulder, elbow, hand and knee It is aggravated by lying down and better with gentle movement.      He is currently taking:  - gabapentin 300 mg capsules twice per day.  - Tylenol 500 mg tablets, 2 tablets once per day  - Voltaren gel 2-3 times per day as needed with good relief.        Past Medical History:   Diagnosis Date    Cataract     Decreased sensation of lower extremity     Diabetes mellitus     Diabetic neuropathy     Dysarthria     Dysphagia     ED (erectile dysfunction)     Glaucoma     Hemiparesis     LEFT side post CVA    High cholesterol     History of hepatitis C, s/p successful Rx w/ cure (SVR12) 4/2020     Hypertension     Impaired functional mobility, balance, gait, and endurance     Stroke     Urinary  frequency          Review of Systems   Constitutional: Positive for fatigue. Negative for chills and fever.   HENT: Negative for trouble swallowing.    Eyes: Positive for visual disturbance.   Respiratory: Negative for shortness of breath.    Cardiovascular: Negative for chest pain.   Gastrointestinal: Negative for constipation, nausea and vomiting.   Genitourinary: Positive for difficulty urinating and urgency.   Musculoskeletal: Positive for arthralgias (left elbow, left knee, right hand), gait problem and neck stiffness. Negative for back pain and neck pain.   Neurological: Positive for weakness and headaches. Negative for dizziness.   Psychiatric/Behavioral: Positive for sleep disturbance. Negative for behavioral problems.       Objective:      Physical Exam  Vitals signs reviewed.   Constitutional:       General: He is not in acute distress.     Appearance: He is well-developed.   HENT:      Head: Normocephalic and atraumatic.   Neck:      Musculoskeletal: Normal range of motion.   Musculoskeletal:      Comments: BUE:  ROM:   RUE: full.   LUE: full. Increased tone (modified Estefanía 2). Mild hand swelling.  Strength:    RUE: 5/5 at shoulder abduction, 5 elbow flexion, 5 elbow extension, 5 hand .   LUE: 2+/5 at shoulder abduction, 3- elbow flexion, 3 elbow extension, 3 hand .  Sensation to pinprick:   RUE: intact.   LUE: intact.      BLE:  ROM:   RLE: full.   LLE: full.Increased tone (modified Estefanía 2)  Knee crepitus:   RLE: -ve.   LLE: -ve.   Strength:    RLE: 5/5 at hip flexion, 5 knee extension, 5 ankle DF, 5 PF.   LLE: 3+/5 at hip flexion, 5- knee extension, 5- ankle DF, 5- PF.  Sensation to pinprick:     RLE: intact.      LLE: intact.         Gait: slow alysha, using a straight cane in Rt hand.     Skin:     General: Skin is warm.   Neurological:      Mental Status: He is alert.   Psychiatric:         Behavior: Behavior normal.         Assessment:       1. Hemiparesis affecting left side as late  effect of cerebrovascular accident (CVA)    2. Cerebrovascular accident (CVA) due to thrombosis of anterior cerebral artery, unspecified blood vessel laterality    3. Dysarthria    4. Neuropathic pain    5. Arthralgia, unspecified joint    6. Impaired mobility and ADLs        Plan:         - Continue stroke prevention care (control HTN, DM, hyperlipidemia; ASA).  - Increase gabapentin (NEURONTIN) 300 MG capsule; Take 1 capsule (300 mg total) by mouth 3 (three) times daily. In 1 wk, if no relief, increase to 1 capsule every morning and after noon, and two at bedtime (4 capsules total daily)  - Increase acetaminophen (TYLENOL) 500 MG tablet; Take 1-2 tablets (500-1,000 mg total) by mouth 3 (three) times daily as needed for Pain.  - Continue diclofenac sodium (VOLTAREN) 1 % Gel; Apply 2 g topically 3 (three) times daily as needed. Apply to painful joints  - Regular home exercise program was encouraged.  - Follow up in about 4 months (around 2/28/2021).    This was a 25 minute visit, 50% of which was spent educating the patient about the diagnosis and the treatment plan.    This note was partly generated with [employer] voice recognition software. I apologize for any possible typographical errors.

## 2020-11-02 NOTE — TELEPHONE ENCOUNTER
Spoke with pt. Advised pt I would mail him the order form for the pump. All questions answered. Pt verbalized understanding.

## 2020-11-11 NOTE — PROGRESS NOTES
Subjective:       Patient ID: Jae Millan is a 69 y.o. AA male who presents for f/u of   Chronic Kidney Disease      HPI     Last seen by me Oct. 2020    Patient presents for f/u of CKD and albuminuria and HTN.  Baseline creatinine slowly trending upward; 1.1 in April 2019 to 1.4 May 2020 to 1.7 in Oct 2020. (Increase to 1.7 expected p starting Jardiance).    Significant hx includes CVA c left hemiparesis, HTN >15 years, HLD, HCV s/p cure, DM2 > 15 years.    Home BPs: does not take BPs at home; does not add salt to food.    The patient denies taking NSAIDs, herbal supplements, or new antibiotics, recreational drugs, recent episode of dehydration, diarrhea, nausea or vomiting, acute illness, hospitalization or exposure to IV radiocontrast. On PPI.    Significant family hx includes: HTN, heart disease; no known renal disease    Last renal US: none in EMR  Last abdominal US: 9/19/19, reviewed; cyst noted on right kidney    Review of Systems   Respiratory: Negative for shortness of breath.    Cardiovascular: Negative for chest pain, palpitations and leg swelling.   Gastrointestinal: Negative for diarrhea, nausea and vomiting.   Genitourinary: Positive for frequency (at night ) and urgency (at night). Negative for difficulty urinating, dysuria, flank pain and hematuria.        Nocturia    Musculoskeletal: Negative for back pain.        Swelling to left arm    Neurological: Positive for weakness (to left side) and numbness (to left side in shoulder, fingers, leg, toes). Negative for dizziness.        Left hemiparesis       Objective:       Blood pressure (!) 152/80, pulse 68, weight 97 kg (213 lb 13.5 oz), SpO2 97 %.  Physical Exam  Vitals signs reviewed.   Constitutional:       General: He is not in acute distress.     Appearance: He is well-developed. He is obese. He is not diaphoretic.   Neck:      Musculoskeletal: Neck supple.      Vascular: No JVD.   Cardiovascular:      Rate and Rhythm: Normal rate and regular  rhythm.      Heart sounds: Normal heart sounds. No murmur. No friction rub. No gallop.    Pulmonary:      Effort: Pulmonary effort is normal. No respiratory distress.      Breath sounds: No wheezing or rales.   Abdominal:      General: Bowel sounds are normal. There is no distension.      Palpations: Abdomen is soft.      Tenderness: There is no abdominal tenderness. There is no right CVA tenderness or left CVA tenderness.   Musculoskeletal:      Right lower leg: No edema.      Left lower leg: No edema.      Comments: Swelling to left hand   Skin:     General: Skin is warm and dry.      Findings: No lesion or rash.   Neurological:      Mental Status: He is alert and oriented to person, place, and time.   Psychiatric:         Mood and Affect: Mood normal.         Behavior: Behavior normal.         Thought Content: Thought content normal.         Judgment: Judgment normal.           Lab Results   Component Value Date    CREATININE 1.7 (H) 10/02/2020     Prot/Creat Ratio, Urine   Date Value Ref Range Status   10/02/2020 0.69 (H) 0.00 - 0.20 Final     Lab Results   Component Value Date     10/02/2020    K 4.5 10/02/2020    CO2 24 10/02/2020     10/02/2020     Lab Results   Component Value Date    CALCIUM 9.2 10/02/2020    PHOS 3.3 10/02/2020     Lab Results   Component Value Date    HGB 13.1 (L) 09/04/2020    WBC 5.61 09/04/2020    HCT 39.1 (L) 09/04/2020      Lab Results   Component Value Date    HGBA1C 7.4 (H) 09/04/2020     09/04/2020    BUN 18 10/02/2020     Lab Results   Component Value Date    LDLCALC 92.2 05/20/2020         Assessment:       No diagnosis found.    Plan:   CKD stage 3A c eGFR 59 mL/min - likely due to DM nephropathy (albuminuria) coupled with nephrosclerosis (CVA, HTN, HLD). Proteinuric. sCr has been rising since April 2019, recently c expected to see a rise in sCr of 25-30% (to 1.7) after starting Jardiance.   Educated patient to control BP, BG, remain well-hydrated, and avoid  NSAIDs to prevent progression of CKD.    UPCR Has ~ 700 mg proteinuria. Jardiance started on 9/18/2020. On losartan 100 mg.  Will check SPEP, JAQUAN, FLC   Acid-base WNL   Renal osteodystrophy Ca okay. Will obtain phos, PTH for next visit.   Anemia Hgb at goal for CKD   DM A1c slightly above goal. On jardiance, metformin.   Lipid Management On statin.   ESRD planning Defer     HTN - High today on amlodipine 10 mg, losartan-Hctz 100-12.5 mg; goal is BP < 130/80. Encouraged low salt diet; pt has been eating a lot of deli meat.  - increase Hctz to 25 mg  - pt does not have BP cuff  - pt to return on 12/7 for BP check    Renal cysts - will obtain renal US    Nocturia - will message urology for pt    All questions patient had were answered.  Asked if further questions. None. F/u in clinic 1 mos  with labs and urine prior to next visit or sooner if needed.  ER for emergency concerns.    Summary of Plan:  1.SPEP, JAQUAN, FLC to 11/18 bloodwork  2. Renal US  3. Agree with Jardiance  4. avoid NSAID/ bactrim/ IV contrast/ gadolinium/ aminoglycoside where possible  5. Increase hctz to 25 mg  6. BP check on 12/7 before eye appt.  7. RTC in 3 mos

## 2020-11-13 ENCOUNTER — OFFICE VISIT (OUTPATIENT)
Dept: NEPHROLOGY | Facility: CLINIC | Age: 70
End: 2020-11-13
Payer: MEDICARE

## 2020-11-13 ENCOUNTER — TELEPHONE (OUTPATIENT)
Dept: UROLOGY | Facility: CLINIC | Age: 70
End: 2020-11-13

## 2020-11-13 VITALS
WEIGHT: 213.88 LBS | DIASTOLIC BLOOD PRESSURE: 92 MMHG | HEART RATE: 68 BPM | OXYGEN SATURATION: 97 % | BODY MASS INDEX: 30.68 KG/M2 | SYSTOLIC BLOOD PRESSURE: 162 MMHG

## 2020-11-13 DIAGNOSIS — I10 HYPERTENSION, UNSPECIFIED TYPE: ICD-10-CM

## 2020-11-13 DIAGNOSIS — R80.9 PROTEINURIA, UNSPECIFIED TYPE: ICD-10-CM

## 2020-11-13 DIAGNOSIS — N28.1 RENAL CYST: ICD-10-CM

## 2020-11-13 DIAGNOSIS — N18.31 STAGE 3A CHRONIC KIDNEY DISEASE: Primary | ICD-10-CM

## 2020-11-13 PROCEDURE — 99214 PR OFFICE/OUTPT VISIT, EST, LEVL IV, 30-39 MIN: ICD-10-PCS | Mod: S$PBB,,, | Performed by: NURSE PRACTITIONER

## 2020-11-13 PROCEDURE — 99215 OFFICE O/P EST HI 40 MIN: CPT | Mod: PBBFAC | Performed by: NURSE PRACTITIONER

## 2020-11-13 PROCEDURE — 99999 PR PBB SHADOW E&M-EST. PATIENT-LVL V: ICD-10-PCS | Mod: PBBFAC,,, | Performed by: NURSE PRACTITIONER

## 2020-11-13 PROCEDURE — 99999 PR PBB SHADOW E&M-EST. PATIENT-LVL V: CPT | Mod: PBBFAC,,, | Performed by: NURSE PRACTITIONER

## 2020-11-13 PROCEDURE — 99214 OFFICE O/P EST MOD 30 MIN: CPT | Mod: S$PBB,,, | Performed by: NURSE PRACTITIONER

## 2020-11-13 RX ORDER — LOSARTAN POTASSIUM AND HYDROCHLOROTHIAZIDE 25; 100 MG/1; MG/1
1 TABLET ORAL DAILY
Qty: 90 TABLET | Refills: 3 | Status: SHIPPED | OUTPATIENT
Start: 2020-11-13 | End: 2021-07-22 | Stop reason: SDUPTHER

## 2020-11-13 NOTE — TELEPHONE ENCOUNTER
----- Message from Naya Sorai NP sent at 11/13/2020  3:41 PM CST -----  Can you reach out to Mr. Millan and see if we can set up an appt to assess his urinary symptoms?  Ever Person   ----- Message -----  From: Orquidea Koehler NP  Sent: 11/13/2020   3:33 PM CST  To: MARLON Bullard,  I follow Mr. Millan in the nephrology clinic. He has severe nocturia; last time I saw him he said he was going ~ 8x/night. I told him I'd message you for him.  Thanks,  SR

## 2020-11-13 NOTE — Clinical Note
Reynaldo Person,  I follow Mr. Millan in the nephrology clinic. He has severe nocturia; last time I saw him he said he was going ~ 8x/night. I told him I'd message you for him.  Thanks,  SR

## 2020-11-13 NOTE — PATIENT INSTRUCTIONS
Get kidney ultrasound. Bloodwork on 11/18.  Increase losartan-hctz to 100-12.5 to losartan-hctz 25 mg  Continue low sodium diet.  Avoid NSAID (ibuprofen, advil, motrin, aleve, naprosyn, naproxen, Stan[place], Goody's Powder). Tylenol is okay.  Avoid bactrim/ IV contrast/ gadolinium/ aminoglycoside where possible.  Avoid herbal supplements.  Stay hydrated.  Return to clinic in 3 months with labs (bloodwork and urine) or sooner if needed.  Go to the ER with any emergency concerns.

## 2020-11-16 ENCOUNTER — TELEPHONE (OUTPATIENT)
Dept: UROLOGY | Facility: CLINIC | Age: 70
End: 2020-11-16

## 2020-11-16 NOTE — TELEPHONE ENCOUNTER
----- Message from Naya Soria NP sent at 11/13/2020  3:41 PM CST -----  Can you reach out to Mr. Millan and see if we can set up an appt to assess his urinary symptoms?  Ever Person   ----- Message -----  From: Orquidea Koehler NP  Sent: 11/13/2020   3:33 PM CST  To: MARLON Bullard,  I follow Mr. Millan in the nephrology clinic. He has severe nocturia; last time I saw him he said he was going ~ 8x/night. I told him I'd message you for him.  Thanks,  SR

## 2020-11-16 NOTE — TELEPHONE ENCOUNTER
Spoke with patient. Patient understood that appoinement has been made with Naya Soria NP. All questions answered.

## 2020-11-18 ENCOUNTER — LAB VISIT (OUTPATIENT)
Dept: LAB | Facility: HOSPITAL | Age: 70
End: 2020-11-18
Payer: MEDICARE

## 2020-11-18 DIAGNOSIS — I10 HYPERTENSION, UNSPECIFIED TYPE: ICD-10-CM

## 2020-11-18 DIAGNOSIS — N18.31 STAGE 3A CHRONIC KIDNEY DISEASE: ICD-10-CM

## 2020-11-18 DIAGNOSIS — R80.9 PROTEINURIA, UNSPECIFIED TYPE: ICD-10-CM

## 2020-11-18 DIAGNOSIS — Z86.19 HISTORY OF HEPATITIS C: ICD-10-CM

## 2020-11-18 DIAGNOSIS — R41.3 MEMORY LOSS: ICD-10-CM

## 2020-11-18 LAB
ALBUMIN SERPL BCP-MCNC: 3.9 G/DL (ref 3.5–5.2)
ANION GAP SERPL CALC-SCNC: 9 MMOL/L (ref 8–16)
BUN SERPL-MCNC: 18 MG/DL (ref 8–23)
CALCIUM SERPL-MCNC: 9.1 MG/DL (ref 8.7–10.5)
CHLORIDE SERPL-SCNC: 108 MMOL/L (ref 95–110)
CO2 SERPL-SCNC: 24 MMOL/L (ref 23–29)
CREAT SERPL-MCNC: 1.5 MG/DL (ref 0.5–1.4)
EST. GFR  (AFRICAN AMERICAN): 54.9 ML/MIN/1.73 M^2
EST. GFR  (NON AFRICAN AMERICAN): 47.5 ML/MIN/1.73 M^2
GLUCOSE SERPL-MCNC: 141 MG/DL (ref 70–110)
PHOSPHATE SERPL-MCNC: 3.3 MG/DL (ref 2.7–4.5)
POTASSIUM SERPL-SCNC: 4 MMOL/L (ref 3.5–5.1)
SODIUM SERPL-SCNC: 141 MMOL/L (ref 136–145)
VIT B12 SERPL-MCNC: 383 PG/ML (ref 210–950)

## 2020-11-18 PROCEDURE — 86334 IMMUNOFIX E-PHORESIS SERUM: CPT | Mod: 26,,, | Performed by: PATHOLOGY

## 2020-11-18 PROCEDURE — 86334 PATHOLOGIST INTERPRETATION IFE: ICD-10-PCS | Mod: 26,,, | Performed by: PATHOLOGY

## 2020-11-18 PROCEDURE — 83520 IMMUNOASSAY QUANT NOS NONAB: CPT | Mod: 59

## 2020-11-18 PROCEDURE — 36415 COLL VENOUS BLD VENIPUNCTURE: CPT

## 2020-11-18 PROCEDURE — 84165 PROTEIN E-PHORESIS SERUM: CPT | Mod: 26,,, | Performed by: PATHOLOGY

## 2020-11-18 PROCEDURE — 86334 IMMUNOFIX E-PHORESIS SERUM: CPT

## 2020-11-18 PROCEDURE — 82607 VITAMIN B-12: CPT

## 2020-11-18 PROCEDURE — 84165 PROTEIN E-PHORESIS SERUM: CPT

## 2020-11-18 PROCEDURE — 87522 HEPATITIS C REVRS TRNSCRPJ: CPT

## 2020-11-18 PROCEDURE — 83921 ORGANIC ACID SINGLE QUANT: CPT

## 2020-11-18 PROCEDURE — 80069 RENAL FUNCTION PANEL: CPT

## 2020-11-18 PROCEDURE — 86592 SYPHILIS TEST NON-TREP QUAL: CPT

## 2020-11-18 PROCEDURE — 84165 PATHOLOGIST INTERPRETATION SPE: ICD-10-PCS | Mod: 26,,, | Performed by: PATHOLOGY

## 2020-11-19 LAB
ALBUMIN SERPL ELPH-MCNC: 4.44 G/DL (ref 3.35–5.55)
ALPHA1 GLOB SERPL ELPH-MCNC: 0.29 G/DL (ref 0.17–0.41)
ALPHA2 GLOB SERPL ELPH-MCNC: 1.23 G/DL (ref 0.43–0.99)
B-GLOBULIN SERPL ELPH-MCNC: 0.87 G/DL (ref 0.5–1.1)
GAMMA GLOB SERPL ELPH-MCNC: 1.55 G/DL (ref 0.67–1.58)
INTERPRETATION SERPL IFE-IMP: NORMAL
KAPPA LC SER QL IA: 4.68 MG/DL (ref 0.33–1.94)
KAPPA LC/LAMBDA SER IA: 1.51 (ref 0.26–1.65)
LAMBDA LC SER QL IA: 3.09 MG/DL (ref 0.57–2.63)
PROT SERPL-MCNC: 8.4 G/DL (ref 6–8.4)
RPR SER QL: NORMAL

## 2020-11-20 ENCOUNTER — TELEPHONE (OUTPATIENT)
Dept: NEPHROLOGY | Facility: CLINIC | Age: 70
End: 2020-11-20

## 2020-11-20 LAB
PATHOLOGIST INTERPRETATION IFE: NORMAL
PATHOLOGIST INTERPRETATION SPE: NORMAL

## 2020-11-20 NOTE — TELEPHONE ENCOUNTER
----- Message from Orquidea Koehler NP sent at 11/20/2020 12:19 PM CST -----  Pls notify pt that kidney function is moving back toward baseline, which is good.  Also checked for extra proteins in blood, but his looked as expected for having CKD, which is good.

## 2020-11-21 LAB
HCV RNA SERPL NAA+PROBE-LOG IU: <1.08 LOG (10) IU/ML
HCV RNA SERPL QL NAA+PROBE: NOT DETECTED IU/ML
HCV RNA SPEC NAA+PROBE-ACNC: <12 IU/ML
METHYLMALONATE SERPL-SCNC: 0.49 UMOL/L

## 2020-11-24 ENCOUNTER — HOSPITAL ENCOUNTER (OUTPATIENT)
Dept: RADIOLOGY | Facility: HOSPITAL | Age: 70
Discharge: HOME | End: 2020-11-24
Attending: NURSE PRACTITIONER
Payer: MEDICARE

## 2020-11-24 ENCOUNTER — TELEPHONE (OUTPATIENT)
Dept: NEPHROLOGY | Facility: CLINIC | Age: 70
End: 2020-11-24

## 2020-11-24 DIAGNOSIS — N28.1 RENAL CYST: ICD-10-CM

## 2020-11-24 DIAGNOSIS — N18.31 STAGE 3A CHRONIC KIDNEY DISEASE: ICD-10-CM

## 2020-11-24 PROCEDURE — 76770 US EXAM ABDO BACK WALL COMP: CPT | Mod: TC

## 2020-11-24 PROCEDURE — 76770 US RETROPERITONEAL COMPLETE: ICD-10-PCS | Mod: 26,,, | Performed by: RADIOLOGY

## 2020-11-24 PROCEDURE — 76770 US EXAM ABDO BACK WALL COMP: CPT | Mod: 26,,, | Performed by: RADIOLOGY

## 2020-11-24 NOTE — TELEPHONE ENCOUNTER
----- Message from Orquidea Koehler NP sent at 11/24/2020  1:04 PM CST -----  Pls notify pt kidney ultrasound looks okay. Still has cyst, but it hasn't changed in the past year, which is good. The cyst should not need continued monitoring.

## 2020-11-24 NOTE — PROGRESS NOTES
Pls notify pt kidney ultrasound looks okay. Still has cyst, but it hasn't changed in the past year, which is good. The cyst should not need continued monitoring.

## 2020-11-25 ENCOUNTER — TELEPHONE (OUTPATIENT)
Dept: INTERNAL MEDICINE | Facility: CLINIC | Age: 70
End: 2020-11-25

## 2020-11-25 NOTE — TELEPHONE ENCOUNTER
Called patient and inform him of his results below and recommendation to start Vitamin complex daily in addition to B12 1000 mcg daily will recheck lab in 3 month.  Patient would like to call his number again and leave the name of the Vitamin on the voicemail as he will not remember it.              ----- Message from Maryjane Farias MD sent at 11/22/2020  1:22 PM CST -----  Please inform - your methylmalonic acid is elevated which indicates a mild B12 deficiency - please start a B complex vitamin daily in addition to  a B12 1000 mcg vitamin daily and will recheck lab in 3 months.  This may improve your memory.

## 2020-11-29 ENCOUNTER — PATIENT OUTREACH (OUTPATIENT)
Dept: ADMINISTRATIVE | Facility: OTHER | Age: 70
End: 2020-11-29

## 2020-11-29 NOTE — PROGRESS NOTES
Health Maintenance Due   Topic Date Due    TETANUS VACCINE  12/01/1968    Shingles Vaccine (1 of 2) 12/01/2002     Updates were requested from care everywhere.  Chart was reviewed for overdue Proactive [facility] Encounters ([facility]) topics (CRS, Breast Cancer Screening, Eye exam)  Health Maintenance has been updated.  LINKS immunization registry triggered.  Immunizations were reconciled.\

## 2020-12-02 ENCOUNTER — OFFICE VISIT (OUTPATIENT)
Dept: ORTHOPEDICS | Facility: CLINIC | Age: 70
End: 2020-12-02
Payer: MEDICARE

## 2020-12-02 VITALS
SYSTOLIC BLOOD PRESSURE: 152 MMHG | WEIGHT: 213 LBS | DIASTOLIC BLOOD PRESSURE: 87 MMHG | BODY MASS INDEX: 30.49 KG/M2 | HEIGHT: 70 IN | HEART RATE: 69 BPM

## 2020-12-02 DIAGNOSIS — M65.341 TRIGGER RING FINGER OF RIGHT HAND: Primary | ICD-10-CM

## 2020-12-02 PROCEDURE — 99999 PR PBB SHADOW E&M-EST. PATIENT-LVL IV: CPT | Mod: PBBFAC,,, | Performed by: PHYSICIAN ASSISTANT

## 2020-12-02 PROCEDURE — 20550 NJX 1 TENDON SHEATH/LIGAMENT: CPT | Mod: PBBFAC | Performed by: PHYSICIAN ASSISTANT

## 2020-12-02 PROCEDURE — 20550 NJX 1 TENDON SHEATH/LIGAMENT: CPT | Mod: F8,S$PBB,, | Performed by: PHYSICIAN ASSISTANT

## 2020-12-02 PROCEDURE — 99999 PR PBB SHADOW E&M-EST. PATIENT-LVL IV: ICD-10-PCS | Mod: PBBFAC,,, | Performed by: PHYSICIAN ASSISTANT

## 2020-12-02 PROCEDURE — 99214 OFFICE O/P EST MOD 30 MIN: CPT | Mod: PBBFAC | Performed by: PHYSICIAN ASSISTANT

## 2020-12-02 PROCEDURE — 99214 OFFICE O/P EST MOD 30 MIN: CPT | Mod: 25,S$PBB,, | Performed by: PHYSICIAN ASSISTANT

## 2020-12-02 PROCEDURE — 20550 PR INJECT TENDON SHEATH/LIGAMENT: ICD-10-PCS | Mod: F8,S$PBB,, | Performed by: PHYSICIAN ASSISTANT

## 2020-12-02 PROCEDURE — 99214 PR OFFICE/OUTPT VISIT, EST, LEVL IV, 30-39 MIN: ICD-10-PCS | Mod: 25,S$PBB,, | Performed by: PHYSICIAN ASSISTANT

## 2020-12-02 RX ORDER — LIDOCAINE HYDROCHLORIDE 10 MG/ML
0.5 INJECTION, SOLUTION EPIDURAL; INFILTRATION; INTRACAUDAL; PERINEURAL ONCE
Status: COMPLETED | OUTPATIENT
Start: 2020-12-02 | End: 2020-12-02

## 2020-12-02 RX ORDER — DEXAMETHASONE SODIUM PHOSPHATE 4 MG/ML
4 INJECTION, SOLUTION INTRA-ARTICULAR; INTRALESIONAL; INTRAMUSCULAR; INTRAVENOUS; SOFT TISSUE
Status: COMPLETED | OUTPATIENT
Start: 2020-12-02 | End: 2020-12-02

## 2020-12-02 RX ADMIN — DEXAMETHASONE SODIUM PHOSPHATE 4 MG: 4 INJECTION INTRA-ARTICULAR; INTRALESIONAL; INTRAMUSCULAR; INTRAVENOUS; SOFT TISSUE at 10:12

## 2020-12-02 RX ADMIN — LIDOCAINE HYDROCHLORIDE 5 MG: 10 INJECTION, SOLUTION EPIDURAL; INFILTRATION; INTRACAUDAL; PERINEURAL at 11:12

## 2020-12-02 NOTE — PROGRESS NOTES
Subjective:      Patient ID: Jae Millan is a 70 y.o. male.    Chief Complaint: Pain of the Right Hand      HPI  Jae Millan is a right hand dominant 70 y.o. male presenting today for f/u right ring trigger finger. He reports pain at the A1 pulley and decreased motion of the right ring finger. Denies triggering. Denies numbness. An injection was performed at last visit he reports minimal relief only lasting about one day. He had a prior stroke which affected the LUE, he has little use of the LUE.     Review of patient's allergies indicates:  No Known Allergies      Current Outpatient Medications   Medication Sig Dispense Refill    acetaminophen (TYLENOL) 500 MG tablet Take 1-2 tablets (500-1,000 mg total) by mouth 3 (three) times daily as needed for Pain.  0    amLODIPine (NORVASC) 10 MG tablet Take 1 tablet (10 mg total) by mouth once daily. For HTN 90 tablet 3    aspirin 81 MG Chew Take 1 tablet (81 mg total) by mouth once daily.      blood pressure test kit-wrist Kit 1 Units by NOT APPLICABLE route once daily.      carvediloL (COREG) 3.125 MG tablet Take 1 tablet (3.125 mg total) by mouth 2 (two) times daily with meals. 60 tablet 6    diclofenac sodium (VOLTAREN) 1 % Gel Apply 2 g topically 3 (three) times daily as needed. Apply to painful joints 5 Tube 2    dorzolamide-timolol 2-0.5% (COSOPT) 22.3-6.8 mg/mL ophthalmic solution Place 1 drop into the left eye 2 (two) times daily. 10 mL 2    empagliflozin (JARDIANCE) 10 mg tablet Take 1 tablet (10 mg total) by mouth once daily. 90 tablet 4    ketoconazole (NIZORAL) 2 % cream Apply topically once daily. 1 Tube 3    losartan-hydrochlorothiazide 100-25 mg (HYZAAR) 100-25 mg per tablet Take 1 tablet by mouth once daily. 90 tablet 3    metFORMIN (GLUCOPHAGE) 1000 MG tablet Take 1 tablet (1,000 mg total) by mouth 2 (two) times daily with meals. For diabetes 180 tablet 3    pantoprazole (PROTONIX) 40 MG tablet Take 1 tablet (40 mg total) by mouth once  daily. For stomach 90 tablet 3    rosuvastatin (CRESTOR) 20 MG tablet TAKE 1 TABLET(20 MG) BY MOUTH EVERY DAY 90 tablet 3    SITagliptin (JANUVIA) 100 MG Tab Take 1 tablet (100 mg total) by mouth once daily. For diabetes 90 tablet 3    vardenafiL (LEVITRA) 5 MG tablet Take 1 tablet (5 mg total) by mouth as needed for Erectile Dysfunction. 5 tablet 0    gabapentin (NEURONTIN) 300 MG capsule Take 1 capsule (300 mg total) by mouth 3 (three) times daily. In 1 wk, if no relief, increase to 1 capsule every morning and after noon, and two at bedtime (4 capsules total daily) 120 capsule 3    tadalafil (CIALIS) 20 MG Tab Take 1 tablet (20 mg total) by mouth daily as needed. (Patient not taking: Reported on 10/2/2020) 30 tablet 11     Current Facility-Administered Medications   Medication Dose Route Frequency Provider Last Rate Last Dose    dexamethasone injection 4 mg  4 mg Other 1 time in Clinic/HOD Christine العلي PA-C        lidocaine (PF) 10 mg/ml (1%) injection 5 mg  0.5 mL Other Once Christine العلي PA-C           Past Medical History:   Diagnosis Date    Cataract     Decreased sensation of lower extremity     Diabetes mellitus     Diabetic neuropathy     Dysarthria     Dysphagia     ED (erectile dysfunction)     Glaucoma     Hemiparesis     LEFT side post CVA    High cholesterol     History of hepatitis C, s/p successful Rx w/ cure (SVR12) 4/2020     Hypertension     Impaired functional mobility, balance, gait, and endurance     Stroke     Urinary frequency        Past Surgical History:   Procedure Laterality Date    INGUINAL HERNIA REPAIR Left 12/11/2019    UNDESCENDED TESTICLE EXPLORATION Right     R testicle removed as it was undescended       Review of Systems:  Constitutional: Negative for chills and fever.   Respiratory: Negative for cough and shortness of breath.    Gastrointestinal: Negative for nausea and vomiting.   Skin: Negative for rash.   Neurological: Negative for dizziness and  "headaches.   Psychiatric/Behavioral: Negative for depression.   MSK as in HPI       OBJECTIVE:     PHYSICAL EXAM:  BP (!) 152/87 (BP Location: Right arm, Patient Position: Sitting, BP Method: Large (Automatic))   Pulse 69   Ht 5' 10" (1.778 m)   Wt 96.6 kg (213 lb)   BMI 30.56 kg/m²     GEN:  NAD, well-developed, well-groomed.  NEURO: Awake, alert, and oriented. Normal attention and concentration.    PSYCH: Normal mood and affect. Behavior is normal.  HEENT: No cervical lymphadenopathy noted.  CARDIOVASCULAR: Radial pulses 2+ bilaterally. No LE edema noted.  PULMONARY: Breath sounds normal. No respiratory distress.  SKIN: Intact, no rashes.      MSK:   RUE:  Good active ROM of the wrist and fingers. There is ttp over the ring A1 pulley with mild edema, palpable nodule present. No triggering but pt does have decreased full flexion of the finger, improved passively though painful. AIN/PIN/Radial/Median/Ulnar Nerves assessed in isolation without deficit. Radial & Ulnar arteries palpated 2+. Capillary Refill <3s.      RADIOGRAPHS:  Xray right hand 10/6/20  FINDINGS:  Three views right: No fracture dislocation bone destruction seen.  Comments: I have personally reviewed the imaging and I agree with the above radiologist's report.    ASSESSMENT/PLAN:       ICD-10-CM ICD-9-CM   1. Trigger ring finger of right hand  M65.341 727.03       Orders Placed This Encounter    lidocaine (PF) 10 mg/ml (1%) injection 5 mg    dexamethasone injection 4 mg     Plan:   -pt wishes to proceed with a repeat right ring trigger finger injection. He declines OT.  -RTC 6 wks if symptoms persist, may discuss right ring trigger finger release though he may have a difficult time with post op restrictions due to limited use of the LUE       PROCEDURE:  I have explained the risks, benefits, and alternatives of the procedure in detail.  The patient voices understanding and all questions have been answered.  The patient agrees to proceed as " planned. US used.So after a sterile prep of the skin in the normal fashion the right ring flexor tendon sheath is injected from the volar  approach using a 25 gauge needle with a combination of 0.5cc 1% plain lidocaine and 4 mg of dexamethasone.  The patient is cautioned and immediate relief of pain is secondary to the local anesthetic and will be temporary.  After the anesthetic wears off there may be a increase in pain that may last for a few hours or a few days and they should use ice to help alleviate this flair up of pain. Patient tolerated the procedure well.       The patient indicates understanding of these issues and agrees to the plan.    Christine العلي PA-C  [facility]   [facility] [place], [place]

## 2020-12-11 ENCOUNTER — LAB VISIT (OUTPATIENT)
Dept: LAB | Facility: HOSPITAL | Age: 70
End: 2020-12-11
Attending: INTERNAL MEDICINE
Payer: MEDICARE

## 2020-12-11 DIAGNOSIS — E11.40 TYPE 2 DIABETES MELLITUS WITH DIABETIC NEUROPATHY, WITHOUT LONG-TERM CURRENT USE OF INSULIN: ICD-10-CM

## 2020-12-11 LAB
ALBUMIN SERPL BCP-MCNC: 3.7 G/DL (ref 3.5–5.2)
ALP SERPL-CCNC: 72 U/L (ref 55–135)
ALT SERPL W/O P-5'-P-CCNC: 17 U/L (ref 10–44)
ANION GAP SERPL CALC-SCNC: 9 MMOL/L (ref 8–16)
AST SERPL-CCNC: 14 U/L (ref 10–40)
BILIRUB SERPL-MCNC: 0.5 MG/DL (ref 0.1–1)
BUN SERPL-MCNC: 19 MG/DL (ref 8–23)
CALCIUM SERPL-MCNC: 8.9 MG/DL (ref 8.7–10.5)
CHLORIDE SERPL-SCNC: 107 MMOL/L (ref 95–110)
CO2 SERPL-SCNC: 23 MMOL/L (ref 23–29)
CREAT SERPL-MCNC: 1.6 MG/DL (ref 0.5–1.4)
EST. GFR  (AFRICAN AMERICAN): 50.4 ML/MIN/1.73 M^2
EST. GFR  (NON AFRICAN AMERICAN): 43.6 ML/MIN/1.73 M^2
ESTIMATED AVG GLUCOSE: 171 MG/DL (ref 68–131)
GLUCOSE SERPL-MCNC: 187 MG/DL (ref 70–110)
HBA1C MFR BLD HPLC: 7.6 % (ref 4–5.6)
POTASSIUM SERPL-SCNC: 4.1 MMOL/L (ref 3.5–5.1)
PROT SERPL-MCNC: 7.8 G/DL (ref 6–8.4)
SODIUM SERPL-SCNC: 139 MMOL/L (ref 136–145)

## 2020-12-11 PROCEDURE — 83036 HEMOGLOBIN GLYCOSYLATED A1C: CPT

## 2020-12-11 PROCEDURE — 80053 COMPREHEN METABOLIC PANEL: CPT

## 2020-12-11 PROCEDURE — 36415 COLL VENOUS BLD VENIPUNCTURE: CPT

## 2020-12-18 ENCOUNTER — OFFICE VISIT (OUTPATIENT)
Dept: ENDOCRINOLOGY | Facility: CLINIC | Age: 70
End: 2020-12-18
Payer: MEDICARE

## 2020-12-18 VITALS
RESPIRATION RATE: 18 BRPM | DIASTOLIC BLOOD PRESSURE: 70 MMHG | HEART RATE: 75 BPM | SYSTOLIC BLOOD PRESSURE: 138 MMHG | WEIGHT: 217.81 LBS | BODY MASS INDEX: 31.18 KG/M2 | OXYGEN SATURATION: 94 % | HEIGHT: 70 IN

## 2020-12-18 DIAGNOSIS — I10 ESSENTIAL HYPERTENSION: ICD-10-CM

## 2020-12-18 DIAGNOSIS — E29.1 HYPOGONADISM IN MALE: ICD-10-CM

## 2020-12-18 DIAGNOSIS — I63.329 CEREBROVASCULAR ACCIDENT (CVA) DUE TO THROMBOSIS OF ANTERIOR CEREBRAL ARTERY, UNSPECIFIED BLOOD VESSEL LATERALITY: ICD-10-CM

## 2020-12-18 DIAGNOSIS — E11.40 TYPE 2 DIABETES MELLITUS WITH DIABETIC NEUROPATHY, WITHOUT LONG-TERM CURRENT USE OF INSULIN: Primary | ICD-10-CM

## 2020-12-18 DIAGNOSIS — N52.9 ERECTILE DYSFUNCTION, UNSPECIFIED ERECTILE DYSFUNCTION TYPE: ICD-10-CM

## 2020-12-18 PROCEDURE — 99214 OFFICE O/P EST MOD 30 MIN: CPT | Mod: S$PBB,,, | Performed by: INTERNAL MEDICINE

## 2020-12-18 PROCEDURE — 99215 OFFICE O/P EST HI 40 MIN: CPT | Mod: PBBFAC | Performed by: INTERNAL MEDICINE

## 2020-12-18 PROCEDURE — 99999 PR PBB SHADOW E&M-EST. PATIENT-LVL V: CPT | Mod: PBBFAC,,, | Performed by: INTERNAL MEDICINE

## 2020-12-18 PROCEDURE — 99214 PR OFFICE/OUTPT VISIT, EST, LEVL IV, 30-39 MIN: ICD-10-PCS | Mod: S$PBB,,, | Performed by: INTERNAL MEDICINE

## 2020-12-18 PROCEDURE — 99999 PR PBB SHADOW E&M-EST. PATIENT-LVL V: ICD-10-PCS | Mod: PBBFAC,,, | Performed by: INTERNAL MEDICINE

## 2020-12-18 NOTE — PATIENT INSTRUCTIONS
Confirmation number for ride on Tuesday: [id number]      Snacks can be an important part of a balanced, healthy meal plan. They allow you to eat more frequently, feeling full and satisfied throughout the day. Also, they allow you to spread carbohydrates evenly, which may stabilize blood sugars.     The amount of carbohydrate needed at snacks varies. Generally, about 15-30 grams of carbohydrate per snack is recommended.  Below you will find some good options.       0-5 grams of carbohydrates (Best Choices)  · Crystal Light  · Vitamin Water Zero  · Herbal tea, unsweetened  · 2 tsp peanut butter on celery  · 1./2 cup sugar-free jell-o  · 1 sugar-free popsicle  · ¼ cup blueberries  · 8oz Blue Bina unsweetened almond milk  · 5 baby carrots & celery sticks, cucumbers, bell peppers dipped in ¼ cup salsa, 2Tbsp light ranch dressing or 2Tbsp plain Greek yogurt  · 10 Goldfish crackers  · ½ oz low-fat cheese or string cheese  · 1 closed handful of nuts, unsalted  · 1 Tbsp of sunflower seeds, unsalted  · 1 cup Smart Pop popcorn  · 1 whole grain brown rice cake         15 grams of carbohydrates (Okay Choices)  · 1 small piece of fruit or ½ banana or 1/2 cup lite canned fruit  · 3 sydnie cracker squares  · 3 cups Smart Pop popcorn, top spray butter, Holman lite salt or cinnamon and Truvia  · 5 Vanilla Wafers  · ½ cup low fat, no added sugar ice cream or frozen yogurt (Blue bell, Blue Bunny, Weight Watchers, Skinny Cow)  · ½ turkey, ham, or chicken sandwich  · ½ c fruit with ½ c Cottage cheese  · 4-6 unsalted wheat crackers with 1 oz low fat cheese or 1 tbsp peanut butter   · 30-45 goldfish crackers (depending on flavor)   · 7-8 Christianity mini brown rice cakes (caramel, apple cinnamon, chocolate)   · 12 Christianity mini brown rice cakes (cheddar, bbq, ranch)   · 1/3 cup hummus dip with raw veg  · 1/2 whole wheat evi, 1Tbsp hummus  · Mini Pizza (1/2 whole wheat English muffin, low-fat  cheese, tomato sauce)  · 100 calorie snack pack  (Oreo, Chips Ahoy, Ritz Mix, Baked Cheetos)  · 4-6 oz. light or Greek Style yogurt (Chobani, Yoplait, [place], [place])  · ½ cup sugar-free pudding    · 6 in. wheat tortilla or evi oven toasted chips (topped with spray butter flavoring, cinnamon, Truvia OR spray butter, garlic powder, chili powder)   · 18 BBQ Popchips (available at [employer], [employer], Fresh Market)        Proteins:  Try to eat more lean proteins  Fish - baked or grilled  Chicken (skinless) - baked or grilled

## 2020-12-18 NOTE — PROGRESS NOTES
Subjective:      Chief Complaint: Erectile Dysfunction and Diabetes    HPI: Jae Millan is a 70 y.o. male who is here for follow-up evaluation for hypogonadism and type 2 diabetes.    Patient presents for evaluation of male hypogonadism and diabetes    He was last seen by me 9/18/2020    Medical problems include:  Past Medical History:   Diagnosis Date    Cataract     Decreased sensation of lower extremity     Diabetes mellitus     Diabetic neuropathy     Dysarthria     Dysphagia     ED (erectile dysfunction)     Glaucoma     Hemiparesis     LEFT side post CVA    High cholesterol     History of hepatitis C, s/p successful Rx w/ cure (SVR12) 4/2020     Hypertension     Impaired functional mobility, balance, gait, and endurance     Stroke     Urinary frequency        Developmental history:  Normal testis descent?: No, had undescended testicle surgery (possibly torsion?) while in skilled nursing a few years ago; removed one testicle and replaced with prosthetic.    Family history of hypogonadism?: not sure  Sense of smell: Intact  Peripheral vision issues: no  Gynecomastia: no    Recent severe/critical illness?: Stroke 2 years ago.  STD's?: had an STD as a teenager - not sure which one  Orchitis or hx of orchiectomy?: Orchiectomy x 1  Mumps?: no  Radiation exposure?: no  Secondary exposure to partner's vaginal estrogen?: no    Chronic corticosteroid or opiate use?:   Marijuana?: no  Lavender or tea tree oil?: no  Anabolic steroids?: no  Excessive EtOH?: no    Depression?: no   Excessive daytime sleepiness?: no    Libido?:   AM erections?: No  Erection at time of intercourse?: Has stimulation but has trouble getting a full erection; he is working with the urologist for this  Maintains erections to ejaculation?: no  Osteoporosis, height loss or history of fractures?: no  Hot flashes or night sweats?: no   Fertility issues?: Two children; never actively pursued fertility.    Breast tenderness, discharge or  gynecomastia?: no  Headaches or blurry vision?: no  Nausea/vomiting, weight loss or dizziness/lightheadedness?: no  Head trauma?: no        He is not interested in pursuing testosterone replacement at this time.    With regards to the diabetes:  The patient was initially diagnosed with Type 2 diabetes mellitus:  Many years ago.    He reports chronic left sided pain in the arms and legs, which started after his stroke 2 years ago. He was in skilled nursing for 42 years     Known diabetic complications: Cardiovascular disease, nephropathy  Cardiovascular risk factors: advanced age (older than 55 for men, 65 for women), diabetes mellitus, dyslipidemia, hypertension and male gender  Current diabetic medications include:   · Januvia 100 mg daily  · Metformin 1000 mg BID - usually only remembers to take it in the morning; often forgets night time dose.  · Jardiance 10 mg daily    Reports he is urinating very frequently since starting Jardiance.    Last visit with diabetes education: never  Current diet: Cutting back on sweet tea, soft drinks and fried chicken. Eating some sweets - fun size chocolate bar 1-2 per day.  Drinks water,  ~three soft drinks/week  Current exercise:  Walking one mile a few times per week.  Also working with therapy.    Current monitoring regimen:  He is not monitoring his blood sugars.    Diabetic Health Maintenance:          BP Readings from Last 3 Encounters:   12/18/20 138/70   12/02/20 (!) 152/87   11/13/20 (!) 162/92           Low dose ASA?: Yes    Wt Readings from Last 10 Encounters:   12/18/20 98.8 kg (217 lb 13 oz)   12/02/20 96.6 kg (213 lb)   11/13/20 97 kg (213 lb 13.5 oz)   10/30/20 97.8 kg (215 lb 11.5 oz)   10/21/20 97.5 kg (215 lb)   10/13/20 97.7 kg (215 lb 4.5 oz)   10/06/20 96.4 kg (212 lb 8.4 oz)   10/02/20 97.3 kg (214 lb 8.1 oz)   09/18/20 95 kg (209 lb 7 oz)   06/30/20 96.5 kg (212 lb 10.1 oz)     Diabetes Management Status    Statin: Taking  ACE/ARB: Taking    Screening or Prevention  Patient's value Goal Complete/Controlled?   HgA1C Testing and Control   Lab Results   Component Value Date    HGBA1C 7.6 (H) 12/11/2020      Annually/Less than 8% Yes   Lipid profile : 05/20/2020 Annually Yes   LDL control Lab Results   Component Value Date    LDLCALC 92.2 05/20/2020    Annually/Less than 100 mg/dl  Yes   Nephropathy screening Lab Results   Component Value Date    LABMICR 1504.0 09/04/2020     Lab Results   Component Value Date    PROTEINUA 1+ (A) 10/02/2020    Annually Yes   Blood pressure BP Readings from Last 1 Encounters:   12/18/20 138/70    Less than 140/90 Yes   Dilated retinal exam : 10/09/2020 Annually Yes   Foot exam   : 09/18/2020 Annually Yes           Lab Results   Component Value Date    HGBA1C 7.6 (H) 12/11/2020    HGBA1C 7.4 (H) 09/04/2020    HGBA1C 7.3 (H) 05/20/2020    HGBA1C 7.3 (H) 08/22/2019    HGBA1C 7.5 (H) 04/12/2019         Reviewed past medical, family, social history and updated as appropriate.    Review of Systems   Constitutional: Negative for unexpected weight change.   Eyes: Negative for visual disturbance.   Respiratory: Negative for shortness of breath.    Cardiovascular: Negative for chest pain.   Gastrointestinal: Negative for abdominal pain.   Genitourinary: Negative for urgency.   Musculoskeletal: Negative for arthralgias.   Skin: Negative for wound.   Neurological: Negative for headaches.   Hematological: Does not bruise/bleed easily.   Psychiatric/Behavioral: Negative for sleep disturbance.     Objective:     Vitals:    12/18/20 0919   BP: 138/70   Pulse: 75   Resp: 18     BP Readings from Last 5 Encounters:   12/18/20 138/70   12/02/20 (!) 152/87   11/13/20 (!) 162/92   10/30/20 (!) 154/85   10/21/20 (!) 159/93       Physical Exam  Vitals signs and nursing note reviewed.   Constitutional:       General: He is not in acute distress.     Appearance: He is well-developed.   HENT:      Head: Normocephalic and atraumatic.   Eyes:      General:         Right eye: No  discharge.         Left eye: No discharge.      Conjunctiva/sclera: Conjunctivae normal.   Neck:      Thyroid: No thyromegaly.      Trachea: No tracheal deviation.   Cardiovascular:      Rate and Rhythm: Normal rate.   Pulmonary:      Effort: Pulmonary effort is normal. No respiratory distress.   Musculoskeletal:      Comments: No digital clubbing or extremity cyanosis   Neurological:      Mental Status: He is alert and oriented to person, place, and time.      Coordination: Coordination normal.   Psychiatric:         Behavior: Behavior normal.         Wt Readings from Last 10 Encounters:   12/18/20 0919 98.8 kg (217 lb 13 oz)   12/02/20 1019 96.6 kg (213 lb)   11/13/20 1414 97 kg (213 lb 13.5 oz)   10/30/20 1112 97.8 kg (215 lb 11.5 oz)   10/21/20 1011 97.5 kg (215 lb)   10/13/20 1315 97.7 kg (215 lb 4.5 oz)   10/06/20 1214 96.4 kg (212 lb 8.4 oz)   10/02/20 1045 97.3 kg (214 lb 8.1 oz)   09/18/20 0804 95 kg (209 lb 7 oz)   06/30/20 1058 96.5 kg (212 lb 10.1 oz)       Lab Results   Component Value Date    HGBA1C 7.6 (H) 12/11/2020     Lab Results   Component Value Date    CHOL 160 05/20/2020    HDL 45 05/20/2020    LDLCALC 92.2 05/20/2020    TRIG 114 05/20/2020    CHOLHDL 28.1 05/20/2020     Lab Results   Component Value Date     12/11/2020    K 4.1 12/11/2020     12/11/2020    CO2 23 12/11/2020     (H) 12/11/2020    BUN 19 12/11/2020    CREATININE 1.6 (H) 12/11/2020    CALCIUM 8.9 12/11/2020    PROT 7.8 12/11/2020    ALBUMIN 3.7 12/11/2020    BILITOT 0.5 12/11/2020    ALKPHOS 72 12/11/2020    AST 14 12/11/2020    ALT 17 12/11/2020    ANIONGAP 9 12/11/2020    ESTGFRAFRICA 50.4 (A) 12/11/2020    EGFRNONAA 43.6 (A) 12/11/2020    TSH 1.509 05/20/2020      Lab Results   Component Value Date    MICALBCREAT 1175.0 (H) 09/04/2020       Assessment/Plan:     Type 2 diabetes mellitus with neurologic complication, without long-term current use of insulin  Reviewed goals of therapy are to get the best  control we can without hypoglycemia    He was seen by education, but has not made all the changes he needs to his diet in order to adequately control his blood sugar.    Medication changes:   Continue:    1. Metformin 1000 mg bid  2. Januvia 100 mg daily  3. Jardiance (empagliflozin) 10 mg daily    Discussed that he needs to start taking the supper dose of metformin more often and make improvements to his diet.  For this visit, I did not escalate therapy, but if his sugars are still not controlled in three months, we will either increase Jardiance (empagliflozin) to 25 mg or add glipizide.    Advised frequent self blood glucose monitoring. Patient encouraged to document glucose results and bring them to every clinic visit. We provided a sample glucometer with 20 strips at last visit, but he has not been using it.    Close adherence to lifestyle changes recommended. We discussed dietary changes at length.    Eyes:  Eye exam up-to-date as of 10/9/2020 repeat due October 2021  Feet:  Seen by Podiatry recently for toenail trimming and routine foot exam.  Kidneys: Urine microalbumin/Cr elevated; on losartan and SGLT-2 inhibitor.  Repeat microalbumin in three months.  HbA1c: Ordered for 3 months  Lipids: On crestor.  LDL is at goal.  ASA: Yes  Hypertension: BP consistently elevated.    Lab Results   Component Value Date    HGBA1C 7.6 (H) 12/11/2020    HGBA1C 7.4 (H) 09/04/2020    HGBA1C 7.3 (H) 05/20/2020       Hypogonadism in male  Previous labs were done at [facility], so we can't say for sure if his testosterone is deficient. 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     I discussed the options for testosterone replacement if it were to be proven that he has low testosterone, including injections, topical, or Testopel.  Discussed the questionable increased risk of ischemic stroke with testosterone replacement.    He says that if a pill form of testosterone available,  that he would be interested.  However, he does not want to do injections or topical treatments.  He plans to discuss with the urologist regarding other treatments for erectile dysfunction, and he will let me know at some point later on if he would like to pursue testosterone replacement.  Given that he is not interested in replacement, I do not see a reason to recheck his testosterone level at this time.  If he decides to per to pursue testosterone placement later on, we will check an 08:00 testosterone panel paired with an FSH and LH.    Erectile dysfunction  Seeing Urology on Tuesday.  He is not interested in testosterone injections    Essential hypertension  He had his blood pressure medications adjusted by Nephrology.  His blood pressure today is near goal.  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      RTC in 3 months. Labs in 3 months.

## 2020-12-18 NOTE — ASSESSMENT & PLAN NOTE
Reviewed goals of therapy are to get the best control we can without hypoglycemia    He was seen by education, but has not made all the changes he needs to his diet in order to adequately control his blood sugar.    Medication changes:   Continue:    1. Metformin 1000 mg bid  2. Januvia 100 mg daily  3. Jardiance (empagliflozin) 10 mg daily    Discussed that he needs to start taking the supper dose of metformin more often and make improvements to his diet.  For this visit, I did not escalate therapy, but if his sugars are still not controlled in three months, we will either increase Jardiance (empagliflozin) to 25 mg or add glipizide.    Advised frequent self blood glucose monitoring. Patient encouraged to document glucose results and bring them to every clinic visit. We provided a sample glucometer with 20 strips at last visit, but he has not been using it.    Close adherence to lifestyle changes recommended. We discussed dietary changes at length.    Eyes:  Eye exam up-to-date as of 10/9/2020 repeat due October 2021  Feet:  Seen by Podiatry recently for toenail trimming and routine foot exam.  Kidneys: Urine microalbumin/Cr elevated; on losartan and SGLT-2 inhibitor.  Repeat microalbumin in three months.  HbA1c: Ordered for 3 months  Lipids: On crestor.  LDL is at goal.  ASA: Yes  Hypertension: BP consistently elevated.    Lab Results   Component Value Date    HGBA1C 7.6 (H) 12/11/2020    HGBA1C 7.4 (H) 09/04/2020    HGBA1C 7.3 (H) 05/20/2020

## 2020-12-18 NOTE — ASSESSMENT & PLAN NOTE
He had his blood pressure medications adjusted by Nephrology.  His blood pressure today is near goal.  Will recheck at next visit before escalating therapy

## 2020-12-18 NOTE — ASSESSMENT & PLAN NOTE
He is on Jardiance (empagliflozin), which has been shown to decrease the risk for major adverse cardiac events.

## 2020-12-21 NOTE — PROGRESS NOTES
"Subjective:      Jae Millan is a 70 y.o. male who presents for follow up.    Mr. Millan presents today to discuss recently worsening nocturia.  He denies increasing p.m. fluids, denies sleep apnea.  He does report significant left upper extremity swelling related to previous stroke.  He reports occasional dysuria.  Denies hematuria, weak stream, double voiding, postvoid dribbling.  Most recent PSA noted to be 0.3.     The following portions of the patient's history were reviewed and updated as appropriate: allergies, current medications, past family history, past medical history, past social history, past surgical history and problem list.    Review of Systems  Constitutional: no fever or chills  ENT: no nasal congestion or sore throat  Respiratory: no cough or shortness of breath  Cardiovascular: no chest pain or palpitations  Gastrointestinal: no nausea or vomiting, tolerating diet  Genitourinary: as per HPI  Hematologic/Lymphatic: no easy bruising or lymphadenopathy  Musculoskeletal: no arthralgias or myalgias  Neurological: no seizures or tremors  Behavioral/Psych: no auditory or visual hallucinations     Objective:   Vitals: BP (!) 152/81 (BP Location: Left arm, Patient Position: Sitting, BP Method: Large (Automatic))   Pulse 73   Ht 5' 10" (1.778 m)   Wt 99.7 kg (219 lb 14.5 oz)   BMI 31.55 kg/m²     Physical Exam   General: alert and oriented, no acute distress  Head: normocephalic, atraumatic  Neck: supple, no lymphadenopathy, normal ROM, no masses  Respiratory: Symmetric expansion, non-labored breathing  Cardiovascular: regular rate and rhythm, nomal pulses, no peripheral edema  Abdomen: soft, non tender, non distended, no palpable masses, no hernias, no hepatomegaly or splenomegaly  Genitourinary: declines exam today  Lymphatic: no inguinal nodes  Skin: normal coloration and turgor, no rashes, no suspicious skin lesions noted  Neuro: alert and oriented x3, no gross deficits  Psych: normal judgment " and insight, normal mood/affect and non-anxious    Physical Exam    Lab Review   Urinalysis demonstrates positive for red blood cells, glucose, protein  Lab Results   Component Value Date    WBC 5.61 09/04/2020    HGB 13.1 (L) 09/04/2020    HCT 39.1 (L) 09/04/2020    MCV 81 (L) 09/04/2020     09/04/2020     Lab Results   Component Value Date    CREATININE 1.6 (H) 12/11/2020    BUN 19 12/11/2020     Lab Results   Component Value Date    PSA 0.30 05/20/2020     Imaging  Right kidney: The right kidney measures 11.2 cm in length.  No cortical thinning. No loss of corticomedullary distinction.  Resistive index measures 0.73.  1.1 x 0.6 x 0.5 cm anechoic lesion in the superior pole of the right kidney similar to prior exam on 09/09/2019.  No renal stone. No hydronephrosis.     Left kidney: The left kidney measures 11.6 cm in length.  No cortical thinning. No loss of corticomedullary distinction.  Resistive index measures 0.71.  No mass. No renal stone. No hydronephrosis.     The bladder is partially distended at the time of scanning and has an unremarkable appearance.     Impression:     No significant abnormality.     Electronically signed by resident: Frances Anne  Date:                                            11/24/2020  Time:                                           11:08     Electronically signed by: Иван Bruce MD  Date:                                            11/24/2020  Time:                                           12:44      Assessment and Plan:   1. Urinary urgency  - POCT URINE DIPSTICK WITHOUT MICROSCOPE  - Urinalysis Microscopic  - Urine culture  - tamsulosin (FLOMAX) 0.4 mg Cap; Take 1 capsule (0.4 mg total) by mouth once daily.  Dispense: 30 capsule; Refill: 11    2. Erectile dysfunction, unspecified erectile dysfunction type  --Levitra was cost prohibitive; patient requests updated rx for Cialis  - tadalafiL (CIALIS) 20 MG Tab; Take 1 tablet (20 mg total) by mouth Every 3 (three) days.  for 10 days  Dispense: 20 tablet; Refill: 9    3. Urinary frequency  - tamsulosin (FLOMAX) 0.4 mg Cap; Take 1 capsule (0.4 mg total) by mouth once daily.  Dispense: 30 capsule; Refill: 11     --UA revealed high levels of glucose.  We discussed how glucose in the urine can be related to urgency and frequency.  --recommend elevating left arm when possible.     --patient is due for CESAR; declined today will follow up in 3 months for exam    This note is dictated on M*Modal word recognition program.  There are word recognition mistakes that are occasionally missed on review.

## 2020-12-22 ENCOUNTER — OFFICE VISIT (OUTPATIENT)
Dept: UROLOGY | Facility: CLINIC | Age: 70
End: 2020-12-22
Payer: MEDICARE

## 2020-12-22 VITALS
HEIGHT: 70 IN | DIASTOLIC BLOOD PRESSURE: 81 MMHG | BODY MASS INDEX: 31.49 KG/M2 | WEIGHT: 219.94 LBS | HEART RATE: 73 BPM | SYSTOLIC BLOOD PRESSURE: 152 MMHG

## 2020-12-22 DIAGNOSIS — N52.9 ERECTILE DYSFUNCTION, UNSPECIFIED ERECTILE DYSFUNCTION TYPE: ICD-10-CM

## 2020-12-22 DIAGNOSIS — R39.15 URINARY URGENCY: Primary | ICD-10-CM

## 2020-12-22 DIAGNOSIS — R35.0 URINARY FREQUENCY: ICD-10-CM

## 2020-12-22 LAB
BACTERIA #/AREA URNS HPF: NORMAL /HPF
MICROSCOPIC COMMENT: NORMAL
RBC #/AREA URNS HPF: 3 /HPF (ref 0–4)
WBC #/AREA URNS HPF: 0 /HPF (ref 0–5)

## 2020-12-22 PROCEDURE — 99999 PR PBB SHADOW E&M-EST. PATIENT-LVL IV: ICD-10-PCS | Mod: PBBFAC,,, | Performed by: NURSE PRACTITIONER

## 2020-12-22 PROCEDURE — 81001 URINALYSIS AUTO W/SCOPE: CPT

## 2020-12-22 PROCEDURE — 99214 OFFICE O/P EST MOD 30 MIN: CPT | Mod: S$PBB,,, | Performed by: NURSE PRACTITIONER

## 2020-12-22 PROCEDURE — 99214 OFFICE O/P EST MOD 30 MIN: CPT | Mod: PBBFAC,PN | Performed by: NURSE PRACTITIONER

## 2020-12-22 PROCEDURE — 99214 PR OFFICE/OUTPT VISIT, EST, LEVL IV, 30-39 MIN: ICD-10-PCS | Mod: S$PBB,,, | Performed by: NURSE PRACTITIONER

## 2020-12-22 PROCEDURE — 99999 PR PBB SHADOW E&M-EST. PATIENT-LVL IV: CPT | Mod: PBBFAC,,, | Performed by: NURSE PRACTITIONER

## 2020-12-22 RX ORDER — TAMSULOSIN HYDROCHLORIDE 0.4 MG/1
0.4 CAPSULE ORAL DAILY
Qty: 30 CAPSULE | Refills: 11 | Status: SHIPPED | OUTPATIENT
Start: 2020-12-22 | End: 2021-07-22 | Stop reason: SDUPTHER

## 2020-12-22 RX ORDER — TADALAFIL 20 MG/1
20 TABLET ORAL
Qty: 20 TABLET | Refills: 9 | Status: SHIPPED | OUTPATIENT
Start: 2020-12-22 | End: 2024-02-15

## 2020-12-22 RX ORDER — TAMSULOSIN HYDROCHLORIDE 0.4 MG/1
0.4 CAPSULE ORAL DAILY
Qty: 30 CAPSULE | Refills: 11 | Status: SHIPPED | OUTPATIENT
Start: 2020-12-22 | End: 2020-12-22

## 2020-12-22 NOTE — PATIENT INSTRUCTIONS
Prostate-Specific Antigen (PSA)  Does this test have other names?  PSA  What is this test?  This test measures the level of prostate-specific antigen (PSA) in your blood.  The cells of the prostate gland make the protein called PSA. Men normally have low levels of PSA. If your PSA levels start to rise, it could mean you have prostate cancer, benign prostate conditions, inflammation, or an infection.  PSA testing is controversial because the U.S. Preventative Services Task Force discourages men who don't have any symptoms of prostate cancer from being screened. The task force says that PSA test results can lead to treating small cancers that would never become life-threatening.  But the American Cancer Society believes men should be told the risks and benefits of PSA testing and allowed to make their own decision about if and when to be screened.  The American Urologic Society says that PSA screening is most important between the ages of 55 and 69. If you have a brother or father with prostate cancer or you are , you should start testing at age 40.   Why do I need this test?  You may have this test if you are 50 or older and your healthcare provider wants to screen you for prostate cancer. Some providers recommend screening at age 40 or 45 if you have a family history of prostate cancer or other risk factors.  You may also have this test if you have already been diagnosed with prostate cancer, so that your healthcare provider can monitor your treatment and see whether your cancer has come back.  What other tests might I have along with this test?  Your healthcare provider may also do a digital rectal exam (CESAR). A CESAR is a physical exam of the prostate, not a lab test. For the exam, your provider will place a gloved finger in your rectum and feel the prostate to check for any bumps or abnormal areas. The FDA recommends that a CESAR be done along with a PSA test.  What do my test results mean?  Many  things may affect your lab test results. These include the method each lab uses to do the test. Even if your test results are different from the normal value, you may not have a problem. To learn what the results mean for you, talk with your healthcare provider.  Results are given in nanograms per milliliter ng/mL. Normal results are below 4.0 ng/mL. A rising PSA may mean that you have cancer. But the PSA results alone won't tell your healthcare provider whether it's cancer or a benign prostate condition. If your provider suspects cancer, he or she will likely suggest that you have a biopsy of the prostate to make the diagnosis.  How is this test done?  The test requires a blood sample, which is drawn through a needle from a vein in your arm.  Does this test pose any risks?  Taking a blood sample with a needle carries risks that include bleeding, infection, bruising, or feeling dizzy. When the needle pricks your arm, you may feel a slight stinging sensation or pain. Afterward, the site may be slightly sore.  What might affect my test results?  An infection can affect your results, as can certain medicines. Riding a bicycle and ejaculation may also affect your results.  How do I get ready for this test?  You may need to abstain from sex and not ride a bicycle within 1 to 2 days of the test. In addition, be sure your healthcare provider knows about all medicines, herbs, vitamins, and supplements you are taking. This includes medicines that don't need a prescription and any illicit drugs you may use.     © 1071-0161 [employer]. [address] [place] [address], [place] [address]. All rights reserved. This information is not intended as a substitute for professional medical care. Always follow your healthcare professional's instructions.

## 2020-12-28 ENCOUNTER — PATIENT OUTREACH (OUTPATIENT)
Dept: ADMINISTRATIVE | Facility: HOSPITAL | Age: 70
End: 2020-12-28

## 2021-01-08 ENCOUNTER — OFFICE VISIT (OUTPATIENT)
Dept: INTERNAL MEDICINE | Facility: CLINIC | Age: 71
End: 2021-01-08
Payer: MEDICARE

## 2021-01-08 VITALS
BODY MASS INDEX: 31.5 KG/M2 | SYSTOLIC BLOOD PRESSURE: 140 MMHG | WEIGHT: 220 LBS | HEART RATE: 71 BPM | OXYGEN SATURATION: 97 % | HEIGHT: 70 IN | DIASTOLIC BLOOD PRESSURE: 80 MMHG

## 2021-01-08 DIAGNOSIS — E11.40 TYPE 2 DIABETES MELLITUS WITH DIABETIC NEUROPATHY, WITHOUT LONG-TERM CURRENT USE OF INSULIN: Chronic | ICD-10-CM

## 2021-01-08 DIAGNOSIS — E29.1 HYPOGONADISM IN MALE: Primary | ICD-10-CM

## 2021-01-08 PROCEDURE — 99999 PR PBB SHADOW E&M-EST. PATIENT-LVL V: CPT | Mod: PBBFAC,,, | Performed by: INTERNAL MEDICINE

## 2021-01-08 PROCEDURE — 99214 OFFICE O/P EST MOD 30 MIN: CPT | Mod: S$PBB,,, | Performed by: INTERNAL MEDICINE

## 2021-01-08 PROCEDURE — 90471 IMMUNIZATION ADMIN: CPT | Mod: PBBFAC

## 2021-01-08 PROCEDURE — 99215 OFFICE O/P EST HI 40 MIN: CPT | Mod: PBBFAC | Performed by: INTERNAL MEDICINE

## 2021-01-08 PROCEDURE — 99214 PR OFFICE/OUTPT VISIT, EST, LEVL IV, 30-39 MIN: ICD-10-PCS | Mod: S$PBB,,, | Performed by: INTERNAL MEDICINE

## 2021-01-08 PROCEDURE — 99999 PR PBB SHADOW E&M-EST. PATIENT-LVL V: ICD-10-PCS | Mod: PBBFAC,,, | Performed by: INTERNAL MEDICINE

## 2021-01-08 RX ORDER — CARVEDILOL 6.25 MG/1
6.25 TABLET ORAL 2 TIMES DAILY WITH MEALS
Qty: 60 TABLET | Refills: 6 | Status: SHIPPED | OUTPATIENT
Start: 2021-01-08 | End: 2021-07-22 | Stop reason: SDUPTHER

## 2021-01-19 ENCOUNTER — LAB VISIT (OUTPATIENT)
Dept: LAB | Facility: OTHER | Age: 71
End: 2021-01-19
Attending: INTERNAL MEDICINE
Payer: MEDICARE

## 2021-01-19 ENCOUNTER — OFFICE VISIT (OUTPATIENT)
Dept: ORTHOPEDICS | Facility: CLINIC | Age: 71
End: 2021-01-19
Payer: MEDICARE

## 2021-01-19 VITALS
WEIGHT: 220 LBS | HEART RATE: 65 BPM | HEIGHT: 70 IN | SYSTOLIC BLOOD PRESSURE: 160 MMHG | BODY MASS INDEX: 31.5 KG/M2 | DIASTOLIC BLOOD PRESSURE: 66 MMHG

## 2021-01-19 DIAGNOSIS — E29.1 HYPOGONADISM IN MALE: ICD-10-CM

## 2021-01-19 DIAGNOSIS — M65.341 TRIGGER RING FINGER OF RIGHT HAND: Primary | ICD-10-CM

## 2021-01-19 LAB
ABO + RH BLD: NORMAL
BLD GP AB SCN CELLS X3 SERPL QL: NORMAL
FSH SERPL-ACNC: 59 MIU/ML (ref 0.95–11.95)
LH SERPL-ACNC: 23.4 MIU/ML (ref 0.6–12.1)
TESTOST SERPL-MCNC: 203 NG/DL (ref 304–1227)

## 2021-01-19 PROCEDURE — 84403 ASSAY OF TOTAL TESTOSTERONE: CPT

## 2021-01-19 PROCEDURE — 99999 PR PBB SHADOW E&M-EST. PATIENT-LVL IV: ICD-10-PCS | Mod: PBBFAC,,, | Performed by: ORTHOPAEDIC SURGERY

## 2021-01-19 PROCEDURE — 83002 ASSAY OF GONADOTROPIN (LH): CPT

## 2021-01-19 PROCEDURE — 99999 PR PBB SHADOW E&M-EST. PATIENT-LVL IV: CPT | Mod: PBBFAC,,, | Performed by: ORTHOPAEDIC SURGERY

## 2021-01-19 PROCEDURE — 99214 PR OFFICE/OUTPT VISIT, EST, LEVL IV, 30-39 MIN: ICD-10-PCS | Mod: S$PBB,,, | Performed by: ORTHOPAEDIC SURGERY

## 2021-01-19 PROCEDURE — 99214 OFFICE O/P EST MOD 30 MIN: CPT | Mod: PBBFAC,25 | Performed by: ORTHOPAEDIC SURGERY

## 2021-01-19 PROCEDURE — 83001 ASSAY OF GONADOTROPIN (FSH): CPT

## 2021-01-19 PROCEDURE — 86900 BLOOD TYPING SEROLOGIC ABO: CPT

## 2021-01-19 PROCEDURE — 99214 OFFICE O/P EST MOD 30 MIN: CPT | Mod: S$PBB,,, | Performed by: ORTHOPAEDIC SURGERY

## 2021-01-19 PROCEDURE — 36415 COLL VENOUS BLD VENIPUNCTURE: CPT

## 2021-01-25 ENCOUNTER — OFFICE VISIT (OUTPATIENT)
Dept: PODIATRY | Facility: CLINIC | Age: 71
End: 2021-01-25
Payer: MEDICARE

## 2021-01-25 VITALS
HEIGHT: 70 IN | HEART RATE: 65 BPM | DIASTOLIC BLOOD PRESSURE: 84 MMHG | RESPIRATION RATE: 18 BRPM | WEIGHT: 214 LBS | SYSTOLIC BLOOD PRESSURE: 127 MMHG | BODY MASS INDEX: 30.64 KG/M2

## 2021-01-25 DIAGNOSIS — E11.40 TYPE 2 DIABETES MELLITUS WITH DIABETIC NEUROPATHY, WITHOUT LONG-TERM CURRENT USE OF INSULIN: Chronic | ICD-10-CM

## 2021-01-25 DIAGNOSIS — B35.1 ONYCHOMYCOSIS DUE TO DERMATOPHYTE: Primary | ICD-10-CM

## 2021-01-25 PROCEDURE — 99999 PR PBB SHADOW E&M-EST. PATIENT-LVL IV: CPT | Mod: PBBFAC,,, | Performed by: PODIATRIST

## 2021-01-25 PROCEDURE — 99999 PR PBB SHADOW E&M-EST. PATIENT-LVL IV: ICD-10-PCS | Mod: PBBFAC,,, | Performed by: PODIATRIST

## 2021-01-25 PROCEDURE — 99499 UNLISTED E&M SERVICE: CPT | Mod: S$PBB,,, | Performed by: PODIATRIST

## 2021-01-25 PROCEDURE — 99214 OFFICE O/P EST MOD 30 MIN: CPT | Mod: PBBFAC | Performed by: PODIATRIST

## 2021-01-25 PROCEDURE — 99499 NO LOS: ICD-10-PCS | Mod: S$PBB,,, | Performed by: PODIATRIST

## 2021-01-25 PROCEDURE — 11721 DEBRIDE NAIL 6 OR MORE: CPT | Mod: Q9,PBBFAC | Performed by: PODIATRIST

## 2021-01-25 PROCEDURE — 11721 DEBRIDE NAIL 6 OR MORE: CPT | Mod: Q9,S$PBB,, | Performed by: PODIATRIST

## 2021-01-25 PROCEDURE — 11721 PR DEBRIDEMENT OF NAILS, 6 OR MORE: ICD-10-PCS | Mod: Q9,S$PBB,, | Performed by: PODIATRIST

## 2021-02-23 ENCOUNTER — OFFICE VISIT (OUTPATIENT)
Dept: NEPHROLOGY | Facility: CLINIC | Age: 71
End: 2021-02-23
Payer: MEDICARE

## 2021-02-23 VITALS
WEIGHT: 211.63 LBS | HEART RATE: 76 BPM | DIASTOLIC BLOOD PRESSURE: 84 MMHG | OXYGEN SATURATION: 96 % | SYSTOLIC BLOOD PRESSURE: 132 MMHG | BODY MASS INDEX: 30.37 KG/M2

## 2021-02-23 DIAGNOSIS — E55.9 HYPOVITAMINOSIS D: ICD-10-CM

## 2021-02-23 DIAGNOSIS — R80.9 PROTEINURIA, UNSPECIFIED TYPE: ICD-10-CM

## 2021-02-23 DIAGNOSIS — N18.31 STAGE 3A CHRONIC KIDNEY DISEASE: Primary | ICD-10-CM

## 2021-02-23 DIAGNOSIS — I10 HYPERTENSION, UNSPECIFIED TYPE: ICD-10-CM

## 2021-02-23 DIAGNOSIS — E11.40 TYPE 2 DIABETES MELLITUS WITH DIABETIC NEUROPATHY, WITHOUT LONG-TERM CURRENT USE OF INSULIN: ICD-10-CM

## 2021-02-23 DIAGNOSIS — N28.1 RENAL CYST: ICD-10-CM

## 2021-02-23 DIAGNOSIS — I63.329 CEREBROVASCULAR ACCIDENT (CVA) DUE TO THROMBOSIS OF ANTERIOR CEREBRAL ARTERY, UNSPECIFIED BLOOD VESSEL LATERALITY: ICD-10-CM

## 2021-02-23 PROCEDURE — 99999 PR PBB SHADOW E&M-EST. PATIENT-LVL IV: CPT | Mod: PBBFAC,,, | Performed by: NURSE PRACTITIONER

## 2021-02-23 PROCEDURE — 99214 OFFICE O/P EST MOD 30 MIN: CPT | Mod: PBBFAC | Performed by: NURSE PRACTITIONER

## 2021-02-23 PROCEDURE — 99214 OFFICE O/P EST MOD 30 MIN: CPT | Mod: S$PBB,,, | Performed by: NURSE PRACTITIONER

## 2021-02-23 PROCEDURE — 99999 PR PBB SHADOW E&M-EST. PATIENT-LVL IV: ICD-10-PCS | Mod: PBBFAC,,, | Performed by: NURSE PRACTITIONER

## 2021-02-23 PROCEDURE — 99214 PR OFFICE/OUTPT VISIT, EST, LEVL IV, 30-39 MIN: ICD-10-PCS | Mod: S$PBB,,, | Performed by: NURSE PRACTITIONER

## 2021-03-09 ENCOUNTER — LAB VISIT (OUTPATIENT)
Dept: LAB | Facility: HOSPITAL | Age: 71
End: 2021-03-09
Attending: INTERNAL MEDICINE
Payer: MEDICARE

## 2021-03-09 DIAGNOSIS — E55.9 HYPOVITAMINOSIS D: ICD-10-CM

## 2021-03-09 DIAGNOSIS — N18.31 STAGE 3A CHRONIC KIDNEY DISEASE: ICD-10-CM

## 2021-03-09 LAB
25(OH)D3+25(OH)D2 SERPL-MCNC: 14 NG/ML (ref 30–96)
ALBUMIN SERPL BCP-MCNC: 3.8 G/DL (ref 3.5–5.2)
ANION GAP SERPL CALC-SCNC: 8 MMOL/L (ref 8–16)
BASOPHILS # BLD AUTO: 0.03 K/UL (ref 0–0.2)
BASOPHILS NFR BLD: 0.5 % (ref 0–1.9)
BUN SERPL-MCNC: 19 MG/DL (ref 8–23)
CALCIUM SERPL-MCNC: 9.4 MG/DL (ref 8.7–10.5)
CHLORIDE SERPL-SCNC: 108 MMOL/L (ref 95–110)
CO2 SERPL-SCNC: 24 MMOL/L (ref 23–29)
CREAT SERPL-MCNC: 1.5 MG/DL (ref 0.5–1.4)
DIFFERENTIAL METHOD: ABNORMAL
EOSINOPHIL # BLD AUTO: 0.2 K/UL (ref 0–0.5)
EOSINOPHIL NFR BLD: 2.4 % (ref 0–8)
ERYTHROCYTE [DISTWIDTH] IN BLOOD BY AUTOMATED COUNT: 14.3 % (ref 11.5–14.5)
EST. GFR  (AFRICAN AMERICAN): 54.5 ML/MIN/1.73 M^2
EST. GFR  (NON AFRICAN AMERICAN): 47.2 ML/MIN/1.73 M^2
GLUCOSE SERPL-MCNC: 153 MG/DL (ref 70–110)
HCT VFR BLD AUTO: 41.1 % (ref 40–54)
HGB BLD-MCNC: 13.8 G/DL (ref 14–18)
IMM GRANULOCYTES # BLD AUTO: 0.02 K/UL (ref 0–0.04)
IMM GRANULOCYTES NFR BLD AUTO: 0.3 % (ref 0–0.5)
LYMPHOCYTES # BLD AUTO: 1.3 K/UL (ref 1–4.8)
LYMPHOCYTES NFR BLD: 21.3 % (ref 18–48)
MCH RBC QN AUTO: 26.8 PG (ref 27–31)
MCHC RBC AUTO-ENTMCNC: 33.6 G/DL (ref 32–36)
MCV RBC AUTO: 80 FL (ref 82–98)
MONOCYTES # BLD AUTO: 0.4 K/UL (ref 0.3–1)
MONOCYTES NFR BLD: 5.7 % (ref 4–15)
NEUTROPHILS # BLD AUTO: 4.4 K/UL (ref 1.8–7.7)
NEUTROPHILS NFR BLD: 69.8 % (ref 38–73)
NRBC BLD-RTO: 0 /100 WBC
PHOSPHATE SERPL-MCNC: 3.4 MG/DL (ref 2.7–4.5)
PLATELET # BLD AUTO: 245 K/UL (ref 150–350)
PMV BLD AUTO: 10.9 FL (ref 9.2–12.9)
POTASSIUM SERPL-SCNC: 4.1 MMOL/L (ref 3.5–5.1)
PTH-INTACT SERPL-MCNC: 72 PG/ML (ref 9–77)
RBC # BLD AUTO: 5.14 M/UL (ref 4.6–6.2)
SODIUM SERPL-SCNC: 140 MMOL/L (ref 136–145)
WBC # BLD AUTO: 6.3 K/UL (ref 3.9–12.7)

## 2021-03-09 PROCEDURE — 85025 COMPLETE CBC W/AUTO DIFF WBC: CPT | Performed by: NURSE PRACTITIONER

## 2021-03-09 PROCEDURE — 36415 COLL VENOUS BLD VENIPUNCTURE: CPT | Performed by: NURSE PRACTITIONER

## 2021-03-09 PROCEDURE — 80069 RENAL FUNCTION PANEL: CPT | Performed by: NURSE PRACTITIONER

## 2021-03-09 PROCEDURE — 82306 VITAMIN D 25 HYDROXY: CPT | Performed by: NURSE PRACTITIONER

## 2021-03-09 PROCEDURE — 83970 ASSAY OF PARATHORMONE: CPT | Performed by: NURSE PRACTITIONER

## 2021-03-10 ENCOUNTER — TELEPHONE (OUTPATIENT)
Dept: NEPHROLOGY | Facility: CLINIC | Age: 71
End: 2021-03-10

## 2021-03-10 DIAGNOSIS — E55.9 HYPOVITAMINOSIS D: Primary | ICD-10-CM

## 2021-03-10 RX ORDER — ERGOCALCIFEROL 1.25 MG/1
50000 CAPSULE ORAL
Qty: 12 CAPSULE | Refills: 3 | Status: SHIPPED | OUTPATIENT
Start: 2021-03-10 | End: 2021-04-19

## 2021-03-22 ENCOUNTER — TELEPHONE (OUTPATIENT)
Dept: UROLOGY | Facility: CLINIC | Age: 71
End: 2021-03-22

## 2021-03-25 ENCOUNTER — OFFICE VISIT (OUTPATIENT)
Dept: UROLOGY | Facility: CLINIC | Age: 71
End: 2021-03-25
Payer: MEDICARE

## 2021-03-25 VITALS
WEIGHT: 214.75 LBS | BODY MASS INDEX: 30.74 KG/M2 | HEIGHT: 70 IN | HEART RATE: 70 BPM | SYSTOLIC BLOOD PRESSURE: 161 MMHG | DIASTOLIC BLOOD PRESSURE: 90 MMHG

## 2021-03-25 DIAGNOSIS — R39.15 URINARY URGENCY: Primary | ICD-10-CM

## 2021-03-25 DIAGNOSIS — N52.9 ERECTILE DYSFUNCTION, UNSPECIFIED ERECTILE DYSFUNCTION TYPE: ICD-10-CM

## 2021-03-25 PROCEDURE — 99999 PR PBB SHADOW E&M-EST. PATIENT-LVL IV: CPT | Mod: PBBFAC,,, | Performed by: NURSE PRACTITIONER

## 2021-03-25 PROCEDURE — 99214 OFFICE O/P EST MOD 30 MIN: CPT | Mod: PBBFAC,PN | Performed by: NURSE PRACTITIONER

## 2021-03-25 PROCEDURE — 99214 OFFICE O/P EST MOD 30 MIN: CPT | Mod: S$PBB,,, | Performed by: NURSE PRACTITIONER

## 2021-03-25 PROCEDURE — 99214 PR OFFICE/OUTPT VISIT, EST, LEVL IV, 30-39 MIN: ICD-10-PCS | Mod: S$PBB,,, | Performed by: NURSE PRACTITIONER

## 2021-03-25 PROCEDURE — 99999 PR PBB SHADOW E&M-EST. PATIENT-LVL IV: ICD-10-PCS | Mod: PBBFAC,,, | Performed by: NURSE PRACTITIONER

## 2021-03-26 ENCOUNTER — PATIENT OUTREACH (OUTPATIENT)
Dept: ADMINISTRATIVE | Facility: HOSPITAL | Age: 71
End: 2021-03-26

## 2021-04-05 ENCOUNTER — OFFICE VISIT (OUTPATIENT)
Dept: PHYSICAL MEDICINE AND REHAB | Facility: CLINIC | Age: 71
End: 2021-04-05
Payer: MEDICARE

## 2021-04-05 VITALS
BODY MASS INDEX: 30.62 KG/M2 | WEIGHT: 213.88 LBS | DIASTOLIC BLOOD PRESSURE: 90 MMHG | HEART RATE: 73 BPM | HEIGHT: 70 IN | SYSTOLIC BLOOD PRESSURE: 147 MMHG

## 2021-04-05 DIAGNOSIS — M79.2 NEUROPATHIC PAIN: ICD-10-CM

## 2021-04-05 DIAGNOSIS — I69.354 HEMIPARESIS AFFECTING LEFT SIDE AS LATE EFFECT OF CEREBROVASCULAR ACCIDENT (CVA): Primary | ICD-10-CM

## 2021-04-05 DIAGNOSIS — I63.329 CEREBROVASCULAR ACCIDENT (CVA) DUE TO THROMBOSIS OF ANTERIOR CEREBRAL ARTERY, UNSPECIFIED BLOOD VESSEL LATERALITY: ICD-10-CM

## 2021-04-05 DIAGNOSIS — M25.50 ARTHRALGIA, UNSPECIFIED JOINT: ICD-10-CM

## 2021-04-05 DIAGNOSIS — R47.1 DYSARTHRIA: ICD-10-CM

## 2021-04-05 DIAGNOSIS — Z74.09 IMPAIRED MOBILITY AND ADLS: ICD-10-CM

## 2021-04-05 DIAGNOSIS — Z78.9 IMPAIRED MOBILITY AND ADLS: ICD-10-CM

## 2021-04-05 PROCEDURE — 99999 PR PBB SHADOW E&M-EST. PATIENT-LVL II: ICD-10-PCS | Mod: PBBFAC,,, | Performed by: PHYSICAL MEDICINE & REHABILITATION

## 2021-04-05 PROCEDURE — 99212 OFFICE O/P EST SF 10 MIN: CPT | Mod: PBBFAC | Performed by: PHYSICAL MEDICINE & REHABILITATION

## 2021-04-05 PROCEDURE — 99214 OFFICE O/P EST MOD 30 MIN: CPT | Mod: S$PBB,,, | Performed by: PHYSICAL MEDICINE & REHABILITATION

## 2021-04-05 PROCEDURE — 99999 PR PBB SHADOW E&M-EST. PATIENT-LVL II: CPT | Mod: PBBFAC,,, | Performed by: PHYSICAL MEDICINE & REHABILITATION

## 2021-04-05 PROCEDURE — 99214 PR OFFICE/OUTPT VISIT, EST, LEVL IV, 30-39 MIN: ICD-10-PCS | Mod: S$PBB,,, | Performed by: PHYSICAL MEDICINE & REHABILITATION

## 2021-04-05 RX ORDER — GABAPENTIN 300 MG/1
300 CAPSULE ORAL 3 TIMES DAILY
Qty: 120 CAPSULE | Refills: 4 | Status: SHIPPED | OUTPATIENT
Start: 2021-04-05 | End: 2021-07-22 | Stop reason: SDUPTHER

## 2021-04-05 RX ORDER — ACETAMINOPHEN 500 MG
500-1000 TABLET ORAL 3 TIMES DAILY PRN
Refills: 0 | COMMUNITY
Start: 2021-04-05 | End: 2023-09-25

## 2021-04-08 ENCOUNTER — TELEPHONE (OUTPATIENT)
Dept: INTERNAL MEDICINE | Facility: CLINIC | Age: 71
End: 2021-04-08

## 2021-04-08 ENCOUNTER — OFFICE VISIT (OUTPATIENT)
Dept: INTERNAL MEDICINE | Facility: CLINIC | Age: 71
End: 2021-04-08
Payer: MEDICARE

## 2021-04-08 VITALS
TEMPERATURE: 98 F | OXYGEN SATURATION: 97 % | DIASTOLIC BLOOD PRESSURE: 84 MMHG | HEIGHT: 70 IN | WEIGHT: 214.75 LBS | SYSTOLIC BLOOD PRESSURE: 138 MMHG | BODY MASS INDEX: 30.74 KG/M2 | HEART RATE: 62 BPM

## 2021-04-08 DIAGNOSIS — Z12.5 ENCOUNTER FOR SCREENING FOR MALIGNANT NEOPLASM OF PROSTATE: ICD-10-CM

## 2021-04-08 DIAGNOSIS — E11.40 TYPE 2 DIABETES MELLITUS WITH DIABETIC NEUROPATHY, WITHOUT LONG-TERM CURRENT USE OF INSULIN: Primary | ICD-10-CM

## 2021-04-08 DIAGNOSIS — E29.1 HYPOGONADISM IN MALE: Primary | ICD-10-CM

## 2021-04-08 PROCEDURE — 99213 OFFICE O/P EST LOW 20 MIN: CPT | Mod: S$PBB,,, | Performed by: INTERNAL MEDICINE

## 2021-04-08 PROCEDURE — 99999 PR PBB SHADOW E&M-EST. PATIENT-LVL V: CPT | Mod: PBBFAC,,, | Performed by: INTERNAL MEDICINE

## 2021-04-08 PROCEDURE — 99999 PR PBB SHADOW E&M-EST. PATIENT-LVL V: ICD-10-PCS | Mod: PBBFAC,,, | Performed by: INTERNAL MEDICINE

## 2021-04-08 PROCEDURE — 99215 OFFICE O/P EST HI 40 MIN: CPT | Mod: PBBFAC | Performed by: INTERNAL MEDICINE

## 2021-04-08 PROCEDURE — 99213 PR OFFICE/OUTPT VISIT, EST, LEVL III, 20-29 MIN: ICD-10-PCS | Mod: S$PBB,,, | Performed by: INTERNAL MEDICINE

## 2021-04-08 RX ORDER — TESTOSTERONE GEL, 1% 10 MG/G
5 GEL TRANSDERMAL DAILY
Qty: 30 PACKET | Refills: 1 | Status: SHIPPED | OUTPATIENT
Start: 2021-04-08 | End: 2021-04-19 | Stop reason: SDUPTHER

## 2021-04-19 ENCOUNTER — LAB VISIT (OUTPATIENT)
Dept: LAB | Facility: HOSPITAL | Age: 71
End: 2021-04-19
Attending: INTERNAL MEDICINE
Payer: MEDICARE

## 2021-04-19 ENCOUNTER — OFFICE VISIT (OUTPATIENT)
Dept: ENDOCRINOLOGY | Facility: CLINIC | Age: 71
End: 2021-04-19
Payer: MEDICARE

## 2021-04-19 VITALS
OXYGEN SATURATION: 99 % | WEIGHT: 214.63 LBS | BODY MASS INDEX: 30.73 KG/M2 | HEART RATE: 72 BPM | RESPIRATION RATE: 18 BRPM | SYSTOLIC BLOOD PRESSURE: 126 MMHG | DIASTOLIC BLOOD PRESSURE: 74 MMHG | HEIGHT: 70 IN

## 2021-04-19 DIAGNOSIS — N18.31 STAGE 3A CHRONIC KIDNEY DISEASE: ICD-10-CM

## 2021-04-19 DIAGNOSIS — N52.9 ERECTILE DYSFUNCTION, UNSPECIFIED ERECTILE DYSFUNCTION TYPE: ICD-10-CM

## 2021-04-19 DIAGNOSIS — E55.9 VITAMIN D DEFICIENCY: ICD-10-CM

## 2021-04-19 DIAGNOSIS — Z12.5 SCREENING FOR MALIGNANT NEOPLASM OF PROSTATE: ICD-10-CM

## 2021-04-19 DIAGNOSIS — I63.329 CEREBROVASCULAR ACCIDENT (CVA) DUE TO THROMBOSIS OF ANTERIOR CEREBRAL ARTERY, UNSPECIFIED BLOOD VESSEL LATERALITY: ICD-10-CM

## 2021-04-19 DIAGNOSIS — N18.32 STAGE 3B CHRONIC KIDNEY DISEASE: ICD-10-CM

## 2021-04-19 DIAGNOSIS — E11.40 TYPE 2 DIABETES MELLITUS WITH DIABETIC NEUROPATHY, WITHOUT LONG-TERM CURRENT USE OF INSULIN: Primary | ICD-10-CM

## 2021-04-19 DIAGNOSIS — E11.40 TYPE 2 DIABETES MELLITUS WITH DIABETIC NEUROPATHY, WITHOUT LONG-TERM CURRENT USE OF INSULIN: ICD-10-CM

## 2021-04-19 DIAGNOSIS — I10 ESSENTIAL HYPERTENSION: ICD-10-CM

## 2021-04-19 DIAGNOSIS — E29.1 HYPOGONADISM IN MALE: ICD-10-CM

## 2021-04-19 LAB
ALBUMIN SERPL BCP-MCNC: 3.8 G/DL (ref 3.5–5.2)
ALP SERPL-CCNC: 63 U/L (ref 55–135)
ALT SERPL W/O P-5'-P-CCNC: 17 U/L (ref 10–44)
ANION GAP SERPL CALC-SCNC: 11 MMOL/L (ref 8–16)
AST SERPL-CCNC: 16 U/L (ref 10–40)
BASOPHILS # BLD AUTO: 0.04 K/UL (ref 0–0.2)
BASOPHILS NFR BLD: 0.6 % (ref 0–1.9)
BILIRUB SERPL-MCNC: 0.5 MG/DL (ref 0.1–1)
BUN SERPL-MCNC: 26 MG/DL (ref 8–23)
CALCIUM SERPL-MCNC: 9 MG/DL (ref 8.7–10.5)
CHLORIDE SERPL-SCNC: 108 MMOL/L (ref 95–110)
CO2 SERPL-SCNC: 22 MMOL/L (ref 23–29)
CREAT SERPL-MCNC: 1.9 MG/DL (ref 0.5–1.4)
DIFFERENTIAL METHOD: ABNORMAL
EOSINOPHIL # BLD AUTO: 0.1 K/UL (ref 0–0.5)
EOSINOPHIL NFR BLD: 2.3 % (ref 0–8)
ERYTHROCYTE [DISTWIDTH] IN BLOOD BY AUTOMATED COUNT: 14.1 % (ref 11.5–14.5)
EST. GFR  (AFRICAN AMERICAN): 41 ML/MIN/1.73 M^2
EST. GFR  (NON AFRICAN AMERICAN): 35.4 ML/MIN/1.73 M^2
ESTIMATED AVG GLUCOSE: 169 MG/DL (ref 68–131)
GLUCOSE SERPL-MCNC: 163 MG/DL (ref 70–110)
HBA1C MFR BLD: 7.5 % (ref 4–5.6)
HCT VFR BLD AUTO: 41.8 % (ref 40–54)
HGB BLD-MCNC: 14.1 G/DL (ref 14–18)
IMM GRANULOCYTES # BLD AUTO: 0.04 K/UL (ref 0–0.04)
IMM GRANULOCYTES NFR BLD AUTO: 0.6 % (ref 0–0.5)
LYMPHOCYTES # BLD AUTO: 1.2 K/UL (ref 1–4.8)
LYMPHOCYTES NFR BLD: 19.6 % (ref 18–48)
MCH RBC QN AUTO: 27 PG (ref 27–31)
MCHC RBC AUTO-ENTMCNC: 33.7 G/DL (ref 32–36)
MCV RBC AUTO: 80 FL (ref 82–98)
MONOCYTES # BLD AUTO: 0.4 K/UL (ref 0.3–1)
MONOCYTES NFR BLD: 6.6 % (ref 4–15)
NEUTROPHILS # BLD AUTO: 4.3 K/UL (ref 1.8–7.7)
NEUTROPHILS NFR BLD: 70.3 % (ref 38–73)
NRBC BLD-RTO: 0 /100 WBC
PLATELET # BLD AUTO: 258 K/UL (ref 150–450)
PMV BLD AUTO: 11.4 FL (ref 9.2–12.9)
POTASSIUM SERPL-SCNC: 3.8 MMOL/L (ref 3.5–5.1)
PROT SERPL-MCNC: 8.1 G/DL (ref 6–8.4)
PTH-INTACT SERPL-MCNC: 77 PG/ML (ref 9–77)
RBC # BLD AUTO: 5.22 M/UL (ref 4.6–6.2)
SODIUM SERPL-SCNC: 141 MMOL/L (ref 136–145)
WBC # BLD AUTO: 6.17 K/UL (ref 3.9–12.7)

## 2021-04-19 PROCEDURE — 83970 ASSAY OF PARATHORMONE: CPT | Performed by: NURSE PRACTITIONER

## 2021-04-19 PROCEDURE — 80053 COMPREHEN METABOLIC PANEL: CPT | Performed by: INTERNAL MEDICINE

## 2021-04-19 PROCEDURE — 36415 COLL VENOUS BLD VENIPUNCTURE: CPT | Performed by: INTERNAL MEDICINE

## 2021-04-19 PROCEDURE — 99999 PR PBB SHADOW E&M-EST. PATIENT-LVL V: CPT | Mod: PBBFAC,,, | Performed by: INTERNAL MEDICINE

## 2021-04-19 PROCEDURE — 99214 PR OFFICE/OUTPT VISIT, EST, LEVL IV, 30-39 MIN: ICD-10-PCS | Mod: S$PBB,GC,, | Performed by: INTERNAL MEDICINE

## 2021-04-19 PROCEDURE — 85025 COMPLETE CBC W/AUTO DIFF WBC: CPT | Performed by: NURSE PRACTITIONER

## 2021-04-19 PROCEDURE — 99999 PR PBB SHADOW E&M-EST. PATIENT-LVL V: ICD-10-PCS | Mod: PBBFAC,,, | Performed by: INTERNAL MEDICINE

## 2021-04-19 PROCEDURE — 99215 OFFICE O/P EST HI 40 MIN: CPT | Mod: PBBFAC | Performed by: INTERNAL MEDICINE

## 2021-04-19 PROCEDURE — 83036 HEMOGLOBIN GLYCOSYLATED A1C: CPT | Performed by: INTERNAL MEDICINE

## 2021-04-19 PROCEDURE — 99214 OFFICE O/P EST MOD 30 MIN: CPT | Mod: S$PBB,GC,, | Performed by: INTERNAL MEDICINE

## 2021-04-19 RX ORDER — VIT C/E/ZN/COPPR/LUTEIN/ZEAXAN 250MG-90MG
5000 CAPSULE ORAL DAILY
Refills: 0
Start: 2021-04-19 | End: 2021-04-19 | Stop reason: SDUPTHER

## 2021-04-19 RX ORDER — TESTOSTERONE GEL, 1% 10 MG/G
5 GEL TRANSDERMAL DAILY
Qty: 30 PACKET | Refills: 3 | Status: SHIPPED | OUTPATIENT
Start: 2021-04-19 | End: 2021-07-22 | Stop reason: SDUPTHER

## 2021-04-19 RX ORDER — LANCETS
EACH MISCELLANEOUS
Qty: 100 EACH | Refills: 11 | Status: SHIPPED | OUTPATIENT
Start: 2021-04-19

## 2021-04-19 RX ORDER — VIT C/E/ZN/COPPR/LUTEIN/ZEAXAN 250MG-90MG
5000 CAPSULE ORAL DAILY
Qty: 150 CAPSULE | Refills: 0 | Status: SHIPPED | OUTPATIENT
Start: 2021-04-19 | End: 2023-09-25

## 2021-04-23 ENCOUNTER — TELEPHONE (OUTPATIENT)
Dept: ENDOCRINOLOGY | Facility: CLINIC | Age: 71
End: 2021-04-23

## 2021-04-26 ENCOUNTER — OFFICE VISIT (OUTPATIENT)
Dept: PODIATRY | Facility: CLINIC | Age: 71
End: 2021-04-26
Payer: MEDICARE

## 2021-04-26 VITALS
HEIGHT: 70 IN | WEIGHT: 214 LBS | BODY MASS INDEX: 30.64 KG/M2 | DIASTOLIC BLOOD PRESSURE: 76 MMHG | HEART RATE: 61 BPM | SYSTOLIC BLOOD PRESSURE: 125 MMHG

## 2021-04-26 DIAGNOSIS — B35.1 ONYCHOMYCOSIS DUE TO DERMATOPHYTE: ICD-10-CM

## 2021-04-26 DIAGNOSIS — E11.40 TYPE 2 DIABETES MELLITUS WITH DIABETIC NEUROPATHY, WITHOUT LONG-TERM CURRENT USE OF INSULIN: Primary | ICD-10-CM

## 2021-04-26 PROCEDURE — 99214 OFFICE O/P EST MOD 30 MIN: CPT | Mod: PBBFAC,25 | Performed by: PODIATRIST

## 2021-04-26 PROCEDURE — 11721 DEBRIDE NAIL 6 OR MORE: CPT | Mod: Q9,PBBFAC | Performed by: PODIATRIST

## 2021-04-26 PROCEDURE — 11721 DEBRIDE NAIL 6 OR MORE: CPT | Mod: Q9,S$PBB,, | Performed by: PODIATRIST

## 2021-04-26 PROCEDURE — 11721 PR DEBRIDEMENT OF NAILS, 6 OR MORE: ICD-10-PCS | Mod: Q9,S$PBB,, | Performed by: PODIATRIST

## 2021-04-26 PROCEDURE — 99999 PR PBB SHADOW E&M-EST. PATIENT-LVL IV: ICD-10-PCS | Mod: PBBFAC,,, | Performed by: PODIATRIST

## 2021-04-26 PROCEDURE — 99499 UNLISTED E&M SERVICE: CPT | Mod: S$PBB,,, | Performed by: PODIATRIST

## 2021-04-26 PROCEDURE — 99999 PR PBB SHADOW E&M-EST. PATIENT-LVL IV: CPT | Mod: PBBFAC,,, | Performed by: PODIATRIST

## 2021-04-26 PROCEDURE — 99499 NO LOS: ICD-10-PCS | Mod: S$PBB,,, | Performed by: PODIATRIST

## 2021-05-11 ENCOUNTER — RESEARCH ENCOUNTER (OUTPATIENT)
Dept: RESEARCH | Facility: HOSPITAL | Age: 71
End: 2021-05-11

## 2021-05-11 ENCOUNTER — OFFICE VISIT (OUTPATIENT)
Dept: NEPHROLOGY | Facility: CLINIC | Age: 71
End: 2021-05-11
Payer: MEDICARE

## 2021-05-11 VITALS
HEART RATE: 65 BPM | DIASTOLIC BLOOD PRESSURE: 72 MMHG | BODY MASS INDEX: 30.08 KG/M2 | WEIGHT: 210.13 LBS | HEIGHT: 70 IN | SYSTOLIC BLOOD PRESSURE: 114 MMHG | OXYGEN SATURATION: 97 %

## 2021-05-11 DIAGNOSIS — N18.31 STAGE 3A CHRONIC KIDNEY DISEASE: ICD-10-CM

## 2021-05-11 DIAGNOSIS — E11.40 TYPE 2 DIABETES MELLITUS WITH DIABETIC NEUROPATHY, WITHOUT LONG-TERM CURRENT USE OF INSULIN: ICD-10-CM

## 2021-05-11 DIAGNOSIS — R80.9 PROTEINURIA, UNSPECIFIED TYPE: ICD-10-CM

## 2021-05-11 DIAGNOSIS — I10 HYPERTENSION, UNSPECIFIED TYPE: ICD-10-CM

## 2021-05-11 DIAGNOSIS — N17.9 AKI (ACUTE KIDNEY INJURY): Primary | ICD-10-CM

## 2021-05-11 PROCEDURE — 99215 OFFICE O/P EST HI 40 MIN: CPT | Mod: PBBFAC | Performed by: NURSE PRACTITIONER

## 2021-05-11 PROCEDURE — 99214 OFFICE O/P EST MOD 30 MIN: CPT | Mod: S$PBB,,, | Performed by: NURSE PRACTITIONER

## 2021-05-11 PROCEDURE — 99214 PR OFFICE/OUTPT VISIT, EST, LEVL IV, 30-39 MIN: ICD-10-PCS | Mod: S$PBB,,, | Performed by: NURSE PRACTITIONER

## 2021-05-11 PROCEDURE — 99999 PR PBB SHADOW E&M-EST. PATIENT-LVL V: ICD-10-PCS | Mod: PBBFAC,,, | Performed by: NURSE PRACTITIONER

## 2021-05-11 PROCEDURE — 99999 PR PBB SHADOW E&M-EST. PATIENT-LVL V: CPT | Mod: PBBFAC,,, | Performed by: NURSE PRACTITIONER

## 2021-05-17 ENCOUNTER — LAB VISIT (OUTPATIENT)
Dept: LAB | Facility: HOSPITAL | Age: 71
End: 2021-05-17
Payer: MEDICARE

## 2021-05-17 DIAGNOSIS — N17.9 AKI (ACUTE KIDNEY INJURY): ICD-10-CM

## 2021-05-17 LAB
ALBUMIN SERPL BCP-MCNC: 3.9 G/DL (ref 3.5–5.2)
ANION GAP SERPL CALC-SCNC: 10 MMOL/L (ref 8–16)
BUN SERPL-MCNC: 26 MG/DL (ref 8–23)
CALCIUM SERPL-MCNC: 9.1 MG/DL (ref 8.7–10.5)
CHLORIDE SERPL-SCNC: 109 MMOL/L (ref 95–110)
CO2 SERPL-SCNC: 20 MMOL/L (ref 23–29)
CREAT SERPL-MCNC: 2 MG/DL (ref 0.5–1.4)
EST. GFR  (AFRICAN AMERICAN): 38.5 ML/MIN/1.73 M^2
EST. GFR  (NON AFRICAN AMERICAN): 33.3 ML/MIN/1.73 M^2
GLUCOSE SERPL-MCNC: 92 MG/DL (ref 70–110)
PHOSPHATE SERPL-MCNC: 3.2 MG/DL (ref 2.7–4.5)
POTASSIUM SERPL-SCNC: 4.2 MMOL/L (ref 3.5–5.1)
SODIUM SERPL-SCNC: 139 MMOL/L (ref 136–145)

## 2021-05-17 PROCEDURE — 80069 RENAL FUNCTION PANEL: CPT | Performed by: NURSE PRACTITIONER

## 2021-05-17 PROCEDURE — 36415 COLL VENOUS BLD VENIPUNCTURE: CPT | Performed by: NURSE PRACTITIONER

## 2021-05-18 RX ORDER — SODIUM BICARBONATE 650 MG/1
650 TABLET ORAL 2 TIMES DAILY
Qty: 180 TABLET | Refills: 3 | Status: SHIPPED | OUTPATIENT
Start: 2021-05-18 | End: 2021-07-22 | Stop reason: SDUPTHER

## 2021-06-04 ENCOUNTER — TELEPHONE (OUTPATIENT)
Dept: NEPHROLOGY | Facility: CLINIC | Age: 71
End: 2021-06-04

## 2021-06-16 RX ORDER — ROSUVASTATIN CALCIUM 20 MG/1
20 TABLET, COATED ORAL DAILY
Qty: 90 TABLET | Refills: 3 | Status: SHIPPED | OUTPATIENT
Start: 2021-06-16 | End: 2021-06-16 | Stop reason: SDUPTHER

## 2021-06-16 RX ORDER — ROSUVASTATIN CALCIUM 20 MG/1
20 TABLET, COATED ORAL DAILY
Qty: 90 TABLET | Refills: 3 | Status: SHIPPED | OUTPATIENT
Start: 2021-06-16 | End: 2021-07-22 | Stop reason: SDUPTHER

## 2021-06-22 RX ORDER — AMLODIPINE BESYLATE 10 MG/1
10 TABLET ORAL DAILY
Qty: 90 TABLET | Refills: 3 | Status: SHIPPED | OUTPATIENT
Start: 2021-06-22 | End: 2021-07-22 | Stop reason: SDUPTHER

## 2021-06-27 ENCOUNTER — TELEPHONE (OUTPATIENT)
Dept: INTERNAL MEDICINE | Facility: CLINIC | Age: 71
End: 2021-06-27

## 2021-06-30 ENCOUNTER — TELEPHONE (OUTPATIENT)
Dept: PODIATRY | Facility: CLINIC | Age: 71
End: 2021-06-30

## 2021-07-15 ENCOUNTER — LAB VISIT (OUTPATIENT)
Dept: LAB | Facility: HOSPITAL | Age: 71
End: 2021-07-15
Attending: INTERNAL MEDICINE
Payer: MEDICARE

## 2021-07-15 DIAGNOSIS — E11.40 TYPE 2 DIABETES MELLITUS WITH DIABETIC NEUROPATHY, WITHOUT LONG-TERM CURRENT USE OF INSULIN: ICD-10-CM

## 2021-07-15 LAB
ESTIMATED AVG GLUCOSE: 146 MG/DL (ref 68–131)
HBA1C MFR BLD: 6.7 % (ref 4–5.6)

## 2021-07-15 PROCEDURE — 36415 COLL VENOUS BLD VENIPUNCTURE: CPT | Performed by: INTERNAL MEDICINE

## 2021-07-15 PROCEDURE — 83036 HEMOGLOBIN GLYCOSYLATED A1C: CPT | Performed by: INTERNAL MEDICINE

## 2021-07-19 ENCOUNTER — OFFICE VISIT (OUTPATIENT)
Dept: PODIATRY | Facility: CLINIC | Age: 71
End: 2021-07-19
Payer: MEDICARE

## 2021-07-19 VITALS
HEART RATE: 67 BPM | DIASTOLIC BLOOD PRESSURE: 87 MMHG | WEIGHT: 215.19 LBS | SYSTOLIC BLOOD PRESSURE: 167 MMHG | BODY MASS INDEX: 30.87 KG/M2

## 2021-07-19 DIAGNOSIS — E11.40 TYPE 2 DIABETES MELLITUS WITH DIABETIC NEUROPATHY, WITHOUT LONG-TERM CURRENT USE OF INSULIN: Primary | ICD-10-CM

## 2021-07-19 DIAGNOSIS — B35.1 ONYCHOMYCOSIS DUE TO DERMATOPHYTE: ICD-10-CM

## 2021-07-19 PROCEDURE — 99214 OFFICE O/P EST MOD 30 MIN: CPT | Mod: PBBFAC,25 | Performed by: PODIATRIST

## 2021-07-19 PROCEDURE — 11721 DEBRIDE NAIL 6 OR MORE: CPT | Mod: Q9,S$PBB,, | Performed by: PODIATRIST

## 2021-07-19 PROCEDURE — 11721 PR DEBRIDEMENT OF NAILS, 6 OR MORE: ICD-10-PCS | Mod: Q9,S$PBB,, | Performed by: PODIATRIST

## 2021-07-19 PROCEDURE — 11721 DEBRIDE NAIL 6 OR MORE: CPT | Mod: Q9,PBBFAC | Performed by: PODIATRIST

## 2021-07-19 PROCEDURE — 99499 NO LOS: ICD-10-PCS | Mod: S$PBB,,, | Performed by: PODIATRIST

## 2021-07-19 PROCEDURE — 99999 PR PBB SHADOW E&M-EST. PATIENT-LVL IV: CPT | Mod: PBBFAC,,, | Performed by: PODIATRIST

## 2021-07-19 PROCEDURE — 99499 UNLISTED E&M SERVICE: CPT | Mod: S$PBB,,, | Performed by: PODIATRIST

## 2021-07-19 PROCEDURE — 99999 PR PBB SHADOW E&M-EST. PATIENT-LVL IV: ICD-10-PCS | Mod: PBBFAC,,, | Performed by: PODIATRIST

## 2021-07-22 ENCOUNTER — OFFICE VISIT (OUTPATIENT)
Dept: INTERNAL MEDICINE | Facility: CLINIC | Age: 71
End: 2021-07-22
Payer: MEDICARE

## 2021-07-22 VITALS
SYSTOLIC BLOOD PRESSURE: 144 MMHG | DIASTOLIC BLOOD PRESSURE: 82 MMHG | HEIGHT: 70 IN | BODY MASS INDEX: 30.62 KG/M2 | OXYGEN SATURATION: 98 % | TEMPERATURE: 98 F | WEIGHT: 213.88 LBS | HEART RATE: 65 BPM

## 2021-07-22 DIAGNOSIS — E53.8 B12 DEFICIENCY: Primary | ICD-10-CM

## 2021-07-22 DIAGNOSIS — N40.0 BENIGN PROSTATIC HYPERPLASIA, UNSPECIFIED WHETHER LOWER URINARY TRACT SYMPTOMS PRESENT: ICD-10-CM

## 2021-07-22 DIAGNOSIS — I63.329 CEREBROVASCULAR ACCIDENT (CVA) DUE TO THROMBOSIS OF ANTERIOR CEREBRAL ARTERY, UNSPECIFIED BLOOD VESSEL LATERALITY: ICD-10-CM

## 2021-07-22 DIAGNOSIS — R35.0 URINARY FREQUENCY: ICD-10-CM

## 2021-07-22 DIAGNOSIS — M79.2 NEUROPATHIC PAIN: ICD-10-CM

## 2021-07-22 DIAGNOSIS — R39.15 URINARY URGENCY: ICD-10-CM

## 2021-07-22 DIAGNOSIS — E11.40 TYPE 2 DIABETES MELLITUS WITH DIABETIC NEUROPATHY, WITHOUT LONG-TERM CURRENT USE OF INSULIN: ICD-10-CM

## 2021-07-22 DIAGNOSIS — Z12.5 ENCOUNTER FOR SCREENING FOR MALIGNANT NEOPLASM OF PROSTATE: ICD-10-CM

## 2021-07-22 DIAGNOSIS — E29.1 HYPOGONADISM IN MALE: ICD-10-CM

## 2021-07-22 DIAGNOSIS — H40.32X3 TRAUMATIC GLAUCOMA, LEFT, SEVERE STAGE: ICD-10-CM

## 2021-07-22 PROCEDURE — 99215 OFFICE O/P EST HI 40 MIN: CPT | Mod: PBBFAC | Performed by: INTERNAL MEDICINE

## 2021-07-22 PROCEDURE — 99214 OFFICE O/P EST MOD 30 MIN: CPT | Mod: S$PBB,,, | Performed by: INTERNAL MEDICINE

## 2021-07-22 PROCEDURE — 99999 PR PBB SHADOW E&M-EST. PATIENT-LVL V: ICD-10-PCS | Mod: PBBFAC,,, | Performed by: INTERNAL MEDICINE

## 2021-07-22 PROCEDURE — 99214 PR OFFICE/OUTPT VISIT, EST, LEVL IV, 30-39 MIN: ICD-10-PCS | Mod: S$PBB,,, | Performed by: INTERNAL MEDICINE

## 2021-07-22 PROCEDURE — 99999 PR PBB SHADOW E&M-EST. PATIENT-LVL V: CPT | Mod: PBBFAC,,, | Performed by: INTERNAL MEDICINE

## 2021-07-22 RX ORDER — SODIUM BICARBONATE 650 MG/1
650 TABLET ORAL 2 TIMES DAILY
Qty: 180 TABLET | Refills: 3 | Status: SHIPPED | OUTPATIENT
Start: 2021-07-22 | End: 2021-11-26

## 2021-07-22 RX ORDER — DORZOLAMIDE HYDROCHLORIDE AND TIMOLOL MALEATE 20; 5 MG/ML; MG/ML
1 SOLUTION/ DROPS OPHTHALMIC 2 TIMES DAILY
Qty: 10 ML | Refills: 2 | Status: SHIPPED | OUTPATIENT
Start: 2021-07-22 | End: 2022-09-08 | Stop reason: SDUPTHER

## 2021-07-22 RX ORDER — EMPAGLIFLOZIN 10 MG/1
10 TABLET, FILM COATED ORAL DAILY
Qty: 90 TABLET | Refills: 4 | Status: SHIPPED | OUTPATIENT
Start: 2021-07-22 | End: 2022-03-25 | Stop reason: SDUPTHER

## 2021-07-22 RX ORDER — GABAPENTIN 300 MG/1
300 CAPSULE ORAL 3 TIMES DAILY
Qty: 120 CAPSULE | Refills: 4 | Status: SHIPPED | OUTPATIENT
Start: 2021-07-22 | End: 2022-03-25 | Stop reason: SDUPTHER

## 2021-07-22 RX ORDER — LOSARTAN POTASSIUM AND HYDROCHLOROTHIAZIDE 25; 100 MG/1; MG/1
1 TABLET ORAL DAILY
Qty: 90 TABLET | Refills: 3 | Status: SHIPPED | OUTPATIENT
Start: 2021-07-22 | End: 2022-03-25 | Stop reason: SDUPTHER

## 2021-07-22 RX ORDER — ERGOCALCIFEROL 1.25 MG/1
CAPSULE ORAL
Qty: 12 CAPSULE | Refills: 0 | Status: SHIPPED | OUTPATIENT
Start: 2021-07-22 | End: 2022-05-04

## 2021-07-22 RX ORDER — TESTOSTERONE GEL, 1% 10 MG/G
5 GEL TRANSDERMAL DAILY
Qty: 30 PACKET | Refills: 3 | Status: SHIPPED | OUTPATIENT
Start: 2021-07-22 | End: 2021-08-24

## 2021-07-22 RX ORDER — AMLODIPINE BESYLATE 10 MG/1
10 TABLET ORAL DAILY
Qty: 90 TABLET | Refills: 3 | Status: SHIPPED | OUTPATIENT
Start: 2021-07-22 | End: 2022-03-25 | Stop reason: SDUPTHER

## 2021-07-22 RX ORDER — PANTOPRAZOLE SODIUM 40 MG/1
40 TABLET, DELAYED RELEASE ORAL DAILY
Qty: 90 TABLET | Refills: 3 | Status: SHIPPED | OUTPATIENT
Start: 2021-07-22 | End: 2022-03-25 | Stop reason: SDUPTHER

## 2021-07-22 RX ORDER — TAMSULOSIN HYDROCHLORIDE 0.4 MG/1
0.4 CAPSULE ORAL DAILY
Qty: 90 CAPSULE | Refills: 3 | Status: SHIPPED | OUTPATIENT
Start: 2021-07-22 | End: 2022-03-25 | Stop reason: SDUPTHER

## 2021-07-22 RX ORDER — ROSUVASTATIN CALCIUM 20 MG/1
20 TABLET, COATED ORAL DAILY
Qty: 90 TABLET | Refills: 3 | Status: SHIPPED | OUTPATIENT
Start: 2021-07-22 | End: 2022-03-25 | Stop reason: SDUPTHER

## 2021-07-22 RX ORDER — METFORMIN HYDROCHLORIDE 1000 MG/1
1000 TABLET ORAL 2 TIMES DAILY WITH MEALS
Qty: 180 TABLET | Refills: 3 | Status: SHIPPED | OUTPATIENT
Start: 2021-07-22 | End: 2022-08-15 | Stop reason: SDUPTHER

## 2021-07-22 RX ORDER — CARVEDILOL 6.25 MG/1
6.25 TABLET ORAL 2 TIMES DAILY WITH MEALS
Qty: 180 TABLET | Refills: 3 | Status: SHIPPED | OUTPATIENT
Start: 2021-07-22 | End: 2022-03-25 | Stop reason: SDUPTHER

## 2021-08-06 ENCOUNTER — LAB VISIT (OUTPATIENT)
Dept: LAB | Facility: HOSPITAL | Age: 71
End: 2021-08-06
Attending: INTERNAL MEDICINE
Payer: MEDICARE

## 2021-08-06 DIAGNOSIS — Z12.5 SCREENING FOR MALIGNANT NEOPLASM OF PROSTATE: ICD-10-CM

## 2021-08-06 DIAGNOSIS — E11.40 TYPE 2 DIABETES MELLITUS WITH DIABETIC NEUROPATHY, WITHOUT LONG-TERM CURRENT USE OF INSULIN: ICD-10-CM

## 2021-08-06 DIAGNOSIS — N18.31 STAGE 3A CHRONIC KIDNEY DISEASE: ICD-10-CM

## 2021-08-06 DIAGNOSIS — E55.9 VITAMIN D DEFICIENCY: ICD-10-CM

## 2021-08-06 DIAGNOSIS — E53.8 B12 DEFICIENCY: ICD-10-CM

## 2021-08-06 DIAGNOSIS — E29.1 HYPOGONADISM IN MALE: ICD-10-CM

## 2021-08-06 LAB
25(OH)D3+25(OH)D2 SERPL-MCNC: 17 NG/ML (ref 30–96)
ALBUMIN SERPL BCP-MCNC: 4 G/DL (ref 3.5–5.2)
ALBUMIN SERPL BCP-MCNC: 4 G/DL (ref 3.5–5.2)
ALP SERPL-CCNC: 62 U/L (ref 55–135)
ALT SERPL W/O P-5'-P-CCNC: 23 U/L (ref 10–44)
ANION GAP SERPL CALC-SCNC: 7 MMOL/L (ref 8–16)
ANION GAP SERPL CALC-SCNC: 7 MMOL/L (ref 8–16)
AST SERPL-CCNC: 17 U/L (ref 10–40)
BASOPHILS # BLD AUTO: 0.05 K/UL (ref 0–0.2)
BASOPHILS NFR BLD: 0.7 % (ref 0–1.9)
BILIRUB SERPL-MCNC: 0.7 MG/DL (ref 0.1–1)
BUN SERPL-MCNC: 28 MG/DL (ref 8–23)
BUN SERPL-MCNC: 28 MG/DL (ref 8–23)
CALCIUM SERPL-MCNC: 9.8 MG/DL (ref 8.7–10.5)
CALCIUM SERPL-MCNC: 9.8 MG/DL (ref 8.7–10.5)
CHLORIDE SERPL-SCNC: 106 MMOL/L (ref 95–110)
CHLORIDE SERPL-SCNC: 106 MMOL/L (ref 95–110)
CO2 SERPL-SCNC: 28 MMOL/L (ref 23–29)
CO2 SERPL-SCNC: 28 MMOL/L (ref 23–29)
COMPLEXED PSA SERPL-MCNC: 0.16 NG/ML (ref 0–4)
CREAT SERPL-MCNC: 2.1 MG/DL (ref 0.5–1.4)
CREAT SERPL-MCNC: 2.1 MG/DL (ref 0.5–1.4)
DIFFERENTIAL METHOD: ABNORMAL
EOSINOPHIL # BLD AUTO: 0.2 K/UL (ref 0–0.5)
EOSINOPHIL NFR BLD: 2.2 % (ref 0–8)
ERYTHROCYTE [DISTWIDTH] IN BLOOD BY AUTOMATED COUNT: 14.6 % (ref 11.5–14.5)
EST. GFR  (AFRICAN AMERICAN): 36.3 ML/MIN/1.73 M^2
EST. GFR  (AFRICAN AMERICAN): 36.3 ML/MIN/1.73 M^2
EST. GFR  (NON AFRICAN AMERICAN): 31.4 ML/MIN/1.73 M^2
EST. GFR  (NON AFRICAN AMERICAN): 31.4 ML/MIN/1.73 M^2
ESTIMATED AVG GLUCOSE: 146 MG/DL (ref 68–131)
GLUCOSE SERPL-MCNC: 134 MG/DL (ref 70–110)
GLUCOSE SERPL-MCNC: 134 MG/DL (ref 70–110)
HBA1C MFR BLD: 6.7 % (ref 4–5.6)
HCT VFR BLD AUTO: 41.2 % (ref 40–54)
HGB BLD-MCNC: 13.5 G/DL (ref 14–18)
IMM GRANULOCYTES # BLD AUTO: 0.01 K/UL (ref 0–0.04)
IMM GRANULOCYTES NFR BLD AUTO: 0.1 % (ref 0–0.5)
LYMPHOCYTES # BLD AUTO: 1.2 K/UL (ref 1–4.8)
LYMPHOCYTES NFR BLD: 17.7 % (ref 18–48)
MCH RBC QN AUTO: 26.8 PG (ref 27–31)
MCHC RBC AUTO-ENTMCNC: 32.8 G/DL (ref 32–36)
MCV RBC AUTO: 82 FL (ref 82–98)
MONOCYTES # BLD AUTO: 0.4 K/UL (ref 0.3–1)
MONOCYTES NFR BLD: 6.2 % (ref 4–15)
NEUTROPHILS # BLD AUTO: 5.1 K/UL (ref 1.8–7.7)
NEUTROPHILS NFR BLD: 73.1 % (ref 38–73)
NRBC BLD-RTO: 0 /100 WBC
PHOSPHATE SERPL-MCNC: 4 MG/DL (ref 2.7–4.5)
PLATELET # BLD AUTO: 216 K/UL (ref 150–450)
PMV BLD AUTO: 11.8 FL (ref 9.2–12.9)
POTASSIUM SERPL-SCNC: 4.5 MMOL/L (ref 3.5–5.1)
POTASSIUM SERPL-SCNC: 4.5 MMOL/L (ref 3.5–5.1)
PROT SERPL-MCNC: 8.3 G/DL (ref 6–8.4)
RBC # BLD AUTO: 5.04 M/UL (ref 4.6–6.2)
SODIUM SERPL-SCNC: 141 MMOL/L (ref 136–145)
SODIUM SERPL-SCNC: 141 MMOL/L (ref 136–145)
TESTOST SERPL-MCNC: 211 NG/DL (ref 304–1227)
VIT B12 SERPL-MCNC: 283 PG/ML (ref 210–950)
WBC # BLD AUTO: 6.94 K/UL (ref 3.9–12.7)

## 2021-08-06 PROCEDURE — 84153 ASSAY OF PSA TOTAL: CPT | Performed by: INTERNAL MEDICINE

## 2021-08-06 PROCEDURE — 85025 COMPLETE CBC W/AUTO DIFF WBC: CPT | Performed by: NURSE PRACTITIONER

## 2021-08-06 PROCEDURE — 83921 ORGANIC ACID SINGLE QUANT: CPT | Performed by: INTERNAL MEDICINE

## 2021-08-06 PROCEDURE — 36415 COLL VENOUS BLD VENIPUNCTURE: CPT | Performed by: INTERNAL MEDICINE

## 2021-08-06 PROCEDURE — 80053 COMPREHEN METABOLIC PANEL: CPT | Performed by: INTERNAL MEDICINE

## 2021-08-06 PROCEDURE — 80069 RENAL FUNCTION PANEL: CPT | Performed by: NURSE PRACTITIONER

## 2021-08-06 PROCEDURE — 82607 VITAMIN B-12: CPT | Performed by: INTERNAL MEDICINE

## 2021-08-06 PROCEDURE — 83036 HEMOGLOBIN GLYCOSYLATED A1C: CPT | Performed by: INTERNAL MEDICINE

## 2021-08-06 PROCEDURE — 84403 ASSAY OF TOTAL TESTOSTERONE: CPT | Performed by: INTERNAL MEDICINE

## 2021-08-06 PROCEDURE — 82306 VITAMIN D 25 HYDROXY: CPT | Performed by: INTERNAL MEDICINE

## 2021-08-10 LAB — METHYLMALONATE SERPL-SCNC: 0.44 UMOL/L

## 2021-08-11 ENCOUNTER — TELEPHONE (OUTPATIENT)
Dept: INTERNAL MEDICINE | Facility: CLINIC | Age: 71
End: 2021-08-11

## 2021-08-12 ENCOUNTER — TELEPHONE (OUTPATIENT)
Dept: INTERNAL MEDICINE | Facility: CLINIC | Age: 71
End: 2021-08-12

## 2021-08-24 ENCOUNTER — TELEPHONE (OUTPATIENT)
Dept: ENDOCRINOLOGY | Facility: CLINIC | Age: 71
End: 2021-08-24

## 2021-08-24 ENCOUNTER — OFFICE VISIT (OUTPATIENT)
Dept: ENDOCRINOLOGY | Facility: CLINIC | Age: 71
End: 2021-08-24
Payer: MEDICARE

## 2021-08-24 VITALS
OXYGEN SATURATION: 95 % | SYSTOLIC BLOOD PRESSURE: 134 MMHG | DIASTOLIC BLOOD PRESSURE: 80 MMHG | HEIGHT: 70 IN | WEIGHT: 214.19 LBS | BODY MASS INDEX: 30.66 KG/M2 | RESPIRATION RATE: 18 BRPM | HEART RATE: 64 BPM

## 2021-08-24 DIAGNOSIS — E55.9 VITAMIN D DEFICIENCY: ICD-10-CM

## 2021-08-24 DIAGNOSIS — E78.2 HYPERLIPIDEMIA, MIXED: ICD-10-CM

## 2021-08-24 DIAGNOSIS — E11.40 TYPE 2 DIABETES MELLITUS WITH DIABETIC NEUROPATHY, WITHOUT LONG-TERM CURRENT USE OF INSULIN: Primary | ICD-10-CM

## 2021-08-24 DIAGNOSIS — I10 ESSENTIAL HYPERTENSION: ICD-10-CM

## 2021-08-24 DIAGNOSIS — Z12.5 ENCOUNTER FOR SCREENING FOR MALIGNANT NEOPLASM OF PROSTATE: ICD-10-CM

## 2021-08-24 DIAGNOSIS — I63.329 CEREBROVASCULAR ACCIDENT (CVA) DUE TO THROMBOSIS OF ANTERIOR CEREBRAL ARTERY, UNSPECIFIED BLOOD VESSEL LATERALITY: ICD-10-CM

## 2021-08-24 DIAGNOSIS — N42.9 DISORDER OF PROSTATE, UNSPECIFIED: ICD-10-CM

## 2021-08-24 DIAGNOSIS — N52.9 ERECTILE DYSFUNCTION, UNSPECIFIED ERECTILE DYSFUNCTION TYPE: ICD-10-CM

## 2021-08-24 DIAGNOSIS — E29.1 HYPOGONADISM IN MALE: ICD-10-CM

## 2021-08-24 PROCEDURE — 99214 PR OFFICE/OUTPT VISIT, EST, LEVL IV, 30-39 MIN: ICD-10-PCS | Mod: S$PBB,,, | Performed by: INTERNAL MEDICINE

## 2021-08-24 PROCEDURE — 99999 PR PBB SHADOW E&M-EST. PATIENT-LVL V: ICD-10-PCS | Mod: PBBFAC,,, | Performed by: INTERNAL MEDICINE

## 2021-08-24 PROCEDURE — 99215 OFFICE O/P EST HI 40 MIN: CPT | Mod: PBBFAC | Performed by: INTERNAL MEDICINE

## 2021-08-24 PROCEDURE — 99214 OFFICE O/P EST MOD 30 MIN: CPT | Mod: S$PBB,,, | Performed by: INTERNAL MEDICINE

## 2021-08-24 PROCEDURE — 99999 PR PBB SHADOW E&M-EST. PATIENT-LVL V: CPT | Mod: PBBFAC,,, | Performed by: INTERNAL MEDICINE

## 2021-08-24 RX ORDER — NEEDLES, DISPOSABLE 27GX1/2"
1 NEEDLE, DISPOSABLE MISCELLANEOUS
Qty: 10 EACH | Refills: 11 | Status: SHIPPED | OUTPATIENT
Start: 2021-08-24

## 2021-08-24 RX ORDER — SYRINGE, DISPOSABLE, 3 ML
1 SYRINGE, EMPTY DISPOSABLE MISCELLANEOUS
Qty: 10 EACH | Refills: 11 | Status: SHIPPED | OUTPATIENT
Start: 2021-08-24

## 2021-08-24 RX ORDER — TESTOSTERONE CYPIONATE 200 MG/ML
200 INJECTION, SOLUTION INTRAMUSCULAR
Qty: 10 ML | Refills: 1 | Status: SHIPPED | OUTPATIENT
Start: 2021-08-24 | End: 2024-02-15

## 2021-09-13 ENCOUNTER — OFFICE VISIT (OUTPATIENT)
Dept: PHYSICAL MEDICINE AND REHAB | Facility: CLINIC | Age: 71
End: 2021-09-13
Payer: MEDICARE

## 2021-09-13 DIAGNOSIS — R47.1 DYSARTHRIA: ICD-10-CM

## 2021-09-13 DIAGNOSIS — Z78.9 IMPAIRED MOBILITY AND ADLS: ICD-10-CM

## 2021-09-13 DIAGNOSIS — I69.354 HEMIPARESIS AFFECTING LEFT SIDE AS LATE EFFECT OF CEREBROVASCULAR ACCIDENT (CVA): Primary | ICD-10-CM

## 2021-09-13 DIAGNOSIS — M79.2 NEUROPATHIC PAIN: ICD-10-CM

## 2021-09-13 DIAGNOSIS — I63.329 CEREBROVASCULAR ACCIDENT (CVA) DUE TO THROMBOSIS OF ANTERIOR CEREBRAL ARTERY, UNSPECIFIED BLOOD VESSEL LATERALITY: ICD-10-CM

## 2021-09-13 DIAGNOSIS — Z74.09 IMPAIRED MOBILITY AND ADLS: ICD-10-CM

## 2021-09-13 DIAGNOSIS — M25.50 ARTHRALGIA, UNSPECIFIED JOINT: ICD-10-CM

## 2021-09-13 PROCEDURE — 99442 PR PHYSICIAN TELEPHONE EVALUATION 11-20 MIN: CPT | Mod: 95,,, | Performed by: PHYSICAL MEDICINE & REHABILITATION

## 2021-09-13 PROCEDURE — 99442 PR PHYSICIAN TELEPHONE EVALUATION 11-20 MIN: ICD-10-PCS | Mod: 95,,, | Performed by: PHYSICAL MEDICINE & REHABILITATION

## 2021-09-24 ENCOUNTER — TELEPHONE (OUTPATIENT)
Dept: NEPHROLOGY | Facility: CLINIC | Age: 71
End: 2021-09-24

## 2021-09-28 ENCOUNTER — PATIENT OUTREACH (OUTPATIENT)
Dept: ADMINISTRATIVE | Facility: OTHER | Age: 71
End: 2021-09-28

## 2021-10-07 DIAGNOSIS — N18.31 STAGE 3A CHRONIC KIDNEY DISEASE: Primary | ICD-10-CM

## 2021-10-28 ENCOUNTER — OFFICE VISIT (OUTPATIENT)
Dept: PODIATRY | Facility: CLINIC | Age: 71
End: 2021-10-28
Payer: MEDICARE

## 2021-10-28 VITALS
HEART RATE: 71 BPM | WEIGHT: 215 LBS | RESPIRATION RATE: 18 BRPM | SYSTOLIC BLOOD PRESSURE: 154 MMHG | DIASTOLIC BLOOD PRESSURE: 90 MMHG | HEIGHT: 70 IN | BODY MASS INDEX: 30.78 KG/M2

## 2021-10-28 DIAGNOSIS — B35.1 ONYCHOMYCOSIS DUE TO DERMATOPHYTE: ICD-10-CM

## 2021-10-28 DIAGNOSIS — E11.40 TYPE 2 DIABETES MELLITUS WITH DIABETIC NEUROPATHY, WITHOUT LONG-TERM CURRENT USE OF INSULIN: Primary | ICD-10-CM

## 2021-10-28 PROCEDURE — 11721 DEBRIDE NAIL 6 OR MORE: CPT | Mod: Q9,PBBFAC | Performed by: PODIATRIST

## 2021-10-28 PROCEDURE — 11721 PR DEBRIDEMENT OF NAILS, 6 OR MORE: ICD-10-PCS | Mod: Q9,S$PBB,, | Performed by: PODIATRIST

## 2021-10-28 PROCEDURE — 99499 UNLISTED E&M SERVICE: CPT | Mod: S$PBB,,, | Performed by: PODIATRIST

## 2021-10-28 PROCEDURE — 99999 PR PBB SHADOW E&M-EST. PATIENT-LVL IV: ICD-10-PCS | Mod: PBBFAC,,, | Performed by: PODIATRIST

## 2021-10-28 PROCEDURE — 99214 OFFICE O/P EST MOD 30 MIN: CPT | Mod: PBBFAC | Performed by: PODIATRIST

## 2021-10-28 PROCEDURE — 11721 DEBRIDE NAIL 6 OR MORE: CPT | Mod: Q9,S$PBB,, | Performed by: PODIATRIST

## 2021-10-28 PROCEDURE — 99999 PR PBB SHADOW E&M-EST. PATIENT-LVL IV: CPT | Mod: PBBFAC,,, | Performed by: PODIATRIST

## 2021-10-28 PROCEDURE — 99499 NO LOS: ICD-10-PCS | Mod: S$PBB,,, | Performed by: PODIATRIST

## 2021-11-23 ENCOUNTER — LAB VISIT (OUTPATIENT)
Dept: LAB | Facility: HOSPITAL | Age: 71
End: 2021-11-23
Payer: MEDICARE

## 2021-11-23 DIAGNOSIS — N18.31 STAGE 3A CHRONIC KIDNEY DISEASE: ICD-10-CM

## 2021-11-23 LAB
CREAT UR-MCNC: 41 MG/DL (ref 23–375)
PROT UR-MCNC: 25 MG/DL (ref 0–15)
PROT/CREAT UR: 0.61 MG/G{CREAT} (ref 0–0.2)

## 2021-11-23 PROCEDURE — 82570 ASSAY OF URINE CREATININE: CPT | Performed by: NURSE PRACTITIONER

## 2021-11-26 ENCOUNTER — OFFICE VISIT (OUTPATIENT)
Dept: INTERNAL MEDICINE | Facility: CLINIC | Age: 71
End: 2021-11-26
Payer: MEDICARE

## 2021-11-26 ENCOUNTER — IMMUNIZATION (OUTPATIENT)
Dept: INTERNAL MEDICINE | Facility: CLINIC | Age: 71
End: 2021-11-26
Payer: MEDICARE

## 2021-11-26 ENCOUNTER — PATIENT OUTREACH (OUTPATIENT)
Dept: ADMINISTRATIVE | Facility: OTHER | Age: 71
End: 2021-11-26
Payer: MEDICARE

## 2021-11-26 VITALS
DIASTOLIC BLOOD PRESSURE: 78 MMHG | BODY MASS INDEX: 31.5 KG/M2 | TEMPERATURE: 98 F | HEIGHT: 70 IN | SYSTOLIC BLOOD PRESSURE: 138 MMHG | OXYGEN SATURATION: 96 % | HEART RATE: 70 BPM | WEIGHT: 220 LBS

## 2021-11-26 DIAGNOSIS — E11.40 TYPE 2 DIABETES MELLITUS WITH DIABETIC NEUROPATHY, WITHOUT LONG-TERM CURRENT USE OF INSULIN: ICD-10-CM

## 2021-11-26 DIAGNOSIS — N40.0 BENIGN PROSTATIC HYPERPLASIA, UNSPECIFIED WHETHER LOWER URINARY TRACT SYMPTOMS PRESENT: ICD-10-CM

## 2021-11-26 DIAGNOSIS — H40.32X3 TRAUMATIC GLAUCOMA, LEFT, SEVERE STAGE: Primary | ICD-10-CM

## 2021-11-26 DIAGNOSIS — E55.9 MILD VITAMIN D DEFICIENCY: ICD-10-CM

## 2021-11-26 DIAGNOSIS — E78.5 HYPERLIPIDEMIA, UNSPECIFIED HYPERLIPIDEMIA TYPE: ICD-10-CM

## 2021-11-26 PROCEDURE — G0008 ADMIN INFLUENZA VIRUS VAC: HCPCS | Mod: PBBFAC

## 2021-11-26 PROCEDURE — 90694 VACC AIIV4 NO PRSRV 0.5ML IM: CPT | Mod: PBBFAC

## 2021-11-26 PROCEDURE — 99215 OFFICE O/P EST HI 40 MIN: CPT | Mod: PBBFAC,25 | Performed by: INTERNAL MEDICINE

## 2021-11-26 PROCEDURE — 99214 OFFICE O/P EST MOD 30 MIN: CPT | Mod: S$PBB,,, | Performed by: INTERNAL MEDICINE

## 2021-11-26 PROCEDURE — 96372 THER/PROPH/DIAG INJ SC/IM: CPT | Mod: PBBFAC,59

## 2021-11-26 PROCEDURE — 99214 PR OFFICE/OUTPT VISIT, EST, LEVL IV, 30-39 MIN: ICD-10-PCS | Mod: S$PBB,,, | Performed by: INTERNAL MEDICINE

## 2021-11-26 PROCEDURE — 99999 PR PBB SHADOW E&M-EST. PATIENT-LVL V: ICD-10-PCS | Mod: PBBFAC,,, | Performed by: INTERNAL MEDICINE

## 2021-11-26 PROCEDURE — 99999 PR PBB SHADOW E&M-EST. PATIENT-LVL V: CPT | Mod: PBBFAC,,, | Performed by: INTERNAL MEDICINE

## 2021-11-26 RX ORDER — CYANOCOBALAMIN 1000 UG/ML
1000 INJECTION, SOLUTION INTRAMUSCULAR; SUBCUTANEOUS ONCE
Status: COMPLETED | OUTPATIENT
Start: 2021-11-26 | End: 2021-11-26

## 2021-11-26 RX ADMIN — CYANOCOBALAMIN 1000 MCG: 1000 INJECTION, SOLUTION INTRAMUSCULAR at 12:11

## 2021-11-29 ENCOUNTER — RESEARCH ENCOUNTER (OUTPATIENT)
Dept: RESEARCH | Facility: HOSPITAL | Age: 71
End: 2021-11-29
Payer: MEDICARE

## 2021-11-29 ENCOUNTER — OFFICE VISIT (OUTPATIENT)
Dept: NEPHROLOGY | Facility: CLINIC | Age: 71
End: 2021-11-29
Payer: MEDICARE

## 2021-11-29 VITALS
SYSTOLIC BLOOD PRESSURE: 140 MMHG | HEART RATE: 69 BPM | WEIGHT: 217.13 LBS | HEIGHT: 70 IN | DIASTOLIC BLOOD PRESSURE: 52 MMHG | OXYGEN SATURATION: 98 % | BODY MASS INDEX: 31.09 KG/M2

## 2021-11-29 DIAGNOSIS — N28.1 RENAL CYST: ICD-10-CM

## 2021-11-29 DIAGNOSIS — R80.9 PROTEINURIA, UNSPECIFIED TYPE: ICD-10-CM

## 2021-11-29 DIAGNOSIS — I10 HYPERTENSION, UNSPECIFIED TYPE: ICD-10-CM

## 2021-11-29 DIAGNOSIS — E11.40 TYPE 2 DIABETES MELLITUS WITH DIABETIC NEUROPATHY, WITHOUT LONG-TERM CURRENT USE OF INSULIN: ICD-10-CM

## 2021-11-29 DIAGNOSIS — N18.32 STAGE 3B CHRONIC KIDNEY DISEASE: Primary | ICD-10-CM

## 2021-11-29 PROCEDURE — 99214 OFFICE O/P EST MOD 30 MIN: CPT | Mod: S$PBB,,, | Performed by: NURSE PRACTITIONER

## 2021-11-29 PROCEDURE — 99999 PR PBB SHADOW E&M-EST. PATIENT-LVL V: CPT | Mod: PBBFAC,,, | Performed by: NURSE PRACTITIONER

## 2021-11-29 PROCEDURE — 99214 PR OFFICE/OUTPT VISIT, EST, LEVL IV, 30-39 MIN: ICD-10-PCS | Mod: S$PBB,,, | Performed by: NURSE PRACTITIONER

## 2021-11-29 PROCEDURE — 99999 PR PBB SHADOW E&M-EST. PATIENT-LVL V: ICD-10-PCS | Mod: PBBFAC,,, | Performed by: NURSE PRACTITIONER

## 2021-11-29 PROCEDURE — 99215 OFFICE O/P EST HI 40 MIN: CPT | Mod: PBBFAC | Performed by: NURSE PRACTITIONER

## 2021-12-07 NOTE — LETTER
July 19, 2019      Maryjane Farias MD  [address]  [facility] [address]           [facility] - Ophthalmology  [address]  [facility] [address]  Phone: [phone]  Fax: [phone]          Patient: Jae Millan   MR Number: [id number]   YOB: 1950   Date of Visit: 7/16/2019       Dear Dr. Maryjane Farias:    Thank you for referring Jae Millan to me for evaluation. Attached you will find relevant portions of my assessment and plan of care.    If you have questions, please do not hesitate to call me. I look forward to following Jae Millan along with you.    Sincerely,    Tamera Chew MD    Enclosure  CC:  No Recipients    If you would like to receive this communication electronically, please contact [email or web address] or [phone] to request more information on [employer] access.    For providers and/or their staff who would like to refer a patient to [facility], please contact us through our one-stop-shop provider referral line, [facility], at [phone].    If you feel you have received this communication in error or would no longer like to receive these types of communications, please e-mail [email or web address]         
LOS

## 2022-01-26 ENCOUNTER — PATIENT OUTREACH (OUTPATIENT)
Dept: ADMINISTRATIVE | Facility: OTHER | Age: 72
End: 2022-01-26
Payer: MEDICARE

## 2022-01-26 NOTE — PROGRESS NOTES
LINKS immunization registry updated  Care Everywhere updated  Health Maintenance updated  Chart reviewed for overdue Proactive [facility] Encounters (PEDRO LUIS) health maintenance testing (CRS, Breast Ca, Diabetic Eye Exam)   Orders entered:N/A

## 2022-01-28 ENCOUNTER — OFFICE VISIT (OUTPATIENT)
Dept: PODIATRY | Facility: CLINIC | Age: 72
End: 2022-01-28
Payer: MEDICARE

## 2022-01-28 VITALS
HEART RATE: 74 BPM | SYSTOLIC BLOOD PRESSURE: 149 MMHG | DIASTOLIC BLOOD PRESSURE: 85 MMHG | HEIGHT: 70 IN | WEIGHT: 217.13 LBS | BODY MASS INDEX: 31.09 KG/M2

## 2022-01-28 DIAGNOSIS — E11.40 TYPE 2 DIABETES MELLITUS WITH DIABETIC NEUROPATHY, WITHOUT LONG-TERM CURRENT USE OF INSULIN: Primary | ICD-10-CM

## 2022-01-28 DIAGNOSIS — B35.1 ONYCHOMYCOSIS DUE TO DERMATOPHYTE: ICD-10-CM

## 2022-01-28 PROCEDURE — 99499 NO LOS: ICD-10-PCS | Mod: S$PBB,,, | Performed by: PODIATRIST

## 2022-01-28 PROCEDURE — 99999 PR PBB SHADOW E&M-EST. PATIENT-LVL IV: ICD-10-PCS | Mod: PBBFAC,,, | Performed by: PODIATRIST

## 2022-01-28 PROCEDURE — 99499 UNLISTED E&M SERVICE: CPT | Mod: S$PBB,,, | Performed by: PODIATRIST

## 2022-01-28 PROCEDURE — 11721 DEBRIDE NAIL 6 OR MORE: CPT | Mod: Q9,S$PBB,, | Performed by: PODIATRIST

## 2022-01-28 PROCEDURE — 99999 PR PBB SHADOW E&M-EST. PATIENT-LVL IV: CPT | Mod: PBBFAC,,, | Performed by: PODIATRIST

## 2022-01-28 PROCEDURE — 99214 OFFICE O/P EST MOD 30 MIN: CPT | Mod: PBBFAC | Performed by: PODIATRIST

## 2022-01-28 PROCEDURE — 11721 DEBRIDE NAIL 6 OR MORE: CPT | Mod: Q9,PBBFAC | Performed by: PODIATRIST

## 2022-01-28 PROCEDURE — 11721 PR DEBRIDEMENT OF NAILS, 6 OR MORE: ICD-10-PCS | Mod: Q9,S$PBB,, | Performed by: PODIATRIST

## 2022-02-25 ENCOUNTER — PES CALL (OUTPATIENT)
Dept: ADMINISTRATIVE | Facility: CLINIC | Age: 72
End: 2022-02-25
Payer: MEDICARE

## 2022-02-25 ENCOUNTER — TELEPHONE (OUTPATIENT)
Dept: NEPHROLOGY | Facility: CLINIC | Age: 72
End: 2022-02-25
Payer: MEDICARE

## 2022-02-25 ENCOUNTER — LAB VISIT (OUTPATIENT)
Dept: LAB | Facility: HOSPITAL | Age: 72
End: 2022-02-25
Payer: MEDICARE

## 2022-02-25 DIAGNOSIS — N18.32 STAGE 3B CHRONIC KIDNEY DISEASE: ICD-10-CM

## 2022-02-25 LAB
ALBUMIN SERPL BCP-MCNC: 3.9 G/DL (ref 3.5–5.2)
ANION GAP SERPL CALC-SCNC: 11 MMOL/L (ref 8–16)
BASOPHILS # BLD AUTO: 0.04 K/UL (ref 0–0.2)
BASOPHILS NFR BLD: 0.5 % (ref 0–1.9)
BUN SERPL-MCNC: 24 MG/DL (ref 8–23)
CALCIUM SERPL-MCNC: 9.7 MG/DL (ref 8.7–10.5)
CHLORIDE SERPL-SCNC: 105 MMOL/L (ref 95–110)
CO2 SERPL-SCNC: 25 MMOL/L (ref 23–29)
CREAT SERPL-MCNC: 1.9 MG/DL (ref 0.5–1.4)
DIFFERENTIAL METHOD: ABNORMAL
EOSINOPHIL # BLD AUTO: 0.2 K/UL (ref 0–0.5)
EOSINOPHIL NFR BLD: 2.1 % (ref 0–8)
ERYTHROCYTE [DISTWIDTH] IN BLOOD BY AUTOMATED COUNT: 14.2 % (ref 11.5–14.5)
EST. GFR  (AFRICAN AMERICAN): 40.7 ML/MIN/1.73 M^2
EST. GFR  (NON AFRICAN AMERICAN): 35.2 ML/MIN/1.73 M^2
GLUCOSE SERPL-MCNC: 178 MG/DL (ref 70–110)
HCT VFR BLD AUTO: 42.8 % (ref 40–54)
HGB BLD-MCNC: 13.9 G/DL (ref 14–18)
IMM GRANULOCYTES # BLD AUTO: 0.02 K/UL (ref 0–0.04)
IMM GRANULOCYTES NFR BLD AUTO: 0.3 % (ref 0–0.5)
LYMPHOCYTES # BLD AUTO: 1.1 K/UL (ref 1–4.8)
LYMPHOCYTES NFR BLD: 14.2 % (ref 18–48)
MCH RBC QN AUTO: 26.3 PG (ref 27–31)
MCHC RBC AUTO-ENTMCNC: 32.5 G/DL (ref 32–36)
MCV RBC AUTO: 81 FL (ref 82–98)
MONOCYTES # BLD AUTO: 0.5 K/UL (ref 0.3–1)
MONOCYTES NFR BLD: 7 % (ref 4–15)
NEUTROPHILS # BLD AUTO: 5.7 K/UL (ref 1.8–7.7)
NEUTROPHILS NFR BLD: 75.9 % (ref 38–73)
NRBC BLD-RTO: 0 /100 WBC
PHOSPHATE SERPL-MCNC: 3.5 MG/DL (ref 2.7–4.5)
PLATELET # BLD AUTO: 280 K/UL (ref 150–450)
PMV BLD AUTO: 11.5 FL (ref 9.2–12.9)
POTASSIUM SERPL-SCNC: 4.3 MMOL/L (ref 3.5–5.1)
PTH-INTACT SERPL-MCNC: 80.3 PG/ML (ref 9–77)
RBC # BLD AUTO: 5.29 M/UL (ref 4.6–6.2)
SODIUM SERPL-SCNC: 141 MMOL/L (ref 136–145)
WBC # BLD AUTO: 7.46 K/UL (ref 3.9–12.7)

## 2022-02-25 PROCEDURE — 36415 COLL VENOUS BLD VENIPUNCTURE: CPT | Performed by: NURSE PRACTITIONER

## 2022-02-25 PROCEDURE — 80069 RENAL FUNCTION PANEL: CPT | Performed by: NURSE PRACTITIONER

## 2022-02-25 PROCEDURE — 85025 COMPLETE CBC W/AUTO DIFF WBC: CPT | Performed by: NURSE PRACTITIONER

## 2022-02-25 PROCEDURE — 83970 ASSAY OF PARATHORMONE: CPT | Performed by: NURSE PRACTITIONER

## 2022-02-25 NOTE — TELEPHONE ENCOUNTER
----- Message from Orquidea Koehler NP sent at 2/25/2022  4:03 PM CST -----  Pls assist c scheduling f/u. I'll discuss labs at visit.

## 2022-03-14 NOTE — PROGRESS NOTES
Subjective:      Patient ID: Jae Millan is a 71 y.o. male.    Chief Complaint: Nail Care (Pcp-Jarrett  11/26/2021) and Diabetes Mellitus    Jae is a 71 y.o. male who presents to the clinic for evaluation and treatment of high risk feet. Jae has a past medical history of Cataract, Decreased sensation of lower extremity, Diabetes mellitus, Diabetic neuropathy, Dysarthria, Dysphagia, ED (erectile dysfunction), Glaucoma, Hemiparesis, High cholesterol, History of hepatitis C, s/p successful Rx w/ cure (SVR12) 4/2020, Hypertension, Impaired functional mobility, balance, gait, and endurance, Stroke, and Urinary frequency. The patient's chief complaint is long, thick toenails. This patient has documented high risk feet requiring routine maintenance secondary to peripheral neuropathy.    PCP: Maryjane Farias MD    Date Last Seen by PCP:   Chief Complaint   Patient presents with    Nail Care     Kayleen-Jarrett  11/26/2021    Diabetes Mellitus         Current shoe gear:  Affected Foot: Tennis shoes     Unaffected Foot: Tennis shoes    Hemoglobin A1C   Date Value Ref Range Status   08/06/2021 6.7 (H) 4.0 - 5.6 % Final     Comment:     ADA Screening Guidelines:  5.7-6.4%  Consistent with prediabetes  >or=6.5%  Consistent with diabetes    High levels of fetal hemoglobin interfere with the HbA1C  assay. Heterozygous hemoglobin variants (HbS, HgC, etc)do  not significantly interfere with this assay.   However, presence of multiple variants may affect accuracy.     07/15/2021 6.7 (H) 4.0 - 5.6 % Final     Comment:     ADA Screening Guidelines:  5.7-6.4%  Consistent with prediabetes  >or=6.5%  Consistent with diabetes    High levels of fetal hemoglobin interfere with the HbA1C  assay. Heterozygous hemoglobin variants (HbS, HgC, etc)do  not significantly interfere with this assay.   However, presence of multiple variants may affect accuracy.     04/19/2021 7.5 (H) 4.0 - 5.6 % Final     Comment:     ADA Screening  Guidelines:  5.7-6.4%  Consistent with prediabetes  >or=6.5%  Consistent with diabetes    High levels of fetal hemoglobin interfere with the HbA1C  assay. Heterozygous hemoglobin variants (HbS, HgC, etc)do  not significantly interfere with this assay.   However, presence of multiple variants may affect accuracy.         Review of Systems   Constitutional: Negative for chills, fever and malaise/fatigue.   HENT: Negative for hearing loss.    Cardiovascular: Negative for claudication.   Respiratory: Negative for shortness of breath.    Skin: Positive for color change, dry skin, nail changes and unusual hair distribution. Negative for flushing and rash.   Musculoskeletal: Negative for joint pain and myalgias.   Neurological: Positive for numbness, paresthesias and sensory change. Negative for loss of balance.   Psychiatric/Behavioral: Negative for altered mental status.           Objective:      Physical Exam  Vitals reviewed.   Constitutional:       Appearance: He is well-developed.   Cardiovascular:      Pulses:           Dorsalis pedis pulses are 0 on the right side and 0 on the left side.        Posterior tibial pulses are 1+ on the right side and 1+ on the left side.   Musculoskeletal:      Right ankle: Decreased range of motion. Abnormal pulse.      Right Achilles Tendon: Bray's test negative.      Left ankle: Decreased range of motion. Abnormal pulse.      Left Achilles Tendon: Bray's test negative.      Right foot: Decreased range of motion.      Left foot: Decreased range of motion.   Feet:      Right foot:      Protective Sensation: 5 sites tested. 2 sites sensed.      Left foot:      Protective Sensation: 5 sites tested. 2 sites sensed.   Skin:     General: Skin is dry.      Capillary Refill: Capillary refill takes more than 3 seconds.      Comments: Skin is thin, terse, shiny, and cool, b/L.     Neurological:      Mental Status: He is alert.      Comments: diminished sensation noted to b/L lower  extremities   Psychiatric:         Behavior: Behavior normal. Behavior is cooperative.         Nails x10 are elongated by  5-7mm's, thickened by 2-4 mm's, dystrophic, and are yellowish in  coloration . Xerosis Bilaterally. No open lesions noted.             Assessment:       Encounter Diagnoses   Name Primary?    Type 2 diabetes mellitus with diabetic neuropathy, without long-term current use of insulin Yes    Onychomycosis due to dermatophyte          Plan:       Jae was seen today for nail care and diabetes mellitus.    Diagnoses and all orders for this visit:    Type 2 diabetes mellitus with diabetic neuropathy, without long-term current use of insulin    Onychomycosis due to dermatophyte      I counseled the patient on his conditions, their implications and medical management.        - Shoe inspection. Diabetic Foot Education. Patient reminded of the importance of good nutrition and blood sugar control to help prevent podiatric complications of diabetes. Patient instructed on proper foot hygeine. We discussed wearing proper shoe gear, daily foot inspections, never walking without protective shoe gear, never putting sharp instruments to feet, routine podiatric nail visits every 2-3 months.      - With patient's permission, nails were aggressively reduced and debrided x 10 to their soft tissue attachment mechanically and with electric , removing all offending nail and debris. Patient relates relief following the procedure. He will continue to monitor the areas daily, inspect his feet, wear protective shoe gear when ambulatory, moisturizer to maintain skin integrity and follow in this office in approximately 2-3 months, sooner p.r.n.

## 2022-03-23 ENCOUNTER — PES CALL (OUTPATIENT)
Dept: ADMINISTRATIVE | Facility: CLINIC | Age: 72
End: 2022-03-23
Payer: MEDICAID

## 2022-03-25 ENCOUNTER — OFFICE VISIT (OUTPATIENT)
Dept: INTERNAL MEDICINE | Facility: CLINIC | Age: 72
End: 2022-03-25
Payer: MEDICARE

## 2022-03-25 ENCOUNTER — TELEPHONE (OUTPATIENT)
Dept: INTERNAL MEDICINE | Facility: CLINIC | Age: 72
End: 2022-03-25

## 2022-03-25 ENCOUNTER — LAB VISIT (OUTPATIENT)
Dept: LAB | Facility: HOSPITAL | Age: 72
End: 2022-03-25
Attending: INTERNAL MEDICINE
Payer: MEDICARE

## 2022-03-25 VITALS
SYSTOLIC BLOOD PRESSURE: 142 MMHG | BODY MASS INDEX: 30.81 KG/M2 | DIASTOLIC BLOOD PRESSURE: 82 MMHG | TEMPERATURE: 98 F | HEIGHT: 70 IN | WEIGHT: 215.19 LBS | OXYGEN SATURATION: 99 % | HEART RATE: 64 BPM

## 2022-03-25 DIAGNOSIS — Z12.11 SCREENING FOR COLON CANCER: ICD-10-CM

## 2022-03-25 DIAGNOSIS — N40.0 BENIGN PROSTATIC HYPERPLASIA, UNSPECIFIED WHETHER LOWER URINARY TRACT SYMPTOMS PRESENT: Primary | ICD-10-CM

## 2022-03-25 DIAGNOSIS — E11.40 TYPE 2 DIABETES MELLITUS WITH DIABETIC NEUROPATHY, WITHOUT LONG-TERM CURRENT USE OF INSULIN: ICD-10-CM

## 2022-03-25 DIAGNOSIS — R39.15 URINARY URGENCY: ICD-10-CM

## 2022-03-25 DIAGNOSIS — Z12.5 ENCOUNTER FOR SCREENING FOR MALIGNANT NEOPLASM OF PROSTATE: ICD-10-CM

## 2022-03-25 DIAGNOSIS — M79.2 NEUROPATHIC PAIN: ICD-10-CM

## 2022-03-25 DIAGNOSIS — E29.1 HYPOGONADISM IN MALE: ICD-10-CM

## 2022-03-25 DIAGNOSIS — R41.3 OTHER AMNESIA: ICD-10-CM

## 2022-03-25 DIAGNOSIS — R35.0 URINARY FREQUENCY: ICD-10-CM

## 2022-03-25 LAB
ALBUMIN SERPL BCP-MCNC: 4.2 G/DL (ref 3.5–5.2)
ALP SERPL-CCNC: 63 U/L (ref 55–135)
ALT SERPL W/O P-5'-P-CCNC: 19 U/L (ref 10–44)
ANION GAP SERPL CALC-SCNC: 15 MMOL/L (ref 8–16)
AST SERPL-CCNC: 16 U/L (ref 10–40)
BILIRUB SERPL-MCNC: 0.6 MG/DL (ref 0.1–1)
BUN SERPL-MCNC: 27 MG/DL (ref 8–23)
CALCIUM SERPL-MCNC: 10.3 MG/DL (ref 8.7–10.5)
CHLORIDE SERPL-SCNC: 104 MMOL/L (ref 95–110)
CHOLEST SERPL-MCNC: 168 MG/DL (ref 120–199)
CHOLEST/HDLC SERPL: 3.6 {RATIO} (ref 2–5)
CO2 SERPL-SCNC: 23 MMOL/L (ref 23–29)
CREAT SERPL-MCNC: 1.9 MG/DL (ref 0.5–1.4)
EST. GFR  (AFRICAN AMERICAN): 40.7 ML/MIN/1.73 M^2
EST. GFR  (NON AFRICAN AMERICAN): 35.2 ML/MIN/1.73 M^2
ESTIMATED AVG GLUCOSE: 217 MG/DL (ref 68–131)
GLUCOSE SERPL-MCNC: 143 MG/DL (ref 70–110)
HBA1C MFR BLD: 9.2 % (ref 4–5.6)
HDLC SERPL-MCNC: 47 MG/DL (ref 40–75)
HDLC SERPL: 28 % (ref 20–50)
LDLC SERPL CALC-MCNC: 93.8 MG/DL (ref 63–159)
NONHDLC SERPL-MCNC: 121 MG/DL
POTASSIUM SERPL-SCNC: 4.2 MMOL/L (ref 3.5–5.1)
PROT SERPL-MCNC: 8.9 G/DL (ref 6–8.4)
SODIUM SERPL-SCNC: 142 MMOL/L (ref 136–145)
TRIGL SERPL-MCNC: 136 MG/DL (ref 30–150)
TSH SERPL DL<=0.005 MIU/L-ACNC: 1.15 UIU/ML (ref 0.4–4)

## 2022-03-25 PROCEDURE — 99999 PR PBB SHADOW E&M-EST. PATIENT-LVL V: ICD-10-PCS | Mod: PBBFAC,,, | Performed by: INTERNAL MEDICINE

## 2022-03-25 PROCEDURE — 99214 OFFICE O/P EST MOD 30 MIN: CPT | Mod: S$PBB,,, | Performed by: INTERNAL MEDICINE

## 2022-03-25 PROCEDURE — 96372 THER/PROPH/DIAG INJ SC/IM: CPT | Mod: PBBFAC

## 2022-03-25 PROCEDURE — 80053 COMPREHEN METABOLIC PANEL: CPT | Performed by: INTERNAL MEDICINE

## 2022-03-25 PROCEDURE — 80061 LIPID PANEL: CPT | Performed by: INTERNAL MEDICINE

## 2022-03-25 PROCEDURE — 99214 PR OFFICE/OUTPT VISIT, EST, LEVL IV, 30-39 MIN: ICD-10-PCS | Mod: S$PBB,,, | Performed by: INTERNAL MEDICINE

## 2022-03-25 PROCEDURE — 83036 HEMOGLOBIN GLYCOSYLATED A1C: CPT | Performed by: INTERNAL MEDICINE

## 2022-03-25 PROCEDURE — 99999 PR PBB SHADOW E&M-EST. PATIENT-LVL V: CPT | Mod: PBBFAC,,, | Performed by: INTERNAL MEDICINE

## 2022-03-25 PROCEDURE — 99215 OFFICE O/P EST HI 40 MIN: CPT | Mod: PBBFAC | Performed by: INTERNAL MEDICINE

## 2022-03-25 PROCEDURE — 84443 ASSAY THYROID STIM HORMONE: CPT | Performed by: INTERNAL MEDICINE

## 2022-03-25 PROCEDURE — 36415 COLL VENOUS BLD VENIPUNCTURE: CPT | Performed by: INTERNAL MEDICINE

## 2022-03-25 RX ORDER — LOSARTAN POTASSIUM AND HYDROCHLOROTHIAZIDE 25; 100 MG/1; MG/1
1 TABLET ORAL DAILY
Qty: 90 TABLET | Refills: 3 | Status: SHIPPED | OUTPATIENT
Start: 2022-03-25 | End: 2023-03-02 | Stop reason: SDUPTHER

## 2022-03-25 RX ORDER — PANTOPRAZOLE SODIUM 40 MG/1
40 TABLET, DELAYED RELEASE ORAL DAILY
Qty: 90 TABLET | Refills: 3 | Status: SHIPPED | OUTPATIENT
Start: 2022-03-25 | End: 2023-02-28

## 2022-03-25 RX ORDER — GABAPENTIN 300 MG/1
300 CAPSULE ORAL 3 TIMES DAILY
Qty: 120 CAPSULE | Refills: 3 | Status: SHIPPED | OUTPATIENT
Start: 2022-03-25 | End: 2022-06-23 | Stop reason: SDUPTHER

## 2022-03-25 RX ORDER — CARVEDILOL 6.25 MG/1
6.25 TABLET ORAL 2 TIMES DAILY WITH MEALS
Qty: 180 TABLET | Refills: 3 | Status: SHIPPED | OUTPATIENT
Start: 2022-03-25 | End: 2023-03-01

## 2022-03-25 RX ORDER — ROSUVASTATIN CALCIUM 20 MG/1
20 TABLET, COATED ORAL DAILY
Qty: 90 TABLET | Refills: 3 | Status: SHIPPED | OUTPATIENT
Start: 2022-03-25 | End: 2023-02-28

## 2022-03-25 RX ORDER — CYANOCOBALAMIN 1000 UG/ML
1000 INJECTION, SOLUTION INTRAMUSCULAR; SUBCUTANEOUS ONCE
Status: COMPLETED | OUTPATIENT
Start: 2022-03-25 | End: 2022-03-25

## 2022-03-25 RX ORDER — TAMSULOSIN HYDROCHLORIDE 0.4 MG/1
0.4 CAPSULE ORAL DAILY
Qty: 90 CAPSULE | Refills: 3 | Status: SHIPPED | OUTPATIENT
Start: 2022-03-25 | End: 2023-03-03

## 2022-03-25 RX ORDER — AMLODIPINE BESYLATE 10 MG/1
10 TABLET ORAL DAILY
Qty: 90 TABLET | Refills: 3 | Status: SHIPPED | OUTPATIENT
Start: 2022-03-25 | End: 2023-03-01

## 2022-03-25 RX ORDER — EMPAGLIFLOZIN 10 MG/1
10 TABLET, FILM COATED ORAL DAILY
Qty: 90 TABLET | Refills: 3 | Status: SHIPPED | OUTPATIENT
Start: 2022-03-25 | End: 2023-04-16

## 2022-03-25 RX ADMIN — CYANOCOBALAMIN 1000 MCG: 1000 INJECTION, SOLUTION INTRAMUSCULAR at 01:03

## 2022-03-25 NOTE — PROGRESS NOTES
Subjective:       Patient ID: Jae Millan is a 71 y.o. male.    Chief Complaint: Follow-up and Memory Loss    HPIPt having some issue with memory.  Some urinary symptoms. No CP or SOB.  Review of Systems   Respiratory: Negative for shortness of breath.    Cardiovascular: Negative for chest pain.   Gastrointestinal: Negative for abdominal pain, diarrhea, nausea and vomiting.   Genitourinary: Negative for dysuria.   Neurological: Negative for seizures, syncope and headaches.       Objective:      Physical Exam  Constitutional:       General: He is not in acute distress.     Appearance: He is well-developed.   Neck:      Thyroid: No thyromegaly.      Vascular: No JVD.   Cardiovascular:      Rate and Rhythm: Normal rate and regular rhythm.      Heart sounds: Normal heart sounds. No murmur heard.    No friction rub. No gallop.   Pulmonary:      Effort: Pulmonary effort is normal.      Breath sounds: Normal breath sounds. No wheezing or rales.   Abdominal:      General: Bowel sounds are normal. There is no distension.      Palpations: Abdomen is soft. There is no mass.      Tenderness: There is no abdominal tenderness. There is no guarding or rebound.   Musculoskeletal:      Cervical back: Neck supple.   Lymphadenopathy:      Cervical: No cervical adenopathy.   Skin:     General: Skin is warm and dry.   Neurological:      Mental Status: He is alert and oriented to person, place, and time.   Psychiatric:         Behavior: Behavior normal.         Thought Content: Thought content normal.         Judgment: Judgment normal.         Assessment:       1. Benign prostatic hyperplasia, unspecified whether lower urinary tract symptoms present    2. Other amnesia    3. Urinary urgency    4. Urinary frequency    5. Type 2 diabetes mellitus with diabetic neuropathy, without long-term current use of insulin    6. Neuropathic pain    7. Screening for colon cancer    8. Hypogonadism in male    9. Encounter for screening for malignant  neoplasm of prostate         Plan:   Benign prostatic hyperplasia, unspecified whether lower urinary tract symptoms present  stable    Memory loss  -     CT Head Without Contrast; Future; Expected date: 03/25/2022  Will do MMSE next time  Urinary urgency  -     tamsulosin (FLOMAX) 0.4 mg Cap; Take 1 capsule (0.4 mg total) by mouth once daily.  Dispense: 90 capsule; Refill: 3    Urinary frequency  -     tamsulosin (FLOMAX) 0.4 mg Cap; Take 1 capsule (0.4 mg total) by mouth once daily.  Dispense: 90 capsule; Refill: 3    Type 2 diabetes mellitus with diabetic neuropathy, without long-term current use of insulin  -     Comprehensive Metabolic Panel; Future; Expected date: 03/25/2022  -     Lipid Panel; Future; Expected date: 03/25/2022  -     TSH; Future; Expected date: 03/25/2022  -     Hemoglobin A1C; Future; Expected date: 03/25/2022  -     Ambulatory referral/consult to Nephrology; Future; Expected date: 04/01/2022  -     empagliflozin (JARDIANCE) 10 mg tablet; Take 1 tablet (10 mg total) by mouth once daily.  Dispense: 90 tablet; Refill: 3  -     SITagliptin (JANUVIA) 50 MG Tab; Take 1 tablet (50 mg total) by mouth once daily.  Dispense: 90 tablet; Refill: 3    Neuropathic pain  -     gabapentin (NEURONTIN) 300 MG capsule; Take 1 capsule (300 mg total) by mouth 3 (three) times daily. In 1 wk, if no relief, may add an extra capsule at bedtime (4 capsules total daily)  Dispense: 120 capsule; Refill: 3    Screening for colon cancer  -     Fecal Immunochemical Test (iFOBT); Future; Expected date: 03/25/2022    Hypogonadism in male  -     Ambulatory referral/consult to Endocrinology; Future; Expected date: 04/01/2022    Encounter for screening for malignant neoplasm of prostate     Other orders  -     cyanocobalamin injection 1,000 mcg  -     losartan-hydrochlorothiazide 100-25 mg (HYZAAR) 100-25 mg per tablet; Take 1 tablet by mouth once daily.  Dispense: 90 tablet; Refill: 3  -     amLODIPine (NORVASC) 10 MG tablet;  Take 1 tablet (10 mg total) by mouth once daily. For HTN  Dispense: 90 tablet; Refill: 3  -     rosuvastatin (CRESTOR) 20 MG tablet; Take 1 tablet (20 mg total) by mouth once daily.  Dispense: 90 tablet; Refill: 3  -     pantoprazole (PROTONIX) 40 MG tablet; Take 1 tablet (40 mg total) by mouth once daily. For stomach  Dispense: 90 tablet; Refill: 3  -     carvediloL (COREG) 6.25 MG tablet; Take 1 tablet (6.25 mg total) by mouth 2 (two) times daily with meals.  Dispense: 180 tablet; Refill: 3    Meds renewed

## 2022-03-28 ENCOUNTER — PATIENT OUTREACH (OUTPATIENT)
Dept: ADMINISTRATIVE | Facility: OTHER | Age: 72
End: 2022-03-28
Payer: MEDICAID

## 2022-03-29 ENCOUNTER — OFFICE VISIT (OUTPATIENT)
Dept: ENDOCRINOLOGY | Facility: CLINIC | Age: 72
End: 2022-03-29
Payer: MEDICARE

## 2022-03-29 VITALS
BODY MASS INDEX: 31.18 KG/M2 | HEART RATE: 68 BPM | OXYGEN SATURATION: 96 % | SYSTOLIC BLOOD PRESSURE: 132 MMHG | HEIGHT: 70 IN | DIASTOLIC BLOOD PRESSURE: 80 MMHG | WEIGHT: 217.81 LBS

## 2022-03-29 DIAGNOSIS — E55.9 VITAMIN D DEFICIENCY: ICD-10-CM

## 2022-03-29 DIAGNOSIS — E29.1 HYPOGONADISM IN MALE: ICD-10-CM

## 2022-03-29 DIAGNOSIS — E11.40 TYPE 2 DIABETES MELLITUS WITH DIABETIC NEUROPATHY, WITHOUT LONG-TERM CURRENT USE OF INSULIN: Primary | ICD-10-CM

## 2022-03-29 DIAGNOSIS — I10 ESSENTIAL HYPERTENSION: ICD-10-CM

## 2022-03-29 PROCEDURE — 99214 PR OFFICE/OUTPT VISIT, EST, LEVL IV, 30-39 MIN: ICD-10-PCS | Mod: S$PBB,GC,, | Performed by: STUDENT IN AN ORGANIZED HEALTH CARE EDUCATION/TRAINING PROGRAM

## 2022-03-29 PROCEDURE — 99999 PR PBB SHADOW E&M-EST. PATIENT-LVL V: ICD-10-PCS | Mod: PBBFAC,GC,, | Performed by: STUDENT IN AN ORGANIZED HEALTH CARE EDUCATION/TRAINING PROGRAM

## 2022-03-29 PROCEDURE — 99999 PR PBB SHADOW E&M-EST. PATIENT-LVL V: CPT | Mod: PBBFAC,GC,, | Performed by: STUDENT IN AN ORGANIZED HEALTH CARE EDUCATION/TRAINING PROGRAM

## 2022-03-29 PROCEDURE — 99214 OFFICE O/P EST MOD 30 MIN: CPT | Mod: S$PBB,GC,, | Performed by: STUDENT IN AN ORGANIZED HEALTH CARE EDUCATION/TRAINING PROGRAM

## 2022-03-29 PROCEDURE — 99215 OFFICE O/P EST HI 40 MIN: CPT | Mod: PBBFAC | Performed by: STUDENT IN AN ORGANIZED HEALTH CARE EDUCATION/TRAINING PROGRAM

## 2022-03-29 NOTE — ASSESSMENT & PLAN NOTE
- Reviewed goals of therapy are to get the best control we can without hypoglycemia  - Not checking sugars at home, refilled blood glucose strips  - Poor compliance with diet, would request referral back to diabetes education      Medication changes: none today    Continue:    · Metformin 1000 mg daily (renal dose; had trouble maintaining b.i.d. dosing in the past)  · Januvia 50mg daily (renal dose)  · Jardiance (empagliflozin) 10 mg daily     - Advised frequent self blood glucose monitoring. Patient encouraged to document glucose results and bring them to every clinic visit.    -Close adherence to lifestyle changes recommended.      Eyes:  Ophthalmology referral provided, blurry vision  Feet:  Seen by Podiatry recently for toenail trimming and routine foot exam. Next comprehensive foot exam due 4/2022  Kidneys: Urine microalbumin/Cr elevated; on losartan and SGLT-2 inhibitor. .  HbA1c: Ordered for 3 months  Lipids: On crestor 20 mg daily  ASA: Yes  Hypertension: BP normal today, 132/80

## 2022-03-29 NOTE — PROGRESS NOTES
Subjective:      Chief Complaint: Diabetes      HPI: Jae Millan is a 71 y.o. male who is here for a follow up evaluation for diabetes mellitus.   He is new to me, last saw Dr. Wasserman in August, 21    Now Regarding diabetes:  -Patient was initially diagnosed with Type 2 diabetes mellitus around 20 years ago   -When questioned about pertinent symptoms, he has polyuria, polydipsia, nocturia, blurry vision, and weakness on left side but denies nausea vomiting, weight change   -Last HbA1c in chart from 25 March 2022 is 9.2  -Currently denies any diabetic complications like neuropathy, retinopathy or nephropathy    Current diabetic medications include:   1. Metformin  twice daily  2. Jardiance 10 mg daily  3. Januvia 50 mg daily     Blood Sugar Range   does not check fingersticks      Medication Compliance  -Patient has not missed a dose of medication yet and vouches to compliance    Consultant Visits  -Diabetes educator:   -Ophthalmology Visit:  -Podiatry Visit:    Cardiovascular risk factors:   -advanced age (older than 55 for men, 65 for women), diabetes mellitus, dyslipidemia, hypertension, male gender, and obesity (BMI >= 30 kg/m2)    Diet  Her Meals are:  Breakfast: scrambled eggs  Lunch: meat with rice  Dinner:  Meat with rice    He has been cutting back on soft drinks and fried chicken.   Eats sweets especially chocolates      Diabetes History in Family  mother    Exercise:  Walks and is going to work with physical therapy for left sided weakness    Hypoglycemic Episodes:  none    Episodes of Diabetic Ketoacidosis:   none    Diabetes Management Status    Statin: Taking  ACE/ARB: Taking    Screening or Prevention Patient's value Goal Complete/Controlled?   HgA1C Testing and Control   Lab Results   Component Value Date    HGBA1C 9.2 (H) 03/25/2022      Annually/Less than 8% No   Lipid profile : 03/25/2022 Annually Yes   LDL control Lab Results   Component Value Date    LDLCALC 93.8 03/25/2022    Annually/Less  than 100 mg/dl  Yes   Nephropathy screening Lab Results   Component Value Date    LABMICR 148.0 04/19/2021     Lab Results   Component Value Date    PROTEINUA 1+ (A) 02/25/2022    Annually Yes   Blood pressure BP Readings from Last 1 Encounters:   03/29/22 132/80    Less than 140/90 Yes   Dilated retinal exam : 10/09/2020 Annually Yes   Foot exam   : 10/28/2021 Annually Yes       Reviewed past medical, family, social history and updated as appropriate.    Review of Systems as above  Objective:     Vitals:    03/29/22 1056   BP: 132/80   Pulse: 68     BP Readings from Last 5 Encounters:   03/29/22 132/80   03/25/22 (!) 142/82   01/28/22 (!) 149/85   12/10/21 (!) 140/72   11/29/21 (!) 140/52       Physical Exam  Constitutional:       Appearance: He is obese.   HENT:      Head: Normocephalic and atraumatic.      Right Ear: External ear normal.      Left Ear: External ear normal.      Mouth/Throat:      Mouth: Mucous membranes are moist.   Cardiovascular:      Rate and Rhythm: Normal rate.   Pulmonary:      Effort: Pulmonary effort is normal.   Abdominal:      Palpations: Abdomen is soft.   Musculoskeletal:      Cervical back: Normal range of motion.      Comments: Left sided weakness, swelling of both left hand and feet   Skin:     General: Skin is warm.   Neurological:      Mental Status: He is oriented to person, place, and time.         Wt Readings from Last 10 Encounters:   03/29/22 1056 98.8 kg (217 lb 13 oz)   03/25/22 1131 97.6 kg (215 lb 2.7 oz)   01/28/22 1044 98.5 kg (217 lb 2.5 oz)   11/29/21 0814 98.5 kg (217 lb 2.5 oz)   11/26/21 1028 99.8 kg (220 lb 0.3 oz)   10/28/21 0823 97.5 kg (215 lb)   08/24/21 1014 97.1 kg (214 lb 2.8 oz)   07/22/21 1056 97 kg (213 lb 13.5 oz)   07/19/21 0958 97.6 kg (215 lb 2.7 oz)   05/11/21 0841 95.3 kg (210 lb 1.6 oz)       Lab Results   Component Value Date    HGBA1C 9.2 (H) 03/25/2022     Lab Results   Component Value Date    CHOL 168 03/25/2022    HDL 47 03/25/2022     LDLCALC 93.8 03/25/2022    TRIG 136 03/25/2022    CHOLHDL 28.0 03/25/2022     Lab Results   Component Value Date     03/25/2022    K 4.2 03/25/2022     03/25/2022    CO2 23 03/25/2022     (H) 03/25/2022    BUN 27 (H) 03/25/2022    CREATININE 1.9 (H) 03/25/2022    CALCIUM 10.3 03/25/2022    PROT 8.9 (H) 03/25/2022    ALBUMIN 4.2 03/25/2022    BILITOT 0.6 03/25/2022    ALKPHOS 63 03/25/2022    AST 16 03/25/2022    ALT 19 03/25/2022    ANIONGAP 15 03/25/2022    ESTGFRAFRICA 40.7 (A) 03/25/2022    EGFRNONAA 35.2 (A) 03/25/2022    TSH 1.146 03/25/2022      Lab Results   Component Value Date    MICALBCREAT 200.0 (H) 04/19/2021       Assessment/Plan:     Type 2 diabetes mellitus with neurologic complication, without long-term current use of insulin  - Reviewed goals of therapy are to get the best control we can without hypoglycemia  - Not checking sugars at home, refilled blood glucose strips  - Poor compliance with diet, would request referral back to diabetes education      Medication changes: none today    Continue:    · Metformin 1000 mg daily (renal dose; had trouble maintaining b.i.d. dosing in the past)  · Januvia 50mg daily (renal dose)  · Jardiance (empagliflozin) 10 mg daily     - Advised frequent self blood glucose monitoring. Patient encouraged to document glucose results and bring them to every clinic visit.    -Close adherence to lifestyle changes recommended.      Eyes:  Ophthalmology referral provided, blurry vision  Feet:  Seen by Podiatry recently for toenail trimming and routine foot exam. Next comprehensive foot exam due 4/2022  Kidneys: Urine microalbumin/Cr elevated; on losartan and SGLT-2 inhibitor. .  HbA1c: Ordered for 3 months  Lipids: On crestor 20 mg daily  ASA: Yes  Hypertension: BP normal today, 132/80           Vitamin D deficiency  He has been taking the 84920 unit weekly ergocalciferol   Okay to continue with ergocalciferol.    Essential hypertension  Blood pressure  controlled on visit today.  On ARB      Follow up in 3 months    Dr. Qasim Zafar Iqbal [facility], [address]  [address] [place], [place], [address]    Office: [phone]  Fax: [phone]    The above history labs imaging impression and plan were discussed with attending physician who is in agreement and also took part in this patient's care.  I personally reviewed all of the patients available medications, labs, imaging, vitals, allergies, medical history.

## 2022-04-05 ENCOUNTER — CLINICAL SUPPORT (OUTPATIENT)
Dept: DIABETES | Facility: CLINIC | Age: 72
End: 2022-04-05
Payer: MEDICARE

## 2022-04-05 DIAGNOSIS — E11.40 TYPE 2 DIABETES MELLITUS WITH DIABETIC NEUROPATHY, WITHOUT LONG-TERM CURRENT USE OF INSULIN: ICD-10-CM

## 2022-04-05 PROCEDURE — G0108 DIAB MANAGE TRN  PER INDIV: HCPCS | Mod: PBBFAC

## 2022-04-05 PROCEDURE — 99999 PR PBB SHADOW E&M-EST. PATIENT-LVL III: CPT | Mod: PBBFAC,,,

## 2022-04-05 PROCEDURE — 99213 OFFICE O/P EST LOW 20 MIN: CPT | Mod: PBBFAC

## 2022-04-05 PROCEDURE — 99999 PR PBB SHADOW E&M-EST. PATIENT-LVL III: ICD-10-PCS | Mod: PBBFAC,,,

## 2022-04-05 NOTE — PROGRESS NOTES
Diabetes Care Specialist Progress Note  Author: Za Borden RN  Date: 4/5/2022    Program Intake  Reason for Diabetes Program Visit:: Initial Diabetes Assessment  Current diabetes risk level:: moderate  In the last 12 months, have you:: none  Permission to speak with others about care:: no    Lab Results   Component Value Date    HGBA1C 9.2 (H) 03/25/2022       Clinical    Patient Health Rating  Compared to other people your age, how would you rate your health?: Good    Problem Review  Reviewed Problem List with Patient: yes  Active comorbidities affecting diabetes self-care.: yes  Comorbidities: Hypertension, Stroke, Other (comment) (Glaucoma)  Reviewed health maintenance: yes    Clinical Assessment  Current Diabetes Treatment: Oral Medication (Metformin 1000 mg BID, Jardiance 10 mg QD, Januvia 50 mg QD)  Have you ever experienced hypoglycemia (low blood sugar)?: no  Have you ever experienced hyperglycemia (high blood sugar)?: no  Are you able to tell when your blood sugar is high?: No (comment)  Have you ever been hospitalized because your blood sugar was high?: no    Medication Information  How do you obtain your medications?: Family picks up  How many days a week do you miss your medications?: 2 (Forgets to take medications)  Do you sometimes have difficulty refilling your medications?: No  Medication adherence impacting ability to self-manage diabetes?: Yes    Labs  Do you have regular lab work to monitor your medications?: Yes  Type of Regular Lab Work: A1c  Where do you get your labs drawn?: [facility]  Lab Compliance Barriers: Yes    Nutritional Status  Diet: Regular  Meal Plan 24 Hour Recall: Breakfast, Lunch, Dinner, Snack  Meal Plan 24 Hour Recall - Breakfast: 2 scrambled eggs, turkey sausage, water  Meal Plan 24 Hour Recall - Lunch: Skipped  Meal Plan 24 Hour Recall - Dinner: Stuffed chicken breast, peas, carrots, noodles with cheese, water  Meal Plan 24 Hour Recall - Snack: Milk, choclate bars, cheese,  PB crackers, chips  Change in appetite?: No  Recent Changes in Weight: No Recent Weight Change  Current nutritional status an area of need that is impacting patient's ability to self-manage diabetes?: No    Additional Social History    Support  Does anyone support you with your diabetes care?: no  Who supports you?: self  Who takes you to your medical appointments?: other (see comments), friend (Public transportation)  Does the current support meet the patient's needs?: Yes  Is Support an area impacting ability to self-manage diabetes?: No    Access to Mass Media & Technology  Does the patient have access to any of the following devices or technologies?: Smart phone  Media or technology needs impacting ability to self-manage diabetes?: No    Cognitive/Behavioral Health  Alert and Oriented: Yes  Difficulty Thinking: No  Requires Prompting: No  Requires assistance for routine expression?: No  Cognitive or behavioral barriers impacting ability to self-manage diabetes?: No    Culture/Taoist  Culture or Confucianism beliefs that may impact ability to access healthcare: No    Communication  Language preference: English  Hearing Problems: No  Vision Problems: Yes  Vision problem type:: Decreased Vision  Vision Assistance: Glasses  Communication needs impacting ability to self-manage diabetes?: No    Health Literacy  Preferred Learning Method: Face to Face, Reading Materials, Hands On  How often do you need to have someone help you read instructions, pamphlets, or written material from your doctor or pharmacy?: Never  Health literacy needs impacting ability to self-manage diabetes?: No      Diabetes Self-Management Skills Assessment    Diabetes Disease Process/Treatment Options  Patient/caregiver able to state what happens when someone has diabetes.: somewhat  Patient/caregiver knows what type of diabetes they have.: no  Patient/caregiver able to identify at least three signs and symptoms of diabetes.: yes  Identified signs  and symptoms:: blurred vision, fatigue, frequent urination  Patient able to identify at least three risk factors for diabetes.: no  Diabetes Disease Process/Treatment Options: Skills Assessment Completed: Yes  Assessment indicates:: Knowledge deficit, Instruction Needed  Area of need?: No    Nutrition/Healthy Eating  Challenges to healthy eating:: lack of will power  Method of carbohydrate measurement:: no method  Patient can identify foods that impact blood sugar.: yes  Patient-identified foods:: sweets  Nutrition/Healthy Eating Skills Assessment Completed:: Yes  Assessment indicates:: Knowledge deficit, Instruction Needed  Area of need?: Yes    Physical Activity/Exercise  Patient's daily activity level:: lightly active  Patient formally exercises outside of work.: yes (Walks every day- if weather is bad will walk up and down stairs at [facility].)  Exercise Type: walking (Uses cane- due to stroke)  Intensity: Low  Frequency: four or more times a week  Duration: 30 min  Patient can identify forms of physical activity.: yes  Stated forms of physical activity:: any movement performed by muscles that uses energy  Patient can identify reasons why exercise/physical activity is important in diabetes management.: no  Physical Activity/Exercise Skills Assessment Completed: : Yes  Assessment indicates:: Knowledge deficit, Instruction Needed  Area of need?: No    Medications  Patient is able to describe current diabetes management routine.: yes  Diabetes management routine:: oral medications  Patient is able to identify current diabetes medications, dosages, and appropriate timing of medications.: no  Patient understands the purpose of the medications taken for diabetes.: no  Patient reports problems or concerns with current medication regimen.: no  Medication Skills Assessment Completed:: Yes  Assessment indicates:: Knowledge deficit, Instruction Needed  Area of need?: Yes    Home Blood Glucose Monitoring  Patient  states that blood sugar is checked at home daily.: (P) no  Reasons for not monitoring:: (P) other (see comments) (Was out of strips- picked them up on yesterday- States he will start checking blood sugar)  Home Blood Glucose Monitoring Skills Assessment Completed: : (P) Yes  Assessment indicates:: (P) Knowledge deficit, Instruction Needed  Area of need?: (P) Yes    Acute Complications  Patient is able to identify types of acute complications: (P) Yes  Patient Identified:: (P) Hypoglycemia  Patient is able to state the basic meaning of hypoglycemia?: (P) Yes  Able to state the blood sugar range for hypoglycemia?: (P) no (see comments)  Patient can identify general symptoms of hypoglycemia: (P) yes  Patient identified:: (P) shakiness, sweating  Able to state proper treatment of hypoglycemia?: (P) no (see comments)  Acute Complications Skills Assessment Completed: : (P) Yes  Assessment indicates:: (P) Knowledge deficit, Instruction Needed  Area of need?: (P) No    Chronic Complications  Patient can identify major chronic complications of diabetes.: (P) no  Patient can identify ways to prevent or delay diabetes complications.: (P) no  Patient is aware that having diabetes increases risk of heart disease?: (P) No  Patient is aware that heart disease is the leading cause of death and disability in people with diabetes?: (P) No  Chronic Complications Skills Assessment Completed: : (P) Yes  Assessment indicates:: (P) Knowledge deficit, Instruction Needed  Area of need?: (P) No    Psychosocial/Coping  Patient can identify ways of coping with chronic disease.: (P) yes  Patient-stated ways of coping with chronic disease:: (P) support group, support from loved ones  Psychosocial/Coping Skills Assessment Completed: : (P) Yes  Assessment indicates:: (P) Adequate understanding  Area of need?: (P) No      Diabetes Self Support Plan         Assessment Summary and Plan    Based on today's diabetes care assessment, the following areas  of need were identified:      Social 4/5/2022   Support No   Access to Mass Media/Tech No   Cognitive/Behavioral Health No   Culture/Anglican No   Communication No   Health Literacy No        Clinical 4/5/2022   Medication Adherence Yes   Lab Compliance Yes   Nutritional Status No        Diabetes Self-Management Skills 4/5/2022   Diabetes Disease Process/Treatment Options No   Nutrition/Healthy Eating Yes - Please see care plan.     Physical Activity/Exercise No   Medication Yes - Please see care plan.     Home Blood Glucose Monitoring Yes - Please see care plan.     Acute Complications No   Chronic Complications No   Psychosocial/Coping No          Today's interventions were provided through individual discussion, instruction, and written materials were provided.      Patient verbalized understanding of instruction and written materials.  Pt was able to return back demonstration of instructions today. Patient understood key points, needs reinforcement and further instruction.     Diabetes Self-Management Care Plan:    Today's Diabetes Self-Management Care Plan was developed with Jae's input. Jae has agreed to work toward the following goal(s) to improve his/her overall diabetes control.      Care Plan: Diabetes Management   Updates made since 3/6/2022 12:00 AM      Problem: Blood Glucose Self-Monitoring       Goal: Patient agrees to check and record blood sugars one time per day measuring at different times.    Start Date: 4/5/2022   Expected End Date: 7/5/2022   Priority: Medium   Barriers: Knowledge deficit   Note:    Reviewed appropriate timing and frequency to SMBG, education on parameters on when to notify provider      Task: Reviewed the importance of self-monitoring blood glucose and keeping logs. Completed 4/5/2022      Task: Instructed on how to self-monitor blood glucose using a home glucometer, how to properly dispose of used strips and lancets after use, and how to appropriately store meter and  supplies. Completed 4/5/2022      Task: Provided patient with blood glucose logs, reviewed appropriate timing and frequency to SMBG, education on parameters on when to notify provider and advised patient to bring logs to all appts with PCP/Endocrinologist/Diabetes Care Specialist. Completed 4/5/2022      Task: Discussed ways to minimize pain when monitoring blood glucose. Completed 4/5/2022      Problem: Healthy Eating       Goal: Will utilize plate method when arranging meal to stay within 45-60 gm of carbohydrates per meal. Will abstain from drinking Sugar Beverages and utilize Diet and/or artificial sweetener.    Start Date: 4/5/2022   Expected End Date: 7/5/2022   Priority: Low   Barriers: Knowledge deficit   Note:    Discussed Sugar Free drink options. Discussed carb vs non-carb foods and reviewed appropriate amounts of carbs to have at meals vs snacks. Recommended 45-60 (3-4 servings) gm carb at meals and 15 gm carb at snacks. Instructed on appropriate label reading and serving sizes of specific carb containing foods. Reviewed need to limit total/saturated fats. Discussed meal plans and snack ideas amenable to pt. Reviewed the plate method. Patient verbalizes understanding of received information/education.      Task: Reviewed the sources and role of Carbohydrate, Protein, and Fat and how each nutrient impacts blood sugar. Completed 4/5/2022      Task: Provided visual examples using dry measuring cups, food models, and other familiar objects such as computer mouse, deck or cards, tennis ball etc. to help with visualization of portions. Completed 4/5/2022      Task: Explained how to count carbohydrates using the food label and the use of dry measuring cups for accurate carb counting. Completed 4/5/2022   Note:    Pt does not feel comfortable counting carbs- went over plate method- pt will utilize plate method to manage carbs per meal.     Task: Recommended replacing beverages containing high sugar content with  noncaloric/sugar free options and/or water. Completed 4/5/2022      Task: Review the importance of balancing carbohydrates with each meal using portion control techniques to count servings of carbohydrate and label reading to identify serving size and amount of total carbs per serving. Completed 4/5/2022      Task: Provided Sample plate method and reviewed the use of the plate to estimate amounts of carbohydrate per meal. Completed 4/5/2022      Problem: Medications       Goal: Patient Agrees to take Diabetes Medication(s) Metformin, jardiance, januvia as prescribed.    Start Date: 4/5/2022   Expected End Date: 7/5/2022   Priority: High   Barriers: Lack of Motivation to Change   Note:    Patient reports forgetting to take all medications occurring 1 time per week. Discuss putting alarm on phone to remind pt to take his mediations as prescribed by MD. Also, discussed using pill box to assist with medication compliance.        Task: Reviewed with patient all current diabetes medications and provided basic review of the purpose, dosage, frequency, side effects, and storage of both oral and injectable diabetes medications. Completed 4/5/2022      Task: Discussed guidelines for preventing, detecting and treating hypoglycemia and hyperglycemia and reviewed the importance of meal and medication timing with diabetes mediations for prevention of hypoglycemia and maximum drug benefit. Completed 4/5/2022          Follow Up Plan   Follow-up 4 weeks (5-5-22: appt made) - Follow-up on following: Has pt been taking DM meds as prescribed- not missing doses/ Has pt begun to monitor blood sugars, does have logs/ Review recent meals- is pt utilizing plate method      Today's care plan and follow up schedule was discussed with patient.  Jae verbalized understanding of the care plan, goals, and agrees to follow up plan.        The patient was encouraged to communicate with his/her health care provider/physician and care team  regarding his/her condition(s) and treatment.  I provided the patient with my contact information today and encouraged to contact me via phone or [facility] Patient Portal as needed.     Length of Visit   Total Time: 60 Minutes

## 2022-04-08 ENCOUNTER — TELEPHONE (OUTPATIENT)
Dept: NEPHROLOGY | Facility: CLINIC | Age: 72
End: 2022-04-08
Payer: MEDICAID

## 2022-04-11 ENCOUNTER — HOSPITAL ENCOUNTER (OUTPATIENT)
Dept: RADIOLOGY | Facility: HOSPITAL | Age: 72
Discharge: HOME | End: 2022-04-11
Attending: INTERNAL MEDICINE
Payer: MEDICARE

## 2022-04-11 DIAGNOSIS — R41.3 OTHER AMNESIA: ICD-10-CM

## 2022-04-11 PROCEDURE — 70450 CT HEAD/BRAIN W/O DYE: CPT | Mod: TC

## 2022-04-11 PROCEDURE — 70450 CT HEAD WITHOUT CONTRAST: ICD-10-PCS | Mod: 26,,, | Performed by: RADIOLOGY

## 2022-04-11 PROCEDURE — 70450 CT HEAD/BRAIN W/O DYE: CPT | Mod: 26,,, | Performed by: RADIOLOGY

## 2022-05-04 ENCOUNTER — OFFICE VISIT (OUTPATIENT)
Dept: NEPHROLOGY | Facility: CLINIC | Age: 72
End: 2022-05-04
Payer: MEDICARE

## 2022-05-04 ENCOUNTER — LAB VISIT (OUTPATIENT)
Dept: LAB | Facility: HOSPITAL | Age: 72
End: 2022-05-04
Payer: MEDICARE

## 2022-05-04 VITALS
OXYGEN SATURATION: 98 % | HEART RATE: 66 BPM | SYSTOLIC BLOOD PRESSURE: 163 MMHG | HEIGHT: 70 IN | DIASTOLIC BLOOD PRESSURE: 92 MMHG | WEIGHT: 212.5 LBS | BODY MASS INDEX: 30.42 KG/M2

## 2022-05-04 DIAGNOSIS — N28.1 RENAL CYST: ICD-10-CM

## 2022-05-04 DIAGNOSIS — N18.32 STAGE 3B CHRONIC KIDNEY DISEASE: Primary | ICD-10-CM

## 2022-05-04 DIAGNOSIS — G81.90 HEMIPARESIS, UNSPECIFIED HEMIPARESIS ETIOLOGY, UNSPECIFIED LATERALITY: ICD-10-CM

## 2022-05-04 DIAGNOSIS — R80.9 PROTEINURIA, UNSPECIFIED TYPE: ICD-10-CM

## 2022-05-04 DIAGNOSIS — I10 HYPERTENSION, UNSPECIFIED TYPE: ICD-10-CM

## 2022-05-04 DIAGNOSIS — E11.40 TYPE 2 DIABETES MELLITUS WITH DIABETIC NEUROPATHY, WITHOUT LONG-TERM CURRENT USE OF INSULIN: ICD-10-CM

## 2022-05-04 DIAGNOSIS — N18.32 STAGE 3B CHRONIC KIDNEY DISEASE: ICD-10-CM

## 2022-05-04 LAB
ALBUMIN SERPL BCP-MCNC: 4.1 G/DL (ref 3.5–5.2)
ANION GAP SERPL CALC-SCNC: 9 MMOL/L (ref 8–16)
BASOPHILS # BLD AUTO: 0.03 K/UL (ref 0–0.2)
BASOPHILS NFR BLD: 0.5 % (ref 0–1.9)
BUN SERPL-MCNC: 22 MG/DL (ref 8–23)
CALCIUM SERPL-MCNC: 9.7 MG/DL (ref 8.7–10.5)
CHLORIDE SERPL-SCNC: 107 MMOL/L (ref 95–110)
CO2 SERPL-SCNC: 26 MMOL/L (ref 23–29)
CREAT SERPL-MCNC: 1.9 MG/DL (ref 0.5–1.4)
DIFFERENTIAL METHOD: ABNORMAL
EOSINOPHIL # BLD AUTO: 0.1 K/UL (ref 0–0.5)
EOSINOPHIL NFR BLD: 1.3 % (ref 0–8)
ERYTHROCYTE [DISTWIDTH] IN BLOOD BY AUTOMATED COUNT: 14.6 % (ref 11.5–14.5)
EST. GFR  (AFRICAN AMERICAN): 40.7 ML/MIN/1.73 M^2
EST. GFR  (NON AFRICAN AMERICAN): 35.2 ML/MIN/1.73 M^2
GLUCOSE SERPL-MCNC: 138 MG/DL (ref 70–110)
HCT VFR BLD AUTO: 42.1 % (ref 40–54)
HGB BLD-MCNC: 14 G/DL (ref 14–18)
IMM GRANULOCYTES # BLD AUTO: 0.01 K/UL (ref 0–0.04)
IMM GRANULOCYTES NFR BLD AUTO: 0.2 % (ref 0–0.5)
LYMPHOCYTES # BLD AUTO: 1.5 K/UL (ref 1–4.8)
LYMPHOCYTES NFR BLD: 24.8 % (ref 18–48)
MCH RBC QN AUTO: 26.4 PG (ref 27–31)
MCHC RBC AUTO-ENTMCNC: 33.3 G/DL (ref 32–36)
MCV RBC AUTO: 79 FL (ref 82–98)
MONOCYTES # BLD AUTO: 0.4 K/UL (ref 0.3–1)
MONOCYTES NFR BLD: 7.3 % (ref 4–15)
NEUTROPHILS # BLD AUTO: 4 K/UL (ref 1.8–7.7)
NEUTROPHILS NFR BLD: 65.9 % (ref 38–73)
NRBC BLD-RTO: 0 /100 WBC
PHOSPHATE SERPL-MCNC: 3.2 MG/DL (ref 2.7–4.5)
PLATELET # BLD AUTO: 251 K/UL (ref 150–450)
PMV BLD AUTO: 11.6 FL (ref 9.2–12.9)
POTASSIUM SERPL-SCNC: 4.3 MMOL/L (ref 3.5–5.1)
PTH-INTACT SERPL-MCNC: 119 PG/ML (ref 9–77)
RBC # BLD AUTO: 5.31 M/UL (ref 4.6–6.2)
SODIUM SERPL-SCNC: 142 MMOL/L (ref 136–145)
WBC # BLD AUTO: 6.04 K/UL (ref 3.9–12.7)

## 2022-05-04 PROCEDURE — 99999 PR PBB SHADOW E&M-EST. PATIENT-LVL V: ICD-10-PCS | Mod: PBBFAC,,, | Performed by: NURSE PRACTITIONER

## 2022-05-04 PROCEDURE — 99214 PR OFFICE/OUTPT VISIT, EST, LEVL IV, 30-39 MIN: ICD-10-PCS | Mod: S$PBB,,, | Performed by: NURSE PRACTITIONER

## 2022-05-04 PROCEDURE — 99999 PR PBB SHADOW E&M-EST. PATIENT-LVL V: CPT | Mod: PBBFAC,,, | Performed by: NURSE PRACTITIONER

## 2022-05-04 PROCEDURE — 85025 COMPLETE CBC W/AUTO DIFF WBC: CPT | Performed by: NURSE PRACTITIONER

## 2022-05-04 PROCEDURE — 99215 OFFICE O/P EST HI 40 MIN: CPT | Mod: PBBFAC | Performed by: NURSE PRACTITIONER

## 2022-05-04 PROCEDURE — 80069 RENAL FUNCTION PANEL: CPT | Performed by: NURSE PRACTITIONER

## 2022-05-04 PROCEDURE — 99214 OFFICE O/P EST MOD 30 MIN: CPT | Mod: S$PBB,,, | Performed by: NURSE PRACTITIONER

## 2022-05-04 PROCEDURE — 36415 COLL VENOUS BLD VENIPUNCTURE: CPT | Performed by: NURSE PRACTITIONER

## 2022-05-04 PROCEDURE — 83970 ASSAY OF PARATHORMONE: CPT | Performed by: NURSE PRACTITIONER

## 2022-05-04 NOTE — PATIENT INSTRUCTIONS
Talk to Dr. Salazar about refills of voltaren gel (pain gel).  [phone]    Ophthalmology phone number to make eye appointment: ________________________    Nephrology (kidney) department: [phone] if you have any question.

## 2022-05-04 NOTE — PROGRESS NOTES
"  Subjective:       Patient ID: Jae Millan is a 71 y.o. AA male who presents for follow-up evaluation of   No chief complaint on file.    HPI     Last seen by me Feb. 2021     Patient presents for f/u of CKD and albuminuria and HTN.  Baseline creatinine slowly trending upward; 1.1 in April 2019 to 1.4 May 2020 to 1.7 in Oct 2020. (Increase to 1.7 expected p starting Jardiance).     Significant hx includes CVA c left hemiparesis, HTN >15 years, HLD, HCV s/p cure, DM2 > 15 years.    Home BPs: does not take BPs at home (due to technical difficulties with hemiparesis); does not add salt to food.     The patient denies taking NSAIDs; no recent hospitalizations.     Significant family hx includes: HTN, heart disease; no known renal disease     Last renal US: Nov. 2020, reviewed. Simple cyst.  Last abdominal US: 9/19/19, reviewed; cyst noted on right kidney    Update 11/29/21:  Patient presents for f/u of CKD. Last seen May 2021.  Baseline sCr 1.9-2.1 mg/dL.  Home BPs: does not take  The patient denies taking NSAIDs; no recent hospitalizations.    Update 5/4/22:  Patient presents for f/u of CKD. Last seen May 2021.  Baseline sCr 1.9-2.1 mg/dL. No recent labs.  Home BPs: does not take due to hemiparesis  The patient denies taking NSAIDs; no recent hospitalizations.    Pressure high today. He did not take medication yet.      Review of Systems   Respiratory: Positive for shortness of breath (with bending over).    Cardiovascular: Positive for leg swelling (a little bit to left since stroke).   Gastrointestinal: Negative for diarrhea, nausea and vomiting.   Genitourinary: Positive for frequency and urgency. Negative for difficulty urinating, dysuria, flank pain and hematuria.   Musculoskeletal:        Swelling to left hand p CVA   Neurological: Negative for headaches.        Left hemiparesis       Objective:       Blood pressure (!) 163/92, pulse 66, height 5' 10" (1.778 m), weight 96.4 kg (212 lb 8.4 oz), SpO2 98 " %.  Physical Exam  Constitutional:       General: He is not in acute distress.     Appearance: Normal appearance. He is not ill-appearing or diaphoretic.   Cardiovascular:      Rate and Rhythm: Normal rate.   Pulmonary:      Effort: Pulmonary effort is normal.   Abdominal:      Tenderness: There is no right CVA tenderness or left CVA tenderness.   Musculoskeletal:      Right lower leg: No edema.      Left lower leg: No edema.   Neurological:      Mental Status: He is alert. Mental status is at baseline.   Psychiatric:         Mood and Affect: Mood normal.         Behavior: Behavior normal.         Thought Content: Thought content normal.         Judgment: Judgment normal.           Lab Results   Component Value Date    CREATININE 1.9 (H) 03/25/2022     Prot/Creat Ratio, Urine   Date Value Ref Range Status   02/25/2022 0.52 (H) 0.00 - 0.20 Final   11/23/2021 0.61 (H) 0.00 - 0.20 Final   08/06/2021 0.30 (H) 0.00 - 0.20 Final     Lab Results   Component Value Date     03/25/2022    K 4.2 03/25/2022    CO2 23 03/25/2022     03/25/2022     Lab Results   Component Value Date    PTH 80.3 (H) 02/25/2022    CALCIUM 10.3 03/25/2022    PHOS 3.5 02/25/2022     Lab Results   Component Value Date    HGB 13.9 (L) 02/25/2022    WBC 7.46 02/25/2022    HCT 42.8 02/25/2022      Lab Results   Component Value Date    HGBA1C 9.2 (H) 03/25/2022     02/25/2022    BUN 27 (H) 03/25/2022     Lab Results   Component Value Date    LDLCALC 93.8 03/25/2022         Assessment:       1. Stage 3b chronic kidney disease    2. Proteinuria, unspecified type    3. Hypertension, unspecified type    4. Type 2 diabetes mellitus with diabetic neuropathy, without long-term current use of insulin    5. Renal cyst    6. Hemiparesis, unspecified hemiparesis etiology, unspecified laterality        Plan:   CKD stage 3B c eGFR 36-41 mL/min - likely due to DM nephropathy (albuminuria) coupled with nephrosclerosis (CVA, HTN, HLD). Stable.  Proteinuric.   Educated patient to control BP, BG, remain well-hydrated, and avoid NSAIDs to prevent progression of CKD.    Uroscopy in clinic previously: mario.     UPCR Proteinuric.  Jardiance started on 9/18/2020. On losartan 100 mg.  SPEP, JAQUAN FLC, WNL.   Acid-base WNL   Renal osteodystrophy PTH, phos, Ca okay.   Anemia Hgb at goal for CKD   DM A1c no longer well-controlled. No retinopathy as of Oct. 2020. Sees endocrinology.   Lipid Management On statin.   ESRD planning Anticipatory guidance provided about timing of dialysis. Start discussions and planning when eGFR is about 20 mL/min; most patients start dialysis between 5-10 mL/min.      HTN - elevated on amlodipine 10 mg, coreg 6.25 mg BID,  losartan-Hctz 100-25 mg; goal is BP < 130/80. Encouraged low salt diet. Did not take medication yet today.  - He said he thinks he has white coat HTN. Unable to take home BPs regularly due to hemiparesis.  [facility] has been [facility]     Renal cysts - simple cyst. No need for further follow-up.     All questions patient had were answered.  Asked if further questions. None. F/u in clinic 3 mos  with labs and urine prior to next visit or sooner if needed.  ER for emergency concerns.     Note: K-tracker participant. Post-survey completed previously.      Summary of Plan:  1. UA, UPCR, CBC, RFP  2. avoid NSAID/ bactrim/ IV contrast/ gadolinium/ aminoglycoside where possible  3. RTC in 3 mos

## 2022-05-05 ENCOUNTER — TELEPHONE (OUTPATIENT)
Dept: NEPHROLOGY | Facility: CLINIC | Age: 72
End: 2022-05-05
Payer: MEDICAID

## 2022-05-05 NOTE — TELEPHONE ENCOUNTER
----- Message from Orquidea Koehler NP sent at 5/5/2022 12:09 PM CDT -----  Pls inform pt that kidney function and blood count look stable. Still the same amount of protein in urine, but not any worse than it was.

## 2022-05-18 ENCOUNTER — PATIENT OUTREACH (OUTPATIENT)
Dept: ADMINISTRATIVE | Facility: OTHER | Age: 72
End: 2022-05-18
Payer: MEDICAID

## 2022-05-18 NOTE — PROGRESS NOTES
Health Maintenance Due   Topic Date Due    Shingles Vaccine (1 of 2) Never done    Eye Exam  10/09/2021    COVID-19 Vaccine (4 - Booster for Moderna series) 02/27/2022    Diabetes Urine Screening  04/19/2022     Updates were requested from care everywhere.  Chart was reviewed for overdue Proactive [facility] Encounters ([facility]) topics (CRS, Breast Cancer Screening, Eye exam)  Health Maintenance has been updated.  [place] immunization registry triggered.  Immunizations were reconciled.

## 2022-05-24 ENCOUNTER — PATIENT OUTREACH (OUTPATIENT)
Dept: ADMINISTRATIVE | Facility: HOSPITAL | Age: 72
End: 2022-05-24
Payer: MEDICAID

## 2022-06-23 ENCOUNTER — OFFICE VISIT (OUTPATIENT)
Dept: PHYSICAL MEDICINE AND REHAB | Facility: CLINIC | Age: 72
End: 2022-06-23
Payer: MEDICARE

## 2022-06-23 VITALS
HEART RATE: 74 BPM | WEIGHT: 216.94 LBS | SYSTOLIC BLOOD PRESSURE: 159 MMHG | HEIGHT: 70 IN | BODY MASS INDEX: 31.06 KG/M2 | DIASTOLIC BLOOD PRESSURE: 86 MMHG

## 2022-06-23 DIAGNOSIS — I63.329 CEREBROVASCULAR ACCIDENT (CVA) DUE TO THROMBOSIS OF ANTERIOR CEREBRAL ARTERY, UNSPECIFIED BLOOD VESSEL LATERALITY: ICD-10-CM

## 2022-06-23 DIAGNOSIS — Z78.9 IMPAIRED MOBILITY AND ADLS: ICD-10-CM

## 2022-06-23 DIAGNOSIS — Z74.09 IMPAIRED MOBILITY AND ADLS: ICD-10-CM

## 2022-06-23 DIAGNOSIS — M25.50 ARTHRALGIA, UNSPECIFIED JOINT: ICD-10-CM

## 2022-06-23 DIAGNOSIS — M79.2 NEUROPATHIC PAIN: ICD-10-CM

## 2022-06-23 DIAGNOSIS — R47.1 DYSARTHRIA: ICD-10-CM

## 2022-06-23 DIAGNOSIS — I69.354 HEMIPARESIS AFFECTING LEFT SIDE AS LATE EFFECT OF CEREBROVASCULAR ACCIDENT (CVA): Primary | ICD-10-CM

## 2022-06-23 PROCEDURE — 99214 PR OFFICE/OUTPT VISIT, EST, LEVL IV, 30-39 MIN: ICD-10-PCS | Mod: S$PBB,,, | Performed by: PHYSICAL MEDICINE & REHABILITATION

## 2022-06-23 PROCEDURE — 99999 PR PBB SHADOW E&M-EST. PATIENT-LVL IV: CPT | Mod: PBBFAC,,, | Performed by: PHYSICAL MEDICINE & REHABILITATION

## 2022-06-23 PROCEDURE — 99214 OFFICE O/P EST MOD 30 MIN: CPT | Mod: PBBFAC | Performed by: PHYSICAL MEDICINE & REHABILITATION

## 2022-06-23 PROCEDURE — 99214 OFFICE O/P EST MOD 30 MIN: CPT | Mod: S$PBB,,, | Performed by: PHYSICAL MEDICINE & REHABILITATION

## 2022-06-23 PROCEDURE — 99999 PR PBB SHADOW E&M-EST. PATIENT-LVL IV: ICD-10-PCS | Mod: PBBFAC,,, | Performed by: PHYSICAL MEDICINE & REHABILITATION

## 2022-06-23 RX ORDER — GABAPENTIN 300 MG/1
300 CAPSULE ORAL 2 TIMES DAILY
Start: 2022-06-23 | End: 2024-02-15

## 2022-06-23 NOTE — PROGRESS NOTES
Subjective:       Patient ID: Jae Millan is a 71 y.o. male.      Chief Complaint: No chief complaint on file.    HPI     Mr. Millan is a 71-year-old right-handed black male with past medical history of hypertension, diabetes mellitus and ischemic stroke with left hemiparesis, dysarthria and dysphagia in 08/2018.  He also has history of trigger right ring finger  status post injection in 10/2020. He is followed up in the [facility] for his stroke care and neuropathic pain management.  His last visit was on 9/13/2021 (a telemedicine audio visit due to COVID-19). He was maintained on gabapentin (the dose was increased), p.r.n. Tylenol and Voltaren gel.      The patient is coming to the clinic for follow-up.  He  has been medically stable.  His left-sided body pain has been stable.  It is an almost constant sensation of aching and numbness.  It is worse in his shoulder, elbow, hand and knee.  His maximum pain is 6/10 and minimum 5/10; today it is 5-6/10.     He is currently taking:  - gabapentin 300 mg capsules once per day.  - Tylenol 500 mg tablets p.r.n. but rarely.  - He has not been using Voltaren gel.  He has been walking almost daily, but less recently due to the heat.        Past Medical History:   Diagnosis Date    Cataract     Decreased sensation of lower extremity     Diabetes mellitus     Diabetic neuropathy     Dysarthria     Dysphagia     ED (erectile dysfunction)     Glaucoma     Hemiparesis     LEFT side post CVA    High cholesterol     History of hepatitis C, s/p successful Rx w/ cure (SVR12) 4/2020     Hypertension     Impaired functional mobility, balance, gait, and endurance     Stroke     Urinary frequency          Review of Systems   Constitutional: Positive for fatigue. Negative for chills and fever.   HENT: Negative for trouble swallowing.    Eyes: Positive for visual disturbance.   Respiratory: Negative for shortness of breath.    Cardiovascular: Negative for  chest pain.   Gastrointestinal: Negative for constipation, nausea and vomiting.   Genitourinary: Positive for difficulty urinating and frequency.   Musculoskeletal: Positive for arthralgias (left elbow, left knee, right hand) and gait problem. Negative for back pain and neck pain.   Neurological: Positive for weakness. Negative for dizziness, speech difficulty and headaches.   Psychiatric/Behavioral: Positive for sleep disturbance. Negative for behavioral problems.       Objective:      Physical Exam  Vitals reviewed.   Constitutional:       General: He is not in acute distress.     Appearance: He is well-developed.   HENT:      Head: Normocephalic and atraumatic.   Musculoskeletal:      Cervical back: Normal range of motion.      Comments: BUE:  ROM:   RUE: full.   LUE: full. Increased tone (modified Estefanía 2).   Strength:    RUE: 5/5 at shoulder abduction, 5 elbow flexion, 5 elbow extension, 5 hand .   LUE: 2+/5 at shoulder abduction, 3 elbow flexion, 3 elbow extension, 3+ hand .  Sensation to pinprick:   RUE: intact.   LUE: mild decrease.  DTR:    RUE: +1 biceps, +1 triceps.   LUE:  +2 biceps, +2 triceps.      BLE:  ROM:   RLE: full.   LLE: full.Increased tone (modified Estefanía 2)  Knee crepitus:   RLE: -ve.   LLE: -ve.   Strength:    RLE: 5/5 at hip flexion, 5 knee extension, 5 ankle DF, 5 PF.   LLE: 4-/5 at hip flexion, 5- knee extension, 5- ankle DF, 5- PF.  DTR:    RUE: +1 biceps, +1 triceps.   LUE:  +1 biceps, +1 triceps.  Sensation to pinprick:     RLE: intact.      LLE: intact.         Gait: slow alysha, using a straight cane in Rt hand.     Skin:     General: Skin is warm.   Neurological:      Mental Status: He is alert.   Psychiatric:         Behavior: Behavior normal.           Assessment:       1. Hemiparesis affecting left side as late effect of cerebrovascular accident (CVA)    2. Cerebrovascular accident (CVA) due to thrombosis of anterior cerebral artery, unspecified blood vessel  laterality    3. Dysarthria    4. Neuropathic pain    5. Arthralgia, unspecified joint    6. Impaired mobility and ADLs        Plan:         - Continue stroke prevention care (control HTN, DM, hyperlipidemia; ASA).  - Increase gabapentin (NEURONTIN) 300 MG capsule; Take 1 capsule (300 mg total) by mouth twice daily.  In 1 week if no relief, he was asked to increase it up to  3 (three) times daily.   - Increase acetaminophen (TYLENOL) 500 MG tablet; Take 1-2 tablets (500-1,000 mg total) by mouth 3 (three) times daily as needed for Pain.  - Start diclofenac sodium (VOLTAREN) 1 % Gel; Apply 2 g topically 3 (three) times daily as needed. Apply to painful joints  - Regular home exercise program was encouraged.  - Follow up in about 4 months (around 10/23/2022).      This note was partly generated with [employer] voice recognition software. I apologize for any possible typographical errors.

## 2022-07-21 DIAGNOSIS — E11.9 TYPE 2 DIABETES MELLITUS WITHOUT COMPLICATION: ICD-10-CM

## 2022-08-03 ENCOUNTER — OFFICE VISIT (OUTPATIENT)
Dept: OPTOMETRY | Facility: CLINIC | Age: 72
End: 2022-08-03
Attending: SURGERY
Payer: MEDICARE

## 2022-08-03 ENCOUNTER — TELEPHONE (OUTPATIENT)
Dept: NEPHROLOGY | Facility: CLINIC | Age: 72
End: 2022-08-03
Payer: MEDICARE

## 2022-08-03 DIAGNOSIS — H52.4 PRESBYOPIA: ICD-10-CM

## 2022-08-03 DIAGNOSIS — H40.32X3 TRAUMATIC GLAUCOMA, LEFT, SEVERE STAGE: ICD-10-CM

## 2022-08-03 DIAGNOSIS — H26.9 CORTICAL CATARACT OF BOTH EYES: ICD-10-CM

## 2022-08-03 DIAGNOSIS — E11.40 TYPE 2 DIABETES MELLITUS WITH DIABETIC NEUROPATHY, WITHOUT LONG-TERM CURRENT USE OF INSULIN: ICD-10-CM

## 2022-08-03 DIAGNOSIS — H25.13 NUCLEAR SCLEROSIS OF BOTH EYES: Primary | ICD-10-CM

## 2022-08-03 PROCEDURE — 99211 OFF/OP EST MAY X REQ PHY/QHP: CPT | Mod: PBBFAC | Performed by: OPTOMETRIST

## 2022-08-03 PROCEDURE — 99999 PR PBB SHADOW E&M-EST. PATIENT-LVL I: ICD-10-PCS | Mod: PBBFAC,,, | Performed by: OPTOMETRIST

## 2022-08-03 PROCEDURE — 99999 PR PBB SHADOW E&M-EST. PATIENT-LVL I: CPT | Mod: PBBFAC,,, | Performed by: OPTOMETRIST

## 2022-08-03 PROCEDURE — 92014 COMPRE OPH EXAM EST PT 1/>: CPT | Mod: S$GLB,,, | Performed by: OPTOMETRIST

## 2022-08-03 PROCEDURE — 92014 PR EYE EXAM, EST PATIENT,COMPREHESV: ICD-10-PCS | Mod: S$GLB,,, | Performed by: OPTOMETRIST

## 2022-08-03 RX ORDER — DORZOLAMIDE HYDROCHLORIDE AND TIMOLOL MALEATE 20; 5 MG/ML; MG/ML
1 SOLUTION/ DROPS OPHTHALMIC 2 TIMES DAILY
Qty: 10 ML | Refills: 1 | Status: SHIPPED | OUTPATIENT
Start: 2022-08-03 | End: 2022-08-29 | Stop reason: SDUPTHER

## 2022-08-03 NOTE — PROGRESS NOTES
HPI     Patient here for routine eye exam  Has been lost to follow up with Dr. Chew  Has not used COSOPT in a very long time  Feels like VA OS is deteriorating  No pain or discomfort      Last edited by Julianna Aden on 8/3/2022 11:37 AM. (History)            Assessment /Plan     For exam results, see Encounter Report.    Nuclear sclerosis of both eyes  Cortical cataract of both eyes   Visually significant, consult for cat eval    Large chorioretinal scar Inferior OD  Type 2 diabetes mellitus with diabetic neuropathy, without long-term current use of insulin  No retinopathy, monitor yearly    Traumatic glaucoma, left, severe stage   Pt has not taken drops   IOP elevated OD   Refilled Cosopt today, restart drops    -     dorzolamide-timolol 2-0.5% (COSOPT) 22.3-6.8 mg/mL ophthalmic solution; Place 1 drop into both eyes 2 (two) times daily.  Dispense: 10 mL; Refill: 1       Return to Dr. Chew for overdue glaucoma follow up    Presbyopia  Use +2.50 OTC readers until cataract surgery           RTC 1 year, sooner PRN

## 2022-08-03 NOTE — TELEPHONE ENCOUNTER
MARLON Mckoy MA  Caller: Unspecified (Today, 12:04 PM)           Previous Messages       ----- Message -----   From: Radha Steve OD   Sent: 8/3/2022  12:05 PM CDT   To: Orquidea Koehler NP     This pt will be out of town on Friday and needs to reschedule his labwork that is currently scheduled for that day. Please contact him to reschedule.

## 2022-08-10 ENCOUNTER — PES CALL (OUTPATIENT)
Dept: ADMINISTRATIVE | Facility: CLINIC | Age: 72
End: 2022-08-10
Payer: MEDICARE

## 2022-08-12 ENCOUNTER — LAB VISIT (OUTPATIENT)
Dept: LAB | Facility: HOSPITAL | Age: 72
End: 2022-08-12
Payer: MEDICARE

## 2022-08-12 DIAGNOSIS — E11.40 TYPE 2 DIABETES MELLITUS WITH DIABETIC NEUROPATHY, WITHOUT LONG-TERM CURRENT USE OF INSULIN: ICD-10-CM

## 2022-08-12 DIAGNOSIS — N18.32 STAGE 3B CHRONIC KIDNEY DISEASE: ICD-10-CM

## 2022-08-12 LAB
ALBUMIN SERPL BCP-MCNC: 3.9 G/DL (ref 3.5–5.2)
ANION GAP SERPL CALC-SCNC: 10 MMOL/L (ref 8–16)
BASOPHILS # BLD AUTO: 0.04 K/UL (ref 0–0.2)
BASOPHILS NFR BLD: 0.6 % (ref 0–1.9)
BUN SERPL-MCNC: 34 MG/DL (ref 8–23)
CALCIUM SERPL-MCNC: 9.4 MG/DL (ref 8.7–10.5)
CHLORIDE SERPL-SCNC: 106 MMOL/L (ref 95–110)
CO2 SERPL-SCNC: 25 MMOL/L (ref 23–29)
CREAT SERPL-MCNC: 1.8 MG/DL (ref 0.5–1.4)
DIFFERENTIAL METHOD: ABNORMAL
EOSINOPHIL # BLD AUTO: 0.4 K/UL (ref 0–0.5)
EOSINOPHIL NFR BLD: 5.8 % (ref 0–8)
ERYTHROCYTE [DISTWIDTH] IN BLOOD BY AUTOMATED COUNT: 14.5 % (ref 11.5–14.5)
EST. GFR  (NO RACE VARIABLE): 40.2 ML/MIN/1.73 M^2
ESTIMATED AVG GLUCOSE: 186 MG/DL (ref 68–131)
GLUCOSE SERPL-MCNC: 154 MG/DL (ref 70–110)
HBA1C MFR BLD: 8.1 % (ref 4–5.6)
HCT VFR BLD AUTO: 40.5 % (ref 40–54)
HGB BLD-MCNC: 13.6 G/DL (ref 14–18)
IMM GRANULOCYTES # BLD AUTO: 0.01 K/UL (ref 0–0.04)
IMM GRANULOCYTES NFR BLD AUTO: 0.2 % (ref 0–0.5)
LYMPHOCYTES # BLD AUTO: 1.3 K/UL (ref 1–4.8)
LYMPHOCYTES NFR BLD: 19.6 % (ref 18–48)
MCH RBC QN AUTO: 27 PG (ref 27–31)
MCHC RBC AUTO-ENTMCNC: 33.6 G/DL (ref 32–36)
MCV RBC AUTO: 81 FL (ref 82–98)
MONOCYTES # BLD AUTO: 0.5 K/UL (ref 0.3–1)
MONOCYTES NFR BLD: 7.4 % (ref 4–15)
NEUTROPHILS # BLD AUTO: 4.2 K/UL (ref 1.8–7.7)
NEUTROPHILS NFR BLD: 66.4 % (ref 38–73)
NRBC BLD-RTO: 0 /100 WBC
PHOSPHATE SERPL-MCNC: 4.1 MG/DL (ref 2.7–4.5)
PLATELET # BLD AUTO: 262 K/UL (ref 150–450)
PMV BLD AUTO: 12.1 FL (ref 9.2–12.9)
POTASSIUM SERPL-SCNC: 4.3 MMOL/L (ref 3.5–5.1)
PTH-INTACT SERPL-MCNC: 84.3 PG/ML (ref 9–77)
RBC # BLD AUTO: 5.03 M/UL (ref 4.6–6.2)
SODIUM SERPL-SCNC: 141 MMOL/L (ref 136–145)
WBC # BLD AUTO: 6.37 K/UL (ref 3.9–12.7)

## 2022-08-12 PROCEDURE — 80069 RENAL FUNCTION PANEL: CPT | Performed by: NURSE PRACTITIONER

## 2022-08-12 PROCEDURE — 83036 HEMOGLOBIN GLYCOSYLATED A1C: CPT | Performed by: STUDENT IN AN ORGANIZED HEALTH CARE EDUCATION/TRAINING PROGRAM

## 2022-08-12 PROCEDURE — 83970 ASSAY OF PARATHORMONE: CPT | Performed by: NURSE PRACTITIONER

## 2022-08-12 PROCEDURE — 36415 COLL VENOUS BLD VENIPUNCTURE: CPT | Performed by: NURSE PRACTITIONER

## 2022-08-12 PROCEDURE — 85025 COMPLETE CBC W/AUTO DIFF WBC: CPT | Performed by: NURSE PRACTITIONER

## 2022-08-15 ENCOUNTER — OFFICE VISIT (OUTPATIENT)
Dept: INTERNAL MEDICINE | Facility: CLINIC | Age: 72
End: 2022-08-15
Payer: MEDICARE

## 2022-08-15 ENCOUNTER — LAB VISIT (OUTPATIENT)
Dept: LAB | Facility: HOSPITAL | Age: 72
End: 2022-08-15
Attending: INTERNAL MEDICINE
Payer: MEDICARE

## 2022-08-15 VITALS
HEIGHT: 70 IN | HEART RATE: 70 BPM | SYSTOLIC BLOOD PRESSURE: 148 MMHG | WEIGHT: 214.31 LBS | DIASTOLIC BLOOD PRESSURE: 82 MMHG | OXYGEN SATURATION: 99 % | BODY MASS INDEX: 30.68 KG/M2

## 2022-08-15 DIAGNOSIS — N40.0 BENIGN PROSTATIC HYPERPLASIA, UNSPECIFIED WHETHER LOWER URINARY TRACT SYMPTOMS PRESENT: ICD-10-CM

## 2022-08-15 DIAGNOSIS — Z12.5 ENCOUNTER FOR SCREENING FOR MALIGNANT NEOPLASM OF PROSTATE: ICD-10-CM

## 2022-08-15 DIAGNOSIS — E11.40 TYPE 2 DIABETES MELLITUS WITH DIABETIC NEUROPATHY, WITHOUT LONG-TERM CURRENT USE OF INSULIN: ICD-10-CM

## 2022-08-15 DIAGNOSIS — I63.329 CEREBROVASCULAR ACCIDENT (CVA) DUE TO THROMBOSIS OF ANTERIOR CEREBRAL ARTERY, UNSPECIFIED BLOOD VESSEL LATERALITY: Primary | ICD-10-CM

## 2022-08-15 LAB — COMPLEXED PSA SERPL-MCNC: 0.29 NG/ML (ref 0–4)

## 2022-08-15 PROCEDURE — 99215 OFFICE O/P EST HI 40 MIN: CPT | Mod: PBBFAC | Performed by: INTERNAL MEDICINE

## 2022-08-15 PROCEDURE — 99999 PR PBB SHADOW E&M-EST. PATIENT-LVL V: ICD-10-PCS | Mod: PBBFAC,,, | Performed by: INTERNAL MEDICINE

## 2022-08-15 PROCEDURE — 84153 ASSAY OF PSA TOTAL: CPT | Performed by: INTERNAL MEDICINE

## 2022-08-15 PROCEDURE — 99999 PR PBB SHADOW E&M-EST. PATIENT-LVL V: CPT | Mod: PBBFAC,,, | Performed by: INTERNAL MEDICINE

## 2022-08-15 PROCEDURE — 36415 COLL VENOUS BLD VENIPUNCTURE: CPT | Performed by: INTERNAL MEDICINE

## 2022-08-15 PROCEDURE — 99214 PR OFFICE/OUTPT VISIT, EST, LEVL IV, 30-39 MIN: ICD-10-PCS | Mod: S$GLB,,, | Performed by: INTERNAL MEDICINE

## 2022-08-15 PROCEDURE — 99214 OFFICE O/P EST MOD 30 MIN: CPT | Mod: S$GLB,,, | Performed by: INTERNAL MEDICINE

## 2022-08-15 RX ORDER — METFORMIN HYDROCHLORIDE 1000 MG/1
1000 TABLET ORAL 2 TIMES DAILY WITH MEALS
Qty: 180 TABLET | Refills: 3 | Status: SHIPPED | OUTPATIENT
Start: 2022-08-15 | End: 2023-08-25 | Stop reason: SDUPTHER

## 2022-08-15 NOTE — PROGRESS NOTES
Subjective:       Patient ID: Jae Millan is a 71 y.o. male.    Chief Complaint: Follow-up    HPIPt is feeling okay - not sure of his meds. L arm (stroke side ) is swollen from hanging down.  No CP or SOB.  Review of Systems   Respiratory: Negative for shortness of breath.    Cardiovascular: Negative for chest pain.   Gastrointestinal: Negative for abdominal pain, diarrhea, nausea and vomiting.   Genitourinary: Negative for dysuria.   Neurological: Negative for seizures, syncope and headaches.       Objective:      Physical Exam  Constitutional:       General: He is not in acute distress.     Appearance: He is well-developed.   Eyes:      Pupils: Pupils are equal, round, and reactive to light.   Neck:      Thyroid: No thyromegaly.      Vascular: No JVD.   Cardiovascular:      Rate and Rhythm: Normal rate and regular rhythm.      Heart sounds: Normal heart sounds. No murmur heard.    No friction rub. No gallop.   Pulmonary:      Effort: Pulmonary effort is normal.      Breath sounds: Normal breath sounds. No wheezing or rales.   Abdominal:      General: Bowel sounds are normal. There is no distension.      Palpations: Abdomen is soft. There is no mass.      Tenderness: There is no abdominal tenderness. There is no guarding or rebound.   Musculoskeletal:      Cervical back: Neck supple.      Comments: L hand fingernails clipped - L thumb clipped slightly low small amount of blood drawn - cleaned with alcohol and bandaid placed   Lymphadenopathy:      Cervical: No cervical adenopathy.   Skin:     General: Skin is warm and dry.   Neurological:      Mental Status: He is alert and oriented to person, place, and time.   Psychiatric:         Behavior: Behavior normal.         Thought Content: Thought content normal.         Judgment: Judgment normal.         Assessment:       1. Cerebrovascular accident (CVA) due to thrombosis of anterior cerebral artery, unspecified blood vessel laterality    2. Type 2 diabetes mellitus  with diabetic neuropathy, without long-term current use of insulin    3. Benign prostatic hyperplasia, unspecified whether lower urinary tract symptoms present    4. Encounter for screening for malignant neoplasm of prostate         Plan:   Cerebrovascular accident (CVA) due to thrombosis of anterior cerebral artery, unspecified blood vessel laterality  -     Ambulatory referral/consult to Physical/Occupational Therapy; Future; Expected date: 08/22/2022  -     Ambulatory referral/consult to Physical/Occupational Therapy; Future; Expected date: 08/22/2022    Type 2 diabetes mellitus with diabetic neuropathy, without long-term current use of insulin  -     Ambulatory referral/consult to Podiatry; Future; Expected date: 08/22/2022    Benign prostatic hyperplasia, unspecified whether lower urinary tract symptoms present  -     PSA, Screening; Future; Expected date: 08/15/2022    Encounter for screening for malignant neoplasm of prostate   -     PSA, Screening; Future; Expected date: 08/15/2022    Other orders  -     metFORMIN (GLUCOPHAGE) 1000 MG tablet; Take 1 tablet (1,000 mg total) by mouth 2 (two) times daily with meals. For diabetes  Dispense: 180 tablet; Refill: 3    Pt with transportation issues - he reports getting his license -handicap form given to bring to [facility] - he is working on getting a car

## 2022-08-17 ENCOUNTER — TELEPHONE (OUTPATIENT)
Dept: ENDOCRINOLOGY | Facility: CLINIC | Age: 72
End: 2022-08-17
Payer: MEDICARE

## 2022-08-17 NOTE — TELEPHONE ENCOUNTER
Called the patient to update him about his HbA1c results.  He was not able to discuss care with me today as he was busy, will call him back again

## 2022-08-24 NOTE — PROGRESS NOTES
Assessment /Plan     For exam results, see Encounter Report.    Traumatic glaucoma, left, indeterminate stage  -     dorzolamide-timolol 2-0.5% (COSOPT) 22.3-6.8 mg/mL ophthalmic solution; Place 1 drop into the left eye 2 (two) times daily.  Dispense: 10 mL; Refill: 6  -     Porter Visual Field - OU - Extended - Both Eyes; Future  -     Posterior Segment OCT Optic Nerve- Both eyes; Future    Afferent pupillary defect, left    Nuclear sclerotic cataract of both eyes    History of eye trauma    LOST TO F/U 9/2019 TO 8/2022 - SAW DR GARCIA 8/3/2022 AND SENT BACK OVER - PT IS OFF GTTS        Glaucoma (type and duration)       First HVF   ??   First photos   7/2019   Treatment / Drops started    Timolol BID OU , Azopt TID OU, Brimonidine BID OU            Family history    none        Glaucoma meds    Off all gtts (use to suse brim / azopt /timolol- os )         H/O adverse rxn to glaucoma drops         LASERS    ??        GLAUCOMA SURGERIES    none        OTHER EYE SURGERIES    none        CDR    OD: 0.6 OS: 0.85        Tbase    16/18         Tmax    16/18            Ttarget    ??             HVF    Full od //  gen dep thru out         Gonio   +4 od // recess 360 os          CCT    585/599        OCT    1 test 2019 to 2019 - nl od // dec thru out os         HRT   ??        Disc photos    7/2019    - Ttoday    13-16 /19-23  - Test done today    IOP CHECK - RE-ESTABLISH CARE     2. + APD os     3. NS     Pt new patient to me, used to be seen in [facility] for glaucoma. Pt ran out of medications in June 2019. Pt has no eye pain, red eye , flashes, floaters, changes in vision. PT has no hx of surgery or family hx of glaucoma.    8/29/2022   Pt lost to f/u x several years - off gtts   Recommend restart eye drops   cosopt os bid- RE-START     F/U with   IN [facility]RE  24-2 ss od and 24-2 STIM V  OS // DFE // OCT

## 2022-08-29 ENCOUNTER — TELEPHONE (OUTPATIENT)
Dept: OPHTHALMOLOGY | Facility: CLINIC | Age: 72
End: 2022-08-29
Payer: MEDICARE

## 2022-08-29 ENCOUNTER — OFFICE VISIT (OUTPATIENT)
Dept: OPHTHALMOLOGY | Facility: CLINIC | Age: 72
End: 2022-08-29
Payer: MEDICARE

## 2022-08-29 DIAGNOSIS — H40.32X4 TRAUMATIC GLAUCOMA, LEFT, INDETERMINATE STAGE: Primary | ICD-10-CM

## 2022-08-29 DIAGNOSIS — H25.13 NUCLEAR SCLEROTIC CATARACT OF BOTH EYES: ICD-10-CM

## 2022-08-29 DIAGNOSIS — Z87.828 HISTORY OF EYE TRAUMA: ICD-10-CM

## 2022-08-29 DIAGNOSIS — H21.562 AFFERENT PUPILLARY DEFECT, LEFT: ICD-10-CM

## 2022-08-29 PROCEDURE — 92012 INTRM OPH EXAM EST PATIENT: CPT | Mod: S$GLB,,, | Performed by: OPHTHALMOLOGY

## 2022-08-29 PROCEDURE — 1126F PR PAIN SEVERITY QUANTIFIED, NO PAIN PRESENT: ICD-10-PCS | Mod: CPTII,S$GLB,, | Performed by: OPHTHALMOLOGY

## 2022-08-29 PROCEDURE — 3288F FALL RISK ASSESSMENT DOCD: CPT | Mod: CPTII,S$GLB,, | Performed by: OPHTHALMOLOGY

## 2022-08-29 PROCEDURE — 1160F PR REVIEW ALL MEDS BY PRESCRIBER/CLIN PHARMACIST DOCUMENTED: ICD-10-PCS | Mod: CPTII,S$GLB,, | Performed by: OPHTHALMOLOGY

## 2022-08-29 PROCEDURE — 99999 PR PBB SHADOW E&M-EST. PATIENT-LVL III: ICD-10-PCS | Mod: PBBFAC,,, | Performed by: OPHTHALMOLOGY

## 2022-08-29 PROCEDURE — 92012 PR EYE EXAM, EST PATIENT,INTERMED: ICD-10-PCS | Mod: S$GLB,,, | Performed by: OPHTHALMOLOGY

## 2022-08-29 PROCEDURE — 3288F PR FALLS RISK ASSESSMENT DOCUMENTED: ICD-10-PCS | Mod: CPTII,S$GLB,, | Performed by: OPHTHALMOLOGY

## 2022-08-29 PROCEDURE — 3052F HG A1C>EQUAL 8.0%<EQUAL 9.0%: CPT | Mod: CPTII,S$GLB,, | Performed by: OPHTHALMOLOGY

## 2022-08-29 PROCEDURE — 3066F PR DOCUMENTATION OF TREATMENT FOR NEPHROPATHY: ICD-10-PCS | Mod: CPTII,S$GLB,, | Performed by: OPHTHALMOLOGY

## 2022-08-29 PROCEDURE — 99999 PR PBB SHADOW E&M-EST. PATIENT-LVL III: CPT | Mod: PBBFAC,,, | Performed by: OPHTHALMOLOGY

## 2022-08-29 PROCEDURE — 1159F MED LIST DOCD IN RCRD: CPT | Mod: CPTII,S$GLB,, | Performed by: OPHTHALMOLOGY

## 2022-08-29 PROCEDURE — 1160F RVW MEDS BY RX/DR IN RCRD: CPT | Mod: CPTII,S$GLB,, | Performed by: OPHTHALMOLOGY

## 2022-08-29 PROCEDURE — 1101F PR PT FALLS ASSESS DOC 0-1 FALLS W/OUT INJ PAST YR: ICD-10-PCS | Mod: CPTII,S$GLB,, | Performed by: OPHTHALMOLOGY

## 2022-08-29 PROCEDURE — 1101F PT FALLS ASSESS-DOCD LE1/YR: CPT | Mod: CPTII,S$GLB,, | Performed by: OPHTHALMOLOGY

## 2022-08-29 PROCEDURE — 3066F NEPHROPATHY DOC TX: CPT | Mod: CPTII,S$GLB,, | Performed by: OPHTHALMOLOGY

## 2022-08-29 PROCEDURE — 3052F PR MOST RECENT HEMOGLOBIN A1C LEVEL 8.0 - < 9.0%: ICD-10-PCS | Mod: CPTII,S$GLB,, | Performed by: OPHTHALMOLOGY

## 2022-08-29 PROCEDURE — 1159F PR MEDICATION LIST DOCUMENTED IN MEDICAL RECORD: ICD-10-PCS | Mod: CPTII,S$GLB,, | Performed by: OPHTHALMOLOGY

## 2022-08-29 PROCEDURE — 1126F AMNT PAIN NOTED NONE PRSNT: CPT | Mod: CPTII,S$GLB,, | Performed by: OPHTHALMOLOGY

## 2022-08-29 RX ORDER — DORZOLAMIDE HYDROCHLORIDE AND TIMOLOL MALEATE 20; 5 MG/ML; MG/ML
1 SOLUTION/ DROPS OPHTHALMIC 2 TIMES DAILY
Qty: 10 ML | Refills: 6 | Status: SHIPPED | OUTPATIENT
Start: 2022-08-29 | End: 2023-02-03 | Stop reason: SDUPTHER

## 2022-08-29 NOTE — TELEPHONE ENCOUNTER
Tried calling pt several times but phone line is busy. Pt needs to schedule a HVF review and to see Dr. Chew on the same day. Next available.

## 2022-08-29 NOTE — TELEPHONE ENCOUNTER
----- Message from Rylee Huerta sent at 8/29/2022  1:08 PM CDT -----  Regarding: HVF/OCT/EP  Patient Requesting Sooner Appointment.     Reason for sooner appt.:hvf 24-2ss OD/24-2 stim V OS //OCT  When is the first available appointment?  Communication Preference:  Additional Information:

## 2022-09-08 ENCOUNTER — OFFICE VISIT (OUTPATIENT)
Dept: INTERNAL MEDICINE | Facility: CLINIC | Age: 72
End: 2022-09-08
Payer: MEDICARE

## 2022-09-08 VITALS
HEIGHT: 70 IN | OXYGEN SATURATION: 98 % | DIASTOLIC BLOOD PRESSURE: 80 MMHG | HEART RATE: 70 BPM | WEIGHT: 214.5 LBS | SYSTOLIC BLOOD PRESSURE: 138 MMHG | BODY MASS INDEX: 30.71 KG/M2

## 2022-09-08 DIAGNOSIS — I69.354 HEMIPARESIS AFFECTING LEFT SIDE AS LATE EFFECT OF CEREBROVASCULAR ACCIDENT (CVA): ICD-10-CM

## 2022-09-08 DIAGNOSIS — E55.9 VITAMIN D DEFICIENCY: ICD-10-CM

## 2022-09-08 DIAGNOSIS — I10 ESSENTIAL HYPERTENSION: ICD-10-CM

## 2022-09-08 DIAGNOSIS — E11.40 TYPE 2 DIABETES MELLITUS WITH DIABETIC NEUROPATHY, WITHOUT LONG-TERM CURRENT USE OF INSULIN: Chronic | ICD-10-CM

## 2022-09-08 DIAGNOSIS — E11.21 TYPE 2 DIABETES MELLITUS WITH DIABETIC NEPHROPATHY, WITHOUT LONG-TERM CURRENT USE OF INSULIN: ICD-10-CM

## 2022-09-08 DIAGNOSIS — I69.30 HISTORY OF CVA WITH RESIDUAL DEFICIT: ICD-10-CM

## 2022-09-08 DIAGNOSIS — E78.2 HYPERLIPIDEMIA, MIXED: ICD-10-CM

## 2022-09-08 DIAGNOSIS — Z86.19 HISTORY OF HEPATITIS C: ICD-10-CM

## 2022-09-08 DIAGNOSIS — Z00.00 ENCOUNTER FOR PREVENTIVE HEALTH EXAMINATION: Primary | ICD-10-CM

## 2022-09-08 DIAGNOSIS — Z99.89 DEPENDENCE ON OTHER ENABLING MACHINES AND DEVICES: ICD-10-CM

## 2022-09-08 DIAGNOSIS — R26.9 ABNORMALITY OF GAIT AND MOBILITY: ICD-10-CM

## 2022-09-08 DIAGNOSIS — M54.2 NECK DISCOMFORT: ICD-10-CM

## 2022-09-08 DIAGNOSIS — N18.32 STAGE 3B CHRONIC KIDNEY DISEASE: ICD-10-CM

## 2022-09-08 PROBLEM — R20.8 DECREASED SENSATION OF LOWER EXTREMITY: Status: RESOLVED | Noted: 2019-06-06 | Resolved: 2022-09-08

## 2022-09-08 PROBLEM — Z74.09 IMPAIRED FUNCTIONAL MOBILITY, BALANCE, GAIT, AND ENDURANCE: Status: RESOLVED | Noted: 2019-06-06 | Resolved: 2022-09-08

## 2022-09-08 PROBLEM — R29.898 DECREASED STRENGTH OF UPPER EXTREMITY: Status: RESOLVED | Noted: 2019-08-08 | Resolved: 2022-09-08

## 2022-09-08 PROBLEM — R53.1 DECREASED STRENGTH: Status: RESOLVED | Noted: 2020-06-11 | Resolved: 2022-09-08

## 2022-09-08 PROBLEM — E11.29 TYPE 2 DIABETES MELLITUS WITH KIDNEY COMPLICATION, WITHOUT LONG-TERM CURRENT USE OF INSULIN: Status: ACTIVE | Noted: 2022-09-08

## 2022-09-08 PROBLEM — M25.60 RANGE OF MOTION DEFICIT: Status: RESOLVED | Noted: 2019-08-08 | Resolved: 2022-09-08

## 2022-09-08 PROBLEM — M25.612 DECREASED ROM OF LEFT SHOULDER: Status: RESOLVED | Noted: 2020-06-11 | Resolved: 2022-09-08

## 2022-09-08 PROBLEM — M79.89 SWELLING OF LEFT HAND: Status: RESOLVED | Noted: 2019-08-08 | Resolved: 2022-09-08

## 2022-09-08 PROCEDURE — 3288F PR FALLS RISK ASSESSMENT DOCUMENTED: ICD-10-PCS | Mod: CPTII,S$GLB,, | Performed by: NURSE PRACTITIONER

## 2022-09-08 PROCEDURE — 1170F FXNL STATUS ASSESSED: CPT | Mod: CPTII,S$GLB,, | Performed by: NURSE PRACTITIONER

## 2022-09-08 PROCEDURE — 1159F MED LIST DOCD IN RCRD: CPT | Mod: CPTII,S$GLB,, | Performed by: NURSE PRACTITIONER

## 2022-09-08 PROCEDURE — 1125F AMNT PAIN NOTED PAIN PRSNT: CPT | Mod: CPTII,S$GLB,, | Performed by: NURSE PRACTITIONER

## 2022-09-08 PROCEDURE — 3079F DIAST BP 80-89 MM HG: CPT | Mod: CPTII,S$GLB,, | Performed by: NURSE PRACTITIONER

## 2022-09-08 PROCEDURE — 1170F PR FUNCTIONAL STATUS ASSESSED: ICD-10-PCS | Mod: CPTII,S$GLB,, | Performed by: NURSE PRACTITIONER

## 2022-09-08 PROCEDURE — 3052F HG A1C>EQUAL 8.0%<EQUAL 9.0%: CPT | Mod: CPTII,S$GLB,, | Performed by: NURSE PRACTITIONER

## 2022-09-08 PROCEDURE — 1101F PT FALLS ASSESS-DOCD LE1/YR: CPT | Mod: CPTII,S$GLB,, | Performed by: NURSE PRACTITIONER

## 2022-09-08 PROCEDURE — 1160F RVW MEDS BY RX/DR IN RCRD: CPT | Mod: CPTII,S$GLB,, | Performed by: NURSE PRACTITIONER

## 2022-09-08 PROCEDURE — G0439 PR MEDICARE ANNUAL WELLNESS SUBSEQUENT VISIT: ICD-10-PCS | Mod: S$GLB,,, | Performed by: NURSE PRACTITIONER

## 2022-09-08 PROCEDURE — 3052F PR MOST RECENT HEMOGLOBIN A1C LEVEL 8.0 - < 9.0%: ICD-10-PCS | Mod: CPTII,S$GLB,, | Performed by: NURSE PRACTITIONER

## 2022-09-08 PROCEDURE — 3288F FALL RISK ASSESSMENT DOCD: CPT | Mod: CPTII,S$GLB,, | Performed by: NURSE PRACTITIONER

## 2022-09-08 PROCEDURE — 3066F NEPHROPATHY DOC TX: CPT | Mod: CPTII,S$GLB,, | Performed by: NURSE PRACTITIONER

## 2022-09-08 PROCEDURE — 3075F PR MOST RECENT SYSTOLIC BLOOD PRESS GE 130-139MM HG: ICD-10-PCS | Mod: CPTII,S$GLB,, | Performed by: NURSE PRACTITIONER

## 2022-09-08 PROCEDURE — 3008F BODY MASS INDEX DOCD: CPT | Mod: CPTII,S$GLB,, | Performed by: NURSE PRACTITIONER

## 2022-09-08 PROCEDURE — 99215 OFFICE O/P EST HI 40 MIN: CPT | Mod: PBBFAC | Performed by: NURSE PRACTITIONER

## 2022-09-08 PROCEDURE — 3075F SYST BP GE 130 - 139MM HG: CPT | Mod: CPTII,S$GLB,, | Performed by: NURSE PRACTITIONER

## 2022-09-08 PROCEDURE — 1125F PR PAIN SEVERITY QUANTIFIED, PAIN PRESENT: ICD-10-PCS | Mod: CPTII,S$GLB,, | Performed by: NURSE PRACTITIONER

## 2022-09-08 PROCEDURE — 1101F PR PT FALLS ASSESS DOC 0-1 FALLS W/OUT INJ PAST YR: ICD-10-PCS | Mod: CPTII,S$GLB,, | Performed by: NURSE PRACTITIONER

## 2022-09-08 PROCEDURE — G0439 PPPS, SUBSEQ VISIT: HCPCS | Mod: S$GLB,,, | Performed by: NURSE PRACTITIONER

## 2022-09-08 PROCEDURE — 99499 UNLISTED E&M SERVICE: CPT | Mod: S$GLB,,, | Performed by: NURSE PRACTITIONER

## 2022-09-08 PROCEDURE — 1160F PR REVIEW ALL MEDS BY PRESCRIBER/CLIN PHARMACIST DOCUMENTED: ICD-10-PCS | Mod: CPTII,S$GLB,, | Performed by: NURSE PRACTITIONER

## 2022-09-08 PROCEDURE — 3079F PR MOST RECENT DIASTOLIC BLOOD PRESSURE 80-89 MM HG: ICD-10-PCS | Mod: CPTII,S$GLB,, | Performed by: NURSE PRACTITIONER

## 2022-09-08 PROCEDURE — 3008F PR BODY MASS INDEX (BMI) DOCUMENTED: ICD-10-PCS | Mod: CPTII,S$GLB,, | Performed by: NURSE PRACTITIONER

## 2022-09-08 PROCEDURE — 99999 PR PBB SHADOW E&M-EST. PATIENT-LVL V: CPT | Mod: PBBFAC,,, | Performed by: NURSE PRACTITIONER

## 2022-09-08 PROCEDURE — 1159F PR MEDICATION LIST DOCUMENTED IN MEDICAL RECORD: ICD-10-PCS | Mod: CPTII,S$GLB,, | Performed by: NURSE PRACTITIONER

## 2022-09-08 PROCEDURE — 3066F PR DOCUMENTATION OF TREATMENT FOR NEPHROPATHY: ICD-10-PCS | Mod: CPTII,S$GLB,, | Performed by: NURSE PRACTITIONER

## 2022-09-08 PROCEDURE — 99999 PR PBB SHADOW E&M-EST. PATIENT-LVL V: ICD-10-PCS | Mod: PBBFAC,,, | Performed by: NURSE PRACTITIONER

## 2022-09-08 NOTE — PROGRESS NOTES
"  Jae Millan presented for a  Medicare AWV and comprehensive Health Risk Assessment today. The following components were reviewed and updated:    Medical history  Family History  Social history  Allergies and Current Medications  Health Risk Assessment  Health Maintenance  Care Team         ** See Completed Assessments for Annual Wellness Visit within the encounter summary.**         The following assessments were completed:  Living Situation  CAGE  Depression Screening  Timed Get Up and Go  Whisper Test  Cognitive Function Screening      Nutrition Screening  ADL Screening  PAQ Screening  OPIOID Screening: Patient does not have a prescription for narcotics. Patient does not use substance         Vitals:    09/08/22 1001 09/08/22 1016   BP:  138/80   BP Location:  Right arm   Patient Position:  Sitting   Pulse:  70   SpO2:  98%   Weight: 97.3 kg (214 lb 8.1 oz)    Height: 5' 10" (1.778 m)      Body mass index is 30.78 kg/m².  Physical Exam  Vitals and nursing note reviewed.   Constitutional:       Appearance: He is well-developed.   HENT:      Head: Normocephalic.   Cardiovascular:      Rate and Rhythm: Normal rate and regular rhythm.   Pulmonary:      Effort: Pulmonary effort is normal.      Breath sounds: Normal breath sounds.   Abdominal:      General: Bowel sounds are normal.      Palpations: Abdomen is soft.   Musculoskeletal:         General: Normal range of motion.   Skin:     General: Skin is warm and dry.   Neurological:      Mental Status: He is alert and oriented to person, place, and time.      Motor: No abnormal muscle tone.   Psychiatric:         Mood and Affect: Mood normal.             Diagnoses and health risks identified today and associated recommendations/orders:    1. Encounter for preventive health examination  Here for Health Risk Assessment/Annual Wellness Visit.  Health maintenance reviewed and updated. Follow up in one year.     2. Essential hypertension  Chronic, stable on current " "medications. Followed by PCP.     3. Hyperlipidemia, mixed  Chronic, stable on current medication. Followed by PCP.     4. History of CVA with residual deficit  Stable on current medications. Followed by PCP.     5. Hemiparesis affecting left side as late effect of cerebrovascular accident (CVA)  Followed by PCP.     6. Type 2 diabetes mellitus with diabetic neuropathy, without long-term current use of insulin  Chronic, stable on current medications. Last A1c 8.1. Followed by PCP, Endocrinology.     7. Type 2 diabetes mellitus with diabetic nephropathy, without long-term current use of insulin  Chronic, stable on current medications. Last A1c 8.1. Followed by PCP, Endocrinology.     8. Stage 3b chronic kidney disease  Chronic, stable on current medications. Followed by PCP, Nephrology    9. Vitamin D deficiency  Chronic, stable on current medication. Followed by PCP.     10 History of hepatitis C, s/p successful Rx w/ cure (SVR12) 4/2020  Followed by PCP.     11. Dependence on other enabling machines and devices  Chronic, ambulates with cane due to left hemiparesis. No reported falls. Followed by PCP, Physical Medicine.    12.  Abnormality of gait and mobility  Chronic, ambulates with cane due to left hemiparesis. No reported falls. Followed by PCP, Physical Medicine.    13. Neck discomfort  Chronic, reporting several month H/O  "popping" sound when turning head, no associated discomfort.  Requesting referral.  - Ambulatory referral/consult to Orthopedics; Future      Provided Jae with a 5-10 year written screening schedule and personal prevention plan. Recommendations were developed using the USPSTF age appropriate recommendations. Education, counseling, and referrals were provided as needed. After Visit Summary printed and given to patient which includes a list of additional screenings\tests needed.    Follow up in 10 weeks (on 11/17/2022).with PCP    Tessa Shanks NP    "

## 2022-09-08 NOTE — PATIENT INSTRUCTIONS
Counseling and Referral of Other Preventative  (Italic type indicates deductible and co-insurance are waived)    Patient Name: Jae Millan  Today's Date: 9/8/2022    Health Maintenance         Date Due Completion Date    Shingles Vaccine (1 of 2) Never done ---    Diabetes Urine Screening 04/19/2022 4/19/2021    Influenza Vaccine (1) 09/01/2022 11/26/2021    Foot Exam 10/28/2022 10/28/2021    Override on 6/24/2020: Done (Dr. Singh)    Hemoglobin A1c 11/12/2022 8/12/2022    Lipid Panel 03/25/2023 3/25/2022    Eye Exam 08/29/2023 8/29/2022    High Dose Statin 08/30/2023 8/30/2022    Colorectal Cancer Screening 03/01/2028 3/1/2018 (Done)    Override on 3/1/2018: Done (Per PCP, need record)    TETANUS VACCINE 01/08/2031 1/8/2021          No orders of the defined types were placed in this encounter.      The following information is provided to all patients.  This information is to help you find resources for any of the problems found today that may be affecting your health:                Living healthy guide: www.[facility].gov      Understanding Diabetes: www.diabetes.org      Eating healthy: www.cdc.gov/healthyweight      CDC home safety checklist: www.cdc.gov/steadi/patient.html      Agency on Aging: www.goea.[place].[facility]      Alcoholics anonymous (AA): www.aa.org      Physical Activity: www.alissa.nih.gov/wf7arwr      Tobacco use: www.quitwithusla.org

## 2022-09-15 ENCOUNTER — CLINICAL SUPPORT (OUTPATIENT)
Dept: REHABILITATION | Facility: HOSPITAL | Age: 72
End: 2022-09-15
Attending: INTERNAL MEDICINE
Payer: MEDICARE

## 2022-09-15 DIAGNOSIS — R29.2: ICD-10-CM

## 2022-09-15 DIAGNOSIS — Z74.09 IMPAIRED MOBILITY AND ADLS: ICD-10-CM

## 2022-09-15 DIAGNOSIS — I63.329 CEREBROVASCULAR ACCIDENT (CVA) DUE TO THROMBOSIS OF ANTERIOR CEREBRAL ARTERY, UNSPECIFIED BLOOD VESSEL LATERALITY: ICD-10-CM

## 2022-09-15 DIAGNOSIS — M25.60 RANGE OF MOTION DEFICIT: Primary | ICD-10-CM

## 2022-09-15 DIAGNOSIS — Z78.9 IMPAIRED MOBILITY AND ADLS: ICD-10-CM

## 2022-09-15 PROCEDURE — 97166 OT EVAL MOD COMPLEX 45 MIN: CPT | Mod: PN

## 2022-09-19 NOTE — PLAN OF CARE
"  [facility] Occupational Therapy  Initial Neurological Evaluation     Date: 9/15/2022  Patient: Jae Millan  Chart Number: [id number]    Therapy Diagnosis:   Encounter Diagnoses   Name Primary?    Cerebrovascular accident (CVA) due to thrombosis of anterior cerebral artery, unspecified blood vessel laterality     Impaired mobility and ADLs     Range of motion deficit Yes     Physician: Maryjane Farias MD    Physician Orders: OT eval and treat  Medical Diagnosis: I63.329 (ICD-10-CM) - Cerebral infarction due to thrombosis of unspecified anterior cerebral artery  Evaluation Date: 9/15/2022  Plan of Care Expiration Period: 11/11/2022  Insurance Authorization period Expiration: 08/15/2023  Visit # / Visits Authorized: 1 / 1  FOTO: 9/15/2022      Time In: 1015  Time Out: 1100  Total Billable (one on one) Time: 45 minutes    Precautions: Standard    Subjective     History of Current Condition: CVA in August 2018. Currently, patient reports the following deficits: limited use of left upper extremity, can close hand but not open it, "once I close my hand, it feels like it is locked". Patient wishes to return to "full use" of left upper extremity. Pt has received outpatient therapy services before for this condition. Current support system includes: family members "when they can".    Mechanism of Injury: CVA  Date of Onset: 2018  Surgical Procedure: None  Imaging: MRI studies reviewed from March 2022; no imaging from initial event  Previous Therapy: Most recent therapy ~2 years ago at [facility] (OT/PT)     Right Left   Involved Side   []     [x]   Dominant Side    [x]     []       Pain:  Pain Related Behaviors Observed: yes   Functional Pain Scale Rating 0-10:   8/10 on average  5/10 at best  9/10 at worst  Location: whole left side  Description: Numb, stabbing in foot and sometimes arm   Aggravating Factors: Laying  Easing Factors: massage    Occupation: retired  Working presently: retired as "    Duties: none    Functional Limitations/Social History:    Prior Level of Function: Independent   Current Level of Function: Modified independent     Home/Living environment : lives alone  Home Access: 2nd story apt, 0 steps in apt  DME: single point cane and shower chair     Leisure: going places, [employer] games    Driving: no; RTA and Medicaid transportation    Functional Status      Functional Mobility:  Bed mobility: Mod I  Roll to left: Mod I  Roll to right: Mod I  Supine to sit: Mod I  Sit to supine: Mod I  Transfers to bed: Mod I  Transfers to toilet: Mod I  Car transfers: Mod I  Wheelchair mobility: NA    ADL's:  Feeding: Mod I  Grooming: Mod I  Hygiene: Mod I  UB Dressing: Mod I  LB Dressing: Mod I - extra time required  Toileting: Mod I - extra time required   Bathing: Mod I - extra time required    IADL's:  Homecare: Mod I - extra time   Cooking: Mod I - extra time and difficulty  Laundry: Mod I - extra time and difficulty  Yard work: Mod I  Use of telephone: Mod I  Money management: Mod I  Medication management: Mod I  Handwriting:Mod I  Technology Use:Mod I    Comments: lives alone     Past Medical History/Physical Systems Review:     Past Medical History:  Jae Millan  has a past medical history of Cataract, Decreased sensation of lower extremity, Diabetes mellitus, Diabetic neuropathy, Dysarthria, Dysphagia, ED (erectile dysfunction), Glaucoma, Hemiparesis, High cholesterol, History of hepatitis C, s/p successful Rx w/ cure (SVR12) 4/2020, Hypertension, Impaired functional mobility, balance, gait, and endurance, Stroke, and Urinary frequency.    Past Surgical History:  Jae Millan  has a past surgical history that includes Undescended testicle exploration (Right) and Inguinal hernia repair (Left, 12/11/2019).    Current Medications:  Jae has a current medication list which includes the following prescription(s): acetaminophen, amlodipine, aspirin, blood pressure test kit-wrist,  blood sugar diagnostic, carvedilol, cholecalciferol (vitamin d3), diclofenac sodium, dorzolamide-timolol 2-0.5%, jardiance, gabapentin, ketoconazole, lancets, losartan-hydrochlorothiazide 100-25 mg, metformin, needle (disp) 18 g, needle (disp) 25 gauge, pantoprazole, rosuvastatin, sitagliptin, syringe (disposable), tadalafil, tamsulosin, testosterone cypionate, and vardenafil.    Allergies:  Review of patient's allergies indicates:  No Known Allergies     Objective     Cognitive Exam:  Oriented Person, Place, Time, and Situation   Behaviors normal, cooperative   Follows Commands/attention: Follows two-step commands   Communication clear/fluent   Memory No Deficits noted; not formally assessed    Safety awareness/insight to disability aware of diagnosis, treatment, and prognosis   Coping skills/emotional control Appropriate to situation       Physical Exam:  Postural examination/scapula alignment: Rounded shoulder, affected scapula elevated  Joint integrity: Firm end feeling  Skin integrity: WFL  Edema: Mild  Palpation: No pain with palpation     Note: 2020 values retrieved from previous therapy episode  Joint Evaluation  AROM  7/10/2020 PROM   7/10/2020 AROM  8/13/20 PROM  8/13/20 AROM  9/15/2022 PROM  9/15/2022     Left Left Left Left Left Left   Shoulder flex 0-180 70 145 90 160 63 145   Shoulder Abd 0-180 55 115 72 140 71 149   Shoulder ER 0-90 To neautral 35 Neutral  45 24 35   Shoulder IR 0-90 60 WNL WFL WNL sidebody sidebody   Shoulder Extension 0-80 30 45 40 73 18 54   Elbow flex/ext 0-150 10/105 WFL 0/105 WNL 20 extension  93 flexion 60 flexion  WFL ext   Wrist flex 0-80 0 70 ~5 55 0 54   Wrist ext 0-70 0 40 neutral 50 0 22   Supination 0-80 30 WNL 30 WNL ~30 ~55   Pronation 0-80 WNL (tone) WNL WNL WNL WNL WNL   UD ~15 WNL ~15 WNL 0 WNL   RD 0 WNL 0 WNL 0 15      Fist: increased edema and tone        Strength 7/10/2020 8/13/20 9/15/2022   **within available ROM** Left  Left Left   Shoulder flex 3+/5 3+/5  3-/5   Shoulder abd 3+/5 3+/5 3/5   Shoulder ER 3/5 3//5 3-/5   Shoulder IR 3+/5 3+/5 3-/5   Shoulder Extension 4-/5 4-/5 3/5   Elbow flex 4-/5 4-/5 3+/5   Elbow ext 4/5 4/5 3/5   Wrist flex 2-/5 2-/5 1/5   Wrist ext 2-/5 2-/5 2-/5   Supination 2-/5 2-/5 2-/5   Pronation 3/5 3/5 2-/5   UD 3-/5 3-/5 2-/5   RD 1/5 1/5 1/5        Fist: loose     Strength: (HERBERT Dynamometer in lbs.) Average 3 trials, Position II:     9/15/2022 9/15/2022   Rung II Right Left   Trial 1 81.5# 5.2#   Trial 2 77.8# 5.5#   Trial 3 73.6# 6.1#   Average 77.6# 5.6#   [norms for men aged 65-69: R=89.7 +/-20.4; L=76.8 +/-20.3 (Veronicawetz et al, 1985)]      Pinch Strength (Measured in lbs)     9/15/2022 9/15/2022    Right Left   Lateral Pinch 20.3 # 1.3 #   3pt Pinch 11.9 # 0 #   2pt Pinch 16.5 # 0 #   [Lateral pinch norms for men aged 65-69: right: 23.4+/-3.9; left: 22.0+/-3.6 (Veronicawetz et al, 1985)]   [3-point pinch norms for men aged 65-69: right: 21.4+/-3.0; left: 21.2+/-4.1 (Veronicawetz et al, 1985)]   [2-point pinch norms for men aged 65-69: right: 17.0+/-4.2; left: 15.4+/-2.9 (Veronicawetz et al, 1985)]       Fine/Gross Motor Coordination    Assessment  Right   9/15/2022 Left  9/15/2022   9 hole peg test 9 pegs in/out for time NT NT   Box and blocks assessment  60 seconds, as many blocks as possible  54 0    SHEILA (Rapid Alternating Movements)    intact   impaired - severe   Finger to Nose (5 times)  intact impaired - moderate   Finger Flicks (coordination moving from digit flexion to digit extension)  intact impaired - severe   *WNL - Within Normal Limits  *NT = Not Tested  Nine Hole Peg Test Norms: [norms for men aged 66-70: R=21.23s +/-3.29s; L=22.29s +/-3.71s (Sami et al, 2003)]  Box and Blocks Norms: [norms for men aged 65-69: R=68.4 +/-7.1; L=67.4 +/-7.8 (Rosalva et al, 1985)]      Tone:  Modified Estefanía Scale:   1+ Slight increases in muscle tone, manifested by a catch, followed by minimal resistance throughout the remainder  (less than half) of the ROM.    Comments: flexor synergy of LUE    Sensation:  Jae  reports some tingling; sensation assessed, see below     Sensation Intact  Impaired Comments    [place] Valeri  Deep Touch (6.65) [x] []     Protective Sensation (4.56) [x] []     Light Touch (3.61) [x] []           Other Kinesthesia  [x] [] Per pt report    Temperature [x] [] Per pt report    Stereognosis  [x] [] Per pt report   Comments: No deficits bilaterally per [place] Valeri    Balance:   Static Sit - GOOD+: Takes MAXIMAL challenges from all directions.    Dynamic sit- GOOD: Takes MODERATE challenges from all directions  Static Stand - GOOD: Takes MODERATE challenges from all directions  Dynamic stand - GOOD-: Takes MODERATE challenges from all directions but inconsistently    Endurance Deficit: mild             CMS Impairment/Limitation/Restriction for FOTO Stroke Upper Extremity Survey    Therapist reviewed FOTO scores for Jae Millan on 9/15/2022.   FOTO documents entered into [employer] - see Media section.      Limitation Score: 59%  Category: Carrying         Treatment     Evaluation only today.     Assessment     Jae Millan is a 71 y.o. male referred to outpatient occupational therapy and presents with a medical diagnosis of CVA, resulting in pain, decreased flexibility, decreased range of motion, decreased muscle strength, impaired function, and decreased work ability and demonstrates limitations as described in the chart below. Following medical record review it is determined that patient will benefit from occupational therapy services in order to maximize pain free and/or functional use of left upper extremity.    Patient prognosis is Fair due to time since onset of CVA.   Patient will benefit from skilled outpatient Occupational Therapy to address the deficits stated above and in the chart below, provide patient/family education, and to maximize patient's level of independence.     Plan of care discussed  with patient: Yes  Patient's spiritual, cultural and educational needs considered and patient is agreeable to the plan of care and goals as stated below:     Anticipated Barriers for therapy: transportation    Medical Necessity is demonstrated by the following  Profile and History Assessment of Occupational Performance Level of Clinical Decision Making Complexity Score   Occupational Profile:   Jae Millan is a 71 y.o. male who lives alone and is currently retired as . Jae Millan has difficulty with  bathing, grooming, and dressing  housework/household chores  affecting his/her daily functional abilities. His/her main goal for therapy is to improve functionality of left upper extremity.     Comorbidities:   Cataract, Decreased sensation of lower extremity, Diabetes mellitus, Diabetic neuropathy, Dysarthria, Dysphagia, ED (erectile dysfunction), Glaucoma, Hemiparesis, High cholesterol, History of hepatitis C, s/p successful Rx w/ cure (SVR12) 4/2020, Hypertension, Impaired functional mobility, balance, gait, and endurance, Stroke, and Urinary frequency.    Medical and Therapy History Review:   Expanded               Performance Deficits    Physical:  Joint Mobility  Muscle Endurance   Strength  Pinch Strength  Gross Motor Coordination  Fine Motor Coordination  Muscle Tone  Pain    Cognitive:  No Deficits    Psychosocial:    Habits  Routines  Rituals     Clinical Decision Making:  moderate    Assessment Process:  Detailed Assessments    Modification/Need for Assistance:  Minimal-Moderate Modifications/Assistance    Intervention Selection:  Several Treatment Options       moderate  Based on PMHX, co morbidities , data from assessments and functional level of assistance required with task and clinical presentation directly impacting function.       The following goals were discussed with the patient and patient is in agreement with them as to be addressed in the treatment plan.     Patient's Goals for  "Therapy: To get back to "full use" of left upper extremity; discussed prognosis and realistic expectations. Identified goals to improve volitional grasp and release and general function of left upper extremity for improved speed and decreased difficulty in completing activities of daily living.    Goals:     Short Term Goals = Long Term Goals: to be met within 8 weeks    Pt will be independent with Home Exercise Program (HEP) to promote long term maintenance of progress made in therapy.     Pt will be able to move 3 blocks in Box and Blocks assessment in 2 min or better using L UE in order to demonstrate improved volitional grasp and release    Pt will improve active range of motion by 15 degrees in all left shoulder planes for improved incorporation of left upper extremity in grooming tasks     Pt will improve L  strength by 5# in order to increase safety and participation with home-care tasks    Pt will score 65% on the FOTO by discharge in order to demonstrate improved QOL and independence with meaningful activities.     Pt will improve left MMS by 1 grade in all planes to demonstrate increased strength of left upper extremity for improved safety in cooking tasks       Goals to be added or modified as necessary.    Plan   Certification Period/Plan of care expiration: 9/15/2022 to 11/11/22.    Outpatient Occupational Therapy 1 times weekly for 8 weeks to include the following interventions: Electrical Stimulation (NMES), Fluidotherapy, Manual Therapy, Moist Heat/ Ice, Neuromuscular Re-ed, Orthotic Management and Training, Paraffin, Patient Education, Self Care, Therapeutic Activities, and Therapeutic Exercise.    EVARISTO Beal LOTR      I certify the need for these services furnished under this plan of treatment and while under my care.  ____________________________________ Physician/Referring Practitioner   Date of Signature      "

## 2022-09-20 ENCOUNTER — DOCUMENTATION ONLY (OUTPATIENT)
Dept: REHABILITATION | Facility: HOSPITAL | Age: 72
End: 2022-09-20
Payer: MEDICARE

## 2022-09-20 NOTE — PROGRESS NOTES
Missed Visit/Cancellation      Visit Date: 09/24/2022     For this plan of care:   Canceled Number: 0  No Show Number: 1                                                                                                                Pt initially had visit scheduled for today for 08:45.  Reason for cancellation: No show  Pt's next scheduled occupational therapy visit is 09/28/2022 at 11:00.    Pt called: Spoke to patient on phone. Mr. Millan stated that he was unaware of today's appointment. Reviewed time and dates of upcoming PT evaluations and OT follow-up. Provided clinic address and phone number per pt request to arrange transportation services. No charges filed at this time.    EVARISTO Beal LOTR

## 2022-09-26 ENCOUNTER — CLINICAL SUPPORT (OUTPATIENT)
Dept: REHABILITATION | Facility: HOSPITAL | Age: 72
End: 2022-09-26
Attending: INTERNAL MEDICINE
Payer: MEDICARE

## 2022-09-26 DIAGNOSIS — I63.329 CEREBROVASCULAR ACCIDENT (CVA) DUE TO THROMBOSIS OF ANTERIOR CEREBRAL ARTERY, UNSPECIFIED BLOOD VESSEL LATERALITY: ICD-10-CM

## 2022-09-26 DIAGNOSIS — Z74.09 IMPAIRED FUNCTIONAL MOBILITY, BALANCE, GAIT, AND ENDURANCE: Primary | ICD-10-CM

## 2022-09-26 PROCEDURE — 97162 PT EVAL MOD COMPLEX 30 MIN: CPT | Mod: PN | Performed by: PHYSICAL THERAPIST

## 2022-09-26 NOTE — PROGRESS NOTES
See POC for PT evaluation.     Naya Obregon, PT, DPT,   Board-Certified Clinical Specialist in Neurologic Physical Therapy   Certified Brain Injury Specialist

## 2022-09-27 NOTE — PLAN OF CARE
[facility]  Physical Therapy Neurological Rehabilitation Initial Evaluation    Name: Jae Millan  Clinic Number: [id number]    Therapy Diagnosis:   Encounter Diagnoses   Name Primary?    Cerebrovascular accident (CVA) due to thrombosis of anterior cerebral artery, unspecified blood vessel laterality     Impaired functional mobility, balance, gait, and endurance Yes     Physician: Maryjane Farias MD    Physician Orders: PT Eval and Treat stroke  Medical Diagnosis from Referral: I63.329 (ICD-10-CM) - Cerebrovascular accident (CVA) due to thrombosis of anterior cerebral artery, unspecified blood vessel laterality  Evaluation Date: 9/26/2022  Authorization Period Expiration: 10/24/2022  Plan of Care Expiration: 12/16/2022  Visit # / Visits authorized: 1/ 1    PN due: 10/26/2022    Time In: 1105  Time Out: 1145  Total Billable Time: 40 minutes    Precautions: Standard    Subjective   Date of onset: 8/2018  History of current condition - Jae reports: pt suffered a CVA 8/2018. He received PT in the past to address sx.  pt reports that he has not noted any significant decline since previous PT. He is now walking with a SPC instead of SBQC. Pt has significant difficulty with negotiating stairs. Drags L foot when walking.  LLE gets stiff if sitting for a long time; difficult to rise. Dressing takes increased time. PMHx: DM, diabetic neuropathy,  HTN     Medical History:   Past Medical History:   Diagnosis Date    Cataract     Decreased sensation of lower extremity     Diabetes mellitus     Diabetic neuropathy     Dysarthria     Dysphagia     ED (erectile dysfunction)     Glaucoma     Hemiparesis     LEFT side post CVA    High cholesterol     History of hepatitis C, s/p successful Rx w/ cure (SVR12) 4/2020     Hypertension     Impaired functional mobility, balance, gait, and endurance     Stroke     Urinary frequency        Surgical History:   Jae Millan  has a past surgical history  that includes Undescended testicle exploration (Right) and Inguinal hernia repair (Left, 12/11/2019).    Medications:   Jae has a current medication list which includes the following prescription(s): acetaminophen, amlodipine, aspirin, blood pressure test kit-wrist, blood sugar diagnostic, carvedilol, cholecalciferol (vitamin d3), diclofenac sodium, dorzolamide-timolol 2-0.5%, jardiance, gabapentin, ketoconazole, lancets, losartan-hydrochlorothiazide 100-25 mg, metformin, needle (disp) 18 g, needle (disp) 25 gauge, pantoprazole, rosuvastatin, sitagliptin, syringe (disposable), tadalafil, tamsulosin, testosterone cypionate, and vardenafil.    Allergies:   Review of patient's allergies indicates:  No Known Allergies     Imaging,   CT head, 4/11/22 (Karan Lincoln DO): No acute intracranial abnormality as detailed above.  Small remote right corona radiata/basal ganglia infarction.    Prior Therapy: outpatient PT, last visit 9/28/2020  Social History:  lives alone  Falls:  denies  DME: Straight cane    Home Environment: 2nd floor apartment, has elevator.  Exercise Routine / History: walking daily 1/2 mi (a few blocks)   Family Present at time of Eval: none   Occupation: NA  Prior Level of Function: ambulation with SBQC after CVA. Per previous PT d/c- pt moving faster with better balance noted.   Current Level of Function: ambulation with SPC for distances outside of home. No AD inside of home. Has difficulty with stair negotiation. Has difficulty with sit>stand after sitting for a while. Notices L foot dragging.     Pain:  Current 8/10, worst 9/10, best 5/10   Location: left arms and leg   Description: Tingling  Aggravating Factors: swelling due to arm hanging  Easing Factors: stretching    Pts goals: full use of my leg and arm    Objective     - Follows commands: yes   - Speech: no deficits     Mental status: alert, oriented to person, place, and time  Appearance: Casually dressed  Behavior:  calm and  cooperative  Attention Span and Concentration:  Normal    Dominant hand:  right     Posture Alignment :forward head, rounded    Skin integrity:  Intact    Sensation:  Light Touch: Impaired: decreased LLE           Proprioception:   NT    Tone: 1-  Slight increase in muscle tone, manifested by a catch and release or by minimal resistance at the end of the range of motino when the affected part(s) is moved in flexion or extension  Limbs/muscles affected: LLE  Spasticity: L quads    Cranial Nerve Assessment:   NT    Visual/Auditory: reports some increased sensitivity to bright light       Coordination:   - fine motor: Refer to OT report for details   - UE coordination: finger to nose: Refer to OT report for details                        Pronation/ supination: Refer to OT report for details   - LE coordination:  alternating toe taps: decrease speed on L. 10 sec: R=20x, L=11x                                Heel to shin: NT    ROM:   UPPER EXTREMITY--AROM/PROM  Refer to OT report for details            RANGE OF MOTION--LOWER EXTREMITIES  (R) LE Hip: WFLs   Knee: WFLs   Ankle: DF AAROM= 6 deg, remainder WFLs    (L) LE: Hip: WFLs, tightness noted in HS   Knee: WFLs   Ankle: DF AAROM= 2 deg, remainder WFLs    Strength: manual muscle test grades below   Upper Extremity Strength  Refer to OT report for details     Lower Extremity Strength   RLE LLE   Hip Flexion: 4/5 3-/5   Hip Extension:  5/5 2+/5   Hip Abduction: 4-/5 2+/5   Hip Adduction: NT NT   Knee Extension: 5/5 3+/5   Knee Flexion:* 5/5 4-/5   Ankle Dorsiflexion: 5/5 3+/5   Ankle Plantarflexion: 4/5 2+/5   Ankle Inversion: NT NT   Ankle Eversion: NT NT     Abdominal Strength: NT       Evaluation   Single Limb Stance R LE NT  (<10 sec = HIGH FALL RISK)   Single Limb Stance L LE NT  (<10 sec = HIGH FALL RISK)   5 times sit-stand 36 seconds, LUE, increased WB RLE  >12 sec= fall risk for general elderly  >16 sec= fall risk for Parkinson's disease  >10 sec=  balance/vestibular dysfunction (<61 y/o)  >14.2 sec= balance/vestibular dysfunction (>61 y/o)  >12 sec= fall risk for CVA   Rivas/ FGA/ Tinetti NT     Postural control:  MCTSIB:  1. Eyes Open/feet together/Firm: 30 seconds, no significant sway  2. Eyes Closed/feet together/Firm: 30 seconds, min sway  3. Eyes Open/feet together/Foam: 30 seconds, min-mod sway  4. Eyes Closed/feet together/Foam: 30 seconds, mod sway  Slight knee buckling with EC    Gait Assessment:   - AD used: none-SPC  - Assistance: mod I  - Distance: limited community  - Curb: NT  - Ramp:  NT  - Stairs: NT      GAIT DEVIATIONS:  Gait component performance:   W/o AD: antalgic gait, shortened step length on R, decreased stance time on L, increased L lateral trunk lean in L stance, decreased arm swing  * Above impairments indicate decreased gait safety and increased fall risk.      Impairments contributing to deviations: impaired motor control, decreased strength    Endurance Deficit: fair for eval      Evaluation   Timed Up and Go 21.3s + 23.7s +22.6 sec=  22.5s w/o AD  < 20 sec safe for independent transfers,     < 30 sec assist required for transfers  Community dwelling older adults >13.5 sec   Chronic CVA >14 sec   Geriatric with h/o falls >15 sec   Frail elderly >32.6 sec    LE amputees >19 sec   PD >7.95- 11.5 sec   Hip OA >10 sec   Vestibular >11.1 sec      6 meter walk test NT   6 min walk test NT     Functional Mobility (Bed mobility, transfers)  Bed mobility: Mod I  Supine to sit: Mod I  Sit to supine: Mod I  Rolling: Mod I  Transfers to bed: Mod I  Transfers to toilet: NT  Sit to stand: Mod I  Stand pivot:  Mod I  Car transfers: NT  Wheelchair mobility: NA  Floor transfers: NT       CMS Impairment/Limitation/Restriction for FOTO Stroke Lower Extremity Survey    Therapist reviewed FOTO scores for Jae Millan on 9/26/2022.   FOTO documents entered into [employer] - see Media section.    Limitation Score: 44%  Category: Mobility         TREATMENT      No treatment provided today, evaluation only.       Home Exercises and Patient Education Provided    Education provided:   - role of PT/ POC  - review of assessments, decline in mobility noted since pt last in PT    Written Home Exercises Provided: continue with daily walking.   Jae demonstrated good  understanding of the education provided.     See EMR under NA for exercises provided to be provided.    Assessment   Jae is a 71 y.o. male referred to outpatient Physical Therapy with a medical diagnosis of Cerebrovascular accident (CVA) due to thrombosis of anterior cerebral artery. Pt reports that he drags L foot when walking, has difficulty with stairs and rising from chairs. Pt presents with increased muscle tone (Modified Estefanía Scale 1) of LLE, spasticity of L quads, decreased motor control of LLE, and decreased strength of LLE. This impairs pt's mobility during gait and sit>stand. Pt's current times were compared to previous d/c from PT in 2020 and a significant decline was noted. Previous time for TUG was ~13 sec and 5 times sit<>stand test was 15 seconds w/o use of UE. Today's time for TUG was 22.5 seconds and 5 times sit<>stand test was 36 seconds with UE support needed. Motor control of LLE was noted to be ~50% slower than R. Pt was able to demonstrate balance with and without visual support on firm and compliant surfaces; however, pt demonstrated knee buckling without visual support. Per formal assessments, pt is at risk for falls. Pt can benefit from skilled PT to improve mobility and issue HEP for maintenance of impairments.       Pt prognosis is Good.   Pt will benefit from skilled outpatient Physical Therapy to address the deficits stated above and in the chart below, provide pt/family education, and to maximize pt's level of independence.     Plan of care discussed with patient: Yes  Pt's spiritual, cultural and educational needs considered and patient is agreeable to the plan of care and  goals as stated below:     Anticipated Barriers for therapy: transportation, length of time since onset of impairments    Medical Necessity is demonstrated by the following  History  Co-morbidities and personal factors that may impact the plan of care Co-morbidities:   DM, diabetic neuropathy,  HTN    Personal Factors:   transportation     high   Examination  Body Structures and Functions, activity limitations and participation restrictions that may impact the plan of care Body Regions:   lower extremities    Body Systems:    gross symmetry  ROM  strength  gross coordinated movement  balance  gait  transfers  transitions  motor control  skin integrity    Participation Restrictions:   Difficulty with sit>stand  Difficulty with stair negotiation  Impaired gait  Difficulty with gait on unlevel surfaces  Requires skilled PT to issue and progress HEP as needed     Activity limitations:   Learning and applying knowledge  no deficits    General Tasks and Commands  no deficits    Communication  no deficits    Mobility  lifting and carrying objects  fine hand use (grasping/picking up)  walking    Self care  Refer to OT    Domestic Life  Refer to OT report for details     Interactions/Relationships  no deficits    Life Areas  no deficits    Community and Social Life  community life  recreation and leisure         high   Clinical Presentation evolving clinical presentation with changing clinical characteristics moderate   Decision Making/ Complexity Score: moderate     Goals:  Short Term Goals: 6 weeks   Pt will report compliance with HEP for LE stretching and strengthening at least 3x/week to progress towards goals set.   Pt will demonstrate improved TUG speed to <20 sec demonstrate improved safety with household mobility.    Pt will demonstrate improved ease with sit<>stand for daily mobility by performing 5 times sit<>stand test in <30 sec.   Assess 2 min walk test and set goals as needed.   Assess stair negotiation and set  goal as needed.      Long Term Goals: 12 weeks   Pt will demonstrate improved TUG speed to 14 sec demonstrate improved safety with decreased fall risk for household mobility.    Pt will demonstrate improved ease with sit<>stand for daily mobility by performing 5 times sit<>stand test in 15 sec.   Pt will report only minimal difficulty negotiating uneven terrain for improved safety with community mobility.     Plan   Plan of care Certification: 9/26/2022 to 12/16/2022.    Outpatient Physical Therapy 1 times weekly (2 recommended by PT, but pt only agreeable to 1x) for 12 weeks to include the following interventions: Neuromuscular Re-ed, Patient Education, Therapeutic Activities, and Therapeutic Exercise.     Naya Obregon, PT, DPT,   Board-Certified Clinical Specialist in Neurologic Physical Therapy   Certified Brain Injury Specialist    9/26/2022

## 2022-09-28 ENCOUNTER — CLINICAL SUPPORT (OUTPATIENT)
Dept: REHABILITATION | Facility: HOSPITAL | Age: 72
End: 2022-09-28
Attending: INTERNAL MEDICINE
Payer: MEDICARE

## 2022-09-28 DIAGNOSIS — Z78.9 IMPAIRED MOBILITY AND ADLS: ICD-10-CM

## 2022-09-28 DIAGNOSIS — Z74.09 IMPAIRED MOBILITY AND ADLS: ICD-10-CM

## 2022-09-28 DIAGNOSIS — I69.354 HEMIPARESIS AFFECTING LEFT SIDE AS LATE EFFECT OF CEREBROVASCULAR ACCIDENT (CVA): Primary | ICD-10-CM

## 2022-09-28 PROCEDURE — 97530 THERAPEUTIC ACTIVITIES: CPT | Mod: PN

## 2022-09-28 PROCEDURE — 97110 THERAPEUTIC EXERCISES: CPT | Mod: PN

## 2022-09-28 NOTE — PATIENT INSTRUCTIONS
Additional instructions: STOP if experiencing shooting pain  Retrieved from OT Toolkit by Ana Gilman (2011)       Perform all with cane.   STOP if experiencing shooting pain

## 2022-09-28 NOTE — PROGRESS NOTES
"  Occupational Therapy Treatment Note     Date: 9/28/2022  Name: Jae Millan  Clinic Number: [id number]    Therapy Diagnosis:   Encounter Diagnoses   Name Primary?    Hemiparesis affecting left side as late effect of cerebrovascular accident (CVA) Yes    Impaired mobility and ADLs      Physician: Maryjane Farias MD     Right Left   Involved Side   []     [x]   Dominant Side    [x]     []     Physician Orders: OT eval and treat  Medical Diagnosis: I63.329 (ICD-10-CM) - Cerebral infarction due to thrombosis of unspecified anterior cerebral artery  Evaluation Date: 9/15/2022  Plan of Care Expiration Period: 11/11/2022  Insurance Authorization period Expiration: 10/13/2022  Visit # / Visits Authorized: 1 / 11 - not including eval  FOTO: 9/15/2022     Time In: 1100  Time Out: 1150  Total Billable (one on one) Time: 50 minutes     Precautions: Standard    Subjective     Pt reports: "This arm feels so much looser." - at end of session  He was not compliant with home exercise program given last session.   Response to previous treatment:no complaints  Functional change: none - eval only    Pain: 8/10  Location: left hand and wrist    Objective      Jae received therapeutic exercises for 25 minutes including:  - UBE x 8 minutes in seated position to promote periscapular activation and strengthening, endurance, and range of motion to enhance functional activities and self-care skills with resistance of 1.0; 4 minutes forward and 4 minutes backward .   - Dowel exercises in supine position using small 5# dowel: 3x10 shoulder flexion, chest press, and scapular protraction for strengthening, stability, and active assisted range of motion of left upper extremity ; reviewed and added to HEP    Jae participated in dynamic functional therapeutic activities to improve functional performance for 25  minutes, including:  - Review and return demonstration of self range HEP; see patient instructions  - Passive range of motion " "of left upper extremity performed in supine position with hold at pain-free end range in all planes  - General wrist stretches including 1. Scaphoid on radius 2. Increasing mobility of metacarpals 3. Carpal rolls 4. Increasing mobility towards radial deviation 5. Increasing mobility of wrist towards extension 6. Increasing mobility of wrist towards supination/pronation      Home Exercises and Education Provided      Education provided:   - HEP   - Passive range of motion for joint integrity  - Progress towards goals    Written Home Exercises Provided: yes.  Exercises were reviewed and Jae was able to demonstrate them prior to the end of the session.    Jae demonstrated good  understanding of the education provided.     See EMR under Patient Instructions for exercises provided 9/28/2022.    Assessment      Jae would continue to benefit from skilled OT. Jae tolerated all activities presented today well. Reports resting pain of 8/10 in distal left upper extremity; improved with passive range of motion and activity. Left upper extremity secured to UBE with good response; no resistance added as patient reported "good stretch" felt without resistance. Good participation in dowel exercises, with some diffiulty in scapular protraction plane. Jae was able to return demonstrate OT Toolkit left upper extremity passive range of motion HEP. Discussed importance of HEP compliance, to which patient verbalized understanding and intent to follow through for max therapuetic gains. Overall, good session.    Jae is progressing well towards his goals and there are no updates to goals at this time. Pt prognosis is Good.     Pt will continue to benefit from skilled outpatient occupational therapy to address the deficits listed in the problem list on initial evaluation provide pt/family education and to maximize pt's level of independence in the home and community environment.     Pt's spiritual, cultural and educational " needs considered and pt agreeable to plan of care and goals.    Anticipated barriers to occupational therapy: time since onset (2018)    Goals:      Short Term Goals = Long Term Goals: to be met within 8 weeks Status    Pt will be independent with Home Exercise Program (HEP) to promote long term maintenance of progress made in therapy.   Progressing, not met     Pt will be able to move 3 blocks in Box and Blocks assessment in 2 min or better using L UE in order to demonstrate improved volitional grasp and release Progressing, not met      Pt will improve active range of motion by 15 degrees in all left shoulder planes for improved incorporation of left upper extremity in grooming tasks  Progressing, not met      Pt will improve L  strength by 5# in order to increase safety and participation with home-care tasks Progressing, not met      Pt will score 65% on the FOTO by discharge in order to demonstrate improved QOL and independence with meaningful activities.  Progressing, not met      Pt will improve left MMS by 1 grade in all planes to demonstrate increased strength of left upper extremity for improved safety in cooking tasks  Progressing, not met         Goals to be added or modified as necessary.     Plan   Certification Period/Plan of care expiration: 9/15/2022 to 11/11/22.     Outpatient Occupational Therapy 1 times weekly for 8 weeks to include the following interventions: Electrical Stimulation (NMES), Fluidotherapy, Manual Therapy, Moist Heat/ Ice, Neuromuscular Re-ed, Orthotic Management and Training, Paraffin, Patient Education, Self Care, Therapeutic Activities, and Therapeutic Exercise.     Updates/Grading for next session: Edema massage HEP, wrist extension EVARISTO Higuera LOTR

## 2022-10-06 ENCOUNTER — CLINICAL SUPPORT (OUTPATIENT)
Dept: REHABILITATION | Facility: HOSPITAL | Age: 72
End: 2022-10-06
Payer: MEDICARE

## 2022-10-06 DIAGNOSIS — Z74.09 IMPAIRED MOBILITY AND ADLS: ICD-10-CM

## 2022-10-06 DIAGNOSIS — Z78.9 IMPAIRED MOBILITY AND ADLS: ICD-10-CM

## 2022-10-06 DIAGNOSIS — I69.354 HEMIPARESIS AFFECTING LEFT SIDE AS LATE EFFECT OF CEREBROVASCULAR ACCIDENT (CVA): Primary | ICD-10-CM

## 2022-10-06 PROCEDURE — 97032 APPL MODALITY 1+ESTIM EA 15: CPT | Mod: PN

## 2022-10-06 PROCEDURE — 97530 THERAPEUTIC ACTIVITIES: CPT | Mod: PN

## 2022-10-06 PROCEDURE — 97110 THERAPEUTIC EXERCISES: CPT | Mod: PN

## 2022-10-10 ENCOUNTER — OFFICE VISIT (OUTPATIENT)
Dept: PODIATRY | Facility: CLINIC | Age: 72
End: 2022-10-10
Payer: MEDICARE

## 2022-10-10 VITALS
DIASTOLIC BLOOD PRESSURE: 92 MMHG | BODY MASS INDEX: 30.78 KG/M2 | WEIGHT: 215 LBS | SYSTOLIC BLOOD PRESSURE: 177 MMHG | HEIGHT: 70 IN | HEART RATE: 72 BPM

## 2022-10-10 DIAGNOSIS — M20.42 HAMMERTOE, BILATERAL: ICD-10-CM

## 2022-10-10 DIAGNOSIS — M20.41 HAMMERTOE, BILATERAL: ICD-10-CM

## 2022-10-10 DIAGNOSIS — E11.40 TYPE 2 DIABETES MELLITUS WITH DIABETIC NEUROPATHY, WITHOUT LONG-TERM CURRENT USE OF INSULIN: Primary | ICD-10-CM

## 2022-10-10 DIAGNOSIS — Q66.6 PES VALGUS: ICD-10-CM

## 2022-10-10 DIAGNOSIS — I69.854 HEMIPARESIS OF LEFT NONDOMINANT SIDE AS LATE EFFECT OF OTHER CEREBROVASCULAR DISEASE: ICD-10-CM

## 2022-10-10 DIAGNOSIS — L60.0 INGROWN NAIL: ICD-10-CM

## 2022-10-10 DIAGNOSIS — B35.1 ONYCHOMYCOSIS: ICD-10-CM

## 2022-10-10 PROCEDURE — 1101F PT FALLS ASSESS-DOCD LE1/YR: CPT | Mod: CPTII,S$GLB,, | Performed by: PODIATRIST

## 2022-10-10 PROCEDURE — 3080F PR MOST RECENT DIASTOLIC BLOOD PRESSURE >= 90 MM HG: ICD-10-PCS | Mod: CPTII,S$GLB,, | Performed by: PODIATRIST

## 2022-10-10 PROCEDURE — 1159F MED LIST DOCD IN RCRD: CPT | Mod: CPTII,S$GLB,, | Performed by: PODIATRIST

## 2022-10-10 PROCEDURE — 3066F NEPHROPATHY DOC TX: CPT | Mod: CPTII,S$GLB,, | Performed by: PODIATRIST

## 2022-10-10 PROCEDURE — 99999 PR PBB SHADOW E&M-EST. PATIENT-LVL IV: ICD-10-PCS | Mod: PBBFAC,,, | Performed by: PODIATRIST

## 2022-10-10 PROCEDURE — 11721 PR DEBRIDEMENT OF NAILS, 6 OR MORE: ICD-10-PCS | Mod: Q9,S$GLB,, | Performed by: PODIATRIST

## 2022-10-10 PROCEDURE — 1101F PR PT FALLS ASSESS DOC 0-1 FALLS W/OUT INJ PAST YR: ICD-10-PCS | Mod: CPTII,S$GLB,, | Performed by: PODIATRIST

## 2022-10-10 PROCEDURE — 3288F FALL RISK ASSESSMENT DOCD: CPT | Mod: CPTII,S$GLB,, | Performed by: PODIATRIST

## 2022-10-10 PROCEDURE — 99214 OFFICE O/P EST MOD 30 MIN: CPT | Mod: 25,S$GLB,, | Performed by: PODIATRIST

## 2022-10-10 PROCEDURE — 3288F PR FALLS RISK ASSESSMENT DOCUMENTED: ICD-10-PCS | Mod: CPTII,S$GLB,, | Performed by: PODIATRIST

## 2022-10-10 PROCEDURE — 11721 DEBRIDE NAIL 6 OR MORE: CPT | Mod: Q9,S$GLB,, | Performed by: PODIATRIST

## 2022-10-10 PROCEDURE — 1125F AMNT PAIN NOTED PAIN PRSNT: CPT | Mod: CPTII,S$GLB,, | Performed by: PODIATRIST

## 2022-10-10 PROCEDURE — 3080F DIAST BP >= 90 MM HG: CPT | Mod: CPTII,S$GLB,, | Performed by: PODIATRIST

## 2022-10-10 PROCEDURE — 99499 UNLISTED E&M SERVICE: CPT | Mod: S$GLB,,, | Performed by: PODIATRIST

## 2022-10-10 PROCEDURE — 99214 PR OFFICE/OUTPT VISIT, EST, LEVL IV, 30-39 MIN: ICD-10-PCS | Mod: 25,S$GLB,, | Performed by: PODIATRIST

## 2022-10-10 PROCEDURE — 1125F PR PAIN SEVERITY QUANTIFIED, PAIN PRESENT: ICD-10-PCS | Mod: CPTII,S$GLB,, | Performed by: PODIATRIST

## 2022-10-10 PROCEDURE — 3077F SYST BP >= 140 MM HG: CPT | Mod: CPTII,S$GLB,, | Performed by: PODIATRIST

## 2022-10-10 PROCEDURE — 3077F PR MOST RECENT SYSTOLIC BLOOD PRESSURE >= 140 MM HG: ICD-10-PCS | Mod: CPTII,S$GLB,, | Performed by: PODIATRIST

## 2022-10-10 PROCEDURE — 99499 RISK ADDL DX/OHS AUDIT: ICD-10-PCS | Mod: S$GLB,,, | Performed by: PODIATRIST

## 2022-10-10 PROCEDURE — 1159F PR MEDICATION LIST DOCUMENTED IN MEDICAL RECORD: ICD-10-PCS | Mod: CPTII,S$GLB,, | Performed by: PODIATRIST

## 2022-10-10 PROCEDURE — 99999 PR PBB SHADOW E&M-EST. PATIENT-LVL IV: CPT | Mod: PBBFAC,,, | Performed by: PODIATRIST

## 2022-10-10 PROCEDURE — 3052F PR MOST RECENT HEMOGLOBIN A1C LEVEL 8.0 - < 9.0%: ICD-10-PCS | Mod: CPTII,S$GLB,, | Performed by: PODIATRIST

## 2022-10-10 PROCEDURE — 3052F HG A1C>EQUAL 8.0%<EQUAL 9.0%: CPT | Mod: CPTII,S$GLB,, | Performed by: PODIATRIST

## 2022-10-10 PROCEDURE — 3066F PR DOCUMENTATION OF TREATMENT FOR NEPHROPATHY: ICD-10-PCS | Mod: CPTII,S$GLB,, | Performed by: PODIATRIST

## 2022-10-10 NOTE — PROGRESS NOTES
Subjective:      Patient ID: Jae Millan is a 71 y.o. male.    Chief Complaint: No chief complaint on file.    Jae is a 71 y.o. male who presents new to this clinic for evaluation and treatment of high risk feet. Last seen in Jan.by .     Jae has a past medical history of Cataract, Decreased sensation of lower extremity, Diabetes mellitus, Diabetic neuropathy, Dysarthria, Dysphagia, ED (erectile dysfunction), Glaucoma, Hemiparesis, High cholesterol, History of hepatitis C, s/p successful Rx w/ cure (SVR12) 4/2020, Hypertension, Impaired functional mobility, balance, gait, and endurance, Stroke, and Urinary frequency. Dx DM before Aug.2018 CVA. L hemiparesis UE & LE - started back to PT.    The patient's chief complaint is long, thick & ingrown toenails as well as numbness since CVA. Concerned about circulation. Numb tips of toes & L hand; also, pins and needles that are hurting on occasion. States pain level can be 7/10.  This patient has documented high risk feet requiring routine maintenance secondary to peripheral neuropathy.    PCP: Maryjane Farias MD    Date Last Seen by PCP: 8/15/22    Current shoe gear:  Tennis shoes    Hemoglobin A1C   Date Value Ref Range Status   08/12/2022 8.1 (H) 4.0 - 5.6 % Final     Comment:     ADA Screening Guidelines:  5.7-6.4%  Consistent with prediabetes  >or=6.5%  Consistent with diabetes    High levels of fetal hemoglobin interfere with the HbA1C  assay. Heterozygous hemoglobin variants (HbS, HgC, etc)do  not significantly interfere with this assay.   However, presence of multiple variants may affect accuracy.     03/25/2022 9.2 (H) 4.0 - 5.6 % Final     Comment:     ADA Screening Guidelines:  5.7-6.4%  Consistent with prediabetes  >or=6.5%  Consistent with diabetes    High levels of fetal hemoglobin interfere with the HbA1C  assay. Heterozygous hemoglobin variants (HbS, HgC, etc)do  not significantly interfere with this assay.   However, presence of  multiple variants may affect accuracy.     08/06/2021 6.7 (H) 4.0 - 5.6 % Final     Comment:     ADA Screening Guidelines:  5.7-6.4%  Consistent with prediabetes  >or=6.5%  Consistent with diabetes    High levels of fetal hemoglobin interfere with the HbA1C  assay. Heterozygous hemoglobin variants (HbS, HgC, etc)do  not significantly interfere with this assay.   However, presence of multiple variants may affect accuracy.       Review of Systems   Constitutional: Negative for malaise/fatigue.   Cardiovascular: Negative for claudication or leg swelling.   Skin: Positive for color change, dry skin, nail changes and unusual hair distribution. Negative for rash.   Musculoskeletal: Negative for joint pain and myalgias.   Neurological: Positive for numbness, paresthesias and sensory change. Negative for loss of balance or weakness.   Psychiatric/Behavioral: Negative for altered mental status.       Objective:      Physical Exam  Vitals reviewed.   Constitutional:       Appearance: He is well-developed.   Cardiovascular:      Pulses:         Dorsalis pedis pulses are 0 on the right side and 0 on the left side. Audible & phasic per Doppler.       Posterior tibial pulses are 1+ on the right side and 1+ on the left side.   Musculoskeletal:     Equinus B/L ankles with < 90 deg foot to leg noted with knees extended.       MS strength of extrinsics to foot and ankle B/L + 5/5 in DF/PF/Inv/Ev to resistance w/ no reproduction of pain in any direction.      Passive ROM of ankle and pedal joints is painless & w/out crepitation B/L.      Pes valgus. Hammertoe deformities B/L.  Feet:      Nails are thickened, dystrophic, cryptotic & yellowish in coloration. Onychogryphosis 2 L. Significantly hypertrophic 1 & 4 B/L, 5 L  Skin:     General: Skin is dry. No open lesions noted.      Capillary Refill: Capillary refill takes more than 3 seconds.      Comments: Skin is thin, shiny, and cool, B/L.  Neurological:      Mental Status: He is  alert.      Comments: diminished sensation noted to BLE       Paresthesias including pins and needles B/L feet with no clearly identified trigger or source; no hyperemia.      L hemiparesis - uses a cane.  Psych:  Patient is alert. Mood & affect is normal. Behavior is normal.    Assessment:       Encounter Diagnoses   Name Primary?    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     Onychomycosis      Problem List Items Addressed This Visit          Endocrine    Type 2 diabetes mellitus with neurologic complication, without long-term current use of insulin - Primary (Chronic)    Relevant Orders    DIABETIC SHOES FOR HOME USE    Routine Foot Care     Other Visit Diagnoses       Ingrown nail        Relevant Orders    Routine Foot Care    Pes valgus        Relevant Orders    DIABETIC SHOES FOR HOME USE    Hammertoe, bilateral        Relevant Orders    DIABETIC SHOES FOR HOME USE    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        Relevant Orders    DIABETIC SHOES FOR HOME USE    Routine Foot Care    Onychomycosis        Relevant Orders    Routine Foot Care           Plan:       Diagnoses and all orders for this visit:    Type 2 diabetes mellitus with diabetic neuropathy, without long-term current use of insulin  -     DIABETIC SHOES FOR HOME USE  -     Routine Foot Care    Ingrown nail  -     Routine Foot Care    Pes valgus  -     DIABETIC SHOES FOR HOME USE    Hammertoe, bilateral  -     DIABETIC SHOES FOR HOME USE    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    - Shoe inspection. Diabetic Foot Education. Patient reminded of the importance of good nutrition and blood sugar control to help prevent podiatric complications of diabetes. Patient instructed on proper foot hygeine. We discussed wearing proper shoe gear, daily foot inspections, never walking without protective shoe gear, annual DM foot exam, sooner prn.      - With patient's permission, nails were aggressively reduced and debrided x 10 to their soft tissue attachment mechanically, removing all offending nail and debris. Patient relates relief following the procedure.     To d/w PCP re: Gabapentin doseage (currently 300mg tid)

## 2022-10-11 NOTE — PROGRESS NOTES
"Physical Therapy Daily Treatment Note     Name: Jae Millan  Clinic Number: [id number]    Therapy Diagnosis:   Encounter Diagnoses   Name Primary?    Cerebrovascular accident (CVA) due to thrombosis of anterior cerebral artery, unspecified blood vessel laterality      Impaired functional mobility, balance, gait, and endurance Yes    Physician: Maryjane Farias MD    Visit Date: 10/12/2022    Physician Orders: PT eval and Treat Stroke  Medical Diagnosis: I63.329 (ICD-10-CM) - Cerebrovascular accident (CVA) due to thrombosis of anterior cerebral artery, unspecified blood vessel laterality  Evaluation Date: 9/26/2022  Authorization Period Expiration: 10/24/2022  Plan of Care Certification Period: 12/16/2022  Visit #/Visits authorized: 1 / 25     Time In: 0935  Time Out: 1020  Total Billable Time: 40 minutes  Total Treatment Time: 45 minutes    Precautions: Standard    Subjective     Pt reports: feeling fine after OT.  Pt was issued HEP this visit  Response to previous treatment: No adverse reaction reported  Functional change: ongoing    Pain: 0/10  Location:       Objective     See treatment     TREATMENT      Jae received therapeutic exercises to increase strength and endurance of the LE for 25 minutes, which included the following including:    NuStepper, L2, 6 min, BUE/LE    HEP:  Sit to Stands 3x10  Heel Raises 3x10  Marching 2x20 alt    //Bars  Step Ups 6", 2 x 10  Lateral Steps, UUE, 3 laps      Jae performed neuromuscular re-education to improve balance and coordination for 15 minutes, which included the following:    // Bars:  NBOS, FOAM, EO 2 x 30 sec, min sway  NBOS, FOAM, Vertical/Horizontal head turns EO 2 x 30 sec, min-mod sway  NBOS, FOAM, EC 2 x 30 sec, mod sway  Taps on Step 6", 10x, 20x alt  Short Hurdles forward 10x    Home Exercises Provided and Patient Education Provided     Education provided:   - Issued HEP: Standing marching, Sit to stands, Heel Raises    Written Home Exercises " Provided: yes.  Exercises were reviewed and Jae was able to demonstrate them prior to the end of the session.  Jae demonstrated good  understanding of the education provided.     See EMR under Patient Instructions for exercises provided 10/12/2022.    Assessment   Jae tolerated today's physical therapy session well. Pt was able to perform static balance exercises with minimal to moderate swaying and no loss of balance. Fatigue near the end of session noted when pt had difficulty with L foot clearance during short minda stepping. Pt reports that session was moderately challenging and wishes to perform short hurdles before step ups. Pt to continue skilled physical therapy to increase lower extremity strength and endurance to improve ambulation.    Jae Is progressing well towards his goals.   Pt prognosis is Good.     Pt will continue to benefit from skilled outpatient physical therapy to address the deficits listed in the problem list box on initial evaluation, provide pt/family education and to maximize pt's level of independence in the home and community environment.     Pt's spiritual, cultural and educational needs considered and pt agreeable to plan of care and goals.     Anticipated barriers to physical therapy: transportation, length of time since onset of impairments    Goals:   Short Term Goals: 6 weeks   Pt will report compliance with HEP for LE stretching and strengthening at least 3x/week to progress towards goals set. Ongoing  Pt will demonstrate improved TUG speed to <20 sec demonstrate improved safety with household mobility.  Ongoing  Pt will demonstrate improved ease with sit<>stand for daily mobility by performing 5 times sit<>stand test in <30 sec. Ongoing  Assess 2 min walk test and set goals as needed. Ongoing  Assess stair negotiation and set goal as needed.  Ongoing     Long Term Goals: 12 weeks   Pt will demonstrate improved TUG speed to 14 sec demonstrate improved safety with  decreased fall risk for household mobility.  Ongoing  Pt will demonstrate improved ease with sit<>stand for daily mobility by performing 5 times sit<>stand test in 15 sec. Ongoing  Pt will report only minimal difficulty negotiating uneven terrain for improved safety with community mobility. Ongoing    Plan   Continue with established POC: Outpatient Physical Therapy 1 times weekly (2 recommended by PT, but pt only agreeable to 1x) for 12 weeks to include the following interventions: Neuromuscular Re-ed, Patient Education, Therapeutic Activities, and Therapeutic Exercise.     JUANPABLO Ellsi  10/12/2022     I, Naya Obregon, DPT, certify that I was present in the room directing the student in service delivery and guiding them using my skilled judgment. As the co-signing therapist I have reviewed the students documentation and am responsible for the treatment, assessment, and plan.     Naya Obregon, PT, DPT, CBIS  Board-Certified Clinical Specialist in Neurologic Physical Therapy    10/12/2022

## 2022-10-12 ENCOUNTER — CLINICAL SUPPORT (OUTPATIENT)
Dept: REHABILITATION | Facility: HOSPITAL | Age: 72
End: 2022-10-12
Payer: MEDICARE

## 2022-10-12 DIAGNOSIS — I69.354 HEMIPARESIS AFFECTING LEFT SIDE AS LATE EFFECT OF CEREBROVASCULAR ACCIDENT (CVA): Primary | ICD-10-CM

## 2022-10-12 DIAGNOSIS — Z74.09 IMPAIRED FUNCTIONAL MOBILITY, BALANCE, GAIT, AND ENDURANCE: Primary | ICD-10-CM

## 2022-10-12 DIAGNOSIS — Z74.09 IMPAIRED MOBILITY AND ADLS: ICD-10-CM

## 2022-10-12 DIAGNOSIS — Z78.9 IMPAIRED MOBILITY AND ADLS: ICD-10-CM

## 2022-10-12 DIAGNOSIS — I63.329 CEREBROVASCULAR ACCIDENT (CVA) DUE TO THROMBOSIS OF ANTERIOR CEREBRAL ARTERY, UNSPECIFIED BLOOD VESSEL LATERALITY: ICD-10-CM

## 2022-10-12 PROCEDURE — 97110 THERAPEUTIC EXERCISES: CPT | Mod: PN

## 2022-10-12 PROCEDURE — 97112 NEUROMUSCULAR REEDUCATION: CPT | Mod: PN

## 2022-10-12 PROCEDURE — 97110 THERAPEUTIC EXERCISES: CPT | Mod: PN | Performed by: PHYSICAL THERAPIST

## 2022-10-12 PROCEDURE — 97112 NEUROMUSCULAR REEDUCATION: CPT | Mod: PN | Performed by: PHYSICAL THERAPIST

## 2022-10-12 PROCEDURE — 97530 THERAPEUTIC ACTIVITIES: CPT | Mod: PN

## 2022-10-12 NOTE — PROGRESS NOTES
"  Occupational Therapy Treatment Note     Date: 10/12/2022  Name: Jae Millan  Clinic Number: [id number]    Therapy Diagnosis:   Encounter Diagnoses   Name Primary?    Hemiparesis affecting left side as late effect of cerebrovascular accident (CVA) Yes    Impaired mobility and ADLs        Physician: Maryjane Farias MD     Right Left   Involved Side   []     [x]   Dominant Side    [x]     []     Physician Orders: OT eval and treat  Medical Diagnosis: I63.329 (ICD-10-CM) - Cerebral infarction due to thrombosis of unspecified anterior cerebral artery  Evaluation Date: 9/15/2022  Plan of Care Expiration Period: 11/11/2022  Insurance Authorization period Expiration: 10/13/2022  Visit # / Visits Authorized: 3 / 11 - not including eval  FOTO: 9/15/2022     Time In: 0846  Time Out: 0934  Total Billable (one on one) Time: 48 minutes     Precautions: Standard    Subjective     Pt reports: "My hand was more flexible this morning and yesterday."  He was not compliant with home exercise program given last session.   Response to previous treatment:no complaints  Functional change: none - eval only    Pain: 7/10  Location: left upper extremity     Objective      Jae received therapeutic exercises for 25 minutes including:  - UBE x 8 minutes in seated position to promote periscapular activation and strengthening, endurance, and range of motion to enhance functional activities and self-care skills with resistance of 2.0; 4 minutes forward and 4 minutes backward .   - Dowel exercises in supine position using small 5# dowel: 3x10 shoulder flexion, chest press, and scapular protraction for strengthening, stability, and active assisted range of motion of left upper extremity; reviewed and added to HEP    Jae participated in dynamic functional therapeutic activities to improve functional performance for 10 minutes, including:  - Passive range of motion of left upper extremity performed in supine position with hold at " pain-free end range in all planes  - General wrist stretches including 1. Scaphoid on radius 2. Increasing mobility of metacarpals 3. Carpal rolls 4. Increasing mobility towards radial deviation 5. Increasing mobility of wrist towards extension 6. Increasing mobility of wrist towards supination/pronation    Jae participated in neuromuscular re-education activities to improve: Balance, Coordination, Kinesthetic, Sense, Proprioception, and Posture for 13 minutes, including:   - Participated in left upper extremity weightbearing activity in standing position; left hand placed on mat while reaching with right upper extremity in all planes to retreive cones from therapist and mat in all available planes, including D1/D2 PNF flexion/extension    Home Exercises and Education Provided      Education provided:   - HEP   - Passive range of motion for joint integrity  - Progress towards goals    Written Home Exercises Provided: yes.  Exercises were reviewed and Jae was able to demonstrate them prior to the end of the session.    Jae demonstrated good  understanding of the education provided.     See EMR under Patient Instructions for exercises provided 9/28/2022.    Assessment      Jae would continue to benefit from skilled OT. Jae tolerated all activities presented today well. Reports resting pain of 7/10 in distal left upper extremity; improved with passive range of motion and activity. Left upper extremity secured to UBE with good response; able to tolerate added resistance of 2.0. Good participation in dowel exercises, with some diffiulty in scapular protraction plane. Jae reports compliance with HEP, citing that he feels looser after HEP participation. Good tolerance to standing weight bearing with noted challenge in maintaining digit extension, even with assist provided from therapist. Will continue to follow up with resting hand splint and assess if resting hand splint helps edema. If edema persists,  will explore options for compression glove. Overall, good session.    Jae is progressing well towards his goals and there are no updates to goals at this time. Pt prognosis is Good.     Pt will continue to benefit from skilled outpatient occupational therapy to address the deficits listed in the problem list on initial evaluation provide pt/family education and to maximize pt's level of independence in the home and community environment.     Pt's spiritual, cultural and educational needs considered and pt agreeable to plan of care and goals.    Anticipated barriers to occupational therapy: time since onset (2018)    Goals:      Short Term Goals = Long Term Goals: to be met within 8 weeks Status    Pt will be independent with Home Exercise Program (HEP) to promote long term maintenance of progress made in therapy.   Progressing, not met     Pt will be able to move 3 blocks in Box and Blocks assessment in 2 min or better using L UE in order to demonstrate improved volitional grasp and release Progressing, not met      Pt will improve active range of motion by 15 degrees in all left shoulder planes for improved incorporation of left upper extremity in grooming tasks  Progressing, not met      Pt will improve L  strength by 5# in order to increase safety and participation with home-care tasks Progressing, not met      Pt will score 65% on the FOTO by discharge in order to demonstrate improved QOL and independence with meaningful activities.  Progressing, not met      Pt will improve left MMS by 1 grade in all planes to demonstrate increased strength of left upper extremity for improved safety in cooking tasks  Progressing, not met         Goals to be added or modified as necessary.     Plan   Certification Period/Plan of care expiration: 9/15/2022 to 11/11/22.     Outpatient Occupational Therapy 1 times weekly for 8 weeks to include the following interventions: Electrical Stimulation (NMES), Fluidotherapy, Manual  Therapy, Moist Heat/ Ice, Neuromuscular Re-ed, Orthotic Management and Training, Paraffin, Patient Education, Self Care, Therapeutic Activities, and Therapeutic Exercise.     Updates/Grading for next session: Edema massage HEP, wrist extension estim      EVARISTO Beal LOTR

## 2022-10-19 NOTE — PROCEDURES
Routine Foot Care    Date/Time: 10/10/2022 1:45 PM  Performed by: Mayda Herrera DPM  Authorized by: Mayda Herrera DPM     Consent Done?:  Yes (Verbal)    Nail Care Type:  Debride  Location(s): All  (Left 1st Toe, Left 3rd Toe, Left 2nd Toe, Left 4th Toe, Left 5th Toe, Right 1st Toe, Right 2nd Toe, Right 3rd Toe, Right 4th Toe and Right 5th Toe)  Patient tolerance:  Patient tolerated the procedure well with no immediate complications

## 2022-10-20 ENCOUNTER — CLINICAL SUPPORT (OUTPATIENT)
Dept: REHABILITATION | Facility: HOSPITAL | Age: 72
End: 2022-10-20
Payer: MEDICARE

## 2022-10-20 DIAGNOSIS — I69.354 HEMIPARESIS AFFECTING LEFT SIDE AS LATE EFFECT OF CEREBROVASCULAR ACCIDENT (CVA): Primary | ICD-10-CM

## 2022-10-20 DIAGNOSIS — Z74.09 IMPAIRED FUNCTIONAL MOBILITY, BALANCE, GAIT, AND ENDURANCE: Primary | ICD-10-CM

## 2022-10-20 DIAGNOSIS — Z78.9 IMPAIRED MOBILITY AND ADLS: ICD-10-CM

## 2022-10-20 DIAGNOSIS — Z74.09 IMPAIRED MOBILITY AND ADLS: ICD-10-CM

## 2022-10-20 PROCEDURE — 97112 NEUROMUSCULAR REEDUCATION: CPT | Mod: PN

## 2022-10-20 PROCEDURE — 97530 THERAPEUTIC ACTIVITIES: CPT | Mod: PN

## 2022-10-20 PROCEDURE — 97110 THERAPEUTIC EXERCISES: CPT | Mod: PN

## 2022-10-20 NOTE — PROGRESS NOTES
"Physical Therapy Daily Treatment Note     Name: Jae Millan  Clinic Number: [id number]    Therapy Diagnosis:   Encounter Diagnoses   Name Primary?    Cerebrovascular accident (CVA) due to thrombosis of anterior cerebral artery, unspecified blood vessel laterality      Impaired functional mobility, balance, gait, and endurance Yes    Physician: Maryjane Farias MD    Visit Date: 10/20/2022    Physician Orders: PT eval and Treat Stroke  Medical Diagnosis: I63.329 (ICD-10-CM) - Cerebrovascular accident (CVA) due to thrombosis of anterior cerebral artery, unspecified blood vessel laterality  Evaluation Date: 9/26/2022  Authorization Period Expiration: 10/24/2022  Plan of Care Certification Period: 12/16/2022  Visit #/Visits authorized: 2 / 25 (plus 1 eval)    Time In: 1105  Time Out: 1144  Total Billable Time:  39 minutes      Precautions: Standard    Subjective     Pt reports: feeling fine after OT. States compliance with HEP and has been making sure he does his exercises everyday.     Response to previous treatment: No adverse reaction reported  Functional change: ongoing    Pain: 7/10  Location:   left side UE and lower extremity due to stroke (more numbness and "nagging pain")    Objective     See treatment     TREATMENT      Jae received therapeutic exercises to increase strength and endurance of the LE for 15 minutes, which included the following including:    NuStepper, L2, 6 min, BUE/lower extremity (ace wrap over left UE)     HEP:  Sit to Stands 1x10  Sit to stands staggered, UE support, 3x5 bilateral lower extremities   Standing hip abduction, UE support, 2x10 bilateral lower extremities  Heel Raises, UE support, 3x10  Standing on incline board for gastroc stretch, 2x30 sec      Jae performed neuromuscular re-education to improve balance and coordination for 24 minutes, which included the following:    // Bars:  NBOS, FOAM, EO 1 x 30 sec, min sway  NBOS, FOAM, Vertical/Horizontal head turns EO 1 x " 30 sec, min-mod sway  NBOS, FOAM, EC 1 x 30 sec, mod sway  Marching, FOAM 3x20 alt, single finger support  Mini squats, FOAM 3x10, no upper extremity support    Home Exercises Provided and Patient Education Provided     Education provided:   - Continue with HEP  -Patient positioning    Written Home Exercises Provided: Patient instructed to cont prior HEP.  Exercises were reviewed and Jae was able to demonstrate them prior to the end of the session.  Jae demonstrated good  understanding of the education provided.     See EMR under Patient Instructions for exercises provided 10/12/2022.    Assessment   Jae tolerated today's physical therapy session well. Patient was able to further work on standing static balance with minimal to moderate swaying. Patient was able to progress to dynamic balance with mini squats/marches on foam and limited UE support. Patient still demonstrates fatigue and instability with walking requiring him to use a cane while walking. Patient will continue to benefit from Physical Therapy to further increase lower extremity strength and balance to improve his ambulation.     Jae Is progressing well towards his goals.   Pt prognosis is Good.     Pt will continue to benefit from skilled outpatient physical therapy to address the deficits listed in the problem list box on initial evaluation, provide pt/family education and to maximize pt's level of independence in the home and community environment.     Pt's spiritual, cultural and educational needs considered and pt agreeable to plan of care and goals.     Anticipated barriers to physical therapy: transportation, length of time since onset of impairments    Goals:   Short Term Goals: 6 weeks   Pt will report compliance with HEP for LE stretching and strengthening at least 3x/week to progress towards goals set. Ongoing  Pt will demonstrate improved TUG speed to <20 sec demonstrate improved safety with household mobility.  Ongoing  Pt will  demonstrate improved ease with sit<>stand for daily mobility by performing 5 times sit<>stand test in <30 sec. Ongoing  Assess 2 min walk test and set goals as needed. Ongoing  Assess stair negotiation and set goal as needed.  Ongoing     Long Term Goals: 12 weeks   Pt will demonstrate improved TUG speed to 14 sec demonstrate improved safety with decreased fall risk for household mobility.  Ongoing  Pt will demonstrate improved ease with sit<>stand for daily mobility by performing 5 times sit<>stand test in 15 sec. Ongoing  Pt will report only minimal difficulty negotiating uneven terrain for improved safety with community mobility. Ongoing    Plan   Continue with established POC: Outpatient Physical Therapy 1 times weekly (2 recommended by PT, but pt only agreeable to 1x) for 12 weeks to include the following interventions: Neuromuscular Re-ed, Patient Education, Therapeutic Activities, and Therapeutic Exercise.     Being gait training, try AlterG, lower extremity strengthening and balance progression.     Judi Huerta, PT  10/20/2022

## 2022-10-20 NOTE — PROGRESS NOTES
"  Occupational Therapy Treatment Note     Date: 10/20/2022  Name: Jae Millan  Clinic Number: [id number]    Therapy Diagnosis:   Encounter Diagnoses   Name Primary?    Hemiparesis affecting left side as late effect of cerebrovascular accident (CVA) Yes    Impaired mobility and ADLs        Physician: Maryjane Farias MD     Right Left   Involved Side   []     [x]   Dominant Side    [x]     []     Physician Orders: OT eval and treat  Medical Diagnosis: I63.329 (ICD-10-CM) - Cerebral infarction due to thrombosis of unspecified anterior cerebral artery  Evaluation Date: 9/15/2022  Plan of Care Expiration Period: 11/11/2022  Insurance Authorization period Expiration: 10/13/2022  Visit # / Visits Authorized: 4 / 11 - not including eval  FOTO: 9/15/2022     Time In: 1015  Time Out: 1100  Total Billable (one on one) Time: 45 minutes     Precautions: Standard    Subjective     Pt reports: "I have been hanging in there."  He was not compliant with home exercise program given last session.   Response to previous treatment:no complaints  Functional change: none - eval only    Pain: 7.25/10  Location: left upper extremity     Objective      Jae received therapeutic exercises for 20 minutes including:  - UBE x 8 minutes in seated position to promote periscapular activation and strengthening, endurance, and range of motion to enhance functional activities and self-care skills with resistance of 2.5; 4 minutes forward and 4 minutes backward .   - Dowel exercises in supine position using small 5# dowel: 3x10 shoulder flexion, chest press, and scapular protraction for strengthening, stability, and active assisted range of motion of left upper extremity; reviewed and added to HEP    Jae participated in dynamic functional therapeutic activities to improve functional performance for 15 minutes, including:  - Passive range of motion of left upper extremity performed in supine position with hold at pain-free end range in all " planes  - General wrist stretches including 1. Scaphoid on radius 2. Increasing mobility of metacarpals 3. Carpal rolls 4. Increasing mobility towards radial deviation 5. Increasing mobility of wrist towards extension 6. Increasing mobility of wrist towards supination/pronation    Jae participated in neuromuscular re-education activities to improve: Balance, Coordination, Kinesthetic, Sense, Proprioception, and Posture for 10 minutes, including:  - Participated in left upper extremity weightbearing activity in standing position; left hand placed on mat while reaching with right upper extremity in all planes to place squigz on mirror in all available planes, including D1/D2 PNF flexion/extension      Home Exercises and Education Provided      Education provided:   - HEP   - Passive range of motion for joint integrity  - Progress towards goals    Written Home Exercises Provided: yes.  Exercises were reviewed and Jae was able to demonstrate them prior to the end of the session.    Jae demonstrated good  understanding of the education provided.     See EMR under Patient Instructions for exercises provided 9/28/2022.    Assessment      Jae would continue to benefit from skilled OT. Left upper extremity secured to UBE with good response; able to tolerate added resistance of 2.5. Good participation in dowel exercises, with better participation in scapular protraction plane. Jae reports compliance with HEP, citing that he has been working with his friend who recently had a stroke to hold each other accountable. Good tolerance to standing weight bearing with noted challenge in maintaining digit extension, managed by placing hand on yoga block. Will continue to follow up with resting hand splint and assess if resting hand splint helps edema.  Overall, good session.    Jae is progressing well towards his goals and there are no updates to goals at this time. Pt prognosis is Good.     Pt will continue to  benefit from skilled outpatient occupational therapy to address the deficits listed in the problem list on initial evaluation provide pt/family education and to maximize pt's level of independence in the home and community environment.     Pt's spiritual, cultural and educational needs considered and pt agreeable to plan of care and goals.    Anticipated barriers to occupational therapy: time since onset (2018)    Goals:      Short Term Goals = Long Term Goals: to be met within 8 weeks Status    Pt will be independent with Home Exercise Program (HEP) to promote long term maintenance of progress made in therapy.   Progressing, not met     Pt will be able to move 3 blocks in Box and Blocks assessment in 2 min or better using L UE in order to demonstrate improved volitional grasp and release Progressing, not met      Pt will improve active range of motion by 15 degrees in all left shoulder planes for improved incorporation of left upper extremity in grooming tasks  Progressing, not met      Pt will improve L  strength by 5# in order to increase safety and participation with home-care tasks Progressing, not met      Pt will score 65% on the FOTO by discharge in order to demonstrate improved QOL and independence with meaningful activities.  Progressing, not met      Pt will improve left MMS by 1 grade in all planes to demonstrate increased strength of left upper extremity for improved safety in cooking tasks  Progressing, not met         Goals to be added or modified as necessary.     Plan   Certification Period/Plan of care expiration: 9/15/2022 to 11/11/22.     Outpatient Occupational Therapy 1 times weekly for 8 weeks to include the following interventions: Electrical Stimulation (NMES), Fluidotherapy, Manual Therapy, Moist Heat/ Ice, Neuromuscular Re-ed, Orthotic Management and Training, Paraffin, Patient Education, Self Care, Therapeutic Activities, and Therapeutic Exercise.     Updates/Grading for next session:  Edema massage HEP, wrist extension estim      Kaity Cortez, EVARISTO, IRLANDA

## 2022-10-27 ENCOUNTER — CLINICAL SUPPORT (OUTPATIENT)
Dept: REHABILITATION | Facility: HOSPITAL | Age: 72
End: 2022-10-27
Payer: MEDICARE

## 2022-10-27 DIAGNOSIS — Z78.9 IMPAIRED MOBILITY AND ADLS: ICD-10-CM

## 2022-10-27 DIAGNOSIS — Z74.09 IMPAIRED MOBILITY AND ADLS: ICD-10-CM

## 2022-10-27 DIAGNOSIS — Z74.09 IMPAIRED FUNCTIONAL MOBILITY, BALANCE, GAIT, AND ENDURANCE: Primary | ICD-10-CM

## 2022-10-27 DIAGNOSIS — I69.354 HEMIPARESIS AFFECTING LEFT SIDE AS LATE EFFECT OF CEREBROVASCULAR ACCIDENT (CVA): Primary | ICD-10-CM

## 2022-10-27 PROCEDURE — 97110 THERAPEUTIC EXERCISES: CPT | Mod: PN

## 2022-10-27 PROCEDURE — 97530 THERAPEUTIC ACTIVITIES: CPT | Mod: PN

## 2022-10-27 PROCEDURE — 97112 NEUROMUSCULAR REEDUCATION: CPT | Mod: PN

## 2022-10-27 NOTE — PROGRESS NOTES
Physical Therapy Daily Treatment Note     Name: Jae Millan  [facility] Number: [id number]    Therapy Diagnosis:   Encounter Diagnoses   Name Primary?    Cerebrovascular accident (CVA) due to thrombosis of anterior cerebral artery, unspecified blood vessel laterality      Impaired functional mobility, balance, gait, and endurance Yes    Physician: Maryjane Farias MD    Visit Date: 10/27/2022    Physician Orders: PT eval and Treat Stroke  Medical Diagnosis: I63.329 (ICD-10-CM) - Cerebrovascular accident (CVA) due to thrombosis of anterior cerebral artery, unspecified blood vessel laterality  Evaluation Date: 9/26/2022  Authorization Period Expiration: 10/24/2022  Plan of Care Certification Period: 12/16/2022  Visit #/Visits authorized: 3 / 25 (plus 1 eval)    Time In: 0933  Time Out: 1007  Total Billable Time:  34 minutes      Precautions: Standard    Subjective     Pt reports: Patient finished with occupational therapy late but states that he is feeling okay. Notes that he did have a rough morning because he was up early and had got stuck by the train on the way to his appointment this morning. Notes that he is generally more tired. Patient states compliance with home exercise program.      Response to previous treatment: No adverse reaction reported  Functional change: ongoing    Pain: NT  Location:   NT    Objective     See treatment     TREATMENT      Jae received therapeutic exercises to increase strength and endurance of the LE for 21 minutes, which included the following including:    NuStepper, L3, 7 min, BUE/lower extremity (ace wrap over left UE)     HEP:  Squats, bilateral upper extremity support,  3x10  Sit to Stands 1x10  Standing hip abduction, red theraband, UE support, 3x10 bilateral lower extremities        Jae performed neuromuscular re-education to improve balance and coordination for  13 minutes, which included the following:    // Bars:  NBOS, FOAM, EO 2 x 30 sec, min sway  NBOS, FOAM,  EC 2 x 30 sec, mod sway  Marching, FOAM 3x20 alt, single finger support-> no upper extremity support      Home Exercises Provided and Patient Education Provided     Education provided:   - Continue with HEP  -Patient positioning  -Information on how the GaitBetter system works and the benefits for his condition.    Written Home Exercises Provided: Patient instructed to cont prior HEP.  Exercises were reviewed and Jae was able to demonstrate them prior to the end of the session.  Jae demonstrated good  understanding of the education provided.     See EMR under Patient Instructions for exercises provided 10/12/2022.    Assessment   Patient tolerated session well today. Patient was able to progress with standing balance exercises, working on standing marches with finger support. Patient was able to continue with squats and standing abduction with red theraband, with verbal cues on patient positioning. Patient demonstrated and increased level of fatigue. He required more rest breaks and required session to end early due to fatigue level and tiredness. States that he will do better next session. Patient will continue to benefit from Physical Therapy to progress lower extremity strengthening and balance.       Jae Is progressing well towards his goals.   Pt prognosis is Good.     Pt will continue to benefit from skilled outpatient physical therapy to address the deficits listed in the problem list box on initial evaluation, provide pt/family education and to maximize pt's level of independence in the home and community environment.     Pt's spiritual, cultural and educational needs considered and pt agreeable to plan of care and goals.     Anticipated barriers to physical therapy: transportation, length of time since onset of impairments    Goals:   Short Term Goals: 6 weeks   Pt will report compliance with HEP for LE stretching and strengthening at least 3x/week to progress towards goals set. Ongoing  Pt will  demonstrate improved TUG speed to <20 sec demonstrate improved safety with household mobility.  Ongoing  Pt will demonstrate improved ease with sit<>stand for daily mobility by performing 5 times sit<>stand test in <30 sec. Ongoing  Assess 2 min walk test and set goals as needed. Ongoing  Assess stair negotiation and set goal as needed.  Ongoing     Long Term Goals: 12 weeks   Pt will demonstrate improved TUG speed to 14 sec demonstrate improved safety with decreased fall risk for household mobility.  Ongoing  Pt will demonstrate improved ease with sit<>stand for daily mobility by performing 5 times sit<>stand test in 15 sec. Ongoing  Pt will report only minimal difficulty negotiating uneven terrain for improved safety with community mobility. Ongoing    Plan   Continue with established POC: Outpatient Physical Therapy 1 times weekly (2 recommended by PT, but pt only agreeable to 1x) for 12 weeks to include the following interventions: Neuromuscular Re-ed, Patient Education, Therapeutic Activities, and Therapeutic Exercise.     Begin gait training, try AlterG, lower extremity strengthening and balance progression.     Judi Huerta, PT  10/27/2022

## 2022-10-27 NOTE — PROGRESS NOTES
"  Occupational Therapy Treatment Note     Date: 10/27/2022  Name: Jae Millan  Clinic Number: [id number]    Therapy Diagnosis:   Encounter Diagnoses   Name Primary?    Hemiparesis affecting left side as late effect of cerebrovascular accident (CVA) Yes    Impaired mobility and ADLs      Physician: Maryjane Farias MD     Right Left   Involved Side   []     [x]   Dominant Side    [x]     []     Physician Orders: OT eval and treat  Medical Diagnosis: I63.329 (ICD-10-CM) - Cerebral infarction due to thrombosis of unspecified anterior cerebral artery  Evaluation Date: 9/15/2022  Plan of Care Expiration Period: 11/11/2022  Insurance Authorization period Expiration: 10/13/2022  Visit # / Visits Authorized: 5 / 11 - not including eval  FOTO: 9/15/2022     Time In: 0850  Time Out: 0933  Total Billable (one on one) Time: 43 minutes     Precautions: Standard    Subjective     Pt reports: "I am frustrated that I was late."  He was not compliant with home exercise program given last session.   Response to previous treatment:no complaints  Functional change: none - eval only    Pain: 8/10  Location: left upper extremity     Objective      Jae received therapeutic exercises for 15 minutes including:  - UBE x 10 minutes in seated position to promote periscapular activation and strengthening, endurance, and range of motion to enhance functional activities and self-care skills with resistance of 2.5; 4 minutes forward and 4 minutes backward .   - Dowel exercises in supine position using small 5# dowel: 3x10 shoulder flexion, chest press, and scapular protraction for strengthening, stability, and active assisted range of motion of left upper extremity; reviewed and added to HEP    Jae participated in dynamic functional therapeutic activities to improve functional performance for 12 minutes, including:  - Passive range of motion of left upper extremity performed in supine position with hold at pain-free end range in all " planes  - General wrist stretches including 1. Scaphoid on radius 2. Increasing mobility of metacarpals 3. Carpal rolls 4. Increasing mobility towards radial deviation 5. Increasing mobility of wrist towards extension 6. Increasing mobility of wrist towards supination/pronation    Jae participated in neuromuscular re-education activities to improve: Balance, Coordination, Kinesthetic, Sense, Proprioception, and Posture for 20 minutes, including:  - Participated in left upper extremity weightbearing activity in standing position; left hand placed on mat while reaching with right upper extremity in all planes to place squigz on mirror in all available planes, including D1/D2 PNF flexion/extension  - NMES for wrist extensors: 30 Hz x 300; for 10 minutes at 10/10 cycle for left wrist and digit extension      Home Exercises and Education Provided      Education provided:   - HEP   - Passive range of motion for joint integrity  - Progress towards goals    Written Home Exercises Provided: yes.  Exercises were reviewed and Jae was able to demonstrate them prior to the end of the session.    Jae demonstrated good  understanding of the education provided.     See EMR under Patient Instructions for exercises provided 9/28/2022.    Assessment      Jae would continue to benefit from skilled OT. Left upper extremity secured to UBE with good response; able to tolerate added resistance of 2.5. Good participation in dowel exercises, with better participation in scapular protraction plane. Good tolerance to NMES to facilitate digit extension. Will continue to follow up with resting hand splint and assess if resting hand splint helps edema. Overall, good session.    Jae is progressing well towards his goals and there are no updates to goals at this time. Pt prognosis is Good.     Pt will continue to benefit from skilled outpatient occupational therapy to address the deficits listed in the problem list on initial  evaluation provide pt/family education and to maximize pt's level of independence in the home and community environment.     Pt's spiritual, cultural and educational needs considered and pt agreeable to plan of care and goals.    Anticipated barriers to occupational therapy: time since onset (2018)    Goals:      Short Term Goals = Long Term Goals: to be met within 8 weeks Status    Pt will be independent with Home Exercise Program (HEP) to promote long term maintenance of progress made in therapy.   Progressing, not met     Pt will be able to move 3 blocks in Box and Blocks assessment in 2 min or better using L UE in order to demonstrate improved volitional grasp and release Progressing, not met      Pt will improve active range of motion by 15 degrees in all left shoulder planes for improved incorporation of left upper extremity in grooming tasks  Progressing, not met      Pt will improve L  strength by 5# in order to increase safety and participation with home-care tasks Progressing, not met      Pt will score 65% on the FOTO by discharge in order to demonstrate improved QOL and independence with meaningful activities.  Progressing, not met      Pt will improve left MMS by 1 grade in all planes to demonstrate increased strength of left upper extremity for improved safety in cooking tasks  Progressing, not met         Goals to be added or modified as necessary.     Plan   Certification Period/Plan of care expiration: 9/15/2022 to 11/11/22.     Outpatient Occupational Therapy 1 times weekly for 8 weeks to include the following interventions: Electrical Stimulation (NMES), Fluidotherapy, Manual Therapy, Moist Heat/ Ice, Neuromuscular Re-ed, Orthotic Management and Training, Paraffin, Patient Education, Self Care, Therapeutic Activities, and Therapeutic Exercise.     Updates/Grading for next session: Edema massage HEP, wrist extension EVARISTO Higuera LOTR

## 2022-11-01 ENCOUNTER — CLINICAL SUPPORT (OUTPATIENT)
Dept: REHABILITATION | Facility: HOSPITAL | Age: 72
End: 2022-11-01
Payer: MEDICARE

## 2022-11-01 DIAGNOSIS — Z78.9 IMPAIRED MOBILITY AND ADLS: ICD-10-CM

## 2022-11-01 DIAGNOSIS — Z74.09 IMPAIRED FUNCTIONAL MOBILITY, BALANCE, GAIT, AND ENDURANCE: ICD-10-CM

## 2022-11-01 DIAGNOSIS — I69.354 HEMIPARESIS AFFECTING LEFT SIDE AS LATE EFFECT OF CEREBROVASCULAR ACCIDENT (CVA): Primary | ICD-10-CM

## 2022-11-01 DIAGNOSIS — Z74.09 IMPAIRED MOBILITY AND ADLS: ICD-10-CM

## 2022-11-01 PROCEDURE — 97110 THERAPEUTIC EXERCISES: CPT | Mod: PN | Performed by: PHYSICAL THERAPIST

## 2022-11-01 PROCEDURE — 97530 THERAPEUTIC ACTIVITIES: CPT | Mod: PN

## 2022-11-01 PROCEDURE — 97112 NEUROMUSCULAR REEDUCATION: CPT | Mod: PN | Performed by: PHYSICAL THERAPIST

## 2022-11-01 PROCEDURE — 97110 THERAPEUTIC EXERCISES: CPT | Mod: PN

## 2022-11-01 PROCEDURE — 97032 APPL MODALITY 1+ESTIM EA 15: CPT | Mod: PN

## 2022-11-01 NOTE — PROGRESS NOTES
"  Occupational Therapy Treatment Note     Date: 11/1/2022  Name: Jae Millan  Clinic Number: [id number]    Therapy Diagnosis:   Encounter Diagnoses   Name Primary?    Hemiparesis affecting left side as late effect of cerebrovascular accident (CVA) Yes    Impaired mobility and ADLs      Physician: Maryjane Farias MD     Right Left   Involved Side   []     [x]   Dominant Side    [x]     []     Physician Orders: OT eval and treat  Medical Diagnosis: I63.329 (ICD-10-CM) - Cerebral infarction due to thrombosis of unspecified anterior cerebral artery  Evaluation Date: 9/15/2022  Plan of Care Expiration Period: 11/11/2022  Insurance Authorization period Expiration: 10/13/2022  Visit # / Visits Authorized: 5 / 11 - not including eval  FOTO: 9/15/2022     Time In: 0845  Time Out: 0930  Total Billable (one on one) Time: 45 minutes     Precautions: Standard    Subjective     Pt reports: "I massage and stretch my hand at night to relax these fingers."  He was not compliant with home exercise program given last session.   Response to previous treatment:no complaints  Functional change: none - eval only    Pain: 5/10  Location: left upper extremity     Objective      Jae received therapeutic exercises for 20 minutes including:  - UBE x 10 minutes in seated position to promote periscapular activation and strengthening, endurance, and range of motion to enhance functional activities and self-care skills with resistance of 2.5; 4 minutes forward and 4 minutes backward .   - Dowel exercises in supine position using small 5# dowel: 3x10 shoulder flexion, chest press, and scapular protraction for strengthening, stability, and active assisted range of motion of left upper extremity; reviewed and added to HEP    Jae participated in dynamic functional therapeutic activities to improve functional performance for 15 minutes, including:  - Passive range of motion of left upper extremity performed in supine position with hold at " pain-free end range in all planes  - General wrist stretches including 1. Scaphoid on radius 2. Increasing mobility of metacarpals 3. Carpal rolls 4. Increasing mobility towards radial deviation 5. Increasing mobility of wrist towards extension 6. Increasing mobility of wrist towards supination/pronation    Jae received the following supervised modalities after being cleared for contradictions for 10 minutes:   - NMES for wrist extensors: 30 Hz x 300; for 10 minutes at 10/10 cycle for left wrist and digit extension      Home Exercises and Education Provided      Education provided:   - HEP   - Passive range of motion for joint integrity  - Progress towards goals    Written Home Exercises Provided: yes.  Exercises were reviewed and Jae was able to demonstrate them prior to the end of the session.    Jae demonstrated good  understanding of the education provided.     See EMR under Patient Instructions for exercises provided 9/28/2022.    Assessment      Jae would continue to benefit from skilled OT. Left upper extremity secured to UBE with good response; able to tolerate added resistance of 2.5. Good participation in dowel exercises, with better participation in scapular protraction plane. Good tolerance to NMES to facilitate digit extension at 32-33. Will continue to follow up with resting hand splint and assess if resting hand splint helps edema. Overall, good session.    Jae is progressing well towards his goals and there are no updates to goals at this time. Pt prognosis is Good.     Pt will continue to benefit from skilled outpatient occupational therapy to address the deficits listed in the problem list on initial evaluation provide pt/family education and to maximize pt's level of independence in the home and community environment.     Pt's spiritual, cultural and educational needs considered and pt agreeable to plan of care and goals.    Anticipated barriers to occupational therapy: time since  onset (2018)    Goals:      Short Term Goals = Long Term Goals: to be met within 8 weeks Status    Pt will be independent with Home Exercise Program (HEP) to promote long term maintenance of progress made in therapy.   Progressing, not met     Pt will be able to move 3 blocks in Box and Blocks assessment in 2 min or better using L UE in order to demonstrate improved volitional grasp and release Progressing, not met      Pt will improve active range of motion by 15 degrees in all left shoulder planes for improved incorporation of left upper extremity in grooming tasks  Progressing, not met      Pt will improve L  strength by 5# in order to increase safety and participation with home-care tasks Progressing, not met      Pt will score 65% on the FOTO by discharge in order to demonstrate improved QOL and independence with meaningful activities.  Progressing, not met      Pt will improve left MMS by 1 grade in all planes to demonstrate increased strength of left upper extremity for improved safety in cooking tasks  Progressing, not met         Goals to be added or modified as necessary.     Plan   Certification Period/Plan of care expiration: 9/15/2022 to 11/11/22.     Outpatient Occupational Therapy 1 times weekly for 8 weeks to include the following interventions: Electrical Stimulation (NMES), Fluidotherapy, Manual Therapy, Moist Heat/ Ice, Neuromuscular Re-ed, Orthotic Management and Training, Paraffin, Patient Education, Self Care, Therapeutic Activities, and Therapeutic Exercise.     Updates/Grading for next session: Edema massage HEP, wrist extension EVARISTO Higuera LOTR

## 2022-11-01 NOTE — PROGRESS NOTES
See plan of care for physical therapy reassessment    Naya Obregon, PT, DPT,   Board-Certified Clinical Specialist in Neurologic Physical Therapy   Certified Brain Injury Specialist

## 2022-11-03 NOTE — PLAN OF CARE
See POC for physical therapy reassessment    Naya Obregon, PT, DPT,   Board-Certified Clinical Specialist in Neurologic Physical Therapy   Certified Brain Injury Specialist

## 2022-11-03 NOTE — PLAN OF CARE
Physical Therapy Daily Treatment Note     Name: Jae Millan  Clinic Number: [id number]    Therapy Diagnosis:   Encounter Diagnoses   Name Primary?    Cerebrovascular accident (CVA) due to thrombosis of anterior cerebral artery, unspecified blood vessel laterality      Impaired functional mobility, balance, gait, and endurance Yes    Physician: Maryjane Farias MD    Visit Date: 11/1/2022    Physician Orders: PT eval and Treat Stroke  Medical Diagnosis: I63.329 (ICD-10-CM) - Cerebrovascular accident (CVA) due to thrombosis of anterior cerebral artery, unspecified blood vessel laterality  Evaluation Date: 9/26/2022  Authorization Period Expiration: 11/25/2022  Plan of Care Certification Period: 12/16/2022  Visit #/Visits authorized: 4 / 25 (plus 1 eval)    PN due: 12/1/2022    Time In: 0938 (patient late)  Time Out: 1015  Total Billable Time:  37 minutes      Precautions: Standard    Subjective     Pt reports:no new reports  Patient was compliant with HEP  Response to previous treatment: No adverse reaction reported  Functional change: ongoing    Pain: NT  Location:   NT    Objective     See treatment     TREATMENT      Jae received therapeutic exercises to increase strength and endurance of the LE for 24 minutes, which included the following including:   Evaluation 11/1/22   Timed Up and Go 21.3s + 23.7s +22.6 sec=  22.5s w/o AD  < 20 sec safe for independent transfers,       < 30 sec assist required for transfers  Community dwelling older adults >13.5 sec   Chronic CVA >14 sec   Geriatric with h/o falls >15 sec   Frail elderly >32.6 sec    LE amputees >19 sec   PD >7.95- 11.5 sec   Hip OA >10 sec   Vestibular >11.1 sec    13.2s +11.1s + 10.5s= 11.6s   6 meter walk test NT NT   6 min walk test  ft      Evaluation 11/1/22   5 times sit-stand 36 seconds, LUE, increased WB RLE  >12 sec= fall risk for general elderly  >16 sec= fall risk for Parkinson's disease  >10 sec= balance/vestibular dysfunction (<60  y/o)  >14.2 sec= balance/vestibular dysfunction (>61 y/o)  >12 sec= fall risk for CVA 16 sec w/o UE   Rivas/ FGA/ Tinetti NT NT     Shuttle:   - bilateral squats 3 black (75#) 2 x 10  - left squats 1 black, 1 red (37.5#) 2 x 10       Jae performed neuromuscular re-education to improve balance and coordination for  13 minutes, which included the following:    // Bars:  NBOS, FOAM, head turns 2 x 30 sec, minimal-mod sway  NBOS, FOAM, EC 2 x 30 sec, min sway  Marching, FOAM 3x20 alt, no upper extremity support  Short hurdles, step to, no upper extremity- 3 laps      Home Exercises Provided and Patient Education Provided     Education provided:   - Continue with HEP  - reviewed results of assessments    Written Home Exercises Provided: Patient instructed to cont prior HEP.  Exercises were reviewed and Jae was able to demonstrate them prior to the end of the session.  Jae demonstrated good  understanding of the education provided.     See EMR under Patient Instructions for exercises provided 10/12/2022.    Assessment   Assessment period: 9/26/2022 to 11/1/2022   Jae tolerates physical therapy sessions well and demonstrates excellent progress towards goals set. Patient reports good compliance with home exercise program for lower extremity strengthening. Patient met 3/5 short term goals. Patient continues to demonstrate fatigue during physical therapy sessions especially when having to lift left lower extremity. Patient is demonstrating improved balance on compliant surfaces requiring less upper extremity support. Physical therapy will trial use of GaitBetter system to improve consistency with patient's foot clearance during gait. Patient can benefit from skilled physical therapy services to progress home exercise program as needed for improved mobility.         Jae Is progressing well towards his goals.   Pt prognosis is Good.     Pt will continue to benefit from skilled outpatient physical therapy to  address the deficits listed in the problem list box on initial evaluation, provide pt/family education and to maximize pt's level of independence in the home and community environment.     Pt's spiritual, cultural and educational needs considered and pt agreeable to plan of care and goals.     Anticipated barriers to physical therapy: transportation, length of time since onset of impairments    Goals:   Formal status as of 11/1/22  Short Term Goals: 6 weeks   Pt will report compliance with HEP for LE stretching and strengthening at least 3x/week to progress towards goals set. progressing  Pt will demonstrate improved TUG speed to <20 sec demonstrate improved safety with household mobility.  met 11/1/22  Pt will demonstrate improved ease with sit<>stand for daily mobility by performing 5 times sit<>stand test in <30 sec. Met 11/1/22  Assess 2 min walk test and set goals as needed. Met 11/1/22- 954 ft in 6 min  Assess stair negotiation and set goal as needed.  Ongoing     Long Term Goals: 12 weeks   Pt will demonstrate improved TUG speed to 14 sec demonstrate improved safety with decreased fall risk for household mobility.  met 11/1/22  Pt will demonstrate improved ease with sit<>stand for daily mobility by performing 5 times sit<>stand test in 15 sec. Improved- 16 s  Pt will report only minimal difficulty negotiating uneven terrain for improved safety with community mobility. Ongoing  New 11/1/22: patient will ambulate 1,117ft in 6 mins w/ SPC to demonstrate a mean detectable change in gait for improved mobility.     Plan   Continue with established POC: Outpatient Physical Therapy 1 times weekly (2 recommended by PT, but pt only agreeable to 1x) to include the following interventions: Neuromuscular Re-ed, Patient Education, Therapeutic Activities, and Therapeutic Exercise.     try GaitBetter Treadmill, continue with LLE strengthening. Perform hurdles at beginning of session      Naya Obregon, PT, DPT,    Board-Certified Clinical Specialist in Neurologic Physical Therapy   Certified Brain Injury Specialist    11/1/2022

## 2022-11-08 ENCOUNTER — CLINICAL SUPPORT (OUTPATIENT)
Dept: REHABILITATION | Facility: HOSPITAL | Age: 72
End: 2022-11-08
Payer: MEDICARE

## 2022-11-08 DIAGNOSIS — I69.354 HEMIPARESIS AFFECTING LEFT SIDE AS LATE EFFECT OF CEREBROVASCULAR ACCIDENT (CVA): Primary | ICD-10-CM

## 2022-11-08 DIAGNOSIS — Z74.09 IMPAIRED FUNCTIONAL MOBILITY, BALANCE, GAIT, AND ENDURANCE: Primary | ICD-10-CM

## 2022-11-08 DIAGNOSIS — Z78.9 IMPAIRED MOBILITY AND ADLS: ICD-10-CM

## 2022-11-08 DIAGNOSIS — Z74.09 IMPAIRED MOBILITY AND ADLS: ICD-10-CM

## 2022-11-08 PROCEDURE — 97112 NEUROMUSCULAR REEDUCATION: CPT | Mod: PN | Performed by: PHYSICAL THERAPIST

## 2022-11-08 PROCEDURE — 97110 THERAPEUTIC EXERCISES: CPT | Mod: PN | Performed by: PHYSICAL THERAPIST

## 2022-11-08 PROCEDURE — 97110 THERAPEUTIC EXERCISES: CPT | Mod: PN

## 2022-11-08 PROCEDURE — 97530 THERAPEUTIC ACTIVITIES: CPT | Mod: PN

## 2022-11-08 NOTE — PROGRESS NOTES
Physical Therapy Daily Treatment Note      Name: Jae Millan  [facility] Number: [id number]     Therapy Diagnosis:        Encounter Diagnoses   Name Primary?    Cerebrovascular accident (CVA) due to thrombosis of anterior cerebral artery, unspecified blood vessel laterality      Impaired functional mobility, balance, gait, and endurance Yes    Physician: Maryjane Farias MD     Visit Date: 11/8/2022      Physician Orders: PT eval and Treat Stroke  Medical Diagnosis: I63.329 (ICD-10-CM) - Cerebrovascular accident (CVA) due to thrombosis of anterior cerebral artery, unspecified blood vessel laterality  Evaluation Date: 9/26/2022  Authorization Period Expiration: 11/25/2022  Plan of Care Certification Period: 12/16/2022  Visit #/Visits authorized: 5 / 25 (plus 1 eval)     PN due: 12/1/2022     Time In: 0945  Time Out: 1038  Total Billable Time:  46 minutes  Total treatment time: 53 minutes     Precautions: Standard     Subjective      Pt reports:no new reports  Patient was compliant with HEP  Response to previous treatment: No adverse reaction reported  Functional change: ongoing     Pain: NT  Location:   NT     Objective      See treatment     TREATMENT      Jae received therapeutic exercises to increase strength and endurance of the LE for 38 minutes, which included the following including:     NuStepper, L3, 8 min, right UE/ bilateral lower extremity     Parallel bar:   - side stepping RTB- 3 laps  - hip extension RTB 3 x 10  - hip abduction RTB 3 x 10 (reported right sided back pain with performance)    Shuttle:   - bilateral squats 3 black (75#) 2 x 10  - left squats 1 black, 1 red (37.5#) 2 x 10      Gait Better Treadmill Training:     User name: [email or web address]  Description of Training Today: park- purple lap    YARDS: 112  SPEED: 0.9  TIME: 5 min    Other:   Decision Making (left/right): NT  Total Negotiation: 2/6   Upper Extremity Support: right upper extremity         Jae performed neuromuscular re-education  to improve balance and coordination for  15 minutes, which included the following:     // Bars:  NBOS, FOAM, horizontal head turns x 30 sec, minimal sway  NBOS, FOAM vertical head turns 2 x 30 sec, minimal- moderate sway  NBOS, FOAM, EC 2 x 30 sec, min sway  Marching, FOAM 60 steps alt, no upper extremity support  Short hurdles, step to, no upper extremity- 3 laps  Short hurdles, side ways, no upper extremity- 3 laps  Modified SLS, foam, right up tall cone 2 x 30 sec        Home Exercises Provided and Patient Education Provided      Education provided:   - Continue with HEP    Written Home Exercises Provided: Patient instructed to cont prior HEP.  Exercises were reviewed and Jae was able to demonstrate them prior to the end of the session.  Jae demonstrated good  understanding of the education provided.      See EMR under Patient Instructions for exercises provided 10/12/2022.     Assessment     Jae tolerated physical therapy session well. Patient trialed use of GaitBetter system to improve gait ability and foot clearance. Patient was able to clear 1 obstacle with each foot. Right upper extremity support was used. Patient participated in lower extremity strengthening with use of light resistance thera band. verbal cues were provided to decrease compensatory trunk lean with attempts at hip abduction. Patient reported some right sided back pain with attempt at left hip abduction.  Patient tolerated all other interventions well without adverse effects reported. Patient can benefit from continued skilled patient to improve mobility.        Jae Is progressing well towards his goals.   Pt prognosis is Good.      Pt will continue to benefit from skilled outpatient physical therapy to address the deficits listed in the problem list box on initial evaluation, provide pt/family education and to maximize pt's level of independence in the home and community environment.      Pt's spiritual, cultural and educational  needs considered and pt agreeable to plan of care and goals.     Anticipated barriers to physical therapy: transportation, length of time since onset of impairments     Goals:   Formal status as of 11/1/22  Short Term Goals: 6 weeks   Pt will report compliance with HEP for LE stretching and strengthening at least 3x/week to progress towards goals set. progressing  Pt will demonstrate improved TUG speed to <20 sec demonstrate improved safety with household mobility.  met 11/1/22  Pt will demonstrate improved ease with sit<>stand for daily mobility by performing 5 times sit<>stand test in <30 sec. Met 11/1/22  Assess 2 min walk test and set goals as needed. Met 11/1/22- 954 ft in 6 min  Assess stair negotiation and set goal as needed.  Ongoing     Long Term Goals: 12 weeks   Pt will demonstrate improved TUG speed to 14 sec demonstrate improved safety with decreased fall risk for household mobility.  met 11/1/22  Pt will demonstrate improved ease with sit<>stand for daily mobility by performing 5 times sit<>stand test in 15 sec. Improved- 16 s  Pt will report only minimal difficulty negotiating uneven terrain for improved safety with community mobility. Ongoing  New 11/1/22: patient will ambulate 1,117ft in 6 mins w/ SPC to demonstrate a mean detectable change in gait for improved mobility.      Plan   Continue with established POC: Outpatient Physical Therapy 1 times weekly (2 recommended by PT, but pt only agreeable to 1x) to include the following interventions: Neuromuscular Re-ed, Patient Education, Therapeutic Activities, and Therapeutic Exercise.      Perform hurdles at beginning of session. Increase resistance on shuttle for BLE        Naya Obregon, PT, DPT,   Board-Certified Clinical Specialist in Neurologic Physical Therapy   Certified Brain Injury Specialist    11/8/2022

## 2022-11-10 NOTE — PLAN OF CARE
"  Occupational Therapy Treatment Note and Updated Plan of Care     Date: 11/8/2022  Name: Jae Millan  [facility] Number: [id number]    Therapy Diagnosis:   Encounter Diagnoses   Name Primary?    Hemiparesis affecting left side as late effect of cerebrovascular accident (CVA) Yes    Impaired mobility and ADLs        Physician: Maryjane Farias MD     Right Left   Involved Side   []     [x]   Dominant Side    [x]     []     Physician Orders: OT eval and treat  Medical Diagnosis: I63.329 (ICD-10-CM) - Cerebral infarction due to thrombosis of unspecified anterior cerebral artery  Evaluation Date: 9/15/2022  Plan of Care Expiration Period: 11/11/2022  Insurance Authorization period Expiration: 10/13/2022  Visit # / Visits Authorized: 6 / 11 - not including eval  FOTO: 9/15/2022     Time In: 1146  Time Out: 1232  Total Billable (one on one) Time: 46 minutes     Precautions: Standard    Subjective     Pt reports: "I am so tired today."  He was not compliant with home exercise program given last session.   Response to previous treatment:no complaints  Functional change: none - eval only    Pain: 7/10  Location: left upper extremity and lower extremity     Objective      Note: 2020 values retrieved from previous therapy episode  Joint Evaluation  AROM  7/10/2020 PROM   7/10/2020 AROM  8/13/20 PROM  8/13/20 AROM  9/15/2022 PROM  9/15/2022 AROM  11/8/2022 PROM  11/8/2022     Left Left Left Left Left Left Left   Left    Shoulder flex 0-180 70 145 90 160 63 145 70 151   Shoulder Abd 0-180 55 115 72 140 71 149 98 134   Shoulder ER 0-90 To neautral 35 Neutral  45 24 35 19 36   Shoulder IR 0-90 60 WNL WFL WNL sidebody sidebody sidebody sidebody   Shoulder Extension 0-80 30 45 40 73 18 54 60 WFL   Elbow flex/ext 0-150 10/105 WFL 0/105 WNL 20 extension  93 flexion 60 flexion  WFL ext 60 flexion    WFL extension  WNL flexion  WNL extension    Wrist flex 0-80 0 70 ~5 55 0 54 5 65   Wrist ext 0-70 0 40 neutral 50 0 22 0 40 "   Supination 0-80 30 WNL 30 WNL ~30 ~55 33 69   Pronation 0-80 WNL (tone) WNL WNL WNL WNL WNL WNL WNL   UD ~15 WNL ~15 WNL 0 WNL 0 WNL   RD 0 WNL 0 WNL 0 15 0 WNL      Fist: increased edema and tone        Strength 7/10/2020 8/13/20 9/15/2022 11/8/2022   **within available ROM** Left  Left Left Left    Shoulder flex 3+/5 3+/5 3-/5 3-/5   Shoulder abd 3+/5 3+/5 3/5 3-/5   Shoulder ER 3/5 3//5 3-/5 3-/5   Shoulder IR 3+/5 3+/5 3-/5 3+/5   Shoulder Extension 4-/5 4-/5 3/5 3-/5   Elbow flex 4-/5 4-/5 3+/5 4-/5   Elbow ext 4/5 4/5 3/5 4-/5   Wrist flex 2-/5 2-/5 1/5 NT/5   Wrist ext 2-/5 2-/5 2-/5 NT/5   Supination 2-/5 2-/5 2-/5 NT/5   Pronation 3/5 3/5 2-/5 NT/5   UD 3-/5 3-/5 2-/5 NT/5   RD 1/5 1/5 1/5 NT/5         Fist: loose      Strength: (HERBERT Dynamometer in lbs.) Average 3 trials, Position II:       9/15/2022 9/15/2022 11/8/2022   Rung II Right Left Left    Trial 1 81.5# 5.2# 7.7#   Trial 2 77.8# 5.5# 10.3#   Trial 3 73.6# 6.1# 10.8#   Average 77.6# 5.6# 9.6#   [norms for men aged 65-69: R=89.7 +/-20.4; L=76.8 +/-20.3 (Rosalva et al, 1985)]            Jae received therapeutic exercises for 15 minutes including:  - UBE x 8 minutes in seated position to promote periscapular activation and strengthening, endurance, and range of motion to enhance functional activities and self-care skills with resistance of 2.5; 4 minutes forward and 4 minutes backward .   - Dowel exercises in supine position using small 5# dowel: 3x10 shoulder flexion, chest press, and scapular protraction for strengthening, stability, and active assisted range of motion of left upper extremity; reviewed and added to HEP    Jae participated in dynamic functional therapeutic activities to improve functional performance for 30 minutes, including:  - Passive range of motion of left upper extremity performed in supine position with hold at pain-free end range in all planes  - General wrist stretches including 1. Scaphoid on radius 2.  Increasing mobility of metacarpals 3. Carpal rolls 4. Increasing mobility towards radial deviation 5. Increasing mobility of wrist towards extension 6. Increasing mobility of wrist towards supination/pronation  - Reassessment of objective measures and FOTO; see above        Home Exercises and Education Provided      Education provided:   - HEP   - Passive range of motion for joint integrity  - Progress towards goals    Written Home Exercises Provided: yes.  Exercises were reviewed and Jae was able to demonstrate them prior to the end of the session.    Jae demonstrated good  understanding of the education provided.     See EMR under Patient Instructions for exercises provided 9/28/2022.    Assessment      Jae would continue to benefit from skilled OT. Left upper extremity secured to UBE with good response; able to tolerate added resistance of 2.5. Good participation in dowel exercises, with better participation in scapular protraction plane. Reassessment of objective measures demonstrates progress in areas of left  strength, range of motion, and manual muscle testing scores. Monty is progressing well to previous baseline established at time of discharge from therapy services in 2020. Overall, good session.    Jae is progressing well towards his goals and there are no updates to goals at this time. Pt prognosis is Good.     Pt will continue to benefit from skilled outpatient occupational therapy to address the deficits listed in the problem list on initial evaluation provide pt/family education and to maximize pt's level of independence in the home and community environment.     Pt's spiritual, cultural and educational needs considered and pt agreeable to plan of care and goals.    Anticipated barriers to occupational therapy: time since onset (2018)    Goals:      Short Term Goals = Long Term Goals: to be met within 8 weeks Status    Pt will be independent with Home Exercise Program (HEP) to  promote long term maintenance of progress made in therapy.   Met     Pt will be able to move 3 blocks in Box and Blocks assessment in 2 min or better using L UE in order to demonstrate improved volitional grasp and release Progressing, not met      Pt will improve active range of motion by 15 degrees in all left shoulder planes for improved incorporation of left upper extremity in grooming tasks  MET for shoulder abduction and shoulder extension; rest progressing   Pt will improve L  strength by 5# in order to increase safety and participation with home-care tasks Progressing, not met      Pt will score 65% on the FOTO by discharge in order to demonstrate improved QOL and independence with meaningful activities.  Progressing, not met      Pt will improve left MMS by 1 grade in all planes to demonstrate increased strength of left upper extremity for improved safety in cooking tasks  Progressing, not met         Goals to be added or modified as necessary.     Previous Short Term Goals Status:   0 STG met; 6 STG progressing  New Short Term Goals Status:   1.5 met; rest progressing  Long Term Goal Status:   continue per initial plan of care.  Reasons for Recertification of Therapy: Pt would continue to benefit from skilled occupational therapy services to maximize functional performance and participation with ADLs, IADLs, functional mobility, and all meaningful occupations.     Plan     Updated Certification Period: 11/8/2022 to 01/06/2022  Recommended Treatment Plan: 1 times per week for 8 weeks: Electrical Stimulation NMES, Manual Therapy, Moist Heat/ Ice, Neuromuscular Re-ed, Patient Education, Self Care, Therapeutic Activities, and Therapeutic Exercise  Other Recommendations: Patient would benefit from resting hand splint; pending orders from MD Kaity Cortez, IRLANDA LOERA  11/8/2022      I CERTIFY THE NEED FOR THESE SERVICES FURNISHED UNDER THIS PLAN OF TREATMENT AND WHILE UNDER MY CARE    Physician's  comments:        Physician's Signature: ___________________________________________________

## 2022-11-15 ENCOUNTER — CLINICAL SUPPORT (OUTPATIENT)
Dept: REHABILITATION | Facility: HOSPITAL | Age: 72
End: 2022-11-15
Payer: MEDICARE

## 2022-11-15 DIAGNOSIS — Z74.09 IMPAIRED FUNCTIONAL MOBILITY, BALANCE, GAIT, AND ENDURANCE: ICD-10-CM

## 2022-11-15 DIAGNOSIS — I69.354 HEMIPARESIS AFFECTING LEFT SIDE AS LATE EFFECT OF CEREBROVASCULAR ACCIDENT (CVA): Primary | ICD-10-CM

## 2022-11-15 DIAGNOSIS — Z78.9 IMPAIRED MOBILITY AND ADLS: ICD-10-CM

## 2022-11-15 DIAGNOSIS — Z74.09 IMPAIRED MOBILITY AND ADLS: ICD-10-CM

## 2022-11-15 PROCEDURE — 97110 THERAPEUTIC EXERCISES: CPT | Mod: PN | Performed by: PHYSICAL THERAPIST

## 2022-11-15 PROCEDURE — 97530 THERAPEUTIC ACTIVITIES: CPT | Mod: PN

## 2022-11-15 PROCEDURE — 97112 NEUROMUSCULAR REEDUCATION: CPT | Mod: PN | Performed by: PHYSICAL THERAPIST

## 2022-11-15 PROCEDURE — 97110 THERAPEUTIC EXERCISES: CPT | Mod: PN

## 2022-11-15 NOTE — PROGRESS NOTES
"  Occupational Therapy Treatment Note     Date: 11/15/2022  Name: Jae Millan  Clinic Number: [id number]    Therapy Diagnosis:  Encounter Diagnoses   Name Primary?    Hemiparesis affecting left side as late effect of cerebrovascular accident (CVA) Yes    Impaired mobility and ADLs        Physician: Maryjane Farias MD     Right Left   Involved Side   []     [x]   Dominant Side    [x]     []     Physician Orders: OT eval and treat  Medical Diagnosis: I63.329 (ICD-10-CM) - Cerebral infarction due to thrombosis of unspecified anterior cerebral artery  Evaluation Date: 9/15/2022  Plan of Care Expiration Period: 1/6/2023  Insurance Authorization period Expiration: 10/13/2022  Visit # / Visits Authorized: 6 / 11 - not including eval  FOTO: 9/15/2022     Time In: 0853  Time Out: 0931  Total Billable (one on one) Time: 38 minutes     Precautions: Standard    Subjective     Pt reports: "I went on a trip this weekend with my [employer]"  He was not compliant with home exercise program given last session.   Response to previous treatment:no complaints  Functional change: none - eval only    Pain: 7/10  Location: left upper extremity     Objective      Jae received therapeutic exercises for 10 minutes including:  - UBE x 8 minutes in seated position to promote periscapular activation and strengthening, endurance, and range of motion to enhance functional activities and self-care skills with resistance of 2.5; 4 minutes forward and 4 minutes backward .   - Dowel exercises in supine position using small 5# dowel: 2x10 shoulder flexion, chest press, and scapular protraction for strengthening, stability, and active assisted range of motion of left upper extremity; reviewed and added to HEP    Jae participated in dynamic functional therapeutic activities to improve functional performance for 28 minutes, including:  - Passive range of motion of left upper extremity performed in supine position with hold at pain-free end range " in all planes  - General wrist stretches including 1. Scaphoid on radius 2. Increasing mobility of metacarpals 3. Carpal rolls 4. Increasing mobility towards radial deviation 5. Increasing mobility of wrist towards extension 6. Increasing mobility of wrist towards supination/pronation  - Weightbearing with left upper extremity placed on mat in standing position for proprioceptive input, joint stability, and strengthening: while reaching for cones with right upper extremity in all planes    Home Exercises and Education Provided      Education provided:   - HEP   - Passive range of motion for joint integrity  - Progress towards goals    Written Home Exercises Provided: yes.  Exercises were reviewed and Jae was able to demonstrate them prior to the end of the session.    Jae demonstrated good  understanding of the education provided.     See EMR under Patient Instructions for exercises provided 9/28/2022.    Assessment      Jae would continue to benefit from skilled OT. Left upper extremity secured to UBE with good response; able to tolerate added resistance of 2.5. Good participation in dowel exercises, with excellent participation in scapular protraction plane. Required yoga block to place hand for weight bearing as patient reported discomfort with digit extension. Overall, good session.    Jae is progressing well towards his goals and there are no updates to goals at this time. Pt prognosis is Good.     Pt will continue to benefit from skilled outpatient occupational therapy to address the deficits listed in the problem list on initial evaluation provide pt/family education and to maximize pt's level of independence in the home and community environment.     Pt's spiritual, cultural and educational needs considered and pt agreeable to plan of care and goals.    Anticipated barriers to occupational therapy: time since onset (2018)    Goals:      Short Term Goals = Long Term Goals: to be met within 8 weeks  Status    Pt will be independent with Home Exercise Program (HEP) to promote long term maintenance of progress made in therapy.   Met      Pt will be able to move 3 blocks in Box and Blocks assessment in 2 min or better using L UE in order to demonstrate improved volitional grasp and release Progressing, not met      Pt will improve active range of motion by 15 degrees in all left shoulder planes for improved incorporation of left upper extremity in grooming tasks  MET for shoulder abduction and shoulder extension; rest progressing   Pt will improve L  strength by 5# in order to increase safety and participation with home-care tasks Progressing, not met      Pt will score 65% on the FOTO by discharge in order to demonstrate improved QOL and independence with meaningful activities.  Progressing, not met      Pt will improve left MMS by 1 grade in all planes to demonstrate increased strength of left upper extremity for improved safety in cooking tasks  Progressing, not met         Goals to be added or modified as necessary.    Plan   Certification Period/Plan of care expiration: 9/15/2022 to 1/6/22.     Outpatient Occupational Therapy 1 times weekly for 8 weeks to include the following interventions: Electrical Stimulation (NMES), Fluidotherapy, Manual Therapy, Moist Heat/ Ice, Neuromuscular Re-ed, Orthotic Management and Training, Paraffin, Patient Education, Self Care, Therapeutic Activities, and Therapeutic Exercise.     Updates/Grading for next session: Edema massage HEP, wrist extension estim      EVARISTO Beal LOTR

## 2022-11-15 NOTE — PROGRESS NOTES
"Physical Therapy Daily Treatment Note      Name: Jae Millan  [facility] Number: [id number]     Therapy Diagnosis:        Encounter Diagnoses   Name Primary?    Cerebrovascular accident (CVA) due to thrombosis of anterior cerebral artery, unspecified blood vessel laterality      Impaired functional mobility, balance, gait, and endurance Yes    Physician: Maryjane Farias MD     Visit Date: 11/15/2022      Physician Orders: PT eval and Treat Stroke  Medical Diagnosis: I63.329 (ICD-10-CM) - Cerebrovascular accident (CVA) due to thrombosis of anterior cerebral artery, unspecified blood vessel laterality  Evaluation Date: 9/26/2022  Authorization Period Expiration: 11/25/2022  Plan of Care Certification Period: 12/16/2022  Visit #/Visits authorized: 6/25    PN due: 12/1/2022     Time In: 0932  Time Out: 1013  Total Billable Time: 40 minutes     Precautions: Standard     Subjective      Pt reports:no new reports  Patient was compliant with HEP  Response to previous treatment: No adverse reaction reported  Functional change: ongoing     Pain: NT  Location:   NT     Objective      See treatment     TREATMENT      Jae received therapeutic exercises to increase strength and endurance of the LE for 17 minutes, which included the following including:    Parallel bar:   - step ups, 6" 2 x 10, no UE  - side stepping RTB- 3 laps     Gait Better Treadmill Training:     User name: [email or web address]  Description of Training Today: park- purple [place] lap    YARDS: 207  SPEED: 0.9  TIME: 8 min    Other:   Decision Making (left/right): not tested  Puddles: left=4/4, right= 3/3  Hurdles: left= 1/1, right= 1/1  Total Negotiation: 100%  Upper Extremity Support: right upper extremity         Jae performed neuromuscular re-education to improve balance and coordination for  23 minutes, which included the following:     // Bars:  NBOS, FOAM, horizontal head turns x 30 sec, minimal sway  NBOS, FOAM vertical head turns 2 x 30 sec, minimal- " "moderate sway  NBOS, FOAM, EC 2 x 30 sec, min sway  Short hurdles, step to, no upper extremity- 3 laps  Short hurdles, side ways, no upper extremity- 3 laps  Modified SLS, foam, right up tall cone 2 x 30 sec  Alternating foot taps on 6" cones, foam, intermittent upper extremity, CGA, 20x        Home Exercises Provided and Patient Education Provided      Education provided:   - Continue with HEP    Written Home Exercises Provided: Patient instructed to cont prior HEP.  Exercises were reviewed and Jae was able to demonstrate them prior to the end of the session.  Jae demonstrated good  understanding of the education provided.      See EMR under Patient Instructions for exercises provided 10/12/2022.     Assessment     Jae tolerated physical therapy session well. He tolerated increased duration on treadmill and demonstrated improvement in obstacle negotiation with Gait Better system. Improved consistency with foot clearance noted with minda negotiation over land. Single limb stability was challenged with foot taps and modified single leg stance on foam.  Patient can benefit from continued skilled patient to improve mobility.        Jae Is progressing well towards his goals.   Pt prognosis is Good.      Pt will continue to benefit from skilled outpatient physical therapy to address the deficits listed in the problem list box on initial evaluation, provide pt/family education and to maximize pt's level of independence in the home and community environment.      Pt's spiritual, cultural and educational needs considered and pt agreeable to plan of care and goals.     Anticipated barriers to physical therapy: transportation, length of time since onset of impairments     Goals:   Formal status as of 11/1/22  Short Term Goals: 6 weeks   Pt will report compliance with HEP for LE stretching and strengthening at least 3x/week to progress towards goals set. progressing  Pt will demonstrate improved TUG speed to <20 " sec demonstrate improved safety with household mobility.  met 11/1/22  Pt will demonstrate improved ease with sit<>stand for daily mobility by performing 5 times sit<>stand test in <30 sec. Met 11/1/22  Assess 2 min walk test and set goals as needed. Met 11/1/22- 954 ft in 6 min  Assess stair negotiation and set goal as needed.  Ongoing     Long Term Goals: 12 weeks   Pt will demonstrate improved TUG speed to 14 sec demonstrate improved safety with decreased fall risk for household mobility.  met 11/1/22  Pt will demonstrate improved ease with sit<>stand for daily mobility by performing 5 times sit<>stand test in 15 sec. Improved- 16 s  Pt will report only minimal difficulty negotiating uneven terrain for improved safety with community mobility. Ongoing  New 11/1/22: patient will ambulate 1,117ft in 6 mins w/ SPC to demonstrate a mean detectable change in gait for improved mobility.      Plan   Continue with established POC: Outpatient Physical Therapy 1 times weekly to include the following interventions: Neuromuscular Re-ed, Patient Education, Therapeutic Activities, and Therapeutic Exercise.      Increase treadmill to 10 mins, Increase resistance on shuttle for ROBERT Obregon, PT, DPT,   Board-Certified Clinical Specialist in Neurologic Physical Therapy   Certified Brain Injury Specialist    11/15/2022

## 2022-11-22 ENCOUNTER — CLINICAL SUPPORT (OUTPATIENT)
Dept: REHABILITATION | Facility: HOSPITAL | Age: 72
End: 2022-11-22
Payer: MEDICARE

## 2022-11-22 DIAGNOSIS — Z78.9 IMPAIRED MOBILITY AND ADLS: ICD-10-CM

## 2022-11-22 DIAGNOSIS — I69.354 HEMIPARESIS AFFECTING LEFT SIDE AS LATE EFFECT OF CEREBROVASCULAR ACCIDENT (CVA): Primary | ICD-10-CM

## 2022-11-22 DIAGNOSIS — Z74.09 IMPAIRED MOBILITY AND ADLS: ICD-10-CM

## 2022-11-22 DIAGNOSIS — I63.329 CEREBROVASCULAR ACCIDENT (CVA) DUE TO THROMBOSIS OF ANTERIOR CEREBRAL ARTERY, UNSPECIFIED BLOOD VESSEL LATERALITY: ICD-10-CM

## 2022-11-22 DIAGNOSIS — Z74.09 IMPAIRED FUNCTIONAL MOBILITY, BALANCE, GAIT, AND ENDURANCE: Primary | ICD-10-CM

## 2022-11-22 PROCEDURE — 97530 THERAPEUTIC ACTIVITIES: CPT | Mod: PN

## 2022-11-22 PROCEDURE — 97110 THERAPEUTIC EXERCISES: CPT | Mod: PN

## 2022-11-22 PROCEDURE — 97110 THERAPEUTIC EXERCISES: CPT | Mod: PN | Performed by: PHYSICAL THERAPIST

## 2022-11-22 PROCEDURE — 97032 APPL MODALITY 1+ESTIM EA 15: CPT | Mod: PN

## 2022-11-22 NOTE — PROGRESS NOTES
"  Occupational Therapy Treatment Note     Date: 11/22/2022  Name: Jae Millan  Clinic Number: [id number]    Therapy Diagnosis:  Encounter Diagnoses   Name Primary?    Hemiparesis affecting left side as late effect of cerebrovascular accident (CVA) Yes    Impaired mobility and ADLs      Physician: Maryjane Farias MD     Right Left   Involved Side   []     [x]   Dominant Side    [x]     []     Physician Orders: OT eval and treat  Medical Diagnosis: I63.329 (ICD-10-CM) - Cerebral infarction due to thrombosis of unspecified anterior cerebral artery  Evaluation Date: 9/15/2022  Plan of Care Expiration Period: 1/6/2023  Insurance Authorization period Expiration: 10/13/2022  Visit # / Visits Authorized: 7 / 11 - not including eval  FOTO: 9/15/2022     Time In: 0845  Time Out: 0928  Total Billable (one on one) Time: 43 minutes     Precautions: Standard    Subjective     Pt reports: "Hand feels numb"  He was not compliant with home exercise program given last session.   Response to previous treatment:no complaints  Functional change: none - eval only    Pain: "28"/10  Location: left upper extremity     Objective      Jae received therapeutic exercises for 15 minutes including:  - UBE x 8 minutes in seated position to promote periscapular activation and strengthening, endurance, and range of motion to enhance functional activities and self-care skills with resistance of 2.5; 4 minutes forward and 4 minutes backward .   - Dowel exercises in supine position using small 5# dowel: 2x10 shoulder flexion, chest press, and scapular protraction for strengthening, stability, and active assisted range of motion of left upper extremity    Jae participated in dynamic functional therapeutic activities to improve functional performance for 18 minutes, including:  - Passive range of motion of left upper extremity performed in supine position with hold at pain-free end range in all planes  - General wrist stretches including 1. " Scaphoid on radius 2. Increasing mobility of metacarpals 3. Carpal rolls 4. Increasing mobility towards radial deviation 5. Increasing mobility of wrist towards extension 6. Increasing mobility of wrist towards supination/pronation  - Weightbearing with left upper extremity placed on mat in standing position for proprioceptive input, joint stability, and strengthening: while reaching for cones with right upper extremity in D1/D2 PNF flexion/extension planes    Jae received the following supervised modalities after being cleared for contradictions for 10 minutes:   -NMES to wrist/digit extensors: x8 minutes with active range of motion during on cycle; requiring extra time for set-up and preparation    Home Exercises and Education Provided      Education provided:   - HEP   - Passive range of motion for joint integrity  - Progress towards goals    Written Home Exercises Provided: yes.  Exercises were reviewed and Jae was able to demonstrate them prior to the end of the session.    Jae demonstrated good  understanding of the education provided.     See EMR under Patient Instructions for exercises provided 9/28/2022.    Assessment      Jae would continue to benefit from skilled OT. Left upper extremity secured to UBE with good response; able to tolerate added resistance of 2.5. Good  noted with dowel exercises. Good participation noted with weight bearing in standing position while reaching for cones in D1/D2 flexion and extension planes. Good response to e-stim, with limitations in digit 2 extension secondary to edema. Overall, good session.    Jae is progressing well towards his goals and there are no updates to goals at this time. Pt prognosis is Good.     Pt will continue to benefit from skilled outpatient occupational therapy to address the deficits listed in the problem list on initial evaluation provide pt/family education and to maximize pt's level of independence in the home and community  environment.     Pt's spiritual, cultural and educational needs considered and pt agreeable to plan of care and goals.    Anticipated barriers to occupational therapy: time since onset (2018)    Goals:      Short Term Goals = Long Term Goals: to be met within 8 weeks Status    Pt will be independent with Home Exercise Program (HEP) to promote long term maintenance of progress made in therapy.   Met      Pt will be able to move 3 blocks in Box and Blocks assessment in 2 min or better using L UE in order to demonstrate improved volitional grasp and release Progressing, not met      Pt will improve active range of motion by 15 degrees in all left shoulder planes for improved incorporation of left upper extremity in grooming tasks  MET for shoulder abduction and shoulder extension; rest progressing   Pt will improve L  strength by 5# in order to increase safety and participation with home-care tasks Progressing, not met      Pt will score 65% on the FOTO by discharge in order to demonstrate improved QOL and independence with meaningful activities.  Progressing, not met      Pt will improve left MMS by 1 grade in all planes to demonstrate increased strength of left upper extremity for improved safety in cooking tasks  Progressing, not met         Goals to be added or modified as necessary.    Plan   Certification Period/Plan of care expiration: 9/15/2022 to 1/6/22.     Outpatient Occupational Therapy 1 times weekly for 8 weeks to include the following interventions: Electrical Stimulation (NMES), Fluidotherapy, Manual Therapy, Moist Heat/ Ice, Neuromuscular Re-ed, Orthotic Management and Training, Paraffin, Patient Education, Self Care, Therapeutic Activities, and Therapeutic Exercise.     Updates/Grading for next session: Edema massage HEP, wrist extension EVARISTO Higuera LOTR

## 2022-11-22 NOTE — PROGRESS NOTES
"Physical Therapy Daily Treatment Note      Name: Jae Millan  Clinic Number: [id number]     Therapy Diagnosis:        Encounter Diagnoses   Name Primary?    Cerebrovascular accident (CVA) due to thrombosis of anterior cerebral artery, unspecified blood vessel laterality      Impaired functional mobility, balance, gait, and endurance Yes    Physician: Maryjane Farias MD     Visit Date: 11/22/2022      Physician Orders: PT eval and Treat Stroke  Medical Diagnosis: I63.329 (ICD-10-CM) - Cerebrovascular accident (CVA) due to thrombosis of anterior cerebral artery, unspecified blood vessel laterality  Evaluation Date: 9/26/2022  Authorization Period Expiration: 11/25/2022  Plan of Care Certification Period: 12/16/2022  Visit #/Visits authorized: 7/25    PN due: 12/1/2022     Time In: 0935   Time Out: 1015  Total Billable Time: 20 minutes  Total treatment time: 40 minutes     Precautions: Standard     Subjective      Pt reports:no new reports. I've been walking a lot  Patient was compliant with HEP  Response to previous treatment: No adverse reaction reported  Functional change: ongoing     Pain: NT  Location:   NT     Objective      See treatment     TREATMENT      Jae received therapeutic exercises to increase strength and endurance of the LE for 20 minutes, which included the following including:    Parallel bar:   - step ups, 6" 2 x 10, no UE  - side stepping RTB- 3 laps     Gait Better Treadmill Training:     User name: [email or web address]  Description of Training Today: park- purple [place] lap    YARDS: 239  SPEED: 0.9  TIME: 10 min    Other:   Decision Making (left/right): not tested  Puddles: left=1/3, right= 0/0  Hurdles: left= 3/7, right= 2/2  Total Negotiation: 50%  Upper Extremity Support: right upper extremity         Jae performed neuromuscular re-education to improve balance and coordination for  20 minutes, which included the following:     // Bars:  NBOS, FOAM, horizontal head turns x 30 sec, minimal " "sway  NBOS, FOAM vertical head turns 2 x 30 sec, minimal- moderate sway  NBOS, FOAM, EC 2 x 30 sec, min sway  Short hurdles, step to, no upper extremity- 3 laps  Short hurdles, side ways, no upper extremity- 3 laps  Modified SLS, foam, right up tall cone 2 x 30 sec  Alternating foot taps on 6" cones, foam, intermittent upper extremity, close S, 40x         Home Exercises Provided and Patient Education Provided      Education provided:   - Continue with HEP    Written Home Exercises Provided: Patient instructed to cont prior HEP.  Exercises were reviewed and Jae was able to demonstrate them prior to the end of the session.  Jae demonstrated good  understanding of the education provided.      See EMR under Patient Instructions for exercises provided 10/12/2022.     Assessment     Jae tolerated physical therapy session well. Patient tolerated increased duration on treadmill. He demonstrated increased difficulty with obstacle negotiation with Gait Better System, but this may be due to patient performing other exercises prior to treadmill use. Patient continues to demonstrate instability in single leg stance and decreased foot clearance (left>right).  Patient can benefit from continued skilled patient to improve mobility.        Jae Is progressing well towards his goals.   Pt prognosis is Good.      Pt will continue to benefit from skilled outpatient physical therapy to address the deficits listed in the problem list box on initial evaluation, provide pt/family education and to maximize pt's level of independence in the home and community environment.      Pt's spiritual, cultural and educational needs considered and pt agreeable to plan of care and goals.     Anticipated barriers to physical therapy: transportation, length of time since onset of impairments     Goals:   Formal status as of 11/1/22  Short Term Goals: 6 weeks   Pt will report compliance with HEP for LE stretching and strengthening at least " 3x/week to progress towards goals set. progressing  Pt will demonstrate improved TUG speed to <20 sec demonstrate improved safety with household mobility.  met 11/1/22  Pt will demonstrate improved ease with sit<>stand for daily mobility by performing 5 times sit<>stand test in <30 sec. Met 11/1/22  Assess 2 min walk test and set goals as needed. Met 11/1/22- 954 ft in 6 min  Assess stair negotiation and set goal as needed.  Ongoing     Long Term Goals: 12 weeks   Pt will demonstrate improved TUG speed to 14 sec demonstrate improved safety with decreased fall risk for household mobility.  met 11/1/22  Pt will demonstrate improved ease with sit<>stand for daily mobility by performing 5 times sit<>stand test in 15 sec. Improved- 16 s  Pt will report only minimal difficulty negotiating uneven terrain for improved safety with community mobility. Ongoing  New 11/1/22: patient will ambulate 1,117ft in 6 mins w/ SPC to demonstrate a mean detectable change in gait for improved mobility.      Plan   Continue with established POC: Outpatient Physical Therapy 1 times weekly to include the following interventions: Neuromuscular Re-ed, Patient Education, Therapeutic Activities, and Therapeutic Exercise.      Increase resistance on shuttle for BLE        Naya Obregon, PT, DPT,   Board-Certified Clinical Specialist in Neurologic Physical Therapy   Certified Brain Injury Specialist    11/22/2022

## 2022-11-29 ENCOUNTER — CLINICAL SUPPORT (OUTPATIENT)
Dept: REHABILITATION | Facility: HOSPITAL | Age: 72
End: 2022-11-29
Payer: MEDICARE

## 2022-11-29 DIAGNOSIS — Z74.09 IMPAIRED MOBILITY AND ADLS: ICD-10-CM

## 2022-11-29 DIAGNOSIS — I69.354 HEMIPARESIS AFFECTING LEFT SIDE AS LATE EFFECT OF CEREBROVASCULAR ACCIDENT (CVA): Primary | ICD-10-CM

## 2022-11-29 DIAGNOSIS — Z74.09 IMPAIRED FUNCTIONAL MOBILITY, BALANCE, GAIT, AND ENDURANCE: Primary | ICD-10-CM

## 2022-11-29 DIAGNOSIS — Z78.9 IMPAIRED MOBILITY AND ADLS: ICD-10-CM

## 2022-11-29 PROCEDURE — 97112 NEUROMUSCULAR REEDUCATION: CPT | Mod: PN

## 2022-11-29 PROCEDURE — 97110 THERAPEUTIC EXERCISES: CPT | Mod: PN

## 2022-11-29 PROCEDURE — 97530 THERAPEUTIC ACTIVITIES: CPT | Mod: PN

## 2022-11-29 NOTE — PROGRESS NOTES
"  Occupational Therapy Treatment Note     Date: 11/29/2022  Name: Jae Millan  Clinic Number: [id number]    Therapy Diagnosis:  Encounter Diagnoses   Name Primary?    Hemiparesis affecting left side as late effect of cerebrovascular accident (CVA) Yes    Impaired mobility and ADLs      Physician: Maryjane Farias MD     Right Left   Involved Side   []     [x]   Dominant Side    [x]     []     Physician Orders: OT eval and treat  Medical Diagnosis: I63.329 (ICD-10-CM) - Cerebral infarction due to thrombosis of unspecified anterior cerebral artery  Evaluation Date: 9/15/2022  Plan of Care Expiration Period: 1/6/2023  Insurance Authorization period Expiration: 10/13/2022  Visit # / Visits Authorized: 8 / 11 - not including eval  FOTO: 9/15/2022     Time In: 1100  Time Out: 1140  Total Billable (one on one) Time: 4 minutes     Precautions: Standard    Subjective     Pt reports: "My birthday is coming up"  He was not compliant with home exercise program given last session.   Response to previous treatment:no complaints  Functional change: none - eval only    Pain: "7.97"/10  Location: left upper extremity     Objective      Jae received therapeutic exercises for 15 minutes including:  - UBE x 8 minutes in seated position to promote periscapular activation and strengthening, endurance, and range of motion to enhance functional activities and self-care skills with resistance of 2.5; 4 minutes forward and 4 minutes backward .   - Dowel exercises in supine position using small 5# dowel: 3x10 shoulder flexion, chest press, and scapular protraction for strengthening, stability, and active assisted range of motion of left upper extremity    Jae participated in dynamic functional therapeutic activities to improve functional performance for 25 minutes, including:  - Passive range of motion of left upper extremity performed in supine position with hold at pain-free end range in all planes  - General wrist stretches " including 1. Scaphoid on radius 2. Increasing mobility of metacarpals 3. Carpal rolls 4. Increasing mobility towards radial deviation 5. Increasing mobility of wrist towards extension 6. Increasing mobility of wrist towards supination/pronation  - Weightbearing with left upper extremity placed on mat in standing position for proprioceptive input, joint stability, and strengthening: while reaching for cones with right upper extremity in D1/D2 PNF flexion/extension planes    Home Exercises and Education Provided      Education provided:   - HEP   - Passive range of motion for joint integrity  - Progress towards goals    Written Home Exercises Provided: yes.  Exercises were reviewed and Jae was able to demonstrate them prior to the end of the session.    Jae demonstrated good  understanding of the education provided.     See EMR under Patient Instructions for exercises provided 9/28/2022.    Assessment      Jae would continue to benefit from skilled OT. Left upper extremity secured to UBE with good response; able to tolerate added resistance of 2.5. Good  noted with dowel exercises. Good participation noted with weight bearing in standing position while reaching for cones in D1/D2 flexion and extension planes with hand positioned on yoga block for comfort. No loss of balance noted with standing weight bearing. Overall, good session.    Jae is progressing well towards his goals and there are no updates to goals at this time. Pt prognosis is Good.     Pt will continue to benefit from skilled outpatient occupational therapy to address the deficits listed in the problem list on initial evaluation provide pt/family education and to maximize pt's level of independence in the home and community environment.     Pt's spiritual, cultural and educational needs considered and pt agreeable to plan of care and goals.    Anticipated barriers to occupational therapy: time since onset (2018)    Goals:      Short Term  Goals = Long Term Goals: to be met within 8 weeks Status    Pt will be independent with Home Exercise Program (HEP) to promote long term maintenance of progress made in therapy.   Met      Pt will be able to move 3 blocks in Box and Blocks assessment in 2 min or better using L UE in order to demonstrate improved volitional grasp and release Progressing, not met      Pt will improve active range of motion by 15 degrees in all left shoulder planes for improved incorporation of left upper extremity in grooming tasks  MET for shoulder abduction and shoulder extension; rest progressing   Pt will improve L  strength by 5# in order to increase safety and participation with home-care tasks Progressing, not met      Pt will score 65% on the FOTO by discharge in order to demonstrate improved QOL and independence with meaningful activities.  Progressing, not met      Pt will improve left MMS by 1 grade in all planes to demonstrate increased strength of left upper extremity for improved safety in cooking tasks  Progressing, not met         Goals to be added or modified as necessary.    Plan   Certification Period/Plan of care expiration: 9/15/2022 to 1/6/22.     Outpatient Occupational Therapy 1 times weekly for 8 weeks to include the following interventions: Electrical Stimulation (NMES), Fluidotherapy, Manual Therapy, Moist Heat/ Ice, Neuromuscular Re-ed, Orthotic Management and Training, Paraffin, Patient Education, Self Care, Therapeutic Activities, and Therapeutic Exercise.     Updates/Grading for next session: Edema massage HEP, wrist extension EVARISTO Higuera LOTR

## 2022-11-29 NOTE — PROGRESS NOTES
"Physical Therapy Daily Treatment Note      Name: Jae Millan  Clinic Number: [id number]     Therapy Diagnosis:        Encounter Diagnoses   Name Primary?    Cerebrovascular accident (CVA) due to thrombosis of anterior cerebral artery, unspecified blood vessel laterality      Impaired functional mobility, balance, gait, and endurance Yes    Physician: Maryjane Farias MD     Visit Date: 11/29/2022      Physician Orders: PT eval and Treat Stroke  Medical Diagnosis: I63.329 (ICD-10-CM) - Cerebrovascular accident (CVA) due to thrombosis of anterior cerebral artery, unspecified blood vessel laterality  Evaluation Date: 9/26/2022  Authorization Period Expiration: 11/25/2022  Plan of Care Certification Period: 12/16/2022  Visit #/Visits authorized: 8/25    PN due: 12/1/2022     Time In: 1147  Time Out: 1230  Total Billable Time:  43 minutes       Precautions: Standard     Subjective      Pt reports:no new reports. He was able to continue walking outside  Patient was compliant with HEP  Response to previous treatment: No adverse reaction reported  Functional change: ongoing     Pain: NT  Location:   NT     Objective      See treatment     TREATMENT      Jae received therapeutic exercises to increase strength and endurance of the LE for 33 minutes, which included the following including:    Nustep, level 3, 8 minutes    Parallel bar:   - step ups, leading with left lower extremity, 6" 2 x 10, no upper extremity  -left Side step ups, leading with left lower extremity, 6" 2 x 10, no UE  -Matrix, leg press 55#, 3x10   -verbal cues to avoid locking knee  -Matrix, leg extension, 50#, 3x10         Jae performed neuromuscular re-education to improve balance and coordination for  10 minutes, which included the following:     // Bars:    Medium hurdles, step to, no upper extremity- 6 laps  Medium  hurdles, side ways, no upper extremity- 6 laps      Exercises below in italics not preformed:     NBOS, FOAM, horizontal head " "turns x 30 sec, minimal sway  NBOS, FOAM vertical head turns 2 x 30 sec, minimal- moderate sway  NBOS, FOAM, EC 2 x 30 sec, min sway  Modified SLS, foam, right up tall cone 2 x 30 sec  Alternating foot taps on 6" cones, foam, intermittent upper extremity,   - side stepping RTB- 3 laps    Gait Better Treadmill Training:     User name: [email or web address]  Description of Training Today: park- purple triangle lap    YARDS: 239  SPEED: 0.9  TIME: 10 min    Other:   Decision Making (left/right): not tested  Puddles: left=1/3, right= 0/0  Hurdles: left= 3/7, right= 2/2  Total Negotiation: 50%  Upper Extremity Support: right upper extremity          Home Exercises Provided and Patient Education Provided      Education provided:   - Continue with HEP    Written Home Exercises Provided: Patient instructed to cont prior HEP.  Exercises were reviewed and Jae was able to demonstrate them prior to the end of the session.  Jae demonstrated good  understanding of the education provided.      See EMR under Patient Instructions for exercises provided 10/12/2022.     Assessment     Jae tolerated physical therapy session well. Patient continues to demonstrate instability in single leg stance (left>right), noting significant difficulty with foot clearance over medium hurdles. Patient was able to benefit from step ups forward/lateral with left lower extremity and leg press to encourage lower extremity strengthening. Patient displayed increased fatigue with lower extremity strengthening, requiring 30 second rest breaks between sets. Patient will continue to benefit from skilled physical therapy to improve functional mobility, balance, and left lower extremity strength.        Jae Is progressing well towards his goals.   Pt prognosis is Good.      Pt will continue to benefit from skilled outpatient physical therapy to address the deficits listed in the problem list box on initial evaluation, provide pt/family education and to maximize pt's " level of independence in the home and community environment.      Pt's spiritual, cultural and educational needs considered and pt agreeable to plan of care and goals.     Anticipated barriers to physical therapy: transportation, length of time since onset of impairments     Goals:   Formal status as of 11/1/22  Short Term Goals: 6 weeks   Pt will report compliance with HEP for LE stretching and strengthening at least 3x/week to progress towards goals set. progressing  Pt will demonstrate improved TUG speed to <20 sec demonstrate improved safety with household mobility.  met 11/1/22  Pt will demonstrate improved ease with sit<>stand for daily mobility by performing 5 times sit<>stand test in <30 sec. Met 11/1/22  Assess 2 min walk test and set goals as needed. Met 11/1/22- 954 ft in 6 min  Assess stair negotiation and set goal as needed.  Ongoing     Long Term Goals: 12 weeks   Pt will demonstrate improved TUG speed to 14 sec demonstrate improved safety with decreased fall risk for household mobility.  met 11/1/22  Pt will demonstrate improved ease with sit<>stand for daily mobility by performing 5 times sit<>stand test in 15 sec. Improved- 16 s  Pt will report only minimal difficulty negotiating uneven terrain for improved safety with community mobility. Ongoing  New 11/1/22: patient will ambulate 1,117ft in 6 mins w/ SPC to demonstrate a mean detectable change in gait for improved mobility.      Plan   Continue with established POC: Outpatient Physical Therapy 1 times weekly to include the following interventions: Neuromuscular Re-ed, Patient Education, Therapeutic Activities, and Therapeutic Exercise.      Continue on gait better device, progress lower extremity strengthening and minda navigation training.         Judi Huerta, PT   11/29/2022

## 2022-12-08 ENCOUNTER — LAB VISIT (OUTPATIENT)
Dept: LAB | Facility: HOSPITAL | Age: 72
End: 2022-12-08
Attending: INTERNAL MEDICINE
Payer: MEDICARE

## 2022-12-08 ENCOUNTER — OFFICE VISIT (OUTPATIENT)
Dept: INTERNAL MEDICINE | Facility: CLINIC | Age: 72
End: 2022-12-08
Payer: MEDICARE

## 2022-12-08 VITALS
DIASTOLIC BLOOD PRESSURE: 72 MMHG | WEIGHT: 213.88 LBS | SYSTOLIC BLOOD PRESSURE: 126 MMHG | BODY MASS INDEX: 30.62 KG/M2 | HEIGHT: 70 IN | OXYGEN SATURATION: 99 % | HEART RATE: 86 BPM

## 2022-12-08 DIAGNOSIS — H40.9 GLAUCOMA, UNSPECIFIED GLAUCOMA TYPE, UNSPECIFIED LATERALITY: ICD-10-CM

## 2022-12-08 DIAGNOSIS — E55.9 MILD VITAMIN D DEFICIENCY: ICD-10-CM

## 2022-12-08 DIAGNOSIS — E11.40 TYPE 2 DIABETES MELLITUS WITH DIABETIC NEUROPATHY, WITHOUT LONG-TERM CURRENT USE OF INSULIN: Chronic | ICD-10-CM

## 2022-12-08 DIAGNOSIS — R73.9 HYPERGLYCEMIA: ICD-10-CM

## 2022-12-08 DIAGNOSIS — M54.2 NECK PAIN: ICD-10-CM

## 2022-12-08 DIAGNOSIS — Z12.11 SCREENING FOR COLON CANCER: ICD-10-CM

## 2022-12-08 DIAGNOSIS — R10.9 ABDOMINAL PAIN, UNSPECIFIED ABDOMINAL LOCATION: Primary | ICD-10-CM

## 2022-12-08 LAB
25(OH)D3+25(OH)D2 SERPL-MCNC: 14 NG/ML (ref 30–96)
ALBUMIN SERPL BCP-MCNC: 3.9 G/DL (ref 3.5–5.2)
ALP SERPL-CCNC: 59 U/L (ref 55–135)
ALT SERPL W/O P-5'-P-CCNC: 17 U/L (ref 10–44)
ANION GAP SERPL CALC-SCNC: 9 MMOL/L (ref 8–16)
AST SERPL-CCNC: 14 U/L (ref 10–40)
BILIRUB SERPL-MCNC: 0.5 MG/DL (ref 0.1–1)
BUN SERPL-MCNC: 14 MG/DL (ref 8–23)
CALCIUM SERPL-MCNC: 10 MG/DL (ref 8.7–10.5)
CHLORIDE SERPL-SCNC: 107 MMOL/L (ref 95–110)
CO2 SERPL-SCNC: 24 MMOL/L (ref 23–29)
CREAT SERPL-MCNC: 1.7 MG/DL (ref 0.5–1.4)
EST. GFR  (NO RACE VARIABLE): 42.8 ML/MIN/1.73 M^2
ESTIMATED AVG GLUCOSE: 197 MG/DL (ref 68–131)
GLUCOSE SERPL-MCNC: 126 MG/DL (ref 70–110)
HBA1C MFR BLD: 8.5 % (ref 4–5.6)
POTASSIUM SERPL-SCNC: 4.2 MMOL/L (ref 3.5–5.1)
PROT SERPL-MCNC: 8 G/DL (ref 6–8.4)
SODIUM SERPL-SCNC: 140 MMOL/L (ref 136–145)

## 2022-12-08 PROCEDURE — 3078F DIAST BP <80 MM HG: CPT | Mod: CPTII,S$GLB,, | Performed by: INTERNAL MEDICINE

## 2022-12-08 PROCEDURE — 3008F BODY MASS INDEX DOCD: CPT | Mod: CPTII,S$GLB,, | Performed by: INTERNAL MEDICINE

## 2022-12-08 PROCEDURE — 1101F PT FALLS ASSESS-DOCD LE1/YR: CPT | Mod: CPTII,S$GLB,, | Performed by: INTERNAL MEDICINE

## 2022-12-08 PROCEDURE — 3078F PR MOST RECENT DIASTOLIC BLOOD PRESSURE < 80 MM HG: ICD-10-PCS | Mod: CPTII,S$GLB,, | Performed by: INTERNAL MEDICINE

## 2022-12-08 PROCEDURE — 3008F PR BODY MASS INDEX (BMI) DOCUMENTED: ICD-10-PCS | Mod: CPTII,S$GLB,, | Performed by: INTERNAL MEDICINE

## 2022-12-08 PROCEDURE — 3072F LOW RISK FOR RETINOPATHY: CPT | Mod: CPTII,S$GLB,, | Performed by: INTERNAL MEDICINE

## 2022-12-08 PROCEDURE — 1159F MED LIST DOCD IN RCRD: CPT | Mod: CPTII,S$GLB,, | Performed by: INTERNAL MEDICINE

## 2022-12-08 PROCEDURE — 3072F PR LOW RISK FOR RETINOPATHY: ICD-10-PCS | Mod: CPTII,S$GLB,, | Performed by: INTERNAL MEDICINE

## 2022-12-08 PROCEDURE — 99214 PR OFFICE/OUTPT VISIT, EST, LEVL IV, 30-39 MIN: ICD-10-PCS | Mod: S$GLB,,, | Performed by: INTERNAL MEDICINE

## 2022-12-08 PROCEDURE — 3288F PR FALLS RISK ASSESSMENT DOCUMENTED: ICD-10-PCS | Mod: CPTII,S$GLB,, | Performed by: INTERNAL MEDICINE

## 2022-12-08 PROCEDURE — 1159F PR MEDICATION LIST DOCUMENTED IN MEDICAL RECORD: ICD-10-PCS | Mod: CPTII,S$GLB,, | Performed by: INTERNAL MEDICINE

## 2022-12-08 PROCEDURE — 3074F PR MOST RECENT SYSTOLIC BLOOD PRESSURE < 130 MM HG: ICD-10-PCS | Mod: CPTII,S$GLB,, | Performed by: INTERNAL MEDICINE

## 2022-12-08 PROCEDURE — 1125F PR PAIN SEVERITY QUANTIFIED, PAIN PRESENT: ICD-10-PCS | Mod: CPTII,S$GLB,, | Performed by: INTERNAL MEDICINE

## 2022-12-08 PROCEDURE — 36415 COLL VENOUS BLD VENIPUNCTURE: CPT | Performed by: INTERNAL MEDICINE

## 2022-12-08 PROCEDURE — 83036 HEMOGLOBIN GLYCOSYLATED A1C: CPT | Performed by: INTERNAL MEDICINE

## 2022-12-08 PROCEDURE — 99999 PR PBB SHADOW E&M-EST. PATIENT-LVL V: ICD-10-PCS | Mod: PBBFAC,,, | Performed by: INTERNAL MEDICINE

## 2022-12-08 PROCEDURE — 1101F PR PT FALLS ASSESS DOC 0-1 FALLS W/OUT INJ PAST YR: ICD-10-PCS | Mod: CPTII,S$GLB,, | Performed by: INTERNAL MEDICINE

## 2022-12-08 PROCEDURE — 3288F FALL RISK ASSESSMENT DOCD: CPT | Mod: CPTII,S$GLB,, | Performed by: INTERNAL MEDICINE

## 2022-12-08 PROCEDURE — 99999 PR PBB SHADOW E&M-EST. PATIENT-LVL V: CPT | Mod: PBBFAC,,, | Performed by: INTERNAL MEDICINE

## 2022-12-08 PROCEDURE — 1125F AMNT PAIN NOTED PAIN PRSNT: CPT | Mod: CPTII,S$GLB,, | Performed by: INTERNAL MEDICINE

## 2022-12-08 PROCEDURE — 99214 OFFICE O/P EST MOD 30 MIN: CPT | Mod: S$GLB,,, | Performed by: INTERNAL MEDICINE

## 2022-12-08 PROCEDURE — 82306 VITAMIN D 25 HYDROXY: CPT | Performed by: INTERNAL MEDICINE

## 2022-12-08 PROCEDURE — 3074F SYST BP LT 130 MM HG: CPT | Mod: CPTII,S$GLB,, | Performed by: INTERNAL MEDICINE

## 2022-12-08 PROCEDURE — 80053 COMPREHEN METABOLIC PANEL: CPT | Performed by: INTERNAL MEDICINE

## 2022-12-08 NOTE — PATIENT INSTRUCTIONS
You have had 1. FLU 2022                         2. Prevnar and Pneumo 23                         3. Tdap                         4. COVID initial series of two plus two boosters last one Oct 2022      You need the shingles vaccine      Please call [phone] for colonoscopy

## 2022-12-08 NOTE — PROGRESS NOTES
Subjective:       Patient ID: Jae Millan is a 72 y.o. male.    Chief Complaint: Follow-up    Follow-up  Pertinent negatives include no abdominal pain, chest pain, headaches, nausea or vomiting. Pt is feeling okay.  No CP or SOB. He does complain of some mild R flank pain.  No CP or SOB. No N/V/D. No dysuria.  He also has some neck pain.    Review of Systems   Respiratory:  Negative for shortness of breath.    Cardiovascular:  Negative for chest pain.   Gastrointestinal:  Negative for abdominal pain, diarrhea, nausea and vomiting.   Genitourinary:  Negative for dysuria.   Neurological:  Negative for seizures, syncope and headaches.     Objective:      Physical Exam  Constitutional:       General: He is not in acute distress.     Appearance: He is well-developed.   Eyes:      Pupils: Pupils are equal, round, and reactive to light.   Neck:      Thyroid: No thyromegaly.      Vascular: No JVD.   Cardiovascular:      Rate and Rhythm: Normal rate and regular rhythm.      Heart sounds: Normal heart sounds. No murmur heard.    No friction rub. No gallop.   Pulmonary:      Effort: Pulmonary effort is normal.      Breath sounds: Normal breath sounds. No wheezing or rales.   Abdominal:      General: Bowel sounds are normal. There is no distension.      Palpations: Abdomen is soft. There is no mass.      Tenderness: There is no abdominal tenderness. There is no guarding or rebound.   Musculoskeletal:      Cervical back: Neck supple.   Lymphadenopathy:      Cervical: No cervical adenopathy.   Skin:     General: Skin is warm and dry.   Neurological:      Mental Status: He is alert and oriented to person, place, and time.   Psychiatric:         Behavior: Behavior normal.         Thought Content: Thought content normal.         Judgment: Judgment normal.     Fingernails were clipped without incident.  Assessment:       1. Abdominal pain, unspecified abdominal location    2. Screening for colon cancer    3. Hyperglycemia    4.  Type 2 diabetes mellitus with diabetic neuropathy, without long-term current use of insulin    5. Mild vitamin D deficiency    6. Neck pain    7. Glaucoma, unspecified glaucoma type, unspecified laterality          Plan:   Abdominal pain, unspecified abdominal location  -     CT Renal Stone Study ABD Pelvis WO; Future; Expected date: 12/08/2022    Screening for colon cancer  -     Ambulatory referral/consult to Endo Procedure ; Future; Expected date: 12/09/2022    Hyperglycemia  -     Comprehensive Metabolic Panel; Future; Expected date: 12/08/2022    Type 2 diabetes mellitus with diabetic neuropathy, without long-term current use of insulin  -     Comprehensive Metabolic Panel; Future; Expected date: 12/08/2022  -     Hemoglobin A1C; Future; Expected date: 12/08/2022    Mild vitamin D deficiency  -     Vitamin D; Future; Expected date: 12/08/2022    Neck pain  -     X-Ray Cervical Spine AP Lat with Flexion  Extension; Future; Expected date: 12/08/2022    Glaucoma, unspecified glaucoma type, unspecified laterality  -     Ambulatory referral/consult to Ophthalmology; Future; Expected date: 12/15/2022

## 2022-12-09 ENCOUNTER — CLINICAL SUPPORT (OUTPATIENT)
Dept: REHABILITATION | Facility: HOSPITAL | Age: 72
End: 2022-12-09
Payer: MEDICARE

## 2022-12-09 DIAGNOSIS — I69.354 HEMIPARESIS AFFECTING LEFT SIDE AS LATE EFFECT OF CEREBROVASCULAR ACCIDENT (CVA): Primary | ICD-10-CM

## 2022-12-09 DIAGNOSIS — Z74.09 IMPAIRED FUNCTIONAL MOBILITY, BALANCE, GAIT, AND ENDURANCE: Primary | ICD-10-CM

## 2022-12-09 DIAGNOSIS — Z78.9 IMPAIRED MOBILITY AND ADLS: ICD-10-CM

## 2022-12-09 DIAGNOSIS — Z74.09 IMPAIRED MOBILITY AND ADLS: ICD-10-CM

## 2022-12-09 DIAGNOSIS — I69.354 HEMIPARESIS AFFECTING LEFT SIDE AS LATE EFFECT OF CEREBROVASCULAR ACCIDENT (CVA): ICD-10-CM

## 2022-12-09 PROCEDURE — 97530 THERAPEUTIC ACTIVITIES: CPT | Mod: PN

## 2022-12-09 PROCEDURE — 97110 THERAPEUTIC EXERCISES: CPT | Mod: PN | Performed by: PHYSICAL THERAPIST

## 2022-12-09 PROCEDURE — 97112 NEUROMUSCULAR REEDUCATION: CPT | Mod: PN | Performed by: PHYSICAL THERAPIST

## 2022-12-09 PROCEDURE — 97110 THERAPEUTIC EXERCISES: CPT | Mod: PN

## 2022-12-09 PROCEDURE — 97112 NEUROMUSCULAR REEDUCATION: CPT | Mod: PN

## 2022-12-09 NOTE — PLAN OF CARE
Physical Therapy Daily Treatment Note      Name: Jae Millan  Clinic Number: [id number]     Therapy Diagnosis:        Encounter Diagnoses   Name Primary?    Cerebrovascular accident (CVA) due to thrombosis of anterior cerebral artery, unspecified blood vessel laterality      Impaired functional mobility, balance, gait, and endurance Yes    Physician: Maryjane Farias MD     Visit Date: 12/9/2022      Physician Orders: PT eval and Treat Stroke  Medical Diagnosis: I63.329 (ICD-10-CM) - Cerebrovascular accident (CVA) due to thrombosis of anterior cerebral artery, unspecified blood vessel laterality  Evaluation Date: 9/26/2022  Authorization Period Expiration: 11/25/2022  Plan of Care Certification Period: 12/16/2022  New plan of care: 12/17/2022 to 1/13/2023  Visit #/Visits authorized: 9/25    PN due: 1/9/2023     Time In: 0934  Time Out: 1015  Total Billable Time:  41 minutes       Precautions: Standard     Subjective      Pt reports:no new reports.   Patient was compliant with HEP  Response to previous treatment: No adverse reaction reported  Functional change: ongoing     Pain: NT  Location:   NT     CMS Impairment/Limitation/Restriction for FOTO Stroke Lower Extremity Survey    Therapist reviewed FOTO scores for Jae Millan on 12/9/2022.   FOTO documents entered into [employer] - see Media section.    Limitation Score: 38%        Objective       Evaluation 11/1/22 12/9/22   Timed Up and Go 21.3s + 23.7s +22.6 sec=  22.5s w/o AD  < 20 sec safe for independent transfers,       < 30 sec assist required for transfers  Community dwelling older adults >13.5 sec   Chronic CVA >14 sec   Geriatric with h/o falls >15 sec   Frail elderly >32.6 sec    LE amputees >19 sec   PD >7.95- 11.5 sec   Hip OA >10 sec   Vestibular >11.1 sec    13.2s +11.1s + 10.5s= 11.6s 9.8s + 9.2s+ 8.8s=    6 meter walk test NT NT NT   6 min walk test  ft 1042 ft        Evaluation 11/1/22 12/9/22   5 times sit-stand 36 seconds, LUE, increased  WB RLE  >12 sec= fall risk for general elderly  >16 sec= fall risk for Parkinson's disease  >10 sec= balance/vestibular dysfunction (<61 y/o)  >14.2 sec= balance/vestibular dysfunction (>61 y/o)  >12 sec= fall risk for CVA 16 sec w/o UE 14sec w/o UE   Rivas/ FGA/ Tinetti NT NT NT        TREATMENT      Jae received therapeutic exercises to increase strength and endurance of the LE for 26 minutes, which included the following includin MWT  Timed Up and Go  5 times sit<>stand     Stairs:   HSS 2 x 30 sec  Dorsi flexion stretch on wedge 2 x 30 sec       Jae performed neuromuscular re-education to improve balance and coordination for 15 minutes, which included the following:     Medium hurdles, step to, no upper extremity, SBA- 5 laps  Medium  hurdles, side ways, no upper extremity, SBA- 3 laps          Home Exercises Provided and Patient Education Provided      Education provided:   - Continue with home exercise program  - reviewed progress noted    Written Home Exercises Provided: Patient instructed to cont prior HEP.  Exercises were reviewed and Jae was able to demonstrate them prior to the end of the session.  Jae demonstrated good  understanding of the education provided.      See EMR under Patient Instructions for exercises provided 10/12/2022.     Assessment   Assessment period: 2022 to 2022    Jae tolerates physical therapy sessions well and is progressing with gait and lower extremity strength. Patient met long term goal set for lower extremity strength and negotiation of uneven surfaces. Patient ambulated further during 6 minute walk test, indicating improved tolerance to activity. Patient demonstrated a left heel scuff during swing throughout gait, demonstrating decreased left foot clearance. Patient demonstrated difficulty clearing left lower extremity over hurdles due to decreased left hip/ knee and dorsi flexion. He demonstrated slight instability on left lower extremity when  in single limb support. Physical therapy will continue to address left foot clearance and stair negotiation to improve patient's confidence in his mobility. Patient can benefit from continued skilled physical therapy to meet remaining goals for improved mobility. Plan of care extended x 4 weeks to allow patient to meet remaining goals and finalize home exercise program if needed.       Jae Is progressing well towards his goals.   Pt prognosis is Good.      Pt will continue to benefit from skilled outpatient physical therapy to address the deficits listed in the problem list box on initial evaluation, provide pt/family education and to maximize pt's level of independence in the home and community environment.      Pt's spiritual, cultural and educational needs considered and pt agreeable to plan of care and goals.     Anticipated barriers to physical therapy: transportation, length of time since onset of impairments     Goals:   Formal status as of 12/9/22  Short Term Goals: 6 weeks   Pt will report compliance with HEP for LE stretching and strengthening at least 3x/week to progress towards goals set. progressing  Pt will demonstrate improved TUG speed to <20 sec demonstrate improved safety with household mobility.  met 11/1/22  Pt will demonstrate improved ease with sit<>stand for daily mobility by performing 5 times sit<>stand test in <30 sec. Met 11/1/22  Assess 2 min walk test and set goals as needed. Met 11/1/22- 954 ft in 6 min  Assess stair negotiation and set goal as needed.  Ongoing     Long Term Goals: 12 weeks   Pt will demonstrate improved TUG speed to 14 sec demonstrate improved safety with decreased fall risk for household mobility.  met 11/1/22  Pt will demonstrate improved ease with sit<>stand for daily mobility by performing 5 times sit<>stand test in 15 sec. met 12/9/22  Pt will report only minimal difficulty negotiating uneven terrain for improved safety with community mobility. Met 12/9/22  New  11/1/22: patient will ambulate 1,117ft in 6 mins w/ SPC to demonstrate a mean detectable change in gait for improved mobility. Improved- 1042 ft w/ SPC     Plan   New plan of care: 12/17/2022 to 1/13/2023    Continue with established POC: Outpatient Physical Therapy 1 times weekly to include the following interventions: Neuromuscular Re-ed, Patient Education, Therapeutic Activities, and Therapeutic Exercise.      Continue on gait better device, minda navigation training.   work on stair negotiation without HR     Naya Obregon, PT, DPT,   Board-Certified Clinical Specialist in Neurologic Physical Therapy   Certified Brain Injury Specialist    12/9/2022

## 2022-12-09 NOTE — PROGRESS NOTES
"  Occupational Therapy Treatment Note     Date: 12/9/2022  Name: Jae Millan  Clinic Number: [id number]    Therapy Diagnosis:  Encounter Diagnoses   Name Primary?    Hemiparesis affecting left side as late effect of cerebrovascular accident (CVA) Yes    Impaired mobility and ADLs      Physician: Maryjane Farias MD     Right Left   Involved Side   []     [x]   Dominant Side    [x]     []     Physician Orders: OT eval and treat  Medical Diagnosis: I63.329 (ICD-10-CM) - Cerebral infarction due to thrombosis of unspecified anterior cerebral artery  Evaluation Date: 9/15/2022  Plan of Care Expiration Period: 1/6/2023  Insurance Authorization period Expiration: 10/13/2022  Visit # / Visits Authorized: 9 / 15 - not including eval  FOTO: 9/15/2022     Time In: 0851  Time Out: 0930  Total Billable (one on one) Time: 39 minutes     Precautions: Standard    Subjective     Pt reports: "I want to keep doing therapy"  He was not compliant with home exercise program given last session.   Response to previous treatment:no complaints  Functional change: none - eval only    Pain: 7/10  Location: left upper extremity     Objective      Jae received therapeutic exercises for 14 minutes including:  - UBE x 8 minutes in seated position to promote periscapular activation and strengthening, endurance, and range of motion to enhance functional activities and self-care skills with resistance of 4.0; 4 minutes forward and 4 minutes backward .   - Dowel exercises in supine position using small 5# dowel: 3x10 shoulder flexion, chest press, and scapular protraction for strengthening, stability, and active assisted range of motion of left upper extremity    Jae participated in dynamic functional therapeutic activities to improve functional performance for 15 minutes, including:  - Passive range of motion of left upper extremity performed in supine position with hold at pain-free end range in all planes  - General wrist stretches " including 1. Scaphoid on radius 2. Increasing mobility of metacarpals 3. Carpal rolls 4. Increasing mobility towards radial deviation 5. Increasing mobility of wrist towards extension 6. Increasing mobility of wrist towards supination/pronation     Jae received the following supervised modalities after being cleared for contradictions for 10 minutes:   -NMES to wrist/digit extensors: x8 minutes with active range of motion during on cycle; requiring extra time for set-up and preparation       Home Exercises and Education Provided      Education provided:   - HEP   - Passive range of motion for joint integrity  - Progress towards goals    Written Home Exercises Provided: yes.  Exercises were reviewed and Jae was able to demonstrate them prior to the end of the session.    Jae demonstrated good  understanding of the education provided.     See EMR under Patient Instructions for exercises provided 9/28/2022.    Assessment      Jae would continue to benefit from skilled OT. Left upper extremity secured to UBE with good response; able to tolerate added resistance of 4.0. Good  noted with dowel exercises. Good tolerance to NMES at wrist extensors; edema limiting full extension of digit 2. Overall, good session.    Jae is progressing well towards his goals and there are no updates to goals at this time. Pt prognosis is Good.     Pt will continue to benefit from skilled outpatient occupational therapy to address the deficits listed in the problem list on initial evaluation provide pt/family education and to maximize pt's level of independence in the home and community environment.     Pt's spiritual, cultural and educational needs considered and pt agreeable to plan of care and goals.    Anticipated barriers to occupational therapy: time since onset (2018)    Goals:      Short Term Goals = Long Term Goals: to be met within 8 weeks Status    Pt will be independent with Home Exercise Program (HEP) to promote  long term maintenance of progress made in therapy.   Met      Pt will be able to move 3 blocks in Box and Blocks assessment in 2 min or better using L UE in order to demonstrate improved volitional grasp and release Progressing, not met      Pt will improve active range of motion by 15 degrees in all left shoulder planes for improved incorporation of left upper extremity in grooming tasks  MET for shoulder abduction and shoulder extension; rest progressing   Pt will improve L  strength by 5# in order to increase safety and participation with home-care tasks Progressing, not met      Pt will score 65% on the FOTO by discharge in order to demonstrate improved QOL and independence with meaningful activities.  Progressing, not met      Pt will improve left MMS by 1 grade in all planes to demonstrate increased strength of left upper extremity for improved safety in cooking tasks  Progressing, not met         Goals to be added or modified as necessary.    Plan   Certification Period/Plan of care expiration: 9/15/2022 to 1/6/22.     Outpatient Occupational Therapy 1 times weekly for 8 weeks to include the following interventions: Electrical Stimulation (NMES), Fluidotherapy, Manual Therapy, Moist Heat/ Ice, Neuromuscular Re-ed, Orthotic Management and Training, Paraffin, Patient Education, Self Care, Therapeutic Activities, and Therapeutic Exercise.     Updates/Grading for next session: Edema massage HEP, wrist extension estim      EVARISTO Beal LOTR

## 2022-12-09 NOTE — PROGRESS NOTES
See plan of care for physical therapy reassessment and updated plan of care.       Naya Obregon, PT, DPT,   Board-Certified Clinical Specialist in Neurologic Physical Therapy   Certified Brain Injury Specialist

## 2022-12-13 NOTE — PROGRESS NOTES
"Physical Therapy Daily Treatment Note      Name: Jae Millan  Clinic Number: [id number]     Therapy Diagnosis:           Encounter Diagnoses   Name Primary?    Cerebrovascular accident (CVA) due to thrombosis of anterior cerebral artery, unspecified blood vessel laterality      Impaired functional mobility, balance, gait, and endurance Yes    Physician: Maryjane Farias MD     Visit Date: 12/16/2022      Physician Orders: PT eval and Treat Stroke  Medical Diagnosis: I63.329 (ICD-10-CM) - Cerebrovascular accident (CVA) due to thrombosis of anterior cerebral artery, unspecified blood vessel laterality  Evaluation Date: 9/26/2022  Authorization Period Expiration: 11/25/2022  Plan of Care Certification Period: 12/16/2022  New plan of care: 12/17/2022 to 1/13/2023  Visit #/Visits authorized: 10/25    PN due: 1/9/2023     Time In: 1017  Time Out: 1057  Total Billable Time:  40 minutes     Precautions: Standard     Subjective      Pt reports:no new reports.   Patient was compliant with HEP  Response to previous treatment: left Leg felt tight for ~1 hour  Functional change: ongoing     Pain: NT  Location:   NT      Objective      See treatment      TREATMENT      Jae received therapeutic exercises to increase strength and endurance of the LE for 40 minutes, which included the following including:     Gait Better Treadmill Training:   User name: [email or web address]  Description of Training Today: park- purple triangle lap     YARDS: 256  SPEED: 0.9  TIME: 10 min     Other:   Decision Making (left/right): not tested  Puddles: left=2/2, right= 4/5  Hurdles: left= 1/1, right= 4/4  Total Negotiation: 91%  Upper Extremity Support: right upper extremity      -Matrix, leg extension, 50#, 3x10    Shuttle:   - bilateral squats 3 black (75#) 2 x 10  - left squats 2 black, (50#) 2 x 10     Stairs:   - Stair negotiation without hand rail, 4", reciprocal- 6 laps  -  Stair negotiation without hand rail, 6", step to- 5 laps***   - step ups on " "12", upper extremity support- 2 x 10        Home Exercises Provided and Patient Education Provided      Education provided:   - Continue with home exercise program     Written Home Exercises Provided: Patient instructed to cont prior HEP.  Exercises were reviewed and Jae was able to demonstrate them prior to the end of the session.  Jae demonstrated good  understanding of the education provided.      See EMR under Patient Instructions for exercises provided 10/12/2022.     Assessment     Jae tolerated physical therapy session well. Complexity of obstacles on Gait Better/treadmill were increased to level 3, patient still demonstrated good performance with >90% accuracy. Stair negotiation without upper extremity support was practiced. Patient was able to perform reciprocal stair negotiation on 4" steps with fair+ to good- balance. Step to pattern was performed on 6" steps with good- balance. Step ups were progressed in height to aid with strengthening of left lower extremity for stair negotiation. Patient can benefit from continued skilled physical therapy to improve level of mobility including stair negotiation.         Jae Is progressing well towards his goals.   Pt prognosis is Good.      Pt will continue to benefit from skilled outpatient physical therapy to address the deficits listed in the problem list box on initial evaluation, provide pt/family education and to maximize pt's level of independence in the home and community environment.      Pt's spiritual, cultural and educational needs considered and pt agreeable to plan of care and goals.     Anticipated barriers to physical therapy: transportation, length of time since onset of impairments     Goals:   Formal status as of 12/9/22  Short Term Goals: 6 weeks   Pt will report compliance with HEP for LE stretching and strengthening at least 3x/week to progress towards goals set. progressing  Pt will demonstrate improved TUG speed to <20 sec " demonstrate improved safety with household mobility.  met 11/1/22  Pt will demonstrate improved ease with sit<>stand for daily mobility by performing 5 times sit<>stand test in <30 sec. Met 11/1/22  Assess 2 min walk test and set goals as needed. Met 11/1/22- 954 ft in 6 min  Assess stair negotiation and set goal as needed.  Ongoing     Long Term Goals: 12 weeks   Pt will demonstrate improved TUG speed to 14 sec demonstrate improved safety with decreased fall risk for household mobility.  met 11/1/22  Pt will demonstrate improved ease with sit<>stand for daily mobility by performing 5 times sit<>stand test in 15 sec. met 12/9/22  Pt will report only minimal difficulty negotiating uneven terrain for improved safety with community mobility. Met 12/9/22  New 11/1/22: patient will ambulate 1,117ft in 6 mins w/ SPC to demonstrate a mean detectable change in gait for improved mobility. Improved- 1042 ft w/ SPC     Plan   New plan of care: 12/17/2022 to 1/13/2023     Continue with established POC: Outpatient Physical Therapy 1 times weekly to include the following interventions: Neuromuscular Re-ed, Patient Education, Therapeutic Activities, and Therapeutic Exercise.      Continue on gait better device, minda navigation training.   work on stair negotiation without HR  Increase obstacle complexity to Level 5     Naya Obregon, PT, DPT,   Board-Certified Clinical Specialist in Neurologic Physical Therapy   Certified Brain Injury Specialist    12/16/2022

## 2022-12-16 ENCOUNTER — CLINICAL SUPPORT (OUTPATIENT)
Dept: REHABILITATION | Facility: HOSPITAL | Age: 72
End: 2022-12-16
Payer: MEDICARE

## 2022-12-16 DIAGNOSIS — I63.329 CEREBROVASCULAR ACCIDENT (CVA) DUE TO THROMBOSIS OF ANTERIOR CEREBRAL ARTERY, UNSPECIFIED BLOOD VESSEL LATERALITY: ICD-10-CM

## 2022-12-16 DIAGNOSIS — Z74.09 IMPAIRED FUNCTIONAL MOBILITY, BALANCE, GAIT, AND ENDURANCE: Primary | ICD-10-CM

## 2022-12-16 DIAGNOSIS — Z74.09 IMPAIRED MOBILITY AND ADLS: ICD-10-CM

## 2022-12-16 DIAGNOSIS — I69.354 HEMIPARESIS AFFECTING LEFT SIDE AS LATE EFFECT OF CEREBROVASCULAR ACCIDENT (CVA): Primary | ICD-10-CM

## 2022-12-16 DIAGNOSIS — Z78.9 IMPAIRED MOBILITY AND ADLS: ICD-10-CM

## 2022-12-16 PROCEDURE — 97110 THERAPEUTIC EXERCISES: CPT | Mod: PN | Performed by: PHYSICAL THERAPIST

## 2022-12-16 PROCEDURE — 97112 NEUROMUSCULAR REEDUCATION: CPT | Mod: PN

## 2022-12-16 PROCEDURE — 97110 THERAPEUTIC EXERCISES: CPT | Mod: PN

## 2022-12-16 PROCEDURE — 97530 THERAPEUTIC ACTIVITIES: CPT | Mod: PN

## 2022-12-16 NOTE — PROGRESS NOTES
"  Occupational Therapy Treatment Note and Progress Note      Date: 12/16/2022  Name: Jae Millan  Clinic Number: [id number]    Therapy Diagnosis:  Encounter Diagnoses   Name Primary?    Hemiparesis affecting left side as late effect of cerebrovascular accident (CVA) Yes    Impaired mobility and ADLs        Physician: Maryjane Farias MD     Right Left   Involved Side   []     [x]   Dominant Side    [x]     []     Physician Orders: OT eval and treat  Medical Diagnosis: I63.329 (ICD-10-CM) - Cerebral infarction due to thrombosis of unspecified anterior cerebral artery  Evaluation Date: 9/15/2022  Plan of Care Expiration Period: 1/6/2023  Insurance Authorization period Expiration: 10/13/2022  Visit # / Visits Authorized: 10 / 15 - not including eval  FOTO: 9/15/2022     Time In: 0915  Time Out: 1010  Total Billable (one on one) Time: 55 minutes     Precautions: Standard    Subjective     Pt reports: "Pain isn't as bad today"  He was not compliant with home exercise program given last session.   Response to previous treatment:no complaints  Functional change: none - eval only    Pain: 6/10  Location: left upper extremity     Objective      Note: 2020 values retrieved from previous therapy episode  Joint Evaluation  AROM  7/10/2020 PROM   7/10/2020 AROM  8/13/20 PROM  8/13/20 AROM  9/15/2022 PROM  9/15/2022 AROM  11/8/2022 PROM  11/8/2022 AROM  12/16/2022 PROM  12/16/2022     Left Left Left Left Left Left Left   Left  Left  Left    Shoulder flex 0-180 70 145 90 160 63 145 70 151 84 154   Shoulder Abd 0-180 55 115 72 140 71 149 98 134 94 134   Shoulder ER 0-90 To neautral 35 Neutral  45 24 35 19 36 25 36   Shoulder IR 0-90 60 WNL WFL WNL sidebody sidebody sidebody sidebody Sidebody Sidebody   Shoulder Extension 0-80 30 45 40 73 18 54 60 WFL 42 WNL   Elbow flex/ext 0-150 10/105 WFL 0/105 WNL 20 extension  93 flexion 60 flexion  WFL ext 60 flexion     WFL extension  WNL flexion  WNL extension  60 flexion  WNL " extension  WNL   Wrist flex 0-80 0 70 ~5 55 0 54 5 65 5 64   Wrist ext 0-70 0 40 neutral 50 0 22 0 40 0 35   Supination 0-80 30 WNL 30 WNL ~30 ~55 33 69 Approx 40 60   Pronation 0-80 WNL (tone) WNL WNL WNL WNL WNL WNL WNL WNL WNL   UD ~15 WNL ~15 WNL 0 WNL 0 WNL 0 WNL   RD 0 WNL 0 WNL 0 15 0 WNL 0  WNL      Fist: increased edema and tone        Strength 7/10/2020 8/13/20 9/15/2022 11/8/2022 12/16/2022   **within available ROM** Left  Left Left Left  Left    Shoulder flex 3+/5 3+/5 3-/5 3-/5 3/5   Shoulder abd 3+/5 3+/5 3/5 3-/5 3/5   Shoulder ER 3/5 3//5 3-/5 3-/5 3/5   Shoulder IR 3+/5 3+/5 3-/5 3+/5 3+/5   Shoulder Extension 4-/5 4-/5 3/5 3-/5 3/5   Elbow flex 4-/5 4-/5 3+/5 4-/5 4-/5   Elbow ext 4/5 4/5 3/5 4-/5 4/5   Wrist flex 2-/5 2-/5 1/5 NT/5 NT/5   Wrist ext 2-/5 2-/5 2-/5 NT/5 NT/5   Supination 2-/5 2-/5 2-/5 NT/5 NT/5   Pronation 3/5 3/5 2-/5 NT/5 NT/5   UD 3-/5 3-/5 2-/5 NT/5 NT/5   RD 1/5 1/5 1/5 NT/5 NT/5         Fist: loose      Strength: (HERBERT Dynamometer in lbs.) Average 3 trials, Position II:       9/15/2022 9/15/2022 11/8/2022 12/16/2022 12/16/2022   Rung II Right Left Left  Right  Left    Trial 1 81.5# 5.2# 7.7# 88.1# 16.9#   Trial 2 77.8# 5.5# 10.3# 94.5# 14.9#   Trial 3 73.6# 6.1# 10.8# 85.3# 20.0#   Average 77.6# 5.6# 9.6# 89.3# 17.2#   [norms for men aged 65-69: R=89.7 +/-20.4; L=76.8 +/-20.3 (Rosalva et al, 1985)]       Jae received therapeutic exercises for 15 minutes including:  - UBE x 8 minutes in seated position to promote periscapular activation and strengthening, endurance, and range of motion to enhance functional activities and self-care skills with resistance of 4.0; 4 minutes forward and 4 minutes backward .   - Dowel exercises in supine position using small 5# dowel: 3x10 shoulder flexion, chest press, and scapular protraction for strengthening, stability, and active assisted range of motion of left upper extremity    Jae participated in dynamic functional  therapeutic activities to improve functional performance for 15 minutes, including:  - Reassessment of objective measurements; see above  - Passive range of motion of left upper extremity performed in supine position with hold at pain-free end range in all planes  - General wrist stretches including 1. Scaphoid on radius 2. Increasing mobility of metacarpals 3. Carpal rolls 4. Increasing mobility towards radial deviation 5. Increasing mobility of wrist towards extension 6. Increasing mobility of wrist towards supination/pronation     Jae participated in neuromuscular re-education activities to improve: Balance, Coordination, Kinesthetic, Sense, Proprioception, and Posture for 25 minutes, including:   - Weightbearing with left upper extremity placed on yoga block in standing position for proprioceptive input, joint stability, and strengthening: while reaching for cones with right upper extremity in all planes for dynamic weight shifting using support from affected upper extremity   -NMES to wrist/digit extensors: x8 minutes with active range of motion during on cycle; requiring extra time for set-up and preparation       Home Exercises and Education Provided      Education provided:   - HEP   - Passive range of motion for joint integrity  - Progress towards goals    Written Home Exercises Provided: yes.  Exercises were reviewed and Jae was able to demonstrate them prior to the end of the session.    Jae demonstrated good  understanding of the education provided.     See EMR under Patient Instructions for exercises provided 9/28/2022.    Assessment      Jae would continue to benefit from skilled OT. Left upper extremity secured to UBE with good response; able to tolerate added resistance of 4.0. Good  noted with dowel exercises. Good tolerance to NMES at wrist extensors; edema limiting full extension of digit 2. Excellent participation in standing weight bearing activity with no discomfort noted.  Today's reassessment of objective measures shows improvement in active and passive range of motion of left upper extremity, left  strength, and left upper extremity MMS. Patient is reaching/exceeding status of time of discharge in 2020 and is on track to discharge with HEP by end of year. Overall, good session.    Jae is progressing well towards his goals and there are no updates to goals at this time. Pt prognosis is Good.     Pt will continue to benefit from skilled outpatient occupational therapy to address the deficits listed in the problem list on initial evaluation provide pt/family education and to maximize pt's level of independence in the home and community environment.     Pt's spiritual, cultural and educational needs considered and pt agreeable to plan of care and goals.    Anticipated barriers to occupational therapy: time since onset (2018)    Goals:      Short Term Goals = Long Term Goals: to be met within 8 weeks Status    Pt will be independent with Home Exercise Program (HEP) to promote long term maintenance of progress made in therapy.   Met      Pt will be able to move 3 blocks in Box and Blocks assessment in 2 min or better using L UE in order to demonstrate improved volitional grasp and release Progressing, not met      Pt will improve active range of motion by 15 degrees in all left shoulder planes for improved incorporation of left upper extremity in grooming tasks  MET for shoulder abduction and shoulder extension; rest progressing   Pt will improve L  strength by 5# in order to increase safety and participation with home-care tasks Progressing, not met      Pt will score 65% on the FOTO by discharge in order to demonstrate improved QOL and independence with meaningful activities.  Progressing, not met      Pt will improve left MMS by 1 grade in all planes to demonstrate increased strength of left upper extremity for improved safety in cooking tasks  Progressing, not met          Goals to be added or modified as necessary.    Plan   Certification Period/Plan of care expiration: 9/15/2022 to 1/6/22.     Outpatient Occupational Therapy 1 times weekly for 8 weeks to include the following interventions: Electrical Stimulation (NMES), Fluidotherapy, Manual Therapy, Moist Heat/ Ice, Neuromuscular Re-ed, Orthotic Management and Training, Paraffin, Patient Education, Self Care, Therapeutic Activities, and Therapeutic Exercise.     Updates/Grading for next session: Edema massage HEP, wrist extension EVARISTO Higuera LOTR

## 2022-12-22 ENCOUNTER — HOSPITAL ENCOUNTER (OUTPATIENT)
Dept: RADIOLOGY | Facility: HOSPITAL | Age: 72
Discharge: HOME | End: 2022-12-22
Attending: INTERNAL MEDICINE
Payer: MEDICARE

## 2022-12-22 DIAGNOSIS — M54.2 NECK PAIN: ICD-10-CM

## 2022-12-22 DIAGNOSIS — R10.9 ABDOMINAL PAIN, UNSPECIFIED ABDOMINAL LOCATION: ICD-10-CM

## 2022-12-22 PROCEDURE — 72050 X-RAY EXAM NECK SPINE 4/5VWS: CPT | Mod: 26,,, | Performed by: RADIOLOGY

## 2022-12-22 PROCEDURE — 72050 X-RAY EXAM NECK SPINE 4/5VWS: CPT | Mod: TC

## 2022-12-22 PROCEDURE — 74176 CT ABD & PELVIS W/O CONTRAST: CPT | Mod: 26,,, | Performed by: RADIOLOGY

## 2022-12-22 PROCEDURE — 74176 CT ABD & PELVIS W/O CONTRAST: CPT | Mod: TC

## 2022-12-22 PROCEDURE — 74176 CT RENAL STONE STUDY ABD PELVIS WO: ICD-10-PCS | Mod: 26,,, | Performed by: RADIOLOGY

## 2022-12-22 PROCEDURE — 72050 XR CERVICAL SPINE AP LAT WITH FLEX EXTEN: ICD-10-PCS | Mod: 26,,, | Performed by: RADIOLOGY

## 2023-01-11 ENCOUNTER — TELEPHONE (OUTPATIENT)
Dept: INTERNAL MEDICINE | Facility: CLINIC | Age: 73
End: 2023-01-11
Payer: MEDICARE

## 2023-01-11 RX ORDER — ERGOCALCIFEROL 1.25 MG/1
CAPSULE ORAL
Qty: 12 CAPSULE | Refills: 3 | Status: SHIPPED | OUTPATIENT
Start: 2023-01-11 | End: 2023-06-08 | Stop reason: SDUPTHER

## 2023-01-11 RX ORDER — GLIMEPIRIDE 1 MG/1
1 TABLET ORAL
Qty: 90 TABLET | Refills: 3 | Status: SHIPPED | OUTPATIENT
Start: 2023-01-11 | End: 2023-06-08

## 2023-01-11 NOTE — PROGRESS NOTES
Patient, Jae Millan (MRN #[id number]), presented with a recent Estimated Glumerular Filtration Rate (EGFR) between 30 and 45 consistent with the definition of chronic kidney disease stage 3 - moderate (ICD10 - N18.3).    eGFR if    Date Value Ref Range Status   05/04/2022 40.7 (A) >60 mL/min/1.73 m^2 Final       The patient's chronic kidney disease stage 3 was monitored, evaluated, addressed and/or treated. This addendum to the medical record is made on 01/11/2023.

## 2023-01-11 NOTE — PROGRESS NOTES
Please inform - CT scan shows no cancer or kidney stones but he does have gallstones - please ask him if he is still having pain on the right side.    Please inform neck Xray looks good - bones and discs are normal and there is no instability seen with movement.

## 2023-01-11 NOTE — PROGRESS NOTES
Please inform him - diabetes is mildly uncontrolled - I am sending an additional medication to the pharmacy - glimepiride 1mg daily.    Vit D is very low I am sending a prescription to take weekly to supplement his Vit D - ergocalciferol 50,000 IU weekly.    Please schedule him an appt in 2 month with me thanks.

## 2023-01-11 NOTE — TELEPHONE ENCOUNTER
----- Message from Maryjane Farias MD sent at 1/11/2023 11:25 AM CST -----  Please inform him - diabetes is mildly uncontrolled - I am sending an additional medication to the pharmacy - glimepiride 1mg daily.    Vit D is very low I am sending a prescription to take weekly to supplement his Vit D - ergocalciferol 50,000 IU weekly.    Please schedule him an appt in 2 month with me thanks.

## 2023-01-12 ENCOUNTER — OFFICE VISIT (OUTPATIENT)
Dept: PODIATRY | Facility: CLINIC | Age: 73
End: 2023-01-12
Payer: MEDICARE

## 2023-01-12 VITALS
WEIGHT: 213 LBS | SYSTOLIC BLOOD PRESSURE: 148 MMHG | DIASTOLIC BLOOD PRESSURE: 88 MMHG | HEIGHT: 70 IN | HEART RATE: 76 BPM | BODY MASS INDEX: 30.49 KG/M2

## 2023-01-12 DIAGNOSIS — B35.1 ONYCHOMYCOSIS WITH INGROWN TOENAIL: ICD-10-CM

## 2023-01-12 DIAGNOSIS — L60.0 ONYCHOMYCOSIS WITH INGROWN TOENAIL: ICD-10-CM

## 2023-01-12 DIAGNOSIS — L60.0 INGROWN RIGHT BIG TOENAIL: Primary | ICD-10-CM

## 2023-01-12 DIAGNOSIS — I87.2 EDEMA OF BOTH LOWER EXTREMITIES DUE TO PERIPHERAL VENOUS INSUFFICIENCY: ICD-10-CM

## 2023-01-12 DIAGNOSIS — G81.94 LEFT HEMIPARESIS: ICD-10-CM

## 2023-01-12 DIAGNOSIS — E11.40 TYPE 2 DIABETES MELLITUS WITH DIABETIC NEUROPATHY, WITHOUT LONG-TERM CURRENT USE OF INSULIN: ICD-10-CM

## 2023-01-12 PROCEDURE — 11721 DEBRIDE NAIL 6 OR MORE: CPT | Mod: S$GLB,,, | Performed by: PODIATRIST

## 2023-01-12 PROCEDURE — 3008F PR BODY MASS INDEX (BMI) DOCUMENTED: ICD-10-PCS | Mod: CPTII,S$GLB,, | Performed by: PODIATRIST

## 2023-01-12 PROCEDURE — 99999 PR PBB SHADOW E&M-EST. PATIENT-LVL IV: ICD-10-PCS | Mod: PBBFAC,,, | Performed by: PODIATRIST

## 2023-01-12 PROCEDURE — 3072F LOW RISK FOR RETINOPATHY: CPT | Mod: CPTII,S$GLB,, | Performed by: PODIATRIST

## 2023-01-12 PROCEDURE — 3077F SYST BP >= 140 MM HG: CPT | Mod: CPTII,S$GLB,, | Performed by: PODIATRIST

## 2023-01-12 PROCEDURE — 1101F PT FALLS ASSESS-DOCD LE1/YR: CPT | Mod: CPTII,S$GLB,, | Performed by: PODIATRIST

## 2023-01-12 PROCEDURE — 1159F MED LIST DOCD IN RCRD: CPT | Mod: CPTII,S$GLB,, | Performed by: PODIATRIST

## 2023-01-12 PROCEDURE — 1101F PR PT FALLS ASSESS DOC 0-1 FALLS W/OUT INJ PAST YR: ICD-10-PCS | Mod: CPTII,S$GLB,, | Performed by: PODIATRIST

## 2023-01-12 PROCEDURE — 3008F BODY MASS INDEX DOCD: CPT | Mod: CPTII,S$GLB,, | Performed by: PODIATRIST

## 2023-01-12 PROCEDURE — 11721 PR DEBRIDEMENT OF NAILS, 6 OR MORE: ICD-10-PCS | Mod: S$GLB,,, | Performed by: PODIATRIST

## 2023-01-12 PROCEDURE — 3288F PR FALLS RISK ASSESSMENT DOCUMENTED: ICD-10-PCS | Mod: CPTII,S$GLB,, | Performed by: PODIATRIST

## 2023-01-12 PROCEDURE — 99499 RISK ADDL DX/OHS AUDIT: ICD-10-PCS | Mod: S$GLB,,, | Performed by: PODIATRIST

## 2023-01-12 PROCEDURE — 99213 OFFICE O/P EST LOW 20 MIN: CPT | Mod: 25,S$GLB,, | Performed by: PODIATRIST

## 2023-01-12 PROCEDURE — 99499 UNLISTED E&M SERVICE: CPT | Mod: S$GLB,,, | Performed by: PODIATRIST

## 2023-01-12 PROCEDURE — 99999 PR PBB SHADOW E&M-EST. PATIENT-LVL IV: CPT | Mod: PBBFAC,,, | Performed by: PODIATRIST

## 2023-01-12 PROCEDURE — 3079F PR MOST RECENT DIASTOLIC BLOOD PRESSURE 80-89 MM HG: ICD-10-PCS | Mod: CPTII,S$GLB,, | Performed by: PODIATRIST

## 2023-01-12 PROCEDURE — 99213 PR OFFICE/OUTPT VISIT, EST, LEVL III, 20-29 MIN: ICD-10-PCS | Mod: 25,S$GLB,, | Performed by: PODIATRIST

## 2023-01-12 PROCEDURE — 3079F DIAST BP 80-89 MM HG: CPT | Mod: CPTII,S$GLB,, | Performed by: PODIATRIST

## 2023-01-12 PROCEDURE — 1159F PR MEDICATION LIST DOCUMENTED IN MEDICAL RECORD: ICD-10-PCS | Mod: CPTII,S$GLB,, | Performed by: PODIATRIST

## 2023-01-12 PROCEDURE — 3288F FALL RISK ASSESSMENT DOCD: CPT | Mod: CPTII,S$GLB,, | Performed by: PODIATRIST

## 2023-01-12 PROCEDURE — 3072F PR LOW RISK FOR RETINOPATHY: ICD-10-PCS | Mod: CPTII,S$GLB,, | Performed by: PODIATRIST

## 2023-01-12 PROCEDURE — 3077F PR MOST RECENT SYSTOLIC BLOOD PRESSURE >= 140 MM HG: ICD-10-PCS | Mod: CPTII,S$GLB,, | Performed by: PODIATRIST

## 2023-01-12 NOTE — PROGRESS NOTES
Subjective:      Patient ID: Jae Millan is a 72 y.o. male.    Chief Complaint: No chief complaint on file.    Jae is a 72 y.o. male who presented new to this clinicPrior to that was seen in Jan.by .     Jae has a past medical history of Cataract, Decreased sensation of lower extremity, Diabetes mellitus, Diabetic neuropathy, Dysarthria, Dysphagia, ED (erectile dysfunction), Glaucoma, Hemiparesis, High cholesterol, History of hepatitis C, s/p successful Rx w/ cure (SVR12) 4/2020, Hypertension, Impaired functional mobility, balance, gait, and endurance, Stroke, and Urinary frequency. Dx DM before Aug.2018 CVA - L hemiparesis UE & LE - PT.    Using public transportation - lives across town near [facility].    The patient's chief complaint is thick & ingrown toenails especially R great toe.  Numbness & tingling since CVA B/L feet; also, pins and needles that hurt on occasion - on Gabapentin 300mg tid.   This patient has documented high risk feet requiring routine maintenance secondary to peripheral neuropathy.    PCP: Maryjane Farias MD    Date Last Seen by PCP: 12/8/22    Current shoe gear:  Tennis shoes    Hemoglobin A1C   Date Value Ref Range Status   12/08/2022 8.5 (H) 4.0 - 5.6 % Final     Comment:     ADA Screening Guidelines:  5.7-6.4%  Consistent with prediabetes  >or=6.5%  Consistent with diabetes    High levels of fetal hemoglobin interfere with the HbA1C  assay. Heterozygous hemoglobin variants (HbS, HgC, etc)do  not significantly interfere with this assay.   However, presence of multiple variants may affect accuracy.     08/12/2022 8.1 (H) 4.0 - 5.6 % Final     Comment:     ADA Screening Guidelines:  5.7-6.4%  Consistent with prediabetes  >or=6.5%  Consistent with diabetes    High levels of fetal hemoglobin interfere with the HbA1C  assay. Heterozygous hemoglobin variants (HbS, HgC, etc)do  not significantly interfere with this assay.   However, presence of multiple variants may affect  accuracy.     03/25/2022 9.2 (H) 4.0 - 5.6 % Final     Comment:     ADA Screening Guidelines:  5.7-6.4%  Consistent with prediabetes  >or=6.5%  Consistent with diabetes    High levels of fetal hemoglobin interfere with the HbA1C  assay. Heterozygous hemoglobin variants (HbS, HgC, etc)do  not significantly interfere with this assay.   However, presence of multiple variants may affect accuracy.       Objective:      Physical Exam  Vitals reviewed.   Constitutional:       Appearance: He is well-developed.   Cardiovascular:      Pulses:         Dorsalis pedis pulses +2 B/L. Audible & phasic per Doppler.       Posterior tibial pulses are 1+ B/L.   Musculoskeletal:     Equinus B/L ankles with < 90 deg foot to leg noted with knees extended.       MS strength of extrinsics to foot and ankle B/L + 5/5 in DF/PF/Inv/Ev to resistance w/ no reproduction of pain in any direction.      Passive ROM of ankle and pedal joints is painless & w/out crepitation B/L.      Pes valgus. Hammertoe deformities B/L.  Feet:      Nails are thickened, dystrophic, cryptotic & yellowish in coloration. Onychogryphosis 2 L. Significantly hypertrophic 1 & 4 B/L, 5 L. Symptomatic medial R hallux nail border w/out sign of infection.  Skin:     General: Skin is dry. No open lesions noted.      Capillary Refill: Capillary refill takes more than 3 seconds.      Comments: Skin is thin, shiny, and cool, B/L.  Neurological:      Mental Status: He is alert.      Comments: diminished sensation noted BLE  including light touch, 2 point discrimination.     Paresthesias including pins and needles B/L feet w/ no clearly identified trigger or source; no hyperemia.      L hemiparesis - uses a cane.  Psych:  Patient is alert. Mood & affect is normal. Behavior is normal.    Assessment:       Encounter Diagnoses   Name Primary?    Type 2 diabetes mellitus with diabetic neuropathy, without long-term current use of insulin     Left hemiparesis     Edema of both lower  extremities due to peripheral venous insufficiency     Ingrown right big toenail Yes    Onychomycosis with ingrown toenail      Problem List Items Addressed This Visit          Endocrine    Type 2 diabetes mellitus with neurologic complication, without long-term current use of insulin (Chronic)    Relevant Orders    Routine Foot Care     Other Visit Diagnoses       Ingrown right big toenail    -  Primary    Relevant Orders    Routine Foot Care    Left hemiparesis        Edema of both lower extremities due to peripheral venous insufficiency        Onychomycosis with ingrown toenail        Relevant Orders    Routine Foot Care           Plan:       Diagnoses and all orders for this visit:    Ingrown right big toenail  -     Routine Foot Care    Type 2 diabetes mellitus with diabetic neuropathy, without long-term current use of insulin  -     Ambulatory referral/consult to Podiatry  -     Routine Foot Care    Left hemiparesis    Edema of both lower extremities due to peripheral venous insufficiency    Onychomycosis with ingrown toenail  -     Routine Foot Care    I counseled the patient on his conditions, their implications and medical management.    - Shoe inspection. Diabetic Foot Education. Patient reminded of the importance of good nutrition and blood sugar control to help prevent podiatric complications of diabetes. Patient instructed on proper foot hygeine. We discussed wearing proper shoe gear, daily foot inspections, never walking without protective shoe gear, annual DM foot exam, sooner prn.      - With patient's permission, nails were aggressively reduced and debrided x 10 to their soft tissue attachment mechanically, removing all offending nail and debris. Patient relates relief following the procedure.

## 2023-01-16 PROBLEM — I63.329: Status: RESOLVED | Noted: 2022-10-12 | Resolved: 2023-01-16

## 2023-01-19 ENCOUNTER — CLINICAL SUPPORT (OUTPATIENT)
Dept: ENDOSCOPY | Facility: HOSPITAL | Age: 73
End: 2023-01-19
Attending: INTERNAL MEDICINE
Payer: MEDICARE

## 2023-01-19 DIAGNOSIS — Z12.11 SCREENING FOR COLON CANCER: ICD-10-CM

## 2023-01-19 DIAGNOSIS — Z12.11 SPECIAL SCREENING FOR MALIGNANT NEOPLASMS, COLON: Primary | ICD-10-CM

## 2023-01-19 RX ORDER — POLYETHYLENE GLYCOL 3350, SODIUM SULFATE ANHYDROUS, SODIUM BICARBONATE, SODIUM CHLORIDE, POTASSIUM CHLORIDE 236; 22.74; 6.74; 5.86; 2.97 G/4L; G/4L; G/4L; G/4L; G/4L
4 POWDER, FOR SOLUTION ORAL ONCE
Qty: 4000 ML | Refills: 0 | Status: SHIPPED | OUTPATIENT
Start: 2023-01-19 | End: 2023-01-19

## 2023-01-23 ENCOUNTER — TELEPHONE (OUTPATIENT)
Dept: INTERNAL MEDICINE | Facility: CLINIC | Age: 73
End: 2023-01-23

## 2023-01-23 NOTE — PROCEDURES
Routine Foot Care    Date/Time: 1/12/2023 10:30 AM  Performed by: Mayda Herrera DPM  Authorized by: Mayda Herrera DPM     Consent Done?:  Yes (Verbal)    Nail Care Type:  Debride  Location(s): All  (Left 1st Toe, Left 3rd Toe, Left 2nd Toe, Left 4th Toe, Left 5th Toe, Right 1st Toe, Right 2nd Toe, Right 3rd Toe, Right 4th Toe and Right 5th Toe)  Patient tolerance:  Patient tolerated the procedure well with no immediate complications

## 2023-01-23 NOTE — TELEPHONE ENCOUNTER
----- Message from Maryjane Farias MD sent at 1/11/2023 11:28 AM CST -----  Please inform - CT scan shows no cancer or kidney stones but he does have gallstones - please ask him if he is still having pain on the right side.    Please inform neck Xray looks good - bones and discs are normal and there is no instability seen with movement.

## 2023-01-31 NOTE — PROGRESS NOTES
HPI    DLS: 2022    Pt here for HVF review/OCT;  Pt states no eye pain or discomfort. Pt states he ran out of eye drops   about two weeks ago.     1. Traumatic glaucoma os   2. APD os   3. NS ou   4, H/O trauma os     Meds;  Cosopt BID OS  Last edited by Tamera Chew MD on 2/3/2023 11:27 AM.            Assessment /Plan     For exam results, see Encounter Report.    Traumatic glaucoma, left, indeterminate stage    Afferent pupillary defect, left    Nuclear sclerotic cataract of both eyes    History of eye trauma        LOST TO F/U 2019 TO 2022 - SAW DR GARCIA 8/3/2022 AND SENT BACK OVER - PT IS OFF GTTS        Glaucoma (type and duration)    GLAUCOMA - 2/2 trauma os - end stage    First HVF   2019   First photos   2019   Treatment / Drops started    Timolol BID OU , Azopt TID OU, Brimonidine BID OU       Stopped - ran out and did not refill            Family history    none        Glaucoma meds    Off all gtts (use to suse brim / azopt /timolol- os ) // off again 2/3/2023        H/O adverse rxn to glaucoma drops         LASERS    ??        GLAUCOMA SURGERIES    none        OTHER EYE SURGERIES    none        CDR    OD: 0.6 OS: 0.85        Tbase             Tmax                Ttarget    ??             HVF    2 test  to  - use to be Full od -- now with SALT - ptosis and was falling asleep  //  gen dep thru out         Gonio   +4 od // recess 360 os          CCT    585/599        OCT    2 test  to  - nl od // dec thru out os         Disc photos    2019    - Ttoday   14 - off drops again os   - Test done today    IOP CHECK -  to establish  // RE-ESTABLISH CARE // HVF / DFE / OCT - re-instruct on drop usage os // fill out a RTA form for patient - his card had jsut      2. + APD os     3. NS     4. [place] OCT    Abnormal od - ? Large cyst or V-M traction  (vision still 20/40)     Pt new patient to me, used to be seen in [facility] for glaucoma. Pt  "ran out of medications in June 2019. Pt has no eye pain, red eye , flashes, floaters, changes in vision. PT has no hx of surgery or family hx of glaucoma.     8/29/2022   Pt lost to f/u x several years - off gtts   Recommend restart eye drops   cosopt os bid- RE-START (2/3/2023)     Refer to retina (Nikki) - evaluation of right macula - see OCT - this is his "good" eye     F/u 4 months - review retina notes // IOP // gonio  "

## 2023-02-03 ENCOUNTER — OFFICE VISIT (OUTPATIENT)
Dept: OPHTHALMOLOGY | Facility: CLINIC | Age: 73
End: 2023-02-03
Payer: MEDICARE

## 2023-02-03 ENCOUNTER — CLINICAL SUPPORT (OUTPATIENT)
Dept: OPHTHALMOLOGY | Facility: CLINIC | Age: 73
End: 2023-02-03
Payer: MEDICARE

## 2023-02-03 DIAGNOSIS — H21.562 AFFERENT PUPILLARY DEFECT, LEFT: ICD-10-CM

## 2023-02-03 DIAGNOSIS — Z87.828 HISTORY OF EYE TRAUMA: ICD-10-CM

## 2023-02-03 DIAGNOSIS — H40.9 GLAUCOMA, UNSPECIFIED GLAUCOMA TYPE, UNSPECIFIED LATERALITY: ICD-10-CM

## 2023-02-03 DIAGNOSIS — H40.32X4 TRAUMATIC GLAUCOMA, LEFT, INDETERMINATE STAGE: Primary | ICD-10-CM

## 2023-02-03 DIAGNOSIS — H25.13 NUCLEAR SCLEROTIC CATARACT OF BOTH EYES: ICD-10-CM

## 2023-02-03 PROCEDURE — 3288F PR FALLS RISK ASSESSMENT DOCUMENTED: ICD-10-PCS | Mod: CPTII,S$GLB,, | Performed by: OPHTHALMOLOGY

## 2023-02-03 PROCEDURE — 1159F MED LIST DOCD IN RCRD: CPT | Mod: CPTII,S$GLB,, | Performed by: OPHTHALMOLOGY

## 2023-02-03 PROCEDURE — 92014 PR EYE EXAM, EST PATIENT,COMPREHESV: ICD-10-PCS | Mod: S$GLB,,, | Performed by: OPHTHALMOLOGY

## 2023-02-03 PROCEDURE — 1126F PR PAIN SEVERITY QUANTIFIED, NO PAIN PRESENT: ICD-10-PCS | Mod: CPTII,S$GLB,, | Performed by: OPHTHALMOLOGY

## 2023-02-03 PROCEDURE — 92083 EXTENDED VISUAL FIELD XM: CPT | Mod: S$GLB,,, | Performed by: OPHTHALMOLOGY

## 2023-02-03 PROCEDURE — 92133 CPTRZD OPH DX IMG PST SGM ON: CPT | Mod: S$GLB,,, | Performed by: OPHTHALMOLOGY

## 2023-02-03 PROCEDURE — 1101F PT FALLS ASSESS-DOCD LE1/YR: CPT | Mod: CPTII,S$GLB,, | Performed by: OPHTHALMOLOGY

## 2023-02-03 PROCEDURE — 1160F RVW MEDS BY RX/DR IN RCRD: CPT | Mod: CPTII,S$GLB,, | Performed by: OPHTHALMOLOGY

## 2023-02-03 PROCEDURE — 92014 COMPRE OPH EXAM EST PT 1/>: CPT | Mod: S$GLB,,, | Performed by: OPHTHALMOLOGY

## 2023-02-03 PROCEDURE — 1160F PR REVIEW ALL MEDS BY PRESCRIBER/CLIN PHARMACIST DOCUMENTED: ICD-10-PCS | Mod: CPTII,S$GLB,, | Performed by: OPHTHALMOLOGY

## 2023-02-03 PROCEDURE — 1101F PR PT FALLS ASSESS DOC 0-1 FALLS W/OUT INJ PAST YR: ICD-10-PCS | Mod: CPTII,S$GLB,, | Performed by: OPHTHALMOLOGY

## 2023-02-03 PROCEDURE — 1159F PR MEDICATION LIST DOCUMENTED IN MEDICAL RECORD: ICD-10-PCS | Mod: CPTII,S$GLB,, | Performed by: OPHTHALMOLOGY

## 2023-02-03 PROCEDURE — 3288F FALL RISK ASSESSMENT DOCD: CPT | Mod: CPTII,S$GLB,, | Performed by: OPHTHALMOLOGY

## 2023-02-03 PROCEDURE — 99999 PR PBB SHADOW E&M-EST. PATIENT-LVL III: ICD-10-PCS | Mod: PBBFAC,,, | Performed by: OPHTHALMOLOGY

## 2023-02-03 PROCEDURE — 92133 POSTERIOR SEGMENT OCT OPTIC NERVE(OCULAR COHERENCE TOMOGRAPHY) - OU - BOTH EYES: ICD-10-PCS | Mod: S$GLB,,, | Performed by: OPHTHALMOLOGY

## 2023-02-03 PROCEDURE — 92083 HUMPHREY VISUAL FIELD - OU - BOTH EYES: ICD-10-PCS | Mod: S$GLB,,, | Performed by: OPHTHALMOLOGY

## 2023-02-03 PROCEDURE — 1126F AMNT PAIN NOTED NONE PRSNT: CPT | Mod: CPTII,S$GLB,, | Performed by: OPHTHALMOLOGY

## 2023-02-03 PROCEDURE — 99999 PR PBB SHADOW E&M-EST. PATIENT-LVL III: CPT | Mod: PBBFAC,,, | Performed by: OPHTHALMOLOGY

## 2023-02-03 RX ORDER — DORZOLAMIDE HYDROCHLORIDE AND TIMOLOL MALEATE 20; 5 MG/ML; MG/ML
1 SOLUTION/ DROPS OPHTHALMIC 2 TIMES DAILY
Qty: 30 ML | Refills: 3 | Status: SHIPPED | OUTPATIENT
Start: 2023-02-03 | End: 2023-06-02 | Stop reason: SDUPTHER

## 2023-02-03 NOTE — PROGRESS NOTES
Rel/fix/coop poor ou chart checked for allergies patient stated that he was on multiple medications and it was hard to keep his eyes open and stay awake-TL

## 2023-02-23 ENCOUNTER — OFFICE VISIT (OUTPATIENT)
Dept: OPTOMETRY | Facility: CLINIC | Age: 73
End: 2023-02-23
Payer: MEDICARE

## 2023-02-23 DIAGNOSIS — H52.4 PRESBYOPIA: Primary | ICD-10-CM

## 2023-02-23 PROCEDURE — 3288F FALL RISK ASSESSMENT DOCD: CPT | Mod: CPTII,S$GLB,, | Performed by: OPTOMETRIST

## 2023-02-23 PROCEDURE — 1126F PR PAIN SEVERITY QUANTIFIED, NO PAIN PRESENT: ICD-10-PCS | Mod: CPTII,S$GLB,, | Performed by: OPTOMETRIST

## 2023-02-23 PROCEDURE — 1101F PR PT FALLS ASSESS DOC 0-1 FALLS W/OUT INJ PAST YR: ICD-10-PCS | Mod: CPTII,S$GLB,, | Performed by: OPTOMETRIST

## 2023-02-23 PROCEDURE — 99999 PR PBB SHADOW E&M-EST. PATIENT-LVL II: CPT | Mod: PBBFAC,,, | Performed by: OPTOMETRIST

## 2023-02-23 PROCEDURE — 1159F PR MEDICATION LIST DOCUMENTED IN MEDICAL RECORD: ICD-10-PCS | Mod: CPTII,S$GLB,, | Performed by: OPTOMETRIST

## 2023-02-23 PROCEDURE — 92015 DETERMINE REFRACTIVE STATE: CPT | Mod: S$GLB,,, | Performed by: OPTOMETRIST

## 2023-02-23 PROCEDURE — 1101F PT FALLS ASSESS-DOCD LE1/YR: CPT | Mod: CPTII,S$GLB,, | Performed by: OPTOMETRIST

## 2023-02-23 PROCEDURE — 3288F PR FALLS RISK ASSESSMENT DOCUMENTED: ICD-10-PCS | Mod: CPTII,S$GLB,, | Performed by: OPTOMETRIST

## 2023-02-23 PROCEDURE — 1159F MED LIST DOCD IN RCRD: CPT | Mod: CPTII,S$GLB,, | Performed by: OPTOMETRIST

## 2023-02-23 PROCEDURE — 92015 PR REFRACTION: ICD-10-PCS | Mod: S$GLB,,, | Performed by: OPTOMETRIST

## 2023-02-23 PROCEDURE — 1126F AMNT PAIN NOTED NONE PRSNT: CPT | Mod: CPTII,S$GLB,, | Performed by: OPTOMETRIST

## 2023-02-23 PROCEDURE — 99999 PR PBB SHADOW E&M-EST. PATIENT-LVL II: ICD-10-PCS | Mod: PBBFAC,,, | Performed by: OPTOMETRIST

## 2023-02-23 RX ORDER — IBUPROFEN 800 MG/1
800 TABLET ORAL EVERY 8 HOURS PRN
COMMUNITY
Start: 2023-02-13 | End: 2023-03-03

## 2023-02-23 RX ORDER — HYDROCODONE BITARTRATE AND ACETAMINOPHEN 5; 325 MG/1; MG/1
1 TABLET ORAL EVERY 6 HOURS PRN
COMMUNITY
Start: 2023-02-13 | End: 2023-03-03

## 2023-02-23 NOTE — PROGRESS NOTES
HPI     Blurred Vision     Additional comments: MR ck           Comments    Last eye exam was 2/3/23 with Dr. Chew.  Patient here to get a glasses rx today. Lost Cosopt bottle last week and   hasn't called pharmacy to get a new one.    Cosopt BID OS (last used a week ago)          Last edited by Julianna Pina MA on 2/23/2023  2:12 PM.            Assessment /Plan     For exam results, see Encounter Report.    Presbyopia           Bifocal rx given.  RTC as scheduled with Dr. Chew.

## 2023-02-24 ENCOUNTER — TELEPHONE (OUTPATIENT)
Dept: INTERNAL MEDICINE | Facility: CLINIC | Age: 73
End: 2023-02-24
Payer: MEDICARE

## 2023-02-24 NOTE — TELEPHONE ENCOUNTER
----- Message from Pearl Brown sent at 2/24/2023  8:52 AM CST -----  Contact: [phone]  Pt is calling for the dr he states he is needing to speak to you in regards to being denied RTA transportation please give return call ASAP

## 2023-03-02 DIAGNOSIS — Z12.11 SPECIAL SCREENING FOR MALIGNANT NEOPLASMS, COLON: Primary | ICD-10-CM

## 2023-03-02 RX ORDER — POLYETHYLENE GLYCOL 3350, SODIUM SULFATE ANHYDROUS, SODIUM BICARBONATE, SODIUM CHLORIDE, POTASSIUM CHLORIDE 236; 22.74; 6.74; 5.86; 2.97 G/4L; G/4L; G/4L; G/4L; G/4L
4 POWDER, FOR SOLUTION ORAL ONCE
Qty: 4000 ML | Refills: 0 | Status: SHIPPED | OUTPATIENT
Start: 2023-03-02 | End: 2023-03-03

## 2023-03-02 NOTE — TELEPHONE ENCOUNTER
No new care gaps identified.  [facility] Embedded Care Gaps. Reference number: [id number]. 3/02/2023   8:34:12 AM CST

## 2023-03-03 ENCOUNTER — OFFICE VISIT (OUTPATIENT)
Dept: INTERNAL MEDICINE | Facility: CLINIC | Age: 73
End: 2023-03-03
Payer: MEDICARE

## 2023-03-03 VITALS
SYSTOLIC BLOOD PRESSURE: 132 MMHG | WEIGHT: 216.94 LBS | BODY MASS INDEX: 31.06 KG/M2 | DIASTOLIC BLOOD PRESSURE: 84 MMHG | HEIGHT: 70 IN | OXYGEN SATURATION: 98 % | HEART RATE: 69 BPM

## 2023-03-03 DIAGNOSIS — I10 ESSENTIAL HYPERTENSION: ICD-10-CM

## 2023-03-03 DIAGNOSIS — E78.5 HYPERLIPIDEMIA, UNSPECIFIED HYPERLIPIDEMIA TYPE: ICD-10-CM

## 2023-03-03 DIAGNOSIS — E11.40 TYPE 2 DIABETES MELLITUS WITH DIABETIC NEUROPATHY, WITHOUT LONG-TERM CURRENT USE OF INSULIN: Primary | Chronic | ICD-10-CM

## 2023-03-03 DIAGNOSIS — E55.9 MILD VITAMIN D DEFICIENCY: ICD-10-CM

## 2023-03-03 DIAGNOSIS — N18.32 STAGE 3B CHRONIC KIDNEY DISEASE: ICD-10-CM

## 2023-03-03 DIAGNOSIS — I70.0 AORTIC ATHEROSCLEROSIS: ICD-10-CM

## 2023-03-03 PROCEDURE — 3072F LOW RISK FOR RETINOPATHY: CPT | Mod: CPTII,S$GLB,, | Performed by: INTERNAL MEDICINE

## 2023-03-03 PROCEDURE — 3052F HG A1C>EQUAL 8.0%<EQUAL 9.0%: CPT | Mod: CPTII,S$GLB,, | Performed by: INTERNAL MEDICINE

## 2023-03-03 PROCEDURE — 3008F BODY MASS INDEX DOCD: CPT | Mod: CPTII,S$GLB,, | Performed by: INTERNAL MEDICINE

## 2023-03-03 PROCEDURE — 1125F PR PAIN SEVERITY QUANTIFIED, PAIN PRESENT: ICD-10-PCS | Mod: CPTII,S$GLB,, | Performed by: INTERNAL MEDICINE

## 2023-03-03 PROCEDURE — 99999 PR PBB SHADOW E&M-EST. PATIENT-LVL V: CPT | Mod: PBBFAC,,, | Performed by: INTERNAL MEDICINE

## 2023-03-03 PROCEDURE — 3075F SYST BP GE 130 - 139MM HG: CPT | Mod: CPTII,S$GLB,, | Performed by: INTERNAL MEDICINE

## 2023-03-03 PROCEDURE — 3052F PR MOST RECENT HEMOGLOBIN A1C LEVEL 8.0 - < 9.0%: ICD-10-PCS | Mod: CPTII,S$GLB,, | Performed by: INTERNAL MEDICINE

## 2023-03-03 PROCEDURE — 99214 OFFICE O/P EST MOD 30 MIN: CPT | Mod: S$GLB,,, | Performed by: INTERNAL MEDICINE

## 2023-03-03 PROCEDURE — 3072F PR LOW RISK FOR RETINOPATHY: ICD-10-PCS | Mod: CPTII,S$GLB,, | Performed by: INTERNAL MEDICINE

## 2023-03-03 PROCEDURE — 99999 PR PBB SHADOW E&M-EST. PATIENT-LVL V: ICD-10-PCS | Mod: PBBFAC,,, | Performed by: INTERNAL MEDICINE

## 2023-03-03 PROCEDURE — 1125F AMNT PAIN NOTED PAIN PRSNT: CPT | Mod: CPTII,S$GLB,, | Performed by: INTERNAL MEDICINE

## 2023-03-03 PROCEDURE — 3288F PR FALLS RISK ASSESSMENT DOCUMENTED: ICD-10-PCS | Mod: CPTII,S$GLB,, | Performed by: INTERNAL MEDICINE

## 2023-03-03 PROCEDURE — 1101F PT FALLS ASSESS-DOCD LE1/YR: CPT | Mod: CPTII,S$GLB,, | Performed by: INTERNAL MEDICINE

## 2023-03-03 PROCEDURE — 99214 PR OFFICE/OUTPT VISIT, EST, LEVL IV, 30-39 MIN: ICD-10-PCS | Mod: S$GLB,,, | Performed by: INTERNAL MEDICINE

## 2023-03-03 PROCEDURE — 3079F PR MOST RECENT DIASTOLIC BLOOD PRESSURE 80-89 MM HG: ICD-10-PCS | Mod: CPTII,S$GLB,, | Performed by: INTERNAL MEDICINE

## 2023-03-03 PROCEDURE — 3008F PR BODY MASS INDEX (BMI) DOCUMENTED: ICD-10-PCS | Mod: CPTII,S$GLB,, | Performed by: INTERNAL MEDICINE

## 2023-03-03 PROCEDURE — 3079F DIAST BP 80-89 MM HG: CPT | Mod: CPTII,S$GLB,, | Performed by: INTERNAL MEDICINE

## 2023-03-03 PROCEDURE — 1101F PR PT FALLS ASSESS DOC 0-1 FALLS W/OUT INJ PAST YR: ICD-10-PCS | Mod: CPTII,S$GLB,, | Performed by: INTERNAL MEDICINE

## 2023-03-03 PROCEDURE — 3075F PR MOST RECENT SYSTOLIC BLOOD PRESS GE 130-139MM HG: ICD-10-PCS | Mod: CPTII,S$GLB,, | Performed by: INTERNAL MEDICINE

## 2023-03-03 PROCEDURE — 3288F FALL RISK ASSESSMENT DOCD: CPT | Mod: CPTII,S$GLB,, | Performed by: INTERNAL MEDICINE

## 2023-03-03 NOTE — PROGRESS NOTES
Subjective:       Patient ID: Jae Millan is a 72 y.o. male.    Chief Complaint: Follow-up and Diabetes    Follow-up  Pertinent negatives include no abdominal pain, chest pain, headaches, nausea or vomiting.   Diabetes  Pertinent negatives for hypoglycemia include no headaches or seizures. Pertinent negatives for diabetes include no chest pain. Pt is feeling okay.  No CP or SOB.  Review of Systems   Respiratory:  Negative for shortness of breath.    Cardiovascular:  Negative for chest pain.   Gastrointestinal:  Negative for abdominal pain, diarrhea, nausea and vomiting.   Genitourinary:  Negative for dysuria.   Neurological:  Negative for seizures, syncope and headaches.     Objective:      Physical Exam  Constitutional:       General: He is not in acute distress.     Appearance: He is well-developed.   Eyes:      Pupils: Pupils are equal, round, and reactive to light.   Neck:      Thyroid: No thyromegaly.      Vascular: No JVD.   Cardiovascular:      Rate and Rhythm: Normal rate and regular rhythm.      Heart sounds: Normal heart sounds. No murmur heard.    No friction rub. No gallop.   Pulmonary:      Effort: Pulmonary effort is normal.      Breath sounds: Normal breath sounds. No wheezing or rales.   Abdominal:      General: Bowel sounds are normal. There is no distension.      Palpations: Abdomen is soft. There is no mass.      Tenderness: There is no abdominal tenderness. There is no guarding or rebound.   Musculoskeletal:      Cervical back: Neck supple.   Lymphadenopathy:      Cervical: No cervical adenopathy.   Skin:     General: Skin is warm and dry.   Neurological:      Mental Status: He is alert and oriented to person, place, and time.   Psychiatric:         Behavior: Behavior normal.         Thought Content: Thought content normal.         Judgment: Judgment normal.       Assessment:       1. Type 2 diabetes mellitus with diabetic neuropathy, without long-term current use of insulin    2. Essential  hypertension    3. Hyperlipidemia, unspecified hyperlipidemia type    4. Mild vitamin D deficiency          Plan:   Type 2 diabetes mellitus with diabetic neuropathy, without long-term current use of insulin  -     TSH; Future; Expected date: 03/03/2023  -     Hemoglobin A1C; Future; Expected date: 03/03/2023    Essential hypertension  -     CBC Auto Differential; Future; Expected date: 03/03/2023  -     Comprehensive Metabolic Panel; Future; Expected date: 03/03/2023    Hyperlipidemia, unspecified hyperlipidemia type  -     Lipid Panel; Future; Expected date: 03/03/2023    Mild vitamin D deficiency  -     Vitamin D; Future; Expected date: 03/03/2023    Aortic Atherosclerosis  Stable on statin    CKD 3b  Stable - monitored

## 2023-03-05 RX ORDER — LOSARTAN POTASSIUM AND HYDROCHLOROTHIAZIDE 25; 100 MG/1; MG/1
1 TABLET ORAL DAILY
Qty: 90 TABLET | Refills: 3 | Status: SHIPPED | OUTPATIENT
Start: 2023-03-05 | End: 2024-02-01 | Stop reason: SDUPTHER

## 2023-03-09 ENCOUNTER — HOSPITAL ENCOUNTER (OUTPATIENT)
Facility: HOSPITAL | Age: 73
Discharge: HOME | End: 2023-03-09
Attending: COLON & RECTAL SURGERY | Admitting: COLON & RECTAL SURGERY
Payer: MEDICARE

## 2023-03-09 ENCOUNTER — ANESTHESIA EVENT (OUTPATIENT)
Dept: ENDOSCOPY | Facility: HOSPITAL | Age: 73
End: 2023-03-09
Payer: MEDICARE

## 2023-03-09 ENCOUNTER — ANESTHESIA (OUTPATIENT)
Dept: ENDOSCOPY | Facility: HOSPITAL | Age: 73
End: 2023-03-09
Payer: MEDICARE

## 2023-03-09 VITALS
TEMPERATURE: 98 F | HEIGHT: 70 IN | OXYGEN SATURATION: 100 % | SYSTOLIC BLOOD PRESSURE: 149 MMHG | WEIGHT: 213 LBS | HEART RATE: 63 BPM | RESPIRATION RATE: 16 BRPM | DIASTOLIC BLOOD PRESSURE: 90 MMHG | BODY MASS INDEX: 30.49 KG/M2

## 2023-03-09 DIAGNOSIS — Z12.11 COLON CANCER SCREENING: ICD-10-CM

## 2023-03-09 LAB — POCT GLUCOSE: 120 MG/DL (ref 70–110)

## 2023-03-09 PROCEDURE — 88305 TISSUE EXAM BY PATHOLOGIST: CPT | Mod: 26,,, | Performed by: STUDENT IN AN ORGANIZED HEALTH CARE EDUCATION/TRAINING PROGRAM

## 2023-03-09 PROCEDURE — E9220 PRA ENDO ANESTHESIA: ICD-10-PCS | Mod: PT,,, | Performed by: NURSE ANESTHETIST, CERTIFIED REGISTERED

## 2023-03-09 PROCEDURE — 63600175 PHARM REV CODE 636 W HCPCS: Performed by: NURSE ANESTHETIST, CERTIFIED REGISTERED

## 2023-03-09 PROCEDURE — 25000003 PHARM REV CODE 250: Performed by: NURSE ANESTHETIST, CERTIFIED REGISTERED

## 2023-03-09 PROCEDURE — 27201089 HC SNARE, DISP (ANY): Performed by: INTERNAL MEDICINE

## 2023-03-09 PROCEDURE — 37000009 HC ANESTHESIA EA ADD 15 MINS: Performed by: INTERNAL MEDICINE

## 2023-03-09 PROCEDURE — 37000008 HC ANESTHESIA 1ST 15 MINUTES: Performed by: INTERNAL MEDICINE

## 2023-03-09 PROCEDURE — 45385 COLONOSCOPY W/LESION REMOVAL: CPT | Mod: PT | Performed by: INTERNAL MEDICINE

## 2023-03-09 PROCEDURE — 27200997: Performed by: INTERNAL MEDICINE

## 2023-03-09 PROCEDURE — 45385 PR COLONOSCOPY,REMV LESN,SNARE: ICD-10-PCS | Mod: PT,,, | Performed by: INTERNAL MEDICINE

## 2023-03-09 PROCEDURE — 88305 TISSUE EXAM BY PATHOLOGIST: CPT | Performed by: STUDENT IN AN ORGANIZED HEALTH CARE EDUCATION/TRAINING PROGRAM

## 2023-03-09 PROCEDURE — 45385 COLONOSCOPY W/LESION REMOVAL: CPT | Mod: PT,,, | Performed by: INTERNAL MEDICINE

## 2023-03-09 PROCEDURE — E9220 PRA ENDO ANESTHESIA: HCPCS | Mod: PT,,, | Performed by: NURSE ANESTHETIST, CERTIFIED REGISTERED

## 2023-03-09 PROCEDURE — 88305 TISSUE EXAM BY PATHOLOGIST: ICD-10-PCS | Mod: 26,,, | Performed by: STUDENT IN AN ORGANIZED HEALTH CARE EDUCATION/TRAINING PROGRAM

## 2023-03-09 RX ORDER — PROPOFOL 10 MG/ML
VIAL (ML) INTRAVENOUS
Status: DISCONTINUED | OUTPATIENT
Start: 2023-03-09 | End: 2023-03-09

## 2023-03-09 RX ORDER — SODIUM CHLORIDE 9 MG/ML
INJECTION, SOLUTION INTRAVENOUS CONTINUOUS
Status: DISCONTINUED | OUTPATIENT
Start: 2023-03-09 | End: 2023-03-09 | Stop reason: HOSPADM

## 2023-03-09 RX ORDER — PROPOFOL 10 MG/ML
VIAL (ML) INTRAVENOUS CONTINUOUS PRN
Status: DISCONTINUED | OUTPATIENT
Start: 2023-03-09 | End: 2023-03-09

## 2023-03-09 RX ORDER — LIDOCAINE HYDROCHLORIDE 20 MG/ML
INJECTION INTRAVENOUS
Status: DISCONTINUED | OUTPATIENT
Start: 2023-03-09 | End: 2023-03-09

## 2023-03-09 RX ADMIN — Medication 150 MCG/KG/MIN: at 11:03

## 2023-03-09 RX ADMIN — SODIUM CHLORIDE: 9 INJECTION, SOLUTION INTRAVENOUS at 11:03

## 2023-03-09 RX ADMIN — PROPOFOL 50 MG: 10 INJECTION, EMULSION INTRAVENOUS at 11:03

## 2023-03-09 RX ADMIN — LIDOCAINE HYDROCHLORIDE 50 MG: 20 INJECTION INTRAVENOUS at 11:03

## 2023-03-09 NOTE — TRANSFER OF CARE
"Anesthesia Transfer of Care Note    Patient: Jae Millan    Procedure(s) Performed: Procedure(s) (LRB):  COLONOSCOPY (N/A)    Patient location: PACU    Anesthesia Type: general    Transport from OR: Transported from OR on room air with adequate spontaneous ventilation    Post pain: adequate analgesia    Post assessment: no apparent anesthetic complications    Post vital signs: stable    Level of consciousness: awake    Nausea/Vomiting: no nausea/vomiting    Complications: none    Transfer of care protocol was followed      Last vitals:   Visit Vitals  BP (!) 89/56 (BP Location: Left arm, Patient Position: Lying)   Pulse 61   Temp 36.5 °C (97.7 °F) (Temporal)   Resp 10   Ht 5' 10" (1.778 m)   Wt 96.6 kg (213 lb)   SpO2 (!) 94%   BMI 30.56 kg/m²     "

## 2023-03-09 NOTE — ANESTHESIA PREPROCEDURE EVALUATION
03/09/2023  Jae Millan is a 72 y.o., male.    2019   Normal left ventricular systolic function. The estimated ejection fraction is 65%   No wall motion abnormalities.   Normal LV diastolic function.   Normal right ventricular systolic function.   Mild right atrial enlargement.   Mild tricuspid regurgitation.   Mild pulmonic regurgitation.   Normal central venous pressure (3 mm Hg).   The estimated PA systolic pressure is 24 mm Hg    Patient Active Problem List   Diagnosis    Essential hypertension    Type 2 diabetes mellitus with neurologic complication, without long-term current use of insulin    Hyperlipidemia, mixed    History of CVA with residual deficit    H/O colonoscopy    Impaired functional mobility, balance, gait, and endurance    Hemiparesis affecting left side as late effect of cerebrovascular accident (CVA)    Range of motion deficit    Inguinal hernia of left side without obstruction or gangrene    History of hepatitis C, s/p successful Rx w/ cure (SVR12) 4/2020    Hypogonadism in male    Erectile dysfunction    Vitamin D deficiency    Stage 3b chronic kidney disease    Type 2 diabetes mellitus with kidney complication, without long-term current use of insulin    Impaired mobility and ADLs    Absent plantar grasp reflex     Past Surgical History:   Procedure Laterality Date    INGUINAL HERNIA REPAIR Left 12/11/2019    UNDESCENDED TESTICLE EXPLORATION Right     R testicle removed as it was undescended         Pre-op Assessment          Review of Systems      Physical Exam  General: Well nourished    Airway:  Mallampati: II   Mouth Opening: Normal  TM Distance: Normal  Tongue: Normal  Neck ROM: Normal ROM        Anesthesia Plan  Type of Anesthesia, risks & benefits discussed:    Anesthesia Type: Gen ETT, Gen Natural Airway  Intra-op Monitoring Plan: Standard ASA  Monitors  Post Op Pain Control Plan: multimodal analgesia  Induction:  IV  Airway Plan: Direct  Informed Consent: Informed consent signed with the Patient and all parties understand the risks and agree with anesthesia plan.  All questions answered.   ASA Score: 3  Day of Surgery Review of History & Physical: H&P Update referred to the surgeon/provider.    Ready For Surgery From Anesthesia Perspective.     .

## 2023-03-09 NOTE — PROVATION PATIENT INSTRUCTIONS
Discharge Summary/Instructions after an Endoscopic Procedure  Patient Name: Jae Millan  Patient MRN: [id number]  Patient YOB: 1950 Thursday, March 9, 2023  Kian Kraft MD  Dear patient,  As a result of recent federal legislation (The Federal Cures Act), you may   receive lab or pathology results from your procedure in your [facility]   account before your physician is able to contact you. Your physician or   their representative will relay the results to you with their   recommendations at their soonest availability.  Thank you,  RESTRICTIONS:  During your procedure today, you received medications for sedation.  These   medications may affect your judgment, balance and coordination.  Therefore,   for 24 hours, you have the following restrictions:   - DO NOT drive a car, operate machinery, make legal/financial decisions,   sign important papers or drink alcohol.    ACTIVITY:  Today: no heavy lifting, straining or running due to procedural   sedation/anesthesia.  The following day: return to full activity including work.  DIET:  Eat and drink normally unless instructed otherwise.     TREATMENT FOR COMMON SIDE EFFECTS:  - Mild abdominal pain, nausea, belching, bloating or excessive gas:  rest,   eat lightly and use a heating pad.  - Sore Throat: treat with throat lozenges and/or gargle with warm salt   water.  - Because air was used during the procedure, expelling large amounts of air   from your rectum or belching is normal.  - If a bowel prep was taken, you may not have a bowel movement for 1-3 days.    This is normal.  SYMPTOMS TO WATCH FOR AND REPORT TO YOUR PHYSICIAN:  1. Abdominal pain or bloating, other than gas cramps.  2. Chest pain.  3. Back pain.  4. Signs of infection such as: chills or fever occurring within 24 hours   after the procedure.  5. Rectal bleeding, which would show as bright red, maroon, or black stools.   (A tablespoon of blood from the rectum is not serious, especially if    hemorrhoids are present.)  6. Vomiting.  7. Weakness or dizziness.  GO DIRECTLY TO THE NEAREST EMERGENCY ROOM IF YOU HAVE ANY OF THE FOLLOWING:      Difficulty breathing              Chills and/or fever over 101 F   Persistent vomiting and/or vomiting blood   Severe abdominal pain   Severe chest pain   Black, tarry stools   Bleeding- more than one tablespoon   Any other symptom or condition that you feel may need urgent attention  Your doctor recommends these additional instructions:  If any biopsies were taken, your doctors clinic will contact you in 1 to 2   weeks with any results.  - Discharge patient to home (ambulatory).   - Patient has a contact number available for emergencies.  The signs and   symptoms of potential delayed complications were discussed with the   patient.  Return to normal activities tomorrow.  Written discharge   instructions were provided to the patient.   - Resume previous diet.   - Continue present medications.   - Return to primary care physician as previously scheduled.   - Repeat colonoscopy in 3 years for surveillance.  For questions, problems or results please call your physician - Kina Kraft MD at Work:  [phone].  [facility] [place], EMERGENCY ROOM PHONE NUMBER: [phone]  IF A COMPLICATION OR EMERGENCY SITUATION ARISES AND YOU ARE UNABLE TO REACH   YOUR PHYSICIAN - GO DIRECTLY TO THE EMERGENCY ROOM.  Kian Kraft MD  3/9/2023 11:47:08 AM  This report has been verified and signed electronically.  Dear patient,  As a result of recent federal legislation (The Federal Cures Act), you may   receive lab or pathology results from your procedure in your [facility]   account before your physician is able to contact you. Your physician or   their representative will relay the results to you with their   recommendations at their soonest availability.  Thank you,  PROVATION

## 2023-03-09 NOTE — H&P
Short Stay Endoscopy History and Physical    PCP - Maryjane Farias MD    Procedure - Colonoscopy  ASA - per anesthesia  Mallampati - per anesthesia  History of Anesthesia problems - no  Family history Anesthesia problems - no   Plan of anesthesia - General    HPI:  This is a 72 y.o. male here for evaluation of : asymptomatic screening exam      ROS:  Constitutional: No fevers, chills, No weight loss  CV: No chest pain  Pulm: No cough, No shortness of breath  GI: see HPI  Derm: No rash    Medical History:  has a past medical history of Cataract, Decreased sensation of lower extremity, Diabetes mellitus, Diabetic neuropathy, Dysarthria, Dysphagia, ED (erectile dysfunction), Glaucoma, Hemiparesis, High cholesterol, History of hepatitis C, s/p successful Rx w/ cure (SVR12) 4/2020, Hypertension, Impaired functional mobility, balance, gait, and endurance, Stroke, and Urinary frequency.    Surgical History:  has a past surgical history that includes Undescended testicle exploration (Right) and Inguinal hernia repair (Left, 12/11/2019).    Family History: family history includes Heart disease in his brother; Hypertension in his sister.. Otherwise no colon cancer, inflammatory bowel disease, or GI malignancies.    Social History:  reports that he quit smoking about 36 years ago. His smoking use included cigarettes. He has a 18.00 pack-year smoking history. He has never used smokeless tobacco. He reports that he does not currently use alcohol. He reports that he does not use drugs.    Review of patient's allergies indicates:  No Known Allergies    Medications:   Medications Prior to Admission   Medication Sig Dispense Refill Last Dose    amLODIPine (NORVASC) 10 MG tablet TAKE 1 TABLET(10 MG) BY MOUTH EVERY DAY FOR HYPERTENSION 90 tablet 3 3/8/2023    aspirin 81 MG Chew Take 1 tablet (81 mg total) by mouth once daily.   3/8/2023    carvediloL (COREG) 6.25 MG tablet TAKE 1 TABLET(6.25 MG) BY MOUTH TWICE DAILY WITH MEALS  180 tablet 3 3/8/2023    cholecalciferol, vitamin D3, (VITAMIN D3) 25 mcg (1,000 unit) capsule Take 5 capsules (5,000 Units total) by mouth once daily. 150 capsule 0 Past Week    diclofenac sodium (VOLTAREN) 1 % Gel Apply 2 g topically 3 (three) times daily as needed. Apply to painful joints 5 Tube 2 Past Month    dorzolamide-timolol 2-0.5% (COSOPT) 22.3-6.8 mg/mL ophthalmic solution Place 1 drop into the left eye 2 (two) times daily. 30 mL 3 Past Week    empagliflozin (JARDIANCE) 10 mg tablet Take 1 tablet (10 mg total) by mouth once daily. 90 tablet 3 3/8/2023    ergocalciferol (ERGOCALCIFEROL) 50,000 unit Cap One weekly 12 capsule 3 Past Week    gabapentin (NEURONTIN) 300 MG capsule Take 1 capsule (300 mg total) by mouth 2 (two) times daily. In 1-2 weeks, if no relief, may increase dose to 3 times per day.       glimepiride (AMARYL) 1 MG tablet Take 1 tablet (1 mg total) by mouth before breakfast. 90 tablet 3 3/8/2023    losartan-hydrochlorothiazide 100-25 mg (HYZAAR) 100-25 mg per tablet Take 1 tablet by mouth once daily. 90 tablet 3 3/8/2023    metFORMIN (GLUCOPHAGE) 1000 MG tablet Take 1 tablet (1,000 mg total) by mouth 2 (two) times daily with meals. For diabetes 180 tablet 3 3/8/2023    pantoprazole (PROTONIX) 40 MG tablet TAKE 1 TABLET(40 MG) BY MOUTH EVERY DAY FOR STOMACH 90 tablet 3 3/8/2023    rosuvastatin (CRESTOR) 20 MG tablet TAKE 1 TABLET(20 MG) BY MOUTH EVERY DAY 90 tablet 0 3/8/2023    SITagliptin (JANUVIA) 50 MG Tab Take 1 tablet (50 mg total) by mouth once daily. 90 tablet 3 3/8/2023    acetaminophen (TYLENOL) 500 MG tablet Take 1-2 tablets (500-1,000 mg total) by mouth 3 (three) times daily as needed for Pain.  0 More than a month    blood pressure test kit-wrist Kit 1 Units by NOT APPLICABLE route once daily.       blood sugar diagnostic Strp To check BG 4 times daily, to use with insurance preferred meter 100 each 11     ketoconazole (NIZORAL) 2 % cream Apply topically once daily. 1 Tube 3  "More than a month    lancets Misc Check bg 4 times daily 100 each 11     needle, disp, 18 G 18 gauge x 1" Ndle 1 Device by Misc.(Non-Drug; Combo Route) route every 14 (fourteen) days. Use to draw testosterone 10 each 11     needle, disp, 25 gauge 25 gauge x 1" Ndle 1 Device by Misc.(Non-Drug; Combo Route) route every 14 (fourteen) days. Use to inject testosterone 10 each 11     syringe, disposable, 3 mL Syrg 1 mL by Misc.(Non-Drug; Combo Route) route every 14 (fourteen) days. 10 each 11     tadalafiL (CIALIS) 20 MG Tab Take 1 tablet (20 mg total) by mouth Every 3 (three) days. for 10 days (Patient not taking: Reported on 2/23/2021) 20 tablet 9     testosterone cypionate (DEPOTESTOTERONE CYPIONATE) 200 mg/mL injection Inject 1 mL (200 mg total) into the muscle every 14 (fourteen) days. (Patient not taking: Reported on 5/4/2022) 10 mL 1          Physical Exam:    Vital Signs:   Vitals:    03/09/23 1015   BP: (!) 166/83   Pulse: 69   Resp: 14   Temp: 97.9 °F (36.6 °C)       Gen: NAD, lying comfortably  HENT: NCAT, oropharynx clear  Eyes: anicteric sclerae, EOMI grossly  Neck: supple, no visible masses/goiter  Cardiac: RRR  Lungs: non-labored breathing  Abd: soft, NT/ND, normoactive BS  Ext: no LE edema, warm, well perfused  Skin: skin intact on exposed body parts, no visible rashes, lesions  Neuro: A&Ox4, neuro exam grossly intact, moves all extremities  Psych: appropriate mood, affect        Labs:  Lab Results   Component Value Date    WBC 6.37 08/12/2022    HGB 13.6 (L) 08/12/2022    HCT 40.5 08/12/2022     08/12/2022    CHOL 168 03/25/2022    TRIG 136 03/25/2022    HDL 47 03/25/2022    ALT 17 12/08/2022    AST 14 12/08/2022     12/08/2022    K 4.2 12/08/2022     12/08/2022    CREATININE 1.7 (H) 12/08/2022    BUN 14 12/08/2022    CO2 24 12/08/2022    TSH 1.146 03/25/2022    PSA 0.29 08/15/2022    INR 1.1 10/01/2019    HGBA1C 8.5 (H) 12/08/2022       Plan:  Colonoscopy for CRC screening      I have " explained the risks and benefits of endoscopy procedures to the patient including but not limited to bleeding, perforation, infection, and death.  The patient was asked if they understand and allowed to ask any further questions to their satisfaction.    Duke Olivas MD

## 2023-03-10 NOTE — ANESTHESIA POSTPROCEDURE EVALUATION
Anesthesia Post Evaluation    Patient: Jae Millan    Procedure(s) Performed: Procedure(s) (LRB):  COLONOSCOPY (N/A)    Final Anesthesia Type: general      Patient location during evaluation: PACU  Patient participation: Yes- Able to Participate  Level of consciousness: awake and alert and oriented  Pain management: adequate  Airway patency: patent    PONV status at discharge: No PONV  Anesthetic complications: no      Cardiovascular status: blood pressure returned to baseline and hemodynamically stable  Respiratory status: unassisted  Hydration status: euvolemic  Follow-up not needed.          Vitals Value Taken Time   /90 03/09/23 1220   Temp 36.5 °C (97.7 °F) 03/09/23 1150   Pulse 63 03/09/23 1220   Resp 16 03/09/23 1220   SpO2 100 % 03/09/23 1220         Event Time   Out of Recovery 12:21:18         Pain/Rick Score: Rick Score: 10 (3/9/2023 12:05 PM)

## 2023-03-14 ENCOUNTER — TELEPHONE (OUTPATIENT)
Dept: INTERNAL MEDICINE | Facility: CLINIC | Age: 73
End: 2023-03-14
Payer: MEDICARE

## 2023-03-14 NOTE — TELEPHONE ENCOUNTER
----- Message from Shreya Aly sent at 3/14/2023  9:43 AM CDT -----  Contact: [phone] - pt  Pt calling in regards to RTA paperwork and why does he need the [employer]     Please fill that out and fax to [phone] - RTA     Pleases call pt and advise @ [phone] - pt

## 2023-03-15 ENCOUNTER — TELEPHONE (OUTPATIENT)
Dept: INTERNAL MEDICINE | Facility: CLINIC | Age: 73
End: 2023-03-15

## 2023-03-15 NOTE — TELEPHONE ENCOUNTER
----- Message from Maikol Soria sent at 3/15/2023 11:12 AM CDT -----  Contact: [phone]  Pt said he needs a call back about paperwork he needs filled out. Please call pt back.

## 2023-03-16 ENCOUNTER — LAB VISIT (OUTPATIENT)
Dept: LAB | Facility: HOSPITAL | Age: 73
End: 2023-03-16
Attending: INTERNAL MEDICINE
Payer: MEDICARE

## 2023-03-16 ENCOUNTER — PATIENT OUTREACH (OUTPATIENT)
Dept: ADMINISTRATIVE | Facility: HOSPITAL | Age: 73
End: 2023-03-16
Payer: MEDICARE

## 2023-03-16 DIAGNOSIS — E78.5 HYPERLIPIDEMIA, UNSPECIFIED HYPERLIPIDEMIA TYPE: ICD-10-CM

## 2023-03-16 DIAGNOSIS — I10 ESSENTIAL HYPERTENSION: ICD-10-CM

## 2023-03-16 DIAGNOSIS — E11.40 TYPE 2 DIABETES MELLITUS WITH DIABETIC NEUROPATHY, WITHOUT LONG-TERM CURRENT USE OF INSULIN: Chronic | ICD-10-CM

## 2023-03-16 DIAGNOSIS — E55.9 MILD VITAMIN D DEFICIENCY: ICD-10-CM

## 2023-03-16 LAB
25(OH)D3+25(OH)D2 SERPL-MCNC: 16 NG/ML (ref 30–96)
ALBUMIN SERPL BCP-MCNC: 4 G/DL (ref 3.5–5.2)
ALP SERPL-CCNC: 62 U/L (ref 55–135)
ALT SERPL W/O P-5'-P-CCNC: 20 U/L (ref 10–44)
ANION GAP SERPL CALC-SCNC: 12 MMOL/L (ref 8–16)
AST SERPL-CCNC: 16 U/L (ref 10–40)
BASOPHILS # BLD AUTO: 0.04 K/UL (ref 0–0.2)
BASOPHILS NFR BLD: 0.7 % (ref 0–1.9)
BILIRUB SERPL-MCNC: 0.5 MG/DL (ref 0.1–1)
BUN SERPL-MCNC: 22 MG/DL (ref 8–23)
CALCIUM SERPL-MCNC: 9.4 MG/DL (ref 8.7–10.5)
CHLORIDE SERPL-SCNC: 106 MMOL/L (ref 95–110)
CHOLEST SERPL-MCNC: 142 MG/DL (ref 120–199)
CHOLEST/HDLC SERPL: 3.2 {RATIO} (ref 2–5)
CO2 SERPL-SCNC: 25 MMOL/L (ref 23–29)
CREAT SERPL-MCNC: 1.8 MG/DL (ref 0.5–1.4)
DIFFERENTIAL METHOD: ABNORMAL
EOSINOPHIL # BLD AUTO: 0.1 K/UL (ref 0–0.5)
EOSINOPHIL NFR BLD: 2.2 % (ref 0–8)
ERYTHROCYTE [DISTWIDTH] IN BLOOD BY AUTOMATED COUNT: 14.6 % (ref 11.5–14.5)
EST. GFR  (NO RACE VARIABLE): 40 ML/MIN/1.73 M^2
ESTIMATED AVG GLUCOSE: 186 MG/DL (ref 68–131)
FINAL PATHOLOGIC DIAGNOSIS: NORMAL
GLUCOSE SERPL-MCNC: 160 MG/DL (ref 70–110)
GROSS: NORMAL
HBA1C MFR BLD: 8.1 % (ref 4–5.6)
HCT VFR BLD AUTO: 40.2 % (ref 40–54)
HDLC SERPL-MCNC: 44 MG/DL (ref 40–75)
HDLC SERPL: 31 % (ref 20–50)
HGB BLD-MCNC: 12.8 G/DL (ref 14–18)
IMM GRANULOCYTES # BLD AUTO: 0.01 K/UL (ref 0–0.04)
IMM GRANULOCYTES NFR BLD AUTO: 0.2 % (ref 0–0.5)
LDLC SERPL CALC-MCNC: 76.4 MG/DL (ref 63–159)
LYMPHOCYTES # BLD AUTO: 1.2 K/UL (ref 1–4.8)
LYMPHOCYTES NFR BLD: 19.5 % (ref 18–48)
Lab: NORMAL
MCH RBC QN AUTO: 26.4 PG (ref 27–31)
MCHC RBC AUTO-ENTMCNC: 31.8 G/DL (ref 32–36)
MCV RBC AUTO: 83 FL (ref 82–98)
MONOCYTES # BLD AUTO: 0.5 K/UL (ref 0.3–1)
MONOCYTES NFR BLD: 7.7 % (ref 4–15)
NEUTROPHILS # BLD AUTO: 4.2 K/UL (ref 1.8–7.7)
NEUTROPHILS NFR BLD: 69.7 % (ref 38–73)
NONHDLC SERPL-MCNC: 98 MG/DL
NRBC BLD-RTO: 0 /100 WBC
PLATELET # BLD AUTO: 262 K/UL (ref 150–450)
PMV BLD AUTO: 11.4 FL (ref 9.2–12.9)
POTASSIUM SERPL-SCNC: 4.1 MMOL/L (ref 3.5–5.1)
PROT SERPL-MCNC: 8 G/DL (ref 6–8.4)
RBC # BLD AUTO: 4.85 M/UL (ref 4.6–6.2)
SODIUM SERPL-SCNC: 143 MMOL/L (ref 136–145)
TRIGL SERPL-MCNC: 108 MG/DL (ref 30–150)
TSH SERPL DL<=0.005 MIU/L-ACNC: 2.09 UIU/ML (ref 0.4–4)
WBC # BLD AUTO: 5.95 K/UL (ref 3.9–12.7)

## 2023-03-16 PROCEDURE — 80053 COMPREHEN METABOLIC PANEL: CPT | Performed by: INTERNAL MEDICINE

## 2023-03-16 PROCEDURE — 80061 LIPID PANEL: CPT | Performed by: INTERNAL MEDICINE

## 2023-03-16 PROCEDURE — 83036 HEMOGLOBIN GLYCOSYLATED A1C: CPT | Performed by: INTERNAL MEDICINE

## 2023-03-16 PROCEDURE — 36415 COLL VENOUS BLD VENIPUNCTURE: CPT | Performed by: INTERNAL MEDICINE

## 2023-03-16 PROCEDURE — 85025 COMPLETE CBC W/AUTO DIFF WBC: CPT | Performed by: INTERNAL MEDICINE

## 2023-03-16 PROCEDURE — 82306 VITAMIN D 25 HYDROXY: CPT | Performed by: INTERNAL MEDICINE

## 2023-03-16 PROCEDURE — 84443 ASSAY THYROID STIM HORMONE: CPT | Performed by: INTERNAL MEDICINE

## 2023-03-16 NOTE — PROGRESS NOTES
Health Maintenance Due   Topic Date Due    Shingles Vaccine (1 of 2) Never done    Diabetes Urine Screening  04/19/2022    Hemoglobin A1c  03/08/2023     Chart review done. HM updated. Immunizations reviewed & updated. Care Everywhere updated.  Lab Appt noted 3/16/23.

## 2023-03-29 PROBLEM — I70.0 AORTIC ATHEROSCLEROSIS: Status: ACTIVE | Noted: 2023-03-29

## 2023-03-29 NOTE — TELEPHONE ENCOUNTER
I spoke with patient and msg from PA Scheuermann relayed.  Lab draw scheduled 11/18/20; appt notice mailed.   Stable and controlled on Effexor 75 mg daily

## 2023-04-03 NOTE — PROGRESS NOTES
"  Occupational Therapy Daily Treatment Note     Date: 9/11/2019  Name: Jae Millan  Clinic Number: [id number]    Therapy Diagnosis:   Encounter Diagnoses   Name Primary?    Self-care deficit in dressing     Self-care deficit for bathing     Range of motion deficit     Swelling of left hand     Decreased strength of upper extremity      Physician: Maryjane Farias MD       Physician Orders: Eval and treat  Medical Diagnosis: Cerebrovascular accident (CVA) due to thrombosis of anterior cerebral artery, unspecified blood vessel laterality  Evaluation Date: 8/8/2019  Plan of Care Expiration Period: 8/8/19 to 10/31/19  Insurance Authorization period Expiration: 12/31/19  Date of Return to MD: 8/29/19  FOTO: 10th visit  8/26/2019: 47% limited in Mobility    Visit # / Visits authorized: 9 / 20  Time In: 10:30am   Time Out: 11:15am  Total Billable Time: 45 minutes    Precautions:  Standard, Diabetes and HTN      Subjective     Pt reports: "I got my new cane!" "Thanks for helping set that up"  he was compliant with home exercise program given this session.   Response to previous treatment: positive  Functional change: ongoing    Pain:  5/10  Location: L shoulder    Objective     Pt. Arrived to treatment with his new quad cane    Jae participated in therapeutic exercise 15 minutes:  - UBE x6min forward x 6min backward left hand secured for increased cardiovascular endurance and activation of affected side; therapist's assistance needed for set up and safe transfer  - Kinesiotape to R hand for prolonged edema managemnt and facilitation of extensors       - Jae participated in manual therapy for 15 minutes:  - PROM of L UE; wrist ext/flex, Shoulder: IR/ER/FF/Abd with prolonged holds for each (breaks needed due to pain)    Jae participated in neuro re-ed for 15 minutes:  - Closed chain seated EOM weight bearing with lateral weight shifts, therapist's [place] assistance needed to maintain hand position  - Scapular " squeezes seated EOM for improved posture and shoulder stability       Home Exercises and Education Provided     Education provided:   - HEP review  - Progress towards goals     Written Home Exercises Provided: yes.  Exercises were reviewed and Jae was able to demonstrate them prior to the end of the session.  Jae demonstrated good  understanding of the HEP provided.   .   See EMR under Patient Instructions for exercises provided 8/12/2019.        Assessment     Jae tolerated treatment session well this date. Pt. Had slightly less reported pain this date. Edema in L hand was also reduced this date as well. He cont's with ROM and strength deficits limiting his full participation in ADL/IADL tasks.     Jae is progressing well towards his goals and there are no updates to goals at this time. Pt prognosis is Fair.     Pt will continue to benefit from skilled outpatient occupational therapy to address the deficits listed in the problem list on initial evaluation provide pt/family education and to maximize pt's level of independence in the home and community environment.     Anticipated barriers to occupational therapy: timeframe of current condition    Pt's spiritual, cultural and educational needs considered and pt agreeable to plan of care and goals.    Goals:  Short Term Goals: 6 weeks    Independent in HEP for self ROM left arm MET 8/19/2019   Pt to consistently elevate left hand to assist with edema when seated MET 8/19/2019   Pt will perform simple meal for self 3 days a week. MET 8/19/2019     LTG GOALS:  Time frame: 12 weeks  LB dressing will improve to by 10 minutes completion MET 8/26/2019  Pt will improve his ability to cut using adaptive device,   Bathing will improve by completion in 20 minutes     Simple meal prep will be performed 5 x week    Simple home care will improve to one task 4 x week(sweep, mop, dust, clean sinks)    Plan   Cont. With established POC  Updates/Grading for next session:   Progress as tolerated      IRLANDA Galicia    Partial Purse String (Intermediate) Text: Given the location of the defect and the characteristics of the surrounding skin an intermediate purse string closure was deemed most appropriate.  Undermining was performed circumfirentially around the surgical defect.  A purse string suture was then placed and tightened. Wound tension only allowed a partial closure of the circular defect.

## 2023-04-15 DIAGNOSIS — E11.40 TYPE 2 DIABETES MELLITUS WITH DIABETIC NEUROPATHY, WITHOUT LONG-TERM CURRENT USE OF INSULIN: ICD-10-CM

## 2023-04-15 NOTE — TELEPHONE ENCOUNTER
No new care gaps identified.  [facility] Embedded Care Gaps. Reference number: [id number]. 4/15/2023   3:35:21 PM CDT

## 2023-04-16 RX ORDER — EMPAGLIFLOZIN 10 MG/1
TABLET, FILM COATED ORAL
Qty: 90 TABLET | Refills: 1 | Status: SHIPPED | OUTPATIENT
Start: 2023-04-16 | End: 2023-09-21 | Stop reason: SDUPTHER

## 2023-04-16 RX ORDER — SITAGLIPTIN 50 MG/1
TABLET, FILM COATED ORAL
Qty: 90 TABLET | Refills: 1 | Status: SHIPPED | OUTPATIENT
Start: 2023-04-16 | End: 2023-09-21 | Stop reason: SDUPTHER

## 2023-04-16 NOTE — TELEPHONE ENCOUNTER
Refill Decision Note   Jae Millan  is requesting a refill authorization.  Brief Assessment and Rationale for Refill:  Approve     Medication Therapy Plan:  Cr. and eGFR are stable. No dose adjustment recommended.    Medication Reconciliation Completed: No   Comments:     No Care Gaps recommended.     Note composed:4:22 PM 04/16/2023

## 2023-04-17 ENCOUNTER — OFFICE VISIT (OUTPATIENT)
Dept: PODIATRY | Facility: CLINIC | Age: 73
End: 2023-04-17
Payer: MEDICARE

## 2023-04-17 VITALS
HEIGHT: 70 IN | WEIGHT: 213 LBS | BODY MASS INDEX: 30.49 KG/M2 | SYSTOLIC BLOOD PRESSURE: 152 MMHG | HEART RATE: 85 BPM | DIASTOLIC BLOOD PRESSURE: 87 MMHG

## 2023-04-17 DIAGNOSIS — L60.0 INGROWN RIGHT GREATER TOENAIL: Primary | ICD-10-CM

## 2023-04-17 DIAGNOSIS — L60.0 ONYCHOMYCOSIS WITH INGROWN TOENAIL: ICD-10-CM

## 2023-04-17 DIAGNOSIS — I69.354 HEMIPARESIS AFFECTING LEFT SIDE AS LATE EFFECT OF CEREBROVASCULAR ACCIDENT (CVA): ICD-10-CM

## 2023-04-17 DIAGNOSIS — B35.1 ONYCHOMYCOSIS WITH INGROWN TOENAIL: ICD-10-CM

## 2023-04-17 DIAGNOSIS — E11.40 TYPE 2 DIABETES MELLITUS WITH DIABETIC NEUROPATHY, WITHOUT LONG-TERM CURRENT USE OF INSULIN: ICD-10-CM

## 2023-04-17 PROCEDURE — 11721 PR DEBRIDEMENT OF NAILS, 6 OR MORE: ICD-10-PCS | Mod: Q9,S$GLB,, | Performed by: PODIATRIST

## 2023-04-17 PROCEDURE — 3052F PR MOST RECENT HEMOGLOBIN A1C LEVEL 8.0 - < 9.0%: ICD-10-PCS | Mod: CPTII,S$GLB,, | Performed by: PODIATRIST

## 2023-04-17 PROCEDURE — 3079F DIAST BP 80-89 MM HG: CPT | Mod: CPTII,S$GLB,, | Performed by: PODIATRIST

## 2023-04-17 PROCEDURE — 99499 RISK ADDL DX/OHS AUDIT: ICD-10-PCS | Mod: S$GLB,,, | Performed by: PODIATRIST

## 2023-04-17 PROCEDURE — 99999 PR PBB SHADOW E&M-EST. PATIENT-LVL II: CPT | Mod: PBBFAC,,, | Performed by: PODIATRIST

## 2023-04-17 PROCEDURE — 99499 UNLISTED E&M SERVICE: CPT | Mod: S$GLB,,, | Performed by: PODIATRIST

## 2023-04-17 PROCEDURE — 3079F PR MOST RECENT DIASTOLIC BLOOD PRESSURE 80-89 MM HG: ICD-10-PCS | Mod: CPTII,S$GLB,, | Performed by: PODIATRIST

## 2023-04-17 PROCEDURE — 11721 DEBRIDE NAIL 6 OR MORE: CPT | Mod: Q9,S$GLB,, | Performed by: PODIATRIST

## 2023-04-17 PROCEDURE — 3008F PR BODY MASS INDEX (BMI) DOCUMENTED: ICD-10-PCS | Mod: CPTII,S$GLB,, | Performed by: PODIATRIST

## 2023-04-17 PROCEDURE — 3077F PR MOST RECENT SYSTOLIC BLOOD PRESSURE >= 140 MM HG: ICD-10-PCS | Mod: CPTII,S$GLB,, | Performed by: PODIATRIST

## 2023-04-17 PROCEDURE — 3072F LOW RISK FOR RETINOPATHY: CPT | Mod: CPTII,S$GLB,, | Performed by: PODIATRIST

## 2023-04-17 PROCEDURE — 3072F PR LOW RISK FOR RETINOPATHY: ICD-10-PCS | Mod: CPTII,S$GLB,, | Performed by: PODIATRIST

## 2023-04-17 PROCEDURE — 3052F HG A1C>EQUAL 8.0%<EQUAL 9.0%: CPT | Mod: CPTII,S$GLB,, | Performed by: PODIATRIST

## 2023-04-17 PROCEDURE — 3077F SYST BP >= 140 MM HG: CPT | Mod: CPTII,S$GLB,, | Performed by: PODIATRIST

## 2023-04-17 PROCEDURE — 99999 PR PBB SHADOW E&M-EST. PATIENT-LVL II: ICD-10-PCS | Mod: PBBFAC,,, | Performed by: PODIATRIST

## 2023-04-17 PROCEDURE — 99213 PR OFFICE/OUTPT VISIT, EST, LEVL III, 20-29 MIN: ICD-10-PCS | Mod: 25,S$GLB,, | Performed by: PODIATRIST

## 2023-04-17 PROCEDURE — 99213 OFFICE O/P EST LOW 20 MIN: CPT | Mod: 25,S$GLB,, | Performed by: PODIATRIST

## 2023-04-17 PROCEDURE — 3008F BODY MASS INDEX DOCD: CPT | Mod: CPTII,S$GLB,, | Performed by: PODIATRIST

## 2023-04-17 NOTE — PROGRESS NOTES
Subjective:      Patient ID: Jae Millan is a 72 y.o. male.    Chief Complaint: No chief complaint on file.    Jae is a 72 y.o. male who presented new to this clinic in October 2022 and seen again 3 months ago. Prior to that was seen in Jan.2022 by .   The patient's chief complaint is thick & ingrown toenails especially R medial great toe.  Numbness & tingling since CVA B/L feet; also, pins and needles that hurt on occasion - on Gabapentin 300mg tid.   This patient has documented high risk feet requiring routine maintenance secondary to peripheral neuropathy.    PCP: Maryjane Farias MD    Date Last Seen by PCP: 3/3/23    Current shoe gear:  Tennis shoes    Using public transportation - lives [place] town near [facility].    Jae has a past medical history of Cataract, Decreased sensation of lower extremity, Diabetes mellitus, Diabetic neuropathy, Dysarthria, Dysphagia, ED (erectile dysfunction), Glaucoma, Hemiparesis, High cholesterol, History of hepatitis C, s/p successful Rx w/ cure (SVR12) 4/2020, Hypertension, Impaired functional mobility, balance, gait, and endurance, Stroke, and Urinary frequency.   Dx DM before Aug.2018 CVA - L hemiparesis UE & LE - PT.  Patient Active Problem List   Diagnosis    Essential hypertension    Type 2 diabetes mellitus with neurologic complication, without long-term current use of insulin    Hyperlipidemia, mixed    History of CVA with residual deficit    H/O colonoscopy    Impaired functional mobility, balance, gait, and endurance    Hemiparesis affecting left side as late effect of cerebrovascular accident (CVA)    Range of motion deficit    Inguinal hernia of left side without obstruction or gangrene    History of hepatitis C, s/p successful Rx w/ cure (SVR12) 4/2020    Hypogonadism in male    Erectile dysfunction    Vitamin D deficiency    Stage 3b chronic kidney disease    Type 2 diabetes mellitus with kidney complication, without long-term current use of  insulin    Impaired mobility and ADLs    Absent plantar grasp reflex    Aortic atherosclerosis      Hemoglobin A1C   Date Value Ref Range Status   03/16/2023 8.1 (H) 4.0 - 5.6 % Final     Comment:     ADA Screening Guidelines:  5.7-6.4%  Consistent with prediabetes  >or=6.5%  Consistent with diabetes    High levels of fetal hemoglobin interfere with the HbA1C  assay. Heterozygous hemoglobin variants (HbS, HgC, etc)do  not significantly interfere with this assay.   However, presence of multiple variants may affect accuracy.     12/08/2022 8.5 (H) 4.0 - 5.6 % Final     Comment:     ADA Screening Guidelines:  5.7-6.4%  Consistent with prediabetes  >or=6.5%  Consistent with diabetes    High levels of fetal hemoglobin interfere with the HbA1C  assay. Heterozygous hemoglobin variants (HbS, HgC, etc)do  not significantly interfere with this assay.   However, presence of multiple variants may affect accuracy.     08/12/2022 8.1 (H) 4.0 - 5.6 % Final     Comment:     ADA Screening Guidelines:  5.7-6.4%  Consistent with prediabetes  >or=6.5%  Consistent with diabetes    High levels of fetal hemoglobin interfere with the HbA1C  assay. Heterozygous hemoglobin variants (HbS, HgC, etc)do  not significantly interfere with this assay.   However, presence of multiple variants may affect accuracy.       Objective:      Physical Exam  Vitals reviewed.   Constitutional:       Appearance: He is well-developed.   Cardiovascular:      Pulses:         Dorsalis pedis pulses +2 B/L. Audible & phasic per Doppler.     Posterior tibial pulses are 1+ B/L.   Edema B/L - can't wear compression as too tight & states has whole leg swollen so not using anything.  Musculoskeletal:     Equinus B/L ankles with < 90 deg foot to leg noted with knees extended.       MS strength of extrinsics to foot and ankle B/L + 5/5 in DF/PF/Inv/Ev to resistance w/ no reproduction of pain in any direction.      Passive ROM of ankle and pedal joints is painless & w/out  crepitation B/L.      Pes valgus. Hammertoe deformities B/L.  Feet:      Nails are thickened, dystrophic, cryptotic & yellowish in coloration. Onychogryphosis 2 L. Significantly hypertrophic 1 & 4 B/L, 5 L. Symptomatic cryptotic medial R hallux nail border w/out sign of infection.  Skin:     General: Skin is dry. No open lesions noted.      Capillary Refill: Capillary refill takes more than 3 seconds.      Comments: Skin is thin, shiny, and cool, B/L.  Neurological:      Mental Status: He is alert.      Comments: diminished sensation noted BLE  including light touch, 2 point discrimination.     Paresthesias including pins and needles B/L feet w/ no clearly identified trigger or source; no hyperemia.      L hemiparesis - uses a cane.  Psych:  Patient is alert. Mood & affect is normal. Behavior is normal.    Assessment:       Encounter Diagnoses   Name Primary?    Hemiparesis affecting left side as late effect of cerebrovascular accident (CVA)     Type 2 diabetes mellitus with diabetic neuropathy, without long-term current use of insulin     Onychomycosis with ingrown toenail     Ingrown right greater toenail Yes     Problem List Items Addressed This Visit          Neuro    Hemiparesis affecting left side as late effect of cerebrovascular accident (CVA)       Endocrine    Type 2 diabetes mellitus with neurologic complication, without long-term current use of insulin (Chronic)    Relevant Orders    Routine Foot Care     Other Visit Diagnoses       Ingrown right greater toenail    -  Primary    Relevant Orders    Routine Foot Care    Onychomycosis with ingrown toenail        Relevant Orders    Routine Foot Care           Plan:       Diagnoses and all orders for this visit:    Ingrown right greater toenail  -     Routine Foot Care    Hemiparesis affecting left side as late effect of cerebrovascular accident (CVA)    Type 2 diabetes mellitus with diabetic neuropathy, without long-term current use of insulin  -     Routine  Foot Care    Onychomycosis with ingrown toenail  -     Routine Foot Care    I counseled the patient on his conditions, their implications and medical management.    - Shoe inspection. Diabetic Foot Education. Patient reminded of the importance of good nutrition and blood sugar control to help prevent podiatric complications of diabetes. Patient instructed on proper foot hygeine. We discussed wearing proper shoe gear, daily foot inspections, never walking without protective shoe gear, annual DM foot exam, sooner prn.      - With patient's permission, nails were aggressively reduced and debrided x 10 to their soft tissue attachment mechanically, removing all offending nail and debris.  The crypttic nail borders B/L hallux were cut back 3 mm from the nail fold and angled back to wedge out the offending nail in toto.  Patient relates relief following the procedure.     Tubigrip size F B/L dispensed        A total of 30 minutes was spent with chart review, patient visit and documentation.

## 2023-04-18 NOTE — PROCEDURES
Routine Foot Care    Date/Time: 4/17/2023 1:30 PM  Performed by: Mayda Herrera DPM  Authorized by: Mayda Herrera DPM     Consent Done?:  Yes (Verbal)    Nail Care Type:  Debride  Location(s): All  (Left 1st Toe, Left 3rd Toe, Left 2nd Toe, Left 4th Toe, Left 5th Toe, Right 1st Toe, Right 2nd Toe, Right 3rd Toe, Right 4th Toe and Right 5th Toe)  Patient tolerance:  Patient tolerated the procedure well with no immediate complications

## 2023-04-21 NOTE — TELEPHONE ENCOUNTER
-- DO NOT REPLY / DO NOT REPLY ALL --  -- Message is from [facility] ([facility]) --    General Patient Message: Patient need them to resend the lab orders for testosterone, Estrogen, Projustorne and the nurse would not be calling she asked the patient to call instead.     Caller Information       Type Contact Phone/Fax    04/20/2023 07:19 PM CDT Phone (Incoming) Navya Sabillon JAVIER (Self) [phone] (M)        Alternative phone number: na    Can a detailed message be left? Yes    Message Turnaround: [place]:    Refer to site's KB page for routing instructions    Please give this turnaround time to the caller:   \"You can expect to receive a response 1-3 business days after your provider's clinical team reviews the message\"               faxed

## 2023-05-18 NOTE — PLAN OF CARE
Physical Medicine and Rehabilitation Consultation              Date of initial consultation: 5/18/2023  Consulting provider: Joseph Fitzgerald D.O.  Reason for consultation: assess for acute inpatient rehab appropriateness  LOS: 3 Day(s)    Chief complaint: left hip pain     HPI: The patient is a 76 y.o. right hand dominant male with a past medical history of atrial fibrillation, renal cell carcinoma status post right nephrectomy, diabetes, hypertension, prostate cancer;  who presented on 5/15/2023  4:02 PM with left hip pain after syncopal episodes.  Work-up in the ED found left femoral neck fracture.  Patient was brought to the OR by Dr. Denver Carrillo MD on 5/16/2023 for left hip hemiarthroplasty.  Patient is weightbearing as tolerated with posterior precautions.  Etiology of fracture is felt to be pathologic due to osteoporosis.  Postoperatively, patient leukocytosis, which is now resolved.  Procalcitonin remains mildly elevated.    The patient currently reports overall doing well, if he does not move.  Patient reports he has left hip pain with mobility.  Patient states he would like to participate in more therapy.  Patient states he has great difficulty with walking, and lower body dressing.  Patient states he is unable to don pants independently, and is unable to reach his socks.  Patient reports he has good support from his spouse when he gets home, but needs to be more mobile before returning home.    ROS  Pertinent positives are mentioned in the HPI, all others reviewed and are negative.    Social Hx:  1 SH  2 LUCERO  With: Spouse    THERAPY:  Restrictions: Posterior hip precautions  PT: Functional mobility   5/17: Mod assist sit to stand and transfers    OT: ADLs  Pending    SLP:   None    IMAGING:  XR hip 5/15/2023  Acute comminuted and displaced fracture of the left femoral neck.    PROCEDURES:  Denver Carrillo MD 5/16/2023  Left hip hemiarthroplasty    PMH:  Past Medical History:   Diagnosis Date       Physical Therapy Daily Treatment Note      Name: Jae Millan  [facility] Number: [id number]     Therapy Diagnosis:   1. Impaired functional mobility, balance, gait, and endurance      2. Decreased strength      3. Decreased ROM of left shoulder            Physician: Charis Salazar MD     Visit Date: 8/5/2020      Physician Orders: Charis Salazar MD     Physician Orders: PT Eval and Treat neuro program  Medical Diagnosis from Referral: (I69.354) Hemiparesis affecting left side as late effect of cerebrovascular accident (CVA)  (primary encounter diagnosis)  (I63.329) Cerebrovascular accident (CVA) due to thrombosis of anterior cerebral artery, unspecified blood vessel laterality  (R47.1) Dysarthria  (M79.2) Neuropathic pain  (Z74.09) Impaired mobility and ADLs     Evaluation Date: 6/8/2020  Authorization Period Expiration: 12/31/2020  Plan of Care Expiration: 8/14/2020  New POC: 8/15/2020 to 10/9/2020  Visit # / Visits authorized: 9/ 10- kx modifier     Time In: 0932  Time Out: 1015  Total Billable Time: 43 minutes     Precautions: Standard     Subjective      Pt reports: I'm doing ok  He was compliant with home exercise program.  Response to previous treatment: felt pretty good  Functional change: ongoing     Pain: 0/10 prior to session, a little stiffness in LUE  Location: left arms      Objective      Bold= new or progression  Jae received therapeutic exercises to develop strength, endurance and core stabilization for 20 minutes including:     eval 7/6/2020 8/5/20   L shoulder AROM ER 15 deg 20 deg 43 deg   L hamstrings (SLR) 60 deg 67 deg 64 deg     Lower Extremity Strength    RLE eval LLE eval LLE 8/5/20   Hip Flexion: 4/5 4-/5 NT   Hip Extension:  4+/5 3+/5 4-/5   Hip Abduction: 3+/5 2+/5 NT   Hip Adduction: NT NT NT   Knee Extension: 5/5 4+/5 NT   Knee Flexion: 5/5 3+/5 NT   Ankle Dorsiflexion: 5/5 4+/5 NT   Ankle Plantarflexion: 4-/5 3-/5 3+/5   Ankle Inversion: NT NT NT   Ankle Eversion: NT NT  Arthritis     hands, knees, shoulders    Backpain     Cancer (HCC)     prostectomy, R kidney removal, skin    Diabetes     borderline    Fall     3 days ago    Hypertension     Prostate cancer (HCC)     Renal cancer (HCC)     Skin cancer        PSH:  Past Surgical History:   Procedure Laterality Date    PB PARTIAL HIP REPLACEMENT  5/16/2023    Procedure: HEMIARTHROPLASTY, HIP;  Surgeon: Denver Carrillo M.D.;  Location: SURGERY [facility];  Service: Orthopedics    GASTROSCOPY WITH BIOPSY  1/23/2012    Performed by OBDULIO ALEJANDRA at [facility] ORS    EGD W/ENDOSCOPIC ULTRASOUND  1/23/2012    Performed by OBDULIO ALEJANDRA at [facility] ORS    ERCP W/SPHINCTEROTOMY/PAPILL.  1/23/2012    Performed by O[facility]ULIO [facility]ANDRA at [facility]    ERCP W/REMOVAL CALCULUS  1/23/2012    Performed by OBDULIO ALEJANDRA at [facility]    PROSTATECTOMY, RADIAL  2005    OTHER  1984    Right kidney removed    OTHER ORTHOPEDIC SURGERY  1974    R knee surgery, repair    APPENDECTOMY  1966       FHX:  Family History   Problem Relation Age of Onset    Cancer Unknown     Diabetes Unknown     Hypertension Unknown     Heart Disease Unknown     Stroke Unknown        Medications:  Current Facility-Administered Medications   Medication Dose    lactated ringers infusion (BOLUS)  1,000 mL    cefTRIAXone (Rocephin) syringe 2,000 mg  2,000 mg    Pharmacy Consult Request ...Pain Management Review 1 Each  1 Each    oxyCODONE immediate-release (ROXICODONE) tablet 2.5 mg  2.5 mg    Or    oxyCODONE immediate-release (ROXICODONE) tablet 5 mg  5 mg    Or    HYDROmorphone (Dilaudid) injection 0.25 mg  0.25 mg    enoxaparin (Lovenox) inj 40 mg  40 mg    famotidine (PEPCID) tablet 20 mg  20 mg    calcium carbonate (Tums) chewable tab 500 mg  500 mg    metoprolol tartrate (LOPRESSOR) tablet 25 mg  25 mg    hydroCHLOROthiazide (HYDRODIURIL) tablet 12.5 mg  12.5 mg    senna-docusate (PERICOLACE or SENOKOT S) 8.6-50 MG per  NT     Shuttle: L single leg squat 50# 3 x 10   L heel raise 12.5# 3 x 10     Parallel bars DF stretch on wedge x 30 sec      Jae participated in neuromuscular re-education activities to improve: Balance, Coordination and mobility for 23 minutes. The following activities were included:      Evaluation 7/6/2020 8/5/20   5 times sit-stand 15 seconds without UE support  >12 sec= fall risk for general elderly  >16 sec= fall risk for Parkinson's disease  >10 sec= balance/vestibular dysfunction (<61 y/o)  >14.2 sec= balance/vestibular dysfunction (>61 y/o)  >12 sec= fall risk for CVA 13 sec without UE support 12 sec w/o UE support   FGA 20/30 (6/18/20) NT today 20/30     Functional Gait Assessment:   1. Gait on level surface =  1, 7.3 sec   (3) Normal: less than 5.5 sec, no A.D., no imbalance, normal gait pattern, deviates< 6in   (2) Mild impairment: 7-5.6 sec, uses A.D., mild gait deviations, or deviates 6-10 in   (1) Moderate impairment: > 7 sec, slow speed, imbalance, deviates 10-15 in.   (0) Severe impairment: needs assist, deviates >15 in, reach/touch wall  2. Change in Gait Speed = 2   (3) Normal: smooth change w/o loss of balance or gait deviation, deviates < 6 in, significant difference between speeds   (2) Mild impairment: changes speed, but demonstrates mild gait deviations, deviates 6-10 in, OR no deviations but unable to significantly speed, OR uses A.D.   (1) Moderate impairment: minor changes to speed, OR changes speed w/ significant deviations, deviates 10-15 in, OR  Changes speed , but loses balance & recovers   (0) Severe impairment: cannot change speed, deviates >15 in, or loses balance & needs assist  3. Gait with horizontal head turns  = 3   (3) Normal: no change in gait, deviates <6 in   (2) Mild impairment: slight change in speed, deviates 6-10 in, OR uses A.D.   (1) Moderate impairment: moderate change in speed, deviates 10-15 in   (0) Severe impairment: severe disruption of gait, deviates  "tablet 2 Tablet  2 Tablet    And    polyethylene glycol/lytes (MIRALAX) PACKET 1 Packet  1 Packet    And    magnesium hydroxide (MILK OF MAGNESIA) suspension 30 mL  30 mL    And    bisacodyl (DULCOLAX) suppository 10 mg  10 mg    ondansetron (ZOFRAN) syringe/vial injection 4 mg  4 mg    ondansetron (ZOFRAN ODT) dispertab 4 mg  4 mg    hydrALAZINE (APRESOLINE) injection 20 mg  20 mg    allopurinol (ZYLOPRIM) tablet 150 mg  150 mg    pregabalin (LYRICA) capsule 150 mg  150 mg    acetaminophen (TYLENOL) tablet 1,000 mg  1,000 mg    Followed by    [START ON 5/21/2023] acetaminophen (TYLENOL) tablet 1,000 mg  1,000 mg    gabapentin (NEURONTIN) capsule 100 mg  100 mg       Allergies:  Allergies   Allergen Reactions    Crestor [Rosuvastatin Calcium]     Levaquin     Metformin     Naproxen     Pcn [Penicillins]     Pravastatin     Simvastatin     Statins [Hmg-Coa-R Inhibitors]          Physical Exam:  Vitals: /66   Pulse 69   Temp 36.3 °C (97.3 °F) (Temporal)   Resp 18   Ht 1.905 m (6' 3\")   Wt 107 kg (235 lb 7.2 oz)   SpO2 92%   Gen: NAD  Head: NC/AT  Eyes/ Nose/ Mouth: moist mucous membranes  Cardio: RRR, good distal perfusion, warm extremities  Pulm: normal respiratory effort, no cyanosis   Abd: Soft NTND, negative borborygmi   Ext: No peripheral edema. No calf tenderness. No clubbing.  Surgical dressing in place in the left lateral proximal thigh    Mental status:  A&Ox4 (person, place, date, situation) answers questions appropriately follows commands  Speech: fluent, no aphasia or dysarthria    Motor:      Upper Extremity  Myotome R L   Shoulder flexion C5 5 5   Elbow flexion C5 5 5   Wrist extension C6 5 5   Elbow extension C7 5 5   Finger flexion C8 5 5   Finger abduction T1 5 5     Lower Extremity Myotome R L   Hip flexion L2 5 2/5   Knee extension L3 5 4/5   Ankle dorsiflexion L4 5 5   Toe extension L5 5 5   Ankle plantarflexion S1 5 5     Sensory:   intact to light touch through out    Labs: Reviewed " >15in  4. Gait with vertical head turns = 3   (3) Normal: no change in gait, deviates <6 in   (2) Mild impairment: slight change in speed, deviates 6-10 in OR uses A.D.   (1) Moderate impairment: moderate change in speed, deviates 10-15 in   (0) Severe impairment: severe disruption of gait, deviates >15 in  5. Gait with pivot turns = 2   (3) Normal: performs safely in 3 sec, no LOB   (2) Mild impairment: performs in >3 sec & no LOB, OR turns safely & requires several steps to regain LOB   (1) Moderate impairment: turns slow, OR requires several small steps for balance following turn & stop   (0) Severe impairment: cannot turn safely, needs assist  6. Step over obstacle = 2   (3) Normal: steps over 2 stacked boxes w/o change in speed or LOB   (2) Mild impairment: able to step over 1 box w/o change in speed or LOB   (1) Moderate impairment: steps over 1 box but must slow down, may require VC   (0) Severe impairment: cannot perform w/o assist  7. Gait with Narrow NORA = 0   (3) Normal: 10 steps no staggering   (2) Mild impairment: 7-9 steps   (1) Moderate impairment: 4-7 steps   (0) Severe impairment: < 4 steps or cannot perform w/o assist  8. Gait with eyes closed = 2   (3) Normal: < 7 sec, no A.D., no LOB, normal gait pattern, deviates <6 in   (2) Mild impairment: 7.1-9 sec, mild gait deviations, deviates 6-10 in   (1) Moderate impairment: > 9 sec, abnormal pattern, LOB, deviates 10-15 in   (0) Severe impairment: cannot perform w/o assist, LOB, deviates >15in  9. Ambulating Backwards = 2   (3) Normal: no A.D., no LOB, normal gait pattern, deviates <6in   (2) Mild impairment: uses A.D., slower speed, mild gait deviations, deviates 6-10 in   (1) Moderate impairment: slow speed, abnormal gait pattern, LOB, deviates 10-15 in   (0) Severe impairment: severe gait deviations or LOB, deviates >15in  10. Steps = 3   (3) Normal: alternating feet, no rail   (2) Mild Impairment: alternating feet, uses rail   (1) Moderate  and significant for   Recent Labs     05/16/23  0257 05/16/23 2356 05/17/23 0751 05/18/23  0243   RBC 5.27 4.74 4.41* 4.43*   HEMOGLOBIN 15.9 14.3 13.9* 13.7*   HEMATOCRIT 48.7 43.4 41.0* 41.1*   PLATELETCT 182 162* 147* 146*   PROTHROMBTM 14.1  --   --   --    INR 1.10  --   --   --      Recent Labs     05/16/23 2356 05/17/23 0751 05/18/23  0243   SODIUM 133* 136 139   POTASSIUM 4.2 4.1 3.7   CHLORIDE 98 101 100   CO2 25 25 26   GLUCOSE 145* 151* 112*   BUN 19 21 22   CREATININE 1.13 1.17 1.10   CALCIUM 8.7 8.5 8.5     Recent Results (from the past 24 hour(s))   CBC WITH DIFFERENTIAL    Collection Time: 05/18/23  2:43 AM   Result Value Ref Range    WBC 10.7 4.8 - 10.8 K/uL    RBC 4.43 (L) 4.70 - 6.10 M/uL    Hemoglobin 13.7 (L) 14.0 - 18.0 g/dL    Hematocrit 41.1 (L) 42.0 - 52.0 %    MCV 92.8 81.4 - 97.8 fL    MCH 30.9 27.0 - 33.0 pg    MCHC 33.3 (L) 33.7 - 35.3 g/dL    RDW 48.0 35.9 - 50.0 fL    Platelet Count 146 (L) 164 - 446 K/uL    MPV 10.0 9.0 - 12.9 fL    Neutrophils-Polys 71.70 44.00 - 72.00 %    Lymphocytes 13.70 (L) 22.00 - 41.00 %    Monocytes 11.90 0.00 - 13.40 %    Eosinophils 1.60 0.00 - 6.90 %    Basophils 0.60 0.00 - 1.80 %    Immature Granulocytes 0.50 0.00 - 0.90 %    Nucleated RBC 0.00 /100 WBC    Neutrophils (Absolute) 7.69 (H) 1.82 - 7.42 K/uL    Lymphs (Absolute) 1.47 1.00 - 4.80 K/uL    Monos (Absolute) 1.28 (H) 0.00 - 0.85 K/uL    Eos (Absolute) 0.17 0.00 - 0.51 K/uL    Baso (Absolute) 0.06 0.00 - 0.12 K/uL    Immature Granulocytes (abs) 0.05 0.00 - 0.11 K/uL    NRBC (Absolute) 0.00 K/uL   Comp Metabolic Panel    Collection Time: 05/18/23  2:43 AM   Result Value Ref Range    Sodium 139 135 - 145 mmol/L    Potassium 3.7 3.6 - 5.5 mmol/L    Chloride 100 96 - 112 mmol/L    Co2 26 20 - 33 mmol/L    Anion Gap 13.0 7.0 - 16.0    Glucose 112 (H) 65 - 99 mg/dL    Bun 22 8 - 22 mg/dL    Creatinine 1.10 0.50 - 1.40 mg/dL    Calcium 8.5 8.5 - 10.5 mg/dL    AST(SGOT) 30 12 - 45 U/L    ALT(SGPT) 19 2  impairment: step-to, uses rail   (0) Severe impairment: cannot perform safely  Score 20/30     Score:   <22/30 fall risk   <20/30 fall risk in older adults   <18/30 fall risk in Parkinsons       Parallel bar: Lunge onto bosu with x10, intermittent UE support   Airex pad standing and toe tap RLE onto 12'', 10 sec hold cone x 10- poor= balance   Tandem gait, intermittent UE support- 3 laps        Home Exercises Provided and Patient Education Provided      Education provided:   - continue with HEP   - reviewed results of assessments     Written Home Exercises Provided: Patient instructed to cont prior HEP.  Exercises were reviewed and Jae was able to demonstrate them prior to the end of the session.  Jae demonstrated good  understanding of the education provided.      See EMR under Patient Instructions for exercises provided 6/18/2020.     Assessment   Assessment period: 7/13/2020 to 8/5/2020   Jae tolerates PT sessions well. Pt demonstrates improvements on LE strength via sit<>stand test and stair negotiation. Specifically pt demonstrates improvement in L hip extension and plantar flexion. Pt is able to consistently perform reciprocal stair negotiation without use of a handrail. Balance during stair negotiation is rated fair+ to good-. Pt is able to ambulate between tasks in PT sessions without assistive device and without loss of balance. A slight decrease in L foot clearance and stance time is noted. Pt demonstrates impaired use of L ankle strategy for maintaining balance. This is due to decreased motor control and strength.  Pt can benefit from continued skilled PT to address LLE motor control and strength to improve balance and gait quality to normalize mobility and decrease fall risk.      Jae is progressing well towards his goals.   Pt prognosis is Good.      Pt will continue to benefit from skilled outpatient physical therapy to address the deficits listed in the problem list box on initial  - 50 U/L    Alkaline Phosphatase 63 30 - 99 U/L    Total Bilirubin 1.9 (H) 0.1 - 1.5 mg/dL    Albumin 3.3 3.2 - 4.9 g/dL    Total Protein 5.5 (L) 6.0 - 8.2 g/dL    Globulin 2.2 1.9 - 3.5 g/dL    A-G Ratio 1.5 g/dL   PROCALCITONIN    Collection Time: 05/18/23  2:43 AM   Result Value Ref Range    Procalcitonin 0.41 (H) <0.25 ng/mL   CORRECTED CALCIUM    Collection Time: 05/18/23  2:43 AM   Result Value Ref Range    Correct Calcium 9.1 8.5 - 10.5 mg/dL   ESTIMATED GFR    Collection Time: 05/18/23  2:43 AM   Result Value Ref Range    GFR (CKD-EPI) 70 >60 mL/min/1.73 m 2         ASSESSMENT:  Patient is a 76 y.o. male admitted with left hip fracture now s/p left hip hemiarthroplasty    [facility] Code / Diagnosis to Support: 0008.51 - Orthopaedic Disorders: Status Post Unilateral Hip Replacement    Rehabilitation: Impaired ADLs and mobility  Patient is a good candidate for inpatient rehab based on needs for PT, OT.  Patient will also benefit from family training.  Patient has a good discharge situation which will be home with spouse.     Barriers to transfer include: Insurance authorization, TCCs to verify disposition, medical clearance and bed availability     All cases are subject to administrative review and recommendations may change    Disposition recommendations:  -Good candidate for IPR.  Patient has deficits with mobility and ADLs secondary to his left hip fracture.  Patient has good family support from his spouse, and a stable discharge environment which is a single-story home with 2 stairs to enter.  -Awaiting OT evaluation, however per my exam patient has needs with occupational therapy that will qualify him for IPR  -TCC to submit to [employer] insurance for prior authorization  -TCC to verify discharge support through spouse  -PMR to follow in the periphery for rehab appropriateness, please reach out with questions or request for medical management    Medical Complexity:    Left hip fracture after ground-level fall and  evaluation, provide pt/family education and to maximize pt's level of independence in the home and community environment.      Pt's spiritual, cultural and educational needs considered and pt agreeable to plan of care and goals.     Anticipated barriers to physical therapy: length of time since onset     Goals:   Formal status as of 7/6/2020  Short Term Goals: 5 weeks   1. Pt will perform HEP for strengthening and range of Motion at least 2x/week to improve carryover of progress. Met 8/5/20  2. Pt will demonstrate improved speed of mobility and activity tolerance by performing 5 sit<>stands without UE support in 10 sec.  Improved- 12 sec, no UE support  3. Pt will demonstrate improved L hamstring passive range of motion to 70 deg straight leg raise to improve flexibility for all mobility.  same- 64 deg  4. Pt will demonstrate improved L shoulder ER active range of motion to 30 degrees to improve shoulder alignment to decrease pain.  met 8/5/2020- 43  deg  5. Assess Functional Gait Assessment and set goals as needed. Met 6/18/2020- 20/30  6. Pt will negotiate 10 steps with 1 handrail to demonstrate improve ability to negotiate a flight of stairs in case of a fire (drill).  met 8/5/2020- 20 steps without handrail,  reciprocal pattern     Long Term Goals: 10 weeks   1. Pt will demonstrate improved L shoulder ER active range of motion to 60 degrees to improve ability to flex shoulder and decrease pain.  improved  2. Pt will demonstrate improved strength of L plantar flexion to 4-/5 to improve foot clearance during swing phase.  improved  3. Pt will demonstrate improved L hip extension strength to 4/5 to improve standing balance and gait ability.  ongoing  4. Pt will negotiate 20 steps with handrail and mod I to demonstrate improved ability to negotiate stairs in case of emergency at apartment.  Ongoing  5. New 6/25/2020: pt will demonstrate decreased fall risk and improved balance during gait by scoring 23/30 on  Functional Gait Assessment. ongoing     Plan   New POC: 8/15/2020 to 10/9/2020 2x/week to include therapeutic exercise, gait training, and neuromuscular re education  Use UE support during lunging to improve lunge quality- may delete use of Bosu ball. Continue with use of shuttle for LLE strengthening     Naya Obregon, DPT, NCS, CBIS   8/5/2020                      syncopal episode  -Status post left hip hemiarthroplasty by Dr. Denver Carrillo MD on 5/16/2023  -WBAT with posterior hip precautions for LLE  -Fractures considered pathologic secondary to osteoporosis  -Postop pain control per primary team  -Continue PT OT while in-house  -Plan for IPR    Syncope  -Likely secondary to atrial fibrillation with RVR  -Rate control with metoprolol    Leukocytosis  -Unclear etiology  -Ceftriaxone 2 g daily IV  -WBC normalizing  -Close observation and monitoring with serial labs    Hypertension  -HCTZ 12.5 mg every morning  -Hydralazine as needed  -Metoprolol 25 mg twice daily    DVT PPX: Lovenox      Thank you for allowing us to participate in the care of this patient.     Patient was seen for >80 minutes on unit/floor of which > 50% of time was spent on counseling and coordination of care regarding the above, including prognosis, risk reduction, benefits of treatment, and options for next stage of care.    Chris Nolasco, DO   Physical Medicine and Rehabilitation     Please note that this dictation was created using voice recognition software. I have made every reasonable attempt to correct obvious errors, but there may be errors of grammar and possibly content that I did not discover before finalizing the note.

## 2023-05-31 ENCOUNTER — PATIENT OUTREACH (OUTPATIENT)
Dept: ADMINISTRATIVE | Facility: HOSPITAL | Age: 73
End: 2023-05-31
Payer: MEDICARE

## 2023-05-31 NOTE — PROGRESS NOTES
Health Maintenance Due   Topic Date Due    Shingles Vaccine (1 of 2) Never done    Diabetes Urine Screening  04/19/2022    COVID-19 Vaccine (6 - Moderna series) 02/28/2023     Chart reviewed.   Immunizations: Reconciled  Orders placed: N/A  Upcoming appts to satisfy PEDRO LUIS topics: N/A

## 2023-06-01 NOTE — PROGRESS NOTES
HPI    DLS: 2/03/2023    PT here for 4 Month Check;  Pt states vision is blurry especially at a distance. Pt states he needs   refills on his Cosopt eye drops.     Meds;  Cosopt BID OS    1. Traumatic glaucoma os   2. Angle recession os   3. APD os   4. NS ou   5, H/O trauma os     Last edited by Tamera Chew MD on 6/2/2023 10:31 AM.              Assessment /Plan     For exam results, see Encounter Report.    Traumatic glaucoma, left, severe stage    Angle recession of left eye    Afferent pupillary defect, left    Nuclear sclerotic cataract of both eyes    History of eye trauma    Traumatic glaucoma, left, indeterminate stage  -     dorzolamide-timolol 2-0.5% (COSOPT) 22.3-6.8 mg/mL ophthalmic solution; Place 1 drop into the left eye 2 (two) times daily.  Dispense: 10 mL; Refill: 12    Macular retinal cyst, right          LOST TO F/U 9/2019 TO 8/2022 - SAW DR GARCIA 8/3/2022 AND SENT BACK OVER - PT IS OFF GTTS        Glaucoma (type and duration)    GLAUCOMA - 2/2 trauma os - end stage    First HVF   2019   First photos   7/2019   Treatment / Drops started    Timolol BID OU , Azopt TID OU, Brimonidine BID OU       Stopped - ran out and did not refill            Family history    none        Glaucoma meds    Off all gtts (use to suse brim / azopt /timolol- os ) // off again 2/3/2023        H/O adverse rxn to glaucoma drops         LASERS    ??        GLAUCOMA SURGERIES    none        OTHER EYE SURGERIES    none        CDR    OD: 0.6 OS: 0.85        Tbase    16/18         Tmax    16/18            Ttarget    ??             HVF    2 test 2019 to 2023 - use to be Full od -- now with SALT - ptosis and was falling asleep  //  gen dep thru out         Gonio   +4 od // recess 360 os          CCT    585/599        OCT    2 test 2019 to 2021 - nl od // dec thru out os         Disc photos    7/2019    - Ttoday   18/17 ( down from 14/21 - back on gtts os )  - Test done today    IOP CHECK - back on gtts os   // gonio  "    2. Angle recession os     3. + APD os     4. NS     4. [place] OCT    Abnormal od - ? Large cyst or V-M traction  (vision still 20/40)     Pt new patient to me, used to be seen in [facility] for glaucoma. Pt ran out of medications in June 2019. Pt has no eye pain, red eye , flashes, floaters, changes in vision. PT has no hx of surgery or family hx of glaucoma.     8/29/2022   Pt lost to f/u x several years - off gtts   Recommend restart eye drops   cosopt os bid- RE-START (2/3/2023)     Refer to retina (Nikki) - evaluation of right macula - see OCT - this is his "good" eye // He missed the appt - will re-schedule     F/u 4 months - review retina notes // IOP // gonio// AND REPEAT MACULA oct - MOVING LINE   "

## 2023-06-02 ENCOUNTER — OFFICE VISIT (OUTPATIENT)
Dept: OPHTHALMOLOGY | Facility: CLINIC | Age: 73
End: 2023-06-02
Payer: MEDICARE

## 2023-06-02 DIAGNOSIS — H40.32X4 TRAUMATIC GLAUCOMA, LEFT, INDETERMINATE STAGE: ICD-10-CM

## 2023-06-02 DIAGNOSIS — H40.32X3 TRAUMATIC GLAUCOMA, LEFT, SEVERE STAGE: Primary | ICD-10-CM

## 2023-06-02 DIAGNOSIS — H25.13 NUCLEAR SCLEROTIC CATARACT OF BOTH EYES: ICD-10-CM

## 2023-06-02 DIAGNOSIS — H21.562 AFFERENT PUPILLARY DEFECT, LEFT: ICD-10-CM

## 2023-06-02 DIAGNOSIS — Z87.828 HISTORY OF EYE TRAUMA: ICD-10-CM

## 2023-06-02 DIAGNOSIS — H35.341 MACULAR RETINAL CYST, RIGHT: ICD-10-CM

## 2023-06-02 DIAGNOSIS — H21.552 ANGLE RECESSION OF LEFT EYE: ICD-10-CM

## 2023-06-02 PROCEDURE — 1126F PR PAIN SEVERITY QUANTIFIED, NO PAIN PRESENT: ICD-10-PCS | Mod: CPTII,S$GLB,, | Performed by: OPHTHALMOLOGY

## 2023-06-02 PROCEDURE — 1160F RVW MEDS BY RX/DR IN RCRD: CPT | Mod: CPTII,S$GLB,, | Performed by: OPHTHALMOLOGY

## 2023-06-02 PROCEDURE — 1126F AMNT PAIN NOTED NONE PRSNT: CPT | Mod: CPTII,S$GLB,, | Performed by: OPHTHALMOLOGY

## 2023-06-02 PROCEDURE — 3288F FALL RISK ASSESSMENT DOCD: CPT | Mod: CPTII,S$GLB,, | Performed by: OPHTHALMOLOGY

## 2023-06-02 PROCEDURE — 1101F PT FALLS ASSESS-DOCD LE1/YR: CPT | Mod: CPTII,S$GLB,, | Performed by: OPHTHALMOLOGY

## 2023-06-02 PROCEDURE — 1159F PR MEDICATION LIST DOCUMENTED IN MEDICAL RECORD: ICD-10-PCS | Mod: CPTII,S$GLB,, | Performed by: OPHTHALMOLOGY

## 2023-06-02 PROCEDURE — 99999 PR PBB SHADOW E&M-EST. PATIENT-LVL III: CPT | Mod: PBBFAC,,, | Performed by: OPHTHALMOLOGY

## 2023-06-02 PROCEDURE — 1101F PR PT FALLS ASSESS DOC 0-1 FALLS W/OUT INJ PAST YR: ICD-10-PCS | Mod: CPTII,S$GLB,, | Performed by: OPHTHALMOLOGY

## 2023-06-02 PROCEDURE — 3288F PR FALLS RISK ASSESSMENT DOCUMENTED: ICD-10-PCS | Mod: CPTII,S$GLB,, | Performed by: OPHTHALMOLOGY

## 2023-06-02 PROCEDURE — 99214 PR OFFICE/OUTPT VISIT, EST, LEVL IV, 30-39 MIN: ICD-10-PCS | Mod: S$GLB,,, | Performed by: OPHTHALMOLOGY

## 2023-06-02 PROCEDURE — 3052F HG A1C>EQUAL 8.0%<EQUAL 9.0%: CPT | Mod: CPTII,S$GLB,, | Performed by: OPHTHALMOLOGY

## 2023-06-02 PROCEDURE — 1160F PR REVIEW ALL MEDS BY PRESCRIBER/CLIN PHARMACIST DOCUMENTED: ICD-10-PCS | Mod: CPTII,S$GLB,, | Performed by: OPHTHALMOLOGY

## 2023-06-02 PROCEDURE — 1159F MED LIST DOCD IN RCRD: CPT | Mod: CPTII,S$GLB,, | Performed by: OPHTHALMOLOGY

## 2023-06-02 PROCEDURE — 99214 OFFICE O/P EST MOD 30 MIN: CPT | Mod: S$GLB,,, | Performed by: OPHTHALMOLOGY

## 2023-06-02 PROCEDURE — 3052F PR MOST RECENT HEMOGLOBIN A1C LEVEL 8.0 - < 9.0%: ICD-10-PCS | Mod: CPTII,S$GLB,, | Performed by: OPHTHALMOLOGY

## 2023-06-02 PROCEDURE — 99999 PR PBB SHADOW E&M-EST. PATIENT-LVL III: ICD-10-PCS | Mod: PBBFAC,,, | Performed by: OPHTHALMOLOGY

## 2023-06-02 RX ORDER — DORZOLAMIDE HYDROCHLORIDE AND TIMOLOL MALEATE 20; 5 MG/ML; MG/ML
1 SOLUTION/ DROPS OPHTHALMIC 2 TIMES DAILY
Qty: 10 ML | Refills: 12 | Status: SHIPPED | OUTPATIENT
Start: 2023-06-02 | End: 2023-09-25 | Stop reason: SDUPTHER

## 2023-06-08 ENCOUNTER — OFFICE VISIT (OUTPATIENT)
Dept: INTERNAL MEDICINE | Facility: CLINIC | Age: 73
End: 2023-06-08
Payer: MEDICARE

## 2023-06-08 VITALS
HEART RATE: 74 BPM | BODY MASS INDEX: 31.09 KG/M2 | DIASTOLIC BLOOD PRESSURE: 72 MMHG | SYSTOLIC BLOOD PRESSURE: 138 MMHG | WEIGHT: 217.13 LBS | OXYGEN SATURATION: 97 % | HEIGHT: 70 IN

## 2023-06-08 DIAGNOSIS — N40.0 BENIGN PROSTATIC HYPERPLASIA, UNSPECIFIED WHETHER LOWER URINARY TRACT SYMPTOMS PRESENT: ICD-10-CM

## 2023-06-08 DIAGNOSIS — N18.32 STAGE 3B CHRONIC KIDNEY DISEASE: Primary | ICD-10-CM

## 2023-06-08 DIAGNOSIS — R07.9 CHEST PAIN, UNSPECIFIED TYPE: ICD-10-CM

## 2023-06-08 DIAGNOSIS — Z12.5 ENCOUNTER FOR SCREENING FOR MALIGNANT NEOPLASM OF PROSTATE: ICD-10-CM

## 2023-06-08 DIAGNOSIS — E55.9 MILD VITAMIN D DEFICIENCY: ICD-10-CM

## 2023-06-08 DIAGNOSIS — E11.21 TYPE 2 DIABETES MELLITUS WITH DIABETIC NEPHROPATHY, WITHOUT LONG-TERM CURRENT USE OF INSULIN: ICD-10-CM

## 2023-06-08 DIAGNOSIS — I63.329 CEREBROVASCULAR ACCIDENT (CVA) DUE TO THROMBOSIS OF ANTERIOR CEREBRAL ARTERY, UNSPECIFIED BLOOD VESSEL LATERALITY: ICD-10-CM

## 2023-06-08 LAB
CREAT UR-MCNC: 96 MG/DL (ref 23–375)
PROT UR-MCNC: 17 MG/DL (ref 0–15)
PROT/CREAT UR: 0.18 MG/G{CREAT} (ref 0–0.2)

## 2023-06-08 PROCEDURE — 3078F PR MOST RECENT DIASTOLIC BLOOD PRESSURE < 80 MM HG: ICD-10-PCS | Mod: CPTII,S$GLB,, | Performed by: INTERNAL MEDICINE

## 2023-06-08 PROCEDURE — 99214 PR OFFICE/OUTPT VISIT, EST, LEVL IV, 30-39 MIN: ICD-10-PCS | Mod: S$GLB,,, | Performed by: INTERNAL MEDICINE

## 2023-06-08 PROCEDURE — 99214 OFFICE O/P EST MOD 30 MIN: CPT | Mod: S$GLB,,, | Performed by: INTERNAL MEDICINE

## 2023-06-08 PROCEDURE — 3075F PR MOST RECENT SYSTOLIC BLOOD PRESS GE 130-139MM HG: ICD-10-PCS | Mod: CPTII,S$GLB,, | Performed by: INTERNAL MEDICINE

## 2023-06-08 PROCEDURE — 93005 ELECTROCARDIOGRAM TRACING: CPT | Mod: S$GLB,,, | Performed by: INTERNAL MEDICINE

## 2023-06-08 PROCEDURE — 99999 PR PBB SHADOW E&M-EST. PATIENT-LVL V: ICD-10-PCS | Mod: PBBFAC,,, | Performed by: INTERNAL MEDICINE

## 2023-06-08 PROCEDURE — 3052F PR MOST RECENT HEMOGLOBIN A1C LEVEL 8.0 - < 9.0%: ICD-10-PCS | Mod: CPTII,S$GLB,, | Performed by: INTERNAL MEDICINE

## 2023-06-08 PROCEDURE — 93010 EKG 12-LEAD: ICD-10-PCS | Mod: S$GLB,,, | Performed by: INTERNAL MEDICINE

## 2023-06-08 PROCEDURE — 3288F PR FALLS RISK ASSESSMENT DOCUMENTED: ICD-10-PCS | Mod: CPTII,S$GLB,, | Performed by: INTERNAL MEDICINE

## 2023-06-08 PROCEDURE — 1101F PT FALLS ASSESS-DOCD LE1/YR: CPT | Mod: CPTII,S$GLB,, | Performed by: INTERNAL MEDICINE

## 2023-06-08 PROCEDURE — 3288F FALL RISK ASSESSMENT DOCD: CPT | Mod: CPTII,S$GLB,, | Performed by: INTERNAL MEDICINE

## 2023-06-08 PROCEDURE — 3072F LOW RISK FOR RETINOPATHY: CPT | Mod: CPTII,S$GLB,, | Performed by: INTERNAL MEDICINE

## 2023-06-08 PROCEDURE — 1125F AMNT PAIN NOTED PAIN PRSNT: CPT | Mod: CPTII,S$GLB,, | Performed by: INTERNAL MEDICINE

## 2023-06-08 PROCEDURE — 3078F DIAST BP <80 MM HG: CPT | Mod: CPTII,S$GLB,, | Performed by: INTERNAL MEDICINE

## 2023-06-08 PROCEDURE — 3075F SYST BP GE 130 - 139MM HG: CPT | Mod: CPTII,S$GLB,, | Performed by: INTERNAL MEDICINE

## 2023-06-08 PROCEDURE — 3008F PR BODY MASS INDEX (BMI) DOCUMENTED: ICD-10-PCS | Mod: CPTII,S$GLB,, | Performed by: INTERNAL MEDICINE

## 2023-06-08 PROCEDURE — 84156 ASSAY OF PROTEIN URINE: CPT | Performed by: INTERNAL MEDICINE

## 2023-06-08 PROCEDURE — 3008F BODY MASS INDEX DOCD: CPT | Mod: CPTII,S$GLB,, | Performed by: INTERNAL MEDICINE

## 2023-06-08 PROCEDURE — 93010 ELECTROCARDIOGRAM REPORT: CPT | Mod: S$GLB,,, | Performed by: INTERNAL MEDICINE

## 2023-06-08 PROCEDURE — 3052F HG A1C>EQUAL 8.0%<EQUAL 9.0%: CPT | Mod: CPTII,S$GLB,, | Performed by: INTERNAL MEDICINE

## 2023-06-08 PROCEDURE — 3072F PR LOW RISK FOR RETINOPATHY: ICD-10-PCS | Mod: CPTII,S$GLB,, | Performed by: INTERNAL MEDICINE

## 2023-06-08 PROCEDURE — 99999 PR PBB SHADOW E&M-EST. PATIENT-LVL V: CPT | Mod: PBBFAC,,, | Performed by: INTERNAL MEDICINE

## 2023-06-08 PROCEDURE — 93005 EKG 12-LEAD: ICD-10-PCS | Mod: S$GLB,,, | Performed by: INTERNAL MEDICINE

## 2023-06-08 PROCEDURE — 1125F PR PAIN SEVERITY QUANTIFIED, PAIN PRESENT: ICD-10-PCS | Mod: CPTII,S$GLB,, | Performed by: INTERNAL MEDICINE

## 2023-06-08 PROCEDURE — 1101F PR PT FALLS ASSESS DOC 0-1 FALLS W/OUT INJ PAST YR: ICD-10-PCS | Mod: CPTII,S$GLB,, | Performed by: INTERNAL MEDICINE

## 2023-06-08 RX ORDER — GLIMEPIRIDE 2 MG/1
2 TABLET ORAL
Qty: 90 TABLET | Refills: 3 | Status: SHIPPED | OUTPATIENT
Start: 2023-06-08 | End: 2024-06-07

## 2023-06-08 RX ORDER — ERGOCALCIFEROL 1.25 MG/1
CAPSULE ORAL
Qty: 12 CAPSULE | Refills: 3 | Status: SHIPPED | OUTPATIENT
Start: 2023-06-08

## 2023-06-08 NOTE — PROGRESS NOTES
Subjective:       Patient ID: Jae Millan is a 72 y.o. male.    Chief Complaint: Follow-up, Diabetes, Chest Pain, and Numbness (Left leg)    Follow-up  Associated symptoms include chest pain. Pertinent negatives include no abdominal pain, headaches, nausea or vomiting.   Diabetes  Pertinent negatives for hypoglycemia include no headaches or seizures. Associated symptoms include chest pain.   Chest Pain   Pertinent negatives include no abdominal pain, headaches, nausea, shortness of breath or vomiting.   Pertinent negatives for past medical history include no seizures. Pt reports some mild chest tightness when he walks a longer distance.  No arm or jaw pain.  No nausea, diaphoresis or SOB assoc.  He does have risk factors of HTN, HLP, DM and hx of CVA.    Review of Systems   Respiratory:  Negative for shortness of breath.    Cardiovascular:  Positive for chest pain.   Gastrointestinal:  Negative for abdominal pain, diarrhea, nausea and vomiting.   Genitourinary:  Negative for dysuria.   Neurological:  Negative for seizures, syncope and headaches.     Objective:      Physical Exam  Constitutional:       General: He is not in acute distress.     Appearance: He is well-developed.   Eyes:      Pupils: Pupils are equal, round, and reactive to light.   Neck:      Thyroid: No thyromegaly.      Vascular: No JVD.   Cardiovascular:      Rate and Rhythm: Normal rate and regular rhythm.      Heart sounds: Normal heart sounds. No murmur heard.    No friction rub. No gallop.   Pulmonary:      Effort: Pulmonary effort is normal.      Breath sounds: Normal breath sounds. No wheezing or rales.   Abdominal:      General: Bowel sounds are normal. There is no distension.      Palpations: Abdomen is soft. There is no mass.      Tenderness: There is no abdominal tenderness. There is no guarding or rebound.   Musculoskeletal:      Cervical back: Neck supple.   Lymphadenopathy:      Cervical: No cervical adenopathy.   Skin:     General:  Skin is warm and dry.      Comments: Fingernails on left hand clipped without incident.   Neurological:      Mental Status: He is alert and oriented to person, place, and time.   Psychiatric:         Behavior: Behavior normal.         Thought Content: Thought content normal.         Judgment: Judgment normal.       Assessment:       1. Stage 3b chronic kidney disease    2. Type 2 diabetes mellitus with diabetic nephropathy, without long-term current use of insulin    3. Mild vitamin D deficiency    4. Benign prostatic hyperplasia, unspecified whether lower urinary tract symptoms present    5. Chest pain, unspecified type    6. Encounter for screening for malignant neoplasm of prostate    7. Cerebrovascular accident (CVA) due to thrombosis of anterior cerebral artery, unspecified blood vessel laterality        Plan:   Stage 3b chronic kidney disease  -     Protein / creatinine ratio, urine  -     CBC Auto Differential; Future; Expected date: 06/08/2023  -     Comprehensive Metabolic Panel; Future; Expected date: 06/08/2023    Type 2 diabetes mellitus with diabetic nephropathy, without long-term current use of insulin  -     Hemoglobin A1C; Future; Expected date: 06/08/2023    Mild vitamin D deficiency  -     Vitamin D; Future; Expected date: 06/08/2023    Benign prostatic hyperplasia, unspecified whether lower urinary tract symptoms present  -     PSA, Screening; Future; Expected date: 06/08/2023    Chest pain, unspecified type  -     EKG 12-lead  -     Cardiac PET Scan Stress; Future  -     Ambulatory referral/consult to Cardiology; Future; Expected date: 06/15/2023    Encounter for screening for malignant neoplasm of prostate  -     PSA, Screening; Future; Expected date: 06/08/2023    Cerebrovascular accident (CVA) due to thrombosis of anterior cerebral artery, unspecified blood vessel laterality  -     Ambulatory referral/consult to Physical/Occupational Therapy; Future; Expected date: 06/15/2023    Other  orders  -     glimepiride (AMARYL) 2 MG tablet; Take 1 tablet (2 mg total) by mouth before breakfast.  Dispense: 90 tablet; Refill: 3  -     ergocalciferol (ERGOCALCIFEROL) 50,000 unit Cap; One weekly  Dispense: 12 capsule; Refill: 3    Pt advised chest pain does not resolve with stopping activity or worsens he is to go to the ED  Last time he felt it was over one week ago

## 2023-06-09 RX ORDER — ROSUVASTATIN CALCIUM 20 MG/1
TABLET, COATED ORAL
Qty: 90 TABLET | Refills: 3 | Status: SHIPPED | OUTPATIENT
Start: 2023-06-09

## 2023-06-09 NOTE — TELEPHONE ENCOUNTER
No care due was identified.  [facility] Embedded Care Due Messages. Reference number: [id number].   6/09/2023 12:13:49 PM CDT

## 2023-06-09 NOTE — TELEPHONE ENCOUNTER
Refill Decision Note   Jae Millan  is requesting a refill authorization.  Brief Assessment and Rationale for Refill:  Approve     Medication Therapy Plan:       Medication Reconciliation Completed: No   Comments:     No Care Gaps recommended.     Note composed:12:22 PM 06/09/2023

## 2023-06-12 ENCOUNTER — DOCUMENTATION ONLY (OUTPATIENT)
Dept: REHABILITATION | Facility: HOSPITAL | Age: 73
End: 2023-06-12
Payer: MEDICARE

## 2023-06-12 NOTE — PROGRESS NOTES
Outpatient Therapy Discharge Summary     Name: Jae Millan  [facility] Number: [id number]    Therapy Diagnosis: No diagnosis found.  Name Primary?    Cerebrovascular accident (CVA) due to thrombosis of anterior cerebral artery, unspecified blood vessel laterality      Impaired functional mobility, balance, gait, and endurance Yes    Physician: Maryjane Farias MD  Physician Orders: PT eval and Treat Stroke  Medical Diagnosis: I63.329 (ICD-10-CM) - Cerebrovascular accident (CVA) due to thrombosis of anterior cerebral artery, unspecified blood vessel laterality    Evaluation Date: 9/26/2022  Authorization Period Expiration: 11/25/2022  Plan of Care Certification Period: 12/16/2022  plan of care: 12/17/2022 to 1/13/2023    Date of Last visit: 12/16/22  Total Visits Received: 12    OBJECTIVE     ROM:   UPPER EXTREMITY--AROM/PROM  (R) UE: WFLs  (L) UE: refer to OT            RANGE OF MOTION--LOWER EXTREMITIES  (R) LE grossly within functional limits    (L) LE: Not tested due to unexpected d/c     Strength: manual muscle test grades below   Upper Extremity Strength  Refer to OT report for details     Lower Extremity Strength  Not tested due to unexpected d/c      Evaluation 12/9/22   5 times sit-stand 36 seconds, LUE, increased WB RLE  >12 sec= fall risk for general elderly  >16 sec= fall risk for Parkinson's disease  >10 sec= balance/vestibular dysfunction (<59 y/o)  >14.2 sec= balance/vestibular dysfunction (>59 y/o)  >12 sec= fall risk for CVA 14sec w/o UE   Rivas/ FGA/ Tinetti NT NT      Gait Assessment:   - AD used: SPC  - Assistance: modified independent   - Distance: community  - Curb: Mod I  - Ramp:  Mod I  - stairs: modified independent      Evaluation 11/1/22 12/9/22   Timed Up and Go 21.3s + 23.7s +22.6 sec=  22.5s w/o AD  < 20 sec safe for independent transfers,       < 30 sec assist required for transfers  Community dwelling older adults >13.5 sec   Chronic CVA >14 sec   Geriatric with h/o falls >15 sec    Frail elderly >32.6 sec    LE amputees >19 sec   PD >7.95- 11.5 sec   Hip OA >10 sec   Vestibular >11.1 sec    13.2s +11.1s + 10.5s= 11.6s 9.8s + 9.2s+ 8.8s= 9.3 sec   6 min walk test  ft 1042 ft     Timed Up and Go fall risk:   Community dwelling older adults >13.5 sec   Chronic CVA >14 sec   Geriatric with h/o falls >15 sec   Frail elderly >32.6 sec    LE amputees >19 sec   PD >7.95- 11.5 sec   Hip OA >10 sec   Vestibular >11.1 sec      Functional Mobility (Bed mobility, transfers)  Bed mobility: Mod I  Supine to sit: Mod I  Sit to supine: Mod I  Rolling: Mod I  Transfers to bed: Mod I  Transfers to toilet: not tested   Sit to stand:  Mod I  Stand pivot:  Mod I  Car transfers: Mod I  Wheelchair mobility: not applicable   Floor transfers: not tested     Written Home Exercises Provided: Patient instructed to cont prior HEP.    Jae demonstrated good  understanding of the education provided.     See EMR under Patient Instructions for exercises provided prior visit.      Assessment    72 year old male with diagnosis of Cerebrovascular accident (CVA) due to thrombosis of anterior cerebral artery referred to [facility] received skilled outpatient PT 9/26/22 to 12/16/2022. Patient did well with physical therapy meeting 3/4 short term goals and 3/4 long term goal set. Patient demonstrated progress with speed of mobility, lower extremity strength, and ambulation ability in various environments.  Patient was progressing towards discharge from physical therapy requiring finalization of home exercise program. Patient had to cancel scheduled discharge session due to weather and did not reschedule. Patient is discharge from physical therapy at this time. Formal status obtained from last physical therapy assessment on 12/9/22.    Goals:   Formal status as of 12/9/22  Short Term Goals:   Pt will report compliance with HEP for LE stretching and strengthening at least 3x/week to progress towards goals  set. Not met  Pt will demonstrate improved TUG speed to <20 sec demonstrate improved safety with household mobility.  met   Pt will demonstrate improved ease with sit<>stand for daily mobility by performing 5 times sit<>stand test in <30 sec. Met   Assess 2 min walk test and set goals as needed. Met   Assess stair negotiation and set goal as needed.  not needed     Long Term Goals: 12 weeks   Pt will demonstrate improved TUG speed to 14 sec demonstrate improved safety with decreased fall risk for household mobility.  met   Pt will demonstrate improved ease with sit<>stand for daily mobility by performing 5 times sit<>stand test in 15 sec. met   Pt will report only minimal difficulty negotiating uneven terrain for improved safety with community mobility. Met   New 11/1/22: patient will ambulate 1,117ft in 6 mins w/ SPC to demonstrate a mean detectable change in gait for improved mobility. Improved/ not met-     Discharge reason: Patient has not attended therapy since 12/16/22    Plan   This patient is discharged from Physical Therapy       Naya Obregon, PT, DPT,   Board-Certified Clinical Specialist in Neurologic Physical Therapy   Certified Brain Injury Specialist

## 2023-06-15 ENCOUNTER — OFFICE VISIT (OUTPATIENT)
Dept: OPHTHALMOLOGY | Facility: CLINIC | Age: 73
End: 2023-06-15
Payer: MEDICARE

## 2023-06-15 DIAGNOSIS — H40.32X3 TRAUMATIC GLAUCOMA, LEFT, SEVERE STAGE: ICD-10-CM

## 2023-06-15 DIAGNOSIS — H34.8310 BRANCH RETINAL VEIN OCCLUSION OF RIGHT EYE WITH MACULAR EDEMA: Primary | ICD-10-CM

## 2023-06-15 DIAGNOSIS — H25.13 NUCLEAR SCLEROTIC CATARACT OF BOTH EYES: ICD-10-CM

## 2023-06-15 PROCEDURE — 92202 PR OPHTHALMOSCOPY, EXT, W/DRAW OPTIC NERVE/MACULA, I&R, UNI/BI: ICD-10-PCS | Mod: 59,S$GLB,, | Performed by: OPHTHALMOLOGY

## 2023-06-15 PROCEDURE — 99999 PR PBB SHADOW E&M-EST. PATIENT-LVL III: CPT | Mod: PBBFAC,,, | Performed by: OPHTHALMOLOGY

## 2023-06-15 PROCEDURE — 3288F FALL RISK ASSESSMENT DOCD: CPT | Mod: CPTII,S$GLB,, | Performed by: OPHTHALMOLOGY

## 2023-06-15 PROCEDURE — 3052F HG A1C>EQUAL 8.0%<EQUAL 9.0%: CPT | Mod: CPTII,S$GLB,, | Performed by: OPHTHALMOLOGY

## 2023-06-15 PROCEDURE — 3052F PR MOST RECENT HEMOGLOBIN A1C LEVEL 8.0 - < 9.0%: ICD-10-PCS | Mod: CPTII,S$GLB,, | Performed by: OPHTHALMOLOGY

## 2023-06-15 PROCEDURE — 92134 OCT, RETINA - OU - BOTH EYES: ICD-10-PCS | Mod: S$GLB,,, | Performed by: OPHTHALMOLOGY

## 2023-06-15 PROCEDURE — 92134 CPTRZ OPH DX IMG PST SGM RTA: CPT | Mod: S$GLB,,, | Performed by: OPHTHALMOLOGY

## 2023-06-15 PROCEDURE — 1160F PR REVIEW ALL MEDS BY PRESCRIBER/CLIN PHARMACIST DOCUMENTED: ICD-10-PCS | Mod: CPTII,S$GLB,, | Performed by: OPHTHALMOLOGY

## 2023-06-15 PROCEDURE — 2023F DILAT RTA XM W/O RTNOPTHY: CPT | Mod: CPTII,S$GLB,, | Performed by: OPHTHALMOLOGY

## 2023-06-15 PROCEDURE — 99214 OFFICE O/P EST MOD 30 MIN: CPT | Mod: S$GLB,,, | Performed by: OPHTHALMOLOGY

## 2023-06-15 PROCEDURE — 1159F PR MEDICATION LIST DOCUMENTED IN MEDICAL RECORD: ICD-10-PCS | Mod: CPTII,S$GLB,, | Performed by: OPHTHALMOLOGY

## 2023-06-15 PROCEDURE — 92202 OPSCPY EXTND ON/MAC DRAW: CPT | Mod: 59,S$GLB,, | Performed by: OPHTHALMOLOGY

## 2023-06-15 PROCEDURE — 99214 PR OFFICE/OUTPT VISIT, EST, LEVL IV, 30-39 MIN: ICD-10-PCS | Mod: S$GLB,,, | Performed by: OPHTHALMOLOGY

## 2023-06-15 PROCEDURE — 1101F PT FALLS ASSESS-DOCD LE1/YR: CPT | Mod: CPTII,S$GLB,, | Performed by: OPHTHALMOLOGY

## 2023-06-15 PROCEDURE — 2023F PR DILATED RETINAL EXAM W/O EVID OF RETINOPATHY: ICD-10-PCS | Mod: CPTII,S$GLB,, | Performed by: OPHTHALMOLOGY

## 2023-06-15 PROCEDURE — 1159F MED LIST DOCD IN RCRD: CPT | Mod: CPTII,S$GLB,, | Performed by: OPHTHALMOLOGY

## 2023-06-15 PROCEDURE — 99999 PR PBB SHADOW E&M-EST. PATIENT-LVL III: ICD-10-PCS | Mod: PBBFAC,,, | Performed by: OPHTHALMOLOGY

## 2023-06-15 PROCEDURE — 1160F RVW MEDS BY RX/DR IN RCRD: CPT | Mod: CPTII,S$GLB,, | Performed by: OPHTHALMOLOGY

## 2023-06-15 PROCEDURE — 3288F PR FALLS RISK ASSESSMENT DOCUMENTED: ICD-10-PCS | Mod: CPTII,S$GLB,, | Performed by: OPHTHALMOLOGY

## 2023-06-15 PROCEDURE — 1126F AMNT PAIN NOTED NONE PRSNT: CPT | Mod: CPTII,S$GLB,, | Performed by: OPHTHALMOLOGY

## 2023-06-15 PROCEDURE — 1126F PR PAIN SEVERITY QUANTIFIED, NO PAIN PRESENT: ICD-10-PCS | Mod: CPTII,S$GLB,, | Performed by: OPHTHALMOLOGY

## 2023-06-15 PROCEDURE — 1101F PR PT FALLS ASSESS DOC 0-1 FALLS W/OUT INJ PAST YR: ICD-10-PCS | Mod: CPTII,S$GLB,, | Performed by: OPHTHALMOLOGY

## 2023-06-15 RX ORDER — KETOROLAC TROMETHAMINE 5 MG/ML
1 SOLUTION OPHTHALMIC 4 TIMES DAILY
Qty: 5 ML | Refills: 3 | Status: SHIPPED | OUTPATIENT
Start: 2023-06-15 | End: 2023-07-27 | Stop reason: SDUPTHER

## 2023-06-15 RX ORDER — PREDNISOLONE ACETATE 10 MG/ML
1 SUSPENSION/ DROPS OPHTHALMIC 4 TIMES DAILY
Qty: 5 ML | Refills: 3 | Status: SHIPPED | OUTPATIENT
Start: 2023-06-15 | End: 2023-07-27 | Stop reason: SDUPTHER

## 2023-06-15 NOTE — PROGRESS NOTES
"HPI    VA OU " aint good" belia at distance but old.  Gtts: Cosopt BID OS  Last edited by Lurdes Kraft on 6/15/2023  9:29 AM.         A/P    ICD-10-CM ICD-9-CM   1. Branch retinal vein occlusion of right eye with macular edema  H34.8310 362.36     362.83   2. Traumatic glaucoma, left, severe stage  H40.32X3 365.65     365.73   3. Nuclear sclerotic cataract of both eyes  H25.13 366.16       1. Branch retinal vein occlusion of right eye with macular edema  Referral from Dr. Chew for CME check  Hx glauc OS  Exam notable for small tertiary BRVO OD with IRF, VA 20/30, pt not bothered  Plan: discussed that IRF would benefit from antiVEGF injection, pt not bothered by visiona nd prefers not to have injeciton in better seeing eye, will try topical PF/Ket for 1 month QID OD and recheck, if worse can try Avastin, pt agrees    2. Traumatic glaucoma, left, severe stage  Remote hx of trauma in 1960s (finger to eye)  Nerve pallor  IOP 15 today, f/b Dr. Chew  Plan: Continue Present Management     3. Nuclear sclerotic cataract of both eyes  Mild NS, borderline VS OD given monocular  Plan: Observation for now, consider CEIOL in future    RTC 1 mo DFE/OCTm OU, poss Avastin OD       I saw and examined the patient and reviewed in detail the findings documented. The final examination findings, image interpretations, and plan as documented in the record represent my personal judgment and conclusions.    Titi Jordan MD  Vitreoretinal Surgery   [facility]  "

## 2023-07-10 ENCOUNTER — OFFICE VISIT (OUTPATIENT)
Dept: CARDIOLOGY | Facility: CLINIC | Age: 73
End: 2023-07-10
Payer: MEDICARE

## 2023-07-10 VITALS
OXYGEN SATURATION: 96 % | WEIGHT: 217.81 LBS | HEART RATE: 79 BPM | SYSTOLIC BLOOD PRESSURE: 132 MMHG | DIASTOLIC BLOOD PRESSURE: 80 MMHG | BODY MASS INDEX: 35.01 KG/M2 | HEIGHT: 66 IN

## 2023-07-10 DIAGNOSIS — R07.9 CHEST PAIN, UNSPECIFIED TYPE: ICD-10-CM

## 2023-07-10 DIAGNOSIS — I20.0 UNSTABLE ANGINA: Primary | ICD-10-CM

## 2023-07-10 PROCEDURE — 1125F AMNT PAIN NOTED PAIN PRSNT: CPT | Mod: CPTII,GC,S$GLB, | Performed by: STUDENT IN AN ORGANIZED HEALTH CARE EDUCATION/TRAINING PROGRAM

## 2023-07-10 PROCEDURE — 3052F HG A1C>EQUAL 8.0%<EQUAL 9.0%: CPT | Mod: CPTII,GC,S$GLB, | Performed by: STUDENT IN AN ORGANIZED HEALTH CARE EDUCATION/TRAINING PROGRAM

## 2023-07-10 PROCEDURE — 1101F PR PT FALLS ASSESS DOC 0-1 FALLS W/OUT INJ PAST YR: ICD-10-PCS | Mod: CPTII,GC,S$GLB, | Performed by: STUDENT IN AN ORGANIZED HEALTH CARE EDUCATION/TRAINING PROGRAM

## 2023-07-10 PROCEDURE — 3288F PR FALLS RISK ASSESSMENT DOCUMENTED: ICD-10-PCS | Mod: CPTII,GC,S$GLB, | Performed by: STUDENT IN AN ORGANIZED HEALTH CARE EDUCATION/TRAINING PROGRAM

## 2023-07-10 PROCEDURE — 1159F MED LIST DOCD IN RCRD: CPT | Mod: CPTII,GC,S$GLB, | Performed by: STUDENT IN AN ORGANIZED HEALTH CARE EDUCATION/TRAINING PROGRAM

## 2023-07-10 PROCEDURE — 99999 PR PBB SHADOW E&M-EST. PATIENT-LVL V: ICD-10-PCS | Mod: PBBFAC,GC,, | Performed by: STUDENT IN AN ORGANIZED HEALTH CARE EDUCATION/TRAINING PROGRAM

## 2023-07-10 PROCEDURE — 1159F PR MEDICATION LIST DOCUMENTED IN MEDICAL RECORD: ICD-10-PCS | Mod: CPTII,GC,S$GLB, | Performed by: STUDENT IN AN ORGANIZED HEALTH CARE EDUCATION/TRAINING PROGRAM

## 2023-07-10 PROCEDURE — 3052F PR MOST RECENT HEMOGLOBIN A1C LEVEL 8.0 - < 9.0%: ICD-10-PCS | Mod: CPTII,GC,S$GLB, | Performed by: STUDENT IN AN ORGANIZED HEALTH CARE EDUCATION/TRAINING PROGRAM

## 2023-07-10 PROCEDURE — 3072F LOW RISK FOR RETINOPATHY: CPT | Mod: CPTII,GC,S$GLB, | Performed by: STUDENT IN AN ORGANIZED HEALTH CARE EDUCATION/TRAINING PROGRAM

## 2023-07-10 PROCEDURE — 3288F FALL RISK ASSESSMENT DOCD: CPT | Mod: CPTII,GC,S$GLB, | Performed by: STUDENT IN AN ORGANIZED HEALTH CARE EDUCATION/TRAINING PROGRAM

## 2023-07-10 PROCEDURE — 3008F PR BODY MASS INDEX (BMI) DOCUMENTED: ICD-10-PCS | Mod: CPTII,GC,S$GLB, | Performed by: STUDENT IN AN ORGANIZED HEALTH CARE EDUCATION/TRAINING PROGRAM

## 2023-07-10 PROCEDURE — 3079F PR MOST RECENT DIASTOLIC BLOOD PRESSURE 80-89 MM HG: ICD-10-PCS | Mod: CPTII,GC,S$GLB, | Performed by: STUDENT IN AN ORGANIZED HEALTH CARE EDUCATION/TRAINING PROGRAM

## 2023-07-10 PROCEDURE — 99999 PR PBB SHADOW E&M-EST. PATIENT-LVL V: CPT | Mod: PBBFAC,GC,, | Performed by: STUDENT IN AN ORGANIZED HEALTH CARE EDUCATION/TRAINING PROGRAM

## 2023-07-10 PROCEDURE — 3079F DIAST BP 80-89 MM HG: CPT | Mod: CPTII,GC,S$GLB, | Performed by: STUDENT IN AN ORGANIZED HEALTH CARE EDUCATION/TRAINING PROGRAM

## 2023-07-10 PROCEDURE — 3075F PR MOST RECENT SYSTOLIC BLOOD PRESS GE 130-139MM HG: ICD-10-PCS | Mod: CPTII,GC,S$GLB, | Performed by: STUDENT IN AN ORGANIZED HEALTH CARE EDUCATION/TRAINING PROGRAM

## 2023-07-10 PROCEDURE — 99213 OFFICE O/P EST LOW 20 MIN: CPT | Mod: GC,S$GLB,, | Performed by: STUDENT IN AN ORGANIZED HEALTH CARE EDUCATION/TRAINING PROGRAM

## 2023-07-10 PROCEDURE — 1101F PT FALLS ASSESS-DOCD LE1/YR: CPT | Mod: CPTII,GC,S$GLB, | Performed by: STUDENT IN AN ORGANIZED HEALTH CARE EDUCATION/TRAINING PROGRAM

## 2023-07-10 PROCEDURE — 1125F PR PAIN SEVERITY QUANTIFIED, PAIN PRESENT: ICD-10-PCS | Mod: CPTII,GC,S$GLB, | Performed by: STUDENT IN AN ORGANIZED HEALTH CARE EDUCATION/TRAINING PROGRAM

## 2023-07-10 PROCEDURE — 3075F SYST BP GE 130 - 139MM HG: CPT | Mod: CPTII,GC,S$GLB, | Performed by: STUDENT IN AN ORGANIZED HEALTH CARE EDUCATION/TRAINING PROGRAM

## 2023-07-10 PROCEDURE — 99213 PR OFFICE/OUTPT VISIT, EST, LEVL III, 20-29 MIN: ICD-10-PCS | Mod: GC,S$GLB,, | Performed by: STUDENT IN AN ORGANIZED HEALTH CARE EDUCATION/TRAINING PROGRAM

## 2023-07-10 PROCEDURE — 3008F BODY MASS INDEX DOCD: CPT | Mod: CPTII,GC,S$GLB, | Performed by: STUDENT IN AN ORGANIZED HEALTH CARE EDUCATION/TRAINING PROGRAM

## 2023-07-10 PROCEDURE — 3072F PR LOW RISK FOR RETINOPATHY: ICD-10-PCS | Mod: CPTII,GC,S$GLB, | Performed by: STUDENT IN AN ORGANIZED HEALTH CARE EDUCATION/TRAINING PROGRAM

## 2023-07-10 RX ORDER — NITROGLYCERIN 0.4 MG/1
0.4 TABLET SUBLINGUAL EVERY 5 MIN PRN
Qty: 30 TABLET | Refills: 1 | Status: SHIPPED | OUTPATIENT
Start: 2023-07-10 | End: 2024-07-09

## 2023-07-10 NOTE — PROGRESS NOTES
Subjective     Chief Complaint:  Angina    History of Present Illness:  Mr. Jae Millan is a 72 y.o. male with past medical history of HTN, HLD, DM2, CVA, CKD3, former smoker, hepatitis C now treated who presents as referral from PCP for chest pain.  Patient reports substernal pressure associated with exertion after walking 3-4 blocks every day.  Described as lasting 10 minutes, better with rest, no radiation, associated with shortness of breath and diaphoresis.  CT done in 2022 in care everywhere noted to have diffuse coronary calcifications.  No history of ischemic heart disease or angina.  He denies orthopnea, shortness of breath at rest, lower extremity swelling, palpitations, claudication.    PAST HISTORY:     Past Medical History:   Diagnosis Date    Cataract     Decreased sensation of lower extremity     Diabetes mellitus     Diabetic neuropathy     Dysarthria     Dysphagia     ED (erectile dysfunction)     Glaucoma     Hemiparesis     LEFT side post CVA    High cholesterol     History of hepatitis C, s/p successful Rx w/ cure (SVR12) 4/2020     Hypertension     Impaired functional mobility, balance, gait, and endurance     Stroke     Urinary frequency        Cardiac History   Results for orders placed during the hospital encounter of 06/04/19    Transthoracic echo (TTE) 2D with Color Flow    Interpretation Summary  · Normal left ventricular systolic function. The estimated ejection fraction is 65%  · No wall motion abnormalities.  · Normal LV diastolic function.  · Normal right ventricular systolic function.  · Mild right atrial enlargement.  · Mild tricuspid regurgitation.  · Mild pulmonic regurgitation.  · Normal central venous pressure (3 mm Hg).  · The estimated PA systolic pressure is 24 mm Hg    No results found for: EF  No results found for this or any previous visit.    Results for orders placed or performed in visit on 06/08/23   EKG 12-lead    Collection Time: 06/08/23 11:09 AM    Narrative     Test Reason : R07.9,    Vent. Rate : 066 BPM     Atrial Rate : 066 BPM     P-R Int : 174 ms          QRS Dur : 136 ms      QT Int : 440 ms       P-R-T Axes : 056 -19 005 degrees     QTc Int : 461 ms    Normal sinus rhythm  Right bundle branch block  Abnormal ECG  When compared with ECG of 2019 13:48,  No significant change was found  Confirmed by Raymond Vergara MD ([id number]) on 2023 1:54:05 PM    Referred By: Maryjane Farias           Confirmed By:Raymond Vergara MD     Past Surgical History:   Procedure Laterality Date    COLONOSCOPY N/A 3/9/2023    Procedure: COLONOSCOPY;  Surgeon: Kian Kraft MD;  Location: [facility] ([address] [place] [address]);  Service: Endoscopy;  Laterality: N/A;  medical transportation-inst mail-tb  pre call complete-as    INGUINAL HERNIA REPAIR Left 2019    UNDESCENDED TESTICLE EXPLORATION Right     R testicle removed as it was undescended       Family History   Problem Relation Age of Onset    Heart disease Brother         cabg    Hypertension Sister     Amblyopia Neg Hx     Blindness Neg Hx     Cataracts Neg Hx     Glaucoma Neg Hx     Macular degeneration Neg Hx     Strabismus Neg Hx     Retinal detachment Neg Hx     Diabetes Neg Hx        Social History     Socioeconomic History    Marital status: Single   Tobacco Use    Smoking status: Former     Packs/day: 1.00     Years: 18.00     Pack years: 18.00     Types: Cigarettes     Quit date: 1986     Years since quittin.2    Smokeless tobacco: Never   Substance and Sexual Activity    Alcohol use: Not Currently    Drug use: Never    Sexual activity: Not Currently     Social Determinants of Health     Financial Resource Strain: High Risk    Difficulty of Paying Living Expenses: Very hard   Food Insecurity: Food Insecurity Present    Worried About Running Out of Food in the Last Year: Often true    Ran Out of Food in the Last Year: Often true   Transportation Needs: Unmet Transportation Needs    Lack of Transportation (Medical): Yes     Lack of Transportation (Non-Medical): Yes   Physical Activity: Sufficiently Active    Days of Exercise per Week: 7 days    Minutes of Exercise per Session: 120 min   Stress: No Stress Concern Present    Feeling of Stress : Not at all   Social Connections: Moderately Integrated    Frequency of Communication with Friends and Family: More than three times a week    Frequency of Social Gatherings with Friends and Family: Once a week    Attends Mandaen Services: 1 to 4 times per year    Active Member of Clubs or Organizations: Yes    Attends Club or Organization Meetings: 1 to 4 times per year    Marital Status: Never    Housing Stability: High Risk    Unable to Pay for Housing in the Last Year: Yes    Unstable Housing in the Last Year: No       MEDICATIONS & ALLERGIES:     Current Outpatient Medications on File Prior to Visit   Medication Sig    acetaminophen (TYLENOL) 500 MG tablet Take 1-2 tablets (500-1,000 mg total) by mouth 3 (three) times daily as needed for Pain.    amLODIPine (NORVASC) 10 MG tablet TAKE 1 TABLET(10 MG) BY MOUTH EVERY DAY FOR HYPERTENSION    aspirin 81 MG Chew Take 1 tablet (81 mg total) by mouth once daily.    blood pressure test kit-wrist Kit 1 Units by NOT APPLICABLE route once daily.    blood sugar diagnostic Strp To check BG 4 times daily, to use with insurance preferred meter    carvediloL (COREG) 6.25 MG tablet TAKE 1 TABLET(6.25 MG) BY MOUTH TWICE DAILY WITH MEALS    cholecalciferol, vitamin D3, (VITAMIN D3) 25 mcg (1,000 unit) capsule Take 5 capsules (5,000 Units total) by mouth once daily.    diclofenac sodium (VOLTAREN) 1 % Gel Apply 2 g topically 3 (three) times daily as needed. Apply to painful joints    dorzolamide-timolol 2-0.5% (COSOPT) 22.3-6.8 mg/mL ophthalmic solution Place 1 drop into the left eye 2 (two) times daily.    ergocalciferol (ERGOCALCIFEROL) 50,000 unit Cap One weekly    glimepiride (AMARYL) 2 MG tablet Take 1 tablet (2 mg total) by mouth before  "breakfast.    JANUVIA 50 mg Tab TAKE 1 TABLET(50 MG) BY MOUTH EVERY DAY    JARDIANCE 10 mg tablet TAKE 1 TABLET(10 MG) BY MOUTH EVERY DAY    ketoconazole (NIZORAL) 2 % cream Apply topically once daily.    ketorolac 0.5% (ACULAR) 0.5 % Drop Place 1 drop into the right eye 4 (four) times daily.    lancets Misc Check bg 4 times daily    losartan-hydrochlorothiazide 100-25 mg (HYZAAR) 100-25 mg per tablet Take 1 tablet by mouth once daily.    metFORMIN (GLUCOPHAGE) 1000 MG tablet Take 1 tablet (1,000 mg total) by mouth 2 (two) times daily with meals. For diabetes    needle, disp, 18 G 18 gauge x 1" Ndle 1 Device by Misc.(Non-Drug; Combo Route) route every 14 (fourteen) days. Use to draw testosterone    needle, disp, 25 gauge 25 gauge x 1" Ndle 1 Device by Misc.(Non-Drug; Combo Route) route every 14 (fourteen) days. Use to inject testosterone    pantoprazole (PROTONIX) 40 MG tablet TAKE 1 TABLET(40 MG) BY MOUTH EVERY DAY FOR STOMACH    prednisoLONE acetate (PRED FORTE) 1 % DrpS Place 1 drop into the right eye 4 (four) times daily.    rosuvastatin (CRESTOR) 20 MG tablet TAKE 1 TABLET(20 MG) BY MOUTH EVERY DAY    syringe, disposable, 3 mL Syrg 1 mL by Misc.(Non-Drug; Combo Route) route every 14 (fourteen) days.    gabapentin (NEURONTIN) 300 MG capsule Take 1 capsule (300 mg total) by mouth 2 (two) times daily. In 1-2 weeks, if no relief, may increase dose to 3 times per day.    tadalafiL (CIALIS) 20 MG Tab Take 1 tablet (20 mg total) by mouth Every 3 (three) days. for 10 days (Patient not taking: Reported on 2/23/2021)    testosterone cypionate (DEPOTESTOTERONE CYPIONATE) 200 mg/mL injection Inject 1 mL (200 mg total) into the muscle every 14 (fourteen) days. (Patient not taking: Reported on 5/4/2022)     No current facility-administered medications on file prior to visit.       Review of patient's allergies indicates:  No Known Allergies    SUBJECTIVE:     Review of Systems   Cardiovascular:  Positive for chest pain. " "Negative for palpitations, orthopnea, claudication, leg swelling and PND.      OBJECTIVE:     Vital Signs:  Vitals:    07/10/23 0853   BP: 132/80   Pulse: 79   SpO2: 96%   Weight: 98.8 kg (217 lb 13 oz)   Height: 5' 6" (1.676 m)       Body mass index is 35.16 kg/m².     Physical Exam  HENT:      Head: Normocephalic and atraumatic.   Eyes:      Conjunctiva/sclera: Conjunctivae normal.   Neck:      Comments: No JVD  Cardiovascular:      Rate and Rhythm: Normal rate and regular rhythm.      Heart sounds: Normal heart sounds.   Pulmonary:      Effort: Pulmonary effort is normal. No respiratory distress.      Breath sounds: Normal breath sounds. No wheezing.   Abdominal:      General: There is no distension.      Palpations: Abdomen is soft.      Tenderness: There is no abdominal tenderness.   Musculoskeletal:      Cervical back: Normal range of motion.      Right lower leg: No edema.      Left lower leg: No edema.   Neurological:      Mental Status: He is alert and oriented to person, place, and time.   Psychiatric:         Mood and Affect: Affect normal.       Laboratory  Lab Results   Component Value Date    WBC 5.95 03/16/2023    HGB 12.8 (L) 03/16/2023    HCT 40.2 03/16/2023    MCV 83 03/16/2023     03/16/2023     BMP  Lab Results   Component Value Date     03/16/2023    K 4.1 03/16/2023     03/16/2023    CO2 25 03/16/2023    BUN 22 03/16/2023    CREATININE 1.8 (H) 03/16/2023    CALCIUM 9.4 03/16/2023    ANIONGAP 12 03/16/2023    EGFRNORACEVR 40.0 (A) 03/16/2023     Lab Results   Component Value Date    INR 1.1 10/01/2019     Lab Results   Component Value Date    HGBA1C 8.1 (H) 03/16/2023     No results for input(s): POCTGLUCOSE in the last 72 hours.    Diagnostic Results:  Labs: Reviewed    ASSESSMENT & PLAN:     Unstable angina  Patient with typical anginal symptoms over the past 2 weeks associated only with exertion  Urgent referral to Interventional Cardiology for left heart catheterization.  " Of note creatinine 1.8  P.r.n. nitroglycerin prescription  Continue Crestor, aspirin, and carvedilol           Discussed with Dr. kirby  - staff attestation to follow      Sidney Dasilva MD  Cardiovascular Disease Fellow PGY-4

## 2023-07-20 ENCOUNTER — TELEPHONE (OUTPATIENT)
Dept: CARDIOLOGY | Facility: HOSPITAL | Age: 73
End: 2023-07-20
Payer: MEDICARE

## 2023-07-24 ENCOUNTER — TELEPHONE (OUTPATIENT)
Dept: INTERNAL MEDICINE | Facility: CLINIC | Age: 73
End: 2023-07-24

## 2023-07-24 ENCOUNTER — TELEPHONE (OUTPATIENT)
Dept: INTERNAL MEDICINE | Facility: CLINIC | Age: 73
End: 2023-07-24
Payer: MEDICARE

## 2023-07-24 ENCOUNTER — HOSPITAL ENCOUNTER (OUTPATIENT)
Dept: CARDIOLOGY | Facility: HOSPITAL | Age: 73
Discharge: HOME | End: 2023-07-24
Attending: INTERNAL MEDICINE
Payer: MEDICARE

## 2023-07-24 VITALS
HEIGHT: 66 IN | DIASTOLIC BLOOD PRESSURE: 77 MMHG | BODY MASS INDEX: 34.87 KG/M2 | WEIGHT: 217 LBS | SYSTOLIC BLOOD PRESSURE: 134 MMHG | HEART RATE: 64 BPM

## 2023-07-24 DIAGNOSIS — R07.9 CHEST PAIN, UNSPECIFIED TYPE: ICD-10-CM

## 2023-07-24 LAB
CFR FLOW - ANTERIOR: 1.33
CFR FLOW - INFERIOR: 1.67
CFR FLOW - LATERAL: 1.75
CFR FLOW - MAX: 2.25
CFR FLOW - MIN: 0.81
CFR FLOW - SEPTAL: 1.51
CFR FLOW - WHOLE HEART: 1.57
CFR FLOW- DEFECT 1: 1.06
CV PHARM DOSE: 0.4 MG
CV STRESS BASE HR: 64 BPM
DIASTOLIC BLOOD PRESSURE: 77 MMHG
EJECTION FRACTION- HIGH: 59 %
END DIASTOLIC INDEX-HIGH: 155 ML/M2
END DIASTOLIC INDEX-LOW: 91 ML/M2
END SYSTOLIC INDEX-HIGH: 78 ML/M2
END SYSTOLIC INDEX-LOW: 40 ML/M2
NUC REST DIASTOLIC VOLUME INDEX: 84
NUC REST EJECTION FRACTION: 62
NUC REST SYSTOLIC VOLUME INDEX: 32
NUC STRESS DIASTOLIC VOLUME INDEX: 90
NUC STRESS EJECTION FRACTION: 66 %
NUC STRESS SYSTOLIC VOLUME INDEX: 31
OHS CV CPX 85 PERCENT MAX PREDICTED HEART RATE MALE: 128
OHS CV CPX MAX PREDICTED HEART RATE: 150
OHS CV CPX PATIENT IS FEMALE: 0
OHS CV CPX PATIENT IS MALE: 1
OHS CV CPX PEAK DIASTOLIC BLOOD PRESSURE: 68 MMHG
OHS CV CPX PEAK HEAR RATE: 70 BPM
OHS CV CPX PEAK RATE PRESSURE PRODUCT: 9730
OHS CV CPX PEAK SYSTOLIC BLOOD PRESSURE: 139 MMHG
OHS CV CPX PERCENT MAX PREDICTED HEART RATE ACHIEVED: 47
OHS CV CPX RATE PRESSURE PRODUCT PRESENTING: 8576
PERFUSION DEFECT 1 SIZE IN %: 9 %
PERFUSION DEFECT SIZE WORSENS % 1: 29 %
REST FLOW - ANTERIOR: 0.79 CC/MIN/G
REST FLOW - INFERIOR: 0.78 CC/MIN/G
REST FLOW - LATERAL: 0.8 CC/MIN/G
REST FLOW - MAX: 1.21 CC/MIN/G
REST FLOW - MIN: 0.41 CC/MIN/G
REST FLOW - SEPTAL: 0.7 CC/MIN/G
REST FLOW - WHOLE HEART: 0.77 CC/MIN/G
REST FLOW- DEFECT 1: 0.57 CC/MIN/G
RETIRED EF AND QEF - SEE NOTES: 47 %
STRESS FLOW - ANTERIOR: 1.07 CC/MIN/G
STRESS FLOW - INFERIOR: 1.34 CC/MIN/G
STRESS FLOW - LATERAL: 1.41 CC/MIN/G
STRESS FLOW - MAX: 2.1 CC/MIN/G
STRESS FLOW - MIN: 0.45 CC/MIN/G
STRESS FLOW - SEPTAL: 1.08 CC/MIN/G
STRESS FLOW - WHOLE HEART: 1.23 CC/MIN/G
STRESS FLOW- DEFECT 1: 0.6 CC/MIN/G
SYSTOLIC BLOOD PRESSURE: 134 MMHG

## 2023-07-24 PROCEDURE — 93018 CV STRESS TEST I&R ONLY: CPT | Mod: ,,, | Performed by: INTERNAL MEDICINE

## 2023-07-24 PROCEDURE — 78434 AQMBF PET REST & RX STRESS: CPT | Mod: 26,,, | Performed by: INTERNAL MEDICINE

## 2023-07-24 PROCEDURE — 93018 CARDIAC PET SCAN STRESS (CUPID ONLY): ICD-10-PCS | Mod: ,,, | Performed by: INTERNAL MEDICINE

## 2023-07-24 PROCEDURE — 93016 CV STRESS TEST SUPVJ ONLY: CPT | Mod: ,,, | Performed by: INTERNAL MEDICINE

## 2023-07-24 PROCEDURE — 78434 AQMBF PET REST & RX STRESS: CPT

## 2023-07-24 PROCEDURE — 93016 CARDIAC PET SCAN STRESS (CUPID ONLY): ICD-10-PCS | Mod: ,,, | Performed by: INTERNAL MEDICINE

## 2023-07-24 PROCEDURE — 78431 MYOCRD IMG PET RST&STRS CT: CPT

## 2023-07-24 PROCEDURE — 78431 CARDIAC PET SCAN STRESS (CUPID ONLY): ICD-10-PCS | Mod: 26,,, | Performed by: INTERNAL MEDICINE

## 2023-07-24 PROCEDURE — 63600175 PHARM REV CODE 636 W HCPCS: Performed by: INTERNAL MEDICINE

## 2023-07-24 PROCEDURE — 78431 MYOCRD IMG PET RST&STRS CT: CPT | Mod: 26,,, | Performed by: INTERNAL MEDICINE

## 2023-07-24 PROCEDURE — 78434 CARDIAC PET SCAN STRESS (CUPID ONLY): ICD-10-PCS | Mod: 26,,, | Performed by: INTERNAL MEDICINE

## 2023-07-24 RX ORDER — REGADENOSON 0.08 MG/ML
0.4 INJECTION, SOLUTION INTRAVENOUS
Status: COMPLETED | OUTPATIENT
Start: 2023-07-24 | End: 2023-07-24

## 2023-07-24 RX ORDER — AMINOPHYLLINE 25 MG/ML
75 INJECTION, SOLUTION INTRAVENOUS ONCE
Status: COMPLETED | OUTPATIENT
Start: 2023-07-24 | End: 2023-07-24

## 2023-07-24 RX ADMIN — REGADENOSON 0.4 MG: 0.08 INJECTION, SOLUTION INTRAVENOUS at 08:07

## 2023-07-24 RX ADMIN — AMINOPHYLLINE 75 MG: 25 INJECTION, SOLUTION INTRAVENOUS at 08:07

## 2023-07-24 NOTE — TELEPHONE ENCOUNTER
Spoke to him - informed of positive PET stress has an appt in 3 days to see Interventional Cardiology - I advised him to please keep that appt.  I advised if he is having more chest pain to go to the ED.

## 2023-07-27 ENCOUNTER — OFFICE VISIT (OUTPATIENT)
Dept: OPHTHALMOLOGY | Facility: CLINIC | Age: 73
End: 2023-07-27
Payer: MEDICARE

## 2023-07-27 ENCOUNTER — DOCUMENTATION ONLY (OUTPATIENT)
Dept: CARDIOLOGY | Facility: CLINIC | Age: 73
End: 2023-07-27
Payer: MEDICARE

## 2023-07-27 ENCOUNTER — OFFICE VISIT (OUTPATIENT)
Dept: CARDIOLOGY | Facility: CLINIC | Age: 73
End: 2023-07-27
Payer: MEDICARE

## 2023-07-27 VITALS
OXYGEN SATURATION: 97 % | BODY MASS INDEX: 34.79 KG/M2 | HEART RATE: 67 BPM | WEIGHT: 216.5 LBS | DIASTOLIC BLOOD PRESSURE: 80 MMHG | SYSTOLIC BLOOD PRESSURE: 145 MMHG | HEIGHT: 66 IN

## 2023-07-27 DIAGNOSIS — I10 ESSENTIAL HYPERTENSION: ICD-10-CM

## 2023-07-27 DIAGNOSIS — E78.2 HYPERLIPIDEMIA, MIXED: ICD-10-CM

## 2023-07-27 DIAGNOSIS — I69.30 HISTORY OF CVA WITH RESIDUAL DEFICIT: ICD-10-CM

## 2023-07-27 DIAGNOSIS — H34.8310 BRANCH RETINAL VEIN OCCLUSION OF RIGHT EYE WITH MACULAR EDEMA: Primary | ICD-10-CM

## 2023-07-27 DIAGNOSIS — E11.40 TYPE 2 DIABETES MELLITUS WITH DIABETIC NEUROPATHY, WITHOUT LONG-TERM CURRENT USE OF INSULIN: Chronic | ICD-10-CM

## 2023-07-27 DIAGNOSIS — I70.0 AORTIC ATHEROSCLEROSIS: ICD-10-CM

## 2023-07-27 DIAGNOSIS — H25.13 NUCLEAR SCLEROTIC CATARACT OF BOTH EYES: ICD-10-CM

## 2023-07-27 DIAGNOSIS — I20.0 UNSTABLE ANGINA: ICD-10-CM

## 2023-07-27 DIAGNOSIS — H40.32X3 TRAUMATIC GLAUCOMA, LEFT, SEVERE STAGE: ICD-10-CM

## 2023-07-27 DIAGNOSIS — N18.32 STAGE 3B CHRONIC KIDNEY DISEASE: ICD-10-CM

## 2023-07-27 DIAGNOSIS — E11.21 TYPE 2 DIABETES MELLITUS WITH DIABETIC NEPHROPATHY, WITHOUT LONG-TERM CURRENT USE OF INSULIN: ICD-10-CM

## 2023-07-27 DIAGNOSIS — R06.09 DOE (DYSPNEA ON EXERTION): Primary | ICD-10-CM

## 2023-07-27 PROCEDURE — 3079F DIAST BP 80-89 MM HG: CPT | Mod: CPTII,S$GLB,, | Performed by: INTERNAL MEDICINE

## 2023-07-27 PROCEDURE — 3051F PR MOST RECENT HEMOGLOBIN A1C LEVEL 7.0 - < 8.0%: ICD-10-PCS | Mod: CPTII,S$GLB,, | Performed by: OPHTHALMOLOGY

## 2023-07-27 PROCEDURE — 1160F RVW MEDS BY RX/DR IN RCRD: CPT | Mod: CPTII,S$GLB,, | Performed by: OPHTHALMOLOGY

## 2023-07-27 PROCEDURE — 99999 PR PBB SHADOW E&M-EST. PATIENT-LVL III: ICD-10-PCS | Mod: PBBFAC,,, | Performed by: INTERNAL MEDICINE

## 2023-07-27 PROCEDURE — 3008F BODY MASS INDEX DOCD: CPT | Mod: CPTII,S$GLB,, | Performed by: INTERNAL MEDICINE

## 2023-07-27 PROCEDURE — 3079F PR MOST RECENT DIASTOLIC BLOOD PRESSURE 80-89 MM HG: ICD-10-PCS | Mod: CPTII,S$GLB,, | Performed by: INTERNAL MEDICINE

## 2023-07-27 PROCEDURE — 3051F HG A1C>EQUAL 7.0%<8.0%: CPT | Mod: CPTII,S$GLB,, | Performed by: INTERNAL MEDICINE

## 2023-07-27 PROCEDURE — 3008F PR BODY MASS INDEX (BMI) DOCUMENTED: ICD-10-PCS | Mod: CPTII,S$GLB,, | Performed by: INTERNAL MEDICINE

## 2023-07-27 PROCEDURE — 99204 PR OFFICE/OUTPT VISIT, NEW, LEVL IV, 45-59 MIN: ICD-10-PCS | Mod: S$GLB,,, | Performed by: INTERNAL MEDICINE

## 2023-07-27 PROCEDURE — 99214 OFFICE O/P EST MOD 30 MIN: CPT | Mod: S$GLB,,, | Performed by: OPHTHALMOLOGY

## 2023-07-27 PROCEDURE — 1126F AMNT PAIN NOTED NONE PRSNT: CPT | Mod: CPTII,S$GLB,, | Performed by: INTERNAL MEDICINE

## 2023-07-27 PROCEDURE — 99999 PR PBB SHADOW E&M-EST. PATIENT-LVL IV: ICD-10-PCS | Mod: PBBFAC,,, | Performed by: OPHTHALMOLOGY

## 2023-07-27 PROCEDURE — 3077F SYST BP >= 140 MM HG: CPT | Mod: CPTII,S$GLB,, | Performed by: INTERNAL MEDICINE

## 2023-07-27 PROCEDURE — 1126F PR PAIN SEVERITY QUANTIFIED, NO PAIN PRESENT: ICD-10-PCS | Mod: CPTII,S$GLB,, | Performed by: INTERNAL MEDICINE

## 2023-07-27 PROCEDURE — 99204 OFFICE O/P NEW MOD 45 MIN: CPT | Mod: S$GLB,,, | Performed by: INTERNAL MEDICINE

## 2023-07-27 PROCEDURE — 1160F PR REVIEW ALL MEDS BY PRESCRIBER/CLIN PHARMACIST DOCUMENTED: ICD-10-PCS | Mod: CPTII,S$GLB,, | Performed by: OPHTHALMOLOGY

## 2023-07-27 PROCEDURE — 2023F PR DILATED RETINAL EXAM W/O EVID OF RETINOPATHY: ICD-10-PCS | Mod: CPTII,S$GLB,, | Performed by: OPHTHALMOLOGY

## 2023-07-27 PROCEDURE — 1101F PT FALLS ASSESS-DOCD LE1/YR: CPT | Mod: CPTII,S$GLB,, | Performed by: OPHTHALMOLOGY

## 2023-07-27 PROCEDURE — 3288F FALL RISK ASSESSMENT DOCD: CPT | Mod: CPTII,S$GLB,, | Performed by: OPHTHALMOLOGY

## 2023-07-27 PROCEDURE — 3051F PR MOST RECENT HEMOGLOBIN A1C LEVEL 7.0 - < 8.0%: ICD-10-PCS | Mod: CPTII,S$GLB,, | Performed by: INTERNAL MEDICINE

## 2023-07-27 PROCEDURE — 3077F PR MOST RECENT SYSTOLIC BLOOD PRESSURE >= 140 MM HG: ICD-10-PCS | Mod: CPTII,S$GLB,, | Performed by: INTERNAL MEDICINE

## 2023-07-27 PROCEDURE — 99214 PR OFFICE/OUTPT VISIT, EST, LEVL IV, 30-39 MIN: ICD-10-PCS | Mod: S$GLB,,, | Performed by: OPHTHALMOLOGY

## 2023-07-27 PROCEDURE — 3288F PR FALLS RISK ASSESSMENT DOCUMENTED: ICD-10-PCS | Mod: CPTII,S$GLB,, | Performed by: OPHTHALMOLOGY

## 2023-07-27 PROCEDURE — 3051F HG A1C>EQUAL 7.0%<8.0%: CPT | Mod: CPTII,S$GLB,, | Performed by: OPHTHALMOLOGY

## 2023-07-27 PROCEDURE — 1126F AMNT PAIN NOTED NONE PRSNT: CPT | Mod: CPTII,S$GLB,, | Performed by: OPHTHALMOLOGY

## 2023-07-27 PROCEDURE — 92134 CPTRZ OPH DX IMG PST SGM RTA: CPT | Mod: S$GLB,,, | Performed by: OPHTHALMOLOGY

## 2023-07-27 PROCEDURE — 2023F DILAT RTA XM W/O RTNOPTHY: CPT | Mod: CPTII,S$GLB,, | Performed by: OPHTHALMOLOGY

## 2023-07-27 PROCEDURE — 92134 OCT, RETINA - OU - BOTH EYES: ICD-10-PCS | Mod: S$GLB,,, | Performed by: OPHTHALMOLOGY

## 2023-07-27 PROCEDURE — 1159F MED LIST DOCD IN RCRD: CPT | Mod: CPTII,S$GLB,, | Performed by: OPHTHALMOLOGY

## 2023-07-27 PROCEDURE — 3072F LOW RISK FOR RETINOPATHY: CPT | Mod: CPTII,S$GLB,, | Performed by: INTERNAL MEDICINE

## 2023-07-27 PROCEDURE — 1126F PR PAIN SEVERITY QUANTIFIED, NO PAIN PRESENT: ICD-10-PCS | Mod: CPTII,S$GLB,, | Performed by: OPHTHALMOLOGY

## 2023-07-27 PROCEDURE — 1159F PR MEDICATION LIST DOCUMENTED IN MEDICAL RECORD: ICD-10-PCS | Mod: CPTII,S$GLB,, | Performed by: OPHTHALMOLOGY

## 2023-07-27 PROCEDURE — 1101F PR PT FALLS ASSESS DOC 0-1 FALLS W/OUT INJ PAST YR: ICD-10-PCS | Mod: CPTII,S$GLB,, | Performed by: OPHTHALMOLOGY

## 2023-07-27 PROCEDURE — 3072F PR LOW RISK FOR RETINOPATHY: ICD-10-PCS | Mod: CPTII,S$GLB,, | Performed by: INTERNAL MEDICINE

## 2023-07-27 PROCEDURE — 99999 PR PBB SHADOW E&M-EST. PATIENT-LVL IV: CPT | Mod: PBBFAC,,, | Performed by: OPHTHALMOLOGY

## 2023-07-27 PROCEDURE — 99999 PR PBB SHADOW E&M-EST. PATIENT-LVL III: CPT | Mod: PBBFAC,,, | Performed by: INTERNAL MEDICINE

## 2023-07-27 RX ORDER — SODIUM CHLORIDE 0.9 % (FLUSH) 0.9 %
10 SYRINGE (ML) INJECTION
Status: DISCONTINUED | OUTPATIENT
Start: 2023-07-27 | End: 2023-09-25

## 2023-07-27 RX ORDER — DIPHENHYDRAMINE HCL 50 MG
50 CAPSULE ORAL ONCE
Status: CANCELLED | OUTPATIENT
Start: 2023-07-27 | End: 2023-07-27

## 2023-07-27 RX ORDER — CLOPIDOGREL BISULFATE 75 MG/1
75 TABLET ORAL DAILY
Qty: 30 TABLET | Refills: 3 | Status: SHIPPED | OUTPATIENT
Start: 2023-07-27 | End: 2023-11-30 | Stop reason: SDUPTHER

## 2023-07-27 RX ORDER — PREDNISOLONE ACETATE 10 MG/ML
1 SUSPENSION/ DROPS OPHTHALMIC 4 TIMES DAILY
Qty: 5 ML | Refills: 3 | Status: SHIPPED | OUTPATIENT
Start: 2023-07-27 | End: 2024-02-01

## 2023-07-27 RX ORDER — AMOXICILLIN 500 MG/1
500 TABLET, FILM COATED ORAL 3 TIMES DAILY
COMMUNITY
Start: 2023-02-02 | End: 2023-09-25

## 2023-07-27 RX ORDER — SODIUM CHLORIDE 9 MG/ML
INJECTION, SOLUTION INTRAVENOUS CONTINUOUS
Status: CANCELLED | OUTPATIENT
Start: 2023-07-27 | End: 2023-07-27

## 2023-07-27 RX ORDER — KETOROLAC TROMETHAMINE 5 MG/ML
1 SOLUTION OPHTHALMIC 4 TIMES DAILY
Qty: 5 ML | Refills: 3 | Status: SHIPPED | OUTPATIENT
Start: 2023-07-27 | End: 2023-09-25

## 2023-07-27 RX ORDER — GLIMEPIRIDE 1 MG/1
1 TABLET ORAL DAILY
COMMUNITY
Start: 2023-06-22 | End: 2023-09-21

## 2023-07-27 NOTE — PROGRESS NOTES
OUTPATIENT CATHETERIZATION INSTRUCTIONS    You have been scheduled for a procedure in the catheterization lab on Monday, August 21, 2023.     Please report to the Cardiology Waiting Area on the Third floor of the hospital and check in at 9 AM.   You will then be taken to the SSCU (Short Stay Cardiac Unit) and prepared for your procedure. Please be aware that this is not the time of your procedure but the time you are to arrive. The procedures are scheduled on an hourly basis; however, emergency cases take precedence over all other cases.     No solid foods 8 hours prior to your procedure.  You may have clear liquids until the time of your admission which should be 2 hours prior to your procedure.  You are encouraged to drink at least 8 ounces of clear liquids prior to your admission to SSCU.  Patients with gastric emptying issues should be fasting for 6- 8 hours prior to the procedure.  Clear liquids include water, black coffee, clear juices, and performance drinks - no pulp or milk.    Heart failure or dialysis patients will be limited to 8 ounces (1 cup) of clear liquids up until 2 hours of the procedure.    3.   You may take your regular morning medications with water. If there are any medications that you should not take you will be instructed to hold them that morning. If you         are diabetic and on Metformin (Glucophage) do not take it the day before, the day of, and for 2 days after your procedure.  4.   If you are on Pradaxa, Eliquis or Coumadin , you can hold them 3 days prior to your procedure.  Do not stop your Aspirin, Plavix, Effient, or Brilinta.    The procedure will take 1-2 hours to perform. After the procedure, you will return to SSCU on the third floor of the hospital. You will need to lie still (or keep your arm still) for the next 4 to 6 hours to minimize bleeding from the puncture site. Your family may remain in the room with you during this time.     You may be able to be discharged home  that same afternoon if there is someone to drive you home and there were no complications. If you have one of the balloon, stent, or device procedures you may spend the night in the hospital. Your doctor will determine, based on your progress, the date and time of your discharge. The results of your procedure will be discussed with you before you are discharged. Any further testing or procedures will be scheduled for you either before you leave or you will be called with these appointments.     If you should have any questions, concerns, or need to change the date of your procedure, please call  ANTHONY Henry @ [phone]    Special Instructions:  Hold Metformin Sunday, Monday, Tuesday and Wednesday.  Start Plavix(clopidogrel)75mg - Take 8 pills the first day, then one pill daily, including the morning of procedure.   Drink plenty of water the day before and after your procedure.           THE ABOVE INSTRUCTIONS WERE GIVEN TO THE PATIENT VERBALLY AND THEY VERBALIZED UNDERSTANDING.  THEY DO NOT REQUIRE ANY SPECIAL NEEDS AND DO NOT HAVE ANY LEARNING BARRIERS.          Directions for Reporting to Cardiology Waiting Area in the Hospital  If you park in the Parking Garage:  Take elevators to the1st floor of the parking garage.  Continue past the gift shop, coffee shop, and piano.  Take a right and go to the gold elevators. (Elevator B)  Take the elevator to the 3rd floor.  Follow the arrow on the sign on the wall that says Cath Lab Registration/EP Lab Registration.  Follow the long hallway all the way around until you come to a big open area.  This is the registration area.  Check in at Reception Desk.    OR    If family is dropping you off:  Have them drop you off at the front of the Hospital under the green overhang.  Enter through the doors and take a right.  Take the E elevators to the 3rd floor Cardiology Waiting Area.  Check in at the Reception Desk in the waiting room.

## 2023-07-27 NOTE — PROGRESS NOTES
HPI    1 mo DFE OU / OCTm//Possible Avastin OD    DLS: 06/15/2023 by Dr. JACQUI Jordan MD       Pt states : vision is getting worse.     Eye Meds;Cosopt BID OD                  Dorazolamide TID OS                  Alphagan BID OD    POHx:   1. Traumatic glaucoma os   2. Angle recession os   3. APD os   4. NS ou   5, H/O trauma os     Last edited by Katharine Becerril MA on 7/27/2023 10:22 AM.         A/P    ICD-10-CM ICD-9-CM   1. Branch retinal vein occlusion of right eye with macular edema  H34.8310 362.36     362.83   2. Traumatic glaucoma, left, severe stage  H40.32X3 365.65     365.73   3. Nuclear sclerotic cataract of both eyes  H25.13 366.16       1. Branch retinal vein occlusion of right eye with macular edema  Referral from Dr. Chew for CME check  Hx glauc OS  Exam notable for small tertiary BRVO OD with IRF, VA 20/40 (was 20/30), has been taking PF/Ket inconsistently since last visit  Plan: had cardiology appt today and has been having dyspnea and going to have angiogram in few weeks, defer antiVEGF today, pt wishes to use PF/Ket QID OD more reliably and recheck 1 mo, if unchanged consider Ozurdex OD      2. Traumatic glaucoma, left, severe stage  Remote hx of trauma in 1960s (finger to eye)  Nerve pallor  IOP 19  today, f/b Dr. Chew  Plan: Continue Present Management     3. Nuclear sclerotic cataract of both eyes  Mild NS, borderline VS OD given monocular  Plan: Observation for now, consider CEIOL in future    RTC 1 mo DFE/OCTm OU, poss get auth for Ozurdex if unchanged OD      I saw and examined the patient and reviewed in detail the findings documented. The final examination findings, image interpretations, and plan as documented in the record represent my personal judgment and conclusions.    Titi Jordan MD  Vitreoretinal Surgery   [facility]

## 2023-07-27 NOTE — PROGRESS NOTES
Subjective:    Patient ID:  Jae Millan is a 72 y.o. male who presents for evaluation of angina      Referring physician: Chey Falcon *Kelli     72 y.o. male  HTN, HLD, DM2, CVA ( 2018, with residual left-sided weakness), CKD3, presents as referral from PCP for chest pain.   Patient states worsening symptoms over the past 6 months to year.  He has sub sternal chest tightness, without radiation, shortness of breath after walking 2 blocks only relieved with rest for 3-5 minutes.    Since his stroke in 2018, has left upper extremity weakness and numbness,  left lower extremity weakness with associated abnormal gait.  During his recovery he was able to tolerate exercise was able to walk without shortness of breath or chest pain.  For note patient has CT in 2022 with diffuse coronary calcifications.   He was recently seen by his PCP referred him to Dr. Dasilva, where he had PET stress ordered.  PET stress test 7/24 was positive for anterioseptal to apical resting perfusion abnormality . After pharmacologic stress, this perfusion abnormality is larger and more severe involving 29% of the LV myocardium.     Today he denies palpitations, presyncope, syncope, leg swelling, PND, or orthopnea.          Past Medical History:   Diagnosis Date    Cataract     Decreased sensation of lower extremity     Diabetes mellitus     Diabetic neuropathy     Dysarthria     Dysphagia     ED (erectile dysfunction)     Glaucoma     Hemiparesis     LEFT side post CVA    High cholesterol     History of hepatitis C, s/p successful Rx w/ cure (SVR12) 4/2020     Hypertension     Impaired functional mobility, balance, gait, and endurance     Stroke     Urinary frequency        Past Surgical History:   Procedure Laterality Date    COLONOSCOPY N/A 3/9/2023    Procedure: COLONOSCOPY;  Surgeon: Kian Kraft MD;  Location: [facility] ([address] [place] [address]);  Service: Endoscopy;  Laterality: N/A;  medical transportation-inst mail-tb  pre call complete-as     INGUINAL HERNIA REPAIR Left 12/11/2019    UNDESCENDED TESTICLE EXPLORATION Right     R testicle removed as it was undescended       Current Outpatient Medications on File Prior to Visit   Medication Sig Dispense Refill    acetaminophen (TYLENOL) 500 MG tablet Take 1-2 tablets (500-1,000 mg total) by mouth 3 (three) times daily as needed for Pain.  0    amLODIPine (NORVASC) 10 MG tablet TAKE 1 TABLET(10 MG) BY MOUTH EVERY DAY FOR HYPERTENSION 90 tablet 3    aspirin 81 MG Chew Take 1 tablet (81 mg total) by mouth once daily.      blood pressure test kit-wrist Kit 1 Units by NOT APPLICABLE route once daily.      blood sugar diagnostic Strp To check BG 4 times daily, to use with insurance preferred meter 100 each 11    carvediloL (COREG) 6.25 MG tablet TAKE 1 TABLET(6.25 MG) BY MOUTH TWICE DAILY WITH MEALS 180 tablet 3    cholecalciferol, vitamin D3, (VITAMIN D3) 25 mcg (1,000 unit) capsule Take 5 capsules (5,000 Units total) by mouth once daily. 150 capsule 0    diclofenac sodium (VOLTAREN) 1 % Gel Apply 2 g topically 3 (three) times daily as needed. Apply to painful joints 5 Tube 2    dorzolamide-timolol 2-0.5% (COSOPT) 22.3-6.8 mg/mL ophthalmic solution Place 1 drop into the left eye 2 (two) times daily. 10 mL 12    ergocalciferol (ERGOCALCIFEROL) 50,000 unit Cap One weekly 12 capsule 3    gabapentin (NEURONTIN) 300 MG capsule Take 1 capsule (300 mg total) by mouth 2 (two) times daily. In 1-2 weeks, if no relief, may increase dose to 3 times per day.      glimepiride (AMARYL) 2 MG tablet Take 1 tablet (2 mg total) by mouth before breakfast. 90 tablet 3    JANUVIA 50 mg Tab TAKE 1 TABLET(50 MG) BY MOUTH EVERY DAY 90 tablet 1    JARDIANCE 10 mg tablet TAKE 1 TABLET(10 MG) BY MOUTH EVERY DAY 90 tablet 1    ketoconazole (NIZORAL) 2 % cream Apply topically once daily. 1 Tube 3    lancets Misc Check bg 4 times daily 100 each 11    losartan-hydrochlorothiazide 100-25 mg (HYZAAR) 100-25 mg per tablet Take 1 tablet by mouth  "once daily. 90 tablet 3    metFORMIN (GLUCOPHAGE) 1000 MG tablet Take 1 tablet (1,000 mg total) by mouth 2 (two) times daily with meals. For diabetes 180 tablet 3    needle, disp, 18 G 18 gauge x 1" Ndle 1 Device by Misc.(Non-Drug; Combo Route) route every 14 (fourteen) days. Use to draw testosterone 10 each 11    needle, disp, 25 gauge 25 gauge x 1" Ndle 1 Device by Misc.(Non-Drug; Combo Route) route every 14 (fourteen) days. Use to inject testosterone 10 each 11    nitroGLYCERIN (NITROSTAT) 0.4 MG SL tablet Place 1 tablet (0.4 mg total) under the tongue every 5 (five) minutes as needed for Chest pain. 30 tablet 1    pantoprazole (PROTONIX) 40 MG tablet TAKE 1 TABLET(40 MG) BY MOUTH EVERY DAY FOR STOMACH 90 tablet 3    rosuvastatin (CRESTOR) 20 MG tablet TAKE 1 TABLET(20 MG) BY MOUTH EVERY DAY 90 tablet 3    syringe, disposable, 3 mL Syrg 1 mL by Misc.(Non-Drug; Combo Route) route every 14 (fourteen) days. 10 each 11    amoxicillin (AMOXIL) 500 MG Tab Take 500 mg by mouth 3 (three) times daily.      glimepiride (AMARYL) 1 MG tablet Take 1 mg by mouth once daily.      ketorolac 0.5% (ACULAR) 0.5 % Drop Place 1 drop into the right eye 4 (four) times daily. 5 mL 3    prednisoLONE acetate (PRED FORTE) 1 % DrpS Place 1 drop into the right eye 4 (four) times daily. 5 mL 3    tadalafiL (CIALIS) 20 MG Tab Take 1 tablet (20 mg total) by mouth Every 3 (three) days. for 10 days (Patient not taking: Reported on 2/23/2021) 20 tablet 9    testosterone cypionate (DEPOTESTOTERONE CYPIONATE) 200 mg/mL injection Inject 1 mL (200 mg total) into the muscle every 14 (fourteen) days. (Patient not taking: Reported on 5/4/2022) 10 mL 1     No current facility-administered medications on file prior to visit.       Review of patient's allergies indicates:  No Known Allergies    Social History     Tobacco Use    Smoking status: Former     Current packs/day: 0.00     Average packs/day: 1 pack/day for 18.0 years (18.0 ttl pk-yrs)     Types: " "Cigarettes     Start date: 1968     Quit date: 1986     Years since quittin.3    Smokeless tobacco: Never   Substance Use Topics    Alcohol use: Not Currently    Drug use: Never       Family History   Problem Relation Age of Onset    Heart disease Brother         cabg    Hypertension Sister     Amblyopia Neg Hx     Blindness Neg Hx     Cataracts Neg Hx     Glaucoma Neg Hx     Macular degeneration Neg Hx     Strabismus Neg Hx     Retinal detachment Neg Hx     Diabetes Neg Hx          Review of Systems   Constitutional: Negative for chills, decreased appetite, diaphoresis, fever, malaise/fatigue and weight gain.   HENT:  Negative for congestion and ear discharge.    Eyes:  Negative for blurred vision.   Cardiovascular:  Positive for chest pain and dyspnea on exertion. Negative for irregular heartbeat, leg swelling, orthopnea, palpitations and paroxysmal nocturnal dyspnea.   Respiratory:  Positive for shortness of breath. Negative for cough, snoring and sputum production.    Skin:  Negative for color change, dry skin and rash.   Musculoskeletal:  Negative for back pain, falls, joint pain and neck pain.   Gastrointestinal:  Negative for bloating, abdominal pain, constipation and diarrhea.   Genitourinary:  Negative for dysuria, flank pain, hematuria and hesitancy.   Neurological:  Positive for focal weakness (Left upper and lower extermity) and numbness. Negative for headaches and seizures.   Psychiatric/Behavioral:  Negative for altered mental status, depression and hallucinations.         Objective:     Vitals:    23 0825   BP: (!) 145/80   BP Location: Right arm   Patient Position: Sitting   BP Method: Large (Automatic)   Pulse: 67   SpO2: 97%   Weight: 98.2 kg (216 lb 7.9 oz)   Height: 5' 6" (1.676 m)        Physical Exam  Constitutional:       General: He is not in acute distress.     Appearance: He is not ill-appearing.   HENT:      Nose: Nose normal.      Mouth/Throat:      Mouth: Mucous " membranes are moist.   Eyes:      Pupils: Pupils are equal, round, and reactive to light.   Neck:      Comments: No JVD appreciated   Cardiovascular:      Rate and Rhythm: Normal rate and regular rhythm.      Pulses: Normal pulses.      Heart sounds: No murmur heard.  Pulmonary:      Effort: Pulmonary effort is normal. No respiratory distress.      Breath sounds: No wheezing or rales.   Abdominal:      General: Abdomen is flat. There is no distension.      Palpations: Abdomen is soft.      Tenderness: There is no right CVA tenderness or left CVA tenderness.   Musculoskeletal:         General: Swelling (Left upper extremity) present.      Cervical back: Normal range of motion and neck supple. No tenderness.      Right lower leg: No edema.      Left lower leg: No edema.   Skin:     General: Skin is warm.      Coloration: Skin is not pale.      Findings: No rash.   Neurological:      Mental Status: He is alert and oriented to person, place, and time. Mental status is at baseline.      Motor: Weakness (Left upper ( 2/5) and lower extremity ( 3/5).) present.           Assessment:       1. YU (dyspnea on exertion)    2. Unstable angina    3. History of CVA with residual deficit    4. Stage 3b chronic kidney disease    5. Hyperlipidemia, mixed    6. Essential hypertension    7. Aortic atherosclerosis    8. Type 2 diabetes mellitus with diabetic neuropathy, without long-term current use of insulin    9. Type 2 diabetes mellitus with diabetic nephropathy, without long-term current use of insulin         Plan:       # YU;   Patient has worsening anginal symptoms in setting of recent positive PET stress test. We discussed the below plan for a diagnostic LHC. We discussed the risks of procedure and his current renal function.     LHC +/- PCI   - Anti-platelet Therapy: ASA and Plavix  - Access: R radial   - Creatinine: 1.8   - Pre-Op Med: Bendaryl 50mg pO   - All patient's questions were answered.  -The risks, benefits and  alternatives of the procedure were explained to the patient.   -The risks of coronary angiography include but are not limited to: bleeding, infection, heart rhythm abnormalities, allergic reactions, kidney injury and potential need for dialysis, stroke and death.   - Should stenting be indicated, the patient has agreed to dual anti-platelet therapy with a drug-eluting stent   - Additionally, pt is aware that non-compliance is likely to result in stent clotting with heart attack, heart failure, and/or death  -The risks of moderate sedation include hypotension, respiratory depression, arrhythmias, bronchospasm, and death.   - Informed consent was obtained and the  patient is agreeable to proceed with the procedure.  # HTN:   Continue current medications; Amlodipine, Coreg, Losartan-HCTZ    # HLD  Continue Crestor       Naty Crowder MD   Cardiology, PGY 4   [facility]         I have personally taken the history and examined this patient and agree with the resident's note as stated above.  1) Angina.  The patient reports typical anginal symptoms and has an abnormal PET stress test. I discussed medical management vs medical management plus cardiac catheterization and possible PCI with the patient.  I discussed the need for long term DAPTif he undergoes PCI as well as the associated bleeding risk. I also discussed the risks of cardiac catheterization including a recurrent stroke.  The patient stated that he would like to proceed. Plan as detailed above. Given CKD will perform diagnostic cath only and stage any indicated PCI    2) HLD. 3/16/23 lipid panel reviewed; continue Crestor 20mg po qday    3) HTN. Blood pressure elevated in clinic today; reassess after cardiac cath; continue current blood pressure medications for now    4) Type 2 diabetes. The patient's HgbA1c on 07/24/2023  was 7.2. Rec tght glycemic control    5) CVA. Continue statin    6) CKD 3b. Rec oral hydration the day prior to cardiac  catheterization and will provide IV hydration the day of cath    7) GERD. Conitnue PPI while on DAPT    All of the patient's questions were answered.

## 2023-08-23 NOTE — TELEPHONE ENCOUNTER
Refill Routing Note   Medication(s) are not appropriate for processing by [facility] for the following reason(s):      Required labs abnormal: recommended dose adjustment to 500 mg BID    [facility] action(s):  Defer Care Due:  None identified        Pharmacist review requested: Yes     Appointments  past 12m or future 3m with PCP    Date Provider   Last Visit   6/8/2023 Maryjane Farias MD   Next Visit   9/21/2023 Maryjane Farias MD   ED visits in past 90 days: 0        Note composed:5:53 PM 08/23/2023

## 2023-08-23 NOTE — TELEPHONE ENCOUNTER
Refill Routing Note   Medication(s) are not appropriate for processing by [facility] for the following reason(s):      Required labs abnormal  CREATININE 1.8 (H) 07/24/2023        EGFRNORACEVR 40.0 (A) 07/24/2023       [facility] action(s):  Defer Care Due:  None identified            Pharmacist review requested: Yes     Appointments  past 12m or future 3m with PCP    Date Provider   Last Visit   6/8/2023 Maryjane Farias MD   Next Visit   9/21/2023 Maryjane Farias MD   ED visits in past 90 days: 0        Note composed:5:13 PM 08/23/2023

## 2023-08-23 NOTE — TELEPHONE ENCOUNTER
No care due was identified.  [facility] Embedded Care Due Messages. Reference number: [id number].   8/23/2023 3:52:07 PM CDT

## 2023-08-25 NOTE — TELEPHONE ENCOUNTER
----- Message from Fransico Nicolas sent at 8/25/2023 11:30 AM CDT -----  Contact: Pt [phone]  Requesting an RX refill or new RX.  Is this a refill or new RX: Refill  RX name and strength metFORMIN (GLUCOPHAGE) 1000 MG tablet   Is this a 30 day or 90 day RX:   Ph[facility] and phone # [id number] - [place] [address] AT [facility] [address] Phone:  [phone]  Fax:  [phone]

## 2023-08-25 NOTE — TELEPHONE ENCOUNTER
No care due was identified.  [facility] Embedded Care Due Messages. Reference number: [id number].   8/25/2023 12:34:03 PM CDT

## 2023-08-25 NOTE — TELEPHONE ENCOUNTER
----- Message from Fransico Nicolas sent at 8/25/2023 11:30 AM CDT -----  Contact: Pt [phone]  Requesting an RX refill or new RX.  Is this a refill or new RX: Refill  RX name and strength metFORMIN (GLUCOPHAGE) 1000 MG tablet   Is this a 30 day or 90 day RX:   Pharmacy name and phone # [id number] - [place] [address] AT [facility] [address] Phone:  [phone]  Fax:  [phone]

## 2023-08-27 RX ORDER — METFORMIN HYDROCHLORIDE 1000 MG/1
1000 TABLET ORAL 2 TIMES DAILY WITH MEALS
Qty: 180 TABLET | Refills: 3 | Status: SHIPPED | OUTPATIENT
Start: 2023-08-27 | End: 2023-09-21

## 2023-08-27 RX ORDER — METFORMIN HYDROCHLORIDE 1000 MG/1
TABLET ORAL
Qty: 180 TABLET | Refills: 1 | OUTPATIENT
Start: 2023-08-27

## 2023-08-31 ENCOUNTER — OFFICE VISIT (OUTPATIENT)
Dept: OPHTHALMOLOGY | Facility: CLINIC | Age: 73
End: 2023-08-31
Payer: MEDICARE

## 2023-08-31 ENCOUNTER — TELEPHONE (OUTPATIENT)
Dept: CARDIOLOGY | Facility: CLINIC | Age: 73
End: 2023-08-31
Payer: MEDICARE

## 2023-08-31 DIAGNOSIS — H34.8310 BRANCH RETINAL VEIN OCCLUSION OF RIGHT EYE WITH MACULAR EDEMA: Primary | ICD-10-CM

## 2023-08-31 DIAGNOSIS — H25.13 NUCLEAR SCLEROTIC CATARACT OF BOTH EYES: ICD-10-CM

## 2023-08-31 DIAGNOSIS — H40.32X3 TRAUMATIC GLAUCOMA, LEFT, SEVERE STAGE: ICD-10-CM

## 2023-08-31 PROCEDURE — 1160F PR REVIEW ALL MEDS BY PRESCRIBER/CLIN PHARMACIST DOCUMENTED: ICD-10-PCS | Mod: CPTII,S$GLB,, | Performed by: OPHTHALMOLOGY

## 2023-08-31 PROCEDURE — 3288F FALL RISK ASSESSMENT DOCD: CPT | Mod: CPTII,S$GLB,, | Performed by: OPHTHALMOLOGY

## 2023-08-31 PROCEDURE — 92014 COMPRE OPH EXAM EST PT 1/>: CPT | Mod: S$GLB,,, | Performed by: OPHTHALMOLOGY

## 2023-08-31 PROCEDURE — 1126F PR PAIN SEVERITY QUANTIFIED, NO PAIN PRESENT: ICD-10-PCS | Mod: CPTII,S$GLB,, | Performed by: OPHTHALMOLOGY

## 2023-08-31 PROCEDURE — 1159F MED LIST DOCD IN RCRD: CPT | Mod: CPTII,S$GLB,, | Performed by: OPHTHALMOLOGY

## 2023-08-31 PROCEDURE — 1160F RVW MEDS BY RX/DR IN RCRD: CPT | Mod: CPTII,S$GLB,, | Performed by: OPHTHALMOLOGY

## 2023-08-31 PROCEDURE — 2023F DILAT RTA XM W/O RTNOPTHY: CPT | Mod: CPTII,S$GLB,, | Performed by: OPHTHALMOLOGY

## 2023-08-31 PROCEDURE — 3288F PR FALLS RISK ASSESSMENT DOCUMENTED: ICD-10-PCS | Mod: CPTII,S$GLB,, | Performed by: OPHTHALMOLOGY

## 2023-08-31 PROCEDURE — 92014 PR EYE EXAM, EST PATIENT,COMPREHESV: ICD-10-PCS | Mod: S$GLB,,, | Performed by: OPHTHALMOLOGY

## 2023-08-31 PROCEDURE — 3051F HG A1C>EQUAL 7.0%<8.0%: CPT | Mod: CPTII,S$GLB,, | Performed by: OPHTHALMOLOGY

## 2023-08-31 PROCEDURE — 1126F AMNT PAIN NOTED NONE PRSNT: CPT | Mod: CPTII,S$GLB,, | Performed by: OPHTHALMOLOGY

## 2023-08-31 PROCEDURE — 3051F PR MOST RECENT HEMOGLOBIN A1C LEVEL 7.0 - < 8.0%: ICD-10-PCS | Mod: CPTII,S$GLB,, | Performed by: OPHTHALMOLOGY

## 2023-08-31 PROCEDURE — 1101F PT FALLS ASSESS-DOCD LE1/YR: CPT | Mod: CPTII,S$GLB,, | Performed by: OPHTHALMOLOGY

## 2023-08-31 PROCEDURE — 92134 OCT, RETINA - OU - BOTH EYES: ICD-10-PCS | Mod: S$GLB,,, | Performed by: OPHTHALMOLOGY

## 2023-08-31 PROCEDURE — 2023F PR DILATED RETINAL EXAM W/O EVID OF RETINOPATHY: ICD-10-PCS | Mod: CPTII,S$GLB,, | Performed by: OPHTHALMOLOGY

## 2023-08-31 PROCEDURE — 1159F PR MEDICATION LIST DOCUMENTED IN MEDICAL RECORD: ICD-10-PCS | Mod: CPTII,S$GLB,, | Performed by: OPHTHALMOLOGY

## 2023-08-31 PROCEDURE — 92134 CPTRZ OPH DX IMG PST SGM RTA: CPT | Mod: S$GLB,,, | Performed by: OPHTHALMOLOGY

## 2023-08-31 PROCEDURE — 99999 PR PBB SHADOW E&M-EST. PATIENT-LVL IV: CPT | Mod: PBBFAC,,, | Performed by: OPHTHALMOLOGY

## 2023-08-31 PROCEDURE — 1101F PR PT FALLS ASSESS DOC 0-1 FALLS W/OUT INJ PAST YR: ICD-10-PCS | Mod: CPTII,S$GLB,, | Performed by: OPHTHALMOLOGY

## 2023-08-31 PROCEDURE — 99999 PR PBB SHADOW E&M-EST. PATIENT-LVL IV: ICD-10-PCS | Mod: PBBFAC,,, | Performed by: OPHTHALMOLOGY

## 2023-08-31 NOTE — PROGRESS NOTES
HPI     Glaucoma     Additional comments: 1 month mOCT/DFE OU with Ozurdex injection OD.           Comments    Pt here for 1 month mOCT/DFE OU with Ozurdex injection OD. Pt states that   he went out of town for a week (last week) and did not use any glaucoma   medications during his time away. Pt cannot confirm medication schedule of   drops for each eye. Pt states that VA OU is blurry and sees flashes of   light when he looks around. Pt denies any eye pain or floaters in VA.    DLS: 07/27/2023    Gtts:  1. Cosopt BID OD - pt unable to confirm  2. Alphagan BID OD - pt unable to confirm  3. Dorzolamide TID OS - pt unable to confirm    POHx:  1. Branch retinal vein occlusion of right eye with macular edema  2. Traumatic glaucoma, left, severe stage  3. Nuclear sclerotic cataract of both eyes    (+)DM  Hemoglobin A1C       Date                     Value               Ref Range             Status                07/24/2023               7.2 (H)             4.0 - 5.6 %           Final                   03/16/2023               8.1 (H)             4.0 - 5.6 %           Final                   12/08/2022               8.5 (H)             4.0 - 5.6 %           Final           Last edited by Ita Barkley on 8/31/2023  9:54 AM.         A/P    ICD-10-CM ICD-9-CM   1. Branch retinal vein occlusion of right eye with macular edema  H34.8310 362.36     362.83   2. Traumatic glaucoma, left, severe stage  H40.32X3 365.65     365.73   3. Nuclear sclerotic cataract of both eyes  H25.13 366.16         1. Branch retinal vein occlusion of right eye with macular edema  Referral from Dr. Chew for CME check  Hx glauc OS  Exam notable for small tertiary BRVO OD with IRF, VA 20/50 (was 20/40), has been taking PF/Ket but stopped two weeks ago, has cardiac angiogram next week and is concerned  Plan: pt does not want injection Ozurdex, wants to focus on cardiac problems, will recheck in 2-3 mo off drops, mild IRF and patient not  bothered      2. Traumatic glaucoma, left, severe stage  Remote hx of trauma in 1960s (finger to eye)  Nerve pallor  IOP 21/20  today, f/b Dr. Chew  Plan: Continue Present Management     3. Nuclear sclerotic cataract of both eyes  Mild NS, borderline VS OD given monocular  Plan: Observation for now, consider CEIOL in future    RTC 2-3 mo DFE/OCTm OU, poss get auth for Ozurdex if unchanged OD      I saw and examined the patient and reviewed in detail the findings documented. The final examination findings, image interpretations, and plan as documented in the record represent my personal judgment and conclusions.    Titi Jordan MD  Vitreoretinal Surgery   [facility]

## 2023-08-31 NOTE — TELEPHONE ENCOUNTER
Called back.  No answer.  Left voicemail.        ----- Message from Asmita Escobar sent at 8/31/2023 11:02 AM CDT -----  Contact: [phone]  Per pt, he has been with out his blood thinners for a week. Pt would like to know will he still be able to have his procedure on 09/06. Please call.      Pt would also like to go over the instructions. He states he is currently at [facility] if he can come talk to someone in person.             Thank you

## 2023-09-01 ENCOUNTER — DOCUMENTATION ONLY (OUTPATIENT)
Dept: CARDIOLOGY | Facility: CLINIC | Age: 73
End: 2023-09-01
Payer: MEDICARE

## 2023-09-01 NOTE — PROGRESS NOTES
OUTPATIENT CATHETERIZATION INSTRUCTIONS    You have been scheduled for a procedure in the catheterization lab on Friday, October 6, 2023.     Please report to the Cardiology Waiting Area on the Third floor of the hospital and check in at 7 AM.   You will then be taken to the SSCU (Short Stay Cardiac Unit) and prepared for your procedure. Please be aware that this is not the time of your procedure but the time you are to arrive. The procedures are scheduled on an hourly basis; however, emergency cases take precedence over all other cases.       No solid foods 8 hours prior to your procedure.  You may have clear liquids until the time of your admission which should be 2 hours prior to your procedure.  You are encouraged to drink at least 8 ounces of clear liquids prior to your admission to SSCU.  Patients with gastric emptying issues should be fasting for 6- 8 hours prior to the procedure.  Clear liquids include water, black coffee, clear juices, and performance drinks - no pulp or milk.    Heart failure or dialysis patients will be limited to 8 ounces (1 cup) of clear liquids up until 2 hours of the procedure.    3.   You may take your regular morning medications with water. If there are any medications that you should not take you will be instructed to hold them that morning. If you         are diabetic and on Metformin (Glucophage) do not take it the day before, the day of, and for 2 days after your procedure.  4.   If you are on Pradaxa, Eliquis or Coumadin , you can hold them 3 days prior to your procedure.      The procedure will take 1-2 hours to perform. After the procedure, you will return to SSCU on the third floor of the hospital. You will need to lie still (or keep your arm still) for the next 2 to 4 hours to minimize bleeding from the puncture site.  This time is determined by your physician.  Your family may remain in the room with you during this time.       You may be able to be discharged home that  "same afternoon if there is someone to drive you home and there are no complications.  Your doctor will determine, based on your progress, the date and time of your discharge. The results of your procedure will be discussed with you before you are discharged. Any further testing or procedures will be scheduled for you either before you leave or after your discharge..       If you should have any questions, concerns, or need to change the date of your procedure, please call "ANTHONY Stafford @ [phone]            Special Instructions:    Take your Plavix and Aspirin every day. One pill each day. On the morning of the procedure, take your Aspirin and Plavix only.    Your last dose of Metformin will be on Wednesday, October 4, 2023. You will start back taking your medication on Monday, October 9, 2023.    Drink plenty of water the day before and after your procedure.           THE ABOVE INSTRUCTIONS WERE GIVEN TO THE PATIENT VERBALLY AND THEY VERBALIZED UNDERSTANDING.  THEY DO NOT REQUIRE ANY SPECIAL NEEDS AND DO NOT HAVE ANY LEARNING BARRIERS.          Directions for Reporting to Cardiology Waiting Area in the Hospital  If you park in the Parking Garage:  Take elevators to the1st floor of the parking garage.  Continue past the gift shop, coffee shop, and piano.  Take a right and go to the gold elevators. (Elevator B)  Take the elevator to the 3rd floor.  Follow the arrow on the sign on the wall that says Cath Lab Registration/EP Lab Registration.  Follow the long hallway all the way around until you come to a big open area.  This is the registration area.  Check in at Reception Desk.    OR    If family is dropping you off:  Have them drop you off at the front of the Hospital under the green overhang.  Enter through the doors and take a right.  Take the E elevators to the 3rd floor Cardiology Waiting Area.  Check in at the Reception Desk in the waiting room.              "

## 2023-09-21 ENCOUNTER — OFFICE VISIT (OUTPATIENT)
Dept: INTERNAL MEDICINE | Facility: CLINIC | Age: 73
End: 2023-09-21
Payer: MEDICARE

## 2023-09-21 ENCOUNTER — LAB VISIT (OUTPATIENT)
Dept: LAB | Facility: HOSPITAL | Age: 73
End: 2023-09-21
Attending: INTERNAL MEDICINE
Payer: MEDICARE

## 2023-09-21 VITALS
DIASTOLIC BLOOD PRESSURE: 82 MMHG | OXYGEN SATURATION: 97 % | HEIGHT: 66 IN | SYSTOLIC BLOOD PRESSURE: 144 MMHG | HEART RATE: 67 BPM | BODY MASS INDEX: 35.04 KG/M2 | WEIGHT: 218.06 LBS

## 2023-09-21 DIAGNOSIS — E21.3 HYPERPARATHYROIDISM: ICD-10-CM

## 2023-09-21 DIAGNOSIS — N40.0 BENIGN PROSTATIC HYPERPLASIA, UNSPECIFIED WHETHER LOWER URINARY TRACT SYMPTOMS PRESENT: ICD-10-CM

## 2023-09-21 DIAGNOSIS — E66.01 SEVERE OBESITY (BMI 35.0-39.9) WITH COMORBIDITY: Primary | ICD-10-CM

## 2023-09-21 DIAGNOSIS — E11.40 TYPE 2 DIABETES MELLITUS WITH DIABETIC NEUROPATHY, WITHOUT LONG-TERM CURRENT USE OF INSULIN: ICD-10-CM

## 2023-09-21 DIAGNOSIS — R35.0 URINARY FREQUENCY: ICD-10-CM

## 2023-09-21 DIAGNOSIS — I69.30 HISTORY OF CVA WITH RESIDUAL DEFICIT: ICD-10-CM

## 2023-09-21 LAB
BACTERIA #/AREA URNS AUTO: NORMAL /HPF
BILIRUB UR QL STRIP: NEGATIVE
CLARITY UR REFRACT.AUTO: CLEAR
COLOR UR AUTO: COLORLESS
GLUCOSE UR QL STRIP: ABNORMAL
HGB UR QL STRIP: NEGATIVE
KETONES UR QL STRIP: NEGATIVE
LEUKOCYTE ESTERASE UR QL STRIP: NEGATIVE
MICROSCOPIC COMMENT: NORMAL
NITRITE UR QL STRIP: NEGATIVE
PH UR STRIP: 6 [PH] (ref 5–8)
PROT UR QL STRIP: ABNORMAL
RBC #/AREA URNS AUTO: 0 /HPF (ref 0–4)
SP GR UR STRIP: 1.01 (ref 1–1.03)
SQUAMOUS #/AREA URNS AUTO: 0 /HPF
URN SPEC COLLECT METH UR: ABNORMAL
WBC #/AREA URNS AUTO: 0 /HPF (ref 0–5)
YEAST UR QL AUTO: NORMAL

## 2023-09-21 PROCEDURE — 3051F PR MOST RECENT HEMOGLOBIN A1C LEVEL 7.0 - < 8.0%: ICD-10-PCS | Mod: CPTII,S$GLB,, | Performed by: INTERNAL MEDICINE

## 2023-09-21 PROCEDURE — 3077F SYST BP >= 140 MM HG: CPT | Mod: CPTII,S$GLB,, | Performed by: INTERNAL MEDICINE

## 2023-09-21 PROCEDURE — 1125F PR PAIN SEVERITY QUANTIFIED, PAIN PRESENT: ICD-10-PCS | Mod: CPTII,S$GLB,, | Performed by: INTERNAL MEDICINE

## 2023-09-21 PROCEDURE — 3288F PR FALLS RISK ASSESSMENT DOCUMENTED: ICD-10-PCS | Mod: CPTII,S$GLB,, | Performed by: INTERNAL MEDICINE

## 2023-09-21 PROCEDURE — 81001 URINALYSIS AUTO W/SCOPE: CPT | Performed by: INTERNAL MEDICINE

## 2023-09-21 PROCEDURE — 3288F FALL RISK ASSESSMENT DOCD: CPT | Mod: CPTII,S$GLB,, | Performed by: INTERNAL MEDICINE

## 2023-09-21 PROCEDURE — 1101F PT FALLS ASSESS-DOCD LE1/YR: CPT | Mod: CPTII,S$GLB,, | Performed by: INTERNAL MEDICINE

## 2023-09-21 PROCEDURE — 87086 URINE CULTURE/COLONY COUNT: CPT | Performed by: INTERNAL MEDICINE

## 2023-09-21 PROCEDURE — 3079F PR MOST RECENT DIASTOLIC BLOOD PRESSURE 80-89 MM HG: ICD-10-PCS | Mod: CPTII,S$GLB,, | Performed by: INTERNAL MEDICINE

## 2023-09-21 PROCEDURE — 1101F PR PT FALLS ASSESS DOC 0-1 FALLS W/OUT INJ PAST YR: ICD-10-PCS | Mod: CPTII,S$GLB,, | Performed by: INTERNAL MEDICINE

## 2023-09-21 PROCEDURE — 1125F AMNT PAIN NOTED PAIN PRSNT: CPT | Mod: CPTII,S$GLB,, | Performed by: INTERNAL MEDICINE

## 2023-09-21 PROCEDURE — 99214 PR OFFICE/OUTPT VISIT, EST, LEVL IV, 30-39 MIN: ICD-10-PCS | Mod: S$GLB,,, | Performed by: INTERNAL MEDICINE

## 2023-09-21 PROCEDURE — 3051F HG A1C>EQUAL 7.0%<8.0%: CPT | Mod: CPTII,S$GLB,, | Performed by: INTERNAL MEDICINE

## 2023-09-21 PROCEDURE — 3077F PR MOST RECENT SYSTOLIC BLOOD PRESSURE >= 140 MM HG: ICD-10-PCS | Mod: CPTII,S$GLB,, | Performed by: INTERNAL MEDICINE

## 2023-09-21 PROCEDURE — 99999 PR PBB SHADOW E&M-EST. PATIENT-LVL IV: CPT | Mod: PBBFAC,,, | Performed by: INTERNAL MEDICINE

## 2023-09-21 PROCEDURE — 99999 PR PBB SHADOW E&M-EST. PATIENT-LVL IV: ICD-10-PCS | Mod: PBBFAC,,, | Performed by: INTERNAL MEDICINE

## 2023-09-21 PROCEDURE — 3008F BODY MASS INDEX DOCD: CPT | Mod: CPTII,S$GLB,, | Performed by: INTERNAL MEDICINE

## 2023-09-21 PROCEDURE — 3079F DIAST BP 80-89 MM HG: CPT | Mod: CPTII,S$GLB,, | Performed by: INTERNAL MEDICINE

## 2023-09-21 PROCEDURE — 99214 OFFICE O/P EST MOD 30 MIN: CPT | Mod: S$GLB,,, | Performed by: INTERNAL MEDICINE

## 2023-09-21 PROCEDURE — 3008F PR BODY MASS INDEX (BMI) DOCUMENTED: ICD-10-PCS | Mod: CPTII,S$GLB,, | Performed by: INTERNAL MEDICINE

## 2023-09-21 RX ORDER — METFORMIN HYDROCHLORIDE 1000 MG/1
TABLET ORAL
Qty: 180 TABLET | Refills: 3
Start: 2023-09-21

## 2023-09-21 NOTE — PROGRESS NOTES
Subjective:       Patient ID: Jae Millan is a 72 y.o. male.    Chief Complaint: Follow-up, Insomnia, Depression, and Diabetes    Follow-up  Pertinent negatives include no abdominal pain, chest pain, headaches, nausea or vomiting.   DepressionPatient is not experiencing: shortness of breath.      Diabetes  Pertinent negatives for hypoglycemia include no headaches or seizures. Pertinent negatives for diabetes include no chest pain.    Pt is feeling okay - we reviewed labs which are improved.  Angina symptoms stable - angiogram soon.    Review of Systems   Respiratory:  Negative for shortness of breath.    Cardiovascular:  Negative for chest pain.   Gastrointestinal:  Negative for abdominal pain, diarrhea, nausea and vomiting.   Genitourinary:  Negative for dysuria.   Neurological:  Negative for seizures, syncope and headaches.   Psychiatric/Behavioral:  Positive for depression.        Objective:      Physical Exam  Constitutional:       General: He is not in acute distress.     Appearance: He is well-developed.   Eyes:      Pupils: Pupils are equal, round, and reactive to light.   Neck:      Thyroid: No thyromegaly.      Vascular: No JVD.   Cardiovascular:      Rate and Rhythm: Normal rate and regular rhythm.      Heart sounds: Normal heart sounds. No murmur heard.     No friction rub. No gallop.   Pulmonary:      Effort: Pulmonary effort is normal.      Breath sounds: Normal breath sounds. No wheezing or rales.   Abdominal:      General: Bowel sounds are normal. There is no distension.      Palpations: Abdomen is soft. There is no mass.      Tenderness: There is no abdominal tenderness. There is no guarding or rebound.   Musculoskeletal:      Cervical back: Neck supple.   Lymphadenopathy:      Cervical: No cervical adenopathy.   Skin:     General: Skin is warm and dry.   Neurological:      Mental Status: He is alert and oriented to person, place, and time.   Psychiatric:         Behavior: Behavior normal.          Thought Content: Thought content normal.         Judgment: Judgment normal.         Assessment:       1. Severe obesity (BMI 35.0-39.9) with comorbidity    2. Hyperparathyroidism    3. History of CVA with residual deficit    4. Urinary frequency    5. Benign prostatic hyperplasia, unspecified whether lower urinary tract symptoms present    6. Type 2 diabetes mellitus with diabetic neuropathy, without long-term current use of insulin        Plan:   Severe obesity (BMI 35.0-39.9) with comorbidity  Working on diet and exercise.   Hyperparathyroidism  Normal calciums recently  History of CVA with residual deficit  -     Ambulatory referral/consult to Physical/Occupational Therapy; Future; Expected date: 09/28/2023  -     Ambulatory referral/consult to Physical/Occupational Therapy; Future; Expected date: 09/28/2023    Urinary frequency  -     Ambulatory referral/consult to Urology; Future; Expected date: 09/28/2023  -     Urinalysis; Future; Expected date: 09/21/2023  -     Urine culture; Future; Expected date: 09/21/2023    Benign prostatic hyperplasia, unspecified whether lower urinary tract symptoms present  -     Ambulatory referral/consult to Urology; Future; Expected date: 09/28/2023    Type 2 diabetes mellitus with diabetic neuropathy, without long-term current use of insulin  -     empagliflozin (JARDIANCE) 10 mg tablet; Take 1 tablet (10 mg total) by mouth once daily.  Dispense: 90 tablet; Refill: 1  -     SITagliptin phosphate (JANUVIA) 50 MG Tab; Take 1 tablet (50 mg total) by mouth once daily.  Dispense: 90 tablet; Refill: 1  -     CBC Auto Differential; Future; Expected date: 09/21/2023  -     Comprehensive Metabolic Panel; Future; Expected date: 09/21/2023  -     Hemoglobin A1C; Future; Expected date: 09/21/2023    Other orders  -     metFORMIN (GLUCOPHAGE) 1000 MG tablet; On daily (Patient taking differently: Take 1,000 mg by mouth daily with breakfast. On daily)  Dispense: 180 tablet; Refill: 3    L hand  nails trimmed without issue

## 2023-09-22 LAB — BACTERIA UR CULT: NO GROWTH

## 2023-09-23 NOTE — PROGRESS NOTES
HPI     Glaucoma            Comments: 4 month ck and pt states that eyes seem sore and still having   difficulty with his vision          Comments    DLS: 6/2/23    1. Traumatic Glaucoma OS  2. Angle Recession OS  3. APD OS  4. NS OU    MEDS:  Cosopt BID OS = HAS NOT BEEN USING IN OVER A WEEK    No longer using steroid gtts or NSAID gtts od - for mac cyst with V-M   traction           Last edited by Tamera Chew MD on 9/25/2023 11:56 AM.            Assessment /Plan     For exam results, see Encounter Report.    Traumatic glaucoma, left, severe stage    Angle recession of left eye    Afferent pupillary defect, left    Branch retinal vein occlusion of right eye with macular edema    Macular retinal cyst, right    Nuclear sclerotic cataract of both eyes    History of eye trauma          LOST TO F/U 9/2019 TO 8/2022 - SAW DR GARCIA 8/3/2022 AND SENT BACK OVER - PT IS OFF GTTS        Glaucoma (type and duration)    GLAUCOMA - 2/2 trauma os - end stage    First HVF   2019   First photos   7/2019   Treatment / Drops started    Timolol BID OU , Azopt TID OU, Brimonidine BID OU       Stopped - ran out and did not refill            Family history    none        Glaucoma meds    Off all gtts (use to suse brim / azopt /timolol- os ) // off again 2/3/2023        H/O adverse rxn to glaucoma drops         LASERS    ??        GLAUCOMA SURGERIES    none        OTHER EYE SURGERIES    none        CDR    OD: 0.6 OS: 0.85        Tbase    16/18         Tmax    16/18            Ttarget    ??             HVF    2 test 2019 to 2023 - use to be Full od -- now with SALT - ptosis and was falling asleep  //  gen dep thru out         Gonio   +4 od // recess 360 os          CCT    585/599        OCT    2 test 2019 to 2021 - nl od // dec thru out os         Disc photos    7/2019    - Ttoday   14/20 - off cosopt - suppose to be using os bid    - Test done today    IOP CHECK -// OCT macuala - + CME os     2. Angle recession os     3. +  "APD os     4. NS     5. BRVO w/ ME od    This was/is his good eye     Pt new patient to me, used to be seen in [facility] for glaucoma. Pt ran out of medications in June 2019. Pt has no eye pain, red eye , flashes, floaters, changes in vision. PT has no hx of surgery or family hx of glaucoma.     8/29/2022   Pt lost to f/u x several years - off gtts   Recommend restart eye drops   cosopt os bid- RE-START (2/3/2023) // off again - again re-start bid os - told to use it indefinetly / for ever (9/25/2023)     Retina ([facility]) - evaluation of right macula - see OCT - this is his "good" eye // BRVO w/ ME od    Has seen Dr Jordan x 3 - did NOT get avastin 2/2 cardiac issues(7/27/2023)   Did NOT get ozurdex - pt defered (8/31/2023) - is to F/U in 2-3 months - make sure he has an appt     F/u 4 months - review retina notes // IOP // gonio// AND REPEAT MACULA oct - MOVING LINE   "

## 2023-09-25 ENCOUNTER — OFFICE VISIT (OUTPATIENT)
Dept: OPHTHALMOLOGY | Facility: CLINIC | Age: 73
End: 2023-09-25
Payer: MEDICARE

## 2023-09-25 DIAGNOSIS — H25.13 NUCLEAR SCLEROTIC CATARACT OF BOTH EYES: ICD-10-CM

## 2023-09-25 DIAGNOSIS — H40.32X4 TRAUMATIC GLAUCOMA, LEFT, INDETERMINATE STAGE: ICD-10-CM

## 2023-09-25 DIAGNOSIS — Z87.828 HISTORY OF EYE TRAUMA: ICD-10-CM

## 2023-09-25 DIAGNOSIS — H21.562 AFFERENT PUPILLARY DEFECT, LEFT: ICD-10-CM

## 2023-09-25 DIAGNOSIS — H40.32X3 TRAUMATIC GLAUCOMA, LEFT, SEVERE STAGE: Primary | ICD-10-CM

## 2023-09-25 DIAGNOSIS — H34.8310 BRANCH RETINAL VEIN OCCLUSION OF RIGHT EYE WITH MACULAR EDEMA: ICD-10-CM

## 2023-09-25 DIAGNOSIS — H21.552 ANGLE RECESSION OF LEFT EYE: ICD-10-CM

## 2023-09-25 DIAGNOSIS — H35.341 MACULAR RETINAL CYST, RIGHT: ICD-10-CM

## 2023-09-25 PROCEDURE — 1125F PR PAIN SEVERITY QUANTIFIED, PAIN PRESENT: ICD-10-PCS | Mod: CPTII,S$GLB,, | Performed by: OPHTHALMOLOGY

## 2023-09-25 PROCEDURE — 1101F PR PT FALLS ASSESS DOC 0-1 FALLS W/OUT INJ PAST YR: ICD-10-PCS | Mod: CPTII,S$GLB,, | Performed by: OPHTHALMOLOGY

## 2023-09-25 PROCEDURE — 1101F PT FALLS ASSESS-DOCD LE1/YR: CPT | Mod: CPTII,S$GLB,, | Performed by: OPHTHALMOLOGY

## 2023-09-25 PROCEDURE — 3051F HG A1C>EQUAL 7.0%<8.0%: CPT | Mod: CPTII,S$GLB,, | Performed by: OPHTHALMOLOGY

## 2023-09-25 PROCEDURE — 99214 OFFICE O/P EST MOD 30 MIN: CPT | Mod: S$GLB,,, | Performed by: OPHTHALMOLOGY

## 2023-09-25 PROCEDURE — 1159F MED LIST DOCD IN RCRD: CPT | Mod: CPTII,S$GLB,, | Performed by: OPHTHALMOLOGY

## 2023-09-25 PROCEDURE — 99214 PR OFFICE/OUTPT VISIT, EST, LEVL IV, 30-39 MIN: ICD-10-PCS | Mod: S$GLB,,, | Performed by: OPHTHALMOLOGY

## 2023-09-25 PROCEDURE — 1159F PR MEDICATION LIST DOCUMENTED IN MEDICAL RECORD: ICD-10-PCS | Mod: CPTII,S$GLB,, | Performed by: OPHTHALMOLOGY

## 2023-09-25 PROCEDURE — 1160F RVW MEDS BY RX/DR IN RCRD: CPT | Mod: CPTII,S$GLB,, | Performed by: OPHTHALMOLOGY

## 2023-09-25 PROCEDURE — 1160F PR REVIEW ALL MEDS BY PRESCRIBER/CLIN PHARMACIST DOCUMENTED: ICD-10-PCS | Mod: CPTII,S$GLB,, | Performed by: OPHTHALMOLOGY

## 2023-09-25 PROCEDURE — 3288F PR FALLS RISK ASSESSMENT DOCUMENTED: ICD-10-PCS | Mod: CPTII,S$GLB,, | Performed by: OPHTHALMOLOGY

## 2023-09-25 PROCEDURE — 99999 PR PBB SHADOW E&M-EST. PATIENT-LVL III: CPT | Mod: PBBFAC,,, | Performed by: OPHTHALMOLOGY

## 2023-09-25 PROCEDURE — 1125F AMNT PAIN NOTED PAIN PRSNT: CPT | Mod: CPTII,S$GLB,, | Performed by: OPHTHALMOLOGY

## 2023-09-25 PROCEDURE — 3288F FALL RISK ASSESSMENT DOCD: CPT | Mod: CPTII,S$GLB,, | Performed by: OPHTHALMOLOGY

## 2023-09-25 PROCEDURE — 92134 CPTRZ OPH DX IMG PST SGM RTA: CPT | Mod: S$GLB,,, | Performed by: OPHTHALMOLOGY

## 2023-09-25 PROCEDURE — 92134 POSTERIOR SEGMENT OCT RETINA (OCULAR COHERENCE TOMOGRAPHY)-BOTH EYES: ICD-10-PCS | Mod: S$GLB,,, | Performed by: OPHTHALMOLOGY

## 2023-09-25 PROCEDURE — 99999 PR PBB SHADOW E&M-EST. PATIENT-LVL III: ICD-10-PCS | Mod: PBBFAC,,, | Performed by: OPHTHALMOLOGY

## 2023-09-25 PROCEDURE — 3051F PR MOST RECENT HEMOGLOBIN A1C LEVEL 7.0 - < 8.0%: ICD-10-PCS | Mod: CPTII,S$GLB,, | Performed by: OPHTHALMOLOGY

## 2023-09-25 RX ORDER — DORZOLAMIDE HYDROCHLORIDE AND TIMOLOL MALEATE 20; 5 MG/ML; MG/ML
1 SOLUTION/ DROPS OPHTHALMIC 2 TIMES DAILY
Qty: 10 ML | Refills: 12 | Status: SHIPPED | OUTPATIENT
Start: 2023-09-25 | End: 2024-01-08 | Stop reason: SDUPTHER

## 2023-10-06 ENCOUNTER — HOSPITAL ENCOUNTER (OUTPATIENT)
Facility: HOSPITAL | Age: 73
Discharge: HOME | End: 2023-10-06
Attending: INTERNAL MEDICINE | Admitting: INTERNAL MEDICINE
Payer: MEDICARE

## 2023-10-06 VITALS
DIASTOLIC BLOOD PRESSURE: 85 MMHG | RESPIRATION RATE: 18 BRPM | SYSTOLIC BLOOD PRESSURE: 146 MMHG | WEIGHT: 216 LBS | BODY MASS INDEX: 30.92 KG/M2 | TEMPERATURE: 98 F | HEART RATE: 75 BPM | HEIGHT: 70 IN | OXYGEN SATURATION: 92 %

## 2023-10-06 DIAGNOSIS — I25.10 CAD (CORONARY ARTERY DISEASE): ICD-10-CM

## 2023-10-06 DIAGNOSIS — R06.09 DOE (DYSPNEA ON EXERTION): ICD-10-CM

## 2023-10-06 PROBLEM — R07.9 CHEST PAIN: Status: ACTIVE | Noted: 2023-10-06

## 2023-10-06 LAB
ABO + RH BLD: NORMAL
ANION GAP SERPL CALC-SCNC: 9 MMOL/L (ref 8–16)
BASOPHILS # BLD AUTO: 0.04 K/UL (ref 0–0.2)
BASOPHILS NFR BLD: 0.6 % (ref 0–1.9)
BLD GP AB SCN CELLS X3 SERPL QL: NORMAL
BUN SERPL-MCNC: 31 MG/DL (ref 8–23)
CALCIUM SERPL-MCNC: 8.7 MG/DL (ref 8.7–10.5)
CHLORIDE SERPL-SCNC: 107 MMOL/L (ref 95–110)
CO2 SERPL-SCNC: 22 MMOL/L (ref 23–29)
CREAT SERPL-MCNC: 2.3 MG/DL (ref 0.5–1.4)
DIFFERENTIAL METHOD: ABNORMAL
EOSINOPHIL # BLD AUTO: 0.2 K/UL (ref 0–0.5)
EOSINOPHIL NFR BLD: 2.4 % (ref 0–8)
ERYTHROCYTE [DISTWIDTH] IN BLOOD BY AUTOMATED COUNT: 15 % (ref 11.5–14.5)
EST. GFR  (NO RACE VARIABLE): 29.8 ML/MIN/1.73 M^2
GLUCOSE SERPL-MCNC: 158 MG/DL (ref 70–110)
HCT VFR BLD AUTO: 40.4 % (ref 40–54)
HGB BLD-MCNC: 13.4 G/DL (ref 14–18)
IMM GRANULOCYTES # BLD AUTO: 0.01 K/UL (ref 0–0.04)
IMM GRANULOCYTES NFR BLD AUTO: 0.2 % (ref 0–0.5)
INR PPP: 1 (ref 0.8–1.2)
LYMPHOCYTES # BLD AUTO: 1 K/UL (ref 1–4.8)
LYMPHOCYTES NFR BLD: 15.3 % (ref 18–48)
MCH RBC QN AUTO: 27.3 PG (ref 27–31)
MCHC RBC AUTO-ENTMCNC: 33.2 G/DL (ref 32–36)
MCV RBC AUTO: 82 FL (ref 82–98)
MONOCYTES # BLD AUTO: 0.6 K/UL (ref 0.3–1)
MONOCYTES NFR BLD: 8.7 % (ref 4–15)
NEUTROPHILS # BLD AUTO: 4.6 K/UL (ref 1.8–7.7)
NEUTROPHILS NFR BLD: 72.8 % (ref 38–73)
NRBC BLD-RTO: 0 /100 WBC
PLATELET # BLD AUTO: 255 K/UL (ref 150–450)
PMV BLD AUTO: 12 FL (ref 9.2–12.9)
POCT GLUCOSE: 155 MG/DL (ref 70–110)
POTASSIUM SERPL-SCNC: 3.7 MMOL/L (ref 3.5–5.1)
PROTHROMBIN TIME: 10.8 SEC (ref 9–12.5)
RBC # BLD AUTO: 4.91 M/UL (ref 4.6–6.2)
SODIUM SERPL-SCNC: 138 MMOL/L (ref 136–145)
SPECIMEN OUTDATE: NORMAL
WBC # BLD AUTO: 6.29 K/UL (ref 3.9–12.7)

## 2023-10-06 PROCEDURE — 82962 GLUCOSE BLOOD TEST: CPT | Performed by: INTERNAL MEDICINE

## 2023-10-06 PROCEDURE — 99499 UNLISTED E&M SERVICE: CPT | Mod: ,,, | Performed by: INTERNAL MEDICINE

## 2023-10-06 PROCEDURE — 80048 BASIC METABOLIC PNL TOTAL CA: CPT | Performed by: STUDENT IN AN ORGANIZED HEALTH CARE EDUCATION/TRAINING PROGRAM

## 2023-10-06 PROCEDURE — 86901 BLOOD TYPING SEROLOGIC RH(D): CPT | Performed by: INTERNAL MEDICINE

## 2023-10-06 PROCEDURE — 85025 COMPLETE CBC W/AUTO DIFF WBC: CPT | Performed by: STUDENT IN AN ORGANIZED HEALTH CARE EDUCATION/TRAINING PROGRAM

## 2023-10-06 PROCEDURE — 99499 NO LOS: ICD-10-PCS | Mod: ,,, | Performed by: INTERNAL MEDICINE

## 2023-10-06 PROCEDURE — 25000003 PHARM REV CODE 250: Performed by: STUDENT IN AN ORGANIZED HEALTH CARE EDUCATION/TRAINING PROGRAM

## 2023-10-06 PROCEDURE — 93005 ELECTROCARDIOGRAM TRACING: CPT

## 2023-10-06 PROCEDURE — 93010 EKG 12-LEAD: ICD-10-PCS | Mod: ,,, | Performed by: INTERNAL MEDICINE

## 2023-10-06 PROCEDURE — 93010 ELECTROCARDIOGRAM REPORT: CPT | Mod: ,,, | Performed by: INTERNAL MEDICINE

## 2023-10-06 PROCEDURE — 85610 PROTHROMBIN TIME: CPT | Performed by: INTERNAL MEDICINE

## 2023-10-06 RX ORDER — DIPHENHYDRAMINE HCL 50 MG
50 CAPSULE ORAL ONCE
Status: COMPLETED | OUTPATIENT
Start: 2023-10-06 | End: 2023-10-06

## 2023-10-06 RX ORDER — SODIUM CHLORIDE 9 MG/ML
INJECTION, SOLUTION INTRAVENOUS CONTINUOUS
Status: ACTIVE | OUTPATIENT
Start: 2023-10-06 | End: 2023-10-06

## 2023-10-06 RX ADMIN — SODIUM CHLORIDE: 9 INJECTION, SOLUTION INTRAVENOUS at 07:10

## 2023-10-06 RX ADMIN — DIPHENHYDRAMINE HYDROCHLORIDE 50 MG: 50 CAPSULE ORAL at 07:10

## 2023-10-06 NOTE — PROGRESS NOTES
Pt is AAOx3 and in no apparent distress.  Provided a copy of discharge instructions.  Teaching performed.  Pt verbalized understanding and denied any questions.  PIV d/c catheter tip intact.  2x2 applied and no active bleeding noted.  Pt waiting for wheelchair to escort to cafeteria for lunch.  Pt has arranged for transport home.

## 2023-10-06 NOTE — ASSESSMENT & PLAN NOTE
Angina.  The patient reports typical anginal symptoms and has an abnormal PET stress test. I discussed medical management vs medical management plus cardiac catheterization and possible PCI with the patient.  I discussed the need for long term DAPTif he undergoes PCI as well as the associated bleeding risk. I also discussed the risks of cardiac catheterization including a recurrent stroke.  The patient stated that he would like to proceed. Plan as detailed above. Given CKD will perform diagnostic cath only and stage any indicated PCI

## 2023-10-06 NOTE — ASSESSMENT & PLAN NOTE
Angina.  The patient reports typical anginal symptoms and has an abnormal PET stress test. we discussed medical management vs medical management plus cardiac catheterization and possible PCI with the patient.  we discussed the need for long term DAPTif he undergoes PCI as well as the associated bleeding risk. I also discussed the risks of cardiac catheterization including a recurrent stroke.  The patient stated that he would like to proceed.     We planned to perform diagnostic cath only and stage any indicated PCI however patient has worsening renal function today. Procedure will be cancelled today. Patient to follow up with nephrology, PCP and us.

## 2023-10-06 NOTE — H&P
[facility] - Short Stay Cardiac Unit  Interventional Cardiology  H&P    Patient Name: Jae Millan  MRN: [id number]  Admission Date: 10/6/2023  Code Status: Prior   Attending Provider: Sandoval Paul MD   Primary Care Physician: Maryjane Farias MD  Principal Problem:<principal problem not specified>    Patient information was obtained from patient and ER records.     Subjective:          HPI:  72 y.o. male  HTN, HLD, DM2, CVA ( 2018, with residual left-sided weakness), CKD3, presents for [facility] today.   Patient states worsening symptoms over the past 6 months to year. He has sub sternal chest tightness, without radiation, shortness of breath after walking 2 blocks only relieved with rest for 3-5 minutes.    Since his stroke in 2018, has left upper extremity weakness and numbness,  left lower extremity weakness with associated abnormal gait.  During his recovery he was able to tolerate exercise was able to walk without shortness of breath or chest pain.  For note patient has CT in 2022 with diffuse coronary calcifications.      PET stress test 7/24 was positive for anterioseptal to apical resting perfusion abnormality . After pharmacologic stress, this perfusion abnormality is larger and more severe involving 29% of the LV myocardium.      Today he denies palpitations, presyncope, syncope, leg swelling, PND, or orthopnea.        Past Medical History:   Diagnosis Date    Cataract     Decreased sensation of lower extremity     Diabetes mellitus     Diabetic neuropathy     Dysarthria     Dysphagia     ED (erectile dysfunction)     Glaucoma     Hemiparesis     LEFT side post CVA    High cholesterol     History of hepatitis C, s/p successful Rx w/ cure (SVR12) 4/2020     Hypertension     Impaired functional mobility, balance, gait, and endurance     Stroke     Urinary frequency        Past Surgical History:   Procedure Laterality Date    COLONOSCOPY N/A 3/9/2023    Procedure: COLONOSCOPY;  Surgeon: Kian Kraft  "MD;  Location: [facility] ([address] [place] [address]);  Service: Endoscopy;  Laterality: N/A;  medical transportation-inst mail-tb  pre call complete-as    INGUINAL HERNIA REPAIR Left 12/11/2019    UNDESCENDED TESTICLE EXPLORATION Right     R testicle removed as it was undescended       Review of patient's allergies indicates:  No Known Allergies    PTA Medications   Medication Sig    amLODIPine (NORVASC) 10 MG tablet TAKE 1 TABLET(10 MG) BY MOUTH EVERY DAY FOR HYPERTENSION    aspirin 81 MG Chew Take 1 tablet (81 mg total) by mouth once daily.    blood sugar diagnostic Strp To check BG 4 times daily, to use with insurance preferred meter    carvediloL (COREG) 6.25 MG tablet TAKE 1 TABLET(6.25 MG) BY MOUTH TWICE DAILY WITH MEALS    clopidogreL (PLAVIX) 75 mg tablet Take 1 tablet (75 mg total) by mouth once daily.    dorzolamide-timolol 2-0.5% (COSOPT) 22.3-6.8 mg/mL ophthalmic solution Place 1 drop into the left eye 2 (two) times daily.    empagliflozin (JARDIANCE) 10 mg tablet Take 1 tablet (10 mg total) by mouth once daily.    glimepiride (AMARYL) 2 MG tablet Take 1 tablet (2 mg total) by mouth before breakfast.    losartan-hydrochlorothiazide 100-25 mg (HYZAAR) 100-25 mg per tablet Take 1 tablet by mouth once daily.    needle, disp, 18 G 18 gauge x 1" Ndle 1 Device by Misc.(Non-Drug; Combo Route) route every 14 (fourteen) days. Use to draw testosterone    needle, disp, 25 gauge 25 gauge x 1" Ndle 1 Device by Misc.(Non-Drug; Combo Route) route every 14 (fourteen) days. Use to inject testosterone    pantoprazole (PROTONIX) 40 MG tablet TAKE 1 TABLET(40 MG) BY MOUTH EVERY DAY FOR STOMACH    prednisoLONE acetate (PRED FORTE) 1 % DrpS Place 1 drop into the right eye 4 (four) times daily.    rosuvastatin (CRESTOR) 20 MG tablet TAKE 1 TABLET(20 MG) BY MOUTH EVERY DAY    SITagliptin phosphate (JANUVIA) 50 MG Tab Take 1 tablet (50 mg total) by mouth once daily.    ergocalciferol (ERGOCALCIFEROL) 50,000 unit Cap One weekly    gabapentin " (NEURONTIN) 300 MG capsule Take 1 capsule (300 mg total) by mouth 2 (two) times daily. In 1-2 weeks, if no relief, may increase dose to 3 times per day.    lancets Misc Check bg 4 times daily    metFORMIN (GLUCOPHAGE) 1000 MG tablet On daily (Patient taking differently: Take 1,000 mg by mouth daily with breakfast. On daily)    nitroGLYCERIN (NITROSTAT) 0.4 MG SL tablet Place 1 tablet (0.4 mg total) under the tongue every 5 (five) minutes as needed for Chest pain.    syringe, disposable, 3 mL Syrg 1 mL by Misc.(Non-Drug; Combo Route) route every 14 (fourteen) days.    tadalafiL (CIALIS) 20 MG Tab Take 1 tablet (20 mg total) by mouth Every 3 (three) days. for 10 days    testosterone cypionate (DEPOTESTOTERONE CYPIONATE) 200 mg/mL injection Inject 1 mL (200 mg total) into the muscle every 14 (fourteen) days.     Family History       Problem Relation (Age of Onset)    Heart disease Brother    Hypertension Sister          Tobacco Use    Smoking status: Former     Current packs/day: 0.00     Average packs/day: 1 pack/day for 18.0 years (18.0 ttl pk-yrs)     Types: Cigarettes     Start date: 1968     Quit date: 1986     Years since quittin.5    Smokeless tobacco: Never   Substance and Sexual Activity    Alcohol use: Not Currently    Drug use: Never    Sexual activity: Not Currently     Review of Systems   Constitutional: Negative for chills, decreased appetite, diaphoresis, fever, malaise/fatigue and weight gain.   HENT:  Negative for congestion and ear discharge.    Eyes:  Negative for blurred vision.   Cardiovascular:  Negative for chest pain, dyspnea on exertion, irregular heartbeat, leg swelling, orthopnea, palpitations and paroxysmal nocturnal dyspnea.   Respiratory:  Negative for cough, shortness of breath, snoring and sputum production.    Skin:  Negative for color change, dry skin and rash.   Musculoskeletal:  Negative for back pain, falls, joint pain and neck pain.   Gastrointestinal:  Negative for  bloating, abdominal pain, constipation and diarrhea.   Genitourinary:  Negative for dysuria, flank pain, hematuria and hesitancy.   Neurological:  Negative for focal weakness, headaches, numbness and seizures.   Psychiatric/Behavioral:  Negative for altered mental status, depression and hallucinations.      Objective:     Vital Signs (Most Recent):  Temp: 97.7 °F (36.5 °C) (10/06/23 0650)  Pulse: 75 (10/06/23 0650)  Resp: 18 (10/06/23 0650)  BP: (!) 146/85 (10/06/23 0651)  SpO2: (!) 92 % (10/06/23 0650) Vital Signs (24h Range):  Temp:  [97.7 °F (36.5 °C)] 97.7 °F (36.5 °C)  Pulse:  [75] 75  Resp:  [18] 18  SpO2:  [92 %] 92 %  BP: (130-146)/(66-85) 146/85     Weight: 98 kg (216 lb)  Body mass index is 30.99 kg/m².    SpO2: (!) 92 %       No intake or output data in the 24 hours ending 10/06/23 0735    Lines/Drains/Airways       Peripheral Intravenous Line  Duration                  Peripheral IV - Single Lumen 10/06/23 0703 20 G Anterior;Distal;Right Forearm <1 day                     Physical Exam  Constitutional:       General: He is not in acute distress.     Appearance: He is not ill-appearing.   HENT:      Nose: Nose normal.      Mouth/Throat:      Mouth: Mucous membranes are moist.   Eyes:      Pupils: Pupils are equal, round, and reactive to light.   Neck:      Comments: No JVD appreciated   Cardiovascular:      Rate and Rhythm: Normal rate and regular rhythm.      Pulses: Normal pulses.      Heart sounds: No murmur heard.  Pulmonary:      Effort: Pulmonary effort is normal. No respiratory distress.      Breath sounds: No wheezing or rales.   Abdominal:      General: Abdomen is flat. There is no distension.      Palpations: Abdomen is soft.      Tenderness: There is no right CVA tenderness or left CVA tenderness.   Musculoskeletal:         General: No swelling.      Cervical back: Normal range of motion and neck supple. No tenderness.      Right lower leg: No edema.      Left lower leg: No edema.   Skin:      "General: Skin is warm.      Coloration: Skin is not pale.      Findings: No rash.   Neurological:      General: No focal deficit present.      Mental Status: He is alert and oriented to person, place, and time.      Motor: No weakness.            Significant Labs: ABG: No results for input(s): "PH", "PCO2", "HCO3", "POCSATURATED", "BE" in the last 48 hours., Blood Culture: No results for input(s): "LABBLOO" in the last 48 hours., BMP: No results for input(s): "GLU", "NA", "K", "CL", "CO2", "BUN", "CREATININE", "CALCIUM", "MG" in the last 48 hours., CMP No results for input(s): "NA", "K", "CL", "CO2", "GLU", "BUN", "CREATININE", "CALCIUM", "PROT", "ALBUMIN", "BILITOT", "ALKPHOS", "AST", "ALT", "ANIONGAP", "ESTGFRAFRICA", "EGFRNONAA" in the last 48 hours., CBC No results for input(s): "WBC", "HGB", "HCT", "PLT" in the last 48 hours., INR No results for input(s): "INR", "PROTIME" in the last 48 hours., Lipid Panel No results for input(s): "CHOL", "HDL", "LDLCALC", "TRIG", "CHOLHDL" in the last 48 hours.,   Pathology Results  (Last 10 years)                 03/09/23 1147  Specimen to Pathology, Surgery Gastrointestinal tract Final result    Narrative:  Pre-op Diagnosis: Screening for colon cancer [Z12.11]   Procedure(s):   COLONOSCOPY   Number of specimens: 2   Name of specimens:   Jar 1 - Ascending colon polyp   Jar 2 - Transverse colon polyp   Which provider would you like to cc?->CURLY BROWN A   Release to patient->Immediate   Specimen total (fresh, frozen, permanent):->2       12/11/19 1016  Specimen to Pathology, Surgery General Surgery Final result    Narrative:  Pre-op Diagnosis: Inguinal hernia of left side with   obstruction [K40.30]   Procedure(s):   REPAIR, HERNIA, left inguinal   Number of specimens: 1   Name of specimens: hernia sac   Specimen total (fresh, frozen, permanent):->1           , Troponin No results for input(s): "TROPONINI" in the last 48 hours., and All pertinent lab results from the last " 24 hours have been reviewed.       Assessment and Plan:     Cardiac/Vascular  Chest pain  Angina.  The patient reports typical anginal symptoms and has an abnormal PET stress test. I discussed medical management vs medical management plus cardiac catheterization and possible PCI with the patient.  I discussed the need for long term DAPTif he undergoes PCI as well as the associated bleeding risk. I also discussed the risks of cardiac catheterization including a recurrent stroke.  The patient stated that he would like to proceed. Plan as detailed above. Given CKD will perform diagnostic cath only and stage any indicated PCI        VTE Risk Mitigation (From admission, onward)      None            Naty Crowder MD  Interventional Cardiology   [facility] - Short Stay Cardiac Unit    Addendum to fellows's note above.  Today's lab work demonstrated Cr=2.3 from baseline of 1.7; will therefore cancel today's elective cardiac catheterization; patient encourage to hydrate; re-check Cr on 10/9/23 if it remians above baseline then will refer patient to nephrology  All of the patient's questions were answered.

## 2023-10-06 NOTE — SUBJECTIVE & OBJECTIVE
"Past Medical History:   Diagnosis Date    Cataract     Decreased sensation of lower extremity     Diabetes mellitus     Diabetic neuropathy     Dysarthria     Dysphagia     ED (erectile dysfunction)     Glaucoma     Hemiparesis     LEFT side post CVA    High cholesterol     History of hepatitis C, s/p successful Rx w/ cure (SVR12) 4/2020     Hypertension     Impaired functional mobility, balance, gait, and endurance     Stroke     Urinary frequency        Past Surgical History:   Procedure Laterality Date    COLONOSCOPY N/A 3/9/2023    Procedure: COLONOSCOPY;  Surgeon: Kian Kraft MD;  Location: [facility] ([address] [place] [address]);  Service: Endoscopy;  Laterality: N/A;  medical transportation-inst mail-tb  pre call complete-as    INGUINAL HERNIA REPAIR Left 12/11/2019    UNDESCENDED TESTICLE EXPLORATION Right     R testicle removed as it was undescended       Review of patient's allergies indicates:  No Known Allergies    PTA Medications   Medication Sig    amLODIPine (NORVASC) 10 MG tablet TAKE 1 TABLET(10 MG) BY MOUTH EVERY DAY FOR HYPERTENSION    aspirin 81 MG Chew Take 1 tablet (81 mg total) by mouth once daily.    blood sugar diagnostic Strp To check BG 4 times daily, to use with insurance preferred meter    carvediloL (COREG) 6.25 MG tablet TAKE 1 TABLET(6.25 MG) BY MOUTH TWICE DAILY WITH MEALS    clopidogreL (PLAVIX) 75 mg tablet Take 1 tablet (75 mg total) by mouth once daily.    dorzolamide-timolol 2-0.5% (COSOPT) 22.3-6.8 mg/mL ophthalmic solution Place 1 drop into the left eye 2 (two) times daily.    empagliflozin (JARDIANCE) 10 mg tablet Take 1 tablet (10 mg total) by mouth once daily.    glimepiride (AMARYL) 2 MG tablet Take 1 tablet (2 mg total) by mouth before breakfast.    losartan-hydrochlorothiazide 100-25 mg (HYZAAR) 100-25 mg per tablet Take 1 tablet by mouth once daily.    needle, disp, 18 G 18 gauge x 1" Ndle 1 Device by Misc.(Non-Drug; Combo Route) route every 14 (fourteen) days. Use to draw testosterone " "   needle, disp, 25 gauge 25 gauge x 1" Ndle 1 Device by Misc.(Non-Drug; Combo Route) route every 14 (fourteen) days. Use to inject testosterone    pantoprazole (PROTONIX) 40 MG tablet TAKE 1 TABLET(40 MG) BY MOUTH EVERY DAY FOR STOMACH    prednisoLONE acetate (PRED FORTE) 1 % DrpS Place 1 drop into the right eye 4 (four) times daily.    rosuvastatin (CRESTOR) 20 MG tablet TAKE 1 TABLET(20 MG) BY MOUTH EVERY DAY    SITagliptin phosphate (JANUVIA) 50 MG Tab Take 1 tablet (50 mg total) by mouth once daily.    ergocalciferol (ERGOCALCIFEROL) 50,000 unit Cap One weekly    gabapentin (NEURONTIN) 300 MG capsule Take 1 capsule (300 mg total) by mouth 2 (two) times daily. In 1-2 weeks, if no relief, may increase dose to 3 times per day.    lancets Misc Check bg 4 times daily    metFORMIN (GLUCOPHAGE) 1000 MG tablet On daily (Patient taking differently: Take 1,000 mg by mouth daily with breakfast. On daily)    nitroGLYCERIN (NITROSTAT) 0.4 MG SL tablet Place 1 tablet (0.4 mg total) under the tongue every 5 (five) minutes as needed for Chest pain.    syringe, disposable, 3 mL Syrg 1 mL by Misc.(Non-Drug; Combo Route) route every 14 (fourteen) days.    tadalafiL (CIALIS) 20 MG Tab Take 1 tablet (20 mg total) by mouth Every 3 (three) days. for 10 days    testosterone cypionate (DEPOTESTOTERONE CYPIONATE) 200 mg/mL injection Inject 1 mL (200 mg total) into the muscle every 14 (fourteen) days.     Family History       Problem Relation (Age of Onset)    Heart disease Brother    Hypertension Sister          Tobacco Use    Smoking status: Former     Current packs/day: 0.00     Average packs/day: 1 pack/day for 18.0 years (18.0 ttl pk-yrs)     Types: Cigarettes     Start date: 1968     Quit date: 1986     Years since quittin.5    Smokeless tobacco: Never   Substance and Sexual Activity    Alcohol use: Not Currently    Drug use: Never    Sexual activity: Not Currently     Review of Systems   Constitutional: Negative for " chills, decreased appetite, diaphoresis, fever, malaise/fatigue and weight gain.   HENT:  Negative for congestion and ear discharge.    Eyes:  Negative for blurred vision.   Cardiovascular:  Negative for chest pain, dyspnea on exertion, irregular heartbeat, leg swelling, orthopnea, palpitations and paroxysmal nocturnal dyspnea.   Respiratory:  Negative for cough, shortness of breath, snoring and sputum production.    Skin:  Negative for color change, dry skin and rash.   Musculoskeletal:  Negative for back pain, falls, joint pain and neck pain.   Gastrointestinal:  Negative for bloating, abdominal pain, constipation and diarrhea.   Genitourinary:  Negative for dysuria, flank pain, hematuria and hesitancy.   Neurological:  Negative for focal weakness, headaches, numbness and seizures.   Psychiatric/Behavioral:  Negative for altered mental status, depression and hallucinations.      Objective:     Vital Signs (Most Recent):  Temp: 97.7 °F (36.5 °C) (10/06/23 0650)  Pulse: 75 (10/06/23 0650)  Resp: 18 (10/06/23 0650)  BP: (!) 146/85 (10/06/23 0651)  SpO2: (!) 92 % (10/06/23 0650) Vital Signs (24h Range):  Temp:  [97.7 °F (36.5 °C)] 97.7 °F (36.5 °C)  Pulse:  [75] 75  Resp:  [18] 18  SpO2:  [92 %] 92 %  BP: (130-146)/(66-85) 146/85     Weight: 98 kg (216 lb)  Body mass index is 30.99 kg/m².    SpO2: (!) 92 %       No intake or output data in the 24 hours ending 10/06/23 0735    Lines/Drains/Airways       Peripheral Intravenous Line  Duration                  Peripheral IV - Single Lumen 10/06/23 0703 20 G Anterior;Distal;Right Forearm <1 day                     Physical Exam  Constitutional:       General: He is not in acute distress.     Appearance: He is not ill-appearing.   HENT:      Nose: Nose normal.      Mouth/Throat:      Mouth: Mucous membranes are moist.   Eyes:      Pupils: Pupils are equal, round, and reactive to light.   Neck:      Comments: No JVD appreciated   Cardiovascular:      Rate and Rhythm: Normal  "rate and regular rhythm.      Pulses: Normal pulses.      Heart sounds: No murmur heard.  Pulmonary:      Effort: Pulmonary effort is normal. No respiratory distress.      Breath sounds: No wheezing or rales.   Abdominal:      General: Abdomen is flat. There is no distension.      Palpations: Abdomen is soft.      Tenderness: There is no right CVA tenderness or left CVA tenderness.   Musculoskeletal:         General: No swelling.      Cervical back: Normal range of motion and neck supple. No tenderness.      Right lower leg: No edema.      Left lower leg: No edema.   Skin:     General: Skin is warm.      Coloration: Skin is not pale.      Findings: No rash.   Neurological:      General: No focal deficit present.      Mental Status: He is alert and oriented to person, place, and time.      Motor: No weakness.            Significant Labs: ABG: No results for input(s): "PH", "PCO2", "HCO3", "POCSATURATED", "BE" in the last 48 hours., Blood Culture: No results for input(s): "LABBLOO" in the last 48 hours., BMP: No results for input(s): "GLU", "NA", "K", "CL", "CO2", "BUN", "CREATININE", "CALCIUM", "MG" in the last 48 hours., CMP No results for input(s): "NA", "K", "CL", "CO2", "GLU", "BUN", "CREATININE", "CALCIUM", "PROT", "ALBUMIN", "BILITOT", "ALKPHOS", "AST", "ALT", "ANIONGAP", "ESTGFRAFRICA", "EGFRNONAA" in the last 48 hours., CBC No results for input(s): "WBC", "HGB", "HCT", "PLT" in the last 48 hours., INR No results for input(s): "INR", "PROTIME" in the last 48 hours., Lipid Panel No results for input(s): "CHOL", "HDL", "LDLCALC", "TRIG", "CHOLHDL" in the last 48 hours.,   Pathology Results  (Last 10 years)                 03/09/23 1147  Specimen to Pathology, Surgery Gastrointestinal tract Final result    Narrative:  Pre-op Diagnosis: Screening for colon cancer [Z12.11]   Procedure(s):   COLONOSCOPY   Number of specimens: 2   Name of specimens:   Jar 1 - Ascending colon polyp   Jar 2 - Transverse colon polyp " "  Which provider would you like to cc?->CURLY BROWN A   Release to patient->Immediate   Specimen total (fresh, frozen, permanent):->2       12/11/19 1016  Specimen to Pathology, Surgery General Surgery Final result    Narrative:  Pre-op Diagnosis: Inguinal hernia of left side with   obstruction [K40.30]   Procedure(s):   REPAIR, HERNIA, left inguinal   Number of specimens: 1   Name of specimens: hernia sac   Specimen total (fresh, frozen, permanent):->1           , Troponin No results for input(s): "TROPONINI" in the last 48 hours., and All pertinent lab results from the last 24 hours have been reviewed.     "

## 2023-10-06 NOTE — HOSPITAL COURSE
Patient presented for [facility] today however, his BMP revealed Cr. 2.3 from BL of 1.7. His procedure was cancelled today due to EVERTON on CKD and pateint advised to follow up with his PCP and nephrology.   Discussed hydration and avoiding nephrotoxic medications with patient.

## 2023-10-06 NOTE — PLAN OF CARE
Pt is AAOx4 and complains of numbness and pain to L side (from L shoulder to L toe) that he attributes to a stroke that he had in 2018. Pt has some effort against gravity to L arm and L leg and walks with cane.  VSS.  Admit questions completed.  IV access obtained.  Will continue to monitor.

## 2023-10-06 NOTE — HPI
72 y.o. male  HTN, HLD, DM2, CVA ( 2018, with residual left-sided weakness), CKD3, presents for [facility] today.   Patient states worsening symptoms over the past 6 months to year. He has sub sternal chest tightness, without radiation, shortness of breath after walking 2 blocks only relieved with rest for 3-5 minutes.    Since his stroke in 2018, has left upper extremity weakness and numbness,  left lower extremity weakness with associated abnormal gait.  During his recovery he was able to tolerate exercise was able to walk without shortness of breath or chest pain.  For note patient has CT in 2022 with diffuse coronary calcifications.      PET stress test 7/24 was positive for anterioseptal to apical resting perfusion abnormality . After pharmacologic stress, this perfusion abnormality is larger and more severe involving 29% of the LV myocardium.      Today he denies palpitations, presyncope, syncope, leg swelling, PND, or orthopnea.

## 2023-10-06 NOTE — DISCHARGE SUMMARY
[facility] - Short Stay Cardiac Unit  Interventional Cardiology  Discharge Summary      Patient Name: Jae Millan  MRN: [id number]  Admission Date: 10/6/2023  Hospital Length of Stay: 0 days  Discharge Date and Time:  10/06/2023 6:07 PM  Attending Physician: Alba att. providers found  Discharging Provider: Naty Crowder MD  Primary Care Physician: Maryjane Farias MD    HPI:  72 y.o. male  HTN, HLD, DM2, CVA ( 2018, with residual left-sided weakness), CKD3, presents for [facility] today.   Patient states worsening symptoms over the past 6 months to year. He has sub sternal chest tightness, without radiation, shortness of breath after walking 2 blocks only relieved with rest for 3-5 minutes.    Since his stroke in 2018, has left upper extremity weakness and numbness,  left lower extremity weakness with associated abnormal gait.  During his recovery he was able to tolerate exercise was able to walk without shortness of breath or chest pain.  For note patient has CT in 2022 with diffuse coronary calcifications.      PET stress test 7/24 was positive for anterioseptal to apical resting perfusion abnormality . After pharmacologic stress, this perfusion abnormality is larger and more severe involving 29% of the LV myocardium.      Today he denies palpitations, presyncope, syncope, leg swelling, PND, or orthopnea.        Procedure(s) (LRB):  Angiogram, Coronary, with Left Heart Cath (N/A)     Indwelling Lines/Drains at time of discharge:  Lines/Drains/Airways       None                   Hospital Course:  Patient presented for [facility] today however, his BMP revealed Cr. 2.3 from BL of 1.7. His procedure was cancelled today due to EVERTON on CKD and pateint advised to follow up with his PCP and nephrology.   Discussed hydration and avoiding nephrotoxic medications with patient.             Goals of Care Treatment Preferences:  Code Status: Full Code          Significant Diagnostic Studies: Labs:   BMP:   Recent Labs   Lab  "10/06/23  0715   *      K 3.7      CO2 22*   BUN 31*   CREATININE 2.3*   CALCIUM 8.7   , CMP   Recent Labs   Lab 10/06/23  0715      K 3.7      CO2 22*   *   BUN 31*   CREATININE 2.3*   CALCIUM 8.7   ANIONGAP 9   , CBC   Recent Labs   Lab 10/06/23  0715   WBC 6.29   HGB 13.4*   HCT 40.4      , INR   Lab Results   Component Value Date    INR 1.0 10/06/2023    INR 1.1 10/01/2019   , Lipid Panel   Lab Results   Component Value Date    CHOL 142 03/16/2023    HDL 44 03/16/2023    LDLCALC 76.4 03/16/2023    TRIG 108 03/16/2023    CHOLHDL 31.0 03/16/2023   , Troponin No results for input(s): "TROPONINI" in the last 168 hours. and A1C:   Recent Labs   Lab 07/24/23  0920   HGBA1C 7.2*       Pending Diagnostic Studies:       None          Cardiac/Vascular  Chest pain  Angina.  The patient reports typical anginal symptoms and has an abnormal PET stress test. we discussed medical management vs medical management plus cardiac catheterization and possible PCI with the patient.  we discussed the need for long term DAPTif he undergoes PCI as well as the associated bleeding risk. I also discussed the risks of cardiac catheterization including a recurrent stroke.  The patient stated that he would like to proceed.     We planned to perform diagnostic cath only and stage any indicated PCI however patient has worsening renal function today. Procedure will be cancelled today. Patient to follow up with nephrology, PCP and us.           Discharged Condition: stable    Follow Up: [facility] on 10/9/23 and potential rescheduling of cardiac catheterization depending on results    Patient Instructions:   No discharge procedures on file.  Medications:  Reconciled Home Medications:      Medication List        ASK your doctor about these medications      amLODIPine 10 MG tablet  Commonly known as: NORVASC  TAKE 1 TABLET(10 MG) BY MOUTH EVERY DAY FOR HYPERTENSION     aspirin 81 MG Chew  Take 1 tablet (81 mg " "total) by mouth once daily.     blood sugar diagnostic Strp  To check BG 4 times daily, to use with insurance preferred meter     carvediloL 6.25 MG tablet  Commonly known as: COREG  TAKE 1 TABLET(6.25 MG) BY MOUTH TWICE DAILY WITH MEALS     clopidogreL 75 mg tablet  Commonly known as: PLAVIX  Take 1 tablet (75 mg total) by mouth once daily.     dorzolamide-timolol 2-0.5% 22.3-6.8 mg/mL ophthalmic solution  Commonly known as: COSOPT  Place 1 drop into the left eye 2 (two) times daily.     empagliflozin 10 mg tablet  Commonly known as: JARDIANCE  Take 1 tablet (10 mg total) by mouth once daily.     ergocalciferol 50,000 unit Cap  Commonly known as: ERGOCALCIFEROL  One weekly     gabapentin 300 MG capsule  Commonly known as: NEURONTIN  Take 1 capsule (300 mg total) by mouth 2 (two) times daily. In 1-2 weeks, if no relief, may increase dose to 3 times per day.     glimepiride 2 MG tablet  Commonly known as: AMARYL  Take 1 tablet (2 mg total) by mouth before breakfast.     lancets Misc  Check bg 4 times daily     losartan-hydrochlorothiazide 100-25 mg 100-25 mg per tablet  Commonly known as: HYZAAR  Take 1 tablet by mouth once daily.     metFORMIN 1000 MG tablet  Commonly known as: GLUCOPHAGE  On daily     * needle (disp) 18 G 18 gauge x 1" Ndle  1 Device by Misc.(Non-Drug; Combo Route) route every 14 (fourteen) days. Use to draw testosterone     * needle (disp) 25 gauge 25 gauge x 1" Ndle  1 Device by Misc.(Non-Drug; Combo Route) route every 14 (fourteen) days. Use to inject testosterone     nitroGLYCERIN 0.4 MG SL tablet  Commonly known as: NITROSTAT  Place 1 tablet (0.4 mg total) under the tongue every 5 (five) minutes as needed for Chest pain.     pantoprazole 40 MG tablet  Commonly known as: PROTONIX  TAKE 1 TABLET(40 MG) BY MOUTH EVERY DAY FOR STOMACH     prednisoLONE acetate 1 % Drps  Commonly known as: PRED FORTE  Place 1 drop into the right eye 4 (four) times daily.     rosuvastatin 20 MG tablet  Commonly known " as: CRESTOR  TAKE 1 TABLET(20 MG) BY MOUTH EVERY DAY     SITagliptin phosphate 50 MG Tab  Commonly known as: JANUVIA  Take 1 tablet (50 mg total) by mouth once daily.     syringe (disposable) 3 mL Syrg  1 mL by Misc.(Non-Drug; Combo Route) route every 14 (fourteen) days.     tadalafiL 20 MG Tab  Commonly known as: CIALIS  Take 1 tablet (20 mg total) by mouth Every 3 (three) days. for 10 days     testosterone cypionate 200 mg/mL injection  Commonly known as: DEPOTESTOTERONE CYPIONATE  Inject 1 mL (200 mg total) into the muscle every 14 (fourteen) days.           * This list has 2 medication(s) that are the same as other medications prescribed for you. Read the directions carefully, and ask your doctor or other care provider to review them with you.                  Time spent on the discharge of patient: 45 minutes    Naty Crowder MD  Interventional Cardiology  [facility] - Short Stay Cardiac Unit

## 2023-10-09 ENCOUNTER — LAB VISIT (OUTPATIENT)
Dept: LAB | Facility: HOSPITAL | Age: 73
End: 2023-10-09
Attending: INTERNAL MEDICINE
Payer: MEDICARE

## 2023-10-09 DIAGNOSIS — E11.40 TYPE 2 DIABETES MELLITUS WITH DIABETIC NEUROPATHY, WITHOUT LONG-TERM CURRENT USE OF INSULIN: ICD-10-CM

## 2023-10-09 LAB
ALBUMIN SERPL BCP-MCNC: 4 G/DL (ref 3.5–5.2)
ALP SERPL-CCNC: 51 U/L (ref 55–135)
ALT SERPL W/O P-5'-P-CCNC: 16 U/L (ref 10–44)
ANION GAP SERPL CALC-SCNC: 12 MMOL/L (ref 8–16)
AST SERPL-CCNC: 15 U/L (ref 10–40)
BASOPHILS # BLD AUTO: 0.05 K/UL (ref 0–0.2)
BASOPHILS NFR BLD: 0.8 % (ref 0–1.9)
BILIRUB SERPL-MCNC: 0.5 MG/DL (ref 0.1–1)
BUN SERPL-MCNC: 31 MG/DL (ref 8–23)
CALCIUM SERPL-MCNC: 9.4 MG/DL (ref 8.7–10.5)
CHLORIDE SERPL-SCNC: 103 MMOL/L (ref 95–110)
CO2 SERPL-SCNC: 24 MMOL/L (ref 23–29)
CREAT SERPL-MCNC: 1.9 MG/DL (ref 0.5–1.4)
DIFFERENTIAL METHOD: NORMAL
EOSINOPHIL # BLD AUTO: 0.1 K/UL (ref 0–0.5)
EOSINOPHIL NFR BLD: 2.1 % (ref 0–8)
ERYTHROCYTE [DISTWIDTH] IN BLOOD BY AUTOMATED COUNT: 14.5 % (ref 11.5–14.5)
EST. GFR  (NO RACE VARIABLE): 37.5 ML/MIN/1.73 M^2
ESTIMATED AVG GLUCOSE: 169 MG/DL (ref 68–131)
GLUCOSE SERPL-MCNC: 130 MG/DL (ref 70–110)
HBA1C MFR BLD: 7.5 % (ref 4–5.6)
HCT VFR BLD AUTO: 42.1 % (ref 40–54)
HGB BLD-MCNC: 14.2 G/DL (ref 14–18)
IMM GRANULOCYTES # BLD AUTO: 0.02 K/UL (ref 0–0.04)
IMM GRANULOCYTES NFR BLD AUTO: 0.3 % (ref 0–0.5)
LYMPHOCYTES # BLD AUTO: 1.2 K/UL (ref 1–4.8)
LYMPHOCYTES NFR BLD: 18.4 % (ref 18–48)
MCH RBC QN AUTO: 27.9 PG (ref 27–31)
MCHC RBC AUTO-ENTMCNC: 33.7 G/DL (ref 32–36)
MCV RBC AUTO: 83 FL (ref 82–98)
MONOCYTES # BLD AUTO: 0.5 K/UL (ref 0.3–1)
MONOCYTES NFR BLD: 8 % (ref 4–15)
NEUTROPHILS # BLD AUTO: 4.4 K/UL (ref 1.8–7.7)
NEUTROPHILS NFR BLD: 70.4 % (ref 38–73)
NRBC BLD-RTO: 0 /100 WBC
PLATELET # BLD AUTO: 235 K/UL (ref 150–450)
PMV BLD AUTO: 11.2 FL (ref 9.2–12.9)
POTASSIUM SERPL-SCNC: 4.1 MMOL/L (ref 3.5–5.1)
PROT SERPL-MCNC: 8.3 G/DL (ref 6–8.4)
RBC # BLD AUTO: 5.09 M/UL (ref 4.6–6.2)
SODIUM SERPL-SCNC: 139 MMOL/L (ref 136–145)
WBC # BLD AUTO: 6.25 K/UL (ref 3.9–12.7)

## 2023-10-09 PROCEDURE — 85025 COMPLETE CBC W/AUTO DIFF WBC: CPT | Performed by: INTERNAL MEDICINE

## 2023-10-09 PROCEDURE — 80053 COMPREHEN METABOLIC PANEL: CPT | Performed by: INTERNAL MEDICINE

## 2023-10-09 PROCEDURE — 36415 COLL VENOUS BLD VENIPUNCTURE: CPT | Performed by: INTERNAL MEDICINE

## 2023-10-09 PROCEDURE — 83036 HEMOGLOBIN GLYCOSYLATED A1C: CPT | Performed by: INTERNAL MEDICINE

## 2023-10-16 ENCOUNTER — OFFICE VISIT (OUTPATIENT)
Dept: UROLOGY | Facility: CLINIC | Age: 73
End: 2023-10-16
Payer: MEDICARE

## 2023-10-16 VITALS
HEIGHT: 70 IN | SYSTOLIC BLOOD PRESSURE: 112 MMHG | BODY MASS INDEX: 30.92 KG/M2 | DIASTOLIC BLOOD PRESSURE: 65 MMHG | WEIGHT: 216 LBS | HEART RATE: 68 BPM

## 2023-10-16 DIAGNOSIS — R35.0 URINARY FREQUENCY: ICD-10-CM

## 2023-10-16 DIAGNOSIS — N40.0 BENIGN PROSTATIC HYPERPLASIA, UNSPECIFIED WHETHER LOWER URINARY TRACT SYMPTOMS PRESENT: ICD-10-CM

## 2023-10-16 DIAGNOSIS — R35.0 FREQUENCY OF MICTURITION: Primary | ICD-10-CM

## 2023-10-16 PROCEDURE — 99999 PR PBB SHADOW E&M-EST. PATIENT-LVL IV: CPT | Mod: PBBFAC,,, | Performed by: UROLOGY

## 2023-10-16 PROCEDURE — 3008F BODY MASS INDEX DOCD: CPT | Mod: CPTII,S$GLB,, | Performed by: UROLOGY

## 2023-10-16 PROCEDURE — 1101F PT FALLS ASSESS-DOCD LE1/YR: CPT | Mod: CPTII,S$GLB,, | Performed by: UROLOGY

## 2023-10-16 PROCEDURE — 99213 PR OFFICE/OUTPT VISIT, EST, LEVL III, 20-29 MIN: ICD-10-PCS | Mod: S$GLB,,, | Performed by: UROLOGY

## 2023-10-16 PROCEDURE — 3074F PR MOST RECENT SYSTOLIC BLOOD PRESSURE < 130 MM HG: ICD-10-PCS | Mod: CPTII,S$GLB,, | Performed by: UROLOGY

## 2023-10-16 PROCEDURE — 3074F SYST BP LT 130 MM HG: CPT | Mod: CPTII,S$GLB,, | Performed by: UROLOGY

## 2023-10-16 PROCEDURE — 99999 PR PBB SHADOW E&M-EST. PATIENT-LVL IV: ICD-10-PCS | Mod: PBBFAC,,, | Performed by: UROLOGY

## 2023-10-16 PROCEDURE — 3051F HG A1C>EQUAL 7.0%<8.0%: CPT | Mod: CPTII,S$GLB,, | Performed by: UROLOGY

## 2023-10-16 PROCEDURE — 1159F MED LIST DOCD IN RCRD: CPT | Mod: CPTII,S$GLB,, | Performed by: UROLOGY

## 2023-10-16 PROCEDURE — 3051F PR MOST RECENT HEMOGLOBIN A1C LEVEL 7.0 - < 8.0%: ICD-10-PCS | Mod: CPTII,S$GLB,, | Performed by: UROLOGY

## 2023-10-16 PROCEDURE — 3078F DIAST BP <80 MM HG: CPT | Mod: CPTII,S$GLB,, | Performed by: UROLOGY

## 2023-10-16 PROCEDURE — 3078F PR MOST RECENT DIASTOLIC BLOOD PRESSURE < 80 MM HG: ICD-10-PCS | Mod: CPTII,S$GLB,, | Performed by: UROLOGY

## 2023-10-16 PROCEDURE — 3288F PR FALLS RISK ASSESSMENT DOCUMENTED: ICD-10-PCS | Mod: CPTII,S$GLB,, | Performed by: UROLOGY

## 2023-10-16 PROCEDURE — 1101F PR PT FALLS ASSESS DOC 0-1 FALLS W/OUT INJ PAST YR: ICD-10-PCS | Mod: CPTII,S$GLB,, | Performed by: UROLOGY

## 2023-10-16 PROCEDURE — 1159F PR MEDICATION LIST DOCUMENTED IN MEDICAL RECORD: ICD-10-PCS | Mod: CPTII,S$GLB,, | Performed by: UROLOGY

## 2023-10-16 PROCEDURE — 99213 OFFICE O/P EST LOW 20 MIN: CPT | Mod: S$GLB,,, | Performed by: UROLOGY

## 2023-10-16 PROCEDURE — 3288F FALL RISK ASSESSMENT DOCD: CPT | Mod: CPTII,S$GLB,, | Performed by: UROLOGY

## 2023-10-16 PROCEDURE — 3008F PR BODY MASS INDEX (BMI) DOCUMENTED: ICD-10-PCS | Mod: CPTII,S$GLB,, | Performed by: UROLOGY

## 2023-10-16 NOTE — PROGRESS NOTES
Subjective:       Patient ID: Jae Millan is a 72 y.o. male.    Chief Complaint: Urinary Frequency (Pt here for urinary frequency. Pt complains of nocturia and frequency during the day. )    HPI  year patient is here for urinary frequency.  He states that he voids about 10 times a day and sometimes 4-6 times at night.  He drinks lots of fluids during the day and during the evening.  He drinks lots of water and caffeinated products also.  He has a slightly decreased stream but he does not want to take Flomax because he is on so many other medications.     Past Medical History:   Diagnosis Date    Cataract     Decreased sensation of lower extremity     Diabetes mellitus     Diabetic neuropathy     Dysarthria     Dysphagia     ED (erectile dysfunction)     Glaucoma     Hemiparesis     LEFT side post CVA    High cholesterol     History of hepatitis C, s/p successful Rx w/ cure (SVR12) 4/2020     Hypertension     Impaired functional mobility, balance, gait, and endurance     Stroke     Urinary frequency        Past Surgical History:   Procedure Laterality Date    COLONOSCOPY N/A 3/9/2023    Procedure: COLONOSCOPY;  Surgeon: Kian Kraft MD;  Location: [facility] ([address] [place] [address]);  Service: Endoscopy;  Laterality: N/A;  medical transportation-inst mail-tb  pre call complete-as    INGUINAL HERNIA REPAIR Left 12/11/2019    UNDESCENDED TESTICLE EXPLORATION Right     R testicle removed as it was undescended       Family History   Problem Relation Age of Onset    Heart disease Brother         cabg    Hypertension Sister     Amblyopia Neg Hx     Blindness Neg Hx     Cataracts Neg Hx     Glaucoma Neg Hx     Macular degeneration Neg Hx     Strabismus Neg Hx     Retinal detachment Neg Hx     Diabetes Neg Hx        Social History     Socioeconomic History    Marital status: Single   Tobacco Use    Smoking status: Former     Current packs/day: 0.00     Average packs/day: 1 pack/day for 18.0 years (18.0 ttl pk-yrs)     Types: Cigarettes      Start date: 1968     Quit date: 1986     Years since quittin.5    Smokeless tobacco: Never   Substance and Sexual Activity    Alcohol use: Not Currently    Drug use: Never    Sexual activity: Not Currently     Social Determinants of Health     Financial Resource Strain: High Risk (2022)    Overall Financial Resource Strain (CARDIA)     Difficulty of Paying Living Expenses: Very hard   Food Insecurity: Food Insecurity Present (2022)    Hunger Vital Sign     Worried About Running Out of Food in the Last Year: Often true     Ran Out of Food in the Last Year: Often true   Transportation Needs: Unmet Transportation Needs (2022)    PRAPARE - Transportation     Lack of Transportation (Medical): Yes     Lack of Transportation (Non-Medical): Yes   Physical Activity: Sufficiently Active (2022)    Exercise Vital Sign     Days of Exercise per Week: 7 days     Minutes of Exercise per Session: 120 min   Stress: No Stress Concern Present (2022)    [employer] [place] [employer] - Occupational Stress Questionnaire     Feeling of Stress : Not at all   Social Connections: Moderately Integrated (2022)    Social Connection and Isolation Panel [NHANES]     Frequency of Communication with Friends and Family: More than three times a week     Frequency of Social Gatherings with Friends and Family: Once a week     Attends Christianity Services: 1 to 4 times per year     Active Member of Clubs or Organizations: Yes     Attends Club or Organization Meetings: 1 to 4 times per year     Marital Status: Never    Housing Stability: High Risk (2022)    Housing Stability Vital Sign     Unable to Pay for Housing in the Last Year: Yes     Unstable Housing in the Last Year: No       Allergies:  Patient has no known allergies.    Medications:    Current Outpatient Medications:     amLODIPine (NORVASC) 10 MG tablet, TAKE 1 TABLET(10 MG) BY MOUTH EVERY DAY FOR HYPERTENSION, Disp: 90 tablet, Rfl:  "3    aspirin 81 MG Chew, Take 1 tablet (81 mg total) by mouth once daily., Disp: , Rfl:     blood sugar diagnostic Strp, To check BG 4 times daily, to use with insurance preferred meter, Disp: 100 each, Rfl: 11    carvediloL (COREG) 6.25 MG tablet, TAKE 1 TABLET(6.25 MG) BY MOUTH TWICE DAILY WITH MEALS, Disp: 180 tablet, Rfl: 3    clopidogreL (PLAVIX) 75 mg tablet, Take 1 tablet (75 mg total) by mouth once daily., Disp: 30 tablet, Rfl: 3    dorzolamide-timolol 2-0.5% (COSOPT) 22.3-6.8 mg/mL ophthalmic solution, Place 1 drop into the left eye 2 (two) times daily., Disp: 10 mL, Rfl: 12    empagliflozin (JARDIANCE) 10 mg tablet, Take 1 tablet (10 mg total) by mouth once daily., Disp: 90 tablet, Rfl: 1    ergocalciferol (ERGOCALCIFEROL) 50,000 unit Cap, One weekly, Disp: 12 capsule, Rfl: 3    gabapentin (NEURONTIN) 300 MG capsule, Take 1 capsule (300 mg total) by mouth 2 (two) times daily. In 1-2 weeks, if no relief, may increase dose to 3 times per day., Disp: , Rfl:     glimepiride (AMARYL) 2 MG tablet, Take 1 tablet (2 mg total) by mouth before breakfast., Disp: 90 tablet, Rfl: 3    lancets Misc, Check bg 4 times daily, Disp: 100 each, Rfl: 11    losartan-hydrochlorothiazide 100-25 mg (HYZAAR) 100-25 mg per tablet, Take 1 tablet by mouth once daily., Disp: 90 tablet, Rfl: 3    metFORMIN (GLUCOPHAGE) 1000 MG tablet, On daily (Patient taking differently: Take 1,000 mg by mouth daily with breakfast. On daily), Disp: 180 tablet, Rfl: 3    needle, disp, 18 G 18 gauge x 1" Ndle, 1 Device by Misc.(Non-Drug; Combo Route) route every 14 (fourteen) days. Use to draw testosterone, Disp: 10 each, Rfl: 11    needle, disp, 25 gauge 25 gauge x 1" Ndle, 1 Device by Misc.(Non-Drug; Combo Route) route every 14 (fourteen) days. Use to inject testosterone, Disp: 10 each, Rfl: 11    nitroGLYCERIN (NITROSTAT) 0.4 MG SL tablet, Place 1 tablet (0.4 mg total) under the tongue every 5 (five) minutes as needed for Chest pain., Disp: 30 tablet, " Rfl: 1    pantoprazole (PROTONIX) 40 MG tablet, TAKE 1 TABLET(40 MG) BY MOUTH EVERY DAY FOR STOMACH, Disp: 90 tablet, Rfl: 3    prednisoLONE acetate (PRED FORTE) 1 % DrpS, Place 1 drop into the right eye 4 (four) times daily., Disp: 5 mL, Rfl: 3    rosuvastatin (CRESTOR) 20 MG tablet, TAKE 1 TABLET(20 MG) BY MOUTH EVERY DAY, Disp: 90 tablet, Rfl: 3    SITagliptin phosphate (JANUVIA) 50 MG Tab, Take 1 tablet (50 mg total) by mouth once daily., Disp: 90 tablet, Rfl: 1    syringe, disposable, 3 mL Syrg, 1 mL by Misc.(Non-Drug; Combo Route) route every 14 (fourteen) days., Disp: 10 each, Rfl: 11    tadalafiL (CIALIS) 20 MG Tab, Take 1 tablet (20 mg total) by mouth Every 3 (three) days. for 10 days, Disp: 20 tablet, Rfl: 9    testosterone cypionate (DEPOTESTOTERONE CYPIONATE) 200 mg/mL injection, Inject 1 mL (200 mg total) into the muscle every 14 (fourteen) days., Disp: 10 mL, Rfl: 1    Review of Systems   Genitourinary:  Positive for frequency.       Objective:      Physical Exam  Genitourinary:     Comments: Prostate deferred per patient's wishes        Assessment:       1. Frequency of micturition    2. Urinary frequency    3. Benign prostatic hyperplasia, unspecified whether lower urinary tract symptoms present        Plan:       Jae was seen today for urinary frequency.    Diagnoses and all orders for this visit:    Frequency of micturition    Urinary frequency  -     Ambulatory referral/consult to Urology    Benign prostatic hyperplasia, unspecified whether lower urinary tract symptoms present  -     Ambulatory referral/consult to Urology    Patient does not wish to take any medicines.  I asked him to cut back on his fluids gradually over the next few months and hopefully that will give him some relief.  He does not wish to have a workup this time I will see him back p.r.n.

## 2023-10-26 ENCOUNTER — OFFICE VISIT (OUTPATIENT)
Dept: OPHTHALMOLOGY | Facility: CLINIC | Age: 73
End: 2023-10-26
Payer: MEDICARE

## 2023-10-26 DIAGNOSIS — H34.8310 BRANCH RETINAL VEIN OCCLUSION OF RIGHT EYE WITH MACULAR EDEMA: Primary | ICD-10-CM

## 2023-10-26 DIAGNOSIS — H25.13 NUCLEAR SCLEROTIC CATARACT OF BOTH EYES: ICD-10-CM

## 2023-10-26 DIAGNOSIS — H40.32X3 TRAUMATIC GLAUCOMA, LEFT, SEVERE STAGE: ICD-10-CM

## 2023-10-26 PROCEDURE — 99999 PR PBB SHADOW E&M-EST. PATIENT-LVL III: ICD-10-PCS | Mod: PBBFAC,,, | Performed by: OPHTHALMOLOGY

## 2023-10-26 PROCEDURE — 1159F PR MEDICATION LIST DOCUMENTED IN MEDICAL RECORD: ICD-10-PCS | Mod: CPTII,S$GLB,, | Performed by: OPHTHALMOLOGY

## 2023-10-26 PROCEDURE — 1101F PR PT FALLS ASSESS DOC 0-1 FALLS W/OUT INJ PAST YR: ICD-10-PCS | Mod: CPTII,S$GLB,, | Performed by: OPHTHALMOLOGY

## 2023-10-26 PROCEDURE — 3051F HG A1C>EQUAL 7.0%<8.0%: CPT | Mod: CPTII,S$GLB,, | Performed by: OPHTHALMOLOGY

## 2023-10-26 PROCEDURE — 1160F RVW MEDS BY RX/DR IN RCRD: CPT | Mod: CPTII,S$GLB,, | Performed by: OPHTHALMOLOGY

## 2023-10-26 PROCEDURE — 3051F PR MOST RECENT HEMOGLOBIN A1C LEVEL 7.0 - < 8.0%: ICD-10-PCS | Mod: CPTII,S$GLB,, | Performed by: OPHTHALMOLOGY

## 2023-10-26 PROCEDURE — 92014 PR EYE EXAM, EST PATIENT,COMPREHESV: ICD-10-PCS | Mod: S$GLB,,, | Performed by: OPHTHALMOLOGY

## 2023-10-26 PROCEDURE — 1126F AMNT PAIN NOTED NONE PRSNT: CPT | Mod: CPTII,S$GLB,, | Performed by: OPHTHALMOLOGY

## 2023-10-26 PROCEDURE — 1101F PT FALLS ASSESS-DOCD LE1/YR: CPT | Mod: CPTII,S$GLB,, | Performed by: OPHTHALMOLOGY

## 2023-10-26 PROCEDURE — 2023F DILAT RTA XM W/O RTNOPTHY: CPT | Mod: CPTII,S$GLB,, | Performed by: OPHTHALMOLOGY

## 2023-10-26 PROCEDURE — 3288F PR FALLS RISK ASSESSMENT DOCUMENTED: ICD-10-PCS | Mod: CPTII,S$GLB,, | Performed by: OPHTHALMOLOGY

## 2023-10-26 PROCEDURE — 3288F FALL RISK ASSESSMENT DOCD: CPT | Mod: CPTII,S$GLB,, | Performed by: OPHTHALMOLOGY

## 2023-10-26 PROCEDURE — 92014 COMPRE OPH EXAM EST PT 1/>: CPT | Mod: S$GLB,,, | Performed by: OPHTHALMOLOGY

## 2023-10-26 PROCEDURE — 92134 CPTRZ OPH DX IMG PST SGM RTA: CPT | Mod: S$GLB,,, | Performed by: OPHTHALMOLOGY

## 2023-10-26 PROCEDURE — 92134 OCT, RETINA - OU - BOTH EYES: ICD-10-PCS | Mod: S$GLB,,, | Performed by: OPHTHALMOLOGY

## 2023-10-26 PROCEDURE — 2023F PR DILATED RETINAL EXAM W/O EVID OF RETINOPATHY: ICD-10-PCS | Mod: CPTII,S$GLB,, | Performed by: OPHTHALMOLOGY

## 2023-10-26 PROCEDURE — 1159F MED LIST DOCD IN RCRD: CPT | Mod: CPTII,S$GLB,, | Performed by: OPHTHALMOLOGY

## 2023-10-26 PROCEDURE — 1160F PR REVIEW ALL MEDS BY PRESCRIBER/CLIN PHARMACIST DOCUMENTED: ICD-10-PCS | Mod: CPTII,S$GLB,, | Performed by: OPHTHALMOLOGY

## 2023-10-26 PROCEDURE — 99999 PR PBB SHADOW E&M-EST. PATIENT-LVL III: CPT | Mod: PBBFAC,,, | Performed by: OPHTHALMOLOGY

## 2023-10-26 PROCEDURE — 1126F PR PAIN SEVERITY QUANTIFIED, NO PAIN PRESENT: ICD-10-PCS | Mod: CPTII,S$GLB,, | Performed by: OPHTHALMOLOGY

## 2023-10-26 NOTE — PROGRESS NOTES
HPI    2-3 mo DFE/OCTm OU, poss get auth for Ozurdex if unchanged OD    DLS 08/31/2023 by Nadia Garcia MD    Cc; pt states vision is blurry this morning and I am having light   sensitivity OU    -eye pain  -diplopia  -flashes/floaters  -headaches   -curtains/shadows/veils    Eye Gtts:   1. Cosopt BID OD   2 Dorzolamide TID OD  3. Alphagan BID OS      POHx:   1. Branch retinal vein occlusion of right eye with macular edema   2. Traumatic glaucoma, left, severe stage   3. Nuclear sclerotic cataract of both eyes     Last edited by Katharine Becerril MA on 10/26/2023 11:00 AM.         A/P    ICD-10-CM ICD-9-CM   1. Branch retinal vein occlusion of right eye with macular edema  H34.8310 362.36     362.83   2. Traumatic glaucoma, left, severe stage  H40.32X3 365.65     365.73   3. Nuclear sclerotic cataract of both eyes  H25.13 366.16           1. Branch retinal vein occlusion of right eye with macular edema  Here for CME/ f/u  Hx glauc OS    Exam notable for small tertiary BRVO OD with stable foveal IRF, VA 20/50 (stable) off PF/Ket, pt was to have cardiac angiogram but it was cancelled with Dr. Paul    Plan: pt does not want injection Ozurdex, will recheck mild IRF and patient not bothered. Pt is going to speak with cardiology service today to see if angiogram will be rescheduled       2. Traumatic glaucoma, left, severe stage  Remote hx of trauma in 1960s (finger to eye)  Nerve pallor  IOP 20/18  today, f/b Dr. Chew - Recent notes reviewed   Plan: Continue Present Management     3. Nuclear sclerotic cataract of both eyes  Mild NS, borderline VS OD given monocular  Plan: Observation for now, consider CEIOL in future    RTC  6 mo DFE/OCTm OU monitor CME      I saw and examined the patient and reviewed in detail the findings documented. The final examination findings, image interpretations, and plan as documented in the record represent my personal judgment and conclusions.    Titi Jordan MD  Vitreoretinal Surgery    [facility]

## 2023-11-30 ENCOUNTER — OFFICE VISIT (OUTPATIENT)
Dept: INTERNAL MEDICINE | Facility: CLINIC | Age: 73
End: 2023-11-30
Payer: MEDICARE

## 2023-11-30 VITALS
OXYGEN SATURATION: 97 % | HEIGHT: 70 IN | DIASTOLIC BLOOD PRESSURE: 66 MMHG | HEART RATE: 71 BPM | WEIGHT: 219.63 LBS | BODY MASS INDEX: 31.44 KG/M2 | SYSTOLIC BLOOD PRESSURE: 130 MMHG

## 2023-11-30 DIAGNOSIS — R06.09 DOE (DYSPNEA ON EXERTION): ICD-10-CM

## 2023-11-30 DIAGNOSIS — E11.21 TYPE 2 DIABETES MELLITUS WITH DIABETIC NEPHROPATHY, WITHOUT LONG-TERM CURRENT USE OF INSULIN: Primary | ICD-10-CM

## 2023-11-30 DIAGNOSIS — E55.9 MILD VITAMIN D DEFICIENCY: ICD-10-CM

## 2023-11-30 DIAGNOSIS — I20.89 ANGINA OF EFFORT: ICD-10-CM

## 2023-11-30 DIAGNOSIS — E78.5 HYPERLIPIDEMIA, UNSPECIFIED HYPERLIPIDEMIA TYPE: ICD-10-CM

## 2023-11-30 PROCEDURE — 3008F BODY MASS INDEX DOCD: CPT | Mod: CPTII,S$GLB,, | Performed by: INTERNAL MEDICINE

## 2023-11-30 PROCEDURE — 3288F PR FALLS RISK ASSESSMENT DOCUMENTED: ICD-10-PCS | Mod: CPTII,S$GLB,, | Performed by: INTERNAL MEDICINE

## 2023-11-30 PROCEDURE — 3078F DIAST BP <80 MM HG: CPT | Mod: CPTII,S$GLB,, | Performed by: INTERNAL MEDICINE

## 2023-11-30 PROCEDURE — 1159F PR MEDICATION LIST DOCUMENTED IN MEDICAL RECORD: ICD-10-PCS | Mod: CPTII,S$GLB,, | Performed by: INTERNAL MEDICINE

## 2023-11-30 PROCEDURE — 3075F PR MOST RECENT SYSTOLIC BLOOD PRESS GE 130-139MM HG: ICD-10-PCS | Mod: CPTII,S$GLB,, | Performed by: INTERNAL MEDICINE

## 2023-11-30 PROCEDURE — 3075F SYST BP GE 130 - 139MM HG: CPT | Mod: CPTII,S$GLB,, | Performed by: INTERNAL MEDICINE

## 2023-11-30 PROCEDURE — 3051F PR MOST RECENT HEMOGLOBIN A1C LEVEL 7.0 - < 8.0%: ICD-10-PCS | Mod: CPTII,S$GLB,, | Performed by: INTERNAL MEDICINE

## 2023-11-30 PROCEDURE — 99214 OFFICE O/P EST MOD 30 MIN: CPT | Mod: S$GLB,,, | Performed by: INTERNAL MEDICINE

## 2023-11-30 PROCEDURE — 1125F AMNT PAIN NOTED PAIN PRSNT: CPT | Mod: CPTII,S$GLB,, | Performed by: INTERNAL MEDICINE

## 2023-11-30 PROCEDURE — 3051F HG A1C>EQUAL 7.0%<8.0%: CPT | Mod: CPTII,S$GLB,, | Performed by: INTERNAL MEDICINE

## 2023-11-30 PROCEDURE — 3288F FALL RISK ASSESSMENT DOCD: CPT | Mod: CPTII,S$GLB,, | Performed by: INTERNAL MEDICINE

## 2023-11-30 PROCEDURE — 99214 PR OFFICE/OUTPT VISIT, EST, LEVL IV, 30-39 MIN: ICD-10-PCS | Mod: S$GLB,,, | Performed by: INTERNAL MEDICINE

## 2023-11-30 PROCEDURE — 3008F PR BODY MASS INDEX (BMI) DOCUMENTED: ICD-10-PCS | Mod: CPTII,S$GLB,, | Performed by: INTERNAL MEDICINE

## 2023-11-30 PROCEDURE — 99999 PR PBB SHADOW E&M-EST. PATIENT-LVL V: ICD-10-PCS | Mod: PBBFAC,,, | Performed by: INTERNAL MEDICINE

## 2023-11-30 PROCEDURE — 99999 PR PBB SHADOW E&M-EST. PATIENT-LVL V: CPT | Mod: PBBFAC,,, | Performed by: INTERNAL MEDICINE

## 2023-11-30 PROCEDURE — 1125F PR PAIN SEVERITY QUANTIFIED, PAIN PRESENT: ICD-10-PCS | Mod: CPTII,S$GLB,, | Performed by: INTERNAL MEDICINE

## 2023-11-30 PROCEDURE — 3078F PR MOST RECENT DIASTOLIC BLOOD PRESSURE < 80 MM HG: ICD-10-PCS | Mod: CPTII,S$GLB,, | Performed by: INTERNAL MEDICINE

## 2023-11-30 PROCEDURE — 1101F PR PT FALLS ASSESS DOC 0-1 FALLS W/OUT INJ PAST YR: ICD-10-PCS | Mod: CPTII,S$GLB,, | Performed by: INTERNAL MEDICINE

## 2023-11-30 PROCEDURE — 1101F PT FALLS ASSESS-DOCD LE1/YR: CPT | Mod: CPTII,S$GLB,, | Performed by: INTERNAL MEDICINE

## 2023-11-30 PROCEDURE — 1159F MED LIST DOCD IN RCRD: CPT | Mod: CPTII,S$GLB,, | Performed by: INTERNAL MEDICINE

## 2023-11-30 RX ORDER — CLOPIDOGREL BISULFATE 75 MG/1
75 TABLET ORAL DAILY
Qty: 30 TABLET | Refills: 3 | Status: SHIPPED | OUTPATIENT
Start: 2023-11-30 | End: 2024-03-19

## 2023-11-30 NOTE — PROGRESS NOTES
Subjective:       Patient ID: Jae Millan is a 73 y.o. male.    Chief Complaint: Follow-up    Follow-up  Pertinent negatives include no abdominal pain, chest pain, headaches, nausea or vomiting.   Pt is feeling okay - did not get angiogram due to elevated creatinine.  Still gets angina with exertion.    Review of Systems   Respiratory:  Negative for shortness of breath.    Cardiovascular:  Negative for chest pain.   Gastrointestinal:  Negative for abdominal pain, diarrhea, nausea and vomiting.   Genitourinary:  Negative for dysuria.   Neurological:  Negative for seizures, syncope and headaches.       Objective:      Physical Exam  Constitutional:       General: He is not in acute distress.     Appearance: He is well-developed.   Eyes:      Pupils: Pupils are equal, round, and reactive to light.   Neck:      Thyroid: No thyromegaly.      Vascular: No JVD.   Cardiovascular:      Rate and Rhythm: Normal rate and regular rhythm.      Heart sounds: Normal heart sounds. No murmur heard.     No friction rub. No gallop.   Pulmonary:      Effort: Pulmonary effort is normal.      Breath sounds: Normal breath sounds. No wheezing or rales.   Abdominal:      General: Bowel sounds are normal. There is no distension.      Palpations: Abdomen is soft. There is no mass.      Tenderness: There is no abdominal tenderness. There is no guarding or rebound.   Musculoskeletal:      Cervical back: Neck supple.   Lymphadenopathy:      Cervical: No cervical adenopathy.   Skin:     General: Skin is warm and dry.   Neurological:      Mental Status: He is alert and oriented to person, place, and time.   Psychiatric:         Behavior: Behavior normal.         Thought Content: Thought content normal.         Judgment: Judgment normal.       Fingernails of left hand reduced without incident  Assessment:       1. Type 2 diabetes mellitus with diabetic nephropathy, without long-term current use of insulin    2. YU (dyspnea on exertion)    3.  Angina of effort    4. Hyperlipidemia, unspecified hyperlipidemia type    5. Mild vitamin D deficiency        Plan:   Type 2 diabetes mellitus with diabetic nephropathy, without long-term current use of insulin  -     Ambulatory referral/consult to Podiatry; Future; Expected date: 12/07/2023  -     CBC Auto Differential; Future; Expected date: 11/30/2023  -     Comprehensive Metabolic Panel; Future; Expected date: 11/30/2023  -     TSH; Future; Expected date: 11/30/2023  -     Hemoglobin A1C; Future; Expected date: 11/30/2023  -     Ambulatory referral/consult to Nephrology; Future; Expected date: 12/07/2023    YU (dyspnea on exertion)  -     clopidogreL (PLAVIX) 75 mg tablet; Take 1 tablet (75 mg total) by mouth once daily.  Dispense: 30 tablet; Refill: 3    Angina of effort  -     Ambulatory referral/consult to Interventional Cardiology; Future; Expected date: 12/07/2023    Hyperlipidemia, unspecified hyperlipidemia type  -     Lipid Panel; Future; Expected date: 11/30/2023    Mild vitamin D deficiency    Refer back to interventional cardiology

## 2023-12-17 RX ORDER — GLIMEPIRIDE 1 MG/1
1 TABLET ORAL
Qty: 90 TABLET | Refills: 3 | OUTPATIENT
Start: 2023-12-17

## 2023-12-17 NOTE — TELEPHONE ENCOUNTER
Refill Decision Note   Jae Millan  is requesting a refill authorization.  Brief Assessment and Rationale for Refill:  Quick Discontinue     Medication Therapy Plan:  Pt is now on 2 mg    Medication Reconciliation Completed: No   Comments:     No Care Gaps recommended.     Note composed:4:55 PM 12/17/2023

## 2023-12-18 ENCOUNTER — OFFICE VISIT (OUTPATIENT)
Dept: PODIATRY | Facility: CLINIC | Age: 73
End: 2023-12-18
Payer: MEDICARE

## 2023-12-18 VITALS
WEIGHT: 225.06 LBS | HEART RATE: 73 BPM | BODY MASS INDEX: 32.22 KG/M2 | DIASTOLIC BLOOD PRESSURE: 85 MMHG | HEIGHT: 70 IN | SYSTOLIC BLOOD PRESSURE: 178 MMHG

## 2023-12-18 DIAGNOSIS — M79.672 FOOT PAIN, BILATERAL: ICD-10-CM

## 2023-12-18 DIAGNOSIS — Z86.73 HISTORY OF STROKE: ICD-10-CM

## 2023-12-18 DIAGNOSIS — R53.1 LEFT-SIDED WEAKNESS: ICD-10-CM

## 2023-12-18 DIAGNOSIS — E11.21 TYPE 2 DIABETES MELLITUS WITH DIABETIC NEPHROPATHY, WITHOUT LONG-TERM CURRENT USE OF INSULIN: Primary | ICD-10-CM

## 2023-12-18 DIAGNOSIS — B35.1 ONYCHOMYCOSIS DUE TO DERMATOPHYTE: ICD-10-CM

## 2023-12-18 DIAGNOSIS — M79.671 FOOT PAIN, BILATERAL: ICD-10-CM

## 2023-12-18 PROCEDURE — 1126F PR PAIN SEVERITY QUANTIFIED, NO PAIN PRESENT: ICD-10-PCS | Mod: CPTII,S$GLB,, | Performed by: PODIATRIST

## 2023-12-18 PROCEDURE — 1160F RVW MEDS BY RX/DR IN RCRD: CPT | Mod: CPTII,S$GLB,, | Performed by: PODIATRIST

## 2023-12-18 PROCEDURE — 1160F PR REVIEW ALL MEDS BY PRESCRIBER/CLIN PHARMACIST DOCUMENTED: ICD-10-PCS | Mod: CPTII,S$GLB,, | Performed by: PODIATRIST

## 2023-12-18 PROCEDURE — 3051F HG A1C>EQUAL 7.0%<8.0%: CPT | Mod: CPTII,S$GLB,, | Performed by: PODIATRIST

## 2023-12-18 PROCEDURE — 11721 DEBRIDE NAIL 6 OR MORE: CPT | Mod: Q8,S$GLB,, | Performed by: PODIATRIST

## 2023-12-18 PROCEDURE — 3079F DIAST BP 80-89 MM HG: CPT | Mod: CPTII,S$GLB,, | Performed by: PODIATRIST

## 2023-12-18 PROCEDURE — 11721 PR DEBRIDEMENT OF NAILS, 6 OR MORE: ICD-10-PCS | Mod: Q8,S$GLB,, | Performed by: PODIATRIST

## 2023-12-18 PROCEDURE — 3077F PR MOST RECENT SYSTOLIC BLOOD PRESSURE >= 140 MM HG: ICD-10-PCS | Mod: CPTII,S$GLB,, | Performed by: PODIATRIST

## 2023-12-18 PROCEDURE — 99999 PR PBB SHADOW E&M-EST. PATIENT-LVL IV: CPT | Mod: PBBFAC,,, | Performed by: PODIATRIST

## 2023-12-18 PROCEDURE — 1159F MED LIST DOCD IN RCRD: CPT | Mod: CPTII,S$GLB,, | Performed by: PODIATRIST

## 2023-12-18 PROCEDURE — 3008F PR BODY MASS INDEX (BMI) DOCUMENTED: ICD-10-PCS | Mod: CPTII,S$GLB,, | Performed by: PODIATRIST

## 2023-12-18 PROCEDURE — 1126F AMNT PAIN NOTED NONE PRSNT: CPT | Mod: CPTII,S$GLB,, | Performed by: PODIATRIST

## 2023-12-18 PROCEDURE — 99214 PR OFFICE/OUTPT VISIT, EST, LEVL IV, 30-39 MIN: ICD-10-PCS | Mod: 25,S$GLB,, | Performed by: PODIATRIST

## 2023-12-18 PROCEDURE — 99214 OFFICE O/P EST MOD 30 MIN: CPT | Mod: 25,S$GLB,, | Performed by: PODIATRIST

## 2023-12-18 PROCEDURE — 3008F BODY MASS INDEX DOCD: CPT | Mod: CPTII,S$GLB,, | Performed by: PODIATRIST

## 2023-12-18 PROCEDURE — 3077F SYST BP >= 140 MM HG: CPT | Mod: CPTII,S$GLB,, | Performed by: PODIATRIST

## 2023-12-18 PROCEDURE — 3079F PR MOST RECENT DIASTOLIC BLOOD PRESSURE 80-89 MM HG: ICD-10-PCS | Mod: CPTII,S$GLB,, | Performed by: PODIATRIST

## 2023-12-18 PROCEDURE — 1159F PR MEDICATION LIST DOCUMENTED IN MEDICAL RECORD: ICD-10-PCS | Mod: CPTII,S$GLB,, | Performed by: PODIATRIST

## 2023-12-18 PROCEDURE — 3051F PR MOST RECENT HEMOGLOBIN A1C LEVEL 7.0 - < 8.0%: ICD-10-PCS | Mod: CPTII,S$GLB,, | Performed by: PODIATRIST

## 2023-12-18 PROCEDURE — 99499 UNLISTED E&M SERVICE: CPT | Mod: S$GLB,,, | Performed by: PODIATRIST

## 2023-12-18 PROCEDURE — 99999 PR PBB SHADOW E&M-EST. PATIENT-LVL IV: ICD-10-PCS | Mod: PBBFAC,,, | Performed by: PODIATRIST

## 2023-12-18 RX ORDER — GLIMEPIRIDE 1 MG/1
1 TABLET ORAL
COMMUNITY
Start: 2023-09-29 | End: 2024-02-01

## 2023-12-18 NOTE — PROGRESS NOTES
Subjective:      Patient ID: Jae Millan is a 73 y.o. male.    Chief Complaint: Diabetes Mellitus (PCP- 11/30/2023/Maryjane Farias MD) and Nail Care    Jae is a 73 y.o. male who returns to clinic for routine high risk foot care/exam. Has numbness & tingling since CVA B/L feet; also, pins and needles that hurt on occasion - on Gabapentin 300mg tid. L foot hurts more than R and feels like a constant toothache.   This patient has documented high risk feet requiring routine maintenance secondary to peripheral neuropathy.    PCP: Maryjane Farias MD  Date Last Seen by PCP: 11/30/2023    Current shoe gear:  Tennis shoes    Using public transportation - lives [place] town near [facility].    Jae has a past medical history of Cataract, Decreased sensation of lower extremity, Diabetes mellitus, Diabetic neuropathy, Dysarthria, Dysphagia, ED (erectile dysfunction), Glaucoma, Hemiparesis, High cholesterol, History of hepatitis C, s/p successful Rx w/ cure (SVR12) 4/2020, Hypertension, Impaired functional mobility, balance, gait, and endurance, Stroke, and Urinary frequency.   Dx DM before Aug.2018 CVA - L hemiparesis UE & LE - PT.  Patient Active Problem List   Diagnosis    Essential hypertension    Type 2 diabetes mellitus with neurologic complication, without long-term current use of insulin    Hyperlipidemia, mixed    History of CVA with residual deficit    H/O colonoscopy    Impaired functional mobility, balance, gait, and endurance    Hemiparesis affecting left side as late effect of cerebrovascular accident (CVA)    Range of motion deficit    Inguinal hernia of left side without obstruction or gangrene    History of hepatitis C, s/p successful Rx w/ cure (SVR12) 4/2020    Hypogonadism in male    Erectile dysfunction    Vitamin D deficiency    Stage 3b chronic kidney disease    Type 2 diabetes mellitus with kidney complication, without long-term current use of insulin    Impaired mobility and ADLs     Absent plantar grasp reflex    Aortic atherosclerosis    Branch retinal vein occlusion of right eye with macular edema    Traumatic glaucoma, left, severe stage    Nuclear sclerotic cataract of both eyes    Severe obesity (BMI 35.0-39.9) with comorbidity    Hyperparathyroidism    Chest pain      Hemoglobin A1C   Date Value Ref Range Status   10/09/2023 7.5 (H) 4.0 - 5.6 % Final     Comment:     ADA Screening Guidelines:  5.7-6.4%  Consistent with prediabetes  >or=6.5%  Consistent with diabetes    High levels of fetal hemoglobin interfere with the HbA1C  assay. Heterozygous hemoglobin variants (HbS, HgC, etc)do  not significantly interfere with this assay.   However, presence of multiple variants may affect accuracy.     07/24/2023 7.2 (H) 4.0 - 5.6 % Final     Comment:     ADA Screening Guidelines:  5.7-6.4%  Consistent with prediabetes  >or=6.5%  Consistent with diabetes    High levels of fetal hemoglobin interfere with the HbA1C  assay. Heterozygous hemoglobin variants (HbS, HgC, etc)do  not significantly interfere with this assay.   However, presence of multiple variants may affect accuracy.     03/16/2023 8.1 (H) 4.0 - 5.6 % Final     Comment:     ADA Screening Guidelines:  5.7-6.4%  Consistent with prediabetes  >or=6.5%  Consistent with diabetes    High levels of fetal hemoglobin interfere with the HbA1C  assay. Heterozygous hemoglobin variants (HbS, HgC, etc)do  not significantly interfere with this assay.   However, presence of multiple variants may affect accuracy.       Objective:      Physical Exam  Vitals reviewed.   Constitutional:       Appearance: He is well-developed.   Cardiovascular:      Pulses:         Dorsalis pedis pulses +2 B/L. Audible & phasic per Doppler.     Posterior tibial pulses are diminished B/L   Edema B/L - can't wear compression as too tight & states has whole leg swollen so not using anything.  Musculoskeletal:     Equinus B/L ankles with < 90 deg foot to leg noted with knees  extended.       MS strength of extrinsics to foot and ankle B/L + 5/5 in DF/PF/Inv/Ev to resistance w/ no reproduction of pain in any direction.      Passive ROM of ankle and pedal joints is painless & w/out crepitation B/L.      Pes valgus. Hammertoe deformities B/L.  Feet:      Nails are thickened, dystrophic, cryptotic & yellowish in coloration. Onychogryphosis 2 L. Significantly hypertrophic 1 & 4 B/L, 5 L. Symptomatic cryptotic medial R hallux nail border w/out sign of infection.  Skin:     General: Skin is dry. No open lesions noted.      Capillary Refill: Capillary refill takes more than 3 seconds.      Comments: Skin is thin, shiny, and cool, B/L.  Neurological:      Mental Status: He is alert.      Comments: diminished sensation noted BLE  including light touch, 2 point discrimination.     Paresthesias including pins and needles B/L feet w/ no clearly identified trigger or source; no hyperemia.      L hemiparesis - uses a cane.  Psych:  Patient is alert. Mood & affect is normal. Behavior is normal.    Assessment:       Encounter Diagnoses   Name Primary?    Type 2 diabetes mellitus with diabetic nephropathy, without long-term current use of insulin Yes    History of stroke     Left-sided weakness     Onychomycosis due to dermatophyte     Foot pain, bilateral      Problem List Items Addressed This Visit       Type 2 diabetes mellitus with kidney complication, without long-term current use of insulin - Primary    Relevant Medications    glimepiride (AMARYL) 1 MG tablet     Other Visit Diagnoses       History of stroke        Left-sided weakness        Onychomycosis due to dermatophyte        Foot pain, bilateral               Plan:       Jae was seen today for diabetes mellitus and nail care.    Diagnoses and all orders for this visit:    Type 2 diabetes mellitus with diabetic nephropathy, without long-term current use of insulin  -     Ambulatory referral/consult to Podiatry    History of  stroke    Left-sided weakness    Onychomycosis due to dermatophyte    Foot pain, bilateral    I counseled the patient on his conditions, their implications and medical management.    - Shoe inspection. Diabetic Foot Education. Patient reminded of the importance of good nutrition and blood sugar control to help prevent podiatric complications of diabetes. Patient instructed on proper foot hygeine. We discussed wearing proper shoe gear, daily foot inspections, never walking without protective shoe gear, annual DM foot exam, sooner prn.      - With patient's permission, nails were aggressively reduced and debrided x 10 to their soft tissue attachment mechanically, removing all offending nail and debris.  The crypttic nail borders B/L hallux were cut back 3 mm from the nail fold and angled back to wedge out the offending nail in toto.  Patient relates relief following the procedure.      Continue Gabapentin as prescribed.     Rx compound pain cream to be applied to areas of paresthesias up to 4-5 x daily as needed for pain. Prescription faxed to [facility].     A total of 30 minutes was spent with chart review, patient visit and documentation.

## 2024-01-04 NOTE — PROGRESS NOTES
HPI    DLS: 9/25/2023    Pt here for 4 Month Check;  Pt states no eye pain but vision is blurry.    Meds;  1. Cosopt BID OS        POHx:   1. Branch retinal vein occlusion of right eye with macular edema   2. Traumatic glaucoma, left, severe stage   3. Nuclear sclerotic cataract of both eyes     Last edited by Tamera Chew MD on 1/8/2024 11:05 AM.            Assessment /Plan     For exam results, see Encounter Report.    Traumatic glaucoma, left, severe stage    Angle recession of left eye    Afferent pupillary defect, left    Branch retinal vein occlusion of right eye with macular edema    Nuclear sclerotic cataract of both eyes    Macular retinal cyst, right    History of eye trauma          LOST TO F/U 9/2019 TO 8/2022 - SAW DR GARCIA 8/3/2022 AND SENT BACK OVER - PT IS OFF GTTS        Glaucoma (type and duration)    GLAUCOMA - 2/2 trauma os - end stage    First HVF   2019   First photos   7/2019   Treatment / Drops started    Timolol BID OU , Azopt TID OU, Brimonidine BID OU       Stopped - ran out and did not refill            Family history    none        Glaucoma meds    cosopt os bid //  (use to use brim / azopt /timolol- os ) // off again 2/3/2023        H/O adverse rxn to glaucoma drops         LASERS    ??        GLAUCOMA SURGERIES    none        OTHER EYE SURGERIES    none        CDR    OD: 0.6 OS: 0.85        Tbase    16/18         Tmax    16/18            Ttarget    ??             HVF    2 test 2019 to 2023 - use to be Full od -- now with SALT - ptosis and was falling asleep  //  gen dep thru out         Gonio   +4 od // recess 360 os          CCT    585/599        OCT    2 test 2019 to 2021 - nl od // dec thru out os         Disc photos    7/2019    - Ttoday   16/18 - back on  cosopt - ?? OS   - Test done today  IOP // macula OCT     2. Angle recession os     3. + APD os     4. NS     5. BRVO w/ ME od    This was/is his good eye     Pt new patient to me, [facility]. Pt ran out of medications in June 2019. Pt has no eye pain, red eye , flashes, floaters, changes in vision. PT has no hx of surgery or family hx of glaucoma.     8/29/2022   Pt lost to f/u x several years - off gtts   Recommend restart eye drops   cosopt os bid- RE-START (2/3/2023) // off again - again re-start bid os - told to use it indefinetly / for ever (9/25/2023)     Retina ([facility]) - evaluation of right macula - see OCT - this is his "good" eye // BRVO w/ ME od    Has seen Dr Jordan x 3 - did NOT get avastin 2/2 cardiac issues(7/27/2023)   Did NOT get ozurdex - pt defered (8/31/2023) - is to F/U in 2-3 months - make sure he has an appt     1/8/2024   Pt gets confused on if he is using the copst in the right or  the left eye - told him to just use it in BOTH eyes so he does not have to keep it straight     Keep F/U with Nikki     F/u 4 months - with       HVF 24-2 ss od and 10-2 ss os // DFE // disc photos   "

## 2024-01-08 ENCOUNTER — OFFICE VISIT (OUTPATIENT)
Dept: OPHTHALMOLOGY | Facility: CLINIC | Age: 74
End: 2024-01-08
Payer: MEDICARE

## 2024-01-08 DIAGNOSIS — H40.32X4 TRAUMATIC GLAUCOMA, LEFT, INDETERMINATE STAGE: ICD-10-CM

## 2024-01-08 DIAGNOSIS — H34.8310 BRANCH RETINAL VEIN OCCLUSION OF RIGHT EYE WITH MACULAR EDEMA: ICD-10-CM

## 2024-01-08 DIAGNOSIS — Z87.828 HISTORY OF EYE TRAUMA: ICD-10-CM

## 2024-01-08 DIAGNOSIS — H21.552 ANGLE RECESSION OF LEFT EYE: ICD-10-CM

## 2024-01-08 DIAGNOSIS — H21.562 AFFERENT PUPILLARY DEFECT, LEFT: ICD-10-CM

## 2024-01-08 DIAGNOSIS — H35.341 MACULAR RETINAL CYST, RIGHT: ICD-10-CM

## 2024-01-08 DIAGNOSIS — H25.13 NUCLEAR SCLEROTIC CATARACT OF BOTH EYES: ICD-10-CM

## 2024-01-08 DIAGNOSIS — H40.32X3 TRAUMATIC GLAUCOMA, LEFT, SEVERE STAGE: Primary | ICD-10-CM

## 2024-01-08 PROCEDURE — 99999 PR PBB SHADOW E&M-EST. PATIENT-LVL III: CPT | Mod: PBBFAC,,, | Performed by: OPHTHALMOLOGY

## 2024-01-08 PROCEDURE — 92134 CPTRZ OPH DX IMG PST SGM RTA: CPT | Mod: S$GLB,,, | Performed by: OPHTHALMOLOGY

## 2024-01-08 PROCEDURE — 99214 OFFICE O/P EST MOD 30 MIN: CPT | Mod: S$GLB,,, | Performed by: OPHTHALMOLOGY

## 2024-01-08 RX ORDER — DORZOLAMIDE HYDROCHLORIDE AND TIMOLOL MALEATE 20; 5 MG/ML; MG/ML
1 SOLUTION/ DROPS OPHTHALMIC 2 TIMES DAILY
Qty: 10 ML | Refills: 12 | Status: SHIPPED | OUTPATIENT
Start: 2024-01-08

## 2024-01-10 ENCOUNTER — TELEPHONE (OUTPATIENT)
Dept: CARDIOLOGY | Facility: CLINIC | Age: 74
End: 2024-01-10
Payer: MEDICARE

## 2024-01-10 NOTE — TELEPHONE ENCOUNTER
Returned patient contact call RE: rescheduling procedure.  Noted patient was referred to nephrology for evaluation of kidney function.  Last creat collected oct 2023 was 1.9.  Explained to patient to keep specialist appointment for further testing and treatment and contact office once cleared for cath. Lab procedure.  F/u appointment with Dr Paul will be made for Feb 2024 in the meantime.

## 2024-01-25 ENCOUNTER — OFFICE VISIT (OUTPATIENT)
Dept: NEPHROLOGY | Facility: CLINIC | Age: 74
End: 2024-01-25
Payer: MEDICARE

## 2024-01-25 ENCOUNTER — LAB VISIT (OUTPATIENT)
Dept: LAB | Facility: HOSPITAL | Age: 74
End: 2024-01-25
Payer: MEDICARE

## 2024-01-25 VITALS
BODY MASS INDEX: 31.47 KG/M2 | OXYGEN SATURATION: 96 % | WEIGHT: 219.38 LBS | HEART RATE: 75 BPM | DIASTOLIC BLOOD PRESSURE: 77 MMHG | SYSTOLIC BLOOD PRESSURE: 125 MMHG

## 2024-01-25 DIAGNOSIS — N18.30 BENIGN HYPERTENSION WITH CHRONIC KIDNEY DISEASE, STAGE III: ICD-10-CM

## 2024-01-25 DIAGNOSIS — N18.32 STAGE 3B CHRONIC KIDNEY DISEASE: ICD-10-CM

## 2024-01-25 DIAGNOSIS — I12.9 BENIGN HYPERTENSION WITH CHRONIC KIDNEY DISEASE, STAGE III: ICD-10-CM

## 2024-01-25 DIAGNOSIS — N18.32 STAGE 3B CHRONIC KIDNEY DISEASE: Primary | ICD-10-CM

## 2024-01-25 DIAGNOSIS — N25.81 SECONDARY HYPERPARATHYROIDISM OF RENAL ORIGIN: ICD-10-CM

## 2024-01-25 DIAGNOSIS — E66.01 SEVERE OBESITY (BMI 35.0-39.9) WITH COMORBIDITY: ICD-10-CM

## 2024-01-25 DIAGNOSIS — E11.21 DIABETIC NEPHROPATHY ASSOCIATED WITH TYPE 2 DIABETES MELLITUS: ICD-10-CM

## 2024-01-25 LAB
25(OH)D3+25(OH)D2 SERPL-MCNC: 31 NG/ML (ref 30–96)
ALBUMIN SERPL BCP-MCNC: 3.7 G/DL (ref 3.5–5.2)
ANION GAP SERPL CALC-SCNC: 10 MMOL/L (ref 8–16)
BACTERIA #/AREA URNS AUTO: NORMAL /HPF
BASOPHILS # BLD AUTO: 0.04 K/UL (ref 0–0.2)
BASOPHILS NFR BLD: 0.6 % (ref 0–1.9)
BILIRUB UR QL STRIP: NEGATIVE
BUN SERPL-MCNC: 27 MG/DL (ref 8–23)
CA-I BLDV-SCNC: 1.18 MMOL/L (ref 1.06–1.42)
CALCIUM SERPL-MCNC: 9.5 MG/DL (ref 8.7–10.5)
CHLORIDE SERPL-SCNC: 107 MMOL/L (ref 95–110)
CLARITY UR REFRACT.AUTO: CLEAR
CO2 SERPL-SCNC: 24 MMOL/L (ref 23–29)
COLOR UR AUTO: YELLOW
CREAT SERPL-MCNC: 2.2 MG/DL (ref 0.5–1.4)
CREAT UR-MCNC: 126 MG/DL (ref 23–375)
DIFFERENTIAL METHOD BLD: ABNORMAL
EOSINOPHIL # BLD AUTO: 0.2 K/UL (ref 0–0.5)
EOSINOPHIL NFR BLD: 2.3 % (ref 0–8)
ERYTHROCYTE [DISTWIDTH] IN BLOOD BY AUTOMATED COUNT: 14.4 % (ref 11.5–14.5)
EST. GFR  (NO RACE VARIABLE): 31.2 ML/MIN/1.73 M^2
GLUCOSE SERPL-MCNC: 149 MG/DL (ref 70–110)
GLUCOSE UR QL STRIP: ABNORMAL
HCT VFR BLD AUTO: 41.6 % (ref 40–54)
HGB BLD-MCNC: 14.1 G/DL (ref 14–18)
HGB UR QL STRIP: NEGATIVE
IMM GRANULOCYTES # BLD AUTO: 0.03 K/UL (ref 0–0.04)
IMM GRANULOCYTES NFR BLD AUTO: 0.4 % (ref 0–0.5)
KETONES UR QL STRIP: NEGATIVE
LEUKOCYTE ESTERASE UR QL STRIP: NEGATIVE
LYMPHOCYTES # BLD AUTO: 1.1 K/UL (ref 1–4.8)
LYMPHOCYTES NFR BLD: 15.8 % (ref 18–48)
MCH RBC QN AUTO: 27.1 PG (ref 27–31)
MCHC RBC AUTO-ENTMCNC: 33.9 G/DL (ref 32–36)
MCV RBC AUTO: 80 FL (ref 82–98)
MICROSCOPIC COMMENT: NORMAL
MONOCYTES # BLD AUTO: 0.4 K/UL (ref 0.3–1)
MONOCYTES NFR BLD: 5.5 % (ref 4–15)
NEUTROPHILS # BLD AUTO: 5.3 K/UL (ref 1.8–7.7)
NEUTROPHILS NFR BLD: 75.4 % (ref 38–73)
NITRITE UR QL STRIP: NEGATIVE
NRBC BLD-RTO: 0 /100 WBC
PH UR STRIP: 6 [PH] (ref 5–8)
PHOSPHATE SERPL-MCNC: 3.5 MG/DL (ref 2.7–4.5)
PLATELET # BLD AUTO: 247 K/UL (ref 150–450)
PMV BLD AUTO: 12 FL (ref 9.2–12.9)
POTASSIUM SERPL-SCNC: 4.5 MMOL/L (ref 3.5–5.1)
PROT UR QL STRIP: ABNORMAL
PROT UR-MCNC: 22 MG/DL (ref 0–15)
PROT/CREAT UR: 0.17 MG/G{CREAT} (ref 0–0.2)
PTH-INTACT SERPL-MCNC: 110.7 PG/ML (ref 9–77)
RBC # BLD AUTO: 5.21 M/UL (ref 4.6–6.2)
SODIUM SERPL-SCNC: 141 MMOL/L (ref 136–145)
SP GR UR STRIP: 1.02 (ref 1–1.03)
URN SPEC COLLECT METH UR: ABNORMAL
WBC # BLD AUTO: 6.97 K/UL (ref 3.9–12.7)
YEAST UR QL AUTO: NORMAL

## 2024-01-25 PROCEDURE — 80069 RENAL FUNCTION PANEL: CPT | Performed by: INTERNAL MEDICINE

## 2024-01-25 PROCEDURE — 81003 URINALYSIS AUTO W/O SCOPE: CPT | Mod: 59 | Performed by: INTERNAL MEDICINE

## 2024-01-25 PROCEDURE — 82610 CYSTATIN C: CPT | Performed by: INTERNAL MEDICINE

## 2024-01-25 PROCEDURE — 99215 OFFICE O/P EST HI 40 MIN: CPT | Mod: S$GLB,,, | Performed by: INTERNAL MEDICINE

## 2024-01-25 PROCEDURE — 85025 COMPLETE CBC W/AUTO DIFF WBC: CPT | Performed by: INTERNAL MEDICINE

## 2024-01-25 PROCEDURE — 81479 UNLISTED MOLECULAR PATHOLOGY: CPT | Performed by: INTERNAL MEDICINE

## 2024-01-25 PROCEDURE — 82330 ASSAY OF CALCIUM: CPT | Performed by: INTERNAL MEDICINE

## 2024-01-25 PROCEDURE — 81001 URINALYSIS AUTO W/SCOPE: CPT | Performed by: INTERNAL MEDICINE

## 2024-01-25 PROCEDURE — 82306 VITAMIN D 25 HYDROXY: CPT | Performed by: INTERNAL MEDICINE

## 2024-01-25 PROCEDURE — 84156 ASSAY OF PROTEIN URINE: CPT | Performed by: INTERNAL MEDICINE

## 2024-01-25 PROCEDURE — 83970 ASSAY OF PARATHORMONE: CPT | Performed by: INTERNAL MEDICINE

## 2024-01-25 PROCEDURE — 99999 PR PBB SHADOW E&M-EST. PATIENT-LVL IV: CPT | Mod: PBBFAC,,, | Performed by: INTERNAL MEDICINE

## 2024-01-25 PROCEDURE — 36415 COLL VENOUS BLD VENIPUNCTURE: CPT | Performed by: INTERNAL MEDICINE

## 2024-01-25 NOTE — PROGRESS NOTES
Nephrology Clinic Note   1/25/2024    No chief complaint on file.     History of present illness:  Patient is a 73 y.o. male. HTN, CKD, DM here to establish care for CKD. Last seen by MARLON Heard in ay/2023. Patient denies NSAID use, no chest pain , no sob. No recent hospitalizations, no recent contrast. No urinary symptoms   Last labs deom October/2023 cr was 1.9   Patient skips meds sometimes     Review of Systems   Constitutional: Negative.    HENT: Negative.     Eyes: Negative.    Respiratory: Negative.     Cardiovascular: Negative.    Gastrointestinal: Negative.    Genitourinary: Negative.    Musculoskeletal: Negative.    Skin: Negative.    Neurological: Negative.    Endo/Heme/Allergies: Negative.    Psychiatric/Behavioral: Negative.     All other systems reviewed and are negative.      History:  Past Medical History:   Diagnosis Date    Cataract     Decreased sensation of lower extremity     Diabetes mellitus     Diabetic neuropathy     Dysarthria     Dysphagia     ED (erectile dysfunction)     Glaucoma     Hemiparesis     LEFT side post CVA    High cholesterol     History of hepatitis C, s/p successful Rx w/ cure (SVR12) 4/2020     Hypertension     Impaired functional mobility, balance, gait, and endurance     Stroke     Urinary frequency       Past Surgical History:   Procedure Laterality Date    COLONOSCOPY N/A 3/9/2023    Procedure: COLONOSCOPY;  Surgeon: Kian Kraft MD;  Location: [facility] ([address] [place] [address]);  Service: Endoscopy;  Laterality: N/A;  medical transportation-inst mail-tb  pre call complete-as    INGUINAL HERNIA REPAIR Left 12/11/2019    UNDESCENDED TESTICLE EXPLORATION Right     R testicle removed as it was undescended        Current Outpatient Medications:     amLODIPine (NORVASC) 10 MG tablet, TAKE 1 TABLET(10 MG) BY MOUTH EVERY DAY FOR HYPERTENSION, Disp: 90 tablet, Rfl: 3    aspirin 81 MG Chew, Take 1 tablet (81 mg total) by mouth once daily., Disp: , Rfl:     blood sugar diagnostic Strp, To  "check BG 4 times daily, to use with insurance preferred meter, Disp: 100 each, Rfl: 11    carvediloL (COREG) 6.25 MG tablet, TAKE 1 TABLET(6.25 MG) BY MOUTH TWICE DAILY WITH MEALS, Disp: 180 tablet, Rfl: 3    clopidogreL (PLAVIX) 75 mg tablet, Take 1 tablet (75 mg total) by mouth once daily., Disp: 30 tablet, Rfl: 3    dorzolamide-timolol 2-0.5% (COSOPT) 22.3-6.8 mg/mL ophthalmic solution, Place 1 drop into both eyes 2 (two) times daily., Disp: 10 mL, Rfl: 12    empagliflozin (JARDIANCE) 10 mg tablet, Take 1 tablet (10 mg total) by mouth once daily., Disp: 90 tablet, Rfl: 1    ergocalciferol (ERGOCALCIFEROL) 50,000 unit Cap, One weekly, Disp: 12 capsule, Rfl: 3    gabapentin (NEURONTIN) 300 MG capsule, Take 1 capsule (300 mg total) by mouth 2 (two) times daily. In 1-2 weeks, if no relief, may increase dose to 3 times per day., Disp: , Rfl:     glimepiride (AMARYL) 1 MG tablet, Take 1 mg by mouth., Disp: , Rfl:     glimepiride (AMARYL) 2 MG tablet, Take 1 tablet (2 mg total) by mouth before breakfast., Disp: 90 tablet, Rfl: 3    lancets Misc, Check bg 4 times daily, Disp: 100 each, Rfl: 11    losartan-hydrochlorothiazide 100-25 mg (HYZAAR) 100-25 mg per tablet, Take 1 tablet by mouth once daily., Disp: 90 tablet, Rfl: 3    metFORMIN (GLUCOPHAGE) 1000 MG tablet, On daily (Patient taking differently: Take 1,000 mg by mouth daily with breakfast. On daily), Disp: 180 tablet, Rfl: 3    needle, disp, 18 G 18 gauge x 1" Ndle, 1 Device by Misc.(Non-Drug; Combo Route) route every 14 (fourteen) days. Use to draw testosterone, Disp: 10 each, Rfl: 11    needle, disp, 25 gauge 25 gauge x 1" Ndle, 1 Device by Misc.(Non-Drug; Combo Route) route every 14 (fourteen) days. Use to inject testosterone, Disp: 10 each, Rfl: 11    nitroGLYCERIN (NITROSTAT) 0.4 MG SL tablet, Place 1 tablet (0.4 mg total) under the tongue every 5 (five) minutes as needed for Chest pain., Disp: 30 tablet, Rfl: 1    pantoprazole (PROTONIX) 40 MG tablet, TAKE 1 " TABLET(40 MG) BY MOUTH EVERY DAY FOR STOMACH, Disp: 90 tablet, Rfl: 3    prednisoLONE acetate (PRED FORTE) 1 % DrpS, Place 1 drop into the right eye 4 (four) times daily. (Patient not taking: Reported on 2024), Disp: 5 mL, Rfl: 3    rosuvastatin (CRESTOR) 20 MG tablet, TAKE 1 TABLET(20 MG) BY MOUTH EVERY DAY, Disp: 90 tablet, Rfl: 3    SITagliptin phosphate (JANUVIA) 50 MG Tab, Take 1 tablet (50 mg total) by mouth once daily., Disp: 90 tablet, Rfl: 1    syringe, disposable, 3 mL Syrg, 1 mL by Misc.(Non-Drug; Combo Route) route every 14 (fourteen) days., Disp: 10 each, Rfl: 11    tadalafiL (CIALIS) 20 MG Tab, Take 1 tablet (20 mg total) by mouth Every 3 (three) days. for 10 days, Disp: 20 tablet, Rfl: 9    testosterone cypionate (DEPOTESTOTERONE CYPIONATE) 200 mg/mL injection, Inject 1 mL (200 mg total) into the muscle every 14 (fourteen) days., Disp: 10 mL, Rfl: 1  Review of patient's allergies indicates:  No Known Allergies   Social History     Tobacco Use    Smoking status: Former     Current packs/day: 0.00     Average packs/day: 1 pack/day for 18.0 years (18.0 ttl pk-yrs)     Types: Cigarettes     Start date: 1968     Quit date: 1986     Years since quittin.8    Smokeless tobacco: Never   Substance Use Topics    Alcohol use: Not Currently      Family History   Problem Relation Age of Onset    Heart disease Brother         cabg    Hypertension Sister     Amblyopia Neg Hx     Blindness Neg Hx     Cataracts Neg Hx     Glaucoma Neg Hx     Macular degeneration Neg Hx     Strabismus Neg Hx     Retinal detachment Neg Hx     Diabetes Neg Hx         Physical Exam :  There were no vitals filed for this visit.  Physical Exam  Vitals reviewed.   Constitutional:       Appearance: Normal appearance.   HENT:      Head: Normocephalic and atraumatic.      Mouth/Throat:      Mouth: Mucous membranes are moist.   Eyes:      Conjunctiva/sclera: Conjunctivae normal.      Pupils: Pupils are equal, round, and  reactive to light.   Cardiovascular:      Rate and Rhythm: Normal rate and regular rhythm.      Pulses: Normal pulses.      Heart sounds: Normal heart sounds.   Pulmonary:      Effort: Pulmonary effort is normal.      Breath sounds: Normal breath sounds.   Abdominal:      General: Bowel sounds are normal.      Palpations: Abdomen is soft.   Musculoskeletal:         General: Normal range of motion.   Skin:     General: Skin is warm.      Capillary Refill: Capillary refill takes less than 2 seconds.   Neurological:      General: No focal deficit present.      Mental Status: He is alert and oriented to person, place, and time.   Psychiatric:         Mood and Affect: Mood normal.       Labs reviewed   Images Reviewed    Assessment:    1. Stage 3b chronic kidney disease secondary to DM   2. Diabetic nephropathy associated with type 2 diabetes mellitus    3. Secondary hyperparathyroidism of renal origin    4. Severe obesity (BMI 35.0-39.9) with comorbidity    5       HTN with CKD stage 3     Plan:  Last known cr was 1.9 in October/2023 which is his baseline. Baseline range is 1.9-2.3   Will need to check labs RFP, Pth, VIT D, renal US last US in 2020 showed right renal cyst   Advised low calorie intake to aid with wt loss   UPCR in June/2023 was 0.18 mg improved from previous was 0.59 mg patient on Jardiance and losartan. Continue wih both of these medications, BP controlled   C/w crestor   Will check cystatin C and APOL-1  gene mutation   Educated about low K diet   Avoid NSAIDS advised  Refer to CKD and renal diet education   Stressed importance of medication compliance   Follow up in 3 months with labs   Spent 45 min on patient care more than 50 % of time spent on counseling patient bari hearn diagnosis and plan of care

## 2024-01-26 ENCOUNTER — TELEPHONE (OUTPATIENT)
Dept: NEPHROLOGY | Facility: CLINIC | Age: 74
End: 2024-01-26
Payer: MEDICARE

## 2024-01-29 LAB
CYSTATIN C SERPL-MCNC: 2.23 MG/L (ref 0.67–1.21)
GFR/BSA.PRED SERPLBLD CYS-BASED-ARV: 26 ML/MIN/BSA

## 2024-01-31 LAB
ANNOTATION COMMENT IMP: NORMAL
APOL1 RESULT: NORMAL
GENETICIST REVIEW: NORMAL
LAB TEST METHOD: NORMAL
PROVIDER SIGNING NAME: NORMAL
TEST PERFORMANCE INFO SPEC: NORMAL

## 2024-02-01 ENCOUNTER — OFFICE VISIT (OUTPATIENT)
Dept: INTERNAL MEDICINE | Facility: CLINIC | Age: 74
End: 2024-02-01
Payer: MEDICARE

## 2024-02-01 ENCOUNTER — LAB VISIT (OUTPATIENT)
Dept: LAB | Facility: HOSPITAL | Age: 74
End: 2024-02-01
Attending: INTERNAL MEDICINE
Payer: MEDICARE

## 2024-02-01 VITALS
BODY MASS INDEX: 31.42 KG/M2 | OXYGEN SATURATION: 98 % | DIASTOLIC BLOOD PRESSURE: 72 MMHG | HEIGHT: 70 IN | SYSTOLIC BLOOD PRESSURE: 130 MMHG | HEART RATE: 73 BPM | WEIGHT: 219.44 LBS

## 2024-02-01 DIAGNOSIS — J43.9 PULMONARY EMPHYSEMA, UNSPECIFIED EMPHYSEMA TYPE: Primary | ICD-10-CM

## 2024-02-01 DIAGNOSIS — I70.0 AORTIC ATHEROSCLEROSIS: ICD-10-CM

## 2024-02-01 DIAGNOSIS — I69.354 HEMIPARESIS AFFECTING LEFT SIDE AS LATE EFFECT OF CEREBROVASCULAR ACCIDENT (CVA): ICD-10-CM

## 2024-02-01 DIAGNOSIS — E11.40 TYPE 2 DIABETES MELLITUS WITH DIABETIC NEUROPATHY, WITHOUT LONG-TERM CURRENT USE OF INSULIN: ICD-10-CM

## 2024-02-01 LAB
ALBUMIN SERPL BCP-MCNC: 3.8 G/DL (ref 3.5–5.2)
ALP SERPL-CCNC: 54 U/L (ref 55–135)
ALT SERPL W/O P-5'-P-CCNC: 19 U/L (ref 10–44)
ANION GAP SERPL CALC-SCNC: 11 MMOL/L (ref 8–16)
AST SERPL-CCNC: 15 U/L (ref 10–40)
BILIRUB SERPL-MCNC: 0.3 MG/DL (ref 0.1–1)
BUN SERPL-MCNC: 27 MG/DL (ref 8–23)
CALCIUM SERPL-MCNC: 9.6 MG/DL (ref 8.7–10.5)
CHLORIDE SERPL-SCNC: 110 MMOL/L (ref 95–110)
CO2 SERPL-SCNC: 21 MMOL/L (ref 23–29)
CREAT SERPL-MCNC: 1.9 MG/DL (ref 0.5–1.4)
EST. GFR  (NO RACE VARIABLE): 37.2 ML/MIN/1.73 M^2
ESTIMATED AVG GLUCOSE: 174 MG/DL (ref 68–131)
GLUCOSE SERPL-MCNC: 86 MG/DL (ref 70–110)
HBA1C MFR BLD: 7.7 % (ref 4–5.6)
POTASSIUM SERPL-SCNC: 4.1 MMOL/L (ref 3.5–5.1)
PROT SERPL-MCNC: 7.9 G/DL (ref 6–8.4)
SODIUM SERPL-SCNC: 142 MMOL/L (ref 136–145)
TSH SERPL DL<=0.005 MIU/L-ACNC: 1.26 UIU/ML (ref 0.4–4)

## 2024-02-01 PROCEDURE — 99214 OFFICE O/P EST MOD 30 MIN: CPT | Mod: S$GLB,,, | Performed by: INTERNAL MEDICINE

## 2024-02-01 PROCEDURE — 80053 COMPREHEN METABOLIC PANEL: CPT | Performed by: INTERNAL MEDICINE

## 2024-02-01 PROCEDURE — 84443 ASSAY THYROID STIM HORMONE: CPT | Performed by: INTERNAL MEDICINE

## 2024-02-01 PROCEDURE — 36415 COLL VENOUS BLD VENIPUNCTURE: CPT | Mod: PO | Performed by: INTERNAL MEDICINE

## 2024-02-01 PROCEDURE — 83036 HEMOGLOBIN GLYCOSYLATED A1C: CPT | Performed by: INTERNAL MEDICINE

## 2024-02-01 PROCEDURE — 99999 PR PBB SHADOW E&M-EST. PATIENT-LVL IV: CPT | Mod: PBBFAC,,, | Performed by: INTERNAL MEDICINE

## 2024-02-01 RX ORDER — PANTOPRAZOLE SODIUM 40 MG/1
TABLET, DELAYED RELEASE ORAL
Qty: 90 TABLET | Refills: 3 | Status: SHIPPED | OUTPATIENT
Start: 2024-02-01

## 2024-02-01 RX ORDER — AMLODIPINE BESYLATE 10 MG/1
TABLET ORAL
Qty: 90 TABLET | Refills: 3 | Status: SHIPPED | OUTPATIENT
Start: 2024-02-01

## 2024-02-01 RX ORDER — TAMSULOSIN HYDROCHLORIDE 0.4 MG/1
CAPSULE ORAL
Qty: 90 CAPSULE | Refills: 3 | Status: SHIPPED | OUTPATIENT
Start: 2024-02-01

## 2024-02-01 RX ORDER — CARVEDILOL 6.25 MG/1
6.25 TABLET ORAL 2 TIMES DAILY
Qty: 180 TABLET | Refills: 3 | Status: SHIPPED | OUTPATIENT
Start: 2024-02-01

## 2024-02-01 RX ORDER — LOSARTAN POTASSIUM AND HYDROCHLOROTHIAZIDE 25; 100 MG/1; MG/1
1 TABLET ORAL DAILY
Qty: 90 TABLET | Refills: 3 | Status: SHIPPED | OUTPATIENT
Start: 2024-02-01 | End: 2025-01-31

## 2024-02-01 NOTE — PROGRESS NOTES
Subjective:       Patient ID: Jae Millan is a 73 y.o. male.    Chief Complaint: Follow-up, Diabetes, Insomnia, Urinary Frequency, and Fatigue    Follow-up  Associated symptoms include fatigue. Pertinent negatives include no abdominal pain, chest pain, headaches, nausea or vomiting.   Diabetes  Pertinent negatives for hypoglycemia include no headaches or seizures. Associated symptoms include fatigue. Pertinent negatives for diabetes include no chest pain.   Urinary Frequency   Associated symptoms include frequency. Pertinent negatives include no nausea or vomiting.   Fatigue  Associated symptoms include fatigue. Pertinent negatives include no abdominal pain, chest pain, headaches, nausea or vomiting.     Pt is doing okay - he gets angina if he walks too quickly.  Difficulty sleeping due to urinary frequency.  Review of Systems   Constitutional:  Positive for fatigue.   Respiratory:  Negative for shortness of breath.    Cardiovascular:  Negative for chest pain.   Gastrointestinal:  Negative for abdominal pain, diarrhea, nausea and vomiting.   Genitourinary:  Positive for frequency. Negative for dysuria.   Neurological:  Negative for seizures, syncope and headaches.       Objective:      Physical Exam    Assessment:       1. Pulmonary emphysema, unspecified emphysema type    2. Type 2 diabetes mellitus with diabetic neuropathy, without long-term current use of insulin    3. Hemiparesis affecting left side as late effect of cerebrovascular accident (CVA)    4. Aortic atherosclerosis        Plan:   Pulmonary emphysema, unspecified emphysema type  Minimal - quit tobacco years ago  Type 2 diabetes mellitus with diabetic neuropathy, without long-term current use of insulin  -     Comprehensive Metabolic Panel; Future; Expected date: 02/01/2024  -     Hemoglobin A1C; Future; Expected date: 02/01/2024  -     TSH; Future; Expected date: 02/01/2024  -     empagliflozin (JARDIANCE) 10 mg tablet; Take 1 tablet (10 mg total)  by mouth once daily.  Dispense: 90 tablet; Refill: 1  -     SITagliptin phosphate (JANUVIA) 50 MG Tab; Take 1 tablet (50 mg total) by mouth once daily.  Dispense: 90 tablet; Refill: 3    Hemiparesis affecting left side as late effect of cerebrovascular accident (CVA)  stable    Aortic atherosclerosis  On statin  Other orders  -     tamsulosin (FLOMAX) 0.4 mg Cap; One capsule at bedtime  Dispense: 90 capsule; Refill: 3  -     amLODIPine (NORVASC) 10 MG tablet; TAKE 1 TABLET(10 MG) BY MOUTH EVERY DAY FOR HYPERTENSION  Dispense: 90 tablet; Refill: 3  -     carvediloL (COREG) 6.25 MG tablet; Take 1 tablet (6.25 mg total) by mouth 2 (two) times daily.  Dispense: 180 tablet; Refill: 3  -     pantoprazole (PROTONIX) 40 MG tablet; TAKE 1 TABLET(40 MG) BY MOUTH EVERY DAY FOR STOMACH  Dispense: 90 tablet; Refill: 3  -     losartan-hydrochlorothiazide 100-25 mg (HYZAAR) 100-25 mg per tablet; Take 1 tablet by mouth once daily.  Dispense: 90 tablet; Refill: 3      Fingernails trimmed on his left hand

## 2024-02-09 NOTE — PROGRESS NOTES
I offered to discuss advanced care planning, including how to pick a person who would make decisions for you if you were unable to make them for yourself, called a health care power of , and what kind of decisions you might make such as use of life sustaining treatments such as ventilators and tube feeding when faced with a life limiting illness recorded on a living will that they will need to know. (How you want to be cared for as you near the end of your natural life)     X Patient is interested in learning more about how to make advanced directives.  I provided them paperwork and offered to discuss this with them.   Start senna and Colace twice daily

## 2024-02-14 DIAGNOSIS — I70.0 AORTIC ATHEROSCLEROSIS: ICD-10-CM

## 2024-02-14 DIAGNOSIS — I35.0 AORTIC VALVE STENOSIS, ETIOLOGY OF CARDIAC VALVE DISEASE UNSPECIFIED: Primary | ICD-10-CM

## 2024-02-14 RX ORDER — DIPHENHYDRAMINE HCL 50 MG
50 CAPSULE ORAL ONCE
Status: CANCELLED | OUTPATIENT
Start: 2024-02-14 | End: 2024-02-14

## 2024-02-14 RX ORDER — SODIUM CHLORIDE 9 MG/ML
INJECTION, SOLUTION INTRAVENOUS CONTINUOUS
Status: CANCELLED | OUTPATIENT
Start: 2024-02-14 | End: 2024-02-14

## 2024-02-15 ENCOUNTER — OFFICE VISIT (OUTPATIENT)
Dept: CARDIOLOGY | Facility: CLINIC | Age: 74
End: 2024-02-15
Payer: MEDICARE

## 2024-02-15 ENCOUNTER — EDUCATION (OUTPATIENT)
Dept: CARDIOLOGY | Facility: CLINIC | Age: 74
End: 2024-02-15
Payer: MEDICARE

## 2024-02-15 VITALS
HEIGHT: 70 IN | BODY MASS INDEX: 31.34 KG/M2 | HEART RATE: 66 BPM | OXYGEN SATURATION: 98 % | SYSTOLIC BLOOD PRESSURE: 141 MMHG | WEIGHT: 218.94 LBS | DIASTOLIC BLOOD PRESSURE: 76 MMHG

## 2024-02-15 DIAGNOSIS — I25.119 CORONARY ARTERY DISEASE INVOLVING NATIVE CORONARY ARTERY OF NATIVE HEART WITH ANGINA PECTORIS: Primary | ICD-10-CM

## 2024-02-15 DIAGNOSIS — I20.89 ANGINA OF EFFORT: ICD-10-CM

## 2024-02-15 DIAGNOSIS — I10 ESSENTIAL HYPERTENSION: ICD-10-CM

## 2024-02-15 DIAGNOSIS — N18.32 STAGE 3B CHRONIC KIDNEY DISEASE: ICD-10-CM

## 2024-02-15 PROCEDURE — 99214 OFFICE O/P EST MOD 30 MIN: CPT | Mod: S$GLB,,, | Performed by: INTERNAL MEDICINE

## 2024-02-15 PROCEDURE — 99999 PR PBB SHADOW E&M-EST. PATIENT-LVL III: CPT | Mod: PBBFAC,,, | Performed by: INTERNAL MEDICINE

## 2024-02-15 NOTE — PROGRESS NOTES
If You need to change the date of your procedure, please call  Omayra SRINIVASANRN [phone]     OUTPATIENT CATHETERIZATION INSTRUCTIONS     You have been scheduled for a procedure in the catheterization lab on WEDNESDAY, MARCH 27, 2024     Please report to the Cardiology Waiting Area on the Third floor of the hospital and check in at ARRIVAL 0930AM.    You will then be taken to the SSCU (Short Stay Cardiac Unit) and prepared for your procedure. Please be aware that this is not the time of your procedure but the time you are to arrive. The procedures are scheduled on an hourly basis; however, emergency cases take precedence over all other cases.         1. NOTHING TO EAT AFTER MIDNIGHT 12AM the morning of the procedure.  You may take your medications with small sips of water - TAKE NORVASC, COREG, CRESTOR, ASPIRIN, PLAVIX.  SEE BELOW INSTRUCTIONS ON MEDICATIONS.     2.   If you are on Pradaxa, Eliquis or Coumadin , you MUST hold them 3 days prior to your procedure.  Do not stop your Aspirin, Plavix, Effient, or Brilinta.  SEE BELOW.    3.  Diabetics:  HOLD all diabetics medication the morning of the procedure - METFORMIN, JANUVIA, JARDIANCE, GLIMEPIRIDE.      4. Heart Failure Patients: N/A        The procedure will take 1-2 hours to perform. After the procedure, you will return to SSCU on the third floor of the hospital. You will need to lie still (or keep your arm still) for the next 4 to 6 hours to minimize bleeding from the puncture site. Your family may remain in the room with you during this time.         You may be able to be discharged home that same afternoon if there is someone to drive you home and there were no complications. If you have one of the balloon, stent, or device procedures you may spend the night in the hospital. Your doctor will determine, based on your progress, the date and time of your discharge. The results of your procedure will be discussed with you before you are discharged. Any further  testing or procedures will be scheduled for you either before you leave or you will be called with these appointments.   YOU CAN NOT DRIVE THE DAY OF THE PROCEDURE.      If you should have any questions, concerns, or please call Omayra [phone]        Special Instructions:  Drink plenty of water the day before (march 26) and the day after the procedure to flush your kidneys (March 28).     Continue daily medications, including aspirin and Plavix (clopidogrel) the morning of the procedure.  See Special instructions for other medications.     IMPORTANT:  HOLD The following medications as directed:    HOLD FLUID/Blood Pressure medication - HOLD -LOSARTAN/HCTZ.  HOLD - FLOMAX.      DO NOT STOP ASPIRIN OR PLAVIX.  Take the morning of the procedure.          THE ABOVE INSTRUCTIONS WERE GIVEN TO THE PATIENT VERBALLY AND THEY VERBALIZED UNDERSTANDING.  THEY DO NOT REQUIRE ANY SPECIAL NEEDS AND DO NOT HAVE ANY LEARNING BARRIERS.         Directions for Reporting to Cardiology Waiting Area in the Hospital  If you park in the Parking Garage:  Take elevators to the1st floor of the parking garage.  Continue past the gift shop, coffee shop, and piano.  Take a right and go to the gold elevators. (Elevator B)  Take the elevator to the 3rd floor.  Follow the arrow on the sign on the wall that says Cath Lab Registration/EP Lab Registration.  Follow the long hallway all the way around until you come to a big open area.  This is the registration area.  Check in at Reception Desk.     OR     If family is dropping you off:  Have them drop you off at the front of the Hospital under the green overhang.  Enter through the doors and take a right.  Take the 'E' elevators to the 3rd floor Cardiology Waiting Area.  Check in at the Reception Desk in the waiting room.

## 2024-02-15 NOTE — PROGRESS NOTES
PCP - Maryjane Farias MD  Subjective:   Patient ID:  Jae Millan is a 73 y.o. male who presents for follow up     HPI: 72 y.o. male HTN, HLD, DM2, CVA ( 2018, with residual left-sided weakness), CKD3, presents as referral from PCP for chest pain. Patient had PET scan that reported ischemia in LAD territory, was scheduled for LHC but on day of the procedure he had worsening kidney function   Patient reports that he had dyspnea and chest pressure with exertion. Denies chest pain at rest, denies syncope PND or orthopnea   Patient's Creatinine is now down to 1.9     History:     Past Medical History:   Diagnosis Date    Cataract     Coronary artery disease involving native coronary artery of native heart with angina pectoris 2/15/2024    Decreased sensation of lower extremity     Diabetes mellitus     Diabetic neuropathy     Dysarthria     Dysphagia     ED (erectile dysfunction)     Glaucoma     Hemiparesis     LEFT side post CVA    High cholesterol     History of hepatitis C, s/p successful Rx w/ cure (SVR12) 2020     Hypertension     Impaired functional mobility, balance, gait, and endurance     Pulmonary emphysema, unspecified emphysema type 2024    Stroke     Urinary frequency      Past Surgical History:   Procedure Laterality Date    COLONOSCOPY N/A 3/9/2023    Procedure: COLONOSCOPY;  Surgeon: Kian Kraft MD;  Location: [facility] ([address] [place] [address]);  Service: Endoscopy;  Laterality: N/A;  medical transportation-inst mail-tb  pre call complete-as    INGUINAL HERNIA REPAIR Left 2019    UNDESCENDED TESTICLE EXPLORATION Right     R testicle removed as it was undescended     Social History     Tobacco Use    Smoking status: Former     Current packs/day: 0.00     Average packs/day: 1 pack/day for 18.0 years (18.0 ttl pk-yrs)     Types: Cigarettes     Start date: 1968     Quit date: 1986     Years since quittin.8    Smokeless tobacco: Never   Substance Use Topics    Alcohol use: Not  Currently     Family History   Problem Relation Age of Onset    Heart disease Brother         cabg    Hypertension Sister     Amblyopia Neg Hx     Blindness Neg Hx     Cataracts Neg Hx     Glaucoma Neg Hx     Macular degeneration Neg Hx     Strabismus Neg Hx     Retinal detachment Neg Hx     Diabetes Neg Hx        Meds:   Review of patient's allergies indicates:  No Known Allergies    Current Outpatient Medications:     amLODIPine (NORVASC) 10 MG tablet, TAKE 1 TABLET(10 MG) BY MOUTH EVERY DAY FOR HYPERTENSION, Disp: 90 tablet, Rfl: 3    aspirin 81 MG Chew, Take 1 tablet (81 mg total) by mouth once daily., Disp: , Rfl:     blood sugar diagnostic Strp, To check BG 4 times daily, to use with insurance preferred meter, Disp: 100 each, Rfl: 11    carvediloL (COREG) 6.25 MG tablet, Take 1 tablet (6.25 mg total) by mouth 2 (two) times daily., Disp: 180 tablet, Rfl: 3    clopidogreL (PLAVIX) 75 mg tablet, Take 1 tablet (75 mg total) by mouth once daily., Disp: 30 tablet, Rfl: 3    dorzolamide-timolol 2-0.5% (COSOPT) 22.3-6.8 mg/mL ophthalmic solution, Place 1 drop into both eyes 2 (two) times daily., Disp: 10 mL, Rfl: 12    empagliflozin (JARDIANCE) 10 mg tablet, Take 1 tablet (10 mg total) by mouth once daily., Disp: 90 tablet, Rfl: 1    ergocalciferol (ERGOCALCIFEROL) 50,000 unit Cap, One weekly, Disp: 12 capsule, Rfl: 3    gabapentin (NEURONTIN) 300 MG capsule, Take 1 capsule (300 mg total) by mouth 2 (two) times daily. In 1-2 weeks, if no relief, may increase dose to 3 times per day., Disp: , Rfl:     glimepiride (AMARYL) 2 MG tablet, Take 1 tablet (2 mg total) by mouth before breakfast., Disp: 90 tablet, Rfl: 3    lancets Misc, Check bg 4 times daily, Disp: 100 each, Rfl: 11    losartan-hydrochlorothiazide 100-25 mg (HYZAAR) 100-25 mg per tablet, Take 1 tablet by mouth once daily., Disp: 90 tablet, Rfl: 3    metFORMIN (GLUCOPHAGE) 1000 MG tablet, On daily (Patient taking differently: Take 1,000 mg by mouth daily with  "breakfast. On daily), Disp: 180 tablet, Rfl: 3    needle, disp, 18 G 18 gauge x 1" Ndle, 1 Device by Misc.(Non-Drug; Combo Route) route every 14 (fourteen) days. Use to draw testosterone, Disp: 10 each, Rfl: 11    needle, disp, 25 gauge 25 gauge x 1" Ndle, 1 Device by Misc.(Non-Drug; Combo Route) route every 14 (fourteen) days. Use to inject testosterone, Disp: 10 each, Rfl: 11    nitroGLYCERIN (NITROSTAT) 0.4 MG SL tablet, Place 1 tablet (0.4 mg total) under the tongue every 5 (five) minutes as needed for Chest pain., Disp: 30 tablet, Rfl: 1    pantoprazole (PROTONIX) 40 MG tablet, TAKE 1 TABLET(40 MG) BY MOUTH EVERY DAY FOR STOMACH, Disp: 90 tablet, Rfl: 3    rosuvastatin (CRESTOR) 20 MG tablet, TAKE 1 TABLET(20 MG) BY MOUTH EVERY DAY, Disp: 90 tablet, Rfl: 3    SITagliptin phosphate (JANUVIA) 50 MG Tab, Take 1 tablet (50 mg total) by mouth once daily., Disp: 90 tablet, Rfl: 3    syringe, disposable, 3 mL Syrg, 1 mL by Misc.(Non-Drug; Combo Route) route every 14 (fourteen) days., Disp: 10 each, Rfl: 11    tadalafiL (CIALIS) 20 MG Tab, Take 1 tablet (20 mg total) by mouth Every 3 (three) days. for 10 days, Disp: 20 tablet, Rfl: 9    tamsulosin (FLOMAX) 0.4 mg Cap, One capsule at bedtime, Disp: 90 capsule, Rfl: 3    testosterone cypionate (DEPOTESTOTERONE CYPIONATE) 200 mg/mL injection, Inject 1 mL (200 mg total) into the muscle every 14 (fourteen) days., Disp: 10 mL, Rfl: 1      ROS 14 systems were reviewed, negative except above     Objective:     Vitals:    02/15/24 1354 02/15/24 1355   BP: (!) 153/77 (!) 141/76   BP Location: Left arm Right arm   Patient Position: Sitting Sitting   BP Method: Large (Automatic) Large (Automatic)   Pulse: 64 66   SpO2: 98% 98%   Weight: 99.3 kg (218 lb 14.7 oz)    Height: 5' 10" (1.778 m)        Physical Exam  Gen awake and alert  Neck supple  Heart distant heart sounds, no appreciated murmurs   Lungs CTA B  Abdomen soft non tender  Ext trace edema   Pulses +2 radial and femoral "   Neuro left arm weakness     Labs:     Lab Results   Component Value Date     02/01/2024    K 4.1 02/01/2024     02/01/2024    CO2 21 (L) 02/01/2024    BUN 27 (H) 02/01/2024    CREATININE 1.9 (H) 02/01/2024    ANIONGAP 11 02/01/2024     Lab Results   Component Value Date    HGBA1C 7.7 (H) 02/01/2024     Lab Results   Component Value Date    BNP 45 04/12/2019       Lab Results   Component Value Date    WBC 6.97 01/25/2024    HGB 14.1 01/25/2024    HCT 41.6 01/25/2024     01/25/2024    GRAN 5.3 01/25/2024    GRAN 75.4 (H) 01/25/2024     Lab Results   Component Value Date    CHOL 142 03/16/2023    HDL 44 03/16/2023    LDLCALC 76.4 03/16/2023    TRIG 108 03/16/2023       Lab Results   Component Value Date     02/01/2024    K 4.1 02/01/2024     02/01/2024    CO2 21 (L) 02/01/2024    BUN 27 (H) 02/01/2024    CREATININE 1.9 (H) 02/01/2024    ANIONGAP 11 02/01/2024     Lab Results   Component Value Date    HGBA1C 7.7 (H) 02/01/2024     Lab Results   Component Value Date    BNP 45 04/12/2019    Lab Results   Component Value Date    WBC 6.97 01/25/2024    HGB 14.1 01/25/2024    HCT 41.6 01/25/2024     01/25/2024    GRAN 5.3 01/25/2024    GRAN 75.4 (H) 01/25/2024     Lab Results   Component Value Date    CHOL 142 03/16/2023    HDL 44 03/16/2023    LDLCALC 76.4 03/16/2023    TRIG 108 03/16/2023                Cardiovascular Imaging:       Echo:   Nuc Stress EF   Date Value Ref Range Status   07/24/2023 66 % Final     Nuc Rest EF   Date Value Ref Range Status   07/24/2023 62  Final     PET   There is a large sized, moderate intensity mid to apical anteroseptal resting perfusion abnormality involving 9% of LV myocardium in the distribution of the proximal  LAD. After pharmacologic stress, this perfusion abnormality is larger and more severe involving 29% of the LV myocardium, indicative of ischemia with underlying scar.    Within the perfusion abnormality, absolute myocardial perfusion  (cc/min/gm) averaged 0.57 cc/min/g at rest, 0.60 cc/min/g at stress and CFR was 1.06 , which equates to severely reduced coronary flow capacity within the LAD territory.    There are no other significant perfusion abnormalities.    The whole heart absolute myocardial perfusion values averaged 0.77 cc/min/g at rest, which is normal; 1.23 cc/min/g at stress, which is mildly reduced; and CFR is 1.57 , which is moderately reduced.    CT attenuation images demonstrate mild diffuse coronary calcifications in the LAD and LCX territory and no aortic calcifications.    The gated perfusion images showed an ejection fraction of 62% at rest and 66% during stress. A normal ejection fraction is greater than 47%.    The wall motion is normal at rest.    There is anteroseptal wall hypokinesis during stress.    The LV cavity size is normal at rest and stress.    The ECG portion of the study is negative for ischemia.    There were no arrhythmias during stress.    The patient reported no chest pain during the stress test.    There are no prior studies for comparison.       Assessment & Plan:   Coronary artery disease involving native coronary artery of native heart with angina pectoris  [facility] +/- PCI   - Anti-platelet Therapy: ASA and Plavix   - Access: R radial   - Creatinine: 1.9  - Pre-Op Med: Bendaryl 50mg pO   - All patient's questions were answered.  -The risks, benefits and alternatives of the procedure were explained to the patient.   -The risks of coronary angiography include but are not limited to: bleeding, infection, heart rhythm abnormalities, allergic reactions, kidney injury and potential need for dialysis, stroke and death.   - Should stenting be indicated, the patient has agreed to dual anti-platelet therapy with a drug-eluting stent   - Additionally, pt is aware that non-compliance is likely to result in stent clotting with heart attack, heart failure, and/or death  -The risks of moderate sedation include hypotension,  respiratory depression, arrhythmias, bronchospasm, and death.   - Informed consent was obtained and the  patient is agreeable to proceed with the procedure.      Patient thinks he is taking Plavix but not sure, medication name was written down for the patient and patient stated that he will check when he is back home     Stage 3b chronic kidney disease  Creatinine improved   Currently 1.9     Essential hypertension  We recommended digital hypertension program, we discussed with benefits, patient does not to enroll at this time           Signed:  Katty Gan MD  Interventional fellow    I have personally taken the history and examined this patient and agree with the resident's note as stated above.  1) Angina.  The patient reports typical anginal symptoms and has an abnormal PET stress test. I discussed medical management vs medical management plus cardiac catheterization and possible PCI with the patient.  I discussed the need for long term DAPT if he undergoes PCI as well as the associated bleeding risk. I also discussed the risks of cardiac catheterization including a recurrent stroke.  The patient stated that he would like to proceed. Plan as detailed above. Given CKD will perform diagnostic cath only and stage any indicated PCI     2) HLD. 3/16/23 lipid panel reviewed; continue Crestor 20mg po qday     3) HTN. Blood pressure elevated in clinic today; reassess after cardiac cath; continue current blood pressure medications for now     4) Type 2 diabetes. The patient's HgbA1c on 07/24/2023  was 7.2. Rec tght glycemic control     5) CVA. Continue statin and enteric-coated aspirin 81 mg p.o. q.day     6) CKD 3b. Rec oral hydration the day prior to cardiac catheterization and will provide IV hydration the day of cath     7) GERD. Conitnue PPI while on DAPT     All of the patient's questions were answered.

## 2024-02-15 NOTE — ASSESSMENT & PLAN NOTE
We recommended digital hypertension program, we discussed with benefits, patient does not to enroll at this time

## 2024-02-15 NOTE — ASSESSMENT & PLAN NOTE
[facility] +/- PCI   - Anti-platelet Therapy: ASA and Plavix   - Access: R radial   - Creatinine: 1.9  - Pre-Op Med: Bendaryl 50mg pO   - All patient's questions were answered.  -The risks, benefits and alternatives of the procedure were explained to the patient.   -The risks of coronary angiography include but are not limited to: bleeding, infection, heart rhythm abnormalities, allergic reactions, kidney injury and potential need for dialysis, stroke and death.   - Should stenting be indicated, the patient has agreed to dual anti-platelet therapy with a drug-eluting stent   - Additionally, pt is aware that non-compliance is likely to result in stent clotting with heart attack, heart failure, and/or death  -The risks of moderate sedation include hypotension, respiratory depression, arrhythmias, bronchospasm, and death.   - Informed consent was obtained and the  patient is agreeable to proceed with the procedure.      Patient thinks he is taking Plavix but not sure, medication name was written down for the patient and patient stated that he will check when he is back home

## 2024-02-15 NOTE — PROGRESS NOTES
If you need to change the date of your procedure, please call  Omayra SRINIVASANRN [phone]     OUTPATIENT CATHETERIZATION INSTRUCTIONS     You have been scheduled for a procedure in the catheterization lab on WEDNESDAY March 27, 2024.     Please report to the Cardiology Waiting Area on the Third floor of the hospital and check in at ARRIVAL 0930am.   You will then be taken to the SSCU (Short Stay Cardiac Unit) and prepared for your procedure. Please be aware that this is not the time of your procedure but the time you are to arrive. The procedures are scheduled on an hourly basis; however, emergency cases take precedence over all other cases.         1. NOTHING TO EAT AFTER MIDNIGHT 12AM the morning of the procedure.  You may take your medications with small sips of water. SEE BELOW INSTRUCTIONS ON MEDICATIONS.     2.   Do not stop your Aspirin, Plavix, Effient, or Brilinta.  SEE BELOW.    3.  Diabetics:  HOLD JARDIANCE, JANUVIA, METFORMIN, GLIMEPIRIDE the morning of the procedure only.      4. Heart Failure Patients: N/A        The procedure will take 1-2 hours to perform. After the procedure, you will return to SSCU on the third floor of the hospital. You will need to lie still (or keep your arm still) for the next 4 to 6 hours to minimize bleeding from the puncture site. Your family may remain in the room with you during this time.         You may be able to be discharged home that same afternoon if there is someone to drive you home and there were no complications. If you have one of the balloon, stent, or device procedures you may spend the night in the hospital. Your doctor will determine, based on your progress, the date and time of your discharge. The results of your procedure will be discussed with you before you are discharged. Any further testing or procedures will be scheduled for you either before you leave or you will be called with these appointments.   YOU CAN NOT DRIVE HOME THE DAY OF THE PROCEDURE.      If you should have any questions, concerns, or please call Omayra [phone]        Special Instructions:  Drink plenty of water the day before and the day after the procedure to flush your kidneys.     Continue daily medications, including aspirin the morning of the procedure.  See Special instructions for other medications.     IMPORTANT:  HOLD The following medications as directed:    HOLD FLUID/Blood pressure Pill --  LOSARTAN/HCTZ only the morning of the procedure.    DO NOT STOP ASPIRIN OR PLAVIX.  Take the morning of the procedure.         THE ABOVE INSTRUCTIONS WERE GIVEN TO THE PATIENT VERBALLY AND THEY VERBALIZED UNDERSTANDING.  THEY DO NOT REQUIRE ANY SPECIAL NEEDS AND DO NOT HAVE ANY LEARNING BARRIERS.          Directions for Reporting to Cardiology Waiting Area in the Hospital  If you park in the Parking Garage:  Take elevators to the1st floor of the parking garage.  Continue past the gift shop, coffee shop, and piano.  Take a right and go to the gold elevators. (Elevator B)  Take the elevator to the 3rd floor.  Follow the arrow on the sign on the wall that says Cath Lab Registration/EP Lab Registration.  Follow the long hallway all the way around until you come to a big open area.  This is the registration area.  Check in at Reception Desk.     OR     If family is dropping you off:  Have them drop you off at the front of the Hospital under the green overhang.  Enter through the doors and take a right.  Take the 'E' elevators to the 3rd floor Cardiology Waiting Area.  Check in at the Reception Desk in the waiting room.

## 2024-02-16 ENCOUNTER — TELEPHONE (OUTPATIENT)
Dept: CARDIOLOGY | Facility: CLINIC | Age: 74
End: 2024-02-16
Payer: MEDICARE

## 2024-02-16 ENCOUNTER — TELEPHONE (OUTPATIENT)
Dept: NEPHROLOGY | Facility: CLINIC | Age: 74
End: 2024-02-16
Payer: MEDICARE

## 2024-02-19 ENCOUNTER — TELEPHONE (OUTPATIENT)
Dept: CARDIOLOGY | Facility: CLINIC | Age: 74
End: 2024-02-19
Payer: MEDICARE

## 2024-02-19 NOTE — TELEPHONE ENCOUNTER
Pt called to report his refill is not ready,  called patient back after leaving message.  Pt stated he keeps calling his pharmacy to see if medication is ready for pickup but its not.  Instructed pt with refills, he contacts the pharmacy and request the medication to be refilled. Pt states he keeps calling [facility] and told if is not ready.  Offered to assist patient, contact to bianca [phone], spoke with pharmacy staff to request refill of His Plavix on patient behalf.  Staff reports system is down at this time and will have to place this  request in once getting system rebooted.  Call ended.

## 2024-02-19 NOTE — TELEPHONE ENCOUNTER
Follow up contact with pharmacy - plavix refill in process at this time for patient.  Pharmacist unable to place plavix on auto refill as next March refill will need new prescription.

## 2024-02-21 ENCOUNTER — HOSPITAL ENCOUNTER (OUTPATIENT)
Dept: RADIOLOGY | Facility: HOSPITAL | Age: 74
Discharge: HOME | End: 2024-02-21
Attending: INTERNAL MEDICINE
Payer: MEDICARE

## 2024-02-21 DIAGNOSIS — N25.81 SECONDARY HYPERPARATHYROIDISM OF RENAL ORIGIN: ICD-10-CM

## 2024-02-21 PROCEDURE — 76770 US EXAM ABDO BACK WALL COMP: CPT | Mod: 26,,, | Performed by: RADIOLOGY

## 2024-02-21 PROCEDURE — 76770 US EXAM ABDO BACK WALL COMP: CPT | Mod: TC

## 2024-02-23 ENCOUNTER — TELEPHONE (OUTPATIENT)
Dept: NEPHROLOGY | Facility: CLINIC | Age: 74
End: 2024-02-23
Payer: MEDICARE

## 2024-03-18 DIAGNOSIS — R06.09 DOE (DYSPNEA ON EXERTION): ICD-10-CM

## 2024-03-18 NOTE — TELEPHONE ENCOUNTER
Care Due:                  Date            Visit Type   Department     Provider  --------------------------------------------------------------------------------                                EP -                              PRIMARY      MET INTERNAL  Last Visit: 02-      CARE ([facility])   MEDICINE       Maryjane Farias                              EP -                              PRIMARY      [facility] INTERNAL  Next Visit: 04-      CARE ([facility])   MEDICINE       Maryjane Farias                                                            Last  Test          Frequency    Reason                     Performed    Due Date  --------------------------------------------------------------------------------    Lipid Panel.  12 months..  rosuvastatin.............  03-   03-    [facility] Embedded Care Due Messages. Reference number: [id number].   3/18/2024 5:15:55 PM CDT

## 2024-03-19 RX ORDER — CLOPIDOGREL BISULFATE 75 MG/1
75 TABLET ORAL
Qty: 90 TABLET | Refills: 3 | Status: SHIPPED | OUTPATIENT
Start: 2024-03-19

## 2024-03-19 NOTE — TELEPHONE ENCOUNTER
Provider Staff:  Action required for this patient    Requires labs      Please see care gap opportunities below in Care Due Message.    Thanks!  [facility]     Appointments      Date Provider   Last Visit   2/1/2024 Maryjane Farias MD   Next Visit   4/4/2024 Maryjane Farias MD     Refill Decision Note   Jae Millan  is requesting a refill authorization.  Brief Assessment and Rationale for Refill:  Approve     Medication Therapy Plan:         Comments:     Note composed:2:42 PM 03/19/2024

## 2024-03-21 ENCOUNTER — LAB VISIT (OUTPATIENT)
Dept: LAB | Facility: HOSPITAL | Age: 74
End: 2024-03-21
Attending: INTERNAL MEDICINE
Payer: MEDICARE

## 2024-03-21 DIAGNOSIS — I25.119 CORONARY ARTERY DISEASE INVOLVING NATIVE CORONARY ARTERY OF NATIVE HEART WITH ANGINA PECTORIS: ICD-10-CM

## 2024-03-21 DIAGNOSIS — N18.32 STAGE 3B CHRONIC KIDNEY DISEASE: ICD-10-CM

## 2024-03-21 LAB
ALBUMIN SERPL BCP-MCNC: 3.8 G/DL (ref 3.5–5.2)
ALP SERPL-CCNC: 65 U/L (ref 55–135)
ALT SERPL W/O P-5'-P-CCNC: 20 U/L (ref 10–44)
ANION GAP SERPL CALC-SCNC: 9 MMOL/L (ref 8–16)
AST SERPL-CCNC: 16 U/L (ref 10–40)
BILIRUB SERPL-MCNC: 0.4 MG/DL (ref 0.1–1)
BUN SERPL-MCNC: 24 MG/DL (ref 8–23)
CALCIUM SERPL-MCNC: 9.6 MG/DL (ref 8.7–10.5)
CHLORIDE SERPL-SCNC: 106 MMOL/L (ref 95–110)
CO2 SERPL-SCNC: 25 MMOL/L (ref 23–29)
CREAT SERPL-MCNC: 1.6 MG/DL (ref 0.5–1.4)
EST. GFR  (NO RACE VARIABLE): 45.8 ML/MIN/1.73 M^2
GLUCOSE SERPL-MCNC: 190 MG/DL (ref 70–110)
POTASSIUM SERPL-SCNC: 4.7 MMOL/L (ref 3.5–5.1)
PROT SERPL-MCNC: 7.6 G/DL (ref 6–8.4)
SODIUM SERPL-SCNC: 140 MMOL/L (ref 136–145)

## 2024-03-21 PROCEDURE — 36415 COLL VENOUS BLD VENIPUNCTURE: CPT | Performed by: INTERNAL MEDICINE

## 2024-03-21 PROCEDURE — 80053 COMPREHEN METABOLIC PANEL: CPT | Performed by: INTERNAL MEDICINE

## 2024-03-25 ENCOUNTER — TELEPHONE (OUTPATIENT)
Dept: CARDIOLOGY | Facility: CLINIC | Age: 74
End: 2024-03-25
Payer: MEDICARE

## 2024-03-25 NOTE — TELEPHONE ENCOUNTER
"Contact with patient, request to review with him the medications he is to take the morning of the procedure.  We discussed in detail the medications, diabetic meds to hold prior to procedure as well as the morning of the procedure should be held.  Also, reinforced NPO status after midnight.  Additional detailed discussion of other medications to hold the AM of the procedure as well as Take the morning of procedure.  Pt very confused and spent explaining each pill (both brand and generic names) and told to either stop/hold or take the morning of.  Offered to sent email to patient of each medication in writing as well.  Email given by patient to sent to [email or web address].  Information sent to patient as requested.  Pt voiced "if I dont understand my medicine after getting the email, he will not come for his procedure".  Call ended.  "

## 2024-03-26 ENCOUNTER — TELEPHONE (OUTPATIENT)
Dept: CARDIOLOGY | Facility: CLINIC | Age: 74
End: 2024-03-26
Payer: MEDICARE

## 2024-03-26 NOTE — TELEPHONE ENCOUNTER
Additional attempt to reach patient, no answer, left detailed message to call office back.  Office phone number provided with message.  Continue to await patient confirmation that he received my email with additional pre procedure instructions for him explaining what medications to take and those to stop prior to scheduled angiogram.

## 2024-03-26 NOTE — TELEPHONE ENCOUNTER
Follow up contact with patient after sending him an email RE:medication confirmation for procedure.  No answer, left detailed message that email was sent and attempting to confirm he received.  Provided office phone number for call back as well.

## 2024-03-27 ENCOUNTER — HOSPITAL ENCOUNTER (OUTPATIENT)
Facility: HOSPITAL | Age: 74
Discharge: HOME | End: 2024-03-27
Attending: INTERNAL MEDICINE | Admitting: INTERNAL MEDICINE
Payer: MEDICARE

## 2024-03-27 ENCOUNTER — TELEPHONE (OUTPATIENT)
Dept: CARDIOLOGY | Facility: CLINIC | Age: 74
End: 2024-03-27
Payer: MEDICARE

## 2024-03-27 ENCOUNTER — TELEPHONE (OUTPATIENT)
Dept: CARDIOTHORACIC SURGERY | Facility: CLINIC | Age: 74
End: 2024-03-27
Payer: MEDICARE

## 2024-03-27 VITALS
SYSTOLIC BLOOD PRESSURE: 151 MMHG | TEMPERATURE: 98 F | OXYGEN SATURATION: 100 % | HEART RATE: 64 BPM | WEIGHT: 218 LBS | DIASTOLIC BLOOD PRESSURE: 81 MMHG | RESPIRATION RATE: 18 BRPM | BODY MASS INDEX: 31.21 KG/M2 | HEIGHT: 70 IN

## 2024-03-27 DIAGNOSIS — I25.119 CORONARY ARTERY DISEASE INVOLVING NATIVE CORONARY ARTERY OF NATIVE HEART WITH ANGINA PECTORIS: Primary | ICD-10-CM

## 2024-03-27 DIAGNOSIS — Z01.818 PRE-OP TESTING: ICD-10-CM

## 2024-03-27 DIAGNOSIS — I20.89 EXERTIONAL ANGINA: Primary | ICD-10-CM

## 2024-03-27 DIAGNOSIS — Z01.818 PREOPERATIVE TESTING: ICD-10-CM

## 2024-03-27 DIAGNOSIS — R94.39 ABNORMAL CARDIOVASCULAR STRESS TEST: ICD-10-CM

## 2024-03-27 DIAGNOSIS — I70.0 AORTIC ATHEROSCLEROSIS: ICD-10-CM

## 2024-03-27 LAB
ABO + RH BLD: NORMAL
ANION GAP SERPL CALC-SCNC: 7 MMOL/L (ref 8–16)
BLD GP AB SCN CELLS X3 SERPL QL: NORMAL
BUN SERPL-MCNC: 30 MG/DL (ref 8–23)
CALCIUM SERPL-MCNC: 9.1 MG/DL (ref 8.7–10.5)
CHLORIDE SERPL-SCNC: 111 MMOL/L (ref 95–110)
CO2 SERPL-SCNC: 24 MMOL/L (ref 23–29)
CREAT SERPL-MCNC: 1.8 MG/DL (ref 0.5–1.4)
ERYTHROCYTE [DISTWIDTH] IN BLOOD BY AUTOMATED COUNT: 14.9 % (ref 11.5–14.5)
EST. GFR  (NO RACE VARIABLE): 39.7 ML/MIN/1.73 M^2
GLUCOSE SERPL-MCNC: 200 MG/DL (ref 70–110)
HCT VFR BLD AUTO: 39.7 % (ref 40–54)
HGB BLD-MCNC: 13.4 G/DL (ref 14–18)
INR PPP: 1 (ref 0.8–1.2)
MCH RBC QN AUTO: 27.5 PG (ref 27–31)
MCHC RBC AUTO-ENTMCNC: 33.8 G/DL (ref 32–36)
MCV RBC AUTO: 81 FL (ref 82–98)
OHS QRS DURATION: 134 MS
OHS QTC CALCULATION: 444 MS
PLATELET # BLD AUTO: 245 K/UL (ref 150–450)
PMV BLD AUTO: 11.5 FL (ref 9.2–12.9)
POCT GLUCOSE: 173 MG/DL (ref 70–110)
POTASSIUM SERPL-SCNC: 3.8 MMOL/L (ref 3.5–5.1)
PROTHROMBIN TIME: 11.1 SEC (ref 9–12.5)
RBC # BLD AUTO: 4.88 M/UL (ref 4.6–6.2)
SODIUM SERPL-SCNC: 142 MMOL/L (ref 136–145)
SPECIMEN OUTDATE: NORMAL
WBC # BLD AUTO: 5.13 K/UL (ref 3.9–12.7)

## 2024-03-27 PROCEDURE — 93454 CORONARY ARTERY ANGIO S&I: CPT | Mod: 26,,, | Performed by: INTERNAL MEDICINE

## 2024-03-27 PROCEDURE — 63600175 PHARM REV CODE 636 W HCPCS: Performed by: INTERNAL MEDICINE

## 2024-03-27 PROCEDURE — 85027 COMPLETE CBC AUTOMATED: CPT | Performed by: INTERNAL MEDICINE

## 2024-03-27 PROCEDURE — C1769 GUIDE WIRE: HCPCS | Performed by: INTERNAL MEDICINE

## 2024-03-27 PROCEDURE — C1894 INTRO/SHEATH, NON-LASER: HCPCS | Performed by: INTERNAL MEDICINE

## 2024-03-27 PROCEDURE — 93454 CORONARY ARTERY ANGIO S&I: CPT | Performed by: INTERNAL MEDICINE

## 2024-03-27 PROCEDURE — C1887 CATHETER, GUIDING: HCPCS | Performed by: INTERNAL MEDICINE

## 2024-03-27 PROCEDURE — 85610 PROTHROMBIN TIME: CPT | Performed by: INTERNAL MEDICINE

## 2024-03-27 PROCEDURE — 25500020 PHARM REV CODE 255: Performed by: INTERNAL MEDICINE

## 2024-03-27 PROCEDURE — 93010 ELECTROCARDIOGRAM REPORT: CPT | Mod: ,,, | Performed by: INTERNAL MEDICINE

## 2024-03-27 PROCEDURE — 85347 COAGULATION TIME ACTIVATED: CPT | Performed by: INTERNAL MEDICINE

## 2024-03-27 PROCEDURE — 25000003 PHARM REV CODE 250: Performed by: INTERNAL MEDICINE

## 2024-03-27 PROCEDURE — 80048 BASIC METABOLIC PNL TOTAL CA: CPT | Performed by: INTERNAL MEDICINE

## 2024-03-27 PROCEDURE — 93005 ELECTROCARDIOGRAM TRACING: CPT

## 2024-03-27 PROCEDURE — 86901 BLOOD TYPING SEROLOGIC RH(D): CPT | Performed by: INTERNAL MEDICINE

## 2024-03-27 RX ORDER — HEPARIN SOD,PORCINE/0.9 % NACL 1000/500ML
INTRAVENOUS SOLUTION INTRAVENOUS
Status: DISCONTINUED | OUTPATIENT
Start: 2024-03-27 | End: 2024-03-27 | Stop reason: HOSPADM

## 2024-03-27 RX ORDER — FENTANYL CITRATE 50 UG/ML
INJECTION, SOLUTION INTRAMUSCULAR; INTRAVENOUS
Status: DISCONTINUED | OUTPATIENT
Start: 2024-03-27 | End: 2024-03-27 | Stop reason: HOSPADM

## 2024-03-27 RX ORDER — MIDAZOLAM HYDROCHLORIDE 1 MG/ML
INJECTION, SOLUTION INTRAMUSCULAR; INTRAVENOUS
Status: DISCONTINUED | OUTPATIENT
Start: 2024-03-27 | End: 2024-03-27 | Stop reason: HOSPADM

## 2024-03-27 RX ORDER — SODIUM CHLORIDE 9 MG/ML
INJECTION, SOLUTION INTRAVENOUS CONTINUOUS
Status: ACTIVE | OUTPATIENT
Start: 2024-03-27 | End: 2024-03-27

## 2024-03-27 RX ORDER — SODIUM CHLORIDE 9 MG/ML
INJECTION, SOLUTION INTRAVENOUS CONTINUOUS
Status: DISCONTINUED | OUTPATIENT
Start: 2024-03-27 | End: 2024-03-27

## 2024-03-27 RX ORDER — HEPARIN SODIUM 1000 [USP'U]/ML
INJECTION, SOLUTION INTRAVENOUS; SUBCUTANEOUS
Status: DISCONTINUED | OUTPATIENT
Start: 2024-03-27 | End: 2024-03-27 | Stop reason: HOSPADM

## 2024-03-27 RX ORDER — DIPHENHYDRAMINE HCL 50 MG
50 CAPSULE ORAL ONCE
Status: COMPLETED | OUTPATIENT
Start: 2024-03-27 | End: 2024-03-27

## 2024-03-27 RX ORDER — LIDOCAINE HYDROCHLORIDE 20 MG/ML
INJECTION, SOLUTION EPIDURAL; INFILTRATION; INTRACAUDAL; PERINEURAL
Status: DISCONTINUED | OUTPATIENT
Start: 2024-03-27 | End: 2024-03-27 | Stop reason: HOSPADM

## 2024-03-27 RX ORDER — ONDANSETRON 8 MG/1
8 TABLET, ORALLY DISINTEGRATING ORAL EVERY 8 HOURS PRN
Status: DISCONTINUED | OUTPATIENT
Start: 2024-03-27 | End: 2024-03-27 | Stop reason: HOSPADM

## 2024-03-27 RX ORDER — ACETAMINOPHEN 325 MG/1
650 TABLET ORAL EVERY 4 HOURS PRN
Status: DISCONTINUED | OUTPATIENT
Start: 2024-03-27 | End: 2024-03-27 | Stop reason: HOSPADM

## 2024-03-27 RX ADMIN — DIPHENHYDRAMINE HYDROCHLORIDE 50 MG: 50 CAPSULE ORAL at 10:03

## 2024-03-27 RX ADMIN — SODIUM CHLORIDE: 9 INJECTION, SOLUTION INTRAVENOUS at 10:03

## 2024-03-27 NOTE — NURSING
Patient discharged per MD orders. Instructions given on medications, wound care, activity, signs of infection, when to call MD, and follow up appointments. Pt verbalized understanding. Telemetry and PIV removed. Patient transferred off of unit by RN.

## 2024-03-27 NOTE — NURSING
Pt ambulated around unit and to restroom. Tolerated walk well. Vital signs remain stable. No c/o pain or discomfort at this time, resp even and unlabored. Vascband to right radial site is CDI. No active bleeding. No hematoma noted.

## 2024-03-27 NOTE — OP NOTE
Brief Operative Note:    : Sandoval Paul MD     Referring Physician: Sandoval Paul     All Operators: Surgeon(s):  Hank Harry MD Patel, Rajan Amish G, MD     Preoperative Diagnosis: Exertional angina  Abnormal stress test     Postop Diagnosis: CAD]    Treatments/Procedures: Procedure(s) (LRB):  ANGIOGRAM, CORONARY ARTERY (N/A)    Access: Right radial artery    Findings:Severe coronary artery disease is present.     See catheterization report for full details.    Intervention: None    See catheterization report for full details.    Closure device:  TR band        Plan:  - Post cath protocol   - IVF @ 150 cc/hr x 4 hours  - Bed rest x 2 hours   - Continue aspirin 81 mg daily indefinitely  - CTS consult   - Continue high intensity statin therapy (LDL goal < 70)  - Risk factor reduction (BP <130/80 mmHg, glycemic control, etc)  - Follow up with outpatient cardiologist    Estimated Blood loss: 20 cc    Specimens removed: No    Hank Harry MD  [facility]

## 2024-03-27 NOTE — TELEPHONE ENCOUNTER
Met with pt at bedside in SSCU, following [facility], and provided him with appt slips for CABG eval on 4/8.  Pt verbalized understanding of appts on 4/8.  Instructed pt to contact Dr. Null's office regarding these appts and any questions pertaining to surgery, which he verbalized understanding to.    ----- Message from Bob Urias RN sent at 3/27/2024  1:04 PM CDT -----  Regarding: referral for CTS  Please assist with making appointment to See Dr Null for CABG eval.    Pt is currently inpatient post angiogram.      Thank you  bob

## 2024-03-27 NOTE — Clinical Note
The catheter was inserted into the ostium   right coronary artery. An angiography was performed of the right coronary arteries. Multiple views were taken. Catheter removed post injection.

## 2024-03-27 NOTE — H&P
Interventional Cardiology                                                        H&P        Jae Millan is a 73 y.o. male who presents for follow up      HPI: 72 y.o. male HTN, HLD, DM2, CVA ( 2018, with residual left-sided weakness), CKD3, presents as referral from PCP for chest pain. Patient had PET scan that reported ischemia in LAD territory, was scheduled for [facility] but on day of the procedure he had worsening kidney function   Patient reports that he had dyspnea and chest pressure with exertion. Denies chest pain at rest, denies syncope PND or orthopnea   Patient's Creatinine is now down to 1.9      History:           Past Medical History:   Diagnosis Date    Cataract      Coronary artery disease involving native coronary artery of native heart with angina pectoris 2/15/2024    Decreased sensation of lower extremity      Diabetes mellitus      Diabetic neuropathy      Dysarthria      Dysphagia      ED (erectile dysfunction)      Glaucoma      Hemiparesis       LEFT side post CVA    High cholesterol      History of hepatitis C, s/p successful Rx w/ cure (SVR12) 4/2020      Hypertension      Impaired functional mobility, balance, gait, and endurance      Pulmonary emphysema, unspecified emphysema type 2/1/2024    Stroke      Urinary frequency              Past Surgical History:   Procedure Laterality Date    COLONOSCOPY N/A 3/9/2023     Procedure: COLONOSCOPY;  Surgeon: Kian Kraft MD;  Location: [facility] ([address] [place] [address]);  Service: Endoscopy;  Laterality: N/A;  medical transportation-inst mail-tb  pre call complete-as    INGUINAL HERNIA REPAIR Left 12/11/2019    UNDESCENDED TESTICLE EXPLORATION Right       R testicle removed as it was undescended      Social History            Tobacco Use    Smoking status: Former       Current packs/day: 0.00       Average packs/day: 1 pack/day for 18.0 years (18.0 ttl pk-yrs)       Types: Cigarettes       Start date: 4/12/1968        Quit date: 1986       Years since quittin.8    Smokeless tobacco: Never   Substance Use Topics    Alcohol use: Not Currently            Family History   Problem Relation Age of Onset    Heart disease Brother           cabg    Hypertension Sister      Amblyopia Neg Hx      Blindness Neg Hx      Cataracts Neg Hx      Glaucoma Neg Hx      Macular degeneration Neg Hx      Strabismus Neg Hx      Retinal detachment Neg Hx      Diabetes Neg Hx           Meds:   Review of patient's allergies indicates:  No Known Allergies     Current Outpatient Medications:     amLODIPine (NORVASC) 10 MG tablet, TAKE 1 TABLET(10 MG) BY MOUTH EVERY DAY FOR HYPERTENSION, Disp: 90 tablet, Rfl: 3    aspirin 81 MG Chew, Take 1 tablet (81 mg total) by mouth once daily., Disp: , Rfl:     blood sugar diagnostic Strp, To check BG 4 times daily, to use with insurance preferred meter, Disp: 100 each, Rfl: 11    carvediloL (COREG) 6.25 MG tablet, Take 1 tablet (6.25 mg total) by mouth 2 (two) times daily., Disp: 180 tablet, Rfl: 3    clopidogreL (PLAVIX) 75 mg tablet, Take 1 tablet (75 mg total) by mouth once daily., Disp: 30 tablet, Rfl: 3    dorzolamide-timolol 2-0.5% (COSOPT) 22.3-6.8 mg/mL ophthalmic solution, Place 1 drop into both eyes 2 (two) times daily., Disp: 10 mL, Rfl: 12    empagliflozin (JARDIANCE) 10 mg tablet, Take 1 tablet (10 mg total) by mouth once daily., Disp: 90 tablet, Rfl: 1    ergocalciferol (ERGOCALCIFEROL) 50,000 unit Cap, One weekly, Disp: 12 capsule, Rfl: 3    gabapentin (NEURONTIN) 300 MG capsule, Take 1 capsule (300 mg total) by mouth 2 (two) times daily. In 1-2 weeks, if no relief, may increase dose to 3 times per day., Disp: , Rfl:     glimepiride (AMARYL) 2 MG tablet, Take 1 tablet (2 mg total) by mouth before breakfast., Disp: 90 tablet, Rfl: 3    lancets Misc, Check bg 4 times daily, Disp: 100 each, Rfl: 11    losartan-hydrochlorothiazide 100-25 mg (HYZAAR) 100-25 mg per tablet, Take 1 tablet by mouth once  "daily., Disp: 90 tablet, Rfl: 3    metFORMIN (GLUCOPHAGE) 1000 MG tablet, On daily (Patient taking differently: Take 1,000 mg by mouth daily with breakfast. On daily), Disp: 180 tablet, Rfl: 3    needle, disp, 18 G 18 gauge x 1" Ndle, 1 Device by Misc.(Non-Drug; Combo Route) route every 14 (fourteen) days. Use to draw testosterone, Disp: 10 each, Rfl: 11    needle, disp, 25 gauge 25 gauge x 1" Ndle, 1 Device by Misc.(Non-Drug; Combo Route) route every 14 (fourteen) days. Use to inject testosterone, Disp: 10 each, Rfl: 11    nitroGLYCERIN (NITROSTAT) 0.4 MG SL tablet, Place 1 tablet (0.4 mg total) under the tongue every 5 (five) minutes as needed for Chest pain., Disp: 30 tablet, Rfl: 1    pantoprazole (PROTONIX) 40 MG tablet, TAKE 1 TABLET(40 MG) BY MOUTH EVERY DAY FOR STOMACH, Disp: 90 tablet, Rfl: 3    rosuvastatin (CRESTOR) 20 MG tablet, TAKE 1 TABLET(20 MG) BY MOUTH EVERY DAY, Disp: 90 tablet, Rfl: 3    SITagliptin phosphate (JANUVIA) 50 MG Tab, Take 1 tablet (50 mg total) by mouth once daily., Disp: 90 tablet, Rfl: 3    syringe, disposable, 3 mL Syrg, 1 mL by Misc.(Non-Drug; Combo Route) route every 14 (fourteen) days., Disp: 10 each, Rfl: 11    tadalafiL (CIALIS) 20 MG Tab, Take 1 tablet (20 mg total) by mouth Every 3 (three) days. for 10 days, Disp: 20 tablet, Rfl: 9    tamsulosin (FLOMAX) 0.4 mg Cap, One capsule at bedtime, Disp: 90 capsule, Rfl: 3    testosterone cypionate (DEPOTESTOTERONE CYPIONATE) 200 mg/mL injection, Inject 1 mL (200 mg total) into the muscle every 14 (fourteen) days., Disp: 10 mL, Rfl: 1        ROS 14 systems were reviewed, negative except above      Objective:      Vitals        Vitals:     02/15/24 1354 02/15/24 1355   BP: (!) 153/77 (!) 141/76   BP Location: Left arm Right arm   Patient Position: Sitting Sitting   BP Method: Large (Automatic) Large (Automatic)   Pulse: 64 66   SpO2: 98% 98%   Weight: 99.3 kg (218 lb 14.7 oz)     Height: 5' 10" (1.778 m)              Physical " Exam  Gen awake and alert  Neck supple  Heart distant heart sounds, no appreciated murmurs   Lungs CTA B  Abdomen soft non tender  Ext trace edema   Pulses +2 radial and femoral   Neuro left arm weakness      Labs:            Lab Results   Component Value Date      02/01/2024     K 4.1 02/01/2024      02/01/2024     CO2 21 (L) 02/01/2024     BUN 27 (H) 02/01/2024     CREATININE 1.9 (H) 02/01/2024     ANIONGAP 11 02/01/2024            Lab Results   Component Value Date     HGBA1C 7.7 (H) 02/01/2024            Lab Results   Component Value Date     BNP 45 04/12/2019               Lab Results   Component Value Date     WBC 6.97 01/25/2024     HGB 14.1 01/25/2024     HCT 41.6 01/25/2024      01/25/2024     GRAN 5.3 01/25/2024     GRAN 75.4 (H) 01/25/2024            Lab Results   Component Value Date     CHOL 142 03/16/2023     HDL 44 03/16/2023     LDLCALC 76.4 03/16/2023     TRIG 108 03/16/2023               Lab Results   Component Value Date      02/01/2024     K 4.1 02/01/2024      02/01/2024     CO2 21 (L) 02/01/2024     BUN 27 (H) 02/01/2024     CREATININE 1.9 (H) 02/01/2024     ANIONGAP 11 02/01/2024            Lab Results   Component Value Date     HGBA1C 7.7 (H) 02/01/2024            Lab Results   Component Value Date     BNP 45 04/12/2019          Lab Results   Component Value Date     WBC 6.97 01/25/2024     HGB 14.1 01/25/2024     HCT 41.6 01/25/2024      01/25/2024     GRAN 5.3 01/25/2024     GRAN 75.4 (H) 01/25/2024            Lab Results   Component Value Date     CHOL 142 03/16/2023     HDL 44 03/16/2023     LDLCALC 76.4 03/16/2023     TRIG 108 03/16/2023                    Cardiovascular Imaging:         Echo:         Nuc Stress EF   Date Value Ref Range Status   07/24/2023 66 % Final            Nuc Rest EF   Date Value Ref Range Status   07/24/2023 62   Final      PET   There is a large sized, moderate intensity mid to apical anteroseptal resting perfusion  abnormality involving 9% of LV myocardium in the distribution of the proximal  LAD. After pharmacologic stress, this perfusion abnormality is larger and more severe involving 29% of the LV myocardium, indicative of ischemia with underlying scar.    Within the perfusion abnormality, absolute myocardial perfusion (cc/min/gm) averaged 0.57 cc/min/g at rest, 0.60 cc/min/g at stress and CFR was 1.06 , which equates to severely reduced coronary flow capacity within the LAD territory.    There are no other significant perfusion abnormalities.    The whole heart absolute myocardial perfusion values averaged 0.77 cc/min/g at rest, which is normal; 1.23 cc/min/g at stress, which is mildly reduced; and CFR is 1.57 , which is moderately reduced.    CT attenuation images demonstrate mild diffuse coronary calcifications in the LAD and LCX territory and no aortic calcifications.    The gated perfusion images showed an ejection fraction of 62% at rest and 66% during stress. A normal ejection fraction is greater than 47%.    The wall motion is normal at rest.    There is anteroseptal wall hypokinesis during stress.    The LV cavity size is normal at rest and stress.    The ECG portion of the study is negative for ischemia.    There were no arrhythmias during stress.    The patient reported no chest pain during the stress test.    There are no prior studies for comparison.        Assessment & Plan:   Coronary artery disease involving native coronary artery of native heart with angina pectoris  [facility] +/- PCI   - Anti-platelet Therapy: ASA and Plavix   - Access: R radial   - Creatinine: 1.9  - Pre-Op Med: Bendaryl 50mg pO   - All patient's questions were answered.  -The risks, benefits and alternatives of the procedure were explained to the patient.   -The risks of coronary angiography include but are not limited to: bleeding, infection, heart rhythm abnormalities, allergic reactions, kidney injury and potential need for dialysis, stroke  and death.   - Should stenting be indicated, the patient has agreed to dual anti-platelet therapy with a drug-eluting stent   - Additionally, pt is aware that non-compliance is likely to result in stent clotting with heart attack, heart failure, and/or death  -The risks of moderate sedation include hypotension, respiratory depression, arrhythmias, bronchospasm, and death.   - Informed consent was obtained and the  patient is agreeable to proceed with the procedure.        Patient thinks he is taking Plavix but not sure, medication name was written down for the patient and patient stated that he will check when he is back home      Stage 3b chronic kidney disease  Creatinine improved   Currently 1.8     Essential hypertension  We recommended digital hypertension program, we discussed with benefits, patient does not to enroll at this time

## 2024-03-27 NOTE — NURSING
Vascband removed per protocol. Pt tolerated well. R wrist dressed with gauze and tegaderm CDI. No c/o pf pain. No bleeding or hematoma noted. Call light in reach.

## 2024-03-27 NOTE — TELEPHONE ENCOUNTER
Request for patient to be seen,  message to Dr Null office to assist.  Referral to vasc. Surgery for CTS for CABG evaluation.

## 2024-03-27 NOTE — DISCHARGE INSTRUCTIONS
Discharge Instructions and Care of Your Wrist After a Cardiac Catheterization Procedure Performed via the Radial Artery    For 24 Hours following the procedure:  Do not subject your affected hand/arm to any forceful movements (i.e. supporting weight when rising from a chair or bed)  Do not drive a car for 24 hours  The dressing (band-aid) on the puncture site may be removed after 24 hours and left open to air.  If there is minor oozing, you may apply another Band-aid and remove after 12 hours  You may shower on the day following your procedure.  Do not take a tub bath or submerge the puncture site in water for 1 week following the procedure    For 72 hours following the procedure:   Do not lift anything heavier than 3 to 5 pounds with the affected hand/arm  Do not operate a lawnmower, motorcycle, chainsaw, or all-terrain vehicle   Avoid excessive (extension/flexion) wrist movement (i.e. supporting weight when rising from a chair or bed, push-ups, lifting garage doors, etc.)  Do not engage in vigorous exercise (i.e. Tennis, Golf, Bowling) using the affected arm    If bleeding should occur following discharge:  Sit down and apply firm pressure to the puncture site with your fingers for 10 minutes   If the bleeding stops, continue to sit quietly, keeping your wrist straight for 2 hours. Notify your physician as soon as possible   If bleeding does not stop after 10 minutes or if there is a large amount of bleeding or spurting, call 911 immediately.  Do not drive yourself to the hospital.     You should expect mild tingling in your hand and tenderness at the puncture site for up to 3 days.     Notify your physician if these symptoms persist or if you experience:  Change in color or temperature of the hand or arm  Redness, heat, or pus at the puncture site  Chills or fever greater than 101.0 F

## 2024-03-27 NOTE — Clinical Note
The catheter was repositioned into the ostium   left main. An angiography was performed of the left coronary arteries. Multiple views were taken. Catheter removed post injection.

## 2024-03-27 NOTE — NURSING
Received report from ANTHONY [facility]. Patient s/p [facility] ,AAOx4. VSS, no c/o pain or discomfort at this time, resp even and unlabored. Vascband to right radial is CDI. No active bleeding. No hematoma noted. Post procedure protocol reviewed with patient. Understanding verbalized. Nurse call bell within reach.

## 2024-03-28 LAB
POC ACTIVATED CLOTTING TIME K: 168 SEC (ref 74–137)
POCT GLUCOSE: 199 MG/DL (ref 70–110)
SAMPLE: ABNORMAL

## 2024-04-02 NOTE — DISCHARGE SUMMARY
[facility] - Short Stay Cardiac Unit  Interventional Cardiology  Discharge Summary      Patient Name: Jae Millan  MRN: [id number]  Admission Date: 3/27/2024  Hospital Length of Stay: 0 days  Discharge Date and Time: 3/27/2024  5:05 PM  Attending Physician: No att. providers found  Discharging Provider: Sandoval Paul MD  Primary Care Physician: Maryjane Farias MD    HPI: Mr. Millan presents for cardiac catheterization due to symptoms of exertional angina and after having had an abnormal stress test.    Procedure(s) (LRB):  ANGIOGRAM, CORONARY ARTERY (N/A)     Indwelling Lines/Drains at time of discharge:  Lines/Drains/Airways       None                   Hospital Course (synopsis of major diagnoses, care, treatment, and services provided during the course of the hospital stay): The patient underwent diagnostic cardiac catheterization that demonstrated multi-vessel CAD. There were no complications      Discharged Condition: stable    Follow Up: CTS clinic in 2-4 weeks for CABG evaluation    Patient Instructions:   No discharge procedures on file.  Medications:  Reconciled Home Medications:      Medication List        CHANGE how you take these medications      metFORMIN 1000 MG tablet  Commonly known as: GLUCOPHAGE  On daily  What changed:   how much to take  how to take this  when to take this            CONTINUE taking these medications      amLODIPine 10 MG tablet  Commonly known as: NORVASC  TAKE 1 TABLET(10 MG) BY MOUTH EVERY DAY FOR HYPERTENSION     aspirin 81 MG Chew  Take 1 tablet (81 mg total) by mouth once daily.     blood sugar diagnostic Strp  To check BG 4 times daily, to use with insurance preferred meter     carvediloL 6.25 MG tablet  Commonly known as: COREG  Take 1 tablet (6.25 mg total) by mouth 2 (two) times daily.     clopidogreL 75 mg tablet  Commonly known as: PLAVIX  TAKE 1 TABLET(75 MG) BY MOUTH EVERY DAY     dorzolamide-timolol 2-0.5% 22.3-6.8 mg/mL ophthalmic solution  Commonly  "known as: COSOPT  Place 1 drop into both eyes 2 (two) times daily.     empagliflozin 10 mg tablet  Commonly known as: JARDIANCE  Take 1 tablet (10 mg total) by mouth once daily.     ergocalciferol 50,000 unit Cap  Commonly known as: ERGOCALCIFEROL  One weekly     gabapentin 300 MG capsule  Commonly known as: NEURONTIN  Take 1 capsule (300 mg total) by mouth 2 (two) times daily. In 1-2 weeks, if no relief, may increase dose to 3 times per day.     glimepiride 2 MG tablet  Commonly known as: AMARYL  Take 1 tablet (2 mg total) by mouth before breakfast.     lancets Misc  Check bg 4 times daily     losartan-hydrochlorothiazide 100-25 mg 100-25 mg per tablet  Commonly known as: HYZAAR  Take 1 tablet by mouth once daily.     * needle (disp) 18 G 18 gauge x 1" Ndle  1 Device by Misc.(Non-Drug; Combo Route) route every 14 (fourteen) days. Use to draw testosterone     * needle (disp) 25 gauge 25 gauge x 1" Ndle  1 Device by Misc.(Non-Drug; Combo Route) route every 14 (fourteen) days. Use to inject testosterone     nitroGLYCERIN 0.4 MG SL tablet  Commonly known as: NITROSTAT  Place 1 tablet (0.4 mg total) under the tongue every 5 (five) minutes as needed for Chest pain.     pantoprazole 40 MG tablet  Commonly known as: PROTONIX  TAKE 1 TABLET(40 MG) BY MOUTH EVERY DAY FOR STOMACH     rosuvastatin 20 MG tablet  Commonly known as: CRESTOR  TAKE 1 TABLET(20 MG) BY MOUTH EVERY DAY     SITagliptin phosphate 50 MG Tab  Commonly known as: JANUVIA  Take 1 tablet (50 mg total) by mouth once daily.     syringe (disposable) 3 mL Syrg  1 mL by Misc.(Non-Drug; Combo Route) route every 14 (fourteen) days.     tadalafiL 20 MG Tab  Commonly known as: CIALIS  Take 1 tablet (20 mg total) by mouth Every 3 (three) days. for 10 days     tamsulosin 0.4 mg Cap  Commonly known as: FLOMAX  One capsule at bedtime     testosterone cypionate 200 mg/mL injection  Commonly known as: DEPOTESTOTERONE CYPIONATE  Inject 1 mL (200 mg total) into the muscle " every 14 (fourteen) days.           * This list has 2 medication(s) that are the same as other medications prescribed for you. Read the directions carefully, and ask your doctor or other care provider to review them with you.                  Time spent on the discharge of patient: 30 minutes    Sandoval Paul MD  Interventional Cardiology  [facility] - Short Stay Cardiac Unit

## 2024-04-04 ENCOUNTER — OFFICE VISIT (OUTPATIENT)
Dept: INTERNAL MEDICINE | Facility: CLINIC | Age: 74
End: 2024-04-04
Payer: MEDICARE

## 2024-04-04 VITALS
HEIGHT: 70 IN | DIASTOLIC BLOOD PRESSURE: 66 MMHG | HEART RATE: 74 BPM | OXYGEN SATURATION: 95 % | SYSTOLIC BLOOD PRESSURE: 126 MMHG | WEIGHT: 218.13 LBS | BODY MASS INDEX: 31.23 KG/M2

## 2024-04-04 DIAGNOSIS — I69.30 HISTORY OF CVA WITH RESIDUAL DEFICIT: Primary | ICD-10-CM

## 2024-04-04 DIAGNOSIS — N18.32 STAGE 3B CHRONIC KIDNEY DISEASE: ICD-10-CM

## 2024-04-04 DIAGNOSIS — I10 ESSENTIAL HYPERTENSION: ICD-10-CM

## 2024-04-04 DIAGNOSIS — E78.2 HYPERLIPIDEMIA, MIXED: ICD-10-CM

## 2024-04-04 DIAGNOSIS — E11.40 TYPE 2 DIABETES MELLITUS WITH DIABETIC NEUROPATHY, WITHOUT LONG-TERM CURRENT USE OF INSULIN: Chronic | ICD-10-CM

## 2024-04-04 DIAGNOSIS — R19.2: ICD-10-CM

## 2024-04-04 DIAGNOSIS — B35.6 TINEA CRURIS: ICD-10-CM

## 2024-04-04 DIAGNOSIS — I25.119 CORONARY ARTERY DISEASE INVOLVING NATIVE CORONARY ARTERY OF NATIVE HEART WITH ANGINA PECTORIS: ICD-10-CM

## 2024-04-04 PROCEDURE — 3288F FALL RISK ASSESSMENT DOCD: CPT | Mod: CPTII,S$GLB,, | Performed by: INTERNAL MEDICINE

## 2024-04-04 PROCEDURE — 99999 PR PBB SHADOW E&M-EST. PATIENT-LVL V: CPT | Mod: PBBFAC,,, | Performed by: INTERNAL MEDICINE

## 2024-04-04 PROCEDURE — 1159F MED LIST DOCD IN RCRD: CPT | Mod: CPTII,S$GLB,, | Performed by: INTERNAL MEDICINE

## 2024-04-04 PROCEDURE — 3051F HG A1C>EQUAL 7.0%<8.0%: CPT | Mod: CPTII,S$GLB,, | Performed by: INTERNAL MEDICINE

## 2024-04-04 PROCEDURE — 3072F LOW RISK FOR RETINOPATHY: CPT | Mod: CPTII,S$GLB,, | Performed by: INTERNAL MEDICINE

## 2024-04-04 PROCEDURE — 99214 OFFICE O/P EST MOD 30 MIN: CPT | Mod: S$GLB,,, | Performed by: INTERNAL MEDICINE

## 2024-04-04 PROCEDURE — 1125F AMNT PAIN NOTED PAIN PRSNT: CPT | Mod: CPTII,S$GLB,, | Performed by: INTERNAL MEDICINE

## 2024-04-04 PROCEDURE — 1101F PT FALLS ASSESS-DOCD LE1/YR: CPT | Mod: CPTII,S$GLB,, | Performed by: INTERNAL MEDICINE

## 2024-04-04 PROCEDURE — 3074F SYST BP LT 130 MM HG: CPT | Mod: CPTII,S$GLB,, | Performed by: INTERNAL MEDICINE

## 2024-04-04 PROCEDURE — 3008F BODY MASS INDEX DOCD: CPT | Mod: CPTII,S$GLB,, | Performed by: INTERNAL MEDICINE

## 2024-04-04 PROCEDURE — 3066F NEPHROPATHY DOC TX: CPT | Mod: CPTII,S$GLB,, | Performed by: INTERNAL MEDICINE

## 2024-04-04 PROCEDURE — 3078F DIAST BP <80 MM HG: CPT | Mod: CPTII,S$GLB,, | Performed by: INTERNAL MEDICINE

## 2024-04-04 RX ORDER — ROSUVASTATIN CALCIUM 20 MG/1
20 TABLET, COATED ORAL DAILY
Qty: 90 TABLET | Refills: 3 | Status: SHIPPED | OUTPATIENT
Start: 2024-04-04

## 2024-04-04 NOTE — PROGRESS NOTES
Subjective:       Patient ID: Jae Millan is a 73 y.o. male.    Chief Complaint: Follow-up and Diabetes    Follow-up  Pertinent negatives include no abdominal pain, chest pain, headaches, nausea or vomiting.   Diabetes  Pertinent negatives for hypoglycemia include no headaches or seizures. Pertinent negatives for diabetes include no chest pain.   Pt is feeling okay - no new angina just mostly with exertion - seeing CT surgery soon.  BPs have been mostly controlled.  Pt is concerned that he has very loud bowel sounds.   Review of Systems   Respiratory:  Negative for shortness of breath.    Cardiovascular:  Negative for chest pain.   Gastrointestinal:  Negative for abdominal pain, diarrhea, nausea and vomiting.   Genitourinary:  Negative for dysuria.   Neurological:  Negative for seizures, syncope and headaches.       Objective:      Physical Exam  Constitutional:       General: He is not in acute distress.     Appearance: He is well-developed.   Eyes:      Pupils: Pupils are equal, round, and reactive to light.   Neck:      Thyroid: No thyromegaly.      Vascular: No JVD.   Cardiovascular:      Rate and Rhythm: Normal rate and regular rhythm.      Heart sounds: Normal heart sounds. No murmur heard.     No friction rub. No gallop.   Pulmonary:      Effort: Pulmonary effort is normal.      Breath sounds: Normal breath sounds. No wheezing or rales.   Abdominal:      General: Bowel sounds are normal. There is no distension.      Palpations: Abdomen is soft. There is no mass.      Tenderness: There is no abdominal tenderness. There is no guarding or rebound.   Musculoskeletal:      Cervical back: Neck supple.   Lymphadenopathy:      Cervical: No cervical adenopathy.   Skin:     General: Skin is warm and dry.   Neurological:      Mental Status: He is alert and oriented to person, place, and time.   Psychiatric:         Behavior: Behavior normal.         Thought Content: Thought content normal.         Judgment: Judgment  normal.         Assessment:       1. History of CVA with residual deficit    2. Essential hypertension    3. Hyperlipidemia, mixed    4. Coronary artery disease involving native coronary artery of native heart with angina pectoris    5. Stage 3b chronic kidney disease    6. Tinea cruris    7. Increased peristalsis        Plan:   History of CVA with residual deficit  Stable  Essential hypertension  Controlled - continue current meds    Hyperlipidemia, mixed  Controlled - continue current meds    Coronary artery disease involving native coronary artery of native heart with angina pectoris  -     LIPID PANEL; Future; Expected date: 04/04/2024    Stage 3b chronic kidney disease  Stable and monitored  Tinea cruris  -     ketoconazole-hydrocortisone 2-2.5 % Crea; Apply to left leg twice daily for 3 weeks  Dispense: 30 g; Refill: 2    Increased peristalsis  -     Ambulatory referral/consult to Gastroenterology; Future; Expected date: 04/11/2024    Other orders  -     rosuvastatin (CRESTOR) 20 MG tablet; Take 1 tablet (20 mg total) by mouth once daily.  Dispense: 90 tablet; Refill: 3  Diabetes  -    Increase empagliflozin (JARDIANCE) 25 mg tablet; Take 1 tablet (25 mg total) by mouth once daily.  Dispense: 90 tablet; Refill: 1

## 2024-04-05 ENCOUNTER — TELEPHONE (OUTPATIENT)
Dept: NEPHROLOGY | Facility: CLINIC | Age: 74
End: 2024-04-05
Payer: MEDICARE

## 2024-04-05 ENCOUNTER — TELEPHONE (OUTPATIENT)
Dept: CARDIOTHORACIC SURGERY | Facility: CLINIC | Age: 74
End: 2024-04-05
Payer: MEDICARE

## 2024-04-05 NOTE — TELEPHONE ENCOUNTER
Called to confirm pt's appts on 4/8; no answer.  Left VM reminding pt of appts and requested a call back if pt has any questions or needs to reschedule.

## 2024-04-07 ENCOUNTER — TELEPHONE (OUTPATIENT)
Dept: INTERNAL MEDICINE | Facility: CLINIC | Age: 74
End: 2024-04-07
Payer: MEDICARE

## 2024-04-07 RX ORDER — KETOCONAZOLE 20 MG/G
CREAM TOPICAL 2 TIMES DAILY
Qty: 60 G | Refills: 1 | Status: SHIPPED | OUTPATIENT
Start: 2024-04-07

## 2024-04-08 ENCOUNTER — HOSPITAL ENCOUNTER (OUTPATIENT)
Dept: CARDIOLOGY | Facility: HOSPITAL | Age: 74
Discharge: HOME | End: 2024-04-08
Attending: THORACIC SURGERY (CARDIOTHORACIC VASCULAR SURGERY)
Payer: MEDICARE

## 2024-04-08 ENCOUNTER — HOSPITAL ENCOUNTER (OUTPATIENT)
Dept: PULMONOLOGY | Facility: CLINIC | Age: 74
Discharge: HOME | End: 2024-04-08
Payer: MEDICARE

## 2024-04-08 ENCOUNTER — OFFICE VISIT (OUTPATIENT)
Dept: CARDIOTHORACIC SURGERY | Facility: CLINIC | Age: 74
End: 2024-04-08
Payer: MEDICARE

## 2024-04-08 ENCOUNTER — HOSPITAL ENCOUNTER (OUTPATIENT)
Dept: VASCULAR SURGERY | Facility: CLINIC | Age: 74
Discharge: HOME | End: 2024-04-08
Payer: MEDICARE

## 2024-04-08 ENCOUNTER — HOSPITAL ENCOUNTER (OUTPATIENT)
Dept: RADIOLOGY | Facility: HOSPITAL | Age: 74
Discharge: HOME | End: 2024-04-08
Attending: THORACIC SURGERY (CARDIOTHORACIC VASCULAR SURGERY)
Payer: MEDICARE

## 2024-04-08 VITALS
HEART RATE: 64 BPM | BODY MASS INDEX: 32.33 KG/M2 | DIASTOLIC BLOOD PRESSURE: 78 MMHG | HEIGHT: 70 IN | WEIGHT: 218.25 LBS | DIASTOLIC BLOOD PRESSURE: 76 MMHG | BODY MASS INDEX: 31.22 KG/M2 | SYSTOLIC BLOOD PRESSURE: 133 MMHG | HEART RATE: 69 BPM | HEIGHT: 69 IN | OXYGEN SATURATION: 94 % | SYSTOLIC BLOOD PRESSURE: 132 MMHG | WEIGHT: 218.06 LBS

## 2024-04-08 DIAGNOSIS — I25.119 CORONARY ARTERY DISEASE INVOLVING NATIVE CORONARY ARTERY OF NATIVE HEART WITH ANGINA PECTORIS: ICD-10-CM

## 2024-04-08 DIAGNOSIS — Z01.818 PRE-OP TESTING: ICD-10-CM

## 2024-04-08 LAB
ASCENDING AORTA: 3.04 CM
AV INDEX (PROSTH): 0.93
AV MEAN GRADIENT: 3 MMHG
AV PEAK GRADIENT: 5 MMHG
AV VALVE AREA BY VELOCITY RATIO: 3.71 CM²
AV VALVE AREA: 3.94 CM²
AV VELOCITY RATIO: 0.88
BSA FOR ECHO PROCEDURE: 2.21 M2
CV ECHO LV RWT: 0.27 CM
DLCO ADJ PRE: 15.53 ML/(MIN*MMHG) (ref 18.37–32.23)
DLCO SINGLE BREATH LLN: 18.37
DLCO SINGLE BREATH PRE REF: 59.2 %
DLCO SINGLE BREATH REF: 25.3
DLCOC SBVA LLN: 2.55
DLCOC SBVA PRE REF: 93.4 %
DLCOC SBVA REF: 3.76
DLCOC SINGLE BREATH LLN: 18.37
DLCOC SINGLE BREATH PRE REF: 61.4 %
DLCOC SINGLE BREATH REF: 25.3
DLCOCSBVAULN: 4.98
DLCOCSINGLEBREATHULN: 32.23
DLCOSINGLEBREATHULN: 32.23
DLCOVA LLN: 2.55
DLCOVA PRE REF: 90.1 %
DLCOVA PRE: 3.39 ML/(MIN*MMHG*L) (ref 2.55–4.98)
DLCOVA REF: 3.76
DLCOVAULN: 4.98
DLVAADJ PRE: 3.52 ML/(MIN*MMHG*L) (ref 2.55–4.98)
DOP CALC AO PEAK VEL: 1.14 M/S
DOP CALC AO VTI: 22.17 CM
DOP CALC LVOT AREA: 4.2 CM2
DOP CALC LVOT DIAMETER: 2.32 CM
DOP CALC LVOT PEAK VEL: 1 M/S
DOP CALC LVOT STROKE VOLUME: 87.33 CM3
DOP CALCLVOT PEAK VEL VTI: 20.67 CM
E WAVE DECELERATION TIME: 307.97 MSEC
E/A RATIO: 0.71
E/E' RATIO: 7.33 M/S
ECHO LV POSTERIOR WALL: 0.7 CM (ref 0.6–1.1)
FEF 25 75 LLN: 0.69
FEF 25 75 PRE REF: 204.1 %
FEF 25 75 REF: 1.98
FEV05 LLN: 1.3
FEV05 REF: 2.44
FEV1 FVC LLN: 64
FEV1 FVC PRE REF: 115.2 %
FEV1 FVC REF: 77
FEV1 LLN: 1.73
FEV1 PRE REF: 102.9 %
FEV1 REF: 2.53
FRACTIONAL SHORTENING: 43 % (ref 28–44)
FVC LLN: 2.38
FVC PRE REF: 89.2 %
FVC REF: 3.31
INTERVENTRICULAR SEPTUM: 0.7 CM (ref 0.6–1.1)
IVC PRE: 2.86 L (ref 2.38–4.26)
IVC SINGLE BREATH LLN: 2.38
IVC SINGLE BREATH PRE REF: 86.4 %
IVC SINGLE BREATH REF: 3.31
IVCSINGLEBREATHULN: 4.26
IVRT: 114.18 MSEC
LA MAJOR: 5.01 CM
LA MINOR: 5.08 CM
LA WIDTH: 3.62 CM
LEFT ATRIUM SIZE: 2.97 CM
LEFT ATRIUM VOLUME INDEX MOD: 18.3 ML/M2
LEFT ATRIUM VOLUME INDEX: 21.2 ML/M2
LEFT ATRIUM VOLUME MOD: 39.75 CM3
LEFT ATRIUM VOLUME: 46.1 CM3
LEFT INTERNAL DIMENSION IN SYSTOLE: 2.94 CM (ref 2.1–4)
LEFT VENTRICLE DIASTOLIC VOLUME INDEX: 40.87 ML/M2
LEFT VENTRICLE DIASTOLIC VOLUME: 88.69 ML
LEFT VENTRICLE MASS INDEX: 57 G/M2
LEFT VENTRICLE SYSTOLIC VOLUME INDEX: 15.4 ML/M2
LEFT VENTRICLE SYSTOLIC VOLUME: 33.4 ML
LEFT VENTRICULAR INTERNAL DIMENSION IN DIASTOLE: 5.2 CM (ref 3.5–6)
LEFT VENTRICULAR MASS: 122.81 G
LV LATERAL E/E' RATIO: 7.86 M/S
LV SEPTAL E/E' RATIO: 6.88 M/S
MV A" WAVE DURATION": 9.42 MSEC
MV PEAK A VEL: 0.77 M/S
MV PEAK E VEL: 0.55 M/S
MV STENOSIS PRESSURE HALF TIME: 89.31 MS
MV VALVE AREA P 1/2 METHOD: 2.46 CM2
MVV LLN: 98
MVV PRE REF: 65.9 %
MVV REF: 115
PEF LLN: 4.98
PEF PRE REF: 96.9 %
PEF REF: 7.48
PHYSICIAN COMMENT: ABNORMAL
PISA TR MAX VEL: 2.33 M/S
PRE DLCO: 14.97 ML/(MIN*MMHG) (ref 18.37–32.23)
PRE FEF 25 75: 4.03 L/S (ref 0.69–3.91)
PRE FET 100: 5.98 SEC
PRE FEV05 REF: 92.6 %
PRE FEV1 FVC: 88.41 % (ref 63.55–88.51)
PRE FEV1: 2.61 L (ref 1.73–3.27)
PRE FEV5: 2.26 L (ref 1.3–3.57)
PRE FVC: 2.95 L (ref 2.38–4.26)
PRE MVV: 75.91 L/MIN (ref 97.85–132.39)
PRE PEF: 7.25 L/S (ref 4.98–9.99)
PULM VEIN S/D RATIO: 1.35
PV PEAK D VEL: 0.4 M/S
PV PEAK S VEL: 0.54 M/S
RA MAJOR: 4.7 CM
RA PRESSURE ESTIMATED: 3 MMHG
RA WIDTH: 2.94 CM
RIGHT VENTRICULAR END-DIASTOLIC DIMENSION: 3.8 CM
RV TB RVSP: 5 MMHG
SINUS: 2.99 CM
STJ: 2.65 CM
TDI LATERAL: 0.07 M/S
TDI SEPTAL: 0.08 M/S
TDI: 0.08 M/S
TR MAX PG: 22 MMHG
TRICUSPID ANNULAR PLANE SYSTOLIC EXCURSION: 1.9 CM
TV REST PULMONARY ARTERY PRESSURE: 25 MMHG
VA PRE: 4.41 L (ref 6.57–6.57)
VA SINGLE BREATH LLN: 6.57
VA SINGLE BREATH PRE REF: 67.2 %
VA SINGLE BREATH REF: 6.57
VASINGLEBREATHULN: 6.57
Z-SCORE OF LEFT VENTRICULAR DIMENSION IN END DIASTOLE: -3.23
Z-SCORE OF LEFT VENTRICULAR DIMENSION IN END SYSTOLE: -3.13

## 2024-04-08 PROCEDURE — 93880 EXTRACRANIAL BILAT STUDY: CPT | Mod: S$GLB,,, | Performed by: SURGERY

## 2024-04-08 PROCEDURE — 93306 TTE W/DOPPLER COMPLETE: CPT | Mod: 26,,, | Performed by: INTERNAL MEDICINE

## 2024-04-08 PROCEDURE — 3066F NEPHROPATHY DOC TX: CPT | Mod: CPTII,S$GLB,, | Performed by: THORACIC SURGERY (CARDIOTHORACIC VASCULAR SURGERY)

## 2024-04-08 PROCEDURE — 3078F DIAST BP <80 MM HG: CPT | Mod: CPTII,S$GLB,, | Performed by: THORACIC SURGERY (CARDIOTHORACIC VASCULAR SURGERY)

## 2024-04-08 PROCEDURE — 3072F LOW RISK FOR RETINOPATHY: CPT | Mod: CPTII,S$GLB,, | Performed by: THORACIC SURGERY (CARDIOTHORACIC VASCULAR SURGERY)

## 2024-04-08 PROCEDURE — 93306 TTE W/DOPPLER COMPLETE: CPT

## 2024-04-08 PROCEDURE — 71250 CT THORAX DX C-: CPT | Mod: 26,,, | Performed by: RADIOLOGY

## 2024-04-08 PROCEDURE — 99205 OFFICE O/P NEW HI 60 MIN: CPT | Mod: S$GLB,,, | Performed by: THORACIC SURGERY (CARDIOTHORACIC VASCULAR SURGERY)

## 2024-04-08 PROCEDURE — 99999 PR PBB SHADOW E&M-EST. PATIENT-LVL V: CPT | Mod: PBBFAC,,, | Performed by: THORACIC SURGERY (CARDIOTHORACIC VASCULAR SURGERY)

## 2024-04-08 PROCEDURE — 1126F AMNT PAIN NOTED NONE PRSNT: CPT | Mod: CPTII,S$GLB,, | Performed by: THORACIC SURGERY (CARDIOTHORACIC VASCULAR SURGERY)

## 2024-04-08 PROCEDURE — 1159F MED LIST DOCD IN RCRD: CPT | Mod: CPTII,S$GLB,, | Performed by: THORACIC SURGERY (CARDIOTHORACIC VASCULAR SURGERY)

## 2024-04-08 PROCEDURE — 3075F SYST BP GE 130 - 139MM HG: CPT | Mod: CPTII,S$GLB,, | Performed by: THORACIC SURGERY (CARDIOTHORACIC VASCULAR SURGERY)

## 2024-04-08 PROCEDURE — 1160F RVW MEDS BY RX/DR IN RCRD: CPT | Mod: CPTII,S$GLB,, | Performed by: THORACIC SURGERY (CARDIOTHORACIC VASCULAR SURGERY)

## 2024-04-08 PROCEDURE — 94729 DIFFUSING CAPACITY: CPT | Mod: S$GLB,,, | Performed by: INTERNAL MEDICINE

## 2024-04-08 PROCEDURE — 3051F HG A1C>EQUAL 7.0%<8.0%: CPT | Mod: CPTII,S$GLB,, | Performed by: THORACIC SURGERY (CARDIOTHORACIC VASCULAR SURGERY)

## 2024-04-08 PROCEDURE — 71250 CT THORAX DX C-: CPT | Mod: TC

## 2024-04-08 PROCEDURE — 94010 BREATHING CAPACITY TEST: CPT | Mod: S$GLB,,, | Performed by: INTERNAL MEDICINE

## 2024-04-08 PROCEDURE — 3008F BODY MASS INDEX DOCD: CPT | Mod: CPTII,S$GLB,, | Performed by: THORACIC SURGERY (CARDIOTHORACIC VASCULAR SURGERY)

## 2024-04-08 RX ORDER — HYDROCORTISONE 25 MG/G
CREAM TOPICAL 2 TIMES DAILY
COMMUNITY
Start: 2024-04-04

## 2024-04-09 ENCOUNTER — CLINICAL SUPPORT (OUTPATIENT)
Dept: NEPHROLOGY | Facility: CLINIC | Age: 74
End: 2024-04-09
Payer: MEDICARE

## 2024-04-09 ENCOUNTER — TELEPHONE (OUTPATIENT)
Dept: CARDIOLOGY | Facility: CLINIC | Age: 74
End: 2024-04-09
Payer: MEDICARE

## 2024-04-09 DIAGNOSIS — N18.32 STAGE 3B CHRONIC KIDNEY DISEASE: ICD-10-CM

## 2024-04-09 NOTE — Clinical Note
Suzy Banuelos, this pt attended the CKD basic ed stage 3 class yesterday. He liked it so much that he wanted to come back again! I told him that I could schedule him, but he didn't want to take up another patient's spot. If he still wants to come to the class again after your next appt, feel free to place another referral and we can put him in another class. Thanks!

## 2024-04-10 NOTE — PROGRESS NOTES
Jae Millan was referred to Introduction to CKD education by Dr. Banuelos    The patient attended class in a group setting unaccompanied.    The following material was covered. Outcomes were assessed via the outcome assessment questions and documented at the end of each lesson.    Chronic Kidney Disease Education (Lesson 1 and 2)    Lesson 1 Understanding Kidney Disease  Lesson Objectives  By the end of each session, participants will be able to:  Recognize that fear and grief are usual responses to kidney disease  State causes/risk factors for kidney disease  Explain how GFR reflects kidney function  Explain how urine albumin/protein reflects kidney damage  State two ways the kidneys help maintain health  Describe how kidney disease is progressive but can be slowed down  Topics & Points Covered  Emotional impact of diagnosis  You're not alone  Emotional aspects  Depression, grief, and fear are typical  Physical activity may help  Benefits of education: Why are you here?  There are many causes of CKD. Diabetes and hypertension are the leading causes. Family history, cardiovascular disease, recurrent UTIs, immunological disease and genetics also play a role in CKD  CKD is complicated  The more you understand, the better you'll do. (Stated directly)  Basic anatomy  Location in the body  The nephrons have filters (working units)  Normal kidney function  Maintain chemical balance  Produce hormones  Regulate blood pressure  Kidney damage  Major causes of kidney damage  High blood pressure, diabetes  Fewer functioning nephrons  Monitoring kidney function and damage  eGFR (function)  Urine albumin/UACR or Urine protein/creatinine ratio (damage)  eGFR goal: a stable level  Decline means progression  Urine albumin/UACR goal: a stable or lower level  Increase in urine albumin/protein may mean progression  Kidney disease is often irreversible and progressive  You'll likely need the help of a kidney specialist  Overview of  treatment modalities  There are things you can do that may slow progression (more on this in next session)  Take your medications  Control blood pressure  Manage diabetes  Eat less salt  Miscellaneous  Kidney disease runs in families (Encouraged testing)  Early kidney disease has no symptoms  Outcome Assessment Questions  See scanned document below for outcome assessment.     Lesson 2 Managing Your Kidney Disease  Lesson Objectives  By the end of each session, participants will be able to:  Recognize that managing blood pressure is a key part of managing kidney disease  Recognize that managing diabetes is a key part of managing kidney disease  State at least one step to eating right for kidney health  Recognize the importance of being cautious about over-the-counter medicines  Recognize that smoking can worsen kidney disease  Identify which lab values are used to keep track of diabetes, high blood pressure, and other conditions  Topics & Points Covered  There are steps you can take to keep your kidneys working  Weight management  Blood pressure management  Blood pressure goal: < 140/90 mm Hg  Medications - ACEs/ARBs, diuretics  ACEs/ARBS and risk of hyperkalemia (too much potassium in the blood, but help lower urine albumin)  Sodium reduction (<2,300 mg)  Physical activity  Diabetes management  A1C target   Diabetes medications may change because of kidney disease (often takes less medication to control glucose in the later stages of CKD)  How to treat hypoglycemia appropriately (risk of high potassium with ACEi and ARB use) glucose tablets are preferred [May need to avoid juices with high potassium levels if hyperkalemic]  Hyperglycemia  Cardiovascular disease (CVD)  Physical activity  CVD is major cause of mortality  LDL goal  Medications  Nutritional health  Choose and prepare foods with less salt and sodium  Eat the right amount and kind of protein  Choose foods that are healthy for your heart  Choose foods  based on phosphorus and potassium content (if restricted)  Make choices that help with diabetes management  Dietitian referral/nutrition therapy is covered benefit  Medication safety  Prescription  Over-the-counter (pain relief)  Tobacco cessation  Outcome Assessment Questions  See scanned document below for outcome assessment.     Scanned outcome assessment document:           Areas of information that primary nephrology provider should reinforce during follow-up visit includes: tips for healthy eating for his kidneys. Pt requested that he would like to attend the class again.    The content of these lessons are adapted from the Kidney Disease Education (KDE) Services benefit as defined by the Centers for Medicare & Medicaid Services.    120 minutes spent educating the patient of the above content.

## 2024-04-18 ENCOUNTER — TELEPHONE (OUTPATIENT)
Dept: CARDIOLOGY | Facility: CLINIC | Age: 74
End: 2024-04-18
Payer: MEDICARE

## 2024-04-18 ENCOUNTER — LAB VISIT (OUTPATIENT)
Dept: LAB | Facility: HOSPITAL | Age: 74
End: 2024-04-18
Payer: MEDICARE

## 2024-04-18 ENCOUNTER — OFFICE VISIT (OUTPATIENT)
Dept: CARDIOLOGY | Facility: CLINIC | Age: 74
End: 2024-04-18
Payer: MEDICARE

## 2024-04-18 ENCOUNTER — EDUCATION (OUTPATIENT)
Dept: CARDIOLOGY | Facility: CLINIC | Age: 74
End: 2024-04-18
Payer: MEDICARE

## 2024-04-18 VITALS
OXYGEN SATURATION: 98 % | HEART RATE: 68 BPM | SYSTOLIC BLOOD PRESSURE: 147 MMHG | BODY MASS INDEX: 31.34 KG/M2 | WEIGHT: 218.94 LBS | HEIGHT: 70 IN | DIASTOLIC BLOOD PRESSURE: 75 MMHG

## 2024-04-18 DIAGNOSIS — I10 ESSENTIAL HYPERTENSION: Primary | ICD-10-CM

## 2024-04-18 DIAGNOSIS — N18.32 STAGE 3B CHRONIC KIDNEY DISEASE: ICD-10-CM

## 2024-04-18 DIAGNOSIS — I25.119 CORONARY ARTERY DISEASE INVOLVING NATIVE CORONARY ARTERY OF NATIVE HEART WITH ANGINA PECTORIS: Primary | ICD-10-CM

## 2024-04-18 DIAGNOSIS — I70.0 AORTIC ATHEROSCLEROSIS: ICD-10-CM

## 2024-04-18 DIAGNOSIS — I25.119 CORONARY ARTERY DISEASE INVOLVING NATIVE CORONARY ARTERY OF NATIVE HEART WITH ANGINA PECTORIS: ICD-10-CM

## 2024-04-18 DIAGNOSIS — E11.21 TYPE 2 DIABETES MELLITUS WITH DIABETIC NEPHROPATHY, WITHOUT LONG-TERM CURRENT USE OF INSULIN: ICD-10-CM

## 2024-04-18 DIAGNOSIS — I10 ESSENTIAL HYPERTENSION: ICD-10-CM

## 2024-04-18 DIAGNOSIS — E78.2 HYPERLIPIDEMIA, MIXED: ICD-10-CM

## 2024-04-18 LAB
25(OH)D3+25(OH)D2 SERPL-MCNC: 30 NG/ML (ref 30–96)
ALBUMIN SERPL BCP-MCNC: 3.7 G/DL (ref 3.5–5.2)
ANION GAP SERPL CALC-SCNC: 13 MMOL/L (ref 8–16)
BACTERIA #/AREA URNS AUTO: NORMAL /HPF
BASOPHILS # BLD AUTO: 0.05 K/UL (ref 0–0.2)
BASOPHILS NFR BLD: 0.7 % (ref 0–1.9)
BILIRUB UR QL STRIP: NEGATIVE
BUN SERPL-MCNC: 28 MG/DL (ref 8–23)
CALCIUM SERPL-MCNC: 9.5 MG/DL (ref 8.7–10.5)
CHLORIDE SERPL-SCNC: 110 MMOL/L (ref 95–110)
CHOLEST SERPL-MCNC: 137 MG/DL (ref 120–199)
CHOLEST/HDLC SERPL: 3.6 {RATIO} (ref 2–5)
CLARITY UR REFRACT.AUTO: CLEAR
CO2 SERPL-SCNC: 20 MMOL/L (ref 23–29)
COLOR UR AUTO: ABNORMAL
CREAT SERPL-MCNC: 2.1 MG/DL (ref 0.5–1.4)
CREAT UR-MCNC: 68 MG/DL (ref 23–375)
DIFFERENTIAL METHOD BLD: ABNORMAL
EOSINOPHIL # BLD AUTO: 0.2 K/UL (ref 0–0.5)
EOSINOPHIL NFR BLD: 2.9 % (ref 0–8)
ERYTHROCYTE [DISTWIDTH] IN BLOOD BY AUTOMATED COUNT: 15 % (ref 11.5–14.5)
EST. GFR  (NO RACE VARIABLE): 33 ML/MIN/1.73 M^2
GLUCOSE SERPL-MCNC: 164 MG/DL (ref 70–110)
GLUCOSE UR QL STRIP: ABNORMAL
HCT VFR BLD AUTO: 41.3 % (ref 40–54)
HDLC SERPL-MCNC: 38 MG/DL (ref 40–75)
HDLC SERPL: 27.7 % (ref 20–50)
HGB BLD-MCNC: 13.6 G/DL (ref 14–18)
HGB UR QL STRIP: ABNORMAL
IMM GRANULOCYTES # BLD AUTO: 0.02 K/UL (ref 0–0.04)
IMM GRANULOCYTES NFR BLD AUTO: 0.3 % (ref 0–0.5)
KETONES UR QL STRIP: NEGATIVE
LDLC SERPL CALC-MCNC: 71.6 MG/DL (ref 63–159)
LEUKOCYTE ESTERASE UR QL STRIP: NEGATIVE
LYMPHOCYTES # BLD AUTO: 1.1 K/UL (ref 1–4.8)
LYMPHOCYTES NFR BLD: 15.6 % (ref 18–48)
MCH RBC QN AUTO: 27.4 PG (ref 27–31)
MCHC RBC AUTO-ENTMCNC: 32.9 G/DL (ref 32–36)
MCV RBC AUTO: 83 FL (ref 82–98)
MICROSCOPIC COMMENT: NORMAL
MONOCYTES # BLD AUTO: 0.5 K/UL (ref 0.3–1)
MONOCYTES NFR BLD: 7.7 % (ref 4–15)
NEUTROPHILS # BLD AUTO: 4.9 K/UL (ref 1.8–7.7)
NEUTROPHILS NFR BLD: 72.8 % (ref 38–73)
NITRITE UR QL STRIP: NEGATIVE
NONHDLC SERPL-MCNC: 99 MG/DL
NRBC BLD-RTO: 0 /100 WBC
PH UR STRIP: 5 [PH] (ref 5–8)
PHOSPHATE SERPL-MCNC: 3.4 MG/DL (ref 2.7–4.5)
PLATELET # BLD AUTO: 260 K/UL (ref 150–450)
PMV BLD AUTO: 11.3 FL (ref 9.2–12.9)
POTASSIUM SERPL-SCNC: 4 MMOL/L (ref 3.5–5.1)
PROT UR QL STRIP: NEGATIVE
PROT UR-MCNC: 16 MG/DL (ref 0–15)
PROT/CREAT UR: 0.24 MG/G{CREAT} (ref 0–0.2)
PTH-INTACT SERPL-MCNC: 77.5 PG/ML (ref 9–77)
RBC # BLD AUTO: 4.96 M/UL (ref 4.6–6.2)
RBC #/AREA URNS AUTO: 1 /HPF (ref 0–4)
SODIUM SERPL-SCNC: 143 MMOL/L (ref 136–145)
SP GR UR STRIP: 1.01 (ref 1–1.03)
TRIGL SERPL-MCNC: 137 MG/DL (ref 30–150)
URN SPEC COLLECT METH UR: ABNORMAL
WBC # BLD AUTO: 6.78 K/UL (ref 3.9–12.7)
WBC #/AREA URNS AUTO: 0 /HPF (ref 0–5)
YEAST UR QL AUTO: NORMAL

## 2024-04-18 PROCEDURE — 3077F SYST BP >= 140 MM HG: CPT | Mod: CPTII,S$GLB,, | Performed by: INTERNAL MEDICINE

## 2024-04-18 PROCEDURE — 1159F MED LIST DOCD IN RCRD: CPT | Mod: CPTII,S$GLB,, | Performed by: INTERNAL MEDICINE

## 2024-04-18 PROCEDURE — 36415 COLL VENOUS BLD VENIPUNCTURE: CPT | Performed by: INTERNAL MEDICINE

## 2024-04-18 PROCEDURE — 81001 URINALYSIS AUTO W/SCOPE: CPT | Performed by: INTERNAL MEDICINE

## 2024-04-18 PROCEDURE — 82306 VITAMIN D 25 HYDROXY: CPT | Performed by: INTERNAL MEDICINE

## 2024-04-18 PROCEDURE — 80069 RENAL FUNCTION PANEL: CPT | Performed by: INTERNAL MEDICINE

## 2024-04-18 PROCEDURE — 85025 COMPLETE CBC W/AUTO DIFF WBC: CPT | Performed by: INTERNAL MEDICINE

## 2024-04-18 PROCEDURE — 3066F NEPHROPATHY DOC TX: CPT | Mod: CPTII,S$GLB,, | Performed by: INTERNAL MEDICINE

## 2024-04-18 PROCEDURE — 3051F HG A1C>EQUAL 7.0%<8.0%: CPT | Mod: CPTII,S$GLB,, | Performed by: INTERNAL MEDICINE

## 2024-04-18 PROCEDURE — 99999 PR PBB SHADOW E&M-EST. PATIENT-LVL V: CPT | Mod: PBBFAC,,, | Performed by: INTERNAL MEDICINE

## 2024-04-18 PROCEDURE — 1125F AMNT PAIN NOTED PAIN PRSNT: CPT | Mod: CPTII,S$GLB,, | Performed by: INTERNAL MEDICINE

## 2024-04-18 PROCEDURE — 3078F DIAST BP <80 MM HG: CPT | Mod: CPTII,S$GLB,, | Performed by: INTERNAL MEDICINE

## 2024-04-18 PROCEDURE — 84156 ASSAY OF PROTEIN URINE: CPT | Performed by: INTERNAL MEDICINE

## 2024-04-18 PROCEDURE — 99214 OFFICE O/P EST MOD 30 MIN: CPT | Mod: S$GLB,,, | Performed by: INTERNAL MEDICINE

## 2024-04-18 PROCEDURE — 3288F FALL RISK ASSESSMENT DOCD: CPT | Mod: CPTII,S$GLB,, | Performed by: INTERNAL MEDICINE

## 2024-04-18 PROCEDURE — 1101F PT FALLS ASSESS-DOCD LE1/YR: CPT | Mod: CPTII,S$GLB,, | Performed by: INTERNAL MEDICINE

## 2024-04-18 PROCEDURE — 80061 LIPID PANEL: CPT | Performed by: INTERNAL MEDICINE

## 2024-04-18 PROCEDURE — 3008F BODY MASS INDEX DOCD: CPT | Mod: CPTII,S$GLB,, | Performed by: INTERNAL MEDICINE

## 2024-04-18 PROCEDURE — 1160F RVW MEDS BY RX/DR IN RCRD: CPT | Mod: CPTII,S$GLB,, | Performed by: INTERNAL MEDICINE

## 2024-04-18 PROCEDURE — 83970 ASSAY OF PARATHORMONE: CPT | Performed by: INTERNAL MEDICINE

## 2024-04-18 PROCEDURE — 3072F LOW RISK FOR RETINOPATHY: CPT | Mod: CPTII,S$GLB,, | Performed by: INTERNAL MEDICINE

## 2024-04-18 RX ORDER — SODIUM CHLORIDE 9 MG/ML
INJECTION, SOLUTION INTRAVENOUS CONTINUOUS
Status: CANCELLED | OUTPATIENT
Start: 2024-04-18 | End: 2024-04-18

## 2024-04-18 RX ORDER — DIPHENHYDRAMINE HCL 50 MG
50 CAPSULE ORAL ONCE
Status: CANCELLED | OUTPATIENT
Start: 2024-04-18 | End: 2024-04-18

## 2024-04-18 RX ORDER — EZETIMIBE 10 MG/1
10 TABLET ORAL DAILY
Qty: 90 TABLET | Refills: 3 | Status: SHIPPED | OUTPATIENT
Start: 2024-04-18 | End: 2025-04-18

## 2024-04-18 NOTE — TELEPHONE ENCOUNTER
Review with patient all medications.  Pre procedure instructions review.  Medications reviewed in detail on which to take the morning of the procedure as well as those diabetic medications to stop 2 days before and not to take the morning of the procedure.  Pt with multiple appointments, printed those for patient and after care summary report given to patient as requested.

## 2024-04-18 NOTE — PROGRESS NOTES
Subjective:    Patient ID:  Jae Millan is a 73 y.o. male who presents for evaluation of Coronary Artery Disease      Referring physician: Maryjane Fraias MD    HPI  72 y.o. male HTN, HLD, DM2, CVA ( 2018, with residual left-sided weakness), CKD3 presents to the clinic to discuss about PCI options.  He underwent angiogram last month that showed multivessel disease.  He was evaluated by CTS and he is worried about postop recovery and he wants to go with PCI.  Patient complains of having only chest pain and shortness a breath on exertion.  Denies having any orthopnea, PND, bilateral lower extremity edema, presyncope, syncope.  His angiogram showed mid LAD lesion with moderate distal left main and ostial LAD lesion.  He also had OM1 lesion.  His creatinine today is 2.1.  His baseline is around 1.6.  He follows with Nephrology.    Past Medical History:   Diagnosis Date    Cataract     Coronary artery disease involving native coronary artery of native heart with angina pectoris 2/15/2024    Decreased sensation of lower extremity     Diabetes mellitus     Diabetic neuropathy     Dysarthria     Dysphagia     ED (erectile dysfunction)     Glaucoma     Hemiparesis     LEFT side post CVA    High cholesterol     History of hepatitis C, s/p successful Rx w/ cure (SVR12) 4/2020     Hypertension     Impaired functional mobility, balance, gait, and endurance     Pre-op testing 4/8/2024    Pulmonary emphysema, unspecified emphysema type 2/1/2024    Stroke     Urinary frequency        Past Surgical History:   Procedure Laterality Date    COLONOSCOPY N/A 3/9/2023    Procedure: COLONOSCOPY;  Surgeon: Kian Kraft MD;  Location: [facility] ENDO ([address] [place] [address]);  Service: Endoscopy;  Laterality: N/A;  medical transportation-inst mail-tb  pre call complete-as    CORONARY ANGIOGRAPHY N/A 3/27/2024    Procedure: ANGIOGRAM, CORONARY ARTERY;  Surgeon: Sandoval Paul MD;  Location: [facility] CATH LAB;  Service: Cardiology;  Laterality: N/A;     "INGUINAL HERNIA REPAIR Left 12/11/2019    UNDESCENDED TESTICLE EXPLORATION Right     R testicle removed as it was undescended       Current Outpatient Medications on File Prior to Visit   Medication Sig Dispense Refill    amLODIPine (NORVASC) 10 MG tablet TAKE 1 TABLET(10 MG) BY MOUTH EVERY DAY FOR HYPERTENSION 90 tablet 3    aspirin 81 MG Chew Take 1 tablet (81 mg total) by mouth once daily.      blood sugar diagnostic Strp To check BG 4 times daily, to use with insurance preferred meter 100 each 11    carvediloL (COREG) 6.25 MG tablet Take 1 tablet (6.25 mg total) by mouth 2 (two) times daily. 180 tablet 3    clopidogreL (PLAVIX) 75 mg tablet TAKE 1 TABLET(75 MG) BY MOUTH EVERY DAY 90 tablet 3    dorzolamide-timolol 2-0.5% (COSOPT) 22.3-6.8 mg/mL ophthalmic solution Place 1 drop into both eyes 2 (two) times daily. 10 mL 12    empagliflozin (JARDIANCE) 25 mg tablet Take 1 tablet (25 mg total) by mouth once daily. 90 tablet 1    ergocalciferol (ERGOCALCIFEROL) 50,000 unit Cap One weekly 12 capsule 3    gabapentin (NEURONTIN) 300 MG capsule Take 1 capsule (300 mg total) by mouth 2 (two) times daily. In 1-2 weeks, if no relief, may increase dose to 3 times per day.      glimepiride (AMARYL) 2 MG tablet Take 1 tablet (2 mg total) by mouth before breakfast. 90 tablet 3    hydrocortisone 2.5 % cream Apply topically 2 (two) times daily.      ketoconazole (NIZORAL) 2 % cream Apply topically 2 (two) times daily. 60 g 1    lancets Misc Check bg 4 times daily 100 each 11    losartan-hydrochlorothiazide 100-25 mg (HYZAAR) 100-25 mg per tablet Take 1 tablet by mouth once daily. 90 tablet 3    metFORMIN (GLUCOPHAGE) 1000 MG tablet On daily (Patient taking differently: Take 1,000 mg by mouth daily with breakfast. On daily) 180 tablet 3    needle, disp, 18 G 18 gauge x 1" Ndle 1 Device by Misc.(Non-Drug; Combo Route) route every 14 (fourteen) days. Use to draw testosterone 10 each 11    needle, disp, 25 gauge 25 gauge x 1" Ndle 1 " Device by Misc.(Non-Drug; Combo Route) route every 14 (fourteen) days. Use to inject testosterone 10 each 11    nitroGLYCERIN (NITROSTAT) 0.4 MG SL tablet Place 1 tablet (0.4 mg total) under the tongue every 5 (five) minutes as needed for Chest pain. 30 tablet 1    pantoprazole (PROTONIX) 40 MG tablet TAKE 1 TABLET(40 MG) BY MOUTH EVERY DAY FOR STOMACH 90 tablet 3    rosuvastatin (CRESTOR) 20 MG tablet Take 1 tablet (20 mg total) by mouth once daily. 90 tablet 3    SITagliptin phosphate (JANUVIA) 50 MG Tab Take 1 tablet (50 mg total) by mouth once daily. 90 tablet 3    syringe, disposable, 3 mL Syrg 1 mL by Misc.(Non-Drug; Combo Route) route every 14 (fourteen) days. 10 each 11    tamsulosin (FLOMAX) 0.4 mg Cap One capsule at bedtime 90 capsule 3    tadalafiL (CIALIS) 20 MG Tab Take 1 tablet (20 mg total) by mouth Every 3 (three) days. for 10 days 20 tablet 9    testosterone cypionate (DEPOTESTOTERONE CYPIONATE) 200 mg/mL injection Inject 1 mL (200 mg total) into the muscle every 14 (fourteen) days. 10 mL 1     No current facility-administered medications on file prior to visit.       Review of patient's allergies indicates:  No Known Allergies    Social History     Tobacco Use    Smoking status: Former     Current packs/day: 0.00     Average packs/day: 1 pack/day for 18.0 years (18.0 ttl pk-yrs)     Types: Cigarettes     Start date: 1968     Quit date: 1986     Years since quittin.0    Smokeless tobacco: Never   Substance Use Topics    Alcohol use: Not Currently    Drug use: Never       Family History   Problem Relation Name Age of Onset    Heart disease Brother          cabg    Hypertension Sister      Amblyopia Neg Hx      Blindness Neg Hx      Cataracts Neg Hx      Glaucoma Neg Hx      Macular degeneration Neg Hx      Strabismus Neg Hx      Retinal detachment Neg Hx      Diabetes Neg Hx           Review of Systems   Constitutional: Negative.   HENT: Negative.     Eyes: Negative.    Cardiovascular:  "Negative.    Respiratory: Negative.     Endocrine: Negative.    Hematologic/Lymphatic: Negative.    Skin: Negative.    Musculoskeletal: Negative.    Gastrointestinal: Negative.    Genitourinary: Negative.    Neurological: Negative.         Objective:     Vitals:    04/18/24 0939 04/18/24 0940   BP: (!) 145/72 (!) 147/75   BP Location: Right arm Right arm   Patient Position: Sitting Sitting   BP Method: Large (Automatic) Large (Automatic)   Pulse: 68 68   SpO2: 98% 98%   Weight: 99.3 kg (218 lb 14.7 oz)    Height: 5' 10" (1.778 m)         Physical Exam  Constitutional:       Appearance: Normal appearance.   HENT:      Head: Normocephalic and atraumatic.   Eyes:      Extraocular Movements: Extraocular movements intact.      Pupils: Pupils are equal, round, and reactive to light.   Cardiovascular:      Rate and Rhythm: Normal rate and regular rhythm.   Pulmonary:      Effort: Pulmonary effort is normal.      Breath sounds: Normal breath sounds.   Abdominal:      General: Abdomen is flat. Bowel sounds are normal.      Palpations: Abdomen is soft.   Musculoskeletal:         General: Normal range of motion.      Cervical back: Normal range of motion.   Skin:     General: Skin is warm.      Capillary Refill: Capillary refill takes less than 2 seconds.   Neurological:      General: No focal deficit present.      Mental Status: He is alert and oriented to person, place, and time.           Assessment:       1. Essential hypertension    2. Hyperlipidemia, mixed    3. Coronary artery disease involving native coronary artery of native heart with angina pectoris    4. Stage 3b chronic kidney disease         Plan:       Coronary artery disease.  Patient underwent PET stress that showed ischemia in the LAD territory.  Diagnostic angiogram showed mid LAD lesion with questionable distal left main and ostial LAD lesion  He was evaluated by CT surgery but patient wanted to pursue with PCI  Echo showed normal ejection fraction with EF " of 65%.    Plan  We will plan PCI for his mid LAD (Lesion preparation with rota atherectomy due to calcium )and  check IVUS on his distal left main and ostial LAD  Patient was advised to hydrate him with good amount of water she days prior to the procedure    --[facility] +/- PCI, patient is a LLUVIA candidate  - Anti-platelet Therapy: ASA / Plavix  - Access: Right CFA  - Catheters: EBU 3.5  - Creatinine/CrCl: 2.1  - Allergies: No shellfish / Iodine allergy  - Pre-Hydration: NS  - Pre-Op Med: Bendaryl 50mg pO   - All patient's questions were answered.  -The risks, benefits and alternatives of the procedure were explained to the patient.   -The risks of coronary angiography include but are not limited to: bleeding, infection, heart rhythm abnormalities, allergic reactions, kidney injury and potential need for dialysis, stroke and death.   - Should stenting be indicated, the patient has agreed to dual anti-platelet therapy for 1-consecutive year with a drug-eluting stent and a minimum of 1-month with the use of a bare metal stent  - Additionally, pt is aware that non-compliance is likely to result in stent clotting with heart attack, heart failure, and/or death  -The risks of moderate sedation include hypotension, respiratory depression, arrhythmias, bronchospasm, and death.   - Informed consent was obtained and the  patient is agreeable to proceed with the procedure.        CKD stage 3  Creatinine baseline is 1.6.  Today his creatinine is 2.1.  Patient was advised to hydrate few days prior to the procedure.  After the procedure will keep him on IV fluids for 6 hours      Hypertension  Slightly elevated in the clinic.  Currently on carvedilol, losartan, hydrochlorothiazide.  Follows with PCP    Hyperlipidemia.  His last LDL is 71.7.  He is on rosuvastatin 20 mg daily.  Will start him on ezetimibe.      History of CVA.  On aspirin, rosuvastatin      Hank Wilson MD  Cardiology Fellow    I have personally taken the history  and examined this patient and agree with the resident's note as stated above.  1) Angina.  The patient reports typical anginal symptoms, an abnormal PET stress test, and multi-vessel CAD.  He was turned down for CABG.  I reviewed his 3/27/24 abgiogram in detail.  I discussed medical management vs medical management plus c PCI with the patient.  I discussed the need for long term DAPT if he undergoes PCI as well as the associated bleeding risk. I also discussed the risks of cardiac catheterization including a recurrent stroke.  The patient stated that he would like to proceed. Plan as detailed above. Given CKD will perform stage any PCI starting with LAD and possible left main depending on LM IVUS.  -continue EC ASA 81mg poq day  -continue Plavix 75mg po qday  -continue Coreg 6.25mg po BID  -6Fr R CFA access  The risks, benefits, and alternatives of cardiac catheterization and possible intervention were discussed with the patient.  All questions were answered and informed consent was obtained.    2) HLD. 3/16/23 lipid panel reviewed; continue Crestor 20mg po qday  -start Zetia 10mg po qday     3) HTN. Blood pressure elevated in clinic today; continue current blood pressure medications for now  -rec blood pressure log X 2 weeks and follow-up      4) Type 2 diabetes. The patient's HgbA1c on 07/24/2023  was 7.2. Rec tght glycemic control     5) CVA. Continue statin and enteric-coated aspirin 81 mg p.o. q.day     6) CKD 3b. Rec oral hydration the day prior to cardiac catheterization and will provide IV hydration the day of cath     7) GERD. Conitnue PPI while on DAPT     All of the patient's questions were answered.

## 2024-04-18 NOTE — PROGRESS NOTES
If you need to change the date of your procedure, please call  Omayra SRINIVASANRN [phone]  CALL THE OFFICE IF YOU HAVE ANY MEDICATIONS CHANGES BEFORE A PROCEDURE.    DO LABWORK AT 1030AM ON 8/13/24 ON 2ND FLOOR AT [facility], THEN GO TO 3RD FLOOR SHORT STAY TO CHECK IN.     OUTPATIENT CATHETERIZATION INSTRUCTIONS     You have been scheduled for a procedure in the catheterization lab on   TUESDAY, AUGUST 13, 2024     Please report to the Cardiology Waiting Area on the Third floor of the hospital and check in at ARRIVAL TIME 1100AM.   You will then be taken to the SSCU (Short Stay Cardiac Unit) and prepared for your procedure. Please be aware that this is not the time of your procedure but the time you are to arrive. The procedures are scheduled on an hourly basis; however, emergency cases take precedence over all other cases.         1. NOTHING TO EAT AFTER MIDNIGHT 12AM the morning of the procedure.NO FOOD.  You may take your medications with small sips of water except those Stopped. SEE BELOW INSTRUCTIONS ON MEDICATIONS.     2.   CONTINUE TO TAKE YOUR DAILY MEDICATIONS EVERYDAY. STOP THOSE SEEN BELOW.  Do not stop your Aspirin, Plavix, Effient, or Brilinta.  SEE BELOW.    3.  Diabetics:  IMPORTANT------ STOP THESE MEDICATIONS ON AUGUST 11, 2024.    STOP METFORMIN 2 DAYS BEFORE, DO NOT TAKE THE MORNING OF THE PROCEDURE, AND CONTINUE TO STOP 2 DAYS AFTER THE PROCEDURE.    STOP GLIPIZIDE(AMARYL) TWO DAYS BEFORE YOUR PROCEDURE, DO NOT TAKE THE MORNING OF THE PROCEDURE.    STOP JANUVIA AND JARDIANCE TWO DAYS BEFORE YOUR PROCEDURE, DO NOT TAKE THE MORNING OF THE PROCEDURE.    IF TAKING OZEMPIC- THIS MUST BE HELD FOR ONE WEEK PRIOR TO PROCEDURE.    4. Heart Failure Patients:N/A        The procedure will take 1-2 hours to perform. After the procedure, you will return to SSCU on the third floor of the hospital. You will need to lie still (or keep your arm still) for the next 4 to 6 hours to minimize bleeding from the  puncture site. Your family may remain in the room with you during this time.         You may be able to be discharged home that same afternoon if there is someone to drive you home and there were no complications. If you have one of the balloon, stent, or device procedures you may spend the night in the hospital. Your doctor will determine, based on your progress, the date and time of your discharge. The results of your procedure will be discussed with you before you are discharged. Any further testing or procedures will be scheduled for you either before you leave or you will be called with these appointments.   YOU WILL NOT BE ABLE TO DRIVE HOME  THE DAY OF THE PROCEDURE.      If you should have any questions, concerns, or please call Omayra [phone]        Special Instructions:  Drink plenty of water the day before the procedure and the day after the procedure to flush your kidneys.     Continue daily medications, including aspirin the morning of the procedure.  See Special instructions for other medications.     IMPORTANT:    Complete your lab work as scheduled ON 2ND FLOOR BEFORE YOUR CHECK IN TO SHORT STAY FOR ANGIOGRAM.      HOLD The following medications as directed:    HOLD FLUID/BLOOD PRESSURE -  TAKE THIS MED AFTER THE PROCEDURE IS DONE - MED LOSARTAN/HCTZ.      DO NOT STOP ASPIRIN OR PLAVIX.  Take the morning of the procedure.             THE ABOVE INSTRUCTIONS WERE GIVEN TO THE PATIENT VERBALLY AND THEY VERBALIZED UNDERSTANDING.  THEY DO NOT REQUIRE ANY SPECIAL NEEDS AND DO NOT HAVE ANY LEARNING BARRIERS.          Directions for Reporting to Cardiology Waiting Area in the Hospital  If you park in the Parking Garage:  Take elevators to the1st floor of the parking garage.  Continue past the gift shop, coffee shop, and piano.  Take a right and go to the gold elevators. (Elevator B)  Take the elevator to the 3rd floor.  Follow the arrow on the sign on the wall that says Cath Lab Registration/EP Lab  Registration.  Follow the long hallway all the way around until you come to a big open area.  This is the registration area.  Check in at Reception Desk.     OR     If family is dropping you off:  Have them drop you off at the front of the Hospital under the green overhang.  Enter through the doors and take a right.  Take the 'E' elevators to the 3rd floor Cardiology Waiting Area.  Check in at the Reception Desk in the waiting room.

## 2024-04-19 ENCOUNTER — TELEPHONE (OUTPATIENT)
Dept: PODIATRY | Facility: CLINIC | Age: 74
End: 2024-04-19
Payer: MEDICARE

## 2024-04-19 NOTE — TELEPHONE ENCOUNTER
Spoke with patient to confirm his appointment on 04/22/2024 with Dr. Salgado(Podiatry), he stated he may come in if he can get a ride. I informed him to just let us know and we can get him rescheduled

## 2024-04-22 ENCOUNTER — OFFICE VISIT (OUTPATIENT)
Dept: PODIATRY | Facility: CLINIC | Age: 74
End: 2024-04-22
Payer: MEDICARE

## 2024-04-22 VITALS
BODY MASS INDEX: 31.41 KG/M2 | SYSTOLIC BLOOD PRESSURE: 138 MMHG | DIASTOLIC BLOOD PRESSURE: 79 MMHG | HEIGHT: 70 IN | HEART RATE: 65 BPM | WEIGHT: 219.38 LBS

## 2024-04-22 DIAGNOSIS — L84 CORNS/CALLOSITIES: ICD-10-CM

## 2024-04-22 DIAGNOSIS — L60.3 ONYCHODYSTROPHY: ICD-10-CM

## 2024-04-22 DIAGNOSIS — R53.1 LEFT-SIDED WEAKNESS: ICD-10-CM

## 2024-04-22 DIAGNOSIS — Z86.73 HISTORY OF STROKE: ICD-10-CM

## 2024-04-22 DIAGNOSIS — E11.49 TYPE II DIABETES MELLITUS WITH NEUROLOGICAL MANIFESTATIONS: Primary | ICD-10-CM

## 2024-04-22 PROCEDURE — 11721 DEBRIDE NAIL 6 OR MORE: CPT | Mod: Q8,59,S$GLB, | Performed by: PODIATRIST

## 2024-04-22 PROCEDURE — 11057 PARNG/CUTG B9 HYPRKR LES >4: CPT | Mod: Q8,S$GLB,, | Performed by: PODIATRIST

## 2024-04-22 PROCEDURE — 99999 PR PBB SHADOW E&M-EST. PATIENT-LVL IV: CPT | Mod: PBBFAC,,, | Performed by: PODIATRIST

## 2024-04-22 PROCEDURE — 99499 UNLISTED E&M SERVICE: CPT | Mod: S$GLB,,, | Performed by: PODIATRIST

## 2024-04-22 NOTE — PROGRESS NOTES
Subjective:      Patient ID: Jae Millan is a 73 y.o. male.    Chief Complaint: Diabetes Mellitus (PCP- 04/04/2024/Maryjane Farias MD) and Nail Care    Jae is a 73 y.o. male who returns to clinic for routine high risk foot care/exam. Has numbness & tingling since CVA B/L feet; also, pins and needles that hurt on occasion - on Gabapentin 300mg tid. Also needs nails/calluses trimming. Having cardiac stent placed next month.   This patient has documented high risk feet requiring routine maintenance secondary to peripheral neuropathy.    PCP: Maryjane Farias MD  Date Last Seen by PCP: 4/4/2024    Current shoe gear:  Tennis shoes    Using public transportation - lives [place] town near [facility].    Jae has a past medical history of Cataract, Coronary artery disease involving native coronary artery of native heart with angina pectoris (2/15/2024), Decreased sensation of lower extremity, Diabetes mellitus, Diabetic neuropathy, Dysarthria, Dysphagia, ED (erectile dysfunction), Glaucoma, Hemiparesis, High cholesterol, History of hepatitis C, s/p successful Rx w/ cure (SVR12) 4/2020, Hypertension, Impaired functional mobility, balance, gait, and endurance, Pre-op testing (4/8/2024), Pulmonary emphysema, unspecified emphysema type (2/1/2024), Stroke, and Urinary frequency.   Dx DM before Aug.2018 CVA - L hemiparesis UE & LE - PT.      Patient Active Problem List   Diagnosis    Essential hypertension    Type 2 diabetes mellitus with neurologic complication, without long-term current use of insulin    Hyperlipidemia, mixed    History of CVA with residual deficit    H/O colonoscopy    Impaired functional mobility, balance, gait, and endurance    Hemiparesis affecting left side as late effect of cerebrovascular accident (CVA)    Range of motion deficit    Inguinal hernia of left side without obstruction or gangrene    History of hepatitis C, s/p successful Rx w/ cure (SVR12) 4/2020    Hypogonadism in male     Erectile dysfunction    Vitamin D deficiency    Stage 3b chronic kidney disease    Type 2 diabetes mellitus with kidney complication, without long-term current use of insulin    Impaired mobility and ADLs    Absent plantar grasp reflex    Aortic atherosclerosis    Branch retinal vein occlusion of right eye with macular edema    Traumatic glaucoma, left, severe stage    Nuclear sclerotic cataract of both eyes    Severe obesity (BMI 35.0-39.9) with comorbidity    Hyperparathyroidism    Chest pain    Pulmonary emphysema, unspecified emphysema type    Coronary artery disease involving native coronary artery of native heart with angina pectoris    Pre-op testing      Hemoglobin A1C   Date Value Ref Range Status   02/01/2024 7.7 (H) 4.0 - 5.6 % Final     Comment:     ADA Screening Guidelines:  5.7-6.4%  Consistent with prediabetes  >or=6.5%  Consistent with diabetes    High levels of fetal hemoglobin interfere with the HbA1C  assay. Heterozygous hemoglobin variants (HbS, HgC, etc)do  not significantly interfere with this assay.   However, presence of multiple variants may affect accuracy.     10/09/2023 7.5 (H) 4.0 - 5.6 % Final     Comment:     ADA Screening Guidelines:  5.7-6.4%  Consistent with prediabetes  >or=6.5%  Consistent with diabetes    High levels of fetal hemoglobin interfere with the HbA1C  assay. Heterozygous hemoglobin variants (HbS, HgC, etc)do  not significantly interfere with this assay.   However, presence of multiple variants may affect accuracy.     07/24/2023 7.2 (H) 4.0 - 5.6 % Final     Comment:     ADA Screening Guidelines:  5.7-6.4%  Consistent with prediabetes  >or=6.5%  Consistent with diabetes    High levels of fetal hemoglobin interfere with the HbA1C  assay. Heterozygous hemoglobin variants (HbS, HgC, etc)do  not significantly interfere with this assay.   However, presence of multiple variants may affect accuracy.       Objective:      Physical Exam  Vitals reviewed.   Constitutional:        Appearance: He is well-developed.   Cardiovascular:      Pulses:         Dorsalis pedis pulses 1+ B/L. Audible & phasic per Doppler.     Posterior tibial pulses are diminished B/L   Edema B/L - can't wear compression as too tight & states has whole leg swollen so not using anything.  Musculoskeletal:     Equinus B/L ankles with < 90 deg foot to leg noted with knees extended.       MS strength of extrinsics to foot and ankle B/L + 5/5 in DF/PF/Inv/Ev to resistance w/ no reproduction of pain in any direction.      Passive ROM of ankle and pedal joints is painless & w/out crepitation B/L.      Pes valgus. Hammertoe deformities B/L.  Feet:      Nails 1-5 b/l are thickened, dystrophic, cryptotic & yellowish in coloration. Onychogryphosis 2 L. Significantly hypertrophic 1 & 4 B/L, 5 L. Symptomatic cryptotic medial R hallux nail border w/out sign of infection. Hyperkeratotic lesion noted to plantar hallux IPJ b/l, distal tips of toes 2-3 b/l. Skin lines present to lesion/s. No signs of deep tissue injury.    Skin:     General: Skin is dry. No open lesions noted.      Capillary Refill: Capillary refill takes more than 3 seconds.      Comments: Skin is thin, shiny, and cool, B/L.  Neurological:      Mental Status: He is alert.      Comments: diminished sensation noted BLE  including light touch, 2 point discrimination.     Paresthesias including pins and needles B/L feet w/ no clearly identified trigger or source; no hyperemia.      L hemiparesis - uses a cane.  Psych:  Patient is alert. Mood & affect is normal. Behavior is normal.    Assessment:       Encounter Diagnoses   Name Primary?    Type II diabetes mellitus with neurological manifestations Yes    Onychodystrophy     History of stroke     Left-sided weakness      Problem List Items Addressed This Visit    None  Visit Diagnoses       Type II diabetes mellitus with neurological manifestations    -  Primary    Onychodystrophy        History of stroke        Left-sided  weakness               Plan:       Jae was seen today for diabetes mellitus and nail care.    Diagnoses and all orders for this visit:    Type II diabetes mellitus with neurological manifestations    Onychodystrophy    History of stroke    Left-sided weakness    I counseled the patient on his conditions, their implications and medical management.    - Shoe inspection. Diabetic Foot Education. Patient reminded of the importance of good nutrition and blood sugar control to help prevent podiatric complications of diabetes. Patient instructed on proper foot hygeine. We discussed wearing proper shoe gear, daily foot inspections, never walking without protective shoe gear, caution putting sharp instruments to feet     - Discussed DM foot care:  Wear comfortable, proper fitting shoes. Wash feet daily. Dry well. After drying, apply moisturizer to feet (no lotion to webspaces). Inspect feet daily for skin breaks, blisters, swelling, or redness. Wear cotton socks (preferably white)  Change socks every day. Do NOT walk barefoot. Do NOT use heating pads or warm/hot water soaks     - With patient's permission, nails were aggressively reduced and debrided x 10 to their soft tissue attachment mechanically, removing all offending nail and debris.  The crypttic nail borders B/L hallux were cut back 3 mm from the nail fold and angled back to wedge out the offending nail in toto.  Patient relates relief following the procedure.      The affected area was cleansed with an alcohol prep pad. Next, utilizing a 5mm curette, the hyperkeratotic tissues were trimmed from plantar hallux IPJ b/l, distal tips of toes 2-3 b/l, down to appropriate level of skin. Care was taken to remove any nucleated core from the center of the lesion. No pinpoint bleeding was encountered. The patient tolerated relief following this procedure.     Continue Gabapentin as prescribed.     RTC 3 months, sooner PRN

## 2024-04-25 ENCOUNTER — OFFICE VISIT (OUTPATIENT)
Dept: NEPHROLOGY | Facility: CLINIC | Age: 74
End: 2024-04-25
Payer: MEDICARE

## 2024-04-25 VITALS
WEIGHT: 216.25 LBS | HEART RATE: 78 BPM | SYSTOLIC BLOOD PRESSURE: 130 MMHG | BODY MASS INDEX: 31.03 KG/M2 | OXYGEN SATURATION: 95 % | DIASTOLIC BLOOD PRESSURE: 76 MMHG

## 2024-04-25 DIAGNOSIS — N18.32 STAGE 3B CHRONIC KIDNEY DISEASE: Primary | ICD-10-CM

## 2024-04-25 DIAGNOSIS — E11.21 DIABETIC NEPHROPATHY ASSOCIATED WITH TYPE 2 DIABETES MELLITUS: ICD-10-CM

## 2024-04-25 DIAGNOSIS — N28.1 RENAL CYST: ICD-10-CM

## 2024-04-25 DIAGNOSIS — N18.30 BENIGN HYPERTENSION WITH CHRONIC KIDNEY DISEASE, STAGE III: ICD-10-CM

## 2024-04-25 DIAGNOSIS — I12.9 BENIGN HYPERTENSION WITH CHRONIC KIDNEY DISEASE, STAGE III: ICD-10-CM

## 2024-04-25 DIAGNOSIS — N25.81 SECONDARY HYPERPARATHYROIDISM OF RENAL ORIGIN: ICD-10-CM

## 2024-04-25 PROCEDURE — 1159F MED LIST DOCD IN RCRD: CPT | Mod: CPTII,S$GLB,, | Performed by: INTERNAL MEDICINE

## 2024-04-25 PROCEDURE — 99214 OFFICE O/P EST MOD 30 MIN: CPT | Mod: S$GLB,,, | Performed by: INTERNAL MEDICINE

## 2024-04-25 PROCEDURE — 3072F LOW RISK FOR RETINOPATHY: CPT | Mod: CPTII,S$GLB,, | Performed by: INTERNAL MEDICINE

## 2024-04-25 PROCEDURE — 3075F SYST BP GE 130 - 139MM HG: CPT | Mod: CPTII,S$GLB,, | Performed by: INTERNAL MEDICINE

## 2024-04-25 PROCEDURE — 3288F FALL RISK ASSESSMENT DOCD: CPT | Mod: CPTII,S$GLB,, | Performed by: INTERNAL MEDICINE

## 2024-04-25 PROCEDURE — 3008F BODY MASS INDEX DOCD: CPT | Mod: CPTII,S$GLB,, | Performed by: INTERNAL MEDICINE

## 2024-04-25 PROCEDURE — 3078F DIAST BP <80 MM HG: CPT | Mod: CPTII,S$GLB,, | Performed by: INTERNAL MEDICINE

## 2024-04-25 PROCEDURE — 99999 PR PBB SHADOW E&M-EST. PATIENT-LVL IV: CPT | Mod: PBBFAC,,, | Performed by: INTERNAL MEDICINE

## 2024-04-25 PROCEDURE — 1101F PT FALLS ASSESS-DOCD LE1/YR: CPT | Mod: CPTII,S$GLB,, | Performed by: INTERNAL MEDICINE

## 2024-04-25 PROCEDURE — 1125F AMNT PAIN NOTED PAIN PRSNT: CPT | Mod: CPTII,S$GLB,, | Performed by: INTERNAL MEDICINE

## 2024-04-25 PROCEDURE — 3066F NEPHROPATHY DOC TX: CPT | Mod: CPTII,S$GLB,, | Performed by: INTERNAL MEDICINE

## 2024-04-25 PROCEDURE — 3051F HG A1C>EQUAL 7.0%<8.0%: CPT | Mod: CPTII,S$GLB,, | Performed by: INTERNAL MEDICINE

## 2024-04-25 NOTE — PROGRESS NOTES
Nephrology Clinic Note   4/25/2024       History of present illness:  Patient is a 73 y.o. male. HTN, CKD, DM. Stroke with residual left sided weakness. here for CKD follow up . Patient denies NSAID use, no chest pain , no sob.  No urinary symptoms. Scr stable 2.1 at baseline        Review of Systems   Constitutional: Negative.    HENT: Negative.     Eyes: Negative.    Respiratory: Negative.     Cardiovascular: Negative.    Gastrointestinal: Negative.    Genitourinary: Negative.    Musculoskeletal: Negative.    Skin: Negative.    Endo/Heme/Allergies: Negative.    All other systems reviewed and are negative.      History:  Past Medical History:   Diagnosis Date    Cataract     Coronary artery disease involving native coronary artery of native heart with angina pectoris 2/15/2024    Decreased sensation of lower extremity     Diabetes mellitus     Diabetic neuropathy     Dysarthria     Dysphagia     ED (erectile dysfunction)     Glaucoma     Hemiparesis     LEFT side post CVA    High cholesterol     History of hepatitis C, s/p successful Rx w/ cure (SVR12) 4/2020     Hypertension     Impaired functional mobility, balance, gait, and endurance     Pre-op testing 4/8/2024    Pulmonary emphysema, unspecified emphysema type 2/1/2024    Stroke     Urinary frequency       Past Surgical History:   Procedure Laterality Date    COLONOSCOPY N/A 3/9/2023    Procedure: COLONOSCOPY;  Surgeon: Kian Kraft MD;  [facility]  Service: Endoscopy;  Laterality: N/A;  medical transportation-inst mail-tb  pre call complete-as    CORONARY ANGIOGRAPHY N/A 3/27/2024    Procedure: ANGIOGRAM, CORONARY ARTERY;  Surgeon: Sandoval Paul MD;  Location: [facility] CATH LAB;  Service: Cardiology;  Laterality: N/A;    INGUINAL HERNIA REPAIR Left 12/11/2019    UNDESCENDED TESTICLE EXPLORATION Right     R testicle removed as it was undescended        Current Outpatient Medications:     amLODIPine (NORVASC) 10 MG tablet, TAKE 1  "TABLET(10 MG) BY MOUTH EVERY DAY FOR HYPERTENSION, Disp: 90 tablet, Rfl: 3    aspirin 81 MG Chew, Take 1 tablet (81 mg total) by mouth once daily., Disp: , Rfl:     blood sugar diagnostic Strp, To check BG 4 times daily, to use with insurance preferred meter, Disp: 100 each, Rfl: 11    carvediloL (COREG) 6.25 MG tablet, Take 1 tablet (6.25 mg total) by mouth 2 (two) times daily., Disp: 180 tablet, Rfl: 3    clopidogreL (PLAVIX) 75 mg tablet, TAKE 1 TABLET(75 MG) BY MOUTH EVERY DAY, Disp: 90 tablet, Rfl: 3    dorzolamide-timolol 2-0.5% (COSOPT) 22.3-6.8 mg/mL ophthalmic solution, Place 1 drop into both eyes 2 (two) times daily., Disp: 10 mL, Rfl: 12    empagliflozin (JARDIANCE) 25 mg tablet, Take 1 tablet (25 mg total) by mouth once daily., Disp: 90 tablet, Rfl: 1    ergocalciferol (ERGOCALCIFEROL) 50,000 unit Cap, One weekly, Disp: 12 capsule, Rfl: 3    ezetimibe (ZETIA) 10 mg tablet, Take 1 tablet (10 mg total) by mouth once daily., Disp: 90 tablet, Rfl: 3    gabapentin (NEURONTIN) 300 MG capsule, Take 1 capsule (300 mg total) by mouth 2 (two) times daily. In 1-2 weeks, if no relief, may increase dose to 3 times per day., Disp: , Rfl:     glimepiride (AMARYL) 2 MG tablet, Take 1 tablet (2 mg total) by mouth before breakfast., Disp: 90 tablet, Rfl: 3    hydrocortisone 2.5 % cream, Apply topically 2 (two) times daily., Disp: , Rfl:     ketoconazole (NIZORAL) 2 % cream, Apply topically 2 (two) times daily., Disp: 60 g, Rfl: 1    lancets Misc, Check bg 4 times daily, Disp: 100 each, Rfl: 11    losartan-hydrochlorothiazide 100-25 mg (HYZAAR) 100-25 mg per tablet, Take 1 tablet by mouth once daily., Disp: 90 tablet, Rfl: 3    metFORMIN (GLUCOPHAGE) 1000 MG tablet, On daily (Patient taking differently: Take 1,000 mg by mouth daily with breakfast. On daily), Disp: 180 tablet, Rfl: 3    needle, disp, 18 G 18 gauge x 1" Ndle, 1 Device by Misc.(Non-Drug; Combo Route) route every 14 (fourteen) days. Use to draw testosterone, " "Disp: 10 each, Rfl: 11    needle, disp, 25 gauge 25 gauge x 1" Ndle, 1 Device by Misc.(Non-Drug; Combo Route) route every 14 (fourteen) days. Use to inject testosterone, Disp: 10 each, Rfl: 11    nitroGLYCERIN (NITROSTAT) 0.4 MG SL tablet, Place 1 tablet (0.4 mg total) under the tongue every 5 (five) minutes as needed for Chest pain., Disp: 30 tablet, Rfl: 1    pantoprazole (PROTONIX) 40 MG tablet, TAKE 1 TABLET(40 MG) BY MOUTH EVERY DAY FOR STOMACH, Disp: 90 tablet, Rfl: 3    rosuvastatin (CRESTOR) 20 MG tablet, Take 1 tablet (20 mg total) by mouth once daily., Disp: 90 tablet, Rfl: 3    SITagliptin phosphate (JANUVIA) 50 MG Tab, Take 1 tablet (50 mg total) by mouth once daily., Disp: 90 tablet, Rfl: 3    syringe, disposable, 3 mL Syrg, 1 mL by Misc.(Non-Drug; Combo Route) route every 14 (fourteen) days., Disp: 10 each, Rfl: 11    tadalafiL (CIALIS) 20 MG Tab, Take 1 tablet (20 mg total) by mouth Every 3 (three) days. for 10 days, Disp: 20 tablet, Rfl: 9    tamsulosin (FLOMAX) 0.4 mg Cap, One capsule at bedtime, Disp: 90 capsule, Rfl: 3    testosterone cypionate (DEPOTESTOTERONE CYPIONATE) 200 mg/mL injection, Inject 1 mL (200 mg total) into the muscle every 14 (fourteen) days., Disp: 10 mL, Rfl: 1  Review of patient's allergies indicates:  No Known Allergies   Social History     Tobacco Use    Smoking status: Former     Current packs/day: 0.00     Average packs/day: 1 pack/day for 18.0 years (18.0 ttl pk-yrs)     Types: Cigarettes     Start date: 1968     Quit date: 1986     Years since quittin.0    Smokeless tobacco: Never   Substance Use Topics    Alcohol use: Not Currently      Family History   Problem Relation Name Age of Onset    Heart disease Brother          cabg    Hypertension Sister      Amblyopia Neg Hx      Blindness Neg Hx      Cataracts Neg Hx      Glaucoma Neg Hx      Macular degeneration Neg Hx      Strabismus Neg Hx      Retinal detachment Neg Hx      Diabetes Neg Hx      "       Physical Exam  Vitals reviewed.   Constitutional:       Appearance: Normal appearance.   HENT:      Head: Normocephalic and atraumatic.      Mouth/Throat:      Mouth: Mucous membranes are moist.   Eyes:      Conjunctiva/sclera: Conjunctivae normal.      Pupils: Pupils are equal, round, and reactive to light.   Cardiovascular:      Rate and Rhythm: Normal rate and regular rhythm.      Pulses: Normal pulses.      Heart sounds: Normal heart sounds.   Pulmonary:      Effort: Pulmonary effort is normal.      Breath sounds: Normal breath sounds.   Abdominal:      General: Bowel sounds are normal.      Palpations: Abdomen is soft.   Musculoskeletal:         General: Normal range of motion.   Skin:     General: Skin is warm.      Capillary Refill: Capillary refill takes less than 2 seconds.   Neurological:      Mental Status: He is alert and oriented to person, place, and time.   Psychiatric:         Mood and Affect: Mood normal.         Labs reviewed   Images Reviewed    Assessment:    # Stage 3b chronic kidney disease secondary to DM and APOL 1 nephropathy    # Diabetic nephropathy associated with type 2 diabetes mellitus    # Secondary hyperparathyroidism of renal origin PTH 77 down from 110    #    HTN with CKD stage 3   #    right renal simple cyst on US feb/2024     Plan:  Last cr was 2.1 which is his baseline. Baseline range is 1.9-2.3   UPCR 0.24 g/g on Jardiance and losartan. Continue wih both of these medications, BP controlled   Educated about low K diet. Low phos diet. Low salt diet less than 2 grams per day   Avoid NSAIDS advised  Stressed importance of medication compliance   Stop metformin given advanced CKD/ bicarb 20   Hold off on bicarb therapy for now unless bicarb drops further in future bicarb is 20   Follow up in 6 months

## 2024-05-20 ENCOUNTER — LAB VISIT (OUTPATIENT)
Dept: LAB | Facility: HOSPITAL | Age: 74
End: 2024-05-20
Attending: INTERNAL MEDICINE
Payer: MEDICARE

## 2024-05-20 ENCOUNTER — TELEPHONE (OUTPATIENT)
Dept: CARDIOLOGY | Facility: CLINIC | Age: 74
End: 2024-05-20
Payer: MEDICARE

## 2024-05-20 DIAGNOSIS — I25.119 CORONARY ARTERY DISEASE INVOLVING NATIVE CORONARY ARTERY OF NATIVE HEART WITH ANGINA PECTORIS: ICD-10-CM

## 2024-05-20 DIAGNOSIS — N18.32 STAGE 3B CHRONIC KIDNEY DISEASE: ICD-10-CM

## 2024-05-20 LAB
ANION GAP SERPL CALC-SCNC: 10 MMOL/L (ref 8–16)
BUN SERPL-MCNC: 37 MG/DL (ref 8–23)
CALCIUM SERPL-MCNC: 9.2 MG/DL (ref 8.7–10.5)
CHLORIDE SERPL-SCNC: 105 MMOL/L (ref 95–110)
CO2 SERPL-SCNC: 24 MMOL/L (ref 23–29)
CREAT SERPL-MCNC: 2.3 MG/DL (ref 0.5–1.4)
EST. GFR  (NO RACE VARIABLE): 29.6 ML/MIN/1.73 M^2
GLUCOSE SERPL-MCNC: 171 MG/DL (ref 70–110)
POTASSIUM SERPL-SCNC: 3.9 MMOL/L (ref 3.5–5.1)
SODIUM SERPL-SCNC: 139 MMOL/L (ref 136–145)

## 2024-05-20 PROCEDURE — 36415 COLL VENOUS BLD VENIPUNCTURE: CPT | Performed by: INTERNAL MEDICINE

## 2024-05-20 PROCEDURE — 80048 BASIC METABOLIC PNL TOTAL CA: CPT | Performed by: INTERNAL MEDICINE

## 2024-05-20 NOTE — TELEPHONE ENCOUNTER
Contact with patient RE: creatinine level check.  Discussed with patient to increase his water intake and continue in preparation for procedure planned for 5/22/24.  Pt voiced understanding and will comply.

## 2024-05-21 ENCOUNTER — TELEPHONE (OUTPATIENT)
Dept: CARDIOLOGY | Facility: CLINIC | Age: 74
End: 2024-05-21
Payer: MEDICARE

## 2024-05-21 NOTE — TELEPHONE ENCOUNTER
Patient called office to report he forget to stop his diabetic medications and needs to reschedule his procedure.  Case has been removed as case was scheduled for tomorrow 5/22/24.

## 2024-06-03 DIAGNOSIS — I25.10 CORONARY ARTERY DISEASE: ICD-10-CM

## 2024-06-03 DIAGNOSIS — I25.10 CORONARY ARTERY DISEASE, UNSPECIFIED VESSEL OR LESION TYPE, UNSPECIFIED WHETHER ANGINA PRESENT, UNSPECIFIED WHETHER NATIVE OR TRANSPLANTED HEART: Primary | ICD-10-CM

## 2024-06-03 RX ORDER — DIPHENHYDRAMINE HCL 50 MG
50 CAPSULE ORAL ONCE
OUTPATIENT
Start: 2024-06-03 | End: 2024-06-03

## 2024-06-03 RX ORDER — SODIUM CHLORIDE 9 MG/ML
INJECTION, SOLUTION INTRAVENOUS CONTINUOUS
OUTPATIENT
Start: 2024-06-03 | End: 2024-06-03

## 2024-06-08 NOTE — PROGRESS NOTES
HPI     Glaucoma            Comments: HVF review today and pt states that bright lights hurt his eyes             Comments    DLS: 1/8/24    1. Traumatic Glaucoma OS - severe stage   2. Angle Recession OS - severe stage   3. Borderline glaucoma OD - low risk   4. APD OS  5. NS OU  6. BRVO with ME OD    MEDS:  Cosopt BID OU = PT STATES HE USES IT SOMETIMES              Last edited by Tamera Chew MD on 6/10/2024 10:51 AM.            Assessment /Plan     For exam results, see Encounter Report.    Traumatic glaucoma, left, severe stage    Angle recession of left eye    Afferent pupillary defect, left    Branch retinal vein occlusion of right eye with macular edema    Nuclear sclerotic cataract of both eyes    Macular retinal cyst, right    History of eye trauma    Traumatic glaucoma, left, indeterminate stage  -     dorzolamide-timolol 2-0.5% (COSOPT) 22.3-6.8 mg/mL ophthalmic solution; Place 1 drop into both eyes 2 (two) times daily.  Dispense: 10 mL; Refill: 12        LOST TO F/U 9/2019 TO 8/2022 - SAW DR GARCIA 8/3/2022 AND SENT BACK OVER - PT IS OFF GTTS        Glaucoma (type and duration)    GLAUCOMA - 2/2 trauma os - end stage    First HVF   2019   First photos   7/2019   Treatment / Drops started    Timolol BID OU , Azopt TID OU, Brimonidine BID OU       Stopped - ran out and did not refill            Family history    none        Glaucoma meds    cosopt os bid //  (use to use brim / azopt /timolol- os ) // off again 2/3/2023        H/O adverse rxn to glaucoma drops         LASERS    ??        GLAUCOMA SURGERIES    none        OTHER EYE SURGERIES    none        CDR    OD: 0.6 OS: 0.85        Tbase    16/18         Tmax    16/18            Ttarget    ??             HVF    3 test 2019 to 2024 - full  od --(improved w/ lid taped)  - ptosis  // 10-2 stim V - IAD         Gonio   +4 od // recess 360 os          CCT    585/599        OCT    2 test 2019 to 2021 - nl od // dec thru out os         Disc photos     "7/2019// 6/2024     - Ttoday   14/16 - back on  cosopt - - told to use OU   - Test done today  IOP / HVF 24-2 ss od and 10-2 stim V os // DFE // photos     2. Angle recession os     3. + APD os     4. NS     5. BRVO w/ ME od    This was/is his good eye     Pt new patient to me, used to be seen in [facility] for glaucoma. Pt ran out of medications in June 2019. Pt has no eye pain, red eye , flashes, floaters, changes in vision. PT has no hx of surgery or family hx of glaucoma.     8/29/2022   Pt lost to f/u x several years - off gtts   Recommend restart eye drops   cosopt os bid- RE-START (2/3/2023) // off again - again re-start bid os - told to use it indefinetly / for ever (9/25/2023)     Retina ([facility]) - evaluation of right macula - see OCT - this is his "good" eye // BRVO w/ ME od    Has seen Dr Jordan x 3 - did NOT get avastin 2/2 cardiac issues(7/27/2023)   Did NOT get ozurdex - pt defered (8/31/2023) - is to F/U in 2-3 months - make sure he has an appt     1/8/2024   Pt gets confused on if he is using the copst in the right or  the left eye - told him to just use it in BOTH eyes so he does not have to keep it straight     Keep F/U with Nikki - 6 months from 10/26/2023 - past due     F/u 4 months - with IOP and gonio // review retina notes   "

## 2024-06-10 ENCOUNTER — CLINICAL SUPPORT (OUTPATIENT)
Dept: OPHTHALMOLOGY | Facility: CLINIC | Age: 74
End: 2024-06-10
Payer: MEDICARE

## 2024-06-10 ENCOUNTER — OFFICE VISIT (OUTPATIENT)
Dept: OPHTHALMOLOGY | Facility: CLINIC | Age: 74
End: 2024-06-10
Payer: MEDICARE

## 2024-06-10 DIAGNOSIS — H21.552 ANGLE RECESSION OF LEFT EYE: ICD-10-CM

## 2024-06-10 DIAGNOSIS — H40.32X3 TRAUMATIC GLAUCOMA, LEFT, SEVERE STAGE: Primary | ICD-10-CM

## 2024-06-10 DIAGNOSIS — H40.32X4 TRAUMATIC GLAUCOMA, LEFT, INDETERMINATE STAGE: ICD-10-CM

## 2024-06-10 DIAGNOSIS — H35.341 MACULAR RETINAL CYST, RIGHT: ICD-10-CM

## 2024-06-10 DIAGNOSIS — H25.13 NUCLEAR SCLEROTIC CATARACT OF BOTH EYES: ICD-10-CM

## 2024-06-10 DIAGNOSIS — H34.8310 BRANCH RETINAL VEIN OCCLUSION OF RIGHT EYE WITH MACULAR EDEMA: ICD-10-CM

## 2024-06-10 DIAGNOSIS — Z87.828 HISTORY OF EYE TRAUMA: ICD-10-CM

## 2024-06-10 DIAGNOSIS — H21.562 AFFERENT PUPILLARY DEFECT, LEFT: ICD-10-CM

## 2024-06-10 PROCEDURE — 99214 OFFICE O/P EST MOD 30 MIN: CPT | Mod: S$GLB,,, | Performed by: OPHTHALMOLOGY

## 2024-06-10 PROCEDURE — 92250 FUNDUS PHOTOGRAPHY W/I&R: CPT | Mod: S$GLB,,, | Performed by: OPHTHALMOLOGY

## 2024-06-10 PROCEDURE — 99999 PR PBB SHADOW E&M-EST. PATIENT-LVL III: CPT | Mod: PBBFAC,,, | Performed by: OPHTHALMOLOGY

## 2024-06-10 PROCEDURE — 3066F NEPHROPATHY DOC TX: CPT | Mod: CPTII,S$GLB,, | Performed by: OPHTHALMOLOGY

## 2024-06-10 PROCEDURE — 3288F FALL RISK ASSESSMENT DOCD: CPT | Mod: CPTII,S$GLB,, | Performed by: OPHTHALMOLOGY

## 2024-06-10 PROCEDURE — 1126F AMNT PAIN NOTED NONE PRSNT: CPT | Mod: CPTII,S$GLB,, | Performed by: OPHTHALMOLOGY

## 2024-06-10 PROCEDURE — 1160F RVW MEDS BY RX/DR IN RCRD: CPT | Mod: CPTII,S$GLB,, | Performed by: OPHTHALMOLOGY

## 2024-06-10 PROCEDURE — 2023F DILAT RTA XM W/O RTNOPTHY: CPT | Mod: CPTII,S$GLB,, | Performed by: OPHTHALMOLOGY

## 2024-06-10 PROCEDURE — 92083 EXTENDED VISUAL FIELD XM: CPT | Mod: S$GLB,,, | Performed by: OPHTHALMOLOGY

## 2024-06-10 PROCEDURE — 1159F MED LIST DOCD IN RCRD: CPT | Mod: CPTII,S$GLB,, | Performed by: OPHTHALMOLOGY

## 2024-06-10 PROCEDURE — 3051F HG A1C>EQUAL 7.0%<8.0%: CPT | Mod: CPTII,S$GLB,, | Performed by: OPHTHALMOLOGY

## 2024-06-10 PROCEDURE — 1101F PT FALLS ASSESS-DOCD LE1/YR: CPT | Mod: CPTII,S$GLB,, | Performed by: OPHTHALMOLOGY

## 2024-06-10 RX ORDER — DORZOLAMIDE HYDROCHLORIDE AND TIMOLOL MALEATE 20; 5 MG/ML; MG/ML
1 SOLUTION/ DROPS OPHTHALMIC 2 TIMES DAILY
Qty: 10 ML | Refills: 12 | Status: SHIPPED | OUTPATIENT
Start: 2024-06-10

## 2024-06-10 NOTE — Clinical Note
Pt was to see you for 6 mos F/U - BRVO w/ ME in only good eye about April or may - but never got an appt - can you have your techs make hin an appt - no rush - relatively stable

## 2024-06-10 NOTE — PROGRESS NOTES
Visual field test done.  Patient stated no latex allergies used coverlet      Fixation poor os   good od           Sphere Cylinder Axis Add   Right +1.00 +1.25 005 +2.50   Left +0.75 +1.25 175 +2.50   Type: Bifocal

## 2024-06-17 ENCOUNTER — TELEPHONE (OUTPATIENT)
Dept: OPHTHALMOLOGY | Facility: CLINIC | Age: 74
End: 2024-06-17
Payer: MEDICARE

## 2024-06-17 NOTE — TELEPHONE ENCOUNTER
Titi Jordan MD Smith, Tracey         Previous Messages       ----- Message -----  From: Tamera Chew MD  Sent: 6/10/2024  11:07 AM CDT  To: Titi Jordan MD    Pt was to see you for 6 mos F/U - BRVO w/ ME in only good eye about April or may - but never got an appt - can you have your techs make hin an appt - no rush - relatively stable  Called pt and left message to marita appt.

## 2024-07-02 ENCOUNTER — OFFICE VISIT (OUTPATIENT)
Dept: GASTROENTEROLOGY | Facility: CLINIC | Age: 74
End: 2024-07-02
Payer: MEDICARE

## 2024-07-02 VITALS
BODY MASS INDEX: 31.66 KG/M2 | HEART RATE: 62 BPM | WEIGHT: 220.69 LBS | SYSTOLIC BLOOD PRESSURE: 118 MMHG | DIASTOLIC BLOOD PRESSURE: 72 MMHG

## 2024-07-02 DIAGNOSIS — R19.2: ICD-10-CM

## 2024-07-02 PROCEDURE — 1159F MED LIST DOCD IN RCRD: CPT | Mod: CPTII,S$GLB,, | Performed by: STUDENT IN AN ORGANIZED HEALTH CARE EDUCATION/TRAINING PROGRAM

## 2024-07-02 PROCEDURE — 99204 OFFICE O/P NEW MOD 45 MIN: CPT | Mod: S$GLB,,, | Performed by: STUDENT IN AN ORGANIZED HEALTH CARE EDUCATION/TRAINING PROGRAM

## 2024-07-02 PROCEDURE — 99999 PR PBB SHADOW E&M-EST. PATIENT-LVL V: CPT | Mod: PBBFAC,,, | Performed by: STUDENT IN AN ORGANIZED HEALTH CARE EDUCATION/TRAINING PROGRAM

## 2024-07-02 PROCEDURE — 1101F PT FALLS ASSESS-DOCD LE1/YR: CPT | Mod: CPTII,S$GLB,, | Performed by: STUDENT IN AN ORGANIZED HEALTH CARE EDUCATION/TRAINING PROGRAM

## 2024-07-02 PROCEDURE — 3066F NEPHROPATHY DOC TX: CPT | Mod: CPTII,S$GLB,, | Performed by: STUDENT IN AN ORGANIZED HEALTH CARE EDUCATION/TRAINING PROGRAM

## 2024-07-02 PROCEDURE — 1125F AMNT PAIN NOTED PAIN PRSNT: CPT | Mod: CPTII,S$GLB,, | Performed by: STUDENT IN AN ORGANIZED HEALTH CARE EDUCATION/TRAINING PROGRAM

## 2024-07-02 PROCEDURE — 3078F DIAST BP <80 MM HG: CPT | Mod: CPTII,S$GLB,, | Performed by: STUDENT IN AN ORGANIZED HEALTH CARE EDUCATION/TRAINING PROGRAM

## 2024-07-02 PROCEDURE — 3051F HG A1C>EQUAL 7.0%<8.0%: CPT | Mod: CPTII,S$GLB,, | Performed by: STUDENT IN AN ORGANIZED HEALTH CARE EDUCATION/TRAINING PROGRAM

## 2024-07-02 PROCEDURE — 3008F BODY MASS INDEX DOCD: CPT | Mod: CPTII,S$GLB,, | Performed by: STUDENT IN AN ORGANIZED HEALTH CARE EDUCATION/TRAINING PROGRAM

## 2024-07-02 PROCEDURE — 3074F SYST BP LT 130 MM HG: CPT | Mod: CPTII,S$GLB,, | Performed by: STUDENT IN AN ORGANIZED HEALTH CARE EDUCATION/TRAINING PROGRAM

## 2024-07-02 PROCEDURE — 3288F FALL RISK ASSESSMENT DOCD: CPT | Mod: CPTII,S$GLB,, | Performed by: STUDENT IN AN ORGANIZED HEALTH CARE EDUCATION/TRAINING PROGRAM

## 2024-07-02 NOTE — PROGRESS NOTES
"    [facility] Consultation Note    Reason for Consult:  The encounter diagnosis was Increased peristalsis.    PCP:   Maryjane Farias   [address], [address] / [place] [id number]    Referring MD:  Maryjane Farias Md  [address]  [address] [place] [address]    HPI:  This is a 73 y.o. male here for evaluation of "increased peristalsis."    He notes that he has been feeling something passing through his intestines for a few months.  He hears audible noises from his abdomen as this is occurring.  This occurs intermittently and does occur daily.  He notes it most commonly after drinking liquid.      He does have some associated bloating and gassiness, but denies any increased belching/flatulence.      He moves his bowels once daily without any difficulty.    He denies any abdominal pain, nausea, vomiting, constipation, or diarrhea.     Colonoscopy done in March 2023 was notable for a 15mm right sided adenomatous polyp.          Objective Findings:    Vital Signs:  /72   Pulse 62   Wt 100.1 kg (220 lb 10.9 oz)   BMI 31.66 kg/m²   Body mass index is 31.66 kg/m².    Physical Exam:  General Appearance: Well appearing in no acute distress    Abdomen: Soft, non tender, non distended with positive bowel sounds in all four quadrants. No hepatosplenomegaly, ascites, or mass      Assessment:  1. Increased peristalsis      In brief, the patient presents with intermittent abdominal noises and the sensation that something is moving through his intestines.     He is without any alarming or red flag symptoms.  I am not entirely sure what he problem is, but he is concerned that this is not normal for him.  I recommended he begin an OTC fiber supplement and will obtain a small bowel study.      Reassurance provided today.      Follow up if symptoms worsen or fail to improve.      Order summary:  Orders Placed This Encounter    FL Upper GI With Small Bowel (xpd) "         Thank you so much for allowing me to participate in the care of Jae Tubbs MD

## 2024-07-02 NOTE — PATIENT INSTRUCTIONS
-Begin a once daily fiber supplement - metamucil, citrucel or benefiber    - We will schedule a Small bowel study

## 2024-07-08 ENCOUNTER — OFFICE VISIT (OUTPATIENT)
Dept: INTERNAL MEDICINE | Facility: CLINIC | Age: 74
End: 2024-07-08
Payer: MEDICARE

## 2024-07-08 VITALS
SYSTOLIC BLOOD PRESSURE: 122 MMHG | HEIGHT: 70 IN | HEART RATE: 68 BPM | BODY MASS INDEX: 31.12 KG/M2 | OXYGEN SATURATION: 97 % | DIASTOLIC BLOOD PRESSURE: 70 MMHG | WEIGHT: 217.38 LBS

## 2024-07-08 DIAGNOSIS — E78.2 HYPERLIPIDEMIA, MIXED: ICD-10-CM

## 2024-07-08 DIAGNOSIS — I25.119 CORONARY ARTERY DISEASE INVOLVING NATIVE CORONARY ARTERY OF NATIVE HEART WITH ANGINA PECTORIS: ICD-10-CM

## 2024-07-08 DIAGNOSIS — I10 ESSENTIAL HYPERTENSION: ICD-10-CM

## 2024-07-08 DIAGNOSIS — I69.30 HISTORY OF CVA WITH RESIDUAL DEFICIT: ICD-10-CM

## 2024-07-08 DIAGNOSIS — E11.21 TYPE 2 DIABETES MELLITUS WITH DIABETIC NEPHROPATHY, WITHOUT LONG-TERM CURRENT USE OF INSULIN: Primary | ICD-10-CM

## 2024-07-08 PROCEDURE — 1125F AMNT PAIN NOTED PAIN PRSNT: CPT | Mod: CPTII,S$GLB,, | Performed by: INTERNAL MEDICINE

## 2024-07-08 PROCEDURE — 3051F HG A1C>EQUAL 7.0%<8.0%: CPT | Mod: CPTII,S$GLB,, | Performed by: INTERNAL MEDICINE

## 2024-07-08 PROCEDURE — 3008F BODY MASS INDEX DOCD: CPT | Mod: CPTII,S$GLB,, | Performed by: INTERNAL MEDICINE

## 2024-07-08 PROCEDURE — 3074F SYST BP LT 130 MM HG: CPT | Mod: CPTII,S$GLB,, | Performed by: INTERNAL MEDICINE

## 2024-07-08 PROCEDURE — 99999 PR PBB SHADOW E&M-EST. PATIENT-LVL IV: CPT | Mod: PBBFAC,,, | Performed by: INTERNAL MEDICINE

## 2024-07-08 PROCEDURE — 3078F DIAST BP <80 MM HG: CPT | Mod: CPTII,S$GLB,, | Performed by: INTERNAL MEDICINE

## 2024-07-08 PROCEDURE — 99214 OFFICE O/P EST MOD 30 MIN: CPT | Mod: S$GLB,,, | Performed by: INTERNAL MEDICINE

## 2024-07-08 PROCEDURE — 3066F NEPHROPATHY DOC TX: CPT | Mod: CPTII,S$GLB,, | Performed by: INTERNAL MEDICINE

## 2024-07-08 PROCEDURE — 1101F PT FALLS ASSESS-DOCD LE1/YR: CPT | Mod: CPTII,S$GLB,, | Performed by: INTERNAL MEDICINE

## 2024-07-08 PROCEDURE — 3288F FALL RISK ASSESSMENT DOCD: CPT | Mod: CPTII,S$GLB,, | Performed by: INTERNAL MEDICINE

## 2024-07-08 PROCEDURE — 2023F DILAT RTA XM W/O RTNOPTHY: CPT | Mod: CPTII,S$GLB,, | Performed by: INTERNAL MEDICINE

## 2024-07-08 RX ORDER — TAMSULOSIN HYDROCHLORIDE 0.4 MG/1
CAPSULE ORAL
Qty: 90 CAPSULE | Refills: 3 | Status: SHIPPED | OUTPATIENT
Start: 2024-07-08

## 2024-07-12 ENCOUNTER — TELEPHONE (OUTPATIENT)
Dept: OPTOMETRY | Facility: CLINIC | Age: 74
End: 2024-07-12
Payer: MEDICARE

## 2024-07-12 ENCOUNTER — CLINICAL SUPPORT (OUTPATIENT)
Dept: OPHTHALMOLOGY | Facility: CLINIC | Age: 74
End: 2024-07-12
Attending: OPHTHALMOLOGY
Payer: MEDICARE

## 2024-07-12 ENCOUNTER — OFFICE VISIT (OUTPATIENT)
Dept: OPHTHALMOLOGY | Facility: CLINIC | Age: 74
End: 2024-07-12
Payer: MEDICARE

## 2024-07-12 DIAGNOSIS — H34.8310 BRANCH RETINAL VEIN OCCLUSION OF RIGHT EYE WITH MACULAR EDEMA: Primary | ICD-10-CM

## 2024-07-12 DIAGNOSIS — H25.13 NUCLEAR SCLEROTIC CATARACT OF BOTH EYES: ICD-10-CM

## 2024-07-12 DIAGNOSIS — H21.552 ANGLE RECESSION OF LEFT EYE: ICD-10-CM

## 2024-07-12 DIAGNOSIS — H21.562 AFFERENT PUPILLARY DEFECT, LEFT: ICD-10-CM

## 2024-07-12 DIAGNOSIS — H40.32X3 TRAUMATIC GLAUCOMA, LEFT, SEVERE STAGE: ICD-10-CM

## 2024-07-12 DIAGNOSIS — H34.8310 BRANCH RETINAL VEIN OCCLUSION OF RIGHT EYE WITH MACULAR EDEMA: ICD-10-CM

## 2024-07-12 LAB
LEFT EYE DM RETINOPATHY: NEGATIVE
RIGHT EYE DM RETINOPATHY: NEGATIVE

## 2024-07-12 PROCEDURE — 99999 PR PBB SHADOW E&M-EST. PATIENT-LVL II: CPT | Mod: PBBFAC,,, | Performed by: OPHTHALMOLOGY

## 2024-07-12 NOTE — PROGRESS NOTES
HPI    6 moDFE// OCTm OU // BRVO w/ ME     10/26/2023 by Dr. JACQUI Jordan MD    CC. Pt reports     Eye MEDS:   Cosopt BID OU = PT STATES HE USES IT SOMETIMES     POHx:  1. Traumatic Glaucoma OS - severe stage   2. Angle Recession OS - severe stage   3. Borderline glaucoma OD - low risk   4. APD OS   5. NS OU   6. BRVO with ME OD       Last edited by Katharine Becerril MA on 7/12/2024 11:42 AM.         A/P    ICD-10-CM ICD-9-CM   1. Branch retinal vein occlusion of right eye with macular edema  H34.8310 362.36     362.83   2. Traumatic glaucoma, left, severe stage  H40.32X3 365.65     365.73   3. Angle recession of left eye  H21.552 364.77   4. Afferent pupillary defect, left  H21.562 364.75   5. Nuclear sclerotic cataract of both eyes  H25.13 366.16           1. Branch retinal vein occlusion of right eye with macular edema  Here for CME/ f/u, over due  Hx glauc OS    Exam notable for small tertiary BRVO OD with stable IRF, VA 20/40 (was 20/50) we tried PF/Ket in past but no improvement, pt did not want injections in past     Plan: monitor IRF for now, NVS likely and stable, recheck in 6 mo     Visit today is associated with current or anticipated ongoing medical care related to this patients single serious condition/complex condition (retinal vein occlusion)       2. Traumatic glaucoma, left, severe stage  3. Angle recession of left eye  4. Afferent pupillary defect, left  Remote hx of trauma in 1960s (finger to eye)  Nerve pallor  IOP 20/21  today, f/b Dr. Chew - Recent notes reviewed   Plan: Continue cosopt BID OU     5. Nuclear sclerotic cataract of both eyes  Mild NS, borderline VS OD given monocular  Plan: Observation for now, consider CEIOL in future    RTC  6 mo DFE/OCTm OU monitor CME  RTC Naina as scheduled      I saw and examined the patient and reviewed in detail the findings documented. The final examination findings, image interpretations, and plan as documented in the record represent my personal  judgment and conclusions.    Titi Jordan MD  Vitreoretinal Surgery   [facility]

## 2024-07-30 ENCOUNTER — PATIENT OUTREACH (OUTPATIENT)
Dept: ADMINISTRATIVE | Facility: HOSPITAL | Age: 74
End: 2024-07-30
Payer: MEDICARE

## 2024-08-06 ENCOUNTER — TELEPHONE (OUTPATIENT)
Dept: CARDIOLOGY | Facility: CLINIC | Age: 74
End: 2024-08-06
Payer: MEDICARE

## 2024-08-08 ENCOUNTER — LAB VISIT (OUTPATIENT)
Dept: LAB | Facility: HOSPITAL | Age: 74
End: 2024-08-08
Attending: INTERNAL MEDICINE
Payer: MEDICARE

## 2024-08-08 DIAGNOSIS — I25.10 CORONARY ARTERY DISEASE, UNSPECIFIED VESSEL OR LESION TYPE, UNSPECIFIED WHETHER ANGINA PRESENT, UNSPECIFIED WHETHER NATIVE OR TRANSPLANTED HEART: ICD-10-CM

## 2024-08-08 LAB
ANION GAP SERPL CALC-SCNC: 13 MMOL/L (ref 8–16)
BUN SERPL-MCNC: 30 MG/DL (ref 8–23)
CALCIUM SERPL-MCNC: 9.6 MG/DL (ref 8.7–10.5)
CHLORIDE SERPL-SCNC: 105 MMOL/L (ref 95–110)
CO2 SERPL-SCNC: 22 MMOL/L (ref 23–29)
CREAT SERPL-MCNC: 2.2 MG/DL (ref 0.5–1.4)
ERYTHROCYTE [DISTWIDTH] IN BLOOD BY AUTOMATED COUNT: 15.4 % (ref 11.5–14.5)
EST. GFR  (NO RACE VARIABLE): 31.2 ML/MIN/1.73 M^2
GLUCOSE SERPL-MCNC: 164 MG/DL (ref 70–110)
HCT VFR BLD AUTO: 41.3 % (ref 40–54)
HGB BLD-MCNC: 13.4 G/DL (ref 14–18)
MCH RBC QN AUTO: 26.9 PG (ref 27–31)
MCHC RBC AUTO-ENTMCNC: 32.4 G/DL (ref 32–36)
MCV RBC AUTO: 83 FL (ref 82–98)
PLATELET # BLD AUTO: 214 K/UL (ref 150–450)
PMV BLD AUTO: 12.2 FL (ref 9.2–12.9)
POTASSIUM SERPL-SCNC: 4.2 MMOL/L (ref 3.5–5.1)
RBC # BLD AUTO: 4.99 M/UL (ref 4.6–6.2)
SODIUM SERPL-SCNC: 140 MMOL/L (ref 136–145)
WBC # BLD AUTO: 6.71 K/UL (ref 3.9–12.7)

## 2024-08-08 PROCEDURE — 85027 COMPLETE CBC AUTOMATED: CPT | Performed by: INTERNAL MEDICINE

## 2024-08-08 PROCEDURE — 80048 BASIC METABOLIC PNL TOTAL CA: CPT | Performed by: INTERNAL MEDICINE

## 2024-08-08 PROCEDURE — 36415 COLL VENOUS BLD VENIPUNCTURE: CPT | Performed by: INTERNAL MEDICINE

## 2024-08-09 RX ORDER — ROSUVASTATIN CALCIUM 20 MG/1
20 TABLET, COATED ORAL DAILY
Qty: 90 TABLET | Refills: 2 | Status: SHIPPED | OUTPATIENT
Start: 2024-08-09

## 2024-08-09 NOTE — TELEPHONE ENCOUNTER
Care Due:                  Date            Visit Type   Department     Provider  --------------------------------------------------------------------------------                                EP -                              PRIMARY      MET INTERNAL  Last Visit: 07-      CARE ([facility])   MEDICINE       Maryjane Farias  Next Visit: None Scheduled  None         None Found                                                            Last  Test          Frequency    Reason                     Performed    Due Date  --------------------------------------------------------------------------------    HBA1C.......  6 months...  SITagliptin,               02- 07-                             empagliflozin,                             glimepiride..............    [employer] Embedded Care Due Messages. Reference number: [id number].   8/09/2024 1:47:23 PM CDT

## 2024-08-09 NOTE — TELEPHONE ENCOUNTER
Pt would like refills of glimepiride (AMARYL) 2 MG tablet and rosuvastatin (CRESTOR) 20 MG tablet. Thanks

## 2024-08-09 NOTE — TELEPHONE ENCOUNTER
----- Message from Lorraine Nelson sent at 8/9/2024  1:35 PM CDT -----  Contact: [phone]    Rx Refill/Request     Is this a Refill or New Rx:  refill   Rx Name and Strength:  glimepiride (AMARYL) 2 MG tablet                                          rosuvastatin (CRESTOR) 20 MG tablet    Preferred Pharmacy with phone number:  [id number] [place] [id number] AT [facility] [address]  [facility]  [facility] [address]  Phone: [phone] Fax: [phone]     Communication Preference:call  Additional Information

## 2024-08-10 NOTE — TELEPHONE ENCOUNTER
Refill Routing Note   Medication(s) are not appropriate for processing by [facility] for the following reason(s):        Required labs outdated  Required labs abnormal    [facility] action(s):  Approve  Defer   Requires labs : Yes             Appointments  past 12m or future 3m with PCP    Date Provider   Last Visit   7/8/2024 Maryjane Farias MD   Next Visit   Visit date not found Maryjane Farias MD   ED visits in past 90 days: 0        Note composed:10:26 PM 08/09/2024

## 2024-08-11 RX ORDER — GLIMEPIRIDE 2 MG/1
2 TABLET ORAL
Qty: 90 TABLET | Refills: 3 | Status: SHIPPED | OUTPATIENT
Start: 2024-08-11

## 2024-08-12 ENCOUNTER — TELEPHONE (OUTPATIENT)
Dept: CARDIOLOGY | Facility: CLINIC | Age: 74
End: 2024-08-12
Payer: MEDICARE

## 2024-08-12 NOTE — TELEPHONE ENCOUNTER
Reminder to patient to hydrate today.  Pt with questions, all questions answered.  Pt aware nothing to eat after midnight.  Water only.

## 2024-08-12 NOTE — TELEPHONE ENCOUNTER
Attempt to call patient to remind him of pre procedure instructions, no answer,  mail box full.  Unable to leave voice message.

## 2024-08-13 ENCOUNTER — HOSPITAL ENCOUNTER (OUTPATIENT)
Facility: HOSPITAL | Age: 74
Discharge: HOME | End: 2024-08-13
Attending: INTERNAL MEDICINE | Admitting: INTERNAL MEDICINE
Payer: MEDICARE

## 2024-08-13 VITALS
SYSTOLIC BLOOD PRESSURE: 148 MMHG | BODY MASS INDEX: 31.5 KG/M2 | HEART RATE: 59 BPM | DIASTOLIC BLOOD PRESSURE: 76 MMHG | RESPIRATION RATE: 16 BRPM | WEIGHT: 220 LBS | OXYGEN SATURATION: 95 % | HEIGHT: 70 IN | TEMPERATURE: 97 F

## 2024-08-13 DIAGNOSIS — I25.10 CORONARY ARTERY DISEASE: ICD-10-CM

## 2024-08-13 DIAGNOSIS — I25.10 CORONARY ARTERY DISEASE, UNSPECIFIED VESSEL OR LESION TYPE, UNSPECIFIED WHETHER ANGINA PRESENT, UNSPECIFIED WHETHER NATIVE OR TRANSPLANTED HEART: ICD-10-CM

## 2024-08-13 DIAGNOSIS — I25.10 CAD (CORONARY ARTERY DISEASE): ICD-10-CM

## 2024-08-13 DIAGNOSIS — Z01.818 PREOPERATIVE TESTING: ICD-10-CM

## 2024-08-13 LAB
ABO + RH BLD: NORMAL
ANION GAP SERPL CALC-SCNC: 11 MMOL/L (ref 8–16)
BLD GP AB SCN CELLS X3 SERPL QL: NORMAL
BUN SERPL-MCNC: 33 MG/DL (ref 8–23)
CALCIUM SERPL-MCNC: 9.1 MG/DL (ref 8.7–10.5)
CHLORIDE SERPL-SCNC: 108 MMOL/L (ref 95–110)
CO2 SERPL-SCNC: 22 MMOL/L (ref 23–29)
CREAT SERPL-MCNC: 2.3 MG/DL (ref 0.5–1.4)
EST. GFR  (NO RACE VARIABLE): 29.6 ML/MIN/1.73 M^2
GLUCOSE SERPL-MCNC: 171 MG/DL (ref 70–110)
OHS QRS DURATION: 138 MS
OHS QTC CALCULATION: 449 MS
POCT GLUCOSE: 166 MG/DL (ref 70–110)
POCT GLUCOSE: 189 MG/DL (ref 70–110)
POTASSIUM SERPL-SCNC: 4.1 MMOL/L (ref 3.5–5.1)
SODIUM SERPL-SCNC: 141 MMOL/L (ref 136–145)
SPECIMEN OUTDATE: NORMAL

## 2024-08-13 PROCEDURE — 93005 ELECTROCARDIOGRAM TRACING: CPT

## 2024-08-13 PROCEDURE — 25500020 PHARM REV CODE 255: Performed by: INTERNAL MEDICINE

## 2024-08-13 PROCEDURE — 25000003 PHARM REV CODE 250: Performed by: INTERNAL MEDICINE

## 2024-08-13 PROCEDURE — C9600 PERC DRUG-EL COR STENT SING: HCPCS | Mod: LD | Performed by: INTERNAL MEDICINE

## 2024-08-13 PROCEDURE — 99153 MOD SED SAME PHYS/QHP EA: CPT | Performed by: INTERNAL MEDICINE

## 2024-08-13 PROCEDURE — 86900 BLOOD TYPING SEROLOGIC ABO: CPT | Performed by: INTERNAL MEDICINE

## 2024-08-13 PROCEDURE — 82962 GLUCOSE BLOOD TEST: CPT | Performed by: INTERNAL MEDICINE

## 2024-08-13 PROCEDURE — C1769 GUIDE WIRE: HCPCS | Performed by: INTERNAL MEDICINE

## 2024-08-13 PROCEDURE — C1887 CATHETER, GUIDING: HCPCS | Performed by: INTERNAL MEDICINE

## 2024-08-13 PROCEDURE — C1725 CATH, TRANSLUMIN NON-LASER: HCPCS | Performed by: INTERNAL MEDICINE

## 2024-08-13 PROCEDURE — C1760 CLOSURE DEV, VASC: HCPCS | Performed by: INTERNAL MEDICINE

## 2024-08-13 PROCEDURE — 85347 COAGULATION TIME ACTIVATED: CPT | Performed by: INTERNAL MEDICINE

## 2024-08-13 PROCEDURE — 92978 ENDOLUMINL IVUS OCT C 1ST: CPT | Mod: LD | Performed by: INTERNAL MEDICINE

## 2024-08-13 PROCEDURE — 93010 ELECTROCARDIOGRAM REPORT: CPT | Mod: ,,, | Performed by: INTERNAL MEDICINE

## 2024-08-13 PROCEDURE — 27201423 OPTIME MED/SURG SUP & DEVICES STERILE SUPPLY: Performed by: INTERNAL MEDICINE

## 2024-08-13 PROCEDURE — 93005 ELECTROCARDIOGRAM TRACING: CPT | Mod: 59

## 2024-08-13 PROCEDURE — 99152 MOD SED SAME PHYS/QHP 5/>YRS: CPT | Mod: ,,, | Performed by: INTERNAL MEDICINE

## 2024-08-13 PROCEDURE — 92978 ENDOLUMINL IVUS OCT C 1ST: CPT | Mod: 26,LD,, | Performed by: INTERNAL MEDICINE

## 2024-08-13 PROCEDURE — C1894 INTRO/SHEATH, NON-LASER: HCPCS | Performed by: INTERNAL MEDICINE

## 2024-08-13 PROCEDURE — 80048 BASIC METABOLIC PNL TOTAL CA: CPT | Performed by: INTERNAL MEDICINE

## 2024-08-13 PROCEDURE — 92928 PRQ TCAT PLMT NTRAC ST 1 LES: CPT | Mod: LD,,, | Performed by: INTERNAL MEDICINE

## 2024-08-13 PROCEDURE — 86901 BLOOD TYPING SEROLOGIC RH(D): CPT | Performed by: INTERNAL MEDICINE

## 2024-08-13 PROCEDURE — C1753 CATH, INTRAVAS ULTRASOUND: HCPCS | Performed by: INTERNAL MEDICINE

## 2024-08-13 PROCEDURE — 99152 MOD SED SAME PHYS/QHP 5/>YRS: CPT | Performed by: INTERNAL MEDICINE

## 2024-08-13 PROCEDURE — C1874 STENT, COATED/COV W/DEL SYS: HCPCS | Performed by: INTERNAL MEDICINE

## 2024-08-13 PROCEDURE — 63600175 PHARM REV CODE 636 W HCPCS: Mod: JZ,JG | Performed by: INTERNAL MEDICINE

## 2024-08-13 DEVICE — EVEROLIMUS-ELUTING PLATINUM CHROMIUM CORONARY STENT SYSTEM
Type: IMPLANTABLE DEVICE | Site: HEART | Status: FUNCTIONAL
Brand: SYNERGY™ XD

## 2024-08-13 DEVICE — ANGIO-SEAL VIP VASCULAR CLOSURE DEVICE
Type: IMPLANTABLE DEVICE | Site: GROIN | Status: FUNCTIONAL
Brand: ANGIO-SEAL

## 2024-08-13 RX ORDER — SODIUM CHLORIDE 9 MG/ML
INJECTION, SOLUTION INTRAVENOUS CONTINUOUS
Status: DISCONTINUED | OUTPATIENT
Start: 2024-08-13 | End: 2024-08-13 | Stop reason: HOSPADM

## 2024-08-13 RX ORDER — NAPROXEN SODIUM 220 MG/1
324 TABLET, FILM COATED ORAL ONCE
Status: DISCONTINUED | OUTPATIENT
Start: 2024-08-13 | End: 2024-08-13 | Stop reason: HOSPADM

## 2024-08-13 RX ORDER — SODIUM CHLORIDE 9 MG/ML
INJECTION, SOLUTION INTRAVENOUS CONTINUOUS
Status: ACTIVE | OUTPATIENT
Start: 2024-08-13 | End: 2024-08-13

## 2024-08-13 RX ORDER — ACETAMINOPHEN 325 MG/1
650 TABLET ORAL EVERY 4 HOURS PRN
Status: DISCONTINUED | OUTPATIENT
Start: 2024-08-13 | End: 2024-08-13 | Stop reason: HOSPADM

## 2024-08-13 RX ORDER — CLOPIDOGREL BISULFATE 300 MG/1
600 TABLET, FILM COATED ORAL ONCE
Status: DISCONTINUED | OUTPATIENT
Start: 2024-08-13 | End: 2024-08-13 | Stop reason: HOSPADM

## 2024-08-13 RX ORDER — LIDOCAINE HYDROCHLORIDE 20 MG/ML
INJECTION, SOLUTION EPIDURAL; INFILTRATION; INTRACAUDAL; PERINEURAL
Status: DISCONTINUED | OUTPATIENT
Start: 2024-08-13 | End: 2024-08-13 | Stop reason: HOSPADM

## 2024-08-13 RX ORDER — HEPARIN SOD,PORCINE/0.9 % NACL 1000/500ML
INTRAVENOUS SOLUTION INTRAVENOUS
Status: DISCONTINUED | OUTPATIENT
Start: 2024-08-13 | End: 2024-08-13 | Stop reason: HOSPADM

## 2024-08-13 RX ORDER — FENTANYL CITRATE 50 UG/ML
INJECTION, SOLUTION INTRAMUSCULAR; INTRAVENOUS
Status: DISCONTINUED | OUTPATIENT
Start: 2024-08-13 | End: 2024-08-13 | Stop reason: HOSPADM

## 2024-08-13 RX ORDER — DIPHENHYDRAMINE HCL 50 MG
50 CAPSULE ORAL ONCE
Status: COMPLETED | OUTPATIENT
Start: 2024-08-13 | End: 2024-08-13

## 2024-08-13 RX ORDER — ONDANSETRON 8 MG/1
8 TABLET, ORALLY DISINTEGRATING ORAL EVERY 8 HOURS PRN
Status: DISCONTINUED | OUTPATIENT
Start: 2024-08-13 | End: 2024-08-13 | Stop reason: HOSPADM

## 2024-08-13 RX ORDER — CEFAZOLIN SODIUM 1 G/3ML
INJECTION, POWDER, FOR SOLUTION INTRAMUSCULAR; INTRAVENOUS
Status: DISCONTINUED | OUTPATIENT
Start: 2024-08-13 | End: 2024-08-13 | Stop reason: HOSPADM

## 2024-08-13 RX ORDER — HEPARIN SODIUM 1000 [USP'U]/ML
INJECTION, SOLUTION INTRAVENOUS; SUBCUTANEOUS
Status: DISCONTINUED | OUTPATIENT
Start: 2024-08-13 | End: 2024-08-13 | Stop reason: HOSPADM

## 2024-08-13 RX ORDER — MIDAZOLAM HYDROCHLORIDE 1 MG/ML
INJECTION, SOLUTION INTRAMUSCULAR; INTRAVENOUS
Status: DISCONTINUED | OUTPATIENT
Start: 2024-08-13 | End: 2024-08-13 | Stop reason: HOSPADM

## 2024-08-13 RX ADMIN — DIPHENHYDRAMINE HYDROCHLORIDE 50 MG: 50 CAPSULE ORAL at 01:08

## 2024-08-13 RX ADMIN — SODIUM CHLORIDE: 9 INJECTION, SOLUTION INTRAVENOUS at 01:08

## 2024-08-13 NOTE — OP NOTE
Brief Operative Note:    : Sandoval Paul MD     Referring Physician: Sandoval Paul     All Operators: Surgeon(s):  Hank Harry MD Patel, Rajan Amish G, MD     Preoperative Diagnosis: CAD with exertional angina  Abnormal PET stress test     Postop Diagnosis: s/p PCI     Treatments/Procedures: Procedure(s) (LRB):  Stent, Drug Eluting, Single Vessel, Coronary (N/A)  IVUS, Coronary    Access: Right CFA    Findings:Severe coronary artery disease is present.     See catheterization report for full details.    Intervention: PCI to LAD.  Will do staged PCI to Lcx later due to CKD stage 4     See catheterization report for full details.    Closure device: Angioseal       Plan:  - Post cath protocol   - IVF @ 150 cc//hr x 4 hours  - Bed rest x 4 hours   - Continue aspirin 81 mg daily indefinitely  - Continue Plavix for minimum 6 months, ideally up to 1 year  - Continue high intensity statin therapy (LDL goal < 70)  - Risk factor reduction (BP <130/80 mmHg, glycemic control, etc)  - Cardiac rehab referral  - Follow up with outpatient cardiologist    Estimated Blood loss: 5 cc    Specimens removed: No    Hank Harry MD  [facility]

## 2024-08-13 NOTE — H&P
[facility] - Short Stay Cardiac Unit  Interventional Cardiology  H&P    Patient Name: Jae Millan  MRN: [id number]  Admission Date: 8/13/2024  Code Status: Prior   Attending Provider: Sandoval Paul MD   Primary Care Physician: Maryjane Farias MD  Principal Problem:<principal problem not specified>    Patient information was obtained from patient.     Subjective:     Chief Complaint:  YU     HPI:  72 y.o. male HTN, HLD, DM2, CVA ( 2018, with residual left-sided weakness), CKD3 presents to the clinic to discuss about PCI options.  He underwent angiogram last month that showed multivessel disease.  He was evaluated by CTS and he is worried about postop recovery and he wants to go with PCI.  Patient complains of having only chest pain and shortness a breath on exertion.  Denies having any orthopnea, PND, bilateral lower extremity edema, presyncope, syncope.  His angiogram showed mid LAD lesion with moderate distal left main and ostial LAD lesion.  He also had OM1 lesion.  His creatinine today is 2.1.  His baseline is around 1.6.  He follows with Nephrology.    Today patient reports he feels at baseline. Does not ambulate quickly because it causes dyspnea. Patient is unsure about his aspirin and plavix regimen; will reload.  DAPT: asa/plavix  AA: coreg 6.25mg BID, amlodipine 10mg qd  Other meds: rosuvastatin 20mg, zetia 10mg, losartan-hctz 100-25mg daily  4/8/24 echo with 60-65% EF, no valve disease.  8/8/ labs: hgb 13.4, plt 214, cr 2.2 (2.1-2.3 baseline since April 20204)      Past Medical History:   Diagnosis Date    Cataract     Coronary artery disease involving native coronary artery of native heart with angina pectoris 02/15/2024    Decreased sensation of lower extremity     Diabetes mellitus     Diabetic neuropathy     Dysarthria     Dysphagia     ED (erectile dysfunction)     Glaucoma     Hemiparesis     LEFT side post CVA    High cholesterol     History of hepatitis C, s/p successful Rx w/ cure  (SVR12) 4/2020     Hypertension     Impaired functional mobility, balance, gait, and endurance     Pre-op testing 04/08/2024    Pulmonary emphysema, unspecified emphysema type 02/01/2024    Stroke     Urinary frequency        Past Surgical History:   Procedure Laterality Date    COLONOSCOPY N/A 3/9/2023    Procedure: COLONOSCOPY;  Surgeon: Kian Kraft MD;  Location: [facility] ENDO (4TH FLR);  Service: Endoscopy;  Laterality: N/A;  medical transportation-inst mail-tb  pre call complete-as    CORONARY ANGIOGRAPHY N/A 3/27/2024    Procedure: ANGIOGRAM, CORONARY ARTERY;  Surgeon: Sandoval Paul MD;  Location: [facility] CATH LAB;  Service: Cardiology;  Laterality: N/A;    INGUINAL HERNIA REPAIR Left 12/11/2019    UNDESCENDED TESTICLE EXPLORATION Right     R testicle removed as it was undescended       Review of patient's allergies indicates:  No Known Allergies    PTA Medications   Medication Sig    amLODIPine (NORVASC) 10 MG tablet TAKE 1 TABLET(10 MG) BY MOUTH EVERY DAY FOR HYPERTENSION    aspirin 81 MG Chew Take 1 tablet (81 mg total) by mouth once daily.    carvediloL (COREG) 6.25 MG tablet Take 1 tablet (6.25 mg total) by mouth 2 (two) times daily.    clopidogreL (PLAVIX) 75 mg tablet TAKE 1 TABLET(75 MG) BY MOUTH EVERY DAY    dorzolamide-timolol 2-0.5% (COSOPT) 22.3-6.8 mg/mL ophthalmic solution Place 1 drop into both eyes 2 (two) times daily.    ergocalciferol (ERGOCALCIFEROL) 50,000 unit Cap One weekly    ezetimibe (ZETIA) 10 mg tablet Take 1 tablet (10 mg total) by mouth once daily.    gabapentin (NEURONTIN) 300 MG capsule Take 1 capsule (300 mg total) by mouth 2 (two) times daily. In 1-2 weeks, if no relief, may increase dose to 3 times per day.    hydrocortisone 2.5 % cream Apply topically 2 (two) times daily.    ketoconazole (NIZORAL) 2 % cream Apply topically 2 (two) times daily.    losartan-hydrochlorothiazide 100-25 mg (HYZAAR) 100-25 mg per tablet Take 1 tablet by mouth once daily.    pantoprazole  (PROTONIX) 40 MG tablet TAKE 1 TABLET(40 MG) BY MOUTH EVERY DAY FOR STOMACH    rosuvastatin (CRESTOR) 20 MG tablet Take 1 tablet (20 mg total) by mouth once daily.    tamsulosin (FLOMAX) 0.4 mg Cap One capsule at bedtime    blood sugar diagnostic Strp To check BG 4 times daily, to use with insurance preferred meter    empagliflozin (JARDIANCE) 25 mg tablet Take 1 tablet (25 mg total) by mouth once daily.    glimepiride (AMARYL) 2 MG tablet Take 1 tablet (2 mg total) by mouth before breakfast.    lancets Misc Check bg 4 times daily    nitroGLYCERIN (NITROSTAT) 0.4 MG SL tablet Place 1 tablet (0.4 mg total) under the tongue every 5 (five) minutes as needed for Chest pain.    SITagliptin phosphate (JANUVIA) 50 MG Tab Take 1 tablet (50 mg total) by mouth once daily.    tadalafiL (CIALIS) 20 MG Tab Take 1 tablet (20 mg total) by mouth Every 3 (three) days. for 10 days    testosterone cypionate (DEPOTESTOTERONE CYPIONATE) 200 mg/mL injection Inject 1 mL (200 mg total) into the muscle every 14 (fourteen) days.     Family History       Problem Relation (Age of Onset)    Heart disease Brother    Hypertension Sister          Tobacco Use    Smoking status: Former     Current packs/day: 0.00     Average packs/day: 1 pack/day for 18.0 years (18.0 ttl pk-yrs)     Types: Cigarettes     Start date: 1968     Quit date: 1986     Years since quittin.3    Smokeless tobacco: Never   Substance and Sexual Activity    Alcohol use: Not Currently    Drug use: Never    Sexual activity: Not Currently     Review of Systems   Constitutional: Negative for chills, fever, malaise/fatigue and night sweats.   HENT:  Negative for congestion, nosebleeds, sore throat, stridor and tinnitus.    Eyes:  Negative for blurred vision, discharge, double vision, pain, vision loss in left eye, vision loss in right eye, visual disturbance and visual halos.   Cardiovascular:  Positive for dyspnea on exertion. Negative for chest pain, leg swelling,  near-syncope, orthopnea, palpitations and syncope.   Respiratory:  Negative for cough, hemoptysis, shortness of breath, snoring, sputum production and wheezing.    Endocrine: Negative for cold intolerance, heat intolerance and polydipsia.   Hematologic/Lymphatic: Negative for adenopathy and bleeding problem. Does not bruise/bleed easily.   Skin:  Negative for color change, dry skin, flushing, poor wound healing and suspicious lesions.   Musculoskeletal:  Negative for arthritis, back pain, gout, joint pain and joint swelling.   Gastrointestinal:  Negative for bloating, abdominal pain, constipation and diarrhea.   Genitourinary:  Negative for dysuria, frequency and hematuria.   Neurological:  Negative for dizziness, focal weakness, headaches, light-headedness, loss of balance, numbness and weakness.   Psychiatric/Behavioral:  Negative for altered mental status, hallucinations and hypervigilance. The patient is not nervous/anxious.      Objective:     Vital Signs (Most Recent):  Temp: 97.7 °F (36.5 °C) (08/13/24 1342)  Pulse: 63 (08/13/24 1342)  Resp: 16 (08/13/24 1342)  BP: 130/68 (08/13/24 1342)  SpO2: 98 % (08/13/24 1342) Vital Signs (24h Range):  Temp:  [97.7 °F (36.5 °C)] 97.7 °F (36.5 °C)  Pulse:  [63] 63  Resp:  [16] 16  SpO2:  [98 %] 98 %  BP: (130)/(68) 130/68     Weight: 99.8 kg (220 lb)  Body mass index is 31.57 kg/m².    SpO2: 98 %       No intake or output data in the 24 hours ending 08/13/24 1404    Lines/Drains/Airways       Peripheral Intravenous Line  Duration                  Peripheral IV - Single Lumen 08/13/24 1336 20 G Right Hand <1 day         Peripheral IV - Single Lumen 08/13/24 1336 20 G Right Hand <1 day                     Physical Exam  Constitutional:       General: He is not in acute distress.     Appearance: Normal appearance. He is normal weight. He is not toxic-appearing.   HENT:      Head: Normocephalic and atraumatic.   Eyes:      General:         Right eye: No discharge.          Left eye: No discharge.      Extraocular Movements: Extraocular movements intact.      Conjunctiva/sclera: Conjunctivae normal.      Pupils: Pupils are equal, round, and reactive to light.   Cardiovascular:      Rate and Rhythm: Normal rate and regular rhythm.      Pulses: Normal pulses.      Heart sounds: Normal heart sounds. No murmur heard.     No friction rub.      Comments: Access:  Right leg: fem+, DP+, PT+  Left leg: fem+, DP+, PT (-)  Right arm: radial+  Left arm: radial+  1+ pitting edema to bilateral ankles    Pulmonary:      Effort: Pulmonary effort is normal. No respiratory distress.      Breath sounds: Normal breath sounds. No wheezing or rales.   Abdominal:      General: Abdomen is flat. Bowel sounds are normal. There is no distension.      Palpations: Abdomen is soft.      Tenderness: There is no abdominal tenderness. There is no guarding.   Musculoskeletal:         General: No swelling, tenderness or deformity. Normal range of motion.      Cervical back: Normal range of motion and neck supple. No rigidity.      Right lower leg: No edema.      Left lower leg: No edema.   Skin:     General: Skin is warm and dry.      Capillary Refill: Capillary refill takes less than 2 seconds.      Coloration: Skin is not jaundiced or pale.      Findings: No bruising.   Neurological:      General: No focal deficit present.      Mental Status: He is alert and oriented to person, place, and time. Mental status is at baseline.   Psychiatric:         Mood and Affect: Mood normal.         Thought Content: Thought content normal.         Judgment: Judgment normal.            Significant Labs: All pertinent lab results from the last 24 hours have been reviewed.    Significant Imaging:  None  Assessment and Plan:     Cardiac/Vascular  Coronary artery disease involving native coronary artery of native heart with angina pectoris  Patient underwent PET stress that showed ischemia in the LAD territory.  Diagnostic angiogram  showed mid LAD lesion with questionable distal left main and ostial LAD lesion  He was evaluated by CT surgery but patient wanted to pursue with PCI  Echo showed normal ejection fraction with EF of 65%.     Plan  We will plan PCI for his mid LAD (Lesion preparation with rota atherectomy due to calcium )and  check IVUS on his distal left main and ostial LAD  Patient was advised to hydrate him with good amount of water she days prior to the procedure     --LHC +/- PCI, patient is a LLUVIA candidate  - Anti-platelet Therapy: ASA / Plavix. Load now  - Access: Right CFA  - Catheters: EBU 3.5  - Creatinine/CrCl: 2.1  - Allergies: No shellfish / Iodine allergy  - Pre-Hydration: NS  - Pre-Op Med: Bendaryl 50mg pO   - All patient's questions were answered.  -The risks, benefits and alternatives of the procedure were explained to the patient.   -The risks of coronary angiography include but are not limited to: bleeding, infection, heart rhythm abnormalities, allergic reactions, kidney injury and potential need for dialysis, stroke and death.   - Should stenting be indicated, the patient has agreed to dual anti-platelet therapy for 1-consecutive year with a drug-eluting stent and a minimum of 1-month with the use of a bare metal stent  - Additionally, pt is aware that non-compliance is likely to result in stent clotting with heart attack, heart failure, and/or death  -The risks of moderate sedation include hypotension, respiratory depression, arrhythmias, bronchospasm, and death.   - Informed consent was obtained and the  patient is agreeable to proceed with the procedure.        VTE Risk Mitigation (From admission, onward)      None              Lewis Rico MD  Interventional Cardiology   [facility] Short Stay Cardiac [facility]

## 2024-08-13 NOTE — SUBJECTIVE & OBJECTIVE
Past Medical History:   Diagnosis Date    Cataract     Coronary artery disease involving native coronary artery of native heart with angina pectoris 02/15/2024    Decreased sensation of lower extremity     Diabetes mellitus     Diabetic neuropathy     Dysarthria     Dysphagia     ED (erectile dysfunction)     Glaucoma     Hemiparesis     LEFT side post CVA    High cholesterol     History of hepatitis C, s/p successful Rx w/ cure (SVR12) 4/2020     Hypertension     Impaired functional mobility, balance, gait, and endurance     Pre-op testing 04/08/2024    Pulmonary emphysema, unspecified emphysema type 02/01/2024    Stroke     Urinary frequency        Past Surgical History:   Procedure Laterality Date    COLONOSCOPY N/A 3/9/2023    Procedure: COLONOSCOPY;  Surgeon: Kian Kraft MD;  [facility]  Service: Endoscopy;  Laterality: N/A;  medical transportation-inst mail-tb  pre call complete-as    CORONARY ANGIOGRAPHY N/A 3/27/2024    Procedure: ANGIOGRAM, CORONARY ARTERY;  Surgeon: Sandoval Paul MD;  Location: [facility] CATH LAB;  Service: Cardiology;  Laterality: N/A;    INGUINAL HERNIA REPAIR Left 12/11/2019    UNDESCENDED TESTICLE EXPLORATION Right     R testicle removed as it was undescended       Review of patient's allergies indicates:  No Known Allergies    PTA Medications   Medication Sig    amLODIPine (NORVASC) 10 MG tablet TAKE 1 TABLET(10 MG) BY MOUTH EVERY DAY FOR HYPERTENSION    aspirin 81 MG Chew Take 1 tablet (81 mg total) by mouth once daily.    carvediloL (COREG) 6.25 MG tablet Take 1 tablet (6.25 mg total) by mouth 2 (two) times daily.    clopidogreL (PLAVIX) 75 mg tablet TAKE 1 TABLET(75 MG) BY MOUTH EVERY DAY    dorzolamide-timolol 2-0.5% (COSOPT) 22.3-6.8 mg/mL ophthalmic solution Place 1 drop into both eyes 2 (two) times daily.    ergocalciferol (ERGOCALCIFEROL) 50,000 unit Cap One weekly    ezetimibe (ZETIA) 10 mg tablet Take 1 tablet (10 mg total) by mouth once daily.     gabapentin (NEURONTIN) 300 MG capsule Take 1 capsule (300 mg total) by mouth 2 (two) times daily. In 1-2 weeks, if no relief, may increase dose to 3 times per day.    hydrocortisone 2.5 % cream Apply topically 2 (two) times daily.    ketoconazole (NIZORAL) 2 % cream Apply topically 2 (two) times daily.    losartan-hydrochlorothiazide 100-25 mg (HYZAAR) 100-25 mg per tablet Take 1 tablet by mouth once daily.    pantoprazole (PROTONIX) 40 MG tablet TAKE 1 TABLET(40 MG) BY MOUTH EVERY DAY FOR STOMACH    rosuvastatin (CRESTOR) 20 MG tablet Take 1 tablet (20 mg total) by mouth once daily.    tamsulosin (FLOMAX) 0.4 mg Cap One capsule at bedtime    blood sugar diagnostic Strp To check BG 4 times daily, to use with insurance preferred meter    empagliflozin (JARDIANCE) 25 mg tablet Take 1 tablet (25 mg total) by mouth once daily.    glimepiride (AMARYL) 2 MG tablet Take 1 tablet (2 mg total) by mouth before breakfast.    lancets Misc Check bg 4 times daily    nitroGLYCERIN (NITROSTAT) 0.4 MG SL tablet Place 1 tablet (0.4 mg total) under the tongue every 5 (five) minutes as needed for Chest pain.    SITagliptin phosphate (JANUVIA) 50 MG Tab Take 1 tablet (50 mg total) by mouth once daily.    tadalafiL (CIALIS) 20 MG Tab Take 1 tablet (20 mg total) by mouth Every 3 (three) days. for 10 days    testosterone cypionate (DEPOTESTOTERONE CYPIONATE) 200 mg/mL injection Inject 1 mL (200 mg total) into the muscle every 14 (fourteen) days.     Family History       Problem Relation (Age of Onset)    Heart disease Brother    Hypertension Sister          Tobacco Use    Smoking status: Former     Current packs/day: 0.00     Average packs/day: 1 pack/day for 18.0 years (18.0 ttl pk-yrs)     Types: Cigarettes     Start date: 1968     Quit date: 1986     Years since quittin.3    Smokeless tobacco: Never   Substance and Sexual Activity    Alcohol use: Not Currently    Drug use: Never    Sexual activity: Not Currently      Review of Systems   Constitutional: Negative for chills, fever, malaise/fatigue and night sweats.   HENT:  Negative for congestion, nosebleeds, sore throat, stridor and tinnitus.    Eyes:  Negative for blurred vision, discharge, double vision, pain, vision loss in left eye, vision loss in right eye, visual disturbance and visual halos.   Cardiovascular:  Positive for dyspnea on exertion. Negative for chest pain, leg swelling, near-syncope, orthopnea, palpitations and syncope.   Respiratory:  Negative for cough, hemoptysis, shortness of breath, snoring, sputum production and wheezing.    Endocrine: Negative for cold intolerance, heat intolerance and polydipsia.   Hematologic/Lymphatic: Negative for adenopathy and bleeding problem. Does not bruise/bleed easily.   Skin:  Negative for color change, dry skin, flushing, poor wound healing and suspicious lesions.   Musculoskeletal:  Negative for arthritis, back pain, gout, joint pain and joint swelling.   Gastrointestinal:  Negative for bloating, abdominal pain, constipation and diarrhea.   Genitourinary:  Negative for dysuria, frequency and hematuria.   Neurological:  Negative for dizziness, focal weakness, headaches, light-headedness, loss of balance, numbness and weakness.   Psychiatric/Behavioral:  Negative for altered mental status, hallucinations and hypervigilance. The patient is not nervous/anxious.      Objective:     Vital Signs (Most Recent):  Temp: 97.7 °F (36.5 °C) (08/13/24 1342)  Pulse: 63 (08/13/24 1342)  Resp: 16 (08/13/24 1342)  BP: 130/68 (08/13/24 1342)  SpO2: 98 % (08/13/24 1342) Vital Signs (24h Range):  Temp:  [97.7 °F (36.5 °C)] 97.7 °F (36.5 °C)  Pulse:  [63] 63  Resp:  [16] 16  SpO2:  [98 %] 98 %  BP: (130)/(68) 130/68     Weight: 99.8 kg (220 lb)  Body mass index is 31.57 kg/m².    SpO2: 98 %       No intake or output data in the 24 hours ending 08/13/24 1404    Lines/Drains/Airways       Peripheral Intravenous Line  Duration                   Peripheral IV - Single Lumen 08/13/24 1336 20 G Right Hand <1 day         Peripheral IV - Single Lumen 08/13/24 1336 20 G Right Hand <1 day                     Physical Exam  Constitutional:       General: He is not in acute distress.     Appearance: Normal appearance. He is normal weight. He is not toxic-appearing.   HENT:      Head: Normocephalic and atraumatic.   Eyes:      General:         Right eye: No discharge.         Left eye: No discharge.      Extraocular Movements: Extraocular movements intact.      Conjunctiva/sclera: Conjunctivae normal.      Pupils: Pupils are equal, round, and reactive to light.   Cardiovascular:      Rate and Rhythm: Normal rate and regular rhythm.      Pulses: Normal pulses.      Heart sounds: Normal heart sounds. No murmur heard.     No friction rub.      Comments: Access:  Right leg: fem+, DP+, PT+  Left leg: fem+, DP+, PT (-)  Right arm: radial+  Left arm: radial+  1+ pitting edema to bilateral ankles    Pulmonary:      Effort: Pulmonary effort is normal. No respiratory distress.      Breath sounds: Normal breath sounds. No wheezing or rales.   Abdominal:      General: Abdomen is flat. Bowel sounds are normal. There is no distension.      Palpations: Abdomen is soft.      Tenderness: There is no abdominal tenderness. There is no guarding.   Musculoskeletal:         General: No swelling, tenderness or deformity. Normal range of motion.      Cervical back: Normal range of motion and neck supple. No rigidity.      Right lower leg: No edema.      Left lower leg: No edema.   Skin:     General: Skin is warm and dry.      Capillary Refill: Capillary refill takes less than 2 seconds.      Coloration: Skin is not jaundiced or pale.      Findings: No bruising.   Neurological:      General: No focal deficit present.      Mental Status: He is alert and oriented to person, place, and time. Mental status is at baseline.   Psychiatric:         Mood and Affect: Mood normal.         Thought  Content: Thought content normal.         Judgment: Judgment normal.            Significant Labs: All pertinent lab results from the last 24 hours have been reviewed.    Significant Imaging:  None

## 2024-08-13 NOTE — Clinical Note
Called pt in regards to his mychart apt request for a cpe apt. LVM to call us back, was going to offer [facility] next available on March 23,2023. The procedural consent was signed. The blood consent was signed. A history and physical note was completed in the chart.

## 2024-08-13 NOTE — PLAN OF CARE
Received report from [facility]. Patient s/p [facility], AAOx3. VSS, no c/o pain or discomfort at this time, resp even and unlabored. Gauze/tegaderm dressing to R groin is CDI. No active bleeding. No hematoma noted. Post procedure protocol reviewed with patient. Understanding verbalized.  Nurse call bell within reach.

## 2024-08-13 NOTE — ASSESSMENT & PLAN NOTE
Patient underwent PET stress that showed ischemia in the LAD territory.  Diagnostic angiogram showed mid LAD lesion with questionable distal left main and ostial LAD lesion  He was evaluated by CT surgery but patient wanted to pursue with PCI  Echo showed normal ejection fraction with EF of 65%.     Plan  We will plan PCI for his mid LAD (Lesion preparation with rota atherectomy due to calcium )and  check IVUS on his distal left main and ostial LAD  Patient was advised to hydrate him with good amount of water she days prior to the procedure     --LHC +/- PCI, patient is a LLUVIA candidate  - Anti-platelet Therapy: ASA / Plavix. Load now  - Access: Right CFA  - Catheters: EBU 3.5  - Creatinine/CrCl: 2.1  - Allergies: No shellfish / Iodine allergy  - Pre-Hydration: NS  - Pre-Op Med: Bendaryl 50mg pO   - All patient's questions were answered.  -The risks, benefits and alternatives of the procedure were explained to the patient.   -The risks of coronary angiography include but are not limited to: bleeding, infection, heart rhythm abnormalities, allergic reactions, kidney injury and potential need for dialysis, stroke and death.   - Should stenting be indicated, the patient has agreed to dual anti-platelet therapy for 1-consecutive year with a drug-eluting stent and a minimum of 1-month with the use of a bare metal stent  - Additionally, pt is aware that non-compliance is likely to result in stent clotting with heart attack, heart failure, and/or death  -The risks of moderate sedation include hypotension, respiratory depression, arrhythmias, bronchospasm, and death.   - Informed consent was obtained and the  patient is agreeable to proceed with the procedure.

## 2024-08-13 NOTE — HPI
72 y.o. male HTN, HLD, DM2, CVA ( 2018, with residual left-sided weakness), CKD3 presents to the clinic to discuss about PCI options.  He underwent angiogram last month that showed multivessel disease.  He was evaluated by CTS and he is worried about postop recovery and he wants to go with PCI.  Patient complains of having only chest pain and shortness a breath on exertion.  Denies having any orthopnea, PND, bilateral lower extremity edema, presyncope, syncope.  His angiogram showed mid LAD lesion with moderate distal left main and ostial LAD lesion.  He also had OM1 lesion.  His creatinine today is 2.1.  His baseline is around 1.6.  He follows with Nephrology.    Today patient reports he feels at baseline. Does not ambulate quickly because it causes dyspnea. Patient is unsure about his aspirin and plavix regimen; will reload.  DAPT: asa/plavix  AA: coreg 6.25mg BID, amlodipine 10mg qd  Other meds: rosuvastatin 20mg, zetia 10mg, losartan-hctz 100-25mg daily  4/8/24 echo with 60-65% EF, no valve disease.  8/8/ labs: hgb 13.4, plt 214, cr 2.2 (2.1-2.3 baseline since April 20204)

## 2024-08-13 NOTE — Clinical Note
The catheter was inserted into the distal   left anterior descending. IVUS was performed of the LAD-LM. Catheter removed.

## 2024-08-14 ENCOUNTER — TELEPHONE (OUTPATIENT)
Dept: CARDIAC REHAB | Facility: CLINIC | Age: 74
End: 2024-08-14
Payer: MEDICARE

## 2024-08-14 LAB
OHS QRS DURATION: 138 MS
OHS QTC CALCULATION: 462 MS
POC ACTIVATED CLOTTING TIME K: 232 SEC (ref 74–137)
POC ACTIVATED CLOTTING TIME K: 317 SEC (ref 74–137)
SAMPLE: ABNORMAL
SAMPLE: ABNORMAL

## 2024-08-14 NOTE — TELEPHONE ENCOUNTER
Information packet & letter regarding Phase II cardiac rehab was sent to patient.  Will contact patient in 2 weeks to see if interested.    Lashaun Dunlap RN  Cardiac Rehab Nurse

## 2024-08-14 NOTE — NURSING
Pt ambulated around unit. Tolerated walk well. Vital signs remain stable. No c/o pain or discomfort at this time, resp even and unlabored. Gauze/tegaderm dressing to right groin site is CDI. No active bleeding. No hematoma noted.

## 2024-08-14 NOTE — NURSING
Patient discharged per MD orders. Instructions given on medications, wound care, activity, signs of infection, when to call MD, and follow up appointments. Pt verbalized understanding. Telemetry and PIV x2 removed. Patient transferred off of unit via wheelchair by RN.

## 2024-08-15 DIAGNOSIS — I25.10 CORONARY ARTERY DISEASE, UNSPECIFIED VESSEL OR LESION TYPE, UNSPECIFIED WHETHER ANGINA PRESENT, UNSPECIFIED WHETHER NATIVE OR TRANSPLANTED HEART: Primary | ICD-10-CM

## 2024-08-15 DIAGNOSIS — N18.32 STAGE 3B CHRONIC KIDNEY DISEASE: ICD-10-CM

## 2024-08-16 ENCOUNTER — TELEPHONE (OUTPATIENT)
Dept: CARDIOLOGY | Facility: CLINIC | Age: 74
End: 2024-08-16
Payer: MEDICARE

## 2024-08-16 NOTE — TELEPHONE ENCOUNTER
Pt s/p stent & IVUS 8/13/24 PCI Mid LAD.  Pt to follow up with interventional cardiology recommended to discuss symptoms if any s/p stent.  Referral completed, staged OM2 & Lcx PCI should symptoms persist, appointment made on patient behalf.  Attempted to reach patient, no answer. Unable to leave message.  Mailed appointment letter to patient.  Noted: pt with CKD3.

## 2024-08-16 NOTE — DISCHARGE SUMMARY
Discharge Summary  Interventional Cardiology      Admit Date: 8/13/2024    Discharge Date:  8/16/2024    Attending Physician: No att. providers found    Discharge Physician: No att. providers found    Principal Diagnoses: <principal problem not specified>  Indication for Admission: Stent, Drug Eluting, Single Vessel, Coronary (N/A), IVUS, Coronary, Angiogram, Coronary, with Left Heart Cath    Discharged Condition: Good    Hospital Course:   Patient presented for outpatient coronary angiogram which went without complication. Coronary angiogram revealed LAD and Lcx disease . See full cath report in Epic for details. Hemostasis of patient's R CFA access site was achieved with Angio-Seal closure device. Patient was monitored per post-cath protocol, and his R groin access site was c/d/i with no hematoma. Patient was able to ambulate without difficulty. He was feeling well and anticipating discharge home today.     Outpatient Plan:  - There were no medication changes    Diet: Cardiac diet    Activity: Ad ginny, wound care instructions provided    Disposition: Home or Self Care    Follow Up:      Discharge Medications:      Medication List        CONTINUE taking these medications      amLODIPine 10 MG tablet  Commonly known as: NORVASC  TAKE 1 TABLET(10 MG) BY MOUTH EVERY DAY FOR HYPERTENSION     aspirin 81 MG Chew  Take 1 tablet (81 mg total) by mouth once daily.     blood sugar diagnostic Strp  To check BG 4 times daily, to use with insurance preferred meter     carvediloL 6.25 MG tablet  Commonly known as: COREG  Take 1 tablet (6.25 mg total) by mouth 2 (two) times daily.     clopidogreL 75 mg tablet  Commonly known as: PLAVIX  TAKE 1 TABLET(75 MG) BY MOUTH EVERY DAY     dorzolamide-timolol 2-0.5% 22.3-6.8 mg/mL ophthalmic solution  Commonly known as: COSOPT  Place 1 drop into both eyes 2 (two) times daily.     empagliflozin 25 mg tablet  Commonly known as: JARDIANCE  Take 1 tablet (25 mg total) by mouth once daily.      ergocalciferol 50,000 unit Cap  Commonly known as: ERGOCALCIFEROL  One weekly     ezetimibe 10 mg tablet  Commonly known as: ZETIA  Take 1 tablet (10 mg total) by mouth once daily.     gabapentin 300 MG capsule  Commonly known as: NEURONTIN  Take 1 capsule (300 mg total) by mouth 2 (two) times daily. In 1-2 weeks, if no relief, may increase dose to 3 times per day.     glimepiride 2 MG tablet  Commonly known as: AMARYL  Take 1 tablet (2 mg total) by mouth before breakfast.     hydrocortisone 2.5 % cream     ketoconazole 2 % cream  Commonly known as: NIZORAL  Apply topically 2 (two) times daily.     lancets Misc  Check bg 4 times daily     losartan-hydrochlorothiazide 100-25 mg 100-25 mg per tablet  Commonly known as: HYZAAR  Take 1 tablet by mouth once daily.     nitroGLYCERIN 0.4 MG SL tablet  Commonly known as: NITROSTAT  Place 1 tablet (0.4 mg total) under the tongue every 5 (five) minutes as needed for Chest pain.     pantoprazole 40 MG tablet  Commonly known as: PROTONIX  TAKE 1 TABLET(40 MG) BY MOUTH EVERY DAY FOR STOMACH     rosuvastatin 20 MG tablet  Commonly known as: CRESTOR  Take 1 tablet (20 mg total) by mouth once daily.     SITagliptin phosphate 50 MG Tab  Commonly known as: JANUVIA  Take 1 tablet (50 mg total) by mouth once daily.     tadalafiL 20 MG Tab  Commonly known as: CIALIS  Take 1 tablet (20 mg total) by mouth Every 3 (three) days. for 10 days     tamsulosin 0.4 mg Cap  Commonly known as: FLOMAX  One capsule at bedtime     testosterone cypionate 200 mg/mL injection  Commonly known as: DEPOTESTOTERONE CYPIONATE  Inject 1 mL (200 mg total) into the muscle every 14 (fourteen) days.              Hank Harry  Interventional Cardiology Fellow PGY-8  08/16/2024

## 2024-08-19 ENCOUNTER — TELEPHONE (OUTPATIENT)
Dept: PODIATRY | Facility: CLINIC | Age: 74
End: 2024-08-19
Payer: MEDICARE

## 2024-08-19 NOTE — TELEPHONE ENCOUNTER
Spoke with patient to confirm his appointment on 08/22/2024 with Dr. Addison, pt. has verbally confirmed appt.

## 2024-08-20 ENCOUNTER — HOSPITAL ENCOUNTER (EMERGENCY)
Facility: HOSPITAL | Age: 74
Discharge: HOME | End: 2024-08-20
Attending: EMERGENCY MEDICINE
Payer: MEDICARE

## 2024-08-20 VITALS
HEIGHT: 70 IN | DIASTOLIC BLOOD PRESSURE: 83 MMHG | HEART RATE: 63 BPM | BODY MASS INDEX: 31.5 KG/M2 | WEIGHT: 220 LBS | RESPIRATION RATE: 16 BRPM | TEMPERATURE: 98 F | SYSTOLIC BLOOD PRESSURE: 143 MMHG | OXYGEN SATURATION: 97 %

## 2024-08-20 DIAGNOSIS — R07.9 CHEST PAIN: ICD-10-CM

## 2024-08-20 DIAGNOSIS — R07.9 CHEST PAIN, UNSPECIFIED TYPE: Primary | ICD-10-CM

## 2024-08-20 LAB
ALBUMIN SERPL BCP-MCNC: 3.8 G/DL (ref 3.5–5.2)
ALP SERPL-CCNC: 51 U/L (ref 55–135)
ALT SERPL W/O P-5'-P-CCNC: 21 U/L (ref 10–44)
ANION GAP SERPL CALC-SCNC: 10 MMOL/L (ref 8–16)
AST SERPL-CCNC: 24 U/L (ref 10–40)
BASOPHILS # BLD AUTO: 0.05 K/UL (ref 0–0.2)
BASOPHILS NFR BLD: 0.8 % (ref 0–1.9)
BILIRUB SERPL-MCNC: 0.3 MG/DL (ref 0.1–1)
BUN SERPL-MCNC: 38 MG/DL (ref 8–23)
CALCIUM SERPL-MCNC: 9.5 MG/DL (ref 8.7–10.5)
CHLORIDE SERPL-SCNC: 107 MMOL/L (ref 95–110)
CO2 SERPL-SCNC: 24 MMOL/L (ref 23–29)
CREAT SERPL-MCNC: 2.4 MG/DL (ref 0.5–1.4)
DIFFERENTIAL METHOD BLD: ABNORMAL
EOSINOPHIL # BLD AUTO: 0.2 K/UL (ref 0–0.5)
EOSINOPHIL NFR BLD: 2.8 % (ref 0–8)
ERYTHROCYTE [DISTWIDTH] IN BLOOD BY AUTOMATED COUNT: 15.3 % (ref 11.5–14.5)
EST. GFR  (NO RACE VARIABLE): 28.1 ML/MIN/1.73 M^2
GLUCOSE SERPL-MCNC: 168 MG/DL (ref 70–110)
HCT VFR BLD AUTO: 40.8 % (ref 40–54)
HGB BLD-MCNC: 13.3 G/DL (ref 14–18)
HIV 1+2 AB+HIV1 P24 AG SERPL QL IA: NORMAL
IMM GRANULOCYTES # BLD AUTO: 0.02 K/UL (ref 0–0.04)
IMM GRANULOCYTES NFR BLD AUTO: 0.3 % (ref 0–0.5)
LYMPHOCYTES # BLD AUTO: 1.1 K/UL (ref 1–4.8)
LYMPHOCYTES NFR BLD: 16.5 % (ref 18–48)
MCH RBC QN AUTO: 27.1 PG (ref 27–31)
MCHC RBC AUTO-ENTMCNC: 32.6 G/DL (ref 32–36)
MCV RBC AUTO: 83 FL (ref 82–98)
MONOCYTES # BLD AUTO: 0.5 K/UL (ref 0.3–1)
MONOCYTES NFR BLD: 6.9 % (ref 4–15)
NEUTROPHILS # BLD AUTO: 4.7 K/UL (ref 1.8–7.7)
NEUTROPHILS NFR BLD: 72.7 % (ref 38–73)
NRBC BLD-RTO: 0 /100 WBC
OHS QRS DURATION: 136 MS
OHS QTC CALCULATION: 444 MS
PLATELET # BLD AUTO: 196 K/UL (ref 150–450)
PMV BLD AUTO: 11.3 FL (ref 9.2–12.9)
POTASSIUM SERPL-SCNC: 5 MMOL/L (ref 3.5–5.1)
PROT SERPL-MCNC: 8.2 G/DL (ref 6–8.4)
RBC # BLD AUTO: 4.91 M/UL (ref 4.6–6.2)
SODIUM SERPL-SCNC: 141 MMOL/L (ref 136–145)
TROPONIN I SERPL DL<=0.01 NG/ML-MCNC: <0.006 NG/ML (ref 0–0.03)
WBC # BLD AUTO: 6.5 K/UL (ref 3.9–12.7)

## 2024-08-20 PROCEDURE — 87389 HIV-1 AG W/HIV-1&-2 AB AG IA: CPT | Performed by: PHYSICIAN ASSISTANT

## 2024-08-20 PROCEDURE — 85025 COMPLETE CBC W/AUTO DIFF WBC: CPT | Performed by: EMERGENCY MEDICINE

## 2024-08-20 PROCEDURE — 80053 COMPREHEN METABOLIC PANEL: CPT | Performed by: EMERGENCY MEDICINE

## 2024-08-20 PROCEDURE — 93005 ELECTROCARDIOGRAM TRACING: CPT

## 2024-08-20 PROCEDURE — 84484 ASSAY OF TROPONIN QUANT: CPT | Performed by: EMERGENCY MEDICINE

## 2024-08-20 PROCEDURE — 99285 EMERGENCY DEPT VISIT HI MDM: CPT | Mod: 25

## 2024-08-20 PROCEDURE — 93010 ELECTROCARDIOGRAM REPORT: CPT | Mod: ,,, | Performed by: INTERNAL MEDICINE

## 2024-08-20 NOTE — ED TRIAGE NOTES
Jae Millan, a 73 y.o. male presents to the ED w/ complaint of chest pain on Sunday on left side of chest that felt like a heaviness. Pt states that he had stent placed last Tuesday.     Triage note:  Chief Complaint   Patient presents with    Chest Pain     Chest pain, stent placed last week.      Review of patient's allergies indicates:  No Known Allergies  Past Medical History:   Diagnosis Date    Cataract     Coronary artery disease involving native coronary artery of native heart with angina pectoris 02/15/2024    Decreased sensation of lower extremity     Diabetes mellitus     Diabetic neuropathy     Dysarthria     Dysphagia     ED (erectile dysfunction)     Glaucoma     Hemiparesis     LEFT side post CVA    High cholesterol     History of hepatitis C, s/p successful Rx w/ cure (SVR12) 4/2020     Hypertension     Impaired functional mobility, balance, gait, and endurance     Pre-op testing 04/08/2024    Pulmonary emphysema, unspecified emphysema type 02/01/2024    Stroke     Urinary frequency

## 2024-08-20 NOTE — ED PROVIDER NOTES
Encounter Date: 8/20/2024       History     Chief Complaint   Patient presents with    Chest Pain     Chest pain, stent placed last week.      The patient is a 73-year-old male who is postop day 7 status post cardiac catheterization with drug-eluting stent placement who presents to the emergency department with an episode of chest pressure.  He states that the pressure like sensation to the chest occurred while lying down 2 days ago.  It only lasted a few minutes.  Since then, he has been completely asymptomatic.  He happened to mention it to a nurse on the floor while visiting a friend.  They instructed the patient to come to the emergency department.  Here in the emergency department, the patient has no complaints.  He denies chest pain, shortness of breath, nausea, diaphoresis, or palpitations.  He has no complaints at this time.      Review of patient's allergies indicates:  No Known Allergies  Past Medical History:   Diagnosis Date    Cataract     Coronary artery disease involving native coronary artery of native heart with angina pectoris 02/15/2024    Decreased sensation of lower extremity     Diabetes mellitus     Diabetic neuropathy     Dysarthria     Dysphagia     ED (erectile dysfunction)     Glaucoma     Hemiparesis     LEFT side post CVA    High cholesterol     History of hepatitis C, s/p successful Rx w/ cure (SVR12) 4/2020     Hypertension     Impaired functional mobility, balance, gait, and endurance     Pre-op testing 04/08/2024    Pulmonary emphysema, unspecified emphysema type 02/01/2024    Stroke     Urinary frequency      Past Surgical History:   Procedure Laterality Date    ANGIOGRAM, CORONARY, WITH LEFT HEART CATHETERIZATION  8/13/2024    Procedure: Angiogram, Coronary, with Left Heart Cath;  Surgeon: Sandoval Paul MD;  Location: [facility] CATH LAB;  Service: Cardiology;;    COLONOSCOPY N/A 3/9/2023    Procedure: COLONOSCOPY;  Surgeon: Kian Kraft MD;  Location: [facility] ENDO (4TH FLR);   Service: Endoscopy;  Laterality: N/A;  medical transportation-inst mail-tb  pre call complete-as    CORONARY ANGIOGRAPHY N/A 3/27/2024    Procedure: ANGIOGRAM, CORONARY ARTERY;  Surgeon: Sandoval Paul MD;  Location: [facility] CATH LAB;  Service: Cardiology;  Laterality: N/A;    INGUINAL HERNIA REPAIR Left 2019    IVUS, CORONARY  2024    Procedure: IVUS, Coronary;  Surgeon: Sandoval Paul MD;  Location: [facility] CATH LAB;  Service: Cardiology;;    STENT, DRUG ELUTING, SINGLE VESSEL, CORONARY N/A 2024    Procedure: Stent, Drug Eluting, Single Vessel, Coronary;  Surgeon: Sandoval Paul MD;  Location: [facility] CATH LAB;  Service: Cardiology;  Laterality: N/A;    UNDESCENDED TESTICLE EXPLORATION Right     R testicle removed as it was undescended     Family History   Problem Relation Name Age of Onset    Heart disease Brother          cabg    Hypertension Sister      Amblyopia Neg Hx      Blindness Neg Hx      Cataracts Neg Hx      Glaucoma Neg Hx      Macular degeneration Neg Hx      Strabismus Neg Hx      Retinal detachment Neg Hx      Diabetes Neg Hx       Social History     Tobacco Use    Smoking status: Former     Current packs/day: 0.00     Average packs/day: 1 pack/day for 18.0 years (18.0 ttl pk-yrs)     Types: Cigarettes     Start date: 1968     Quit date: 1986     Years since quittin.3    Smokeless tobacco: Never   Substance Use Topics    Alcohol use: Not Currently    Drug use: Never     Review of Systems  See HPI      Physical Exam     Initial Vitals [24 1217]   BP Pulse Resp Temp SpO2   120/68 70 20 97.6 °F (36.4 °C) 96 %      MAP       --         Physical Exam  General: No apparent distress.  Well-nourished.  Well-developed.  Alert and oriented x3.  HENT: Moist mucous membranes.  Normocephalic atraumatic.  Oropharynx clear.   Eyes: Pupils equally round and reactive to light.  Extraocular movements intact.  No scleral icterus.  No conjunctival  pallor.  Cardiovascular: Regular rate and rhythm.  No murmurs, rubs, or gallops.  Brisk capillary fill.  2+ distal pulses.  Respiratory: Clear to auscultation bilaterally.  No wheezes, rales, or rhonchi.  No respiratory distress.  Abdomen: Soft.  Nontender.  Nondistended.  No guarding.  No rebound.  No masses.  No abdominal bruit auscultated.  Skin: No rashes.  No lesions.  No pallor.  No jaundice.  Neuro: Cranial nerves II through XII grossly intact.  Moving all extremities equally.  No sensory deficits.  Strength 5 out of 5 in all 4 extremities.  Musculoskeletal: Neck supple.  No extremity tenderness.  Moving all extremities without pain.  No back tenderness.  No neck tenderness.        ED Course   Procedures  Labs Reviewed   CBC W/ AUTO DIFFERENTIAL - Abnormal       Result Value    WBC 6.50      RBC 4.91      Hemoglobin 13.3 (*)     Hematocrit 40.8      MCV 83      MCH 27.1      MCHC 32.6      RDW 15.3 (*)     Platelets 196      MPV 11.3      Immature Granulocytes 0.3      Gran # (ANC) 4.7      Immature Grans (Abs) 0.02      Lymph # 1.1      Mono # 0.5      Eos # 0.2      Baso # 0.05      nRBC 0      Gran % 72.7      Lymph % 16.5 (*)     Mono % 6.9      Eosinophil % 2.8      Basophil % 0.8      Differential Method Automated     COMPREHENSIVE METABOLIC PANEL - Abnormal    Sodium 141      Potassium 5.0      Chloride 107      CO2 24      Glucose 168 (*)     BUN 38 (*)     Creatinine 2.4 (*)     Calcium 9.5      Total Protein 8.2      Albumin 3.8      Total Bilirubin 0.3      Alkaline Phosphatase 51 (*)     AST 24      ALT 21      eGFR 28.1 (*)     Anion Gap 10     TROPONIN I    Troponin I <0.006     HIV 1 / 2 ANTIBODY     EKG Readings: (Independently Interpreted)   I independently interpreted this EKG.  Normal sinus rhythm with a rate of 68.  Left axis deviation.  Right bundle-branch block.  No obvious acute ischemic changes.     ECG Results              EKG 12-lead (Final result)        Collection Time Result Time  QRS Duration OHS QTC Calculation    08/20/24 12:20:41 08/20/24 16:53:41 136 [id number]                     Final result by Interface, Lab In [facility] (08/20/24 16:53:45)                   Narrative:    Test Reason : R07.9,    Vent. Rate : 068 BPM     Atrial Rate : 068 BPM     P-R Int : 174 ms          QRS Dur : 136 ms      QT Int : 418 ms       P-R-T Axes : 048 -36 014 degrees     QTc Int : 444 ms    Normal sinus rhythm  Left axis deviation  Right bundle branch block  Abnormal ECG  When compared with ECG of 13-AUG-2024 16:25,  The axis Shifted left  Confirmed by Franko Archuleta MD ([id number]) on 8/20/2024 4:53:35 PM    Referred By:             Confirmed By:Franko Archuleta MD                                  Imaging Results              X-Ray Chest AP Portable (Final result)  Result time 08/20/24 15:13:34      Final result by Bruce Domingo MD (08/20/24 15:13:34)                   Impression:      See above      Electronically signed by: Bruce Domingo MD  Date:    08/20/2024  Time:    15:13               Narrative:    EXAMINATION:  XR CHEST AP PORTABLE    CLINICAL HISTORY:  Chest Pain;    TECHNIQUE:  Single frontal view of the chest was performed.    COMPARISON:  04/12/2019    FINDINGS:  Cardiac size is normal.  Lungs are clear and well aerated.                                       Medications - No data to display  Medical Decision Making  This is an emergent evaluation.  Laboratory studies, EKG, and a chest x-ray have been ordered to assess the patient's presenting complaint of chest pressure.  However, because he is now asymptomatic, I do not feel strongly that the patient will require any further emergent evaluation or intervention if my initial workup is negative.  I will reassess.    4:53 p.m.   Laboratory studies, chest x-ray, and EKG show no clinically significant abnormalities.  As the patient has been asymptomatic over the past 2 days, I do not feel strongly that any further emergent evaluation or intervention is  warranted.  He has been instructed to follow up closely with his primary care physician.  At this time, I feel that he is clinically stable for discharge.    Amount and/or Complexity of Data Reviewed  Labs: ordered.  Radiology: ordered.                                      Clinical Impression:  Final diagnoses:  [R07.9] Chest pain  [R07.9] Chest pain, unspecified type (Primary)          ED Disposition Condition    Discharge Stable          ED Prescriptions    None       Follow-up Information       Follow up With Specialties Details Why Contact Info    Maryjane Farias MD Internal Medicine In 2 days  [address]  [address], [address]  [facility] [address]  [phone]               Robel Roper MD  08/20/24 [id number]

## 2024-08-22 ENCOUNTER — OFFICE VISIT (OUTPATIENT)
Dept: PODIATRY | Facility: CLINIC | Age: 74
End: 2024-08-22
Payer: MEDICARE

## 2024-08-22 VITALS
HEIGHT: 70 IN | RESPIRATION RATE: 19 BRPM | WEIGHT: 218.25 LBS | SYSTOLIC BLOOD PRESSURE: 119 MMHG | BODY MASS INDEX: 31.25 KG/M2 | HEART RATE: 62 BPM | DIASTOLIC BLOOD PRESSURE: 76 MMHG

## 2024-08-22 DIAGNOSIS — E11.49 TYPE II DIABETES MELLITUS WITH NEUROLOGICAL MANIFESTATIONS: Primary | ICD-10-CM

## 2024-08-22 DIAGNOSIS — L60.3 ONYCHODYSTROPHY: ICD-10-CM

## 2024-08-22 PROCEDURE — 99499 UNLISTED E&M SERVICE: CPT | Mod: S$GLB,,, | Performed by: PODIATRIST

## 2024-08-22 PROCEDURE — 11721 DEBRIDE NAIL 6 OR MORE: CPT | Mod: Q9,S$GLB,, | Performed by: PODIATRIST

## 2024-08-22 PROCEDURE — 99999 PR PBB SHADOW E&M-EST. PATIENT-LVL IV: CPT | Mod: PBBFAC,,, | Performed by: PODIATRIST

## 2024-08-26 NOTE — PROGRESS NOTES
Subjective:     Patient ID: Jae Millan is a 73 y.o. male.    Chief Complaint: Diabetic Foot Exam (Maryjane Farias MD at 7/8/2024), Nail Care, and Flat Foot    Jae is a 73 y.o. male who presents to the clinic for evaluation and treatment of high risk feet. Jae has a past medical history of Cataract, Coronary artery disease involving native coronary artery of native heart with angina pectoris (02/15/2024), Decreased sensation of lower extremity, Diabetes mellitus, Diabetic neuropathy, Dysarthria, Dysphagia, ED (erectile dysfunction), Glaucoma, Hemiparesis, High cholesterol, History of hepatitis C, s/p successful Rx w/ cure (SVR12) 4/2020, Hypertension, Impaired functional mobility, balance, gait, and endurance, Pre-op testing (04/08/2024), Pulmonary emphysema, unspecified emphysema type (02/01/2024), Stroke, and Urinary frequency. The patient's chief complaint is long, thick toenails. This patient has documented high risk feet requiring routine maintenance secondary to diabetes mellitis and those secondary complications of diabetes, as mentioned..    PCP: Maryjane Farias MD    Date Last Seen by PCP:   Chief Complaint   Patient presents with    Diabetic Foot Exam     Maryjane Farias MD at 7/8/2024    Nail Care    Flat Foot       Hemoglobin A1C   Date Value Ref Range Status   02/01/2024 7.7 (H) 4.0 - 5.6 % Final     Comment:     ADA Screening Guidelines:  5.7-6.4%  Consistent with prediabetes  >or=6.5%  Consistent with diabetes    High levels of fetal hemoglobin interfere with the HbA1C  assay. Heterozygous hemoglobin variants (HbS, HgC, etc)do  not significantly interfere with this assay.   However, presence of multiple variants may affect accuracy.     10/09/2023 7.5 (H) 4.0 - 5.6 % Final     Comment:     ADA Screening Guidelines:  5.7-6.4%  Consistent with prediabetes  >or=6.5%  Consistent with diabetes    High levels of fetal hemoglobin interfere with the HbA1C  assay. Heterozygous  hemoglobin variants (HbS, HgC, etc)do  not significantly interfere with this assay.   However, presence of multiple variants may affect accuracy.     07/24/2023 7.2 (H) 4.0 - 5.6 % Final     Comment:     ADA Screening Guidelines:  5.7-6.4%  Consistent with prediabetes  >or=6.5%  Consistent with diabetes    High levels of fetal hemoglobin interfere with the HbA1C  assay. Heterozygous hemoglobin variants (HbS, HgC, etc)do  not significantly interfere with this assay.   However, presence of multiple variants may affect accuracy.         Review of Systems   Constitutional: Negative for chills, decreased appetite and fever.   Cardiovascular:  Positive for leg swelling.   Skin:  Positive for dry skin and nail changes.   Musculoskeletal:  Positive for muscle weakness. Negative for arthritis, joint pain, joint swelling and myalgias.   Gastrointestinal:  Negative for nausea and vomiting.   Neurological:  Negative for loss of balance, numbness and paresthesias.      Objective:     Physical Exam  Vitals reviewed.   Constitutional:       Appearance: He is well-developed.   Cardiovascular:      Comments: Dorsalis pedis and posterior tibial pulses are diminished Bilaterally. Toes are cool to touch. Feet are warm proximally.There is decreased digital hair. Skin is atrophic, slightly hyperpigmented, and mildly edematous      Musculoskeletal:         General: No tenderness. Normal range of motion.      Comments: Adequate joint range of motion without pain, limitation, nor crepitation Bilateral feet and ankle joints. Muscle strength is 5/5 in all groups bilaterally.         Skin:     General: Skin is warm and dry.      Findings: No ecchymosis, erythema or lesion.      Comments: Nails x 10  are elongated by  3-4mm's, thickened by 2-5 mm's, dystrophic, and are darkened in  coloration . Xerosis Bilaterally. No open lesions noted.       Neurological:      Mental Status: He is alert and oriented to person, place, and time.      Comments:  Grayson-Valeri 5.07 monofilamant testing is diminished Rodríguez feet. Sharp/dull sensation diminished Bilaterally. Light touch absent Bilaterally.       Psychiatric:         Behavior: Behavior normal.       Assessment:      Encounter Diagnoses   Name Primary?    Type II diabetes mellitus with neurological manifestations Yes    Onychodystrophy      Plan:     Jae was seen today for diabetic foot exam, nail care and flat foot.    Diagnoses and all orders for this visit:    Type II diabetes mellitus with neurological manifestations    Onychodystrophy      I counseled the patient on his conditions, their implications and medical management.        - Shoe inspection. Diabetic Foot Education. Patient reminded of the importance of good nutrition and blood sugar control to help prevent podiatric complications of diabetes. Patient instructed on proper foot hygeine. We discussed wearing proper shoe gear, daily foot inspections, never walking without protective shoe gear, never putting sharp instruments to feet, routine podiatric nail visits every 2-3 months.      - With patient's permission, nails were aggressively reduced and debrided x 10 to their soft tissue attachment mechanically emoving all offending nail and debris. Patient relates relief following the procedure. He will continue to monitor the areas daily, inspect his feet, wear protective shoe gear when ambulatory, moisturizer to maintain skin integrity and follow in this office in approximately 2-3 months, sooner p.r.n.

## 2024-08-28 ENCOUNTER — TELEPHONE (OUTPATIENT)
Dept: CARDIAC REHAB | Facility: CLINIC | Age: 74
End: 2024-08-28
Payer: MEDICARE

## 2024-10-01 ENCOUNTER — TELEPHONE (OUTPATIENT)
Dept: INTERNAL MEDICINE | Facility: CLINIC | Age: 74
End: 2024-10-01
Payer: MEDICARE

## 2024-10-01 NOTE — TELEPHONE ENCOUNTER
Called pt to help schedule 3m f/u, scheduled pt for 10/17/24    ----- Message from Chey sent at 10/1/2024  3:36 PM CDT -----  Contact: Pt  [phone]  Patient is returning a phone call.    Who left a message for the patient:  Mayank Canales MA    Does patient know what this is regarding:  yes    Would you like a call back, or a response through your [facility] portal?:   Call back    Comments:     Please call and advise.    Thank You

## 2024-10-01 NOTE — TELEPHONE ENCOUNTER
----- Message from Foreign sent at 10/1/2024  9:36 AM CDT -----  Contact: [phone]  Caller is requesting an earlier appointment then we can schedule.  Caller is requesting a message be sent to the provider.    If this is for urgent care symptoms, did you offer other providers at this location, providers at other locations, or [facility]? (yes, no, n/a):  N/A    If this is for the patients physical, did you offer to schedule next available and put on wait list, or to see NP or PA for their physical?  (yes, no, n/a):  N/A    When is the next available appointment with their provider:  11/15/24    Reason for the appointment:  F/U    Patient preference of timeframe to be scheduled: October     Would the patient like a call back, or a response through their [facility] portal?:   call    Comments:   patient had a stent placed last week

## 2024-10-02 DIAGNOSIS — E11.9 TYPE 2 DIABETES MELLITUS WITHOUT COMPLICATION: ICD-10-CM

## 2024-10-10 DIAGNOSIS — I25.10 CORONARY ARTERY DISEASE, UNSPECIFIED VESSEL OR LESION TYPE, UNSPECIFIED WHETHER ANGINA PRESENT, UNSPECIFIED WHETHER NATIVE OR TRANSPLANTED HEART: Primary | ICD-10-CM

## 2024-10-11 ENCOUNTER — OFFICE VISIT (OUTPATIENT)
Dept: CARDIOLOGY | Facility: CLINIC | Age: 74
End: 2024-10-11
Payer: MEDICARE

## 2024-10-11 ENCOUNTER — EDUCATION (OUTPATIENT)
Dept: CARDIOLOGY | Facility: CLINIC | Age: 74
End: 2024-10-11
Payer: MEDICARE

## 2024-10-11 VITALS
OXYGEN SATURATION: 96 % | SYSTOLIC BLOOD PRESSURE: 127 MMHG | HEART RATE: 73 BPM | WEIGHT: 218.25 LBS | DIASTOLIC BLOOD PRESSURE: 66 MMHG | BODY MASS INDEX: 31.25 KG/M2 | HEIGHT: 70 IN

## 2024-10-11 DIAGNOSIS — I25.119 CORONARY ARTERY DISEASE INVOLVING NATIVE CORONARY ARTERY OF NATIVE HEART WITH ANGINA PECTORIS: Primary | ICD-10-CM

## 2024-10-11 DIAGNOSIS — I10 ESSENTIAL HYPERTENSION: ICD-10-CM

## 2024-10-11 DIAGNOSIS — E78.2 HYPERLIPIDEMIA, MIXED: ICD-10-CM

## 2024-10-11 PROCEDURE — 99999 PR PBB SHADOW E&M-EST. PATIENT-LVL V: CPT | Mod: PBBFAC,,, | Performed by: INTERNAL MEDICINE

## 2024-10-11 NOTE — PROGRESS NOTES
If you need to change the date of your procedure, please call  [phone]  CALL THE OFFICE IF YOU HAVE ANY MEDICATIONS CHANGES BEFORE A PROCEDURE.     OUTPATIENT CATHETERIZATION INSTRUCTIONS  -  PLEASE DO PRE PROCEDURE LABS ON 11/13/24, APPT HAS BEEN MADE.     You have been scheduled for a procedure in the catheterization lab on  THURSDAY, NOVEMBER 14, 2024     Please report to the Cardiology Waiting Area on the Third floor of the hospital and check in at ARRIVAL TIME at 0730am.   You will then be taken to the SSCU (Short Stay Cardiac Unit) and prepared for your procedure. Please be aware that this is not the time of your procedure but the time you are to arrive. The procedures are scheduled on an hourly basis; however, emergency cases take precedence over all other cases.         1. NOTHING TO EAT AFTER MIDNIGHT 12AM the morning of the procedure.  NO FOOD.  You may take your medications with small sips of water. YOU MAY HAVE WATER UNTIL YOU REACH THE HOSPITAL.   SEE BELOW INSTRUCTIONS ON MEDICATIONS.     2.  any questions please call.   Take your everyday medications.  EXCEPT meds told to stop for procedure.  Do not stop aspirin or plavix,  keep taking even in the morning of the procedure with water.  3.  Diabetics: SEE MED SHEET    Stop diabetic meds 2 days before the procedure and do not take the morning of the procedure.  -Glimepiride/Amaryl  -Empagliflozin/jardiance  -Januvia/sitagliptin phosphate      4. Heart Failure Patients: none        The procedure will take 1-2 hours to perform. After the procedure, you will return to SSCU on the third floor of the hospital. You will need to lie still (or keep your arm still) for the next 4 to 6 hours to minimize bleeding from the puncture site. Your family may remain in the room with you during this time.         You may be able to be discharged home that same afternoon if there is someone to drive you home and there were no complications. If you have one of the  balloon, stent, or device procedures you may spend the night in the hospital. Your doctor will determine, based on your progress, the date and time of your discharge. The results of your procedure will be discussed with you before you are discharged. Any further testing or procedures will be scheduled for you either before you leave or you will be called with these appointments.   YOU WILL NOT BE ABLE TO DRIVE HOME  THE DAY OF THE PROCEDURE.      If you should have any questions, concerns, or please call [phone]        Special Instructions:IMPORTANT TO HYDRATE  Drink plenty of water the day before the procedure and the day after the procedure to flush your kidneys.     See Special instructions for other medications.     IMPORTANT:    DO NOT STOP ASPIRIN OR PLAVIX.  Take the morning of the procedure.             THE ABOVE INSTRUCTIONS WERE GIVEN TO THE PATIENT VERBALLY AND THEY VERBALIZED UNDERSTANDING.  THEY DO NOT REQUIRE ANY SPECIAL NEEDS AND DO NOT HAVE ANY LEARNING BARRIERS.          Directions for Reporting to Cardiology Waiting Area in the Hospital  If you park in the Parking Garage:  Take elevators to the1st floor of the parking garage.  Continue past the gift shop, coffee shop, and piano.  Take a right and go to the gold elevators. (Elevator B)  Take the elevator to the 3rd floor.  Follow the arrow on the sign on the wall that says Cath Lab Registration/EP Lab Registration.  Follow the long hallway all the way around until you come to a big open area.  This is the registration area.  Check in at Reception Desk.     OR     If family is dropping you off:  Have them drop you off at the front of the Hospital under the green overhang.  Enter through the doors and take a right.  Take the 'E' elevators to the 3rd floor Cardiology Waiting Area.  Check in at the Reception Desk in the waiting room.

## 2024-10-11 NOTE — ASSESSMENT & PLAN NOTE
- Multi-vessel CAD. Not a surgical candidate. Staged PCI was planned due to CKD.  - 8/13/2024: PCI to mid-LAD by Dr. Paul. Pt had 40% ostial LAD stenosis, and High grade ostial to proximal LCx and OM2 stenoses. Previous plan was to do PCI to Lcx later due to CKD stage 4.  - today, patient with continued chest pressures and shortness of breath with exertion  - discussed with patient the reasoning for staging PCI. will plan for C +/- PCI of existing LCx and OM2 lesions    - The risks, benefits, and alternatives of coronary vascular angiography and possible intervention were discussed with the patient.   - I had a detailed discussion with the patient regarding risk of stroke, MI, bleeding access site complications, renal failure, emergent need for heart surgery, acute limb complications including ischemia and loss, contrast allergy and death.   - Should stenting be indicated, the patient has agreed to dual anti-platelet therapy for 1-consecutive year with a drug-eluting stent and a minimum of 1-month with the use of a bare metal stent. Additionally, patient is aware that non-compliance is likely to result in stent clotting with heart attack, heart failure, and/or death.   - The risks of moderate sedation include hypotension, respiratory depression, arrhythmias, bronchospasm, & death.  - Patient understands the risks and benefits of the procedure and wishes to proceed. All questions were answered and informed consent was obtained.   - This patient was discussed with the attending interventional cardiologist who agrees with the above assessment & plan.

## 2024-10-11 NOTE — PROGRESS NOTES
Interventional Cardiology Clinic Note    HPI:     Jae Millan is a 73 y.o. male s/p PCI LAD 8/2024, HTN, HLD, DM2, CVA with residual L deficits, CKD3, former smoker, who presents for follow-up after angiogram with PCI.    Chart review:  CT done in 2022 noted to have diffuse coronary calcifications. Further workup included PET stress demonstrating anterior ischemia. Cath done 3/27/2024 by Dr. Paul showed Multi-vessel CAD. Patient was turned down for CABG by CTS. PCI was planned to be staged due to CKD. Patient had staged PCI to mid-LAD by Dr. Paul on 8/13/2024. Pt had 40% ostial LAD stenosis, and High grade ostial to proximal LCx and OM2 stenoses. Per d/c summary, plan was to do staged PCI to Lcx later due to CKD stage 4.     Today, Patient reports continued symptoms following PCI to LAD. He notes of substernal chest pressure with exertion after walking 1-2 blocks. Associated with shortness of breath. No radiation of pain. He endorses being on asa and plavix. No other acute symptoms at this time.    ROS:      Review of Systems   Cardiovascular:  Positive for chest pain and dyspnea on exertion.   Respiratory:  Positive for shortness of breath.        PMH:     Past Medical History:   Diagnosis Date    Cataract     Coronary artery disease involving native coronary artery of native heart with angina pectoris 02/15/2024    Decreased sensation of lower extremity     Diabetes mellitus     Diabetic neuropathy     Dysarthria     Dysphagia     ED (erectile dysfunction)     Glaucoma     Hemiparesis     LEFT side post CVA    High cholesterol     History of hepatitis C, s/p successful Rx w/ cure (SVR12) 4/2020     Hypertension     Impaired functional mobility, balance, gait, and endurance     Pre-op testing 04/08/2024    Pulmonary emphysema, unspecified emphysema type 02/01/2024    Stroke     Urinary frequency      Past Surgical History:   Procedure Laterality Date    ANGIOGRAM, CORONARY, WITH LEFT HEART CATHETERIZATION   8/13/2024    Procedure: Angiogram, Coronary, with Left Heart Cath;  Surgeon: Sandoval Paul MD;  Location: [facility] CATH LAB;  Service: Cardiology;;    COLONOSCOPY N/A 3/9/2023    Procedure: COLONOSCOPY;  Surgeon: Kian Kraft MD;  Location: [facility] ENDO (4TH FLR);  Service: Endoscopy;  Laterality: N/A;  medical transportation-inst mail-tb  pre call complete-as    CORONARY ANGIOGRAPHY N/A 3/27/2024    Procedure: ANGIOGRAM, CORONARY ARTERY;  Surgeon: Sandoval Paul MD;  Location: [facility] CATH LAB;  Service: Cardiology;  Laterality: N/A;    INGUINAL HERNIA REPAIR Left 12/11/2019    IVUS, CORONARY  8/13/2024    Procedure: IVUS, Coronary;  Surgeon: Sandoval Paul MD;  Location: [facility] CATH LAB;  Service: Cardiology;;    STENT, DRUG ELUTING, SINGLE VESSEL, CORONARY N/A 8/13/2024    Procedure: Stent, Drug Eluting, Single Vessel, Coronary;  Surgeon: Sandoval Paul MD;  Location: [facility] CATH LAB;  Service: Cardiology;  Laterality: N/A;    UNDESCENDED TESTICLE EXPLORATION Right     R testicle removed as it was undescended     Allergies:   Review of patient's allergies indicates:  No Known Allergies  Medications:     Current Outpatient Medications on File Prior to Visit   Medication Sig Dispense Refill    amLODIPine (NORVASC) 10 MG tablet TAKE 1 TABLET(10 MG) BY MOUTH EVERY DAY FOR HYPERTENSION 90 tablet 3    aspirin 81 MG Chew Take 1 tablet (81 mg total) by mouth once daily.      blood sugar diagnostic Strp To check BG 4 times daily, to use with insurance preferred meter 100 each 11    carvediloL (COREG) 6.25 MG tablet Take 1 tablet (6.25 mg total) by mouth 2 (two) times daily. 180 tablet 3    clopidogreL (PLAVIX) 75 mg tablet TAKE 1 TABLET(75 MG) BY MOUTH EVERY DAY 90 tablet 3    dorzolamide-timolol 2-0.5% (COSOPT) 22.3-6.8 mg/mL ophthalmic solution Place 1 drop into both eyes 2 (two) times daily. 10 mL 12    empagliflozin (JARDIANCE) 25 mg tablet Take 1 tablet (25 mg total) by mouth once daily. 90 tablet 1     ergocalciferol (ERGOCALCIFEROL) 50,000 unit Cap One weekly 12 capsule 3    ezetimibe (ZETIA) 10 mg tablet Take 1 tablet (10 mg total) by mouth once daily. 90 tablet 3    gabapentin (NEURONTIN) 300 MG capsule Take 1 capsule (300 mg total) by mouth 2 (two) times daily. In 1-2 weeks, if no relief, may increase dose to 3 times per day.      glimepiride (AMARYL) 2 MG tablet Take 1 tablet (2 mg total) by mouth before breakfast. 90 tablet 3    hydrocortisone 2.5 % cream Apply topically 2 (two) times daily.      ketoconazole (NIZORAL) 2 % cream Apply topically 2 (two) times daily. 60 g 1    lancets Misc Check bg 4 times daily 100 each 11    losartan-hydrochlorothiazide 100-25 mg (HYZAAR) 100-25 mg per tablet Take 1 tablet by mouth once daily. 90 tablet 3    nitroGLYCERIN (NITROSTAT) 0.4 MG SL tablet Place 1 tablet (0.4 mg total) under the tongue every 5 (five) minutes as needed for Chest pain. 30 tablet 1    pantoprazole (PROTONIX) 40 MG tablet TAKE 1 TABLET(40 MG) BY MOUTH EVERY DAY FOR STOMACH 90 tablet 3    rosuvastatin (CRESTOR) 20 MG tablet Take 1 tablet (20 mg total) by mouth once daily. 90 tablet 2    SITagliptin phosphate (JANUVIA) 50 MG Tab Take 1 tablet (50 mg total) by mouth once daily. 90 tablet 3    tadalafiL (CIALIS) 20 MG Tab Take 1 tablet (20 mg total) by mouth Every 3 (three) days. for 10 days 20 tablet 9    tamsulosin (FLOMAX) 0.4 mg Cap One capsule at bedtime 90 capsule 3    testosterone cypionate (DEPOTESTOTERONE CYPIONATE) 200 mg/mL injection Inject 1 mL (200 mg total) into the muscle every 14 (fourteen) days. 10 mL 1     No current facility-administered medications on file prior to visit.     Social History:     Social History     Tobacco Use    Smoking status: Former     Current packs/day: 0.00     Average packs/day: 1 pack/day for 18.0 years (18.0 ttl pk-yrs)     Types: Cigarettes     Start date: 1968     Quit date: 1986     Years since quittin.5    Smokeless tobacco: Never  "  Substance Use Topics    Alcohol use: Not Currently     Family History:     Family History   Problem Relation Name Age of Onset    Heart disease Brother          cabg    Hypertension Sister      Amblyopia Neg Hx      Blindness Neg Hx      Cataracts Neg Hx      Glaucoma Neg Hx      Macular degeneration Neg Hx      Strabismus Neg Hx      Retinal detachment Neg Hx      Diabetes Neg Hx       Physical Exam:   /66   Pulse 73   Ht 5' 10" (1.778 m)   Wt 99 kg (218 lb 4.1 oz)   SpO2 96%   BMI 31.32 kg/m²        Physical Exam  Vitals and nursing note reviewed.   Constitutional:       General: He is not in acute distress.     Appearance: Normal appearance. He is not ill-appearing or toxic-appearing.   HENT:      Head: Normocephalic and atraumatic.   Eyes:      Pupils: Pupils are equal, round, and reactive to light.   Cardiovascular:      Rate and Rhythm: Normal rate and regular rhythm.      Pulses: Normal pulses.   Pulmonary:      Effort: Pulmonary effort is normal. No respiratory distress.   Musculoskeletal:         General: Swelling present. Normal range of motion.      Cervical back: Normal range of motion.      Comments: Mild bilateral ANTA, L>R   Skin:     General: Skin is warm and dry.      Capillary Refill: Capillary refill takes less than 2 seconds.   Neurological:      General: No focal deficit present.      Mental Status: He is alert.          Labs:     Lab Results   Component Value Date     08/20/2024    K 5.0 08/20/2024     08/20/2024    CO2 24 08/20/2024    BUN 38 (H) 08/20/2024    CREATININE 2.4 (H) 08/20/2024    ANIONGAP 10 08/20/2024     Lab Results   Component Value Date    HGBA1C 7.7 (H) 02/01/2024     Lab Results   Component Value Date    BNP 45 04/12/2019    Lab Results   Component Value Date    WBC 6.50 08/20/2024    HGB 13.3 (L) 08/20/2024    HCT 40.8 08/20/2024     08/20/2024    GRAN 4.7 08/20/2024    GRAN 72.7 08/20/2024     Lab Results   Component Value Date    CHOL 137 " 04/18/2024    HDL 38 (L) 04/18/2024    LDLCALC 71.6 04/18/2024    TRIG 137 04/18/2024          Lipids:  Recent Labs   Lab 04/18/24  0850   LDL Cholesterol 71.6   HDL 38 L      Renal:  Recent Labs   Lab 08/20/24  1538   Creatinine 2.4 H   Potassium 5.0   CO2 24   BUN 38 H     Liver:  Recent Labs   Lab 08/20/24  1538   AST 24   ALT 21       Imaging:     Echocardiograms:   Results for orders placed or performed during the hospital encounter of 04/08/24   Echo    Narrative      Left Ventricle: Normal wall motion. There is normal systolic function   with a visually estimated ejection fraction of 60 - 65%. There is normal   diastolic function. Normal left ventricular filling pressure.    Right Ventricle: Normal right ventricular cavity size. Wall thickness   is normal. Right ventricle wall motion  is normal. Systolic function is   normal.    Left Atrium: Normal left atrial size.    Right Atrium: Normal right atrial size.    Aortic Valve: The aortic valve is a trileaflet valve. There is mild   annular calcification present.    Mitral Valve: The mitral valve is structurally normal. There is trace   regurgitation.    Tricuspid Valve: The tricuspid valve is structurally normal. There is   trace regurgitation.    Pulmonic Valve: The pulmonic valve is structurally normal. There is   trace regurgitation.    Aorta: Aortic root is normal in size measuring 2.99 cm. Ascending aorta   is normal measuring 3.04 cm.    Pulmonary Artery: The estimated pulmonary artery systolic pressure is   25 mmHg.    IVC/SVC: Normal venous pressure at 3 mmHg.    Pericardium: There is no pericardial effusion.       Stress Tests:   PET Stress 7/24/2023:    There is a large sized, moderate intensity mid to apical anteroseptal resting perfusion abnormality involving 9% of LV myocardium in the distribution of the proximal LAD. After pharmacologic stress, this perfusion abnormality is larger and more severe involving 29% of the LV myocardium, indicative of  ischemia with underlying scar.    Within the perfusion abnormality, absolute myocardial perfusion (cc/min/gm) averaged 0.57 cc/min/g at rest, 0.60 cc/min/g at stress and CFR was 1.06 , which equates to severely reduced coronary flow capacity within the LAD territory.    There are no other significant perfusion abnormalities.    The whole heart absolute myocardial perfusion values averaged 0.77 cc/min/g at rest, which is normal; 1.23 cc/min/g at stress, which is mildly reduced; and CFR is 1.57 , which is moderately reduced.    CT attenuation images demonstrate mild diffuse coronary calcifications in the LAD and LCX territory and no aortic calcifications.    The gated perfusion images showed an ejection fraction of 62% at rest and 66% during stress. A normal ejection fraction is greater than 47%.    The wall motion is normal at rest.    There is anteroseptal wall hypokinesis during stress.    The LV cavity size is normal at rest and stress.    The ECG portion of the study is negative for ischemia.    There were no arrhythmias during stress.    The patient reported no chest pain during the stress test.    There are no prior studies for comparison.    Caths:   3/27/2024: Multi-vessel CAD.    8/13/2024:    Mr. Millan presented for stage LAD PCI after having a PET stres test that demonstrated anterior ischemia and a coronary angiogram that demonstrated multi-vessel CAD. He was turned down for CABG by CTS. PCI is being staged due to CKD    Successful PCI of 80% mid LAD wtih 3.0X24 LLUVIA    IVUS utilized for stent sizing    40% ostial LAD stenosis is present with MLA=5.1mm2 by IVUS    High grade ostial to proximal LCx and OM2 stenoses are present    The estimated blood loss was <50 mL.      Assessment & Plan:     1. Coronary artery disease involving native coronary artery of native heart with angina pectoris    2. Essential hypertension    3. Hyperlipidemia, mixed        Coronary artery disease involving native coronary  artery of native heart with angina pectoris  - Multi-vessel CAD. Not a surgical candidate. Staged PCI was planned due to CKD.  - 8/13/2024: PCI to mid-LAD by Dr. Paul. Pt had 40% ostial LAD stenosis, and High grade ostial to proximal LCx and OM2 stenoses. Previous plan was to do PCI to Lcx later due to CKD stage 4.  - today, patient with continued chest pressures and shortness of breath with exertion  - discussed with patient the reasoning for staging PCI. will plan for [facility] +/- PCI of existing LCx and OM2 lesions    - The risks, benefits, and alternatives of coronary vascular angiography and possible intervention were discussed with the patient.   - I had a detailed discussion with the patient regarding risk of stroke, MI, bleeding access site complications, renal failure, emergent need for heart surgery, acute limb complications including ischemia and loss, contrast allergy and death.   - Should stenting be indicated, the patient has agreed to dual anti-platelet therapy for 1-consecutive year with a drug-eluting stent and a minimum of 1-month with the use of a bare metal stent. Additionally, patient is aware that non-compliance is likely to result in stent clotting with heart attack, heart failure, and/or death.   - The risks of moderate sedation include hypotension, respiratory depression, arrhythmias, bronchospasm, & death.  - Patient understands the risks and benefits of the procedure and wishes to proceed. All questions were answered and informed consent was obtained.   - This patient was discussed with the attending interventional cardiologist who agrees with the above assessment & plan.        Hyperlipidemia, mixed  - controlled, continue as is    Essential hypertension  - controlled, continue as is      Signed:  Guerda Holley PA-C  Interventional Cardiology      IC STAFF  I have seen the patient, reviewed the Fellow's history and physical, assessment and plan. I have personally interviewed and examined the  patient and agree with the findings. Cs and omb PCI.    TERESA Whittington MD

## 2024-10-12 NOTE — PROGRESS NOTES
HPI    DLS: 6/10/2024    Pt here for 4 Month Check;  Pt states having a little discomfort to OU, they just feel foggy and   heavy.     Meds;  Cosopt BID OU= Pt states at times he does forget to put his eye drops in.     POHx:   1. Traumatic Glaucoma OS - severe stage   2. Angle Recession OS - severe stage   3. Borderline g laucoma OD - low risk   4. APD OS   5. NS OU   6. BRVO with ME OD     Last edited by Claudia Merida on 10/14/2024  9:40 AM.            Assessment /Plan     For exam results, see Encounter Report.    Traumatic glaucoma, left, severe stage    Angle recession of left eye    Afferent pupillary defect, left    Branch retinal vein occlusion of right eye with macular edema    Nuclear sclerotic cataract of both eyes    Blunt trauma of left eye, sequela        FIRST [facility] GLAUCOMA VISIT - 7/16/2019   Previous pt of [facility] - and was Rx for glaucoma     LOST TO F/U 9/2019 TO 8/2022 - SAW DR GARCIA 8/3/2022 AND SENT BACK OVER - PT IS OFF GTTS          Glaucoma (type and duration)    GLAUCOMA - 2/2 trauma OS (age 16)  - end stage    First HVF   2019   First photos   7/2019   Treatment / Drops started    Timolol BID OU , Azopt TID OU, Brimonidine BID OU       Stopped - ran out and did not refill            Family history    none        Glaucoma meds    cosopt os bid //  (use to use brim / azopt /timolol- os ) // off again 2/3/2023        H/O adverse rxn to glaucoma drops         LASERS    ??        GLAUCOMA SURGERIES    none        OTHER EYE SURGERIES    none        CDR    OD: 0.6 OS: 0.85        Tbase    16- 22 /18 - 23        Tmax    22/23          Ttarget    ??             HVF    3 test 2019 to 2024 - full  od --(improved w/ lid taped)  - ptosis  // 10-2 stim V - IAD         Gonio   +4 od // recess 360 os          CCT    585/599        OCT    2 test 2019 to 2021 - nl od // dec thru out os         Disc photos    7/2019// 6/2024     - Ttoday   16/16  - back on  cosopt -  - Test done today  IOP  "/ gonio     2. Angle recession os    H/O blunt trauma age 16    Finger injury to the eye     3. + APD os     4. NS     5. BRVO w/ ME od    This was/is his good eye    - LAST SEEN - [facility]I 10/26/2023 - IS TO f/U 6 MONTHS     6. Refractive error    Last glasses - Dr Erwin 2/2023 - has lost glasses    Refer to optometry     7/16/2019 - Pt new patient to me, used to be seen in [facility] for glaucoma. Pt ran out of medications in June 2019. Pt has no eye pain, red eye , flashes, floaters, changes in vision. PT has no hx of surgery or family hx of glaucoma.       Retina (Nikki) - evaluation of right macula - see OCT - this is his "good" eye // BRVO w/ ME od    Has seen Dr Jordan x 3 - did NOT get avastin 2/2 cardiac issues(7/27/2023)   Did NOT get ozurdex - pt defered (8/31/2023) - last seen 7/12/2024  - to F/U 6 months  - stable     1/8/2024   Pt gets confused on if he is using the copst in the right or  the left eye - told him to just use it in BOTH eyes so he does not have to keep it straight     10/14/2024   IOP 16 ou   Gonio +4 od and recession os     No longer has his bifocals - having trouble with up close vision  Last glasses Rx - Yaima 2/2023   Refer to optometry   In mean time rec +3.00 to +3.50 for near vision     Cataracts - mild to mod - ? Vis sign    Doubt CE os will help much - adv glaucoma / angle rcession    May benefit from CE od     F/u 4 months - with IOP and AR/MR/BAT cataract check - belia od    "

## 2024-10-14 ENCOUNTER — OFFICE VISIT (OUTPATIENT)
Dept: OPHTHALMOLOGY | Facility: CLINIC | Age: 74
End: 2024-10-14
Payer: MEDICARE

## 2024-10-14 DIAGNOSIS — H21.552 ANGLE RECESSION OF LEFT EYE: ICD-10-CM

## 2024-10-14 DIAGNOSIS — H40.32X4 TRAUMATIC GLAUCOMA, LEFT, INDETERMINATE STAGE: ICD-10-CM

## 2024-10-14 DIAGNOSIS — H40.32X3 TRAUMATIC GLAUCOMA, LEFT, SEVERE STAGE: Primary | ICD-10-CM

## 2024-10-14 DIAGNOSIS — H25.13 NUCLEAR SCLEROTIC CATARACT OF BOTH EYES: ICD-10-CM

## 2024-10-14 DIAGNOSIS — H34.8310 BRANCH RETINAL VEIN OCCLUSION OF RIGHT EYE WITH MACULAR EDEMA: ICD-10-CM

## 2024-10-14 DIAGNOSIS — H21.562 AFFERENT PUPILLARY DEFECT, LEFT: ICD-10-CM

## 2024-10-14 DIAGNOSIS — S05.8X2S BLUNT TRAUMA OF LEFT EYE, SEQUELA: ICD-10-CM

## 2024-10-14 PROCEDURE — 99999 PR PBB SHADOW E&M-EST. PATIENT-LVL III: CPT | Mod: PBBFAC,,, | Performed by: OPHTHALMOLOGY

## 2024-10-14 RX ORDER — DORZOLAMIDE HYDROCHLORIDE AND TIMOLOL MALEATE 20; 5 MG/ML; MG/ML
1 SOLUTION/ DROPS OPHTHALMIC 2 TIMES DAILY
Qty: 10 ML | Refills: 12 | Status: SHIPPED | OUTPATIENT
Start: 2024-10-14

## 2024-10-17 ENCOUNTER — OFFICE VISIT (OUTPATIENT)
Dept: INTERNAL MEDICINE | Facility: CLINIC | Age: 74
End: 2024-10-17
Payer: MEDICARE

## 2024-10-17 VITALS
HEIGHT: 70 IN | BODY MASS INDEX: 31.56 KG/M2 | SYSTOLIC BLOOD PRESSURE: 136 MMHG | HEART RATE: 60 BPM | TEMPERATURE: 98 F | DIASTOLIC BLOOD PRESSURE: 72 MMHG | OXYGEN SATURATION: 96 % | RESPIRATION RATE: 12 BRPM | WEIGHT: 220.44 LBS

## 2024-10-17 DIAGNOSIS — N40.0 BENIGN PROSTATIC HYPERPLASIA, UNSPECIFIED WHETHER LOWER URINARY TRACT SYMPTOMS PRESENT: ICD-10-CM

## 2024-10-17 DIAGNOSIS — Z23 NEED FOR VACCINATION: ICD-10-CM

## 2024-10-17 DIAGNOSIS — E11.21 TYPE 2 DIABETES MELLITUS WITH DIABETIC NEPHROPATHY, WITHOUT LONG-TERM CURRENT USE OF INSULIN: ICD-10-CM

## 2024-10-17 DIAGNOSIS — N18.4 CHRONIC KIDNEY DISEASE (CKD), STAGE IV (SEVERE): Primary | ICD-10-CM

## 2024-10-17 PROCEDURE — 90653 IIV ADJUVANT VACCINE IM: CPT | Mod: S$GLB,,, | Performed by: INTERNAL MEDICINE

## 2024-10-17 PROCEDURE — G0008 ADMIN INFLUENZA VIRUS VAC: HCPCS | Mod: S$GLB,,, | Performed by: INTERNAL MEDICINE

## 2024-10-17 PROCEDURE — 3075F SYST BP GE 130 - 139MM HG: CPT | Mod: CPTII,S$GLB,, | Performed by: INTERNAL MEDICINE

## 2024-10-17 PROCEDURE — 99999 PR PBB SHADOW E&M-EST. PATIENT-LVL III: CPT | Mod: PBBFAC,,, | Performed by: INTERNAL MEDICINE

## 2024-10-17 PROCEDURE — 3066F NEPHROPATHY DOC TX: CPT | Mod: CPTII,S$GLB,, | Performed by: INTERNAL MEDICINE

## 2024-10-17 PROCEDURE — 99214 OFFICE O/P EST MOD 30 MIN: CPT | Mod: 25,S$GLB,, | Performed by: INTERNAL MEDICINE

## 2024-10-17 PROCEDURE — 3046F HEMOGLOBIN A1C LEVEL >9.0%: CPT | Mod: CPTII,S$GLB,, | Performed by: INTERNAL MEDICINE

## 2024-10-17 PROCEDURE — 3078F DIAST BP <80 MM HG: CPT | Mod: CPTII,S$GLB,, | Performed by: INTERNAL MEDICINE

## 2024-10-17 PROCEDURE — 3008F BODY MASS INDEX DOCD: CPT | Mod: CPTII,S$GLB,, | Performed by: INTERNAL MEDICINE

## 2024-10-21 NOTE — TELEPHONE ENCOUNTER
----- Message from Sindhu sent at 10/21/2024 12:37 PM CDT -----  Contact: Patient   [phone]  Requesting an RX refill or new RX.    Is this a refill or new RX: REFILL    RX name and strength (copy/paste from chart):    empagliflozin  empagliflozin (JARDIANCE) 25 mg table      Is this a 30 day or 90 day RX:     Pharmacy name and phone # (copy/paste from chart):    [id number] - [place], [place] - [address] AT [facility] [address]  [address]  [facility] [address]  Phone: [phone] Fax: [phone]       The doctors have asked that we provide their patients with the following 2 reminders -- prescription refills can take up to 72 hours, and a friendly reminder that in the future you can use your [facility] account to request refills:

## 2024-10-21 NOTE — TELEPHONE ENCOUNTER
No care due was identified.  [facility] Embedded Care Due Messages. Reference number: [id number].   10/21/2024 1:36:08 PM CDT

## 2024-10-23 ENCOUNTER — OFFICE VISIT (OUTPATIENT)
Dept: UROLOGY | Facility: CLINIC | Age: 74
End: 2024-10-23
Payer: MEDICARE

## 2024-10-23 VITALS
DIASTOLIC BLOOD PRESSURE: 75 MMHG | BODY MASS INDEX: 31.66 KG/M2 | HEIGHT: 70 IN | WEIGHT: 221.13 LBS | SYSTOLIC BLOOD PRESSURE: 120 MMHG | HEART RATE: 72 BPM

## 2024-10-23 DIAGNOSIS — E11.8 DM TYPE 2 CAUSING COMPLICATION: ICD-10-CM

## 2024-10-23 DIAGNOSIS — N13.8 BPH WITH URINARY OBSTRUCTION: Primary | ICD-10-CM

## 2024-10-23 DIAGNOSIS — I10 ESSENTIAL HYPERTENSION: ICD-10-CM

## 2024-10-23 DIAGNOSIS — N52.01 ERECTILE DYSFUNCTION DUE TO ARTERIAL INSUFFICIENCY: ICD-10-CM

## 2024-10-23 DIAGNOSIS — E78.2 HYPERLIPIDEMIA, MIXED: ICD-10-CM

## 2024-10-23 DIAGNOSIS — N40.1 BPH WITH URINARY OBSTRUCTION: Primary | ICD-10-CM

## 2024-10-23 PROBLEM — I69.30 HISTORY OF CVA WITH RESIDUAL DEFICIT: Status: RESOLVED | Noted: 2019-04-12 | Resolved: 2024-10-23

## 2024-10-23 PROBLEM — Z98.890 H/O COLONOSCOPY: Status: RESOLVED | Noted: 2019-04-12 | Resolved: 2024-10-23

## 2024-10-23 PROBLEM — Z01.818 PRE-OP TESTING: Status: RESOLVED | Noted: 2024-04-08 | Resolved: 2024-10-23

## 2024-10-23 PROBLEM — R07.9 CHEST PAIN: Status: RESOLVED | Noted: 2023-10-06 | Resolved: 2024-10-23

## 2024-10-23 PROCEDURE — 99999 PR PBB SHADOW E&M-EST. PATIENT-LVL V: CPT | Mod: PBBFAC,,, | Performed by: UROLOGY

## 2024-10-23 NOTE — PROGRESS NOTES
CHIEF COMPLAINT:    Mr. Millan is a 73 y.o. male presenting for a consultation at the request of Dr. Farias. Patient presents with LUTS.    PRESENTING ILLNESS:    Jae Millan is a 73 y.o. male who c/o LUTS.  He's a poorly controlled diabetic and has a prior history of stroke.  He has LUTS. Nocturia x 4. He is taking Flomax.  He's pleased with how he voids on the flomax.       He has ED. Has tried and failed Cialis 20 mg.     REVIEW OF SYSTEMS:    Jae Millan denies headache, blurred vision, fever, nausea, vomiting, chills, abdominal pain, chest pain, sore throat, bleeding per rectum, cough, SOB, recent loss of consciousness, recent mental status changes, seizures, dizziness, or upper or lower extremity weakness.    PATEL  1. 2  2. 0  3. 1  4. 2  5. 3      PATIENT HISTORY:    Past Medical History:   Diagnosis Date    Cataract     Coronary artery disease involving native coronary artery of native heart with angina pectoris 02/15/2024    Decreased sensation of lower extremity     Diabetes mellitus     Diabetic neuropathy     Dysarthria     Dysphagia     ED (erectile dysfunction)     Glaucoma     Hemiparesis     LEFT side post CVA    High cholesterol     History of CVA with residual deficit 04/12/2019 August 23, 2018      History of hepatitis C, s/p successful Rx w/ cure (SVR12) 4/2020     Hypertension     Impaired functional mobility, balance, gait, and endurance     Pre-op testing 04/08/2024    Pulmonary emphysema, unspecified emphysema type 02/01/2024    Stroke     Urinary frequency        Past Surgical History:   Procedure Laterality Date    ANGIOGRAM, CORONARY, WITH LEFT HEART CATHETERIZATION  8/13/2024    Procedure: Angiogram, Coronary, with Left Heart Cath;  Surgeon: Sandoval Paul MD;  Location: [facility] CATH LAB;  Service: Cardiology;;    COLONOSCOPY N/A 3/9/2023    Procedure: COLONOSCOPY;  Surgeon: Kian Kraft MD;  Location: [facility] ENDO ([address] [place] [address]);  Service: Endoscopy;  Laterality: N/A;   medical transportation-inst mail-tb  pre call complete-as    CORONARY ANGIOGRAPHY N/A 3/27/2024    Procedure: ANGIOGRAM, CORONARY ARTERY;  Surgeon: Sandoval Paul MD;  Location: [facility] CATH LAB;  Service: Cardiology;  Laterality: N/A;    INGUINAL HERNIA REPAIR Left 2019    IVUS, CORONARY  2024    Procedure: IVUS, Coronary;  Surgeon: Sandoval Paul MD;  Location: [facility] CATH LAB;  Service: Cardiology;;    STENT, DRUG ELUTING, SINGLE VESSEL, CORONARY N/A 2024    Procedure: Stent, Drug Eluting, Single Vessel, Coronary;  Surgeon: Sandoval Paul MD;  Location: [facility] CATH LAB;  Service: Cardiology;  Laterality: N/A;    UNDESCENDED TESTICLE EXPLORATION Right     R testicle removed as it was undescended       Family History   Problem Relation Name Age of Onset    Heart disease Brother          cabg    Hypertension Sister      Amblyopia Neg Hx      Blindness Neg Hx      Cataracts Neg Hx      Glaucoma Neg Hx      Macular degeneration Neg Hx      Strabismus Neg Hx      Retinal detachment Neg Hx      Diabetes Neg Hx         Social History     Socioeconomic History    Marital status: Single   Tobacco Use    Smoking status: Former     Current packs/day: 0.00     Average packs/day: 1 pack/day for 18.0 years (18.0 ttl pk-yrs)     Types: Cigarettes     Start date: 1968     Quit date: 1986     Years since quittin.5    Smokeless tobacco: Never   Substance and Sexual Activity    Alcohol use: Not Currently    Drug use: Never    Sexual activity: Not Currently     Social Drivers of Health     Financial Resource Strain: High Risk (2022)    Overall Financial Resource Strain (CARDIA)     Difficulty of Paying Living Expenses: Very hard   Food Insecurity: Food Insecurity Present (2022)    Hunger Vital Sign     Worried About Running Out of Food in the Last Year: Often true     Ran Out of Food in the Last Year: Often true   Transportation Needs: Unmet Transportation Needs (2022)     PRAPARE - Transportation     Lack of Transportation (Medical): Yes     Lack of Transportation (Non-Medical): Yes   Physical Activity: Sufficiently Active (9/8/2022)    Exercise Vital Sign     Days of Exercise per Week: 7 days     Minutes of Exercise per Session: 120 min   Stress: No Stress Concern Present (9/8/2022)    [employer] - Occupational Stress Questionnaire     Feeling of Stress : Not at all   Housing Stability: High Risk (9/8/2022)    Housing Stability Vital Sign     Unable to Pay for Housing in the Last Year: Yes     Unstable Housing in the Last Year: No       Allergies:  Patient has no known allergies.    Medications:    Current Outpatient Medications:     amLODIPine (NORVASC) 10 MG tablet, TAKE 1 TABLET(10 MG) BY MOUTH EVERY DAY FOR HYPERTENSION, Disp: 90 tablet, Rfl: 3    aspirin 81 MG Chew, Take 1 tablet (81 mg total) by mouth once daily., Disp: , Rfl:     blood sugar diagnostic Strp, To check BG 4 times daily, to use with insurance preferred meter, Disp: 100 each, Rfl: 11    carvediloL (COREG) 6.25 MG tablet, Take 1 tablet (6.25 mg total) by mouth 2 (two) times daily., Disp: 180 tablet, Rfl: 3    clopidogreL (PLAVIX) 75 mg tablet, TAKE 1 TABLET(75 MG) BY MOUTH EVERY DAY, Disp: 90 tablet, Rfl: 3    dorzolamide-timolol 2-0.5% (COSOPT) 22.3-6.8 mg/mL ophthalmic solution, Place 1 drop into both eyes 2 (two) times daily., Disp: 10 mL, Rfl: 12    empagliflozin (JARDIANCE) 25 mg tablet, Take 1 tablet (25 mg total) by mouth once daily., Disp: 90 tablet, Rfl: 1    ergocalciferol (ERGOCALCIFEROL) 50,000 unit Cap, One weekly, Disp: 12 capsule, Rfl: 3    ezetimibe (ZETIA) 10 mg tablet, Take 1 tablet (10 mg total) by mouth once daily., Disp: 90 tablet, Rfl: 3    glimepiride (AMARYL) 2 MG tablet, Take 1 tablet (2 mg total) by mouth before breakfast., Disp: 90 tablet, Rfl: 3    hydrocortisone 2.5 % cream, Apply topically 2 (two) times daily., Disp: , Rfl:     ketoconazole (NIZORAL) 2 %  cream, Apply topically 2 (two) times daily., Disp: 60 g, Rfl: 1    lancets Misc, Check bg 4 times daily, Disp: 100 each, Rfl: 11    losartan-hydrochlorothiazide 100-25 mg (HYZAAR) 100-25 mg per tablet, Take 1 tablet by mouth once daily., Disp: 90 tablet, Rfl: 3    pantoprazole (PROTONIX) 40 MG tablet, TAKE 1 TABLET(40 MG) BY MOUTH EVERY DAY FOR STOMACH, Disp: 90 tablet, Rfl: 3    rosuvastatin (CRESTOR) 20 MG tablet, Take 1 tablet (20 mg total) by mouth once daily., Disp: 90 tablet, Rfl: 2    SITagliptin phosphate (JANUVIA) 50 MG Tab, Take 1 tablet (50 mg total) by mouth once daily., Disp: 90 tablet, Rfl: 3    tamsulosin (FLOMAX) 0.4 mg Cap, One capsule at bedtime, Disp: 90 capsule, Rfl: 3    gabapentin (NEURONTIN) 300 MG capsule, Take 1 capsule (300 mg total) by mouth 2 (two) times daily. In 1-2 weeks, if no relief, may increase dose to 3 times per day., Disp: , Rfl:     nitroGLYCERIN (NITROSTAT) 0.4 MG SL tablet, Place 1 tablet (0.4 mg total) under the tongue every 5 (five) minutes as needed for Chest pain., Disp: 30 tablet, Rfl: 1    tadalafiL (CIALIS) 20 MG Tab, Take 1 tablet (20 mg total) by mouth Every 3 (three) days. for 10 days, Disp: 20 tablet, Rfl: 9    testosterone cypionate (DEPOTESTOTERONE CYPIONATE) 200 mg/mL injection, Inject 1 mL (200 mg total) into the muscle every 14 (fourteen) days., Disp: 10 mL, Rfl: 1    PHYSICAL EXAMINATION:    The patient generally appears in good health, is appropriately interactive, and is in no apparent distress.     Eyes: anicteric sclerae, moist conjunctivae; no lid-lag; PERRLA     HENT: Atraumatic; oropharynx clear with moist mucous membranes and no mucosal ulcerations;normal hard and soft palate.  No evidence of lymphadenopathy.    Neck: Trachea midline.  No thyromegaly.    Skin: No lesions.    Mental: Cooperative with normal affect.  Is oriented to time, place, and person.    Neuro: Grossly intact.    Chest: Normal inspiratory effort.   No accessory muscles.  No audible  wheezes.  Respirations symmetric on inspiration and expiration.    Heart: Regular rhythm.      Abdomen:  Soft, non-tender. No masses or organomegaly. Bladder is not palpable. No evidence of flank discomfort. No evidence of inguinal hernia.    Genitourinary: The penis is not circumcised with no evidence of plaques or induration. The urethral meatus is normal. The testes, epididymides, and cord structures are normal in size and contour bilaterally. The scrotum is normal in size and contour.    Normal anal sphincter tone. No rectal mass.    The prostate is 50 g. Normal landmarks. Lateral sulci. Median furrow intact.  No nodularity or induration. Seminal vesicles are normal.    Extremities: No clubbing, cyanosis, or edema      LABS:      Lab Results   Component Value Date    PSA 0.19 07/24/2023    PSA 0.29 08/15/2022    PSA 0.16 08/06/2021       IMPRESSION:    Encounter Diagnoses   Name Primary?    BPH with urinary obstruction Yes    DM type 2 causing complication     Erectile dysfunction due to arterial insufficiency     Essential hypertension     Hyperlipidemia, mixed    HTN, stable  DM, stable  Hyperlipidemia, stable      PLAN:    1. Discussed that he's a poorly controlled diabetic which will worsen his LUTS.  Recommend better DM control.  2. Can continue flomax for his LUTS.  Prescribed by PCP.  3. Discussed options for his severe ED (affected by above comorbidities).  He'd like to think about [facility].  4. He can RTC prn.  He will call if he elects to pursue treatment for his ED.    Copy to:

## 2024-11-06 ENCOUNTER — OFFICE VISIT (OUTPATIENT)
Dept: OPTOMETRY | Facility: CLINIC | Age: 74
End: 2024-11-06
Payer: MEDICARE

## 2024-11-06 DIAGNOSIS — H52.203 HYPEROPIA OF BOTH EYES WITH ASTIGMATISM AND PRESBYOPIA: ICD-10-CM

## 2024-11-06 DIAGNOSIS — H52.03 HYPEROPIA OF BOTH EYES WITH ASTIGMATISM AND PRESBYOPIA: ICD-10-CM

## 2024-11-06 DIAGNOSIS — H52.4 HYPEROPIA OF BOTH EYES WITH ASTIGMATISM AND PRESBYOPIA: ICD-10-CM

## 2024-11-06 DIAGNOSIS — H25.13 NUCLEAR SCLEROSIS, BILATERAL: Primary | ICD-10-CM

## 2024-11-06 PROCEDURE — 3288F FALL RISK ASSESSMENT DOCD: CPT | Mod: CPTII,S$GLB,, | Performed by: OPTOMETRIST

## 2024-11-06 PROCEDURE — 92015 DETERMINE REFRACTIVE STATE: CPT | Mod: S$GLB,,, | Performed by: OPTOMETRIST

## 2024-11-06 PROCEDURE — 1101F PT FALLS ASSESS-DOCD LE1/YR: CPT | Mod: CPTII,S$GLB,, | Performed by: OPTOMETRIST

## 2024-11-06 PROCEDURE — 3051F HG A1C>EQUAL 7.0%<8.0%: CPT | Mod: CPTII,S$GLB,, | Performed by: OPTOMETRIST

## 2024-11-06 PROCEDURE — 3066F NEPHROPATHY DOC TX: CPT | Mod: CPTII,S$GLB,, | Performed by: OPTOMETRIST

## 2024-11-06 PROCEDURE — 99999 PR PBB SHADOW E&M-EST. PATIENT-LVL III: CPT | Mod: PBBFAC,,, | Performed by: OPTOMETRIST

## 2024-11-06 PROCEDURE — 1159F MED LIST DOCD IN RCRD: CPT | Mod: CPTII,S$GLB,, | Performed by: OPTOMETRIST

## 2024-11-06 PROCEDURE — 1126F AMNT PAIN NOTED NONE PRSNT: CPT | Mod: CPTII,S$GLB,, | Performed by: OPTOMETRIST

## 2024-11-06 PROCEDURE — 92014 COMPRE OPH EXAM EST PT 1/>: CPT | Mod: S$GLB,,, | Performed by: OPTOMETRIST

## 2024-11-06 NOTE — PROGRESS NOTES
HPI    Refraction Only   Pt states vision is poor   Pt reports broke specs a little while   LDFE: 06/2024    Pt reports some Dry/ Itchy/ Burning due to prescribed gtts   Gtt: Yes    Traumatic glaucoma, left, severe stage  Cosopt OU pt reports poor compliance   Pt reports sometimes he doesn't even take his gtts  Pt reports he took it this morning     Last edited by Carmelina Galindo on 11/6/2024 10:40 AM.            Assessment /Plan     For exam results, see Encounter Report.    Nuclear sclerosis, bilateral  -Monitor annual DFE    Hyperopia of both eyes with astigmatism and presbyopia  Eyeglass Final Rx       Eyeglass Final Rx         Sphere Cylinder Axis Dist VA Add    Right +1.00 +0.75 180 20/25- +2.50    Left +1.00 +1.25 175 20/50 +2.50      Type: PAL    Expiration Date: 11/6/2025                      [facility] as scheduled

## 2024-11-13 ENCOUNTER — LAB VISIT (OUTPATIENT)
Dept: LAB | Facility: HOSPITAL | Age: 74
End: 2024-11-13
Attending: INTERNAL MEDICINE
Payer: MEDICARE

## 2024-11-13 DIAGNOSIS — I25.10 CORONARY ARTERY DISEASE, UNSPECIFIED VESSEL OR LESION TYPE, UNSPECIFIED WHETHER ANGINA PRESENT, UNSPECIFIED WHETHER NATIVE OR TRANSPLANTED HEART: ICD-10-CM

## 2024-11-13 DIAGNOSIS — E11.21 TYPE 2 DIABETES MELLITUS WITH DIABETIC NEPHROPATHY, WITHOUT LONG-TERM CURRENT USE OF INSULIN: ICD-10-CM

## 2024-11-13 LAB
ALBUMIN SERPL BCP-MCNC: 3.7 G/DL (ref 3.5–5.2)
ALP SERPL-CCNC: 58 U/L (ref 40–150)
ALT SERPL W/O P-5'-P-CCNC: 27 U/L (ref 10–44)
ANION GAP SERPL CALC-SCNC: 11 MMOL/L (ref 8–16)
AST SERPL-CCNC: 17 U/L (ref 10–40)
BASOPHILS # BLD AUTO: 0.04 K/UL (ref 0–0.2)
BASOPHILS NFR BLD: 0.6 % (ref 0–1.9)
BILIRUB SERPL-MCNC: 0.5 MG/DL (ref 0.1–1)
BUN SERPL-MCNC: 38 MG/DL (ref 8–23)
CALCIUM SERPL-MCNC: 9.2 MG/DL (ref 8.7–10.5)
CHLORIDE SERPL-SCNC: 104 MMOL/L (ref 95–110)
CO2 SERPL-SCNC: 25 MMOL/L (ref 23–29)
CREAT SERPL-MCNC: 2.7 MG/DL (ref 0.5–1.4)
DIFFERENTIAL METHOD BLD: ABNORMAL
EOSINOPHIL # BLD AUTO: 0.2 K/UL (ref 0–0.5)
EOSINOPHIL NFR BLD: 3.1 % (ref 0–8)
ERYTHROCYTE [DISTWIDTH] IN BLOOD BY AUTOMATED COUNT: 15.3 % (ref 11.5–14.5)
EST. GFR  (NO RACE VARIABLE): 24.4 ML/MIN/1.73 M^2
ESTIMATED AVG GLUCOSE: 243 MG/DL (ref 68–131)
GLUCOSE SERPL-MCNC: 208 MG/DL (ref 70–110)
HBA1C MFR BLD: 10.1 % (ref 4–5.6)
HCT VFR BLD AUTO: 37.6 % (ref 40–54)
HGB BLD-MCNC: 12.4 G/DL (ref 14–18)
IMM GRANULOCYTES # BLD AUTO: 0.02 K/UL (ref 0–0.04)
IMM GRANULOCYTES NFR BLD AUTO: 0.3 % (ref 0–0.5)
INR PPP: 1 (ref 0.8–1.2)
LYMPHOCYTES # BLD AUTO: 1 K/UL (ref 1–4.8)
LYMPHOCYTES NFR BLD: 15.2 % (ref 18–48)
MCH RBC QN AUTO: 27.1 PG (ref 27–31)
MCHC RBC AUTO-ENTMCNC: 33 G/DL (ref 32–36)
MCV RBC AUTO: 82 FL (ref 82–98)
MONOCYTES # BLD AUTO: 0.5 K/UL (ref 0.3–1)
MONOCYTES NFR BLD: 8.5 % (ref 4–15)
NEUTROPHILS # BLD AUTO: 4.6 K/UL (ref 1.8–7.7)
NEUTROPHILS NFR BLD: 72.3 % (ref 38–73)
NRBC BLD-RTO: 0 /100 WBC
PLATELET # BLD AUTO: 219 K/UL (ref 150–450)
PMV BLD AUTO: 11.7 FL (ref 9.2–12.9)
POTASSIUM SERPL-SCNC: 4.1 MMOL/L (ref 3.5–5.1)
PROT SERPL-MCNC: 7.3 G/DL (ref 6–8.4)
PROTHROMBIN TIME: 11.4 SEC (ref 9–12.5)
RBC # BLD AUTO: 4.57 M/UL (ref 4.6–6.2)
SODIUM SERPL-SCNC: 140 MMOL/L (ref 136–145)
WBC # BLD AUTO: 6.37 K/UL (ref 3.9–12.7)

## 2024-11-13 PROCEDURE — 85025 COMPLETE CBC W/AUTO DIFF WBC: CPT | Performed by: INTERNAL MEDICINE

## 2024-11-13 PROCEDURE — 36415 COLL VENOUS BLD VENIPUNCTURE: CPT | Performed by: INTERNAL MEDICINE

## 2024-11-13 PROCEDURE — 80053 COMPREHEN METABOLIC PANEL: CPT | Performed by: INTERNAL MEDICINE

## 2024-11-13 PROCEDURE — 85610 PROTHROMBIN TIME: CPT | Performed by: INTERNAL MEDICINE

## 2024-11-13 PROCEDURE — 83036 HEMOGLOBIN GLYCOSYLATED A1C: CPT | Performed by: INTERNAL MEDICINE

## 2024-11-14 ENCOUNTER — HOSPITAL ENCOUNTER (OUTPATIENT)
Facility: HOSPITAL | Age: 74
Discharge: HOME | End: 2024-11-14
Attending: INTERNAL MEDICINE | Admitting: INTERNAL MEDICINE
Payer: MEDICARE

## 2024-11-14 VITALS
TEMPERATURE: 98 F | SYSTOLIC BLOOD PRESSURE: 113 MMHG | RESPIRATION RATE: 16 BRPM | BODY MASS INDEX: 31.21 KG/M2 | WEIGHT: 218 LBS | DIASTOLIC BLOOD PRESSURE: 60 MMHG | HEIGHT: 70 IN | HEART RATE: 60 BPM | OXYGEN SATURATION: 98 %

## 2024-11-14 DIAGNOSIS — I25.10 CAD (CORONARY ARTERY DISEASE): ICD-10-CM

## 2024-11-14 DIAGNOSIS — R07.9 CHEST PAIN IN ADULT: ICD-10-CM

## 2024-11-14 DIAGNOSIS — Z98.890 STATUS POST LEFT HEART CATHETERIZATION: ICD-10-CM

## 2024-11-14 DIAGNOSIS — I25.10 CAD, MULTIPLE VESSEL: ICD-10-CM

## 2024-11-14 LAB
ABO + RH BLD: NORMAL
BLD GP AB SCN CELLS X3 SERPL QL: NORMAL
OHS QRS DURATION: 136 MS
OHS QRS DURATION: 144 MS
OHS QRS DURATION: 146 MS
OHS QTC CALCULATION: 447 MS
OHS QTC CALCULATION: 447 MS
OHS QTC CALCULATION: 462 MS
POCT GLUCOSE: 233 MG/DL (ref 70–110)
POCT GLUCOSE: 246 MG/DL (ref 70–110)

## 2024-11-14 PROCEDURE — 99152 MOD SED SAME PHYS/QHP 5/>YRS: CPT | Performed by: INTERNAL MEDICINE

## 2024-11-14 PROCEDURE — 25000003 PHARM REV CODE 250: Performed by: HOSPITALIST

## 2024-11-14 PROCEDURE — 92928 PRQ TCAT PLMT NTRAC ST 1 LES: CPT | Mod: LC,,, | Performed by: INTERNAL MEDICINE

## 2024-11-14 PROCEDURE — C1874 STENT, COATED/COV W/DEL SYS: HCPCS | Performed by: INTERNAL MEDICINE

## 2024-11-14 PROCEDURE — 25500020 PHARM REV CODE 255: Performed by: INTERNAL MEDICINE

## 2024-11-14 PROCEDURE — C1887 CATHETER, GUIDING: HCPCS | Performed by: INTERNAL MEDICINE

## 2024-11-14 PROCEDURE — 86850 RBC ANTIBODY SCREEN: CPT | Performed by: INTERNAL MEDICINE

## 2024-11-14 PROCEDURE — C9601 PERC DRUG-EL COR STENT BRAN: HCPCS | Performed by: INTERNAL MEDICINE

## 2024-11-14 PROCEDURE — 93010 ELECTROCARDIOGRAM REPORT: CPT | Mod: ,,, | Performed by: INTERNAL MEDICINE

## 2024-11-14 PROCEDURE — C1769 GUIDE WIRE: HCPCS | Performed by: INTERNAL MEDICINE

## 2024-11-14 PROCEDURE — 93005 ELECTROCARDIOGRAM TRACING: CPT

## 2024-11-14 PROCEDURE — 63600175 PHARM REV CODE 636 W HCPCS: Performed by: HOSPITALIST

## 2024-11-14 PROCEDURE — C1894 INTRO/SHEATH, NON-LASER: HCPCS | Performed by: INTERNAL MEDICINE

## 2024-11-14 PROCEDURE — 93454 CORONARY ARTERY ANGIO S&I: CPT | Mod: 26,59,51, | Performed by: INTERNAL MEDICINE

## 2024-11-14 PROCEDURE — 25000003 PHARM REV CODE 250: Performed by: INTERNAL MEDICINE

## 2024-11-14 PROCEDURE — 99153 MOD SED SAME PHYS/QHP EA: CPT | Performed by: INTERNAL MEDICINE

## 2024-11-14 PROCEDURE — 93454 CORONARY ARTERY ANGIO S&I: CPT | Mod: 59 | Performed by: INTERNAL MEDICINE

## 2024-11-14 PROCEDURE — 99152 MOD SED SAME PHYS/QHP 5/>YRS: CPT | Mod: ,,, | Performed by: INTERNAL MEDICINE

## 2024-11-14 PROCEDURE — 93010 ELECTROCARDIOGRAM REPORT: CPT | Mod: 76,,, | Performed by: INTERNAL MEDICINE

## 2024-11-14 PROCEDURE — C9600 PERC DRUG-EL COR STENT SING: HCPCS | Mod: LC | Performed by: INTERNAL MEDICINE

## 2024-11-14 PROCEDURE — C1725 CATH, TRANSLUMIN NON-LASER: HCPCS | Performed by: INTERNAL MEDICINE

## 2024-11-14 PROCEDURE — 63600175 PHARM REV CODE 636 W HCPCS: Performed by: INTERNAL MEDICINE

## 2024-11-14 PROCEDURE — 27201423 OPTIME MED/SURG SUP & DEVICES STERILE SUPPLY: Performed by: INTERNAL MEDICINE

## 2024-11-14 PROCEDURE — 92929 PR STENT, ADD'L VESSEL: CPT | Mod: ,,, | Performed by: INTERNAL MEDICINE

## 2024-11-14 PROCEDURE — 25000242 PHARM REV CODE 250 ALT 637 W/ HCPCS: Performed by: HOSPITALIST

## 2024-11-14 PROCEDURE — C1760 CLOSURE DEV, VASC: HCPCS | Performed by: INTERNAL MEDICINE

## 2024-11-14 DEVICE — EVEROLIMUS-ELUTING PLATINUM CHROMIUM CORONARY STENT SYSTEM
Type: IMPLANTABLE DEVICE | Site: CORONARY | Status: FUNCTIONAL
Brand: SYNERGY™ XD

## 2024-11-14 RX ORDER — HEPARIN SOD,PORCINE/0.9 % NACL 1000/500ML
INTRAVENOUS SOLUTION INTRAVENOUS
Status: DISCONTINUED | OUTPATIENT
Start: 2024-11-14 | End: 2024-11-14 | Stop reason: HOSPADM

## 2024-11-14 RX ORDER — FENTANYL CITRATE 50 UG/ML
INJECTION, SOLUTION INTRAMUSCULAR; INTRAVENOUS
Status: DISCONTINUED | OUTPATIENT
Start: 2024-11-14 | End: 2024-11-14 | Stop reason: HOSPADM

## 2024-11-14 RX ORDER — SODIUM CHLORIDE 9 MG/ML
INJECTION, SOLUTION INTRAVENOUS CONTINUOUS
Status: ACTIVE | OUTPATIENT
Start: 2024-11-14 | End: 2024-11-14

## 2024-11-14 RX ORDER — SODIUM CHLORIDE 9 MG/ML
INJECTION, SOLUTION INTRAVENOUS CONTINUOUS
Status: DISCONTINUED | OUTPATIENT
Start: 2024-11-14 | End: 2024-11-14 | Stop reason: HOSPADM

## 2024-11-14 RX ORDER — LIDOCAINE HYDROCHLORIDE 20 MG/ML
INJECTION, SOLUTION EPIDURAL; INFILTRATION; INTRACAUDAL; PERINEURAL
Status: DISCONTINUED | OUTPATIENT
Start: 2024-11-14 | End: 2024-11-14 | Stop reason: HOSPADM

## 2024-11-14 RX ORDER — MORPHINE SULFATE 2 MG/ML
2 INJECTION, SOLUTION INTRAMUSCULAR; INTRAVENOUS ONCE
Status: COMPLETED | OUTPATIENT
Start: 2024-11-14 | End: 2024-11-14

## 2024-11-14 RX ORDER — DIPHENHYDRAMINE HCL 50 MG
50 CAPSULE ORAL ONCE
Status: COMPLETED | OUTPATIENT
Start: 2024-11-14 | End: 2024-11-14

## 2024-11-14 RX ORDER — NITROGLYCERIN 0.4 MG/1
TABLET SUBLINGUAL
Status: DISCONTINUED
Start: 2024-11-14 | End: 2024-11-14 | Stop reason: HOSPADM

## 2024-11-14 RX ORDER — ACETAMINOPHEN 325 MG/1
650 TABLET ORAL EVERY 6 HOURS PRN
Status: DISCONTINUED | OUTPATIENT
Start: 2024-11-14 | End: 2024-11-14 | Stop reason: HOSPADM

## 2024-11-14 RX ORDER — PROTAMINE SULFATE 10 MG/ML
INJECTION, SOLUTION INTRAVENOUS
Status: DISCONTINUED | OUTPATIENT
Start: 2024-11-14 | End: 2024-11-14 | Stop reason: HOSPADM

## 2024-11-14 RX ORDER — NITROGLYCERIN 0.4 MG/1
0.4 TABLET SUBLINGUAL EVERY 5 MIN PRN
Status: DISCONTINUED | OUTPATIENT
Start: 2024-11-14 | End: 2024-11-14 | Stop reason: HOSPADM

## 2024-11-14 RX ORDER — CEFAZOLIN SODIUM 1 G/3ML
INJECTION, POWDER, FOR SOLUTION INTRAMUSCULAR; INTRAVENOUS
Status: DISCONTINUED | OUTPATIENT
Start: 2024-11-14 | End: 2024-11-14 | Stop reason: HOSPADM

## 2024-11-14 RX ORDER — GABAPENTIN 300 MG/1
300 CAPSULE ORAL ONCE
Status: COMPLETED | OUTPATIENT
Start: 2024-11-14 | End: 2024-11-14

## 2024-11-14 RX ORDER — HEPARIN SODIUM 1000 [USP'U]/ML
INJECTION, SOLUTION INTRAVENOUS; SUBCUTANEOUS
Status: DISCONTINUED | OUTPATIENT
Start: 2024-11-14 | End: 2024-11-14 | Stop reason: HOSPADM

## 2024-11-14 RX ORDER — MIDAZOLAM HYDROCHLORIDE 1 MG/ML
INJECTION INTRAMUSCULAR; INTRAVENOUS
Status: DISCONTINUED | OUTPATIENT
Start: 2024-11-14 | End: 2024-11-14 | Stop reason: HOSPADM

## 2024-11-14 RX ADMIN — SODIUM CHLORIDE: 9 INJECTION, SOLUTION INTRAVENOUS at 07:11

## 2024-11-14 RX ADMIN — NITROGLYCERIN 0.4 MG: 0.4 TABLET, ORALLY DISINTEGRATING SUBLINGUAL at 11:11

## 2024-11-14 RX ADMIN — ACETAMINOPHEN 650 MG: 325 TABLET ORAL at 10:11

## 2024-11-14 RX ADMIN — DIPHENHYDRAMINE HYDROCHLORIDE 50 MG: 50 CAPSULE ORAL at 07:11

## 2024-11-14 RX ADMIN — MORPHINE SULFATE 2 MG: 2 INJECTION, SOLUTION INTRAMUSCULAR; INTRAVENOUS at 10:11

## 2024-11-14 RX ADMIN — GABAPENTIN 300 MG: 300 CAPSULE ORAL at 11:11

## 2024-11-14 NOTE — NURSING
No c/o pain or SOB.  Right groin dressing remains clean dry and intact.  Right groin remains soft.  No bleeding or hematoma noted.

## 2024-11-14 NOTE — NURSING
Ambulating without difficulty.  IV d/c'd with cath tip intact.  Right groin dressing remains clean dry and intact.  Right groin soft.  No bleeding or hematoma noted.  Off unit via wheelchair for discharge home.  Pt received by his friend for ride home.

## 2024-11-14 NOTE — SUBJECTIVE & OBJECTIVE
Past Medical History:   Diagnosis Date    Cataract     Coronary artery disease involving native coronary artery of native heart with angina pectoris 02/15/2024    Decreased sensation of lower extremity     Diabetes mellitus     Diabetic neuropathy     Dysarthria     Dysphagia     ED (erectile dysfunction)     Glaucoma     Hemiparesis     LEFT side post CVA    High cholesterol     History of CVA with residual deficit 04/12/2019 August 23, 2018      History of hepatitis C, s/p successful Rx w/ cure (SVR12) 4/2020     Hypertension     Impaired functional mobility, balance, gait, and endurance     Pre-op testing 04/08/2024    Pulmonary emphysema, unspecified emphysema type 02/01/2024    Stroke     Urinary frequency        Past Surgical History:   Procedure Laterality Date    ANGIOGRAM, CORONARY, WITH LEFT HEART CATHETERIZATION  8/13/2024    Procedure: Angiogram, Coronary, with Left Heart Cath;  Surgeon: Sandoval Paul MD;  Location: [facility] CATH LAB;  Service: Cardiology;;    COLONOSCOPY N/A 3/9/2023    Procedure: COLONOSCOPY;  Surgeon: Kian Kraft MD;  Location: [facility] ENDO ([address] [place] [address]);  Service: Endoscopy;  Laterality: N/A;  medical transportation-inst mail-tb  pre call complete-as    CORONARY ANGIOGRAPHY N/A 3/27/2024    Procedure: ANGIOGRAM, CORONARY ARTERY;  Surgeon: Sandoval Paul MD;  Location: [facility] CATH LAB;  Service: Cardiology;  Laterality: N/A;    INGUINAL HERNIA REPAIR Left 12/11/2019    IVUS, CORONARY  8/13/2024    Procedure: IVUS, Coronary;  Surgeon: Sandoval Paul MD;  Location: [facility] CATH LAB;  Service: Cardiology;;    STENT, DRUG ELUTING, SINGLE VESSEL, CORONARY N/A 8/13/2024    Procedure: Stent, Drug Eluting, Single Vessel, Coronary;  Surgeon: Sandoval Paul MD;  Location: [facility] CATH LAB;  Service: Cardiology;  Laterality: N/A;    UNDESCENDED TESTICLE EXPLORATION Right     R testicle removed as it was undescended       Review of patient's allergies indicates:  No Known  Allergies    PTA Medications   Medication Sig    amLODIPine (NORVASC) 10 MG tablet TAKE 1 TABLET(10 MG) BY MOUTH EVERY DAY FOR HYPERTENSION    aspirin 81 MG Chew Take 1 tablet (81 mg total) by mouth once daily.    blood sugar diagnostic Strp To check BG 4 times daily, to use with insurance preferred meter    carvediloL (COREG) 6.25 MG tablet Take 1 tablet (6.25 mg total) by mouth 2 (two) times daily.    clopidogreL (PLAVIX) 75 mg tablet TAKE 1 TABLET(75 MG) BY MOUTH EVERY DAY    dorzolamide-timolol 2-0.5% (COSOPT) 22.3-6.8 mg/mL ophthalmic solution Place 1 drop into both eyes 2 (two) times daily.    empagliflozin (JARDIANCE) 25 mg tablet Take 1 tablet (25 mg total) by mouth once daily.    ergocalciferol (ERGOCALCIFEROL) 50,000 unit Cap One weekly    ezetimibe (ZETIA) 10 mg tablet Take 1 tablet (10 mg total) by mouth once daily.    gabapentin (NEURONTIN) 300 MG capsule Take 1 capsule (300 mg total) by mouth 2 (two) times daily. In 1-2 weeks, if no relief, may increase dose to 3 times per day.    glimepiride (AMARYL) 2 MG tablet Take 1 tablet (2 mg total) by mouth before breakfast.    hydrocortisone 2.5 % cream Apply topically 2 (two) times daily.    ketoconazole (NIZORAL) 2 % cream Apply topically 2 (two) times daily.    lancets Misc Check bg 4 times daily    losartan-hydrochlorothiazide 100-25 mg (HYZAAR) 100-25 mg per tablet Take 1 tablet by mouth once daily.    nitroGLYCERIN (NITROSTAT) 0.4 MG SL tablet Place 1 tablet (0.4 mg total) under the tongue every 5 (five) minutes as needed for Chest pain.    pantoprazole (PROTONIX) 40 MG tablet TAKE 1 TABLET(40 MG) BY MOUTH EVERY DAY FOR STOMACH    rosuvastatin (CRESTOR) 20 MG tablet Take 1 tablet (20 mg total) by mouth once daily.    SITagliptin phosphate (JANUVIA) 50 MG Tab Take 1 tablet (50 mg total) by mouth once daily.    tadalafiL (CIALIS) 20 MG Tab Take 1 tablet (20 mg total) by mouth Every 3 (three) days. for 10 days    tamsulosin (FLOMAX) 0.4 mg Cap One capsule  at bedtime    testosterone cypionate (DEPOTESTOTERONE CYPIONATE) 200 mg/mL injection Inject 1 mL (200 mg total) into the muscle every 14 (fourteen) days.     Family History       Problem Relation (Age of Onset)    Heart disease Brother    Hypertension Sister          Tobacco Use    Smoking status: Former     Current packs/day: 0.00     Average packs/day: 1 pack/day for 18.0 years (18.0 ttl pk-yrs)     Types: Cigarettes     Start date: 1968     Quit date: 1986     Years since quittin.6    Smokeless tobacco: Never   Substance and Sexual Activity    Alcohol use: Not Currently    Drug use: Never    Sexual activity: Not Currently     Review of Systems   Constitutional: Negative for chills and fever.   Cardiovascular:  Positive for chest pain and dyspnea on exertion. Negative for leg swelling, near-syncope, orthopnea, palpitations and syncope.   Gastrointestinal:  Negative for bloating, nausea and vomiting.   Neurological:  Negative for dizziness and light-headedness.     Objective:     Vital Signs (Most Recent):    Vital Signs (24h Range):           There is no height or weight on file to calculate BMI.            No intake or output data in the 24 hours ending 24 0700    Lines/Drains/Airways       None                    Physical Exam  Constitutional:       Appearance: Normal appearance.   HENT:      Mouth/Throat:      Mouth: Mucous membranes are moist.   Eyes:      Extraocular Movements: Extraocular movements intact.      Conjunctiva/sclera: Conjunctivae normal.   Cardiovascular:      Rate and Rhythm: Normal rate and regular rhythm.      Pulses: Normal pulses.   Pulmonary:      Effort: Pulmonary effort is normal.      Breath sounds: Normal breath sounds.   Abdominal:      General: Abdomen is flat.      Palpations: Abdomen is soft.   Musculoskeletal:      Right lower leg: Edema present.      Left lower leg: Edema present.   Skin:     General: Skin is warm and dry.   Neurological:      General: No  "focal deficit present.      Mental Status: He is alert and oriented to person, place, and time.            Significant Labs: CMP   Recent Labs   Lab 11/13/24  0757      K 4.1      CO2 25   *   BUN 38*   CREATININE 2.7*   CALCIUM 9.2   PROT 7.3   ALBUMIN 3.7   BILITOT 0.5   ALKPHOS 58   AST 17   ALT 27   ANIONGAP 11   , CBC   Recent Labs   Lab 11/13/24  0757   WBC 6.37   HGB 12.4*   HCT 37.6*      , and INR   Recent Labs   Lab 11/13/24  0757   INR 1.0       Significant Imaging: Echocardiogram: Transthoracic echo (TTE) complete (Cupid Only):   Results for orders placed or performed during the hospital encounter of 04/08/24   Echo   Result Value Ref Range    RA Width 2.94 cm    LA Vol (MOD) 39.75 cm3    Left Atrium Major Axis 5.01 cm    Left Atrium Minor Axis 5.08 cm    RA Major Axis 4.70 cm    LV Diastolic Volume 88.69 mL    LV Systolic Volume 33.40 mL    PV Peak D Tyler 0.40 m/s    PV Peak S Tyler 0.54 m/s    MV Peak A Tyler 0.77 m/s    MV stenosis pressure 1/2 time 89.31 ms    TR Max Tyler 2.33 m/s    MV Peak E Tyler 0.55 m/s    Ao VTI 22.17 cm    Ao peak tyler 1.14 m/s    LVOT peak VTI 20.67 cm    LVOT peak tyler 1.00 m/s    LVOT diameter 2.32 cm    MV "A" wave duration 9.42 msec    IVRT 114.18 msec    E wave deceleration time 307.97 msec    AV mean gradient 3 mmHg    TAPSE 1.90 cm    RVDD 3.80 cm    LA size 2.97 cm    Ascending aorta 3.04 cm    STJ 2.65 cm    Sinus 2.99 cm    LVIDs 2.94 2.1 - 4.0 cm    PW 0.7 0.6 - 1.1 cm    IVS 0.7 0.6 - 1.1 cm    LVIDd 5.2 3.5 - 6.0 cm    TDI LATERAL 0.07 m/s    LA WIDTH 3.62 cm    TDI SEPTAL 0.08 m/s    LV LATERAL E/E' RATIO 7.86 m/s    LV SEPTAL E/E' RATIO 6.88 m/s    FS 43 28 - 44 %    LA Vol 46.10 cm3    LV mass 122.81 g    Left Ventricle Relative Wall Thickness 0.27 cm    AV valve area 3.94 cm²    AV Velocity Ratio 0.88     AV index (prosthetic) 0.93     MV valve area p 1/2 method 2.46 cm2    E/A ratio 0.71     Mean e' 0.08 m/s    Pulm vein S/D ratio 1.35     " LVOT area 4.2 cm2    LVOT stroke volume 87.33 cm3    AV peak gradient 5 mmHg    E/E' ratio 7.33 m/s    Triscuspid Valve Regurgitation Peak Gradient 22 mmHg    IVAN by Velocity Ratio 3.71 cm²    BSA 2.21 m2    LV Systolic Volume Index 15.4 mL/m2    LV Diastolic Volume Index 40.87 mL/m2    LV Mass Index 57 g/m2    MARIO 21.2 mL/m2    MARIO (MOD) 18.3 mL/m2    ZLVIDS -3.13     ZLVIDD -3.23     TV resting pulmonary artery pressure 25 mmHg    RV TB RVSP 5 mmHg    Est. RA pres 3 mmHg    Narrative      Left Ventricle: Normal wall motion. There is normal systolic function   with a visually estimated ejection fraction of 60 - 65%. There is normal   diastolic function. Normal left ventricular filling pressure.    Right Ventricle: Normal right ventricular cavity size. Wall thickness   is normal. Right ventricle wall motion  is normal. Systolic function is   normal.    Left Atrium: Normal left atrial size.    Right Atrium: Normal right atrial size.    Aortic Valve: The aortic valve is a trileaflet valve. There is mild   annular calcification present.    Mitral Valve: The mitral valve is structurally normal. There is trace   regurgitation.    Tricuspid Valve: The tricuspid valve is structurally normal. There is   trace regurgitation.    Pulmonic Valve: The pulmonic valve is structurally normal. There is   trace regurgitation.    Aorta: Aortic root is normal in size measuring 2.99 cm. Ascending aorta   is normal measuring 3.04 cm.    Pulmonary Artery: The estimated pulmonary artery systolic pressure is   25 mmHg.    IVC/SVC: Normal venous pressure at 3 mmHg.    Pericardium: There is no pericardial effusion.

## 2024-11-14 NOTE — ASSESSMENT & PLAN NOTE
Coronary artery disease involving native coronary artery of native heart with angina pectoris  - Multi-vessel CAD. Not a surgical candidate. Staged PCI was planned due to CKD.  - 8/13/2024: PCI to mid-LAD by Dr. Paul. Pt had 40% ostial LAD stenosis, and High grade ostial to proximal LCx and OM2 stenoses. Previous plan was to do PCI to Lcx later due to CKD stage 4.  - Today, patient with continued chest pressures and shortness of breath with exertion  - Discussed with patient the reasoning for staging PCI. will plan for [facility] +/- PCI of existing LCx and OM2 lesions     - The risks, benefits, and alternatives of coronary vascular angiography and possible intervention were discussed with the patient.   - I had a detailed discussion with the patient regarding risk of stroke, MI, bleeding access site complications, renal failure, emergent need for heart surgery, acute limb complications including ischemia and loss, contrast allergy and death.   - Should stenting be indicated, the patient has agreed to dual anti-platelet therapy for 1-consecutive year with a drug-eluting stent and a minimum of 1-month with the use of a bare metal stent. Additionally, patient is aware that non-compliance is likely to result in stent clotting with heart attack, heart failure, and/or death.   - The risks of moderate sedation include hypotension, respiratory depression, arrhythmias, bronchospasm, & death.  - Patient understands the risks and benefits of the procedure and wishes to proceed. All questions were answered and informed consent was obtained.   - This patient was discussed with the attending interventional cardiologist who agrees with the above assessment & plan.

## 2024-11-14 NOTE — NURSING
"Pt stated that he "just wants me to give him medication to sleep".  Pt is requesting his home med gabapentin.  Dr. Harry placed order and reviewed the newest EKG.  Pt states his chest pain is now better.  "

## 2024-11-14 NOTE — DISCHARGE SUMMARY
Discharge Summary  Interventional Cardiology      Admit Date: 11/14/2024    Discharge Date:  11/14/2024    Attending Physician: Kian Whittington MD    Discharge Physician: Pk    Principal Diagnoses: <principal problem not specified>  Indication for Admission: ANGIOGRAM, CORONARY ARTERY (N/A), Stent, Coronary, Multi Vessel (N/A)    Discharged Condition: Good    Hospital Course:   Patient presented for outpatient coronary angiogram which went without complication. Coronary angiogram revealed severe disease . See full cath report in Epic for details. Hemostasis of patient's R CFA access site was achieved with Perclosure. Patient was monitored per post-cath protocol, and his R groin access site was c/d/i with no hematoma. Patient was able to ambulate without difficulty. He was feeling well and anticipating discharge home today.     Outpatient Plan:  - There were no medication changes    Diet: Cardiac diet    Activity: Ad ginny    Disposition: Home or Self Care    Discharge Medications:      Medication List        CONTINUE taking these medications      amLODIPine 10 MG tablet  Commonly known as: NORVASC  TAKE 1 TABLET(10 MG) BY MOUTH EVERY DAY FOR HYPERTENSION     aspirin 81 MG Chew  Take 1 tablet (81 mg total) by mouth once daily.     blood sugar diagnostic Strp  To check BG 4 times daily, to use with insurance preferred meter     carvediloL 6.25 MG tablet  Commonly known as: COREG  Take 1 tablet (6.25 mg total) by mouth 2 (two) times daily.     clopidogreL 75 mg tablet  Commonly known as: PLAVIX  TAKE 1 TABLET(75 MG) BY MOUTH EVERY DAY     dorzolamide-timolol 2-0.5% 22.3-6.8 mg/mL ophthalmic solution  Commonly known as: COSOPT  Place 1 drop into both eyes 2 (two) times daily.     empagliflozin 25 mg tablet  Commonly known as: JARDIANCE  Take 1 tablet (25 mg total) by mouth once daily.     ergocalciferol 50,000 unit Cap  Commonly known as: ERGOCALCIFEROL  One weekly     ezetimibe 10 mg tablet  Commonly known as:  ZETIA  Take 1 tablet (10 mg total) by mouth once daily.     gabapentin 300 MG capsule  Commonly known as: NEURONTIN  Take 1 capsule (300 mg total) by mouth 2 (two) times daily. In 1-2 weeks, if no relief, may increase dose to 3 times per day.     glimepiride 2 MG tablet  Commonly known as: AMARYL  Take 1 tablet (2 mg total) by mouth before breakfast.     hydrocortisone 2.5 % cream     ketoconazole 2 % cream  Commonly known as: NIZORAL  Apply topically 2 (two) times daily.     lancets Misc  Check bg 4 times daily     losartan-hydrochlorothiazide 100-25 mg 100-25 mg per tablet  Commonly known as: HYZAAR  Take 1 tablet by mouth once daily.     nitroGLYCERIN 0.4 MG SL tablet  Commonly known as: NITROSTAT  Place 1 tablet (0.4 mg total) under the tongue every 5 (five) minutes as needed for Chest pain.     pantoprazole 40 MG tablet  Commonly known as: PROTONIX  TAKE 1 TABLET(40 MG) BY MOUTH EVERY DAY FOR STOMACH     rosuvastatin 20 MG tablet  Commonly known as: CRESTOR  Take 1 tablet (20 mg total) by mouth once daily.     SITagliptin phosphate 50 MG Tab  Commonly known as: JANUVIA  Take 1 tablet (50 mg total) by mouth once daily.     tadalafiL 20 MG Tab  Commonly known as: CIALIS  Take 1 tablet (20 mg total) by mouth Every 3 (three) days. for 10 days     tamsulosin 0.4 mg Cap  Commonly known as: FLOMAX  One capsule at bedtime     testosterone cypionate 200 mg/mL injection  Commonly known as: DEPOTESTOTERONE CYPIONATE  Inject 1 mL (200 mg total) into the muscle every 14 (fourteen) days.            Follow Up:  - General cardiologist     MITCHEL Bonilla  Cardiovascular Disease fellow, PGY-4  [facility]

## 2024-11-14 NOTE — H&P
[facility] - Short Stay Cardiac Unit  Interventional Cardiology  H&P    Patient Name: Jae Millan  MRN: [id number]  Admission Date: 11/14/2024  Code Status: Prior   Attending Provider: Kian Whittington MD   Primary Care Physician: Maryjane Farias MD  Principal Problem:<principal problem not specified>    Patient information was obtained from patient, past medical records, and ER records.     Subjective:     Chief Complaint:  Dyspnea     HPI:  Jae Millan is a 73 y.o. male s/p PCI LAD 8/2024, HTN, HLD, DM2, CVA with residual L deficits, CKD3, former smoker, who presents for follow-up after angiogram with PCI.     Chart review:  CT done in 2022 noted to have diffuse coronary calcifications. Further workup included PET stress demonstrating anterior ischemia. Cath done 3/27/2024 by Dr. Paul showed Multi-vessel CAD. Patient was turned down for CABG by CTS. PCI was planned to be staged due to CKD. Patient had staged PCI to mid-LAD by Dr. Paul on 8/13/2024. Pt had 40% ostial LAD stenosis, and High grade ostial to proximal LCx and OM2 stenoses. Per d/c summary, plan was to do staged PCI to Lcx later due to CKD stage 4.      Today, Patient reports continued symptoms following PCI to LAD. He notes of substernal chest pressure with exertion after walking 1-2 blocks. Associated with shortness of breath. No radiation of pain. He endorses being on asa and plavix. No other acute symptoms at this time.      Past Medical History:   Diagnosis Date    Cataract     Coronary artery disease involving native coronary artery of native heart with angina pectoris 02/15/2024    Decreased sensation of lower extremity     Diabetes mellitus     Diabetic neuropathy     Dysarthria     Dysphagia     ED (erectile dysfunction)     Glaucoma     Hemiparesis     LEFT side post CVA    High cholesterol     History of CVA with residual deficit 04/12/2019 August 23, 2018      History of hepatitis C, s/p successful Rx w/ cure (SVR12) 4/2020      Hypertension     Impaired functional mobility, balance, gait, and endurance     Pre-op testing 04/08/2024    Pulmonary emphysema, unspecified emphysema type 02/01/2024    Stroke     Urinary frequency        Past Surgical History:   Procedure Laterality Date    ANGIOGRAM, CORONARY, WITH LEFT HEART CATHETERIZATION  8/13/2024    Procedure: Angiogram, Coronary, with Left Heart Cath;  Surgeon: Sandoval Paul MD;  Location: [facility] CATH LAB;  Service: Cardiology;;    COLONOSCOPY N/A 3/9/2023    Procedure: COLONOSCOPY;  Surgeon: Kian Kraft MD;  Location: [facility] ENDO ([facility] FLR);  Service: Endoscopy;  Laterality: N/A;  medical transportation-inst mail-tb  pre call complete-as    CORONARY ANGIOGRAPHY N/A 3/27/2024    Procedure: ANGIOGRAM, CORONARY ARTERY;  Surgeon: Sandoval Paul MD;  Location: [facility] CATH LAB;  Service: Cardiology;  Laterality: N/A;    INGUINAL HERNIA REPAIR Left 12/11/2019    IVUS, CORONARY  8/13/2024    Procedure: IVUS, Coronary;  Surgeon: Sandoval Paul MD;  Location: [facility] CATH LAB;  Service: Cardiology;;    STENT, DRUG ELUTING, SINGLE VESSEL, CORONARY N/A 8/13/2024    Procedure: Stent, Drug Eluting, Single Vessel, Coronary;  Surgeon: Sandoval Paul MD;  Location: [facility] CATH LAB;  Service: Cardiology;  Laterality: N/A;    UNDESCENDED TESTICLE EXPLORATION Right     R testicle removed as it was undescended       Review of patient's allergies indicates:  No Known Allergies    PTA Medications   Medication Sig    amLODIPine (NORVASC) 10 MG tablet TAKE 1 TABLET(10 MG) BY MOUTH EVERY DAY FOR HYPERTENSION    aspirin 81 MG Chew Take 1 tablet (81 mg total) by mouth once daily.    blood sugar diagnostic Strp To check BG 4 times daily, to use with insurance preferred meter    carvediloL (COREG) 6.25 MG tablet Take 1 tablet (6.25 mg total) by mouth 2 (two) times daily.    clopidogreL (PLAVIX) 75 mg tablet TAKE 1 TABLET(75 MG) BY MOUTH EVERY DAY    dorzolamide-timolol 2-0.5% (COSOPT) 22.3-6.8  mg/mL ophthalmic solution Place 1 drop into both eyes 2 (two) times daily.    empagliflozin (JARDIANCE) 25 mg tablet Take 1 tablet (25 mg total) by mouth once daily.    ergocalciferol (ERGOCALCIFEROL) 50,000 unit Cap One weekly    ezetimibe (ZETIA) 10 mg tablet Take 1 tablet (10 mg total) by mouth once daily.    gabapentin (NEURONTIN) 300 MG capsule Take 1 capsule (300 mg total) by mouth 2 (two) times daily. In 1-2 weeks, if no relief, may increase dose to 3 times per day.    glimepiride (AMARYL) 2 MG tablet Take 1 tablet (2 mg total) by mouth before breakfast.    hydrocortisone 2.5 % cream Apply topically 2 (two) times daily.    ketoconazole (NIZORAL) 2 % cream Apply topically 2 (two) times daily.    lancets Misc Check bg 4 times daily    losartan-hydrochlorothiazide 100-25 mg (HYZAAR) 100-25 mg per tablet Take 1 tablet by mouth once daily.    nitroGLYCERIN (NITROSTAT) 0.4 MG SL tablet Place 1 tablet (0.4 mg total) under the tongue every 5 (five) minutes as needed for Chest pain.    pantoprazole (PROTONIX) 40 MG tablet TAKE 1 TABLET(40 MG) BY MOUTH EVERY DAY FOR STOMACH    rosuvastatin (CRESTOR) 20 MG tablet Take 1 tablet (20 mg total) by mouth once daily.    SITagliptin phosphate (JANUVIA) 50 MG Tab Take 1 tablet (50 mg total) by mouth once daily.    tadalafiL (CIALIS) 20 MG Tab Take 1 tablet (20 mg total) by mouth Every 3 (three) days. for 10 days    tamsulosin (FLOMAX) 0.4 mg Cap One capsule at bedtime    testosterone cypionate (DEPOTESTOTERONE CYPIONATE) 200 mg/mL injection Inject 1 mL (200 mg total) into the muscle every 14 (fourteen) days.     Family History       Problem Relation (Age of Onset)    Heart disease Brother    Hypertension Sister          Tobacco Use    Smoking status: Former     Current packs/day: 0.00     Average packs/day: 1 pack/day for 18.0 years (18.0 ttl pk-yrs)     Types: Cigarettes     Start date: 1968     Quit date: 1986     Years since quittin.6    Smokeless tobacco:  Never   Substance and Sexual Activity    Alcohol use: Not Currently    Drug use: Never    Sexual activity: Not Currently     Review of Systems   Constitutional: Negative for chills and fever.   Cardiovascular:  Positive for chest pain and dyspnea on exertion. Negative for leg swelling, near-syncope, orthopnea, palpitations and syncope.   Gastrointestinal:  Negative for bloating, nausea and vomiting.   Neurological:  Negative for dizziness and light-headedness.     Objective:     Vital Signs (Most Recent):    Vital Signs (24h Range):           There is no height or weight on file to calculate BMI.            No intake or output data in the 24 hours ending 11/14/24 0700    Lines/Drains/Airways       None                    Physical Exam  Constitutional:       Appearance: Normal appearance.   HENT:      Mouth/Throat:      Mouth: Mucous membranes are moist.   Eyes:      Extraocular Movements: Extraocular movements intact.      Conjunctiva/sclera: Conjunctivae normal.   Cardiovascular:      Rate and Rhythm: Normal rate and regular rhythm.      Pulses: Normal pulses.   Pulmonary:      Effort: Pulmonary effort is normal.      Breath sounds: Normal breath sounds.   Abdominal:      General: Abdomen is flat.      Palpations: Abdomen is soft.   Musculoskeletal:      Right lower leg: Edema present.      Left lower leg: Edema present.   Skin:     General: Skin is warm and dry.   Neurological:      General: No focal deficit present.      Mental Status: He is alert and oriented to person, place, and time.            Significant Labs: CMP   Recent Labs   Lab 11/13/24  0757      K 4.1      CO2 25   *   BUN 38*   CREATININE 2.7*   CALCIUM 9.2   PROT 7.3   ALBUMIN 3.7   BILITOT 0.5   ALKPHOS 58   AST 17   ALT 27   ANIONGAP 11   , CBC   Recent Labs   Lab 11/13/24  0757   WBC 6.37   HGB 12.4*   HCT 37.6*      , and INR   Recent Labs   Lab 11/13/24  0757   INR 1.0       Significant Imaging: Echocardiogram:  "Transthoracic echo (TTE) complete (Cupid Only):   Results for orders placed or performed during the hospital encounter of 04/08/24   Echo   Result Value Ref Range    RA Width 2.94 cm    LA Vol (MOD) 39.75 cm3    Left Atrium Major Axis 5.01 cm    Left Atrium Minor Axis 5.08 cm    RA Major Axis 4.70 cm    LV Diastolic Volume 88.69 mL    LV Systolic Volume 33.40 mL    PV Peak D Tyler 0.40 m/s    PV Peak S Tyler 0.54 m/s    MV Peak A Tyler 0.77 m/s    MV stenosis pressure 1/2 time 89.31 ms    TR Max Tyler 2.33 m/s    MV Peak E Tyler 0.55 m/s    Ao VTI 22.17 cm    Ao peak tyler 1.14 m/s    LVOT peak VTI 20.67 cm    LVOT peak tyler 1.00 m/s    LVOT diameter 2.32 cm    MV "A" wave duration 9.42 msec    IVRT 114.18 msec    E wave deceleration time 307.97 msec    AV mean gradient 3 mmHg    TAPSE 1.90 cm    RVDD 3.80 cm    LA size 2.97 cm    Ascending aorta 3.04 cm    STJ 2.65 cm    Sinus 2.99 cm    LVIDs 2.94 2.1 - 4.0 cm    PW 0.7 0.6 - 1.1 cm    IVS 0.7 0.6 - 1.1 cm    LVIDd 5.2 3.5 - 6.0 cm    TDI LATERAL 0.07 m/s    LA WIDTH 3.62 cm    TDI SEPTAL 0.08 m/s    LV LATERAL E/E' RATIO 7.86 m/s    LV SEPTAL E/E' RATIO 6.88 m/s    FS 43 28 - 44 %    LA Vol 46.10 cm3    LV mass 122.81 g    Left Ventricle Relative Wall Thickness 0.27 cm    AV valve area 3.94 cm²    AV Velocity Ratio 0.88     AV index (prosthetic) 0.93     MV valve area p 1/2 method 2.46 cm2    E/A ratio 0.71     Mean e' 0.08 m/s    Pulm vein S/D ratio 1.35     LVOT area 4.2 cm2    LVOT stroke volume 87.33 cm3    AV peak gradient 5 mmHg    E/E' ratio 7.33 m/s    Triscuspid Valve Regurgitation Peak Gradient 22 mmHg    IVAN by Velocity Ratio 3.71 cm²    BSA 2.21 m2    LV Systolic Volume Index 15.4 mL/m2    LV Diastolic Volume Index 40.87 mL/m2    LV Mass Index 57 g/m2    MARIO 21.2 mL/m2    MARIO (MOD) 18.3 mL/m2    ZLVIDS -3.13     ZLVIDD -3.23     TV resting pulmonary artery pressure 25 mmHg    RV TB RVSP 5 mmHg    Est. RA pres 3 mmHg    Narrative      Left Ventricle: Normal wall " motion. There is normal systolic function   with a visually estimated ejection fraction of 60 - 65%. There is normal   diastolic function. Normal left ventricular filling pressure.    Right Ventricle: Normal right ventricular cavity size. Wall thickness   is normal. Right ventricle wall motion  is normal. Systolic function is   normal.    Left Atrium: Normal left atrial size.    Right Atrium: Normal right atrial size.    Aortic Valve: The aortic valve is a trileaflet valve. There is mild   annular calcification present.    Mitral Valve: The mitral valve is structurally normal. There is trace   regurgitation.    Tricuspid Valve: The tricuspid valve is structurally normal. There is   trace regurgitation.    Pulmonic Valve: The pulmonic valve is structurally normal. There is   trace regurgitation.    Aorta: Aortic root is normal in size measuring 2.99 cm. Ascending aorta   is normal measuring 3.04 cm.    Pulmonary Artery: The estimated pulmonary artery systolic pressure is   25 mmHg.    IVC/SVC: Normal venous pressure at 3 mmHg.    Pericardium: There is no pericardial effusion.       Assessment and Plan:     Cardiac/Vascular  Coronary artery disease involving native coronary artery of native heart with angina pectoris  Coronary artery disease involving native coronary artery of native heart with angina pectoris  - Multi-vessel CAD. Not a surgical candidate. Staged PCI was planned due to CKD.  - 8/13/2024: PCI to mid-LAD by Dr. Paul. Pt had 40% ostial LAD stenosis, and High grade ostial to proximal LCx and OM2 stenoses. Previous plan was to do PCI to Lcx later due to CKD stage 4.  - Today, patient with continued chest pressures and shortness of breath with exertion  - Discussed with patient the reasoning for staging PCI. will plan for C +/- PCI of existing LCx and OM2 lesions     - The risks, benefits, and alternatives of coronary vascular angiography and possible intervention were discussed with the patient.   - I  had a detailed discussion with the patient regarding risk of stroke, MI, bleeding access site complications, renal failure, emergent need for heart surgery, acute limb complications including ischemia and loss, contrast allergy and death.   - Should stenting be indicated, the patient has agreed to dual anti-platelet therapy for 1-consecutive year with a drug-eluting stent and a minimum of 1-month with the use of a bare metal stent. Additionally, patient is aware that non-compliance is likely to result in stent clotting with heart attack, heart failure, and/or death.   - The risks of moderate sedation include hypotension, respiratory depression, arrhythmias, bronchospasm, & death.  - Patient understands the risks and benefits of the procedure and wishes to proceed. All questions were answered and informed consent was obtained.   - This patient was discussed with the attending interventional cardiologist who agrees with the above assessment & plan.        Zita Bonilla MD  Interventional Cardiology   [facility] Short Stay Cardiac [facility]

## 2024-11-14 NOTE — BRIEF OP NOTE
Brief Operative Note:    : Kian Whittington MD     Referring Physician: Sandoval aPul     All Operators: Surgeon(s):  Hank Harry MD Jenkins, James S., MD Kim, Jin Wan, MD     Preoperative Diagnosis: CAD, multiple vessel [I25.10]     Postop Diagnosis: CAD, multiple vessel [I25.10]    Treatments/Procedures: Procedure(s) (LRB):  ANGIOGRAM, CORONARY ARTERY (N/A)  Stent, Coronary, Multi Vessel (N/A)    Access: Right CFA    Findings:Severe coronary artery disease is present.     See catheterization report for full details.    Intervention: PCI to LCx    See catheterization report for full details.    Closure device: Manual pressure       Plan:  - Post cath protocol   - IVF @ 150 cc/kg/hr x 4 hours  - Bed rest x 4 hours   - Continue DAPT  - Continue Plavix for minimum 6 months, ideally up to 1 year  - Continue high intensity statin therapy (LDL goal < 70)  - Risk factor reduction (BP <130/80 mmHg, glycemic control, etc)  - Follow up with outpatient cardiologist    Estimated Blood loss: 20 cc    Specimens removed: No    MITCHEL Bonilla  Cardiovascular Disease fellow, PGY-4  [facility]

## 2024-11-14 NOTE — HPI
Jae Millan is a 73 y.o. male s/p PCI LAD 8/2024, HTN, HLD, DM2, CVA with residual L deficits, CKD3, former smoker, who presents for follow-up after angiogram with PCI.     Chart review:  CT done in 2022 noted to have diffuse coronary calcifications. Further workup included PET stress demonstrating anterior ischemia. Cath done 3/27/2024 by Dr. Paul showed Multi-vessel CAD. Patient was turned down for CABG by CTS. PCI was planned to be staged due to CKD. Patient had staged PCI to mid-LAD by Dr. Paul on 8/13/2024. Pt had 40% ostial LAD stenosis, and High grade ostial to proximal LCx and OM2 stenoses. Per d/c summary, plan was to do staged PCI to Lcx later due to CKD stage 4.      Today, Patient reports continued symptoms following PCI to LAD. He notes of substernal chest pressure with exertion after walking 1-2 blocks. Associated with shortness of breath. No radiation of pain. He endorses being on asa and plavix. No other acute symptoms at this time.

## 2024-11-14 NOTE — NURSING
Ok to d/c home.  Pt denies pain chest pain or SOB.  Right groin dressing remains clean dry and intact.  Right groin soft.  No bleeding or hematoma noted.  Pt and pt friend both verbalized an understanding of d/c instructions.

## 2024-11-14 NOTE — NURSING
Received via stretcher from cath lab procedure.  Report from ANTHONY Sun.  Pt c/o chest pain of a 5 on scale of 0-10 on arrival and right temporal pain of a 3 on a scale of 0-10.  Called and notified Dr. Harry upon arrival and EKG tech notified that stat EKG needed.  Oriented to room and call bell provided.  Will update pt friend via phone.  CBG = 233 on arrival.  Will continue to monitor.  Right groin with gauze/tegaderm clean dry and intact.  Right groin soft.  No bleeding or hematoma noted.  Drink provided.

## 2024-11-14 NOTE — PLAN OF CARE
Patient arrived to room. PIV placed, labs sent. Admit assessment completed. Plan of care discussed with patient. Will monitor. Call light within reach, instructed in use.  POC

## 2024-11-14 NOTE — NURSING
Pt states his chest pain is an 8 on scale of 0-10.  Notified Dr. Harry to come see patient at bedside.  VSS.  2Lnc oxygen placed.

## 2024-11-17 NOTE — PROGRESS NOTES
Subjective:       Patient ID: Jae Millan is a 73 y.o. male.    Chief Complaint: Follow-up    Follow-up  Pertinent negatives include no abdominal pain, chest pain, headaches, nausea or vomiting.   He is doing okay - no new issues.  Having angiogram soon.    Review of Systems   Respiratory:  Negative for shortness of breath.    Cardiovascular:  Negative for chest pain.   Gastrointestinal:  Negative for abdominal pain, diarrhea, nausea and vomiting.   Genitourinary:  Negative for dysuria.   Neurological:  Negative for seizures, syncope and headaches.       Objective:      Physical Exam  Constitutional:       General: He is not in acute distress.     Appearance: He is well-developed.   Eyes:      Pupils: Pupils are equal, round, and reactive to light.   Neck:      Thyroid: No thyromegaly.      Vascular: No JVD.   Cardiovascular:      Rate and Rhythm: Normal rate and regular rhythm.      Heart sounds: Normal heart sounds. No murmur heard.     No friction rub. No gallop.   Pulmonary:      Effort: Pulmonary effort is normal.      Breath sounds: Normal breath sounds. No wheezing or rales.      Comments: Distant - no wheeze  Abdominal:      General: Bowel sounds are normal. There is no distension.      Palpations: Abdomen is soft. There is no mass.      Tenderness: There is no abdominal tenderness. There is no guarding or rebound.   Musculoskeletal:      Cervical back: Neck supple.   Lymphadenopathy:      Cervical: No cervical adenopathy.   Skin:     General: Skin is warm and dry.   Neurological:      Mental Status: He is alert and oriented to person, place, and time.   Psychiatric:         Behavior: Behavior normal.         Thought Content: Thought content normal.         Judgment: Judgment normal.       Fingernails clipped and filed without incident  Assessment:       1. Chronic kidney disease (CKD), stage IV (severe)    2. Type 2 diabetes mellitus with diabetic nephropathy, without long-term current use of insulin     3. Benign prostatic hyperplasia, unspecified whether lower urinary tract symptoms present    4. Need for vaccination        Plan:   Chronic kidney disease (CKD), stage IV (severe)  stable    Type 2 diabetes mellitus with diabetic nephropathy, without long-term current use of insulin  -     Hemoglobin A1C; Future; Expected date: 10/17/2024    Benign prostatic hyperplasia, unspecified whether lower urinary tract symptoms present  -     Ambulatory referral/consult to Urology; Future; Expected date: 10/24/2024    Need for vaccination  -     Influenza - Trivalent (Adjuvanted)

## 2024-11-17 NOTE — PROGRESS NOTES
Please inform him - his diabetes is uncontrolled - please check if he is taking all three meds  1.  Januvia  2.  Jardiance  3.  Glimepiride    If he is then please advise him to double his glimepiride to 4mg and let me know so I can send a new prescription - thanks!

## 2024-11-18 ENCOUNTER — TELEPHONE (OUTPATIENT)
Dept: INTERNAL MEDICINE | Facility: CLINIC | Age: 74
End: 2024-11-18

## 2024-11-18 ENCOUNTER — TELEPHONE (OUTPATIENT)
Dept: INTERNAL MEDICINE | Facility: CLINIC | Age: 74
End: 2024-11-18
Payer: MEDICARE

## 2024-11-18 NOTE — TELEPHONE ENCOUNTER
----- Message from Nurse Soliman sent at 11/18/2024  8:51 AM CST -----    ----- Message -----  From: Maryjane Farias MD  Sent: 11/17/2024  10:22 AM CST  To: Janny Bernardo LPN    Please inform him - his diabetes is uncontrolled - please check if he is taking all three meds  1.  Januvia  2.  Jardiance  3.  Glimepiride    If he is then please advise him to double his glimepiride to 4mg and let me know so I can send a new prescription - thanks!

## 2024-11-18 NOTE — TELEPHONE ENCOUNTER
----- Message from Kelly sent at 11/18/2024  9:23 AM CST -----  Contact: [phone]  Patient is returning a phone call.    Who left a message for the patient: Annemarie Daniels MA    Does patient know what this is regarding:  labs    Would you like a call back, or a response through your [facility] portal?:  call back     Comments:

## 2024-11-18 NOTE — TELEPHONE ENCOUNTER
Pt advised his diabetes is uncontrolled - confirmed that he is taking all three meds Januvia, Jardiance, and Glimepiride. Advise him to double his glimepiride to 4mg.

## 2024-11-18 NOTE — TELEPHONE ENCOUNTER
Attempted to call pt. Sent SMS notification since VM box full.  Pt's diabetes is uncontrolled.  - Is pt on Januvia, Jardiance, & Glimepiride?  - If yes, double glimepiride to 4mg and let Dr. Farias know so she can send increased dose prescription.

## 2024-12-04 ENCOUNTER — OFFICE VISIT (OUTPATIENT)
Dept: PODIATRY | Facility: CLINIC | Age: 74
End: 2024-12-04
Payer: MEDICARE

## 2024-12-04 VITALS — WEIGHT: 220 LBS | HEIGHT: 70 IN | BODY MASS INDEX: 31.5 KG/M2

## 2024-12-04 DIAGNOSIS — Z86.73 HISTORY OF STROKE: ICD-10-CM

## 2024-12-04 DIAGNOSIS — E11.49 TYPE II DIABETES MELLITUS WITH NEUROLOGICAL MANIFESTATIONS: Primary | ICD-10-CM

## 2024-12-04 DIAGNOSIS — L60.3 ONYCHODYSTROPHY: ICD-10-CM

## 2024-12-04 PROCEDURE — 99999 PR PBB SHADOW E&M-EST. PATIENT-LVL I: CPT | Mod: PBBFAC,,, | Performed by: PODIATRIST

## 2024-12-12 NOTE — PROGRESS NOTES
Subjective:     Patient ID: Jae Millan is a 74 y.o. male.    Chief Complaint: No chief complaint on file.    Jae is a 74 y.o. male who presents to the clinic for evaluation and treatment of high risk feet. Jae has a past medical history of Cataract, Coronary artery disease involving native coronary artery of native heart with angina pectoris (02/15/2024), Decreased sensation of lower extremity, Diabetes mellitus, Diabetic neuropathy, Dysarthria, Dysphagia, ED (erectile dysfunction), Glaucoma, Hemiparesis, High cholesterol, History of CVA with residual deficit (04/12/2019), History of hepatitis C, s/p successful Rx w/ cure (SVR12) 4/2020, Hypertension, Impaired functional mobility, balance, gait, and endurance, Pre-op testing (04/08/2024), Pulmonary emphysema, unspecified emphysema type (02/01/2024), Stroke, and Urinary frequency. The patient's chief complaint is long, thick toenails. This patient has documented high risk feet requiring routine maintenance secondary to diabetes mellitis and those secondary complications of diabetes, as mentioned..    PCP: Maryjane Farias MD    Date Last Seen by PCP:   No chief complaint on file.      Hemoglobin A1C   Date Value Ref Range Status   11/13/2024 10.1 (H) 4.0 - 5.6 % Final     Comment:     ADA Screening Guidelines:  5.7-6.4%  Consistent with prediabetes  >or=6.5%  Consistent with diabetes    High levels of fetal hemoglobin interfere with the HbA1C  assay. Heterozygous hemoglobin variants (HbS, HgC, etc)do  not significantly interfere with this assay.   However, presence of multiple variants may affect accuracy.     02/01/2024 7.7 (H) 4.0 - 5.6 % Final     Comment:     ADA Screening Guidelines:  5.7-6.4%  Consistent with prediabetes  >or=6.5%  Consistent with diabetes    High levels of fetal hemoglobin interfere with the HbA1C  assay. Heterozygous hemoglobin variants (HbS, HgC, etc)do  not significantly interfere with this assay.   However, presence of  multiple variants may affect accuracy.     10/09/2023 7.5 (H) 4.0 - 5.6 % Final     Comment:     ADA Screening Guidelines:  5.7-6.4%  Consistent with prediabetes  >or=6.5%  Consistent with diabetes    High levels of fetal hemoglobin interfere with the HbA1C  assay. Heterozygous hemoglobin variants (HbS, HgC, etc)do  not significantly interfere with this assay.   However, presence of multiple variants may affect accuracy.         Review of Systems   Constitutional: Negative for chills, decreased appetite and fever.   Cardiovascular:  Positive for leg swelling.   Skin:  Positive for dry skin and nail changes.   Musculoskeletal:  Positive for muscle weakness. Negative for arthritis, back pain, joint pain, joint swelling and myalgias.   Gastrointestinal:  Negative for nausea and vomiting.   Neurological:  Negative for loss of balance, numbness and paresthesias.        Objective:     Physical Exam  Vitals reviewed.   Constitutional:       Appearance: He is well-developed.   Cardiovascular:      Comments: Dorsalis pedis and posterior tibial pulses are diminished Bilaterally. Toes are cool to touch. Feet are warm proximally.There is decreased digital hair. Skin is atrophic, slightly hyperpigmented, and mildly edematous      Musculoskeletal:         General: No tenderness. Normal range of motion.      Comments: Adequate joint range of motion without pain, limitation, nor crepitation Bilateral feet and ankle joints. Muscle strength is 5/5 in all groups bilaterally.         Skin:     General: Skin is warm and dry.      Findings: No ecchymosis, erythema or lesion.      Comments: Nails x 10  are elongated by  3-6mm's, thickened by 2-5 mm's, dystrophic, and are darkened in  coloration . Xerosis Bilaterally. No open lesions noted.       Neurological:      Mental Status: He is alert and oriented to person, place, and time.      Comments: Dover-Valeri 5.07 monofilamant testing is diminished Rodríguez feet. Sharp/dull sensation  diminished Bilaterally. Light touch absent Bilaterally.       Psychiatric:         Behavior: Behavior normal.         Assessment:      Encounter Diagnoses   Name Primary?    Type II diabetes mellitus with neurological manifestations Yes    Onychodystrophy     History of stroke      Plan:     Diagnoses and all orders for this visit:    Type II diabetes mellitus with neurological manifestations    Onychodystrophy    History of stroke      I counseled the patient on his conditions, their implications and medical management.        - Shoe inspection. Diabetic Foot Education. Patient reminded of the importance of good nutrition and blood sugar control to help prevent podiatric complications of diabetes. Patient instructed on proper foot hygeine. We discussed wearing proper shoe gear, daily foot inspections, never walking without protective shoe gear, never putting sharp instruments to feet, routine podiatric nail visits every 2-3 months.      - With patient's permission, nails were aggressively reduced and debrided x 10 to their soft tissue attachment mechanically removing all offending nail and debris. Patient relates relief following the procedure. He will continue to monitor the areas daily, inspect his feet, wear protective shoe gear when ambulatory, moisturizer to maintain skin integrity and follow in this office in approximately 2-3 months, sooner p.r.n.

## 2024-12-18 ENCOUNTER — TELEPHONE (OUTPATIENT)
Dept: CARDIAC REHAB | Facility: CLINIC | Age: 74
End: 2024-12-18
Payer: MEDICARE

## 2024-12-18 ENCOUNTER — OFFICE VISIT (OUTPATIENT)
Dept: CARDIOLOGY | Facility: CLINIC | Age: 74
End: 2024-12-18
Payer: MEDICARE

## 2024-12-18 VITALS
OXYGEN SATURATION: 94 % | HEART RATE: 77 BPM | HEIGHT: 70 IN | DIASTOLIC BLOOD PRESSURE: 71 MMHG | WEIGHT: 226 LBS | SYSTOLIC BLOOD PRESSURE: 133 MMHG | BODY MASS INDEX: 32.35 KG/M2

## 2024-12-18 DIAGNOSIS — R06.09 DOE (DYSPNEA ON EXERTION): ICD-10-CM

## 2024-12-18 DIAGNOSIS — N52.01 ERECTILE DYSFUNCTION DUE TO ARTERIAL INSUFFICIENCY: ICD-10-CM

## 2024-12-18 DIAGNOSIS — Z98.61 S/P PTCA (PERCUTANEOUS TRANSLUMINAL CORONARY ANGIOPLASTY): ICD-10-CM

## 2024-12-18 DIAGNOSIS — E78.2 HYPERLIPIDEMIA, MIXED: ICD-10-CM

## 2024-12-18 DIAGNOSIS — N18.4 CHRONIC KIDNEY DISEASE (CKD), STAGE IV (SEVERE): ICD-10-CM

## 2024-12-18 DIAGNOSIS — E11.8 DM TYPE 2 CAUSING COMPLICATION: ICD-10-CM

## 2024-12-18 DIAGNOSIS — I10 ESSENTIAL HYPERTENSION: ICD-10-CM

## 2024-12-18 DIAGNOSIS — I70.0 AORTIC ATHEROSCLEROSIS: ICD-10-CM

## 2024-12-18 DIAGNOSIS — I25.119 CORONARY ARTERY DISEASE INVOLVING NATIVE CORONARY ARTERY OF NATIVE HEART WITH ANGINA PECTORIS: Primary | ICD-10-CM

## 2024-12-18 DIAGNOSIS — I69.354 HEMIPARESIS AFFECTING LEFT SIDE AS LATE EFFECT OF CEREBROVASCULAR ACCIDENT (CVA): ICD-10-CM

## 2024-12-18 PROCEDURE — 3288F FALL RISK ASSESSMENT DOCD: CPT | Mod: CPTII,S$GLB,, | Performed by: INTERNAL MEDICINE

## 2024-12-18 PROCEDURE — 1160F RVW MEDS BY RX/DR IN RCRD: CPT | Mod: CPTII,S$GLB,, | Performed by: INTERNAL MEDICINE

## 2024-12-18 PROCEDURE — 99214 OFFICE O/P EST MOD 30 MIN: CPT | Mod: S$GLB,,, | Performed by: INTERNAL MEDICINE

## 2024-12-18 PROCEDURE — 3008F BODY MASS INDEX DOCD: CPT | Mod: CPTII,S$GLB,, | Performed by: INTERNAL MEDICINE

## 2024-12-18 PROCEDURE — 3078F DIAST BP <80 MM HG: CPT | Mod: CPTII,S$GLB,, | Performed by: INTERNAL MEDICINE

## 2024-12-18 PROCEDURE — 1159F MED LIST DOCD IN RCRD: CPT | Mod: CPTII,S$GLB,, | Performed by: INTERNAL MEDICINE

## 2024-12-18 PROCEDURE — 99999 PR PBB SHADOW E&M-EST. PATIENT-LVL IV: CPT | Mod: PBBFAC,,, | Performed by: INTERNAL MEDICINE

## 2024-12-18 PROCEDURE — 3046F HEMOGLOBIN A1C LEVEL >9.0%: CPT | Mod: CPTII,S$GLB,, | Performed by: INTERNAL MEDICINE

## 2024-12-18 PROCEDURE — 1101F PT FALLS ASSESS-DOCD LE1/YR: CPT | Mod: CPTII,S$GLB,, | Performed by: INTERNAL MEDICINE

## 2024-12-18 PROCEDURE — 3075F SYST BP GE 130 - 139MM HG: CPT | Mod: CPTII,S$GLB,, | Performed by: INTERNAL MEDICINE

## 2024-12-18 PROCEDURE — 3066F NEPHROPATHY DOC TX: CPT | Mod: CPTII,S$GLB,, | Performed by: INTERNAL MEDICINE

## 2024-12-18 PROCEDURE — 1126F AMNT PAIN NOTED NONE PRSNT: CPT | Mod: CPTII,S$GLB,, | Performed by: INTERNAL MEDICINE

## 2024-12-18 RX ORDER — GLIMEPIRIDE 4 MG/1
4 TABLET ORAL
Qty: 90 TABLET | Refills: 3 | Status: SHIPPED | OUTPATIENT
Start: 2024-12-18

## 2024-12-18 NOTE — PROGRESS NOTES
Subjective:   Patient ID:  Jae Millan is a 74 y.o. male who presents for follow up of No chief complaint on file.      HPI: Very pleasant man here to establish general care.  He's had multivessel stenting over the course of the last few months - first Dr. Paul stented his LAD and then Dr. Whittington stented his LCx and OM last month.    He has not felt better since the PCI and notes that he gets out of breath at just 1/2 block of walking, if not less.  No angina.  No lightheadedness/syncope.      Nov 2024 PCI    The Prox Cx lesion was 80% stenosed with 0% stenosis post-intervention.    The 1st Mrg lesion was 80% stenosed with 0% stenosis post-intervention.    Prox Cx lesion: A STENT SYNERGY XD [id number] stent was successfully placed at 12 [place] for 8 sec.    1st Mrg lesion: A STENT SYNERGY XD [id number] stent was successfully placed at 12 [place] for 8 sec.    The estimated blood loss was none.    The PCI was successful.    < 10 cc contrast used for procedure.      Dr. Whittington' October 2024 HPI:  Jae Millan is a 73 y.o. male s/p PCI LAD 8/2024, HTN, HLD, DM2, CVA with residual L deficits, CKD3, former smoker, who presents for follow-up after angiogram with PCI.     Chart review:  CT done in 2022 noted to have diffuse coronary calcifications. Further workup included PET stress demonstrating anterior ischemia. Cath done 3/27/2024 by Dr. Paul showed Multi-vessel CAD. Patient was turned down for CABG by CTS. PCI was planned to be staged due to CKD. Patient had staged PCI to mid-LAD by Dr. Paul on 8/13/2024. Pt had 40% ostial LAD stenosis, and High grade ostial to proximal LCx and OM2 stenoses. Per d/c summary, plan was to do staged PCI to Lcx later due to CKD stage 4.      Today, Patient reports continued symptoms following PCI to LAD. He notes of substernal chest pressure with exertion after walking 1-2 blocks. Associated with shortness of breath. No radiation of pain. He endorses being on asa and plavix. No other  acute symptoms at this time.    Patient Active Problem List   Diagnosis    Essential hypertension    Type 2 diabetes mellitus with neurologic complication, without long-term current use of insulin    Hyperlipidemia, mixed    Impaired functional mobility, balance, gait, and endurance    Hemiparesis affecting left side as late effect of cerebrovascular accident (CVA)    Range of motion deficit    Inguinal hernia of left side without obstruction or gangrene    Hypogonadism in male    Erectile dysfunction due to arterial insufficiency    Vitamin D deficiency    Stage 3b chronic kidney disease    DM type 2 causing complication    Impaired mobility and ADLs    Absent plantar grasp reflex    Aortic atherosclerosis    Branch retinal vein occlusion of right eye with macular edema    Traumatic glaucoma, left, severe stage    Nuclear sclerotic cataract of both eyes    Severe obesity (BMI 35.0-39.9) with comorbidity    Hyperparathyroidism    Pulmonary emphysema, unspecified emphysema type    Coronary artery disease involving native coronary artery of native heart with angina pectoris    Chronic kidney disease (CKD), stage IV (severe)    BPH with urinary obstruction       Current Outpatient Medications   Medication Sig    amLODIPine (NORVASC) 10 MG tablet TAKE 1 TABLET(10 MG) BY MOUTH EVERY DAY FOR HYPERTENSION    aspirin 81 MG Chew Take 1 tablet (81 mg total) by mouth once daily.    blood sugar diagnostic Strp To check BG 4 times daily, to use with insurance preferred meter    carvediloL (COREG) 6.25 MG tablet Take 1 tablet (6.25 mg total) by mouth 2 (two) times daily.    clopidogreL (PLAVIX) 75 mg tablet TAKE 1 TABLET(75 MG) BY MOUTH EVERY DAY    dorzolamide-timolol 2-0.5% (COSOPT) 22.3-6.8 mg/mL ophthalmic solution Place 1 drop into both eyes 2 (two) times daily.    empagliflozin (JARDIANCE) 25 mg tablet Take 1 tablet (25 mg total) by mouth once daily.    ergocalciferol (ERGOCALCIFEROL) 50,000 unit Cap One weekly    ezetimibe  (ZETIA) 10 mg tablet Take 1 tablet (10 mg total) by mouth once daily.    gabapentin (NEURONTIN) 300 MG capsule Take 1 capsule (300 mg total) by mouth 2 (two) times daily. In 1-2 weeks, if no relief, may increase dose to 3 times per day.    glimepiride (AMARYL) 2 MG tablet Take 1 tablet (2 mg total) by mouth before breakfast.    hydrocortisone 2.5 % cream Apply topically 2 (two) times daily.    ketoconazole (NIZORAL) 2 % cream Apply topically 2 (two) times daily.    lancets Misc Check bg 4 times daily    losartan-hydrochlorothiazide 100-25 mg (HYZAAR) 100-25 mg per tablet Take 1 tablet by mouth once daily.    nitroGLYCERIN (NITROSTAT) 0.4 MG SL tablet Place 1 tablet (0.4 mg total) under the tongue every 5 (five) minutes as needed for Chest pain.    pantoprazole (PROTONIX) 40 MG tablet TAKE 1 TABLET(40 MG) BY MOUTH EVERY DAY FOR STOMACH    rosuvastatin (CRESTOR) 20 MG tablet Take 1 tablet (20 mg total) by mouth once daily.    SITagliptin phosphate (JANUVIA) 50 MG Tab Take 1 tablet (50 mg total) by mouth once daily.    tadalafiL (CIALIS) 20 MG Tab Take 1 tablet (20 mg total) by mouth Every 3 (three) days. for 10 days    tamsulosin (FLOMAX) 0.4 mg Cap One capsule at bedtime    testosterone cypionate (DEPOTESTOTERONE CYPIONATE) 200 mg/mL injection Inject 1 mL (200 mg total) into the muscle every 14 (fourteen) days.     No current facility-administered medications for this visit.       ROS  The review of systems is negative except as above.     Objective:   Physical Exam  Vitals reviewed.   Constitutional:       Appearance: He is well-developed.   HENT:      Head: Normocephalic and atraumatic.   Eyes:      General: No scleral icterus.     Conjunctiva/sclera: Conjunctivae normal.   Neck:      Vascular: No JVD.   Cardiovascular:      Rate and Rhythm: Normal rate and regular rhythm.      Pulses: Intact distal pulses.      Heart sounds: Normal heart sounds. No murmur heard.     No friction rub. No gallop.   Pulmonary:       Effort: Pulmonary effort is normal.      Breath sounds: Normal breath sounds. No wheezing or rales.   Abdominal:      General: Bowel sounds are normal. There is no distension.      Palpations: Abdomen is soft.      Tenderness: There is no abdominal tenderness.   Musculoskeletal:         General: Normal range of motion.      Cervical back: Normal range of motion and neck supple.   Skin:     General: Skin is warm and dry.      Findings: No erythema or rash.   Neurological:      Mental Status: He is alert and oriented to person, place, and time.   Psychiatric:         Behavior: Behavior normal.         Thought Content: Thought content normal.         Judgment: Judgment normal.         Lab Results   Component Value Date    WBC 6.37 11/13/2024    HGB 12.4 (L) 11/13/2024    HCT 37.6 (L) 11/13/2024    MCV 82 11/13/2024     11/13/2024         Chemistry        Component Value Date/Time     11/13/2024 0757    K 4.1 11/13/2024 0757     11/13/2024 0757    CO2 25 11/13/2024 0757    BUN 38 (H) 11/13/2024 0757    CREATININE 2.7 (H) 11/13/2024 0757     (H) 11/13/2024 0757        Component Value Date/Time    CALCIUM 9.2 11/13/2024 0757    ALKPHOS 58 11/13/2024 0757    AST 17 11/13/2024 0757    ALT 27 11/13/2024 0757    BILITOT 0.5 11/13/2024 0757    ESTGFRAFRICA 40.7 (A) 05/04/2022 1227    EGFRNONAA 35.2 (A) 05/04/2022 1227            Lab Results   Component Value Date    CHOL 137 04/18/2024    CHOL 142 03/16/2023    CHOL 168 03/25/2022     Lab Results   Component Value Date    HDL 38 (L) 04/18/2024    HDL 44 03/16/2023    HDL 47 03/25/2022     Lab Results   Component Value Date    LDLCALC 71.6 04/18/2024    LDLCALC 76.4 03/16/2023    LDLCALC 93.8 03/25/2022     Lab Results   Component Value Date    TRIG 137 04/18/2024    TRIG 108 03/16/2023    TRIG 136 03/25/2022     Lab Results   Component Value Date    CHOLHDL 27.7 04/18/2024    CHOLHDL 31.0 03/16/2023    CHOLHDL 28.0 03/25/2022       Lab Results    Component Value Date    TSH 1.265 02/01/2024       Lab Results   Component Value Date    HGBA1C 10.1 (H) 11/13/2024       Assessment:     1. Coronary artery disease involving native coronary artery of native heart with angina pectoris    2. Essential hypertension    3. Hyperlipidemia, mixed    4. Aortic atherosclerosis    5. Erectile dysfunction due to arterial insufficiency    6. DM type 2 causing complication    7. Chronic kidney disease (CKD), stage IV (severe)    8. Hemiparesis affecting left side as late effect of cerebrovascular accident (CVA)        Plan:     Echocardiogram.    Continue current medicines.  Desperately needs better DM control and this was stressed.  I reordered his glimepiride at the 4mg daily dose he says he's supposed to be on.    Diet/exercise goals reinforced.    F/U 6 months

## 2024-12-18 NOTE — TELEPHONE ENCOUNTER
Letter regarding Phase II cardiac rehab was sent to patient.  Will contact patient in 2 weeks to see if interested.  Also, information letter sent to [facility].  Abiloi Meehan, RN  Cardiac Rehab Nurse

## 2024-12-18 NOTE — LETTER
December 18, 2024    Jae Millan  [address]  [address]  [place] [address]             [facility]  [address]  [place] [address]  Phone: [phone]                                  [facility]   [address]   [place] [address]  [phone]         [facility]   [address] [place] [address] [phone]         [facility]    [address]  [place], [place] [address] [phone]   Re: Jae Millan  Clinic number: [id number]    Dear Mr. Millan:    You were recently admitted to an [facility] facility for cardiac (heart) problem.  Your physician has referred you to [facility].  Cardiac Rehab Phase 2 is an educational and exercise program, conducted in a outpatient setting, proven to help reduce your risk for recurrent heart events.    Cardiac rehab has two major parts:    1. Exercise training to help you achieve cardiovascular fitness while learning how to exercise safely and improve muscle strength and endurance.  Your exercise prescription will be based on the results of the cardiopulmonary stress test (CPX) which will be done before entering the program and at completion.  2. Education, counseling and training to help you understand your heart condition and find ways to reduce your risk of future heart problems.  The cardiac rehab team will help you learn how to cope with the stress of adjusting to a new lifestyle and to deal with your fears about the future.    Phase 2 is a 36-session program, meeting 3 times a week for 12 weeks.  Each session consists of an hour of exercise and half-hour dedicated to the educational topic of the day.  Class days vary per location.  Please contact your nearest facility for details.    Through cardiac rehab you will learn:  About your heart condition, medical therapies, and medication  Risk factors in y our lifestyle contributing  to heart disease  New strategies to modify your risk factors  About a healthy diet that can lower your blood cholesterol, control weight, help prevent or control high blood pressure, and diabetes  How to stop smoking  How to manage stress    If you are interested in getting started, call the [facility] of your choosing.     Sincerely,     [facility]

## 2025-01-02 ENCOUNTER — TELEPHONE (OUTPATIENT)
Dept: CARDIAC REHAB | Facility: CLINIC | Age: 75
End: 2025-01-02
Payer: MEDICARE

## 2025-01-10 ENCOUNTER — HOSPITAL ENCOUNTER (OUTPATIENT)
Dept: CARDIOLOGY | Facility: HOSPITAL | Age: 75
Discharge: HOME | End: 2025-01-10
Attending: INTERNAL MEDICINE
Payer: MEDICARE

## 2025-01-10 VITALS
HEIGHT: 70 IN | BODY MASS INDEX: 32.19 KG/M2 | HEART RATE: 77 BPM | WEIGHT: 224.88 LBS | SYSTOLIC BLOOD PRESSURE: 133 MMHG | DIASTOLIC BLOOD PRESSURE: 71 MMHG

## 2025-01-10 DIAGNOSIS — I25.119 CORONARY ARTERY DISEASE INVOLVING NATIVE CORONARY ARTERY OF NATIVE HEART WITH ANGINA PECTORIS: ICD-10-CM

## 2025-01-10 DIAGNOSIS — R06.09 DOE (DYSPNEA ON EXERTION): ICD-10-CM

## 2025-01-10 LAB
ASCENDING AORTA: 2.97 CM
AV AREA BY CONTINUOUS VTI: 4.1 CM2
AV INDEX (PROSTH): 0.89
AV LVOT MEAN GRADIENT: 2 MMHG
AV LVOT PEAK GRADIENT: 4 MMHG
AV MEAN GRADIENT: 2.5 MMHG
AV PEAK GRADIENT: 4.8 MMHG
AV VALVE AREA BY VELOCITY RATIO: 4.1 CM²
AV VALVE AREA: 4 CM2
AV VELOCITY RATIO: 0.91
BSA FOR ECHO PROCEDURE: 2.24 M2
CV ECHO LV RWT: 0.37 CM
DOP CALC AO PEAK VEL: 1.1 M/S
DOP CALC AO VTI: 23.3 CM
DOP CALC LVOT AREA: 4.5 CM2
DOP CALC LVOT DIAMETER: 2.4 CM
DOP CALC LVOT PEAK VEL: 1 M/S
DOP CALC LVOT STROKE VOLUME: 93.6 CM3
DOP CALCLVOT PEAK VEL VTI: 20.7 CM
E WAVE DECELERATION TIME: 248.35 MS
E/A RATIO: 1.02
E/E' RATIO: 10.5 M/S
ECHO EF ESTIMATED: 72 %
ECHO LV POSTERIOR WALL: 1 CM (ref 0.6–1.1)
EJECTION FRACTION: 63 %
FRACTIONAL SHORTENING: 40.7 % (ref 28–44)
INTERVENTRICULAR SEPTUM: 0.7 CM (ref 0.6–1.1)
IVC DIAMETER: 1.25 CM
IVRT: 114.18 MS
LA MAJOR: 4.74 CM
LA MINOR: 5.11 CM
LA WIDTH: 4.09 CM
LEFT ATRIUM SIZE: 4.75 CM
LEFT ATRIUM VOLUME INDEX MOD: 21.8 ML/M2
LEFT ATRIUM VOLUME INDEX: 37.1 ML/M2
LEFT ATRIUM VOLUME MOD: 47.68 ML
LEFT ATRIUM VOLUME: 81.21 CM3
LEFT INTERNAL DIMENSION IN SYSTOLE: 3.2 CM (ref 2.1–4)
LEFT VENTRICLE DIASTOLIC VOLUME INDEX: 64.37 ML/M2
LEFT VENTRICLE DIASTOLIC VOLUME: 140.98 ML
LEFT VENTRICLE MASS INDEX: 76.4 G/M2
LEFT VENTRICLE SYSTOLIC VOLUME INDEX: 18.1 ML/M2
LEFT VENTRICLE SYSTOLIC VOLUME: 39.73 ML
LEFT VENTRICULAR INTERNAL DIMENSION IN DIASTOLE: 5.4 CM (ref 3.5–6)
LEFT VENTRICULAR MASS: 167.4 G
LV LATERAL E/E' RATIO: 10.5
LV SEPTAL E/E' RATIO: 10.5
MV A" WAVE DURATION": 152.24 MS
MV PEAK A VEL: 0.62 M/S
MV PEAK E VEL: 0.63 M/S
OHS CV RV/LV RATIO: 0.78 CM
PISA TR MAX VEL: 2.11 M/S
PULM VEIN A" WAVE DURATION": 152.24 MS
PULM VEIN S/D RATIO: 1.2
PULMONIC VEIN PEAK A VELOCITY: 0.3 M/S
PV PEAK D VEL: 0.35 M/S
PV PEAK S VEL: 0.42 M/S
RA MAJOR: 4.43 CM
RA PRESSURE ESTIMATED: 3 MMHG
RA WIDTH: 3.22 CM
RIGHT ATRIAL AREA: 11.5 CM2
RIGHT VENTRICLE DIASTOLIC BASEL DIMENSION: 4.2 CM
RV TB RVSP: 5 MMHG
RV TISSUE DOPPLER FREE WALL SYSTOLIC VELOCITY 1 (APICAL 4 CHAMBER VIEW): 11.3 CM/S
SINUS: 3.14 CM
STJ: 2.78 CM
TDI LATERAL: 0.06 M/S
TDI SEPTAL: 0.06 M/S
TDI: 0.06 M/S
TR MAX PG: 18 MMHG
TRICUSPID ANNULAR PLANE SYSTOLIC EXCURSION: 1.68 CM
TV PEAK GRADIENT: 18 MMHG
TV REST PULMONARY ARTERY PRESSURE: 21 MMHG
Z-SCORE OF LEFT VENTRICULAR DIMENSION IN END DIASTOLE: -3.15
Z-SCORE OF LEFT VENTRICULAR DIMENSION IN END SYSTOLE: -2.69

## 2025-01-10 PROCEDURE — 93306 TTE W/DOPPLER COMPLETE: CPT

## 2025-01-10 PROCEDURE — 93306 TTE W/DOPPLER COMPLETE: CPT | Mod: 26,,, | Performed by: INTERNAL MEDICINE

## 2025-01-14 DIAGNOSIS — I25.10 CORONARY ARTERY DISEASE, UNSPECIFIED VESSEL OR LESION TYPE, UNSPECIFIED WHETHER ANGINA PRESENT, UNSPECIFIED WHETHER NATIVE OR TRANSPLANTED HEART: ICD-10-CM

## 2025-01-14 DIAGNOSIS — Z98.61 POSTSURGICAL PERCUTANEOUS TRANSLUMINAL CORONARY ANGIOPLASTY STATUS: Primary | ICD-10-CM

## 2025-01-14 DIAGNOSIS — E11.9 DIABETES MELLITUS WITHOUT COMPLICATION: ICD-10-CM

## 2025-01-17 ENCOUNTER — OFFICE VISIT (OUTPATIENT)
Dept: INTERNAL MEDICINE | Facility: CLINIC | Age: 75
End: 2025-01-17
Payer: MEDICARE

## 2025-01-17 VITALS
WEIGHT: 218.56 LBS | SYSTOLIC BLOOD PRESSURE: 122 MMHG | OXYGEN SATURATION: 96 % | DIASTOLIC BLOOD PRESSURE: 64 MMHG | HEART RATE: 72 BPM | BODY MASS INDEX: 31.29 KG/M2 | HEIGHT: 70 IN

## 2025-01-17 DIAGNOSIS — E11.40 TYPE 2 DIABETES MELLITUS WITH DIABETIC NEUROPATHY, WITHOUT LONG-TERM CURRENT USE OF INSULIN: ICD-10-CM

## 2025-01-17 DIAGNOSIS — J43.9 PULMONARY EMPHYSEMA, UNSPECIFIED EMPHYSEMA TYPE: ICD-10-CM

## 2025-01-17 DIAGNOSIS — N40.0 BENIGN PROSTATIC HYPERPLASIA, UNSPECIFIED WHETHER LOWER URINARY TRACT SYMPTOMS PRESENT: ICD-10-CM

## 2025-01-17 DIAGNOSIS — Z12.5 ENCOUNTER FOR SCREENING FOR MALIGNANT NEOPLASM OF PROSTATE: ICD-10-CM

## 2025-01-17 DIAGNOSIS — E55.9 MILD VITAMIN D DEFICIENCY: Primary | ICD-10-CM

## 2025-01-17 DIAGNOSIS — R06.09 DOE (DYSPNEA ON EXERTION): ICD-10-CM

## 2025-01-17 DIAGNOSIS — H34.8310 BRANCH RETINAL VEIN OCCLUSION OF RIGHT EYE WITH MACULAR EDEMA: ICD-10-CM

## 2025-01-17 DIAGNOSIS — E66.01 SEVERE OBESITY (BMI 35.0-39.9) WITH COMORBIDITY: ICD-10-CM

## 2025-01-17 DIAGNOSIS — I69.354 HEMIPARESIS AFFECTING LEFT SIDE AS LATE EFFECT OF CEREBROVASCULAR ACCIDENT (CVA): ICD-10-CM

## 2025-01-17 DIAGNOSIS — I20.0 UNSTABLE ANGINA: ICD-10-CM

## 2025-01-17 DIAGNOSIS — E11.8 DM TYPE 2 CAUSING COMPLICATION: ICD-10-CM

## 2025-01-17 DIAGNOSIS — E11.49 TYPE II DIABETES MELLITUS WITH NEUROLOGICAL MANIFESTATIONS: ICD-10-CM

## 2025-01-17 DIAGNOSIS — N18.4 CHRONIC KIDNEY DISEASE (CKD), STAGE IV (SEVERE): ICD-10-CM

## 2025-01-17 PROCEDURE — 3074F SYST BP LT 130 MM HG: CPT | Mod: CPTII,S$GLB,, | Performed by: INTERNAL MEDICINE

## 2025-01-17 PROCEDURE — 1125F AMNT PAIN NOTED PAIN PRSNT: CPT | Mod: CPTII,S$GLB,, | Performed by: INTERNAL MEDICINE

## 2025-01-17 PROCEDURE — 3288F FALL RISK ASSESSMENT DOCD: CPT | Mod: CPTII,S$GLB,, | Performed by: INTERNAL MEDICINE

## 2025-01-17 PROCEDURE — 3078F DIAST BP <80 MM HG: CPT | Mod: CPTII,S$GLB,, | Performed by: INTERNAL MEDICINE

## 2025-01-17 PROCEDURE — 1101F PT FALLS ASSESS-DOCD LE1/YR: CPT | Mod: CPTII,S$GLB,, | Performed by: INTERNAL MEDICINE

## 2025-01-17 PROCEDURE — 1159F MED LIST DOCD IN RCRD: CPT | Mod: CPTII,S$GLB,, | Performed by: INTERNAL MEDICINE

## 2025-01-17 PROCEDURE — 3008F BODY MASS INDEX DOCD: CPT | Mod: CPTII,S$GLB,, | Performed by: INTERNAL MEDICINE

## 2025-01-17 PROCEDURE — 99999 PR PBB SHADOW E&M-EST. PATIENT-LVL IV: CPT | Mod: PBBFAC,,, | Performed by: INTERNAL MEDICINE

## 2025-01-17 PROCEDURE — 99214 OFFICE O/P EST MOD 30 MIN: CPT | Mod: S$GLB,,, | Performed by: INTERNAL MEDICINE

## 2025-01-17 RX ORDER — CLOPIDOGREL BISULFATE 75 MG/1
75 TABLET ORAL DAILY
Qty: 90 TABLET | Refills: 3 | Status: SHIPPED | OUTPATIENT
Start: 2025-01-17

## 2025-01-17 RX ORDER — GLIMEPIRIDE 4 MG/1
4 TABLET ORAL
Qty: 90 TABLET | Refills: 3 | Status: SHIPPED | OUTPATIENT
Start: 2025-01-17

## 2025-01-17 RX ORDER — EZETIMIBE 10 MG/1
10 TABLET ORAL DAILY
Qty: 90 TABLET | Refills: 3 | Status: SHIPPED | OUTPATIENT
Start: 2025-01-17 | End: 2026-01-17

## 2025-01-17 RX ORDER — CARVEDILOL 6.25 MG/1
6.25 TABLET ORAL 2 TIMES DAILY
Qty: 180 TABLET | Refills: 3 | Status: SHIPPED | OUTPATIENT
Start: 2025-01-17

## 2025-01-17 RX ORDER — NITROGLYCERIN 0.4 MG/1
0.4 TABLET SUBLINGUAL EVERY 5 MIN PRN
Qty: 30 TABLET | Refills: 1 | Status: SHIPPED | OUTPATIENT
Start: 2025-01-17 | End: 2026-01-17

## 2025-01-17 RX ORDER — ROSUVASTATIN CALCIUM 20 MG/1
20 TABLET, COATED ORAL DAILY
Qty: 90 TABLET | Refills: 3 | Status: SHIPPED | OUTPATIENT
Start: 2025-01-17

## 2025-01-17 RX ORDER — LOSARTAN POTASSIUM AND HYDROCHLOROTHIAZIDE 25; 100 MG/1; MG/1
1 TABLET ORAL DAILY
Qty: 90 TABLET | Refills: 3 | Status: SHIPPED | OUTPATIENT
Start: 2025-01-17 | End: 2026-01-17

## 2025-01-17 RX ORDER — PANTOPRAZOLE SODIUM 40 MG/1
TABLET, DELAYED RELEASE ORAL
Qty: 90 TABLET | Refills: 3 | Status: SHIPPED | OUTPATIENT
Start: 2025-01-17

## 2025-01-17 RX ORDER — AMLODIPINE BESYLATE 10 MG/1
TABLET ORAL
Qty: 90 TABLET | Refills: 3 | Status: SHIPPED | OUTPATIENT
Start: 2025-01-17

## 2025-01-23 NOTE — PROGRESS NOTES
Subjective:       Patient ID: Jae Millan is a 74 y.o. male.    Chief Complaint: Follow-up and Diabetes    Follow-up  Pertinent negatives include no abdominal pain, chest pain, headaches, nausea or vomiting.   Diabetes  Pertinent negatives for hypoglycemia include no headaches or seizures. Pertinent negatives for diabetes include no chest pain.    He had run out of some of his meds.  Still feel YU despite stents placed.  No CP.  Encouraged to do cardiac rehab.    Review of Systems   Respiratory:  Negative for shortness of breath.    Cardiovascular:  Negative for chest pain.   Gastrointestinal:  Negative for abdominal pain, diarrhea, nausea and vomiting.   Genitourinary:  Negative for dysuria.   Neurological:  Negative for seizures, syncope and headaches.       Objective:      Physical Exam  Constitutional:       General: He is not in acute distress.     Appearance: He is well-developed.   Eyes:      Pupils: Pupils are equal, round, and reactive to light.   Neck:      Thyroid: No thyromegaly.      Vascular: No JVD.   Cardiovascular:      Rate and Rhythm: Normal rate and regular rhythm.      Heart sounds: Normal heart sounds. No murmur heard.     No friction rub. No gallop.   Pulmonary:      Effort: Pulmonary effort is normal.      Breath sounds: Normal breath sounds. No wheezing or rales.   Abdominal:      General: Bowel sounds are normal. There is no distension.      Palpations: Abdomen is soft. There is no mass.      Tenderness: There is no abdominal tenderness. There is no guarding or rebound.   Musculoskeletal:      Cervical back: Neck supple.   Lymphadenopathy:      Cervical: No cervical adenopathy.   Skin:     General: Skin is warm and dry.   Neurological:      Mental Status: He is alert and oriented to person, place, and time.   Psychiatric:         Behavior: Behavior normal.         Thought Content: Thought content normal.         Judgment: Judgment normal.       Fingernails of both hands filed so as not  to pull or snag (he is unable to perform due to LUE hemiplegia  Assessment:       1. Mild vitamin D deficiency    2. Benign prostatic hyperplasia, unspecified whether lower urinary tract symptoms present    3. DM type 2 causing complication    4. Encounter for screening for malignant neoplasm of prostate    5. Type II diabetes mellitus with neurological manifestations    6. Severe obesity (BMI 35.0-39.9) with comorbidity    7. Hemiparesis affecting left side as late effect of cerebrovascular accident (CVA)    8. Branch retinal vein occlusion of right eye with macular edema    9. Chronic kidney disease (CKD), stage IV (severe)    10. Pulmonary emphysema, unspecified emphysema type    11. YU (dyspnea on exertion)    12. Unstable angina    13. Type 2 diabetes mellitus with diabetic neuropathy, without long-term current use of insulin        Plan:   Mild vitamin D deficiency  -     Vitamin D; Future; Expected date: 01/17/2025    Benign prostatic hyperplasia, unspecified whether lower urinary tract symptoms present  -     PSA, Screening; Future; Expected date: 01/17/2025    DM type 2 causing complication  -     Comprehensive Metabolic Panel; Future; Expected date: 01/17/2025  -     TSH; Future; Expected date: 01/17/2025    Encounter for screening for malignant neoplasm of prostate  -     PSA, Screening; Future; Expected date: 01/17/2025    Type II diabetes mellitus with neurological manifestations  Stable and monitored  Severe obesity (BMI 35.0-39.9) with comorbidity  Working on healthy diet - will start cardiac rehab which will help  Hemiparesis affecting left side as late effect of cerebrovascular accident (CVA)  Stable and monitored  Branch retinal vein occlusion of right eye with macular edema  Per Ophthalmology  Chronic kidney disease (CKD), stage IV (severe)  Stable and monitored  Pulmonary emphysema, unspecified emphysema type  Mild - no new symptoms  YU (dyspnea on exertion)  -     clopidogreL (PLAVIX) 75 mg  tablet; Take 1 tablet (75 mg total) by mouth once daily.  Dispense: 90 tablet; Refill: 3    Unstable angina  -     nitroGLYCERIN (NITROSTAT) 0.4 MG SL tablet; Place 1 tablet (0.4 mg total) under the tongue every 5 (five) minutes as needed for Chest pain.  Dispense: 30 tablet; Refill: 1    Type 2 diabetes mellitus with diabetic neuropathy, without long-term current use of insulin  -     SITagliptin phosphate (JANUVIA) 50 MG Tab; Take 1 tablet (50 mg total) by mouth once daily.  Dispense: 90 tablet; Refill: 3    Other orders  -     amLODIPine (NORVASC) 10 MG tablet; TAKE 1 TABLET(10 MG) BY MOUTH EVERY DAY FOR HYPERTENSION  Dispense: 90 tablet; Refill: 3  -     carvediloL (COREG) 6.25 MG tablet; Take 1 tablet (6.25 mg total) by mouth 2 (two) times daily.  Dispense: 180 tablet; Refill: 3  -     empagliflozin (JARDIANCE) 25 mg tablet; Take 1 tablet (25 mg total) by mouth once daily.  Dispense: 90 tablet; Refill: 3  -     ezetimibe (ZETIA) 10 mg tablet; Take 1 tablet (10 mg total) by mouth once daily.  Dispense: 90 tablet; Refill: 3  -     glimepiride (AMARYL) 4 MG tablet; Take 1 tablet (4 mg total) by mouth before breakfast.  Dispense: 90 tablet; Refill: 3  -     losartan-hydrochlorothiazide 100-25 mg (HYZAAR) 100-25 mg per tablet; Take 1 tablet by mouth once daily.  Dispense: 90 tablet; Refill: 3  -     pantoprazole (PROTONIX) 40 MG tablet; TAKE 1 TABLET(40 MG) BY MOUTH EVERY DAY FOR STOMACH  Dispense: 90 tablet; Refill: 3  -     rosuvastatin (CRESTOR) 20 MG tablet; Take 1 tablet (20 mg total) by mouth once daily.  Dispense: 90 tablet; Refill: 3

## 2025-01-27 ENCOUNTER — LAB VISIT (OUTPATIENT)
Dept: LAB | Facility: HOSPITAL | Age: 75
End: 2025-01-27
Attending: INTERNAL MEDICINE
Payer: MEDICARE

## 2025-01-27 DIAGNOSIS — I25.10 CORONARY ARTERY DISEASE, UNSPECIFIED VESSEL OR LESION TYPE, UNSPECIFIED WHETHER ANGINA PRESENT, UNSPECIFIED WHETHER NATIVE OR TRANSPLANTED HEART: ICD-10-CM

## 2025-01-27 DIAGNOSIS — Z12.5 ENCOUNTER FOR SCREENING FOR MALIGNANT NEOPLASM OF PROSTATE: ICD-10-CM

## 2025-01-27 DIAGNOSIS — Z98.61 POSTSURGICAL PERCUTANEOUS TRANSLUMINAL CORONARY ANGIOPLASTY STATUS: ICD-10-CM

## 2025-01-27 DIAGNOSIS — E11.9 DIABETES MELLITUS WITHOUT COMPLICATION: ICD-10-CM

## 2025-01-27 DIAGNOSIS — N40.0 BENIGN PROSTATIC HYPERPLASIA, UNSPECIFIED WHETHER LOWER URINARY TRACT SYMPTOMS PRESENT: ICD-10-CM

## 2025-01-27 DIAGNOSIS — E55.9 MILD VITAMIN D DEFICIENCY: ICD-10-CM

## 2025-01-27 DIAGNOSIS — E11.21 TYPE 2 DIABETES MELLITUS WITH DIABETIC NEPHROPATHY, WITHOUT LONG-TERM CURRENT USE OF INSULIN: ICD-10-CM

## 2025-01-27 DIAGNOSIS — E11.8 DM TYPE 2 CAUSING COMPLICATION: ICD-10-CM

## 2025-01-27 LAB
25(OH)D3+25(OH)D2 SERPL-MCNC: 22 NG/ML (ref 30–96)
ALBUMIN SERPL BCP-MCNC: 3.6 G/DL (ref 3.5–5.2)
ALP SERPL-CCNC: 70 U/L (ref 40–150)
ALT SERPL W/O P-5'-P-CCNC: 26 U/L (ref 10–44)
ANION GAP SERPL CALC-SCNC: 11 MMOL/L (ref 8–16)
AST SERPL-CCNC: 16 U/L (ref 10–40)
BASOPHILS # BLD AUTO: 0.05 K/UL (ref 0–0.2)
BASOPHILS NFR BLD: 0.8 % (ref 0–1.9)
BILIRUB SERPL-MCNC: 0.3 MG/DL (ref 0.1–1)
BUN SERPL-MCNC: 37 MG/DL (ref 8–23)
CALCIUM SERPL-MCNC: 8.8 MG/DL (ref 8.7–10.5)
CHLORIDE SERPL-SCNC: 107 MMOL/L (ref 95–110)
CHOLEST SERPL-MCNC: 108 MG/DL (ref 120–199)
CHOLEST/HDLC SERPL: 3.2 {RATIO} (ref 2–5)
CO2 SERPL-SCNC: 23 MMOL/L (ref 23–29)
COMPLEXED PSA SERPL-MCNC: 0.18 NG/ML (ref 0–4)
CREAT SERPL-MCNC: 2.2 MG/DL (ref 0.5–1.4)
CRP SERPL-MCNC: 4.74 MG/L (ref 0–3.19)
DIFFERENTIAL METHOD BLD: ABNORMAL
EOSINOPHIL # BLD AUTO: 0.2 K/UL (ref 0–0.5)
EOSINOPHIL NFR BLD: 3.6 % (ref 0–8)
ERYTHROCYTE [DISTWIDTH] IN BLOOD BY AUTOMATED COUNT: 14.8 % (ref 11.5–14.5)
EST. GFR  (NO RACE VARIABLE): 31 ML/MIN/1.73 M^2
ESTIMATED AVG GLUCOSE: 237 MG/DL (ref 68–131)
ESTIMATED AVG GLUCOSE: 237 MG/DL (ref 68–131)
GLUCOSE SERPL-MCNC: 188 MG/DL (ref 70–110)
GLUCOSE SERPL-MCNC: 188 MG/DL (ref 70–110)
HBA1C MFR BLD: 9.9 % (ref 4–5.6)
HBA1C MFR BLD: 9.9 % (ref 4–5.6)
HCT VFR BLD AUTO: 37.9 % (ref 40–54)
HDLC SERPL-MCNC: 34 MG/DL (ref 40–75)
HDLC SERPL: 31.5 % (ref 20–50)
HGB BLD-MCNC: 13 G/DL (ref 14–18)
IMM GRANULOCYTES # BLD AUTO: 0.02 K/UL (ref 0–0.04)
IMM GRANULOCYTES NFR BLD AUTO: 0.3 % (ref 0–0.5)
LDLC SERPL CALC-MCNC: 50.6 MG/DL (ref 63–159)
LYMPHOCYTES # BLD AUTO: 1 K/UL (ref 1–4.8)
LYMPHOCYTES NFR BLD: 16 % (ref 18–48)
MCH RBC QN AUTO: 26.8 PG (ref 27–31)
MCHC RBC AUTO-ENTMCNC: 34.3 G/DL (ref 32–36)
MCV RBC AUTO: 78 FL (ref 82–98)
MONOCYTES # BLD AUTO: 0.4 K/UL (ref 0.3–1)
MONOCYTES NFR BLD: 7.1 % (ref 4–15)
NEUTROPHILS # BLD AUTO: 4.5 K/UL (ref 1.8–7.7)
NEUTROPHILS NFR BLD: 72.2 % (ref 38–73)
NONHDLC SERPL-MCNC: 74 MG/DL
NRBC BLD-RTO: 0 /100 WBC
PLATELET # BLD AUTO: 216 K/UL (ref 150–450)
PMV BLD AUTO: 11.8 FL (ref 9.2–12.9)
POTASSIUM SERPL-SCNC: 4 MMOL/L (ref 3.5–5.1)
PROT SERPL-MCNC: 7.6 G/DL (ref 6–8.4)
RBC # BLD AUTO: 4.85 M/UL (ref 4.6–6.2)
SODIUM SERPL-SCNC: 141 MMOL/L (ref 136–145)
TRIGL SERPL-MCNC: 117 MG/DL (ref 30–150)
TSH SERPL DL<=0.005 MIU/L-ACNC: 2.23 UIU/ML (ref 0.4–4)
WBC # BLD AUTO: 6.17 K/UL (ref 3.9–12.7)

## 2025-01-27 PROCEDURE — 36415 COLL VENOUS BLD VENIPUNCTURE: CPT | Performed by: INTERNAL MEDICINE

## 2025-01-27 PROCEDURE — 82306 VITAMIN D 25 HYDROXY: CPT | Performed by: INTERNAL MEDICINE

## 2025-01-27 PROCEDURE — 80053 COMPREHEN METABOLIC PANEL: CPT | Performed by: INTERNAL MEDICINE

## 2025-01-27 PROCEDURE — 83036 HEMOGLOBIN GLYCOSYLATED A1C: CPT | Performed by: INTERNAL MEDICINE

## 2025-01-27 PROCEDURE — 84153 ASSAY OF PSA TOTAL: CPT | Performed by: INTERNAL MEDICINE

## 2025-01-27 PROCEDURE — 84443 ASSAY THYROID STIM HORMONE: CPT | Performed by: INTERNAL MEDICINE

## 2025-01-27 PROCEDURE — 80061 LIPID PANEL: CPT | Performed by: INTERNAL MEDICINE

## 2025-01-27 PROCEDURE — 83036 HEMOGLOBIN GLYCOSYLATED A1C: CPT | Mod: 91 | Performed by: INTERNAL MEDICINE

## 2025-01-27 PROCEDURE — 86141 C-REACTIVE PROTEIN HS: CPT | Performed by: INTERNAL MEDICINE

## 2025-01-27 PROCEDURE — 85025 COMPLETE CBC W/AUTO DIFF WBC: CPT | Performed by: INTERNAL MEDICINE

## 2025-01-27 PROCEDURE — 82947 ASSAY GLUCOSE BLOOD QUANT: CPT | Performed by: INTERNAL MEDICINE

## 2025-01-29 ENCOUNTER — HOSPITAL ENCOUNTER (OUTPATIENT)
Dept: CARDIOLOGY | Facility: HOSPITAL | Age: 75
Discharge: HOME | End: 2025-01-29
Attending: INTERNAL MEDICINE
Payer: MEDICARE

## 2025-01-29 VITALS
HEART RATE: 65 BPM | DIASTOLIC BLOOD PRESSURE: 75 MMHG | HEIGHT: 69 IN | SYSTOLIC BLOOD PRESSURE: 119 MMHG | BODY MASS INDEX: 33.18 KG/M2 | WEIGHT: 224 LBS

## 2025-01-29 DIAGNOSIS — Z98.61 POSTSURGICAL PERCUTANEOUS TRANSLUMINAL CORONARY ANGIOPLASTY STATUS: ICD-10-CM

## 2025-01-29 DIAGNOSIS — I25.10 CORONARY ARTERY DISEASE, UNSPECIFIED VESSEL OR LESION TYPE, UNSPECIFIED WHETHER ANGINA PRESENT, UNSPECIFIED WHETHER NATIVE OR TRANSPLANTED HEART: ICD-10-CM

## 2025-01-29 LAB
CV STRESS BASE HR: 65 BPM
DIASTOLIC BLOOD PRESSURE: 75 MMHG
OHS CV CPX 1 MINUTE RECOVERY HEART RATE: 81 BPM
OHS CV CPX 85 PERCENT MAX PREDICTED HEART RATE MALE: 126
OHS CV CPX ABDOMINAL GIRTH: 46.9 CM
OHS CV CPX DATA GRADE - PEAK: 2.3
OHS CV CPX DATA O2 SAT - PEAK: 97
OHS CV CPX DATA O2 SAT - REST: 97
OHS CV CPX DATA SPEED - PEAK: 1.6
OHS CV CPX DATA TIME - PEAK: 3.68
OHS CV CPX DATA VE/VCO2 - PEAK: 39
OHS CV CPX DATA VE/VO2 - PEAK: 37
OHS CV CPX DATA VO2 - PEAK: 11
OHS CV CPX DATA VO2 - REST: 3.5
OHS CV CPX ESTIMATED METS: 3
OHS CV CPX FEV1/FVC: 0.86
OHS CV CPX FORCED EXPIRATORY VOLUME: 2.6
OHS CV CPX FORCED VITAL CAPACITY (FVC): 3.04
OHS CV CPX HIGHEST VO: 29.4
OHS CV CPX MAX PREDICTED HEART RATE: 148
OHS CV CPX MAXIMAL VOLUNTARY VENTILATION (MVV) PREDICTED: 104
OHS CV CPX MAXIMAL VOLUNTARY VENTILATION (MVV): 63
OHS CV CPX MAXIUMUM EXERCISE VENTILATION (VE MAX): 49.4
OHS CV CPX PATIENT AGE: 72
OHS CV CPX PATIENT HEIGHT IN: 69
OHS CV CPX PATIENT IS FEMALE AGE 11-19: 0
OHS CV CPX PATIENT IS FEMALE AGE GREATER THAN 19: 0
OHS CV CPX PATIENT IS FEMALE AGE LESS THAN 11: 0
OHS CV CPX PATIENT IS FEMALE: 0
OHS CV CPX PATIENT IS MALE AGE 11-25: 0
OHS CV CPX PATIENT IS MALE AGE GREATER THAN 25: 1
OHS CV CPX PATIENT IS MALE AGE LESS THAN 11: 0
OHS CV CPX PATIENT IS MALE GREATER THAN 18: 1
OHS CV CPX PATIENT IS MALE LESS THAN OR EQUAL TO 18: 0
OHS CV CPX PATIENT IS MALE: 1
OHS CV CPX PATIENT WEIGHT RETURNED IN OZ: 3584
OHS CV CPX PEAK DIASTOLIC BLOOD PRESSURE: 96 MMHG
OHS CV CPX PEAK HEAR RATE: 112 BPM
OHS CV CPX PEAK RATE PRESSURE PRODUCT: NORMAL
OHS CV CPX PEAK SYSTOLIC BLOOD PRESSURE: 128 MMHG
OHS CV CPX PERCENT BODY FAT: 26.1
OHS CV CPX PERCENT MAX PREDICTED HEART RATE ACHIEVED: 76
OHS CV CPX PREDICTED VO2: 29.4 ML/KG/MIN
OHS CV CPX RATE PRESSURE PRODUCT PRESENTING: 7735
OHS CV CPX REST PET CO2: 32
OHS CV CPX VE/VCO2 SLOPE: 41.7
STRESS ECHO POST EXERCISE DUR MIN: 3 MINUTES
STRESS ECHO POST EXERCISE DUR SEC: 41 SECONDS
SYSTOLIC BLOOD PRESSURE: 119 MMHG

## 2025-01-29 PROCEDURE — 94621 CARDIOPULM EXERCISE TESTING: CPT | Mod: 26,,, | Performed by: INTERNAL MEDICINE

## 2025-01-29 PROCEDURE — 94621 CARDIOPULM EXERCISE TESTING: CPT

## 2025-02-12 NOTE — PROGRESS NOTES
HPI    DLS: 6/10/2024    Pt here for 4 Month Check;  Pt states having a little discomfort to OU, they just feel foggy and   heavy.   Pt no longer has the glasses from Dr Erwin (2/2023)   Has been going with out glasses - has trouble seeing up close     Meds;  Cosopt BID OU= Pt states at times he does forget to put his eye drops in.     POHx:   1. Traumatic Glaucoma OS - severe stage   2. Angle Recession OS - severe stage   3. Borderline g laucoma OD - low risk   4. APD OS   5. NS OU   6. BRVO with ME OD     Last edited by Kaylee Aguilar on 2/17/2025 11:31 AM.            Assessment /Plan     For exam results, see Encounter Report.    Traumatic glaucoma, left, severe stage    Angle recession of left eye    Afferent pupillary defect, left    Branch retinal vein occlusion of right eye with macular edema    Nuclear sclerotic cataract of both eyes    Blunt trauma of left eye, sequela    History of eye trauma        FIRST [facility] VISIT - 7/16/2019   Previous pt of [facility] - and was Rx for glaucoma     LOST TO F/U 9/2019 TO 8/2022 - SAW DR GARCIA 8/3/2022 AND SENT BACK OVER - PT IS OFF GTTS          Glaucoma (type and duration)    GLAUCOMA - 2/2 trauma OS (age 16)  - end stage    First HVF   2019   First photos   7/2019   Treatment / Drops started    Timolol BID OU , Azopt TID OU, Brimonidine BID OU       Stopped - ran out and did not refill            Family history    none        Glaucoma meds    cosopt os bid //  (use to use brim / azopt /timolol- os ) // off again 2/3/2023        H/O adverse rxn to glaucoma drops         LASERS    ??        GLAUCOMA SURGERIES    none        OTHER EYE SURGERIES    none        CDR    OD: 0.6 OS: 0.85        Tbase    16- 22 /18 - 23        Tmax    22/23          Ttarget    ??             HVF    3 test 2019 to 2024 - full  od --(improved w/ lid taped)  - ptosis  // 10-2 stim V - IAD         Gonio   +4 od // recess 360 os          CCT    585/599        OCT    2 test 2019  "to 2021 - nl od // dec thru out os         Disc photos    7/2019// 6/2024     - Ttoday   14/15   - back on  cosopt -  - Test done today  IOP     2. Angle recession os    H/O blunt trauma age 16    Finger injury to the eye     3. + APD os     4. NS     5. BRVO w/ ME od    This was/is his good eye    - LAST SEEN - [facility]I 10/26/2023 - IS TO f/U 6 MONTHS     6. Refractive error    Last glasses - Dr Erwin 2/2023 - has lost glasses    Refer to optometry     7/16/2019 - Pt new patient to me, used to be seen in [facility] [place] [place] for glaucoma. Pt ran out of medications in June 2019. Pt has no eye pain, red eye , flashes, floaters, changes in vision. PT has no hx of surgery or family hx of glaucoma.       Retina (Nikki) - evaluation of right macula - see OCT - this is his "good" eye // BRVO w/ ME od    Has seen Dr Jordan x 3 - did NOT get avastin 2/2 cardiac issues(7/27/2023)   Did NOT get ozurdex - pt defered (8/31/2023) - last seen 7/12/2024  - to F/U 6 months  - stable     1/8/2024   Pt gets confused on if he is using the copst in the right or  the left eye - told him to just use it in BOTH eyes so he does not have to keep it straight     10/14/2024   IOP 16 ou   Gonio +4 od and recession os     Dr Morejon - new glasses 11/6/2024 - doing ok     Cataracts - mild to mod - ? Vis sign    Doubt CE os will help much - adv glaucoma / angle rcession    May benefit from CE od     F/U 4 months with HVF 24-2 ss od and 10-2 stim V os // DFE // OCT  "

## 2025-02-17 ENCOUNTER — OFFICE VISIT (OUTPATIENT)
Dept: OPHTHALMOLOGY | Facility: CLINIC | Age: 75
End: 2025-02-17
Payer: MEDICARE

## 2025-02-17 DIAGNOSIS — S05.8X2S BLUNT TRAUMA OF LEFT EYE, SEQUELA: ICD-10-CM

## 2025-02-17 DIAGNOSIS — Z87.828 HISTORY OF EYE TRAUMA: ICD-10-CM

## 2025-02-17 DIAGNOSIS — H34.8310 BRANCH RETINAL VEIN OCCLUSION OF RIGHT EYE WITH MACULAR EDEMA: ICD-10-CM

## 2025-02-17 DIAGNOSIS — H25.13 NUCLEAR SCLEROTIC CATARACT OF BOTH EYES: ICD-10-CM

## 2025-02-17 DIAGNOSIS — H21.562 AFFERENT PUPILLARY DEFECT, LEFT: ICD-10-CM

## 2025-02-17 DIAGNOSIS — H40.32X3 TRAUMATIC GLAUCOMA, LEFT, SEVERE STAGE: Primary | ICD-10-CM

## 2025-02-17 DIAGNOSIS — H21.552 ANGLE RECESSION OF LEFT EYE: ICD-10-CM

## 2025-02-17 PROCEDURE — 1159F MED LIST DOCD IN RCRD: CPT | Mod: CPTII,S$GLB,, | Performed by: OPHTHALMOLOGY

## 2025-02-17 PROCEDURE — 1101F PT FALLS ASSESS-DOCD LE1/YR: CPT | Mod: CPTII,S$GLB,, | Performed by: OPHTHALMOLOGY

## 2025-02-17 PROCEDURE — 1160F RVW MEDS BY RX/DR IN RCRD: CPT | Mod: CPTII,S$GLB,, | Performed by: OPHTHALMOLOGY

## 2025-02-17 PROCEDURE — 1126F AMNT PAIN NOTED NONE PRSNT: CPT | Mod: CPTII,S$GLB,, | Performed by: OPHTHALMOLOGY

## 2025-02-17 PROCEDURE — 3288F FALL RISK ASSESSMENT DOCD: CPT | Mod: CPTII,S$GLB,, | Performed by: OPHTHALMOLOGY

## 2025-02-17 PROCEDURE — 3046F HEMOGLOBIN A1C LEVEL >9.0%: CPT | Mod: CPTII,S$GLB,, | Performed by: OPHTHALMOLOGY

## 2025-02-17 NOTE — TELEPHONE ENCOUNTER
No care due was identified.  [facility] Embedded Care Due Messages. Reference number: [id number].   2/17/2025 12:50:28 PM CST

## 2025-02-18 ENCOUNTER — TELEPHONE (OUTPATIENT)
Dept: CARDIAC REHAB | Facility: CLINIC | Age: 75
End: 2025-02-18
Payer: MEDICARE

## 2025-02-18 RX ORDER — CARVEDILOL 6.25 MG/1
6.25 TABLET ORAL
Qty: 180 TABLET | Refills: 3 | Status: SHIPPED | OUTPATIENT
Start: 2025-02-18

## 2025-02-18 NOTE — TELEPHONE ENCOUNTER
Refill Routing Note   Medication(s) are not appropriate for processing by [facility] for the following reason(s):        Drug-disease interaction: carvediloL and Pulmonary emphysema, unspecified emphysema type     [facility] action(s):  Defer             Pharmacist review requested: Yes     Appointments  past 12m or future 3m with PCP    Date Provider   Last Visit   1/17/2025 Maryjane Farias MD   Next Visit   4/17/2025 Maryjane Farias MD   ED visits in past 90 days: 0        Note composed:11:01 PM 02/17/2025

## 2025-02-18 NOTE — TELEPHONE ENCOUNTER
Refill Decision Note   Jae Millan  is requesting a refill authorization.  Brief Assessment and Rationale for Refill:  Approve     Medication Therapy Plan:         Pharmacist review requested: Yes   Comments:     Note composed:5:51 AM 02/18/2025

## 2025-02-24 ENCOUNTER — TELEPHONE (OUTPATIENT)
Dept: PODIATRY | Facility: CLINIC | Age: 75
End: 2025-02-24
Payer: MEDICARE

## 2025-02-24 NOTE — TELEPHONE ENCOUNTER
Spoke to pt and rescheduled appt due to provider being out of clinic on rounds. Original appt:3/5/25. New Appt:3/25/25. Pt verbalized understanding.

## 2025-03-13 ENCOUNTER — TELEPHONE (OUTPATIENT)
Dept: CARDIOLOGY | Facility: CLINIC | Age: 75
End: 2025-03-13
Payer: MEDICARE

## 2025-03-13 DIAGNOSIS — R07.9 CHEST PAIN, UNSPECIFIED TYPE: Primary | ICD-10-CM

## 2025-03-13 NOTE — TELEPHONE ENCOUNTER
----- Message from Charisse sent at 3/13/2025  2:41 PM CDT -----  Regarding: medical advice  Pt of Dr. Ag 12/18/24Pt has been experiencing SOB when he walks about a block and says he is starting to build up some fluid.  He can be reached at [phone].Thank you

## 2025-03-13 NOTE — TELEPHONE ENCOUNTER
Pt called states he is having sob and chest discomfort and called over to Dr Paul clinic.  Pt aware Dr Paul no longer at [facility].    Instructed pt is would send this message to let you know about his condition and that I instructed him to go to the emergency room with those symptoms reported.  Pt replied last time he went to hospital ER he spent 5 hrs awaiting services.  Reinforced with patient importance of getting evaluated urgently.    Pt wanted a clinic appointment and transferred to your department line of cardiology, Dr Friend.

## 2025-03-13 NOTE — TELEPHONE ENCOUNTER
I spoke with Mr. Millan. He is not currently having chest pain. He would like to be seen by cardiology tomorrow. Appointment scheduled to see Dr. Guzman tomorrow 3/14. Patient instructed if he has chest pain to report to the ED. Verbalized understanding.

## 2025-03-13 NOTE — TELEPHONE ENCOUNTER
----- Message from ANTHONY Cutler sent at 3/13/2025  2:51 PM CDT -----  Regarding: medical urgency  Hi Pt called states he is having sob and chest discomfort and called over to Dr Paul clinic. Pt aware Dr Paul no longer here at [facility].Instructed pt is would send this message to let you know about his condition and that I instructed him to go to the emergency room with those symptoms reported.Pt wanted a clinic appointment and transferred to your department line.Omayra

## 2025-03-13 NOTE — TELEPHONE ENCOUNTER
Called pt . He confirmed having SOB and said that he is already scheduled w. dr Guzman in [place] and did not need help anymore.

## 2025-03-14 ENCOUNTER — LAB VISIT (OUTPATIENT)
Dept: LAB | Facility: HOSPITAL | Age: 75
End: 2025-03-14
Attending: INTERNAL MEDICINE
Payer: MEDICARE

## 2025-03-14 ENCOUNTER — HOSPITAL ENCOUNTER (OUTPATIENT)
Dept: CARDIOLOGY | Facility: CLINIC | Age: 75
Discharge: HOME | End: 2025-03-14
Payer: MEDICARE

## 2025-03-14 ENCOUNTER — OFFICE VISIT (OUTPATIENT)
Dept: CARDIOLOGY | Facility: CLINIC | Age: 75
End: 2025-03-14
Payer: MEDICARE

## 2025-03-14 VITALS
HEART RATE: 72 BPM | HEIGHT: 70 IN | OXYGEN SATURATION: 94 % | RESPIRATION RATE: 18 BRPM | SYSTOLIC BLOOD PRESSURE: 114 MMHG | WEIGHT: 223.13 LBS | BODY MASS INDEX: 31.94 KG/M2 | DIASTOLIC BLOOD PRESSURE: 74 MMHG

## 2025-03-14 DIAGNOSIS — N18.32 STAGE 3B CHRONIC KIDNEY DISEASE: ICD-10-CM

## 2025-03-14 DIAGNOSIS — I69.354 HEMIPARESIS AFFECTING LEFT SIDE AS LATE EFFECT OF CEREBROVASCULAR ACCIDENT (CVA): ICD-10-CM

## 2025-03-14 DIAGNOSIS — I10 ESSENTIAL HYPERTENSION: ICD-10-CM

## 2025-03-14 DIAGNOSIS — I50.32 CHRONIC DIASTOLIC HEART FAILURE: Primary | ICD-10-CM

## 2025-03-14 DIAGNOSIS — R06.02 SOB (SHORTNESS OF BREATH): ICD-10-CM

## 2025-03-14 DIAGNOSIS — E66.01 SEVERE OBESITY (BMI 35.0-39.9) WITH COMORBIDITY: ICD-10-CM

## 2025-03-14 DIAGNOSIS — I25.119 CORONARY ARTERY DISEASE INVOLVING NATIVE CORONARY ARTERY OF NATIVE HEART WITH ANGINA PECTORIS: ICD-10-CM

## 2025-03-14 DIAGNOSIS — R07.9 CHEST PAIN, UNSPECIFIED TYPE: ICD-10-CM

## 2025-03-14 DIAGNOSIS — E78.2 HYPERLIPIDEMIA, MIXED: ICD-10-CM

## 2025-03-14 DIAGNOSIS — I70.0 AORTIC ATHEROSCLEROSIS: ICD-10-CM

## 2025-03-14 DIAGNOSIS — E11.40 TYPE 2 DIABETES MELLITUS WITH DIABETIC NEUROPATHY, WITHOUT LONG-TERM CURRENT USE OF INSULIN: Chronic | ICD-10-CM

## 2025-03-14 LAB
ANION GAP SERPL CALC-SCNC: 11 MMOL/L (ref 8–16)
BUN SERPL-MCNC: 35 MG/DL (ref 8–23)
CALCIUM SERPL-MCNC: 9.4 MG/DL (ref 8.7–10.5)
CHLORIDE SERPL-SCNC: 104 MMOL/L (ref 95–110)
CO2 SERPL-SCNC: 26 MMOL/L (ref 23–29)
CREAT SERPL-MCNC: 2 MG/DL (ref 0.5–1.4)
EST. GFR  (NO RACE VARIABLE): 34.8 ML/MIN/1.73 M^2
GLUCOSE SERPL-MCNC: 106 MG/DL (ref 70–110)
OHS QRS DURATION: 142 MS
OHS QTC CALCULATION: 442 MS
POTASSIUM SERPL-SCNC: 4.3 MMOL/L (ref 3.5–5.1)
SODIUM SERPL-SCNC: 141 MMOL/L (ref 136–145)

## 2025-03-14 PROCEDURE — 93010 ELECTROCARDIOGRAM REPORT: CPT | Mod: S$GLB,,, | Performed by: INTERNAL MEDICINE

## 2025-03-14 PROCEDURE — 83880 ASSAY OF NATRIURETIC PEPTIDE: CPT | Performed by: INTERNAL MEDICINE

## 2025-03-14 PROCEDURE — 36415 COLL VENOUS BLD VENIPUNCTURE: CPT | Performed by: INTERNAL MEDICINE

## 2025-03-14 PROCEDURE — 80048 BASIC METABOLIC PNL TOTAL CA: CPT | Performed by: INTERNAL MEDICINE

## 2025-03-14 PROCEDURE — 93005 ELECTROCARDIOGRAM TRACING: CPT | Mod: S$GLB,,, | Performed by: INTERNAL MEDICINE

## 2025-03-14 PROCEDURE — 99999 PR PBB SHADOW E&M-EST. PATIENT-LVL IV: CPT | Mod: PBBFAC,,, | Performed by: INTERNAL MEDICINE

## 2025-03-14 RX ORDER — FUROSEMIDE 20 MG/1
20 TABLET ORAL DAILY
Qty: 90 TABLET | Refills: 3 | Status: SHIPPED | OUTPATIENT
Start: 2025-03-14

## 2025-03-14 NOTE — PROGRESS NOTES
[facility] Cardiology     Subjective:    Patient ID:  Jae Millan is a 74 y.o. male who presents for follow-up of Shortness of Breath, Hyperlipidemia, Hypertension, Coronary Artery Disease, and Chronic Renal Failure    Review of patient's allergies indicates:  No Known Allergies  He follows with Dr. Friend and has undergone stenting to both LAD and circumflex vessels successfully.  He was having angina and shortness of breath with activity.  His chest pains have resolved.  Cath films reviewed-stenting to mid LAD as well as 2 stents to circumflex.  His ejection fraction has been normal by workup preceding the intervention.  He is on aspirin and Plavix.    He states that he is still with shortness of breath that is bothering him on a daily basis.  His GFR is 30 range with serum creatinine 2.2.  He follows with a she.  He is on Jardiance and Amaryl for his diabetes as well as Januvia.  He has chronic left arm swelling.  He had a significant stroke in 2018 and has left hemiparesis.    Current medications are tolerated well.  Today's blood pressure normal.  Last LDL 50.6 mg%, HDL 34, triglycerides 117 mg%.    Today's electrocardiogram reviewed and independently interpreted by myself confirms heart rate 72 sinus rhythm with right bundle branch block left axis deviation.  No ischemic ST changes or infarct pattern present.         Review of Systems   Constitutional: Negative for chills, decreased appetite, diaphoresis, fever, malaise/fatigue, night sweats, weight gain and weight loss.   HENT:  Negative for congestion, ear discharge, ear pain, hearing loss, hoarse voice, nosebleeds, odynophagia, sore throat, stridor and tinnitus.    Eyes:  Negative for blurred vision, discharge, double vision, pain, photophobia, redness, vision loss in left eye, vision loss in right eye, visual disturbance and visual halos.   Cardiovascular:  Positive for dyspnea on  exertion and leg swelling. Negative for chest pain, claudication, cyanosis, irregular heartbeat, near-syncope, orthopnea, palpitations, paroxysmal nocturnal dyspnea and syncope.   Respiratory:  Positive for shortness of breath. Negative for cough, hemoptysis, sleep disturbances due to breathing, snoring, sputum production and wheezing.    Endocrine: Negative for cold intolerance, heat intolerance, polydipsia, polyphagia and polyuria.   Hematologic/Lymphatic: Negative for adenopathy and bleeding problem. Does not bruise/bleed easily.   Skin:  Negative for color change, dry skin, flushing, itching, nail changes, poor wound healing, rash, skin cancer, suspicious lesions and unusual hair distribution.   Musculoskeletal:  Negative for arthritis, back pain, falls, gout, joint pain, joint swelling, muscle cramps, muscle weakness, myalgias, neck pain and stiffness.   Gastrointestinal:  Negative for bloating, abdominal pain, anorexia, change in bowel habit, bowel incontinence, constipation, diarrhea, dysphagia, excessive appetite, flatus, heartburn, hematemesis, hematochezia, hemorrhoids, jaundice, melena, nausea and vomiting.   Genitourinary:  Negative for bladder incontinence, decreased libido, dysuria, flank pain, frequency, genital sores, hematuria, hesitancy, incomplete emptying, nocturia and urgency.   Neurological:  Positive for focal weakness. Negative for aphonia, brief paralysis, difficulty with concentration, disturbances in coordination, excessive daytime sleepiness, dizziness, headaches, light-headedness, loss of balance, numbness, paresthesias, seizures, sensory change, tremors, vertigo and weakness.   Psychiatric/Behavioral:  Negative for altered mental status, depression, hallucinations, memory loss, substance abuse, suicidal ideas and thoughts of violence. The patient does not have insomnia and is not nervous/anxious.    Allergic/Immunologic: Negative for hives and persistent infections.        Objective:     "   Vitals:    03/14/25 1102   BP: 114/74   BP Location: Left arm   Patient Position: Sitting   Pulse: 72   Resp: 18   SpO2: (!) 94%   Weight: 101.2 kg (223 lb 1.7 oz)   Height: 5' 10" (1.778 m)    Physical Exam  Constitutional:       General: He is not in acute distress.     Appearance: He is well-developed. He is not diaphoretic.   HENT:      Head: Normocephalic and atraumatic.      Nose: Nose normal.   Eyes:      General: No scleral icterus.        Right eye: No discharge.      Conjunctiva/sclera: Conjunctivae normal.      Pupils: Pupils are equal, round, and reactive to light.   Neck:      Thyroid: No thyromegaly.      Vascular: No JVD.      Trachea: No tracheal deviation.   Cardiovascular:      Rate and Rhythm: Normal rate and regular rhythm.      Pulses:           Carotid pulses are 2+ on the right side and 2+ on the left side.       Radial pulses are 2+ on the right side and 2+ on the left side.      Heart sounds: Normal heart sounds. No murmur heard.     No friction rub. No gallop.   Pulmonary:      Effort: Pulmonary effort is normal. No respiratory distress.      Breath sounds: Normal breath sounds. No stridor. No wheezing or rales.   Chest:      Chest wall: No tenderness.   Abdominal:      General: Bowel sounds are normal. There is no distension.      Palpations: Abdomen is soft. There is no mass.      Tenderness: There is no abdominal tenderness. There is no guarding or rebound.   Musculoskeletal:         General: No tenderness. Normal range of motion.      Cervical back: Normal range of motion and neck supple.   Lymphadenopathy:      Cervical: No cervical adenopathy.   Skin:     General: Skin is warm and dry.      Coloration: Skin is not pale.      Findings: No erythema or rash.   Neurological:      Mental Status: He is alert and oriented to person, place, and time.      Cranial Nerves: No cranial nerve deficit.      Coordination: Coordination normal.   Psychiatric:         Behavior: Behavior normal.    "      Thought Content: Thought content normal.         Judgment: Judgment normal.           Assessment:       1. Chronic diastolic heart failure    2. SOB (shortness of breath)    3. Hemiparesis affecting left side as late effect of cerebrovascular accident (CVA)    4. Hyperlipidemia, mixed    5. Essential hypertension    6. Coronary artery disease involving native coronary artery of native heart with angina pectoris    7. Stage 3b chronic kidney disease    8. Aortic atherosclerosis    9. Severe obesity (BMI 35.0-39.9) with comorbidity    10. Type 2 diabetes mellitus with diabetic neuropathy, without long-term current use of insulin      Results for orders placed or performed during the hospital encounter of 01/29/25   CPX Study    Collection Time: 01/29/25  3:10 PM   Result Value Ref Range    [facility] CV CPX PATIENT HEIGHT IN 69     [facility] CV CPX PATIENT WEIGHT RETURNED IN OZ 3,584     Max Predicted      85% Max Predicted      [facility] CV CPX PATIENT IS MALE 1.0     [facility] CV CPX PATIENT IS FEMALE 0.0     [facility] CV CPX PATIENT IS MALE AGE LESS THAN 11 0.0     [facility] CV CPX PATIENT IS MALE AGE 11-25 0.0     [facility]HS CV CPX PATIENT IS MALE AGE GREATER THAN 25 1.0     Age [age]     [facility]HS CV CPX PATIENT IS MALE LESS THAN OR EQUAL TO 18 0.0     [facility] CV CPX PATIENT IS MALE GREATER THAN 18 1.0     [facility]HS CV CPX PATIENT IS FEMALE AGE LESS THAN 11 0.00     [facility]HS CV CPX PATIENT IS FEMALE AGE 11-19 0.0     [facility]HS CV CPX PATIENT IS FEMALE AGE GREATER THAN 19 0.0     HR at rest 65 bpm    Exercise duration (min) 3 minutes    Systolic blood pressure 119 mmHg    Diastolic blood pressure 75 mmHg    RPP 7,735     Exercise duration (sec) 41 seconds    Estimated METs 3     % Max HR Achieved 76     Peak  bpm    Peak Systolic  mmHg    Peak Diatolic BP 96 mmHg    Peak RPP 14,336     1 Minute Recovery HR 81 bpm    Abdominal girth 46.9 cm    Forced Vital Capacity 3.04     [facility] CV CPX PERCENT BODY FAT 26.1     FEV1 2.60     Predicted VO2 29.4 ml/kg/min     MVV 63     VE/VCO2 Tensas 41.7     VE Max 49.4     Rest PetCO2 32.0     Peak Time 3.68     VO2 Rest 3.5     VO2 Peak 11.0     VE/VO2 PEak 37     VE/VCO2 Peak 39     O2 Sat Rest 97     O2 Sat Peak 97     Peak Speed 1.6     Peak Grade 2.3     [facility] CV CPX HIGHEST VO 29.40     MVV Predicted 104.00     FEV1/FVC 0.86      *Note: Due to a large number of results and/or encounters for the requested time period, some results have not been displayed. A complete set of results can be found in Results Review.       Current Medications[1]     Lab Results   Component Value Date    WBC 6.17 01/27/2025    RBC 4.85 01/27/2025    HGB 13.0 (L) 01/27/2025    HCT 37.9 (L) 01/27/2025    MCV 78 (L) 01/27/2025    MCH 26.8 (L) 01/27/2025    MCHC 34.3 01/27/2025    RDW 14.8 (H) 01/27/2025     01/27/2025    MPV 11.8 01/27/2025    GRAN 4.5 01/27/2025    GRAN 72.2 01/27/2025    LYMPH 1.0 01/27/2025    LYMPH 16.0 (L) 01/27/2025    MONO 0.4 01/27/2025    MONO 7.1 01/27/2025    EOS 0.2 01/27/2025    BASO 0.05 01/27/2025    EOSINOPHIL 3.6 01/27/2025    BASOPHIL 0.8 01/27/2025        CMP  Lab Results   Component Value Date     01/27/2025    K 4.0 01/27/2025     01/27/2025    CO2 23 01/27/2025     (H) 01/27/2025    BUN 37 (H) 01/27/2025    CREATININE 2.2 (H) 01/27/2025    CALCIUM 8.8 01/27/2025    PROT 7.6 01/27/2025    ALBUMIN 3.6 01/27/2025    BILITOT 0.3 01/27/2025    ALKPHOS 70 01/27/2025    AST 16 01/27/2025    ALT 26 01/27/2025    ANIONGAP 11 01/27/2025    ESTGFRAFRICA 40.7 (A) 05/04/2022    EGFRNONAA 35.2 (A) 05/04/2022        Lab Results   Component Value Date    LABURIN No growth 09/21/2023            Results for orders placed or performed during the hospital encounter of 11/14/24   EKG 12-lead    Collection Time: 11/14/24 12:26 PM   Result Value Ref Range    QRS Duration 136 ms    [facility] QTC Calculation 462 ms    Narrative    Test Reason : R07.9,    Vent. Rate :  60 BPM     Atrial Rate :  60 BPM     P-R Int : 184 ms           QRS Dur : 136 ms      QT Int : 462 ms       P-R-T Axes :  66  32  -7 degrees    QTcB Int : 462 ms    Normal sinus rhythm  Right bundle branch block  Abnormal ECG  When compared with ECG of 14-Nov-2024 10:57,  Premature ventricular complexes are no longer Present  Confirmed by Ramana Allison ([id number]) on 11/14/2024 2:31:38 PM    Referred By: CATHERINE CALVIN           Confirmed By: Ramana Allison                   Plan:       Problem List Items Addressed This Visit          Neuro    Hemiparesis affecting left side as late effect of cerebrovascular accident (CVA)    Condition unchanged.            Cardiac/Vascular    Essential hypertension    Today's blood pressure is normal.  I did not make changes.  Amlodipine, Hyzaar tolerated well.         Hyperlipidemia, mixed    Current therapy to continue.  It is working well and tolerated well.  He is on rosuvastatin 20 mg plus Zetia.  Condition controlled.         Aortic atherosclerosis    Condition stable.         Coronary artery disease involving native coronary artery of native heart with angina pectoris    Both circumflex and left anterior descending artery vessel stenting completed, successful.  It required staged intervention because of his renal dysfunction.    Electrocardiogram is stable.  He is free of angina but is still having dyspnea.         Chronic diastolic heart failure - Primary    Despite intervention, he is still with reduced exercise.  We discussed his renal status.  His blood pressures are controlled.  Furosemide 20 mg prescribed.  Labs ordered.            Renal/    Stage 3b chronic kidney disease    Condition stable.            Endocrine    Type 2 diabetes mellitus with neurologic complication, without long-term current use of insulin (Chronic)    Perhaps adding Ozempic would improve the patient's heart failure symptomatology.             Severe obesity (BMI 35.0-39.9) with comorbidity    Weight loss encouraged.          Other Visit Diagnoses          SOB (shortness of breath)        Relevant Medications    furosemide (LASIX) 20 MG tablet    Other Relevant Orders    NT-Pro Natriuretic Peptide    Basic metabolic panel               Furosemide 20 mg recommended.  BMP and BNP ordered.    I asked the patient to work harder on salt restriction.  He has been told to drink a lot of water in the past, I think he can cut back some.  I want him to start doing daily weights.  I asked him to check in with his kidney doctor.    Three-month follow-up recommended with Dr. Friend.             Elpidio Guzman MD  03/14/2025   11:47 AM               [1]   Current Outpatient Medications:     amLODIPine (NORVASC) 10 MG tablet, TAKE 1 TABLET(10 MG) BY MOUTH EVERY DAY FOR HYPERTENSION, Disp: 90 tablet, Rfl: 3    aspirin 81 MG Chew, Take 1 tablet (81 mg total) by mouth once daily., Disp: , Rfl:     blood sugar diagnostic Strp, To check BG 4 times daily, to use with insurance preferred meter, Disp: 100 each, Rfl: 11    carvediloL (COREG) 6.25 MG tablet, TAKE 1 TABLET(6.25 MG) BY MOUTH TWICE DAILY, Disp: 180 tablet, Rfl: 3    clopidogreL (PLAVIX) 75 mg tablet, Take 1 tablet (75 mg total) by mouth once daily., Disp: 90 tablet, Rfl: 3    dorzolamide-timolol 2-0.5% (COSOPT) 22.3-6.8 mg/mL ophthalmic solution, Place 1 drop into both eyes 2 (two) times daily., Disp: 10 mL, Rfl: 12    empagliflozin (JARDIANCE) 25 mg tablet, Take 1 tablet (25 mg total) by mouth once daily., Disp: 90 tablet, Rfl: 3    ezetimibe (ZETIA) 10 mg tablet, Take 1 tablet (10 mg total) by mouth once daily., Disp: 90 tablet, Rfl: 3    gabapentin (NEURONTIN) 300 MG capsule, Take 1 capsule (300 mg total) by mouth 2 (two) times daily. In 1-2 weeks, if no relief, may increase dose to 3 times per day., Disp: , Rfl:     glimepiride (AMARYL) 4 MG tablet, Take 1 tablet (4 mg total) by mouth before breakfast., Disp: 90 tablet, Rfl: 3    hydrocortisone 2.5 % cream, Apply topically 2 (two) times daily., Disp: , Rfl:     lancets Misc,  Check bg 4 times daily, Disp: 100 each, Rfl: 11    losartan-hydrochlorothiazide 100-25 mg (HYZAAR) 100-25 mg per tablet, Take 1 tablet by mouth once daily., Disp: 90 tablet, Rfl: 3    nitroGLYCERIN (NITROSTAT) 0.4 MG SL tablet, Place 1 tablet (0.4 mg total) under the tongue every 5 (five) minutes as needed for Chest pain., Disp: 30 tablet, Rfl: 1    pantoprazole (PROTONIX) 40 MG tablet, TAKE 1 TABLET(40 MG) BY MOUTH EVERY DAY FOR STOMACH, Disp: 90 tablet, Rfl: 3    rosuvastatin (CRESTOR) 20 MG tablet, Take 1 tablet (20 mg total) by mouth once daily., Disp: 90 tablet, Rfl: 3    SITagliptin phosphate (JANUVIA) 50 MG Tab, Take 1 tablet (50 mg total) by mouth once daily., Disp: 90 tablet, Rfl: 3    tamsulosin (FLOMAX) 0.4 mg Cap, One capsule at bedtime, Disp: 90 capsule, Rfl: 3    furosemide (LASIX) 20 MG tablet, Take 1 tablet (20 mg total) by mouth once daily., Disp: 90 tablet, Rfl: 3

## 2025-03-14 NOTE — ASSESSMENT & PLAN NOTE
Both circumflex and left anterior descending artery vessel stenting completed, successful.  It required staged intervention because of his renal dysfunction.    Electrocardiogram is stable.  He is free of angina but is still having dyspnea.

## 2025-03-14 NOTE — Clinical Note
Perhaps Ozempic added to Jardiance will improve his breathing, as well as assist in weight loss which should improve his breathing.

## 2025-03-14 NOTE — ASSESSMENT & PLAN NOTE
Despite intervention, he is still with reduced exercise.  We discussed his renal status.  His blood pressures are controlled.  Furosemide 20 mg prescribed.  Labs ordered.

## 2025-03-14 NOTE — ASSESSMENT & PLAN NOTE
Current therapy to continue.  It is working well and tolerated well.  He is on rosuvastatin 20 mg plus Zetia.  Condition controlled.

## 2025-03-15 LAB — NT-PROBNP SERPL IA-MCNC: 49 PG/ML

## 2025-03-17 ENCOUNTER — TELEPHONE (OUTPATIENT)
Dept: CARDIOLOGY | Facility: HOSPITAL | Age: 75
End: 2025-03-17
Payer: MEDICARE

## 2025-03-17 ENCOUNTER — RESULTS FOLLOW-UP (OUTPATIENT)
Dept: CARDIOLOGY | Facility: HOSPITAL | Age: 75
End: 2025-03-17

## 2025-03-17 NOTE — TELEPHONE ENCOUNTER
I left a voicemail reporting results of kidney test and fluid test-Chem 7 and BNP.  I told him to use the Lasix as needed for swelling.  Voicemail left.

## 2025-03-25 ENCOUNTER — OFFICE VISIT (OUTPATIENT)
Dept: PODIATRY | Facility: CLINIC | Age: 75
End: 2025-03-25
Payer: MEDICARE

## 2025-03-25 VITALS
HEIGHT: 70 IN | SYSTOLIC BLOOD PRESSURE: 119 MMHG | RESPIRATION RATE: 18 BRPM | DIASTOLIC BLOOD PRESSURE: 69 MMHG | HEART RATE: 65 BPM | BODY MASS INDEX: 28.35 KG/M2 | WEIGHT: 198 LBS

## 2025-03-25 DIAGNOSIS — E11.49 TYPE II DIABETES MELLITUS WITH NEUROLOGICAL MANIFESTATIONS: Primary | ICD-10-CM

## 2025-03-25 DIAGNOSIS — R53.1 LEFT-SIDED WEAKNESS: ICD-10-CM

## 2025-03-25 DIAGNOSIS — Z86.73 HISTORY OF STROKE: ICD-10-CM

## 2025-03-25 DIAGNOSIS — L84 CORNS/CALLOSITIES: ICD-10-CM

## 2025-03-25 DIAGNOSIS — L60.3 ONYCHODYSTROPHY: ICD-10-CM

## 2025-03-25 PROCEDURE — 99999 PR PBB SHADOW E&M-EST. PATIENT-LVL III: CPT | Mod: PBBFAC,,, | Performed by: PODIATRIST

## 2025-03-25 NOTE — PROGRESS NOTES
Subjective:     Patient ID: Jae Millan is a 74 y.o. male.    Chief Complaint: Diabetic Foot Exam (Maryjane Farias MD at 1/17/2025), Nail Care, and Foot Pain (Numbness/)    Jae is a 74 y.o. male who presents to the clinic for evaluation and treatment of high risk feet. Jae has a past medical history of Cataract, Coronary artery disease involving native coronary artery of native heart with angina pectoris (02/15/2024), Decreased sensation of lower extremity, Diabetes mellitus, Diabetic neuropathy, Dysarthria, Dysphagia, ED (erectile dysfunction), Glaucoma, Hemiparesis, High cholesterol, History of CVA with residual deficit (04/12/2019), History of hepatitis C, s/p successful Rx w/ cure (SVR12) 4/2020, Hypertension, Impaired functional mobility, balance, gait, and endurance, Pre-op testing (04/08/2024), Pulmonary emphysema, unspecified emphysema type (02/01/2024), Stroke, and Urinary frequency. The patient's chief complaint is long, thick toenails. This patient has documented high risk feet requiring routine maintenance secondary to diabetes mellitis and those secondary complications of diabetes, as mentioned..    PCP: Maryjane Farias MD    Date Last Seen by PCP:   Chief Complaint   Patient presents with    Diabetic Foot Exam     Maryjane Farias MD at 1/17/2025    Nail Care    Foot Pain     Numbness         Hemoglobin A1C   Date Value Ref Range Status   01/27/2025 9.9 (H) 4.0 - 5.6 % Final     Comment:     ADA Screening Guidelines:  5.7-6.4%  Consistent with prediabetes  >or=6.5%  Consistent with diabetes    High levels of fetal hemoglobin interfere with the HbA1C  assay. Heterozygous hemoglobin variants (HbS, HgC, etc)do  not significantly interfere with this assay.   However, presence of multiple variants may affect accuracy.     01/27/2025 9.9 (H) 4.0 - 5.6 % Final     Comment:     ADA Screening Guidelines:  5.7-6.4%  Consistent with prediabetes  >or=6.5%  Consistent with  diabetes    High levels of fetal hemoglobin interfere with the HbA1C  assay. Heterozygous hemoglobin variants (HbS, HgC, etc)do  not significantly interfere with this assay.   However, presence of multiple variants may affect accuracy.     11/13/2024 10.1 (H) 4.0 - 5.6 % Final     Comment:     ADA Screening Guidelines:  5.7-6.4%  Consistent with prediabetes  >or=6.5%  Consistent with diabetes    High levels of fetal hemoglobin interfere with the HbA1C  assay. Heterozygous hemoglobin variants (HbS, HgC, etc)do  not significantly interfere with this assay.   However, presence of multiple variants may affect accuracy.         Review of Systems   Constitutional: Negative for chills, decreased appetite and fever.   Cardiovascular:  Positive for leg swelling.   Skin:  Positive for dry skin and nail changes. Negative for color change and flushing.   Musculoskeletal:  Positive for muscle weakness. Negative for arthritis, back pain, joint pain, joint swelling and myalgias.   Gastrointestinal:  Negative for nausea and vomiting.   Neurological:  Negative for loss of balance, numbness and paresthesias.        Objective:     Physical Exam  Vitals reviewed.   Constitutional:       Appearance: He is well-developed.   Cardiovascular:      Comments: Dorsalis pedis and posterior tibial pulses are diminished Bilaterally. Toes are cool to touch. Feet are warm proximally.There is decreased digital hair. Skin is atrophic, slightly hyperpigmented, and mildly edematous      Musculoskeletal:         General: No tenderness. Normal range of motion.      Comments: Adequate joint range of motion without pain, limitation, nor crepitation Bilateral feet and ankle joints. Muscle strength is 5/5 in all groups bilaterally.         Skin:     General: Skin is warm and dry.      Findings: No ecchymosis, erythema or lesion.      Comments: Nails x 10  are elongated by  3-7mm's, thickened by 2-5 mm's, dystrophic, and are darkened in  coloration . Xerosis  Bilaterally. No open lesions noted.    Distal 2nd toe b/l    Neurological:      Mental Status: He is alert and oriented to person, place, and time.      Comments: [place] 5.07 monofilamant testing is diminished Rodríguez feet. Sharp/dull sensation diminished Bilaterally. Light touch absent Bilaterally.       Psychiatric:         Behavior: Behavior normal.         Assessment:      Encounter Diagnoses   Name Primary?    Type II diabetes mellitus with neurological manifestations Yes    Onychodystrophy     History of stroke     Corns/callosities     Left-sided weakness      Plan:     Jae was seen today for diabetic foot exam, nail care and foot pain.    Diagnoses and all orders for this visit:    Type II diabetes mellitus with neurological manifestations    Onychodystrophy    History of stroke    Corns/callosities    Left-sided weakness      I counseled the patient on his conditions, their implications and medical management.        - Shoe inspection. Diabetic Foot Education. Patient reminded of the importance of good nutrition and blood sugar control to help prevent podiatric complications of diabetes. Patient instructed on proper foot hygeine. We discussed wearing proper shoe gear, daily foot inspections, never walking without protective shoe gear, never putting sharp instruments to feet, routine podiatric nail visits every 2-3 months.      - With patient's permission, nails were aggressively reduced and debrided x 10 to their soft tissue attachment mechanically removing all offending nail and debris. Patient relates relief following the procedure. He will continue to monitor the areas daily, inspect his feet, wear protective shoe gear when ambulatory, moisturizer to maintain skin integrity and follow in this office in approximately 2-3 months, sooner p.r.n.    - After cleansing the  area w/ alcohol prep pad the above mentioned hyperkeratosis was trimmed utilizing No 3 curette  to a smooth base with out  incident. Patient tolerated this  well and reported comfort to the area(s)

## 2025-04-10 DIAGNOSIS — E11.40 TYPE 2 DIABETES MELLITUS WITH DIABETIC NEUROPATHY, WITHOUT LONG-TERM CURRENT USE OF INSULIN: ICD-10-CM

## 2025-04-10 RX ORDER — SITAGLIPTIN 50 MG/1
50 TABLET, FILM COATED ORAL
Qty: 90 TABLET | Refills: 1 | Status: SHIPPED | OUTPATIENT
Start: 2025-04-10

## 2025-04-10 NOTE — TELEPHONE ENCOUNTER
No care due was identified.  [facility] Embedded Care Due Messages. Reference number: [id number].   4/10/2025 10:14:42 AM CDT

## 2025-04-10 NOTE — TELEPHONE ENCOUNTER
Refill Decision Note   Jae Millan  is requesting a refill authorization.  Brief Assessment and Rationale for Refill:  Approve     Medication Therapy Plan:  Dose appropriate for pt's renal function      Comments:     Note composed:11:16 AM 04/10/2025

## 2025-04-17 ENCOUNTER — LAB VISIT (OUTPATIENT)
Dept: LAB | Facility: HOSPITAL | Age: 75
End: 2025-04-17
Attending: INTERNAL MEDICINE
Payer: MEDICARE

## 2025-04-17 ENCOUNTER — OFFICE VISIT (OUTPATIENT)
Dept: INTERNAL MEDICINE | Facility: CLINIC | Age: 75
End: 2025-04-17
Payer: MEDICARE

## 2025-04-17 VITALS
HEART RATE: 64 BPM | HEIGHT: 70 IN | SYSTOLIC BLOOD PRESSURE: 124 MMHG | OXYGEN SATURATION: 97 % | DIASTOLIC BLOOD PRESSURE: 68 MMHG | BODY MASS INDEX: 31.62 KG/M2 | WEIGHT: 220.88 LBS

## 2025-04-17 DIAGNOSIS — E11.40 TYPE 2 DIABETES MELLITUS WITH DIABETIC NEUROPATHY, WITHOUT LONG-TERM CURRENT USE OF INSULIN: Primary | ICD-10-CM

## 2025-04-17 DIAGNOSIS — E11.40 TYPE 2 DIABETES MELLITUS WITH DIABETIC NEUROPATHY, WITHOUT LONG-TERM CURRENT USE OF INSULIN: ICD-10-CM

## 2025-04-17 DIAGNOSIS — M65.30 TRIGGER FINGER, UNSPECIFIED FINGER, UNSPECIFIED LATERALITY: ICD-10-CM

## 2025-04-17 LAB
ABSOLUTE EOSINOPHIL (OHS): 0.15 K/UL
ABSOLUTE MONOCYTE (OHS): 0.54 K/UL (ref 0.3–1)
ABSOLUTE NEUTROPHIL COUNT (OHS): 5.7 K/UL (ref 1.8–7.7)
ANION GAP (OHS): 12 MMOL/L (ref 8–16)
BASOPHILS # BLD AUTO: 0.05 K/UL
BASOPHILS NFR BLD AUTO: 0.7 %
BUN SERPL-MCNC: 35 MG/DL (ref 8–23)
CALCIUM SERPL-MCNC: 9.3 MG/DL (ref 8.7–10.5)
CHLORIDE SERPL-SCNC: 102 MMOL/L (ref 95–110)
CO2 SERPL-SCNC: 26 MMOL/L (ref 23–29)
CREAT SERPL-MCNC: 2.2 MG/DL (ref 0.5–1.4)
EAG (OHS): 243 MG/DL (ref 68–131)
ERYTHROCYTE [DISTWIDTH] IN BLOOD BY AUTOMATED COUNT: 16.2 % (ref 11.5–14.5)
GFR SERPLBLD CREATININE-BSD FMLA CKD-EPI: 31 ML/MIN/1.73/M2
GLUCOSE SERPL-MCNC: 227 MG/DL (ref 70–110)
HBA1C MFR BLD: 10.1 % (ref 4–5.6)
HCT VFR BLD AUTO: 42.1 % (ref 40–54)
HGB BLD-MCNC: 14.1 GM/DL (ref 14–18)
IMM GRANULOCYTES # BLD AUTO: 0.04 K/UL (ref 0–0.04)
IMM GRANULOCYTES NFR BLD AUTO: 0.5 % (ref 0–0.5)
LYMPHOCYTES # BLD AUTO: 1.18 K/UL (ref 1–4.8)
MCH RBC QN AUTO: 27.1 PG (ref 27–31)
MCHC RBC AUTO-ENTMCNC: 33.5 G/DL (ref 32–36)
MCV RBC AUTO: 81 FL (ref 82–98)
NUCLEATED RBC (/100WBC) (OHS): 0 /100 WBC
PLATELET # BLD AUTO: 239 K/UL (ref 150–450)
PMV BLD AUTO: 11.5 FL (ref 9.2–12.9)
POTASSIUM SERPL-SCNC: 4 MMOL/L (ref 3.5–5.1)
RBC # BLD AUTO: 5.21 M/UL (ref 4.6–6.2)
RELATIVE EOSINOPHIL (OHS): 2 %
RELATIVE LYMPHOCYTE (OHS): 15.4 % (ref 18–48)
RELATIVE MONOCYTE (OHS): 7 % (ref 4–15)
RELATIVE NEUTROPHIL (OHS): 74.4 % (ref 38–73)
SODIUM SERPL-SCNC: 140 MMOL/L (ref 136–145)
WBC # BLD AUTO: 7.66 K/UL (ref 3.9–12.7)

## 2025-04-17 PROCEDURE — 82310 ASSAY OF CALCIUM: CPT

## 2025-04-17 PROCEDURE — 1125F AMNT PAIN NOTED PAIN PRSNT: CPT | Mod: CPTII,S$GLB,, | Performed by: INTERNAL MEDICINE

## 2025-04-17 PROCEDURE — 3074F SYST BP LT 130 MM HG: CPT | Mod: CPTII,S$GLB,, | Performed by: INTERNAL MEDICINE

## 2025-04-17 PROCEDURE — 3008F BODY MASS INDEX DOCD: CPT | Mod: CPTII,S$GLB,, | Performed by: INTERNAL MEDICINE

## 2025-04-17 PROCEDURE — 99214 OFFICE O/P EST MOD 30 MIN: CPT | Mod: S$GLB,,, | Performed by: INTERNAL MEDICINE

## 2025-04-17 PROCEDURE — 3288F FALL RISK ASSESSMENT DOCD: CPT | Mod: CPTII,S$GLB,, | Performed by: INTERNAL MEDICINE

## 2025-04-17 PROCEDURE — 85025 COMPLETE CBC W/AUTO DIFF WBC: CPT

## 2025-04-17 PROCEDURE — 3078F DIAST BP <80 MM HG: CPT | Mod: CPTII,S$GLB,, | Performed by: INTERNAL MEDICINE

## 2025-04-17 PROCEDURE — 99999 PR PBB SHADOW E&M-EST. PATIENT-LVL IV: CPT | Mod: PBBFAC,,, | Performed by: INTERNAL MEDICINE

## 2025-04-17 PROCEDURE — 1101F PT FALLS ASSESS-DOCD LE1/YR: CPT | Mod: CPTII,S$GLB,, | Performed by: INTERNAL MEDICINE

## 2025-04-17 PROCEDURE — 3046F HEMOGLOBIN A1C LEVEL >9.0%: CPT | Mod: CPTII,S$GLB,, | Performed by: INTERNAL MEDICINE

## 2025-04-17 PROCEDURE — 83036 HEMOGLOBIN GLYCOSYLATED A1C: CPT

## 2025-04-17 RX ORDER — TAMSULOSIN HYDROCHLORIDE 0.4 MG/1
CAPSULE ORAL
Qty: 90 CAPSULE | Refills: 3 | Status: SHIPPED | OUTPATIENT
Start: 2025-04-17

## 2025-05-01 ENCOUNTER — TELEPHONE (OUTPATIENT)
Dept: ORTHOPEDICS | Facility: CLINIC | Age: 75
End: 2025-05-01
Payer: MEDICARE

## 2025-05-01 DIAGNOSIS — M79.641 RIGHT HAND PAIN: Primary | ICD-10-CM

## 2025-05-15 ENCOUNTER — TELEPHONE (OUTPATIENT)
Dept: ORTHOPEDICS | Facility: CLINIC | Age: 75
End: 2025-05-15
Payer: MEDICARE

## 2025-05-15 NOTE — TELEPHONE ENCOUNTER
LVM c pt. Attempting to confirm appt location & time c Dr. Shearer with XR. Requested a call back to the [facility] at [phone] with any questions, concerns or need for a different appointment time.

## 2025-05-20 ENCOUNTER — HOSPITAL ENCOUNTER (OUTPATIENT)
Dept: RADIOLOGY | Facility: OTHER | Age: 75
Discharge: HOME | End: 2025-05-20
Attending: ORTHOPAEDIC SURGERY
Payer: MEDICARE

## 2025-05-20 ENCOUNTER — OFFICE VISIT (OUTPATIENT)
Dept: ORTHOPEDICS | Facility: CLINIC | Age: 75
End: 2025-05-20
Payer: MEDICARE

## 2025-05-20 VITALS — WEIGHT: 220.88 LBS | HEIGHT: 70 IN | BODY MASS INDEX: 31.62 KG/M2

## 2025-05-20 DIAGNOSIS — M65.30 TRIGGER FINGER, UNSPECIFIED FINGER, UNSPECIFIED LATERALITY: ICD-10-CM

## 2025-05-20 DIAGNOSIS — M79.641 RIGHT HAND PAIN: ICD-10-CM

## 2025-05-20 DIAGNOSIS — M65.30 TRIGGER FINGER, UNSPECIFIED FINGER, UNSPECIFIED LATERALITY: Primary | ICD-10-CM

## 2025-05-20 PROCEDURE — 99999 PR PBB SHADOW E&M-EST. PATIENT-LVL IV: CPT | Mod: PBBFAC,,, | Performed by: ORTHOPAEDIC SURGERY

## 2025-05-20 PROCEDURE — 3046F HEMOGLOBIN A1C LEVEL >9.0%: CPT | Mod: CPTII,S$GLB,, | Performed by: ORTHOPAEDIC SURGERY

## 2025-05-20 PROCEDURE — 1125F AMNT PAIN NOTED PAIN PRSNT: CPT | Mod: CPTII,S$GLB,, | Performed by: ORTHOPAEDIC SURGERY

## 2025-05-20 PROCEDURE — 73130 X-RAY EXAM OF HAND: CPT | Mod: 26,RT,, | Performed by: RADIOLOGY

## 2025-05-20 PROCEDURE — 73130 X-RAY EXAM OF HAND: CPT | Mod: TC,FY,RT

## 2025-05-20 PROCEDURE — 99204 OFFICE O/P NEW MOD 45 MIN: CPT | Mod: S$GLB,,, | Performed by: ORTHOPAEDIC SURGERY

## 2025-05-20 PROCEDURE — 3008F BODY MASS INDEX DOCD: CPT | Mod: CPTII,S$GLB,, | Performed by: ORTHOPAEDIC SURGERY

## 2025-05-20 NOTE — PROGRESS NOTES
[facility]  History & Physical  Orthopedics    SUBJECTIVE:      Chief Complaint:  Right long finger pain    Referring Provider: Maryjane Farias MD     History of Present Illness:  Patient is a 74 y.o. right hand dominant male who presents today with complaints of complaints of a 5 year history of right long finger decreased range of motion and pain.  He was seen in clinic in 2020 where he was given a A1 pulley injection for right long trigger finger, he return to clinic in 2021 it was offered surgery for A1 pulley release for right long trigger finger. However, he declined at that time because he has a history of stroke in 2018 with with residual weakness of the left upper extremity.  He has a history of diabetes with hemoglobin A1c over 10.  He is on 3 separate medications for treatment.  He states that he is working with his primary care provider to improve his glucose control.  He also endorses history of coronary artery disease status post PCI with stent placement.    The patient is retired.    Onset of symptoms/DOI was 5 years ago.    Symptoms are aggravated by activity and movement.    Symptoms are alleviated by rest, immobilization, and medication.    Symptoms consist of pain, swelling, and decreased ROM.    The patient rates their pain as a 7/10.    Attempted treatment(s) and/or interventions include activity modifications, rest, rest, activity modification, anti-inflammatory medications, immobilization, and steroid injection.     The patient denies any fevers, chills, N/V, D/C and presents for evaluation.       Past Medical History:   Diagnosis Date    Cataract     Coronary artery disease involving native coronary artery of native heart with angina pectoris 02/15/2024    Decreased sensation of lower extremity     Diabetes mellitus     Diabetic neuropathy     Dysarthria     Dysphagia     ED (erectile dysfunction)     Glaucoma     Hemiparesis     LEFT side post CVA    High  cholesterol     History of CVA with residual deficit 04/12/2019 August 23, 2018      History of hepatitis C, s/p successful Rx w/ cure (SVR12) 4/2020     Hypertension     Impaired functional mobility, balance, gait, and endurance     Pre-op testing 04/08/2024    Pulmonary emphysema, unspecified emphysema type 02/01/2024    Stroke     Urinary frequency      Past Surgical History:   Procedure Laterality Date    ANGIOGRAM, CORONARY, WITH LEFT HEART CATHETERIZATION  8/13/2024    Procedure: Angiogram, Coronary, with Left Heart Cath;  Surgeon: Sandoval Paul MD;  Location: [facility] CATH LAB;  Service: Cardiology;;    COLONOSCOPY N/A 3/9/2023    Procedure: COLONOSCOPY;  Surgeon: Kian Kraft MD;  [facility]  Service: Endoscopy;  Laterality: N/A;  medical transportation-inst mail-tb  pre call complete-as    CORONARY ANGIOGRAPHY N/A 3/27/2024    Procedure: ANGIOGRAM, CORONARY ARTERY;  Surgeon: Sandoval Paul MD;  Location: [facility] CATH LAB;  Service: Cardiology;  Laterality: N/A;    CORONARY ANGIOGRAPHY N/A 11/14/2024    Procedure: ANGIOGRAM, CORONARY ARTERY;  Surgeon: Kian Whittington MD;  Location: [facility] CATH LAB;  Service: Cardiology;  Laterality: N/A;    INGUINAL HERNIA REPAIR Left 12/11/2019    IVUS, CORONARY  8/13/2024    Procedure: IVUS, Coronary;  Surgeon: Sandoval Paul MD;  Location: [facility] CATH LAB;  Service: Cardiology;;    STENT, CORONARY, MULTI VESSEL N/A 11/14/2024    Procedure: Stent, Coronary, Multi Vessel;  Surgeon: Kian Whittington MD;  Location: [facility] CATH LAB;  Service: Cardiology;  Laterality: N/A;    STENT, DRUG ELUTING, MULTI VESSEL, CORONARY  11/14/2024    Procedure: Stent, Drug Eluting, Multi Vessel, Coronary;  Surgeon: Kian Whittington MD;  Location: [facility] CATH LAB;  Service: Cardiology;;    STENT, DRUG ELUTING, SINGLE VESSEL, CORONARY N/A 8/13/2024    Procedure: Stent, Drug Eluting, Single Vessel, Coronary;  Surgeon: Sandoval Paul MD;  Location: [facility]  "CATH LAB;  Service: Cardiology;  Laterality: N/A;    UNDESCENDED TESTICLE EXPLORATION Right     R testicle removed as it was undescended     Review of patient's allergies indicates:  No Known Allergies  Social History     Social History Narrative    Not on file     Family History   Problem Relation Name Age of Onset    Heart disease Brother          cabg    Hypertension Sister      Amblyopia Neg Hx      Blindness Neg Hx      Cataracts Neg Hx      Glaucoma Neg Hx      Macular degeneration Neg Hx      Strabismus Neg Hx      Retinal detachment Neg Hx      Diabetes Neg Hx         Current Medications[1]      Review of Systems:  As per HPI otherwise noncontributory    OBJECTIVE:      Vital Signs (Most Recent):  Vitals:    05/20/25 0939   Weight: 100.2 kg (220 lb 14.4 oz)   Height: 5' 10" (1.778 m)     Body mass index is 31.7 kg/m².      Physical Exam:  Constitutional: The patient appears well-developed and well-nourished. No distress.   Skin: No lesions appreciated  Head: Normocephalic and atraumatic.   Nose: Nose normal.   Ears: No deformities seen  Eyes: Conjunctivae and EOM are normal.   Neck: No tracheal deviation present.   Cardiovascular: Normal rate and intact distal pulses.    Pulmonary/Chest: Effort normal. No respiratory distress.   Abdominal: There is no guarding.   Neurological: The patient is alert.   Psychiatric: The patient has a normal mood and affect.     Right Hand/Wrist Examination:    Observation/Inspection:  Swelling  none    Deformity  none  Discoloration  none     Scars   none    Atrophy  none    HAND/WRIST EXAMINATION:  Finkelstein's Test   Neg  WHAT Test    Neg  Snuff box tenderness   Neg  Fox's Test    Neg  Hook of [place] Tenderness  Neg  CMC grind    Neg  Circumduction test   Neg    Neurovascular Exam:  Digits WWP, brisk CR < 3s throughout  NVI motor/LTS to M/R/U nerves, radial pulse 2+  Tinel's Test - Carpal Tunnel  Neg  Tinel's Test - Cubital Tunnel  Neg  Phalen's Test    Neg  Median Nerve " Compression Test Neg    ROM hand shows inability to flex the right long finger at this time, locked in extension with painful mass felt over the A1 pulley and popping/clicking of the tendon with passive range of motion and passive flexion of the long finger.  Otherwise full range of motion to each of the fingers    ROM wrist full, painless    ROM elbow full, painless    Abdomen not guarded  Respirations nonlabored  Perfusion intact    Diagnostic Results:     Imaging - I independently viewed the patient's imaging as well as the radiology report.  Xrays of the patient's right hand demonstrate no evidence of any acute fractures or dislocations or significant degenerative changes.    ASSESSMENT/PLAN:      74 y.o. yo male with trigger finger of right long finger.      Plan: The patient and I had a thorough discussion today.  We discussed the working diagnosis as well as several other potential alternative diagnoses.  Treatment options were discussed, both conservative and surgical.  Conservative treatment options would include things such as activity modifications, workplace modifications, a period of rest, oral vs topical OTC and prescription anti-inflammatory medications, occupational therapy, splinting/bracing, immobilization, corticosteroid injections, and others.  Surgical options were discussed as well.     At this time, the patient would like to proceed operative management of right long finger triggering with A1 pulley release.  Patient is consented in clinic..    Should the patient's symptoms worsen, persist, or fail to improve they should return for reevaluation and I would be happy to see them back anytime.           [1]   Current Outpatient Medications:     amLODIPine (NORVASC) 10 MG tablet, TAKE 1 TABLET(10 MG) BY MOUTH EVERY DAY FOR HYPERTENSION, Disp: 90 tablet, Rfl: 3    aspirin 81 MG Chew, Take 1 tablet (81 mg total) by mouth once daily., Disp: , Rfl:     blood sugar diagnostic Strp, To check BG 4 times  daily, to use with insurance preferred meter, Disp: 100 each, Rfl: 11    carvediloL (COREG) 6.25 MG tablet, TAKE 1 TABLET(6.25 MG) BY MOUTH TWICE DAILY, Disp: 180 tablet, Rfl: 3    clopidogreL (PLAVIX) 75 mg tablet, Take 1 tablet (75 mg total) by mouth once daily., Disp: 90 tablet, Rfl: 3    dorzolamide-timolol 2-0.5% (COSOPT) 22.3-6.8 mg/mL ophthalmic solution, Place 1 drop into both eyes 2 (two) times daily., Disp: 10 mL, Rfl: 12    empagliflozin (JARDIANCE) 25 mg tablet, Take 1 tablet (25 mg total) by mouth once daily., Disp: 90 tablet, Rfl: 3    ezetimibe (ZETIA) 10 mg tablet, Take 1 tablet (10 mg total) by mouth once daily., Disp: 90 tablet, Rfl: 3    furosemide (LASIX) 20 MG tablet, Take 1 tablet (20 mg total) by mouth once daily., Disp: 90 tablet, Rfl: 3    glimepiride (AMARYL) 4 MG tablet, Take 1 tablet (4 mg total) by mouth before breakfast., Disp: 90 tablet, Rfl: 3    hydrocortisone 2.5 % cream, Apply topically 2 (two) times daily., Disp: , Rfl:     JANUVIA 50 mg Tab, TAKE 1 TABLET(50 MG) BY MOUTH EVERY DAY, Disp: 90 tablet, Rfl: 1    lancets Misc, Check bg 4 times daily, Disp: 100 each, Rfl: 11    losartan-hydrochlorothiazide 100-25 mg (HYZAAR) 100-25 mg per tablet, Take 1 tablet by mouth once daily., Disp: 90 tablet, Rfl: 3    nitroGLYCERIN (NITROSTAT) 0.4 MG SL tablet, Place 1 tablet (0.4 mg total) under the tongue every 5 (five) minutes as needed for Chest pain., Disp: 30 tablet, Rfl: 1    pantoprazole (PROTONIX) 40 MG tablet, TAKE 1 TABLET(40 MG) BY MOUTH EVERY DAY FOR STOMACH, Disp: 90 tablet, Rfl: 3    rosuvastatin (CRESTOR) 20 MG tablet, Take 1 tablet (20 mg total) by mouth once daily., Disp: 90 tablet, Rfl: 3    tamsulosin (FLOMAX) 0.4 mg Cap, One capsule at bedtime, Disp: 90 capsule, Rfl: 3    gabapentin (NEURONTIN) 300 MG capsule, Take 1 capsule (300 mg total) by mouth 2 (two) times daily. In 1-2 weeks, if no relief, may increase dose to 3 times per day., Disp: , Rfl:

## 2025-05-25 ENCOUNTER — TELEPHONE (OUTPATIENT)
Dept: INTERNAL MEDICINE | Facility: CLINIC | Age: 75
End: 2025-05-25
Payer: MEDICARE

## 2025-05-25 ENCOUNTER — RESULTS FOLLOW-UP (OUTPATIENT)
Dept: INTERNAL MEDICINE | Facility: CLINIC | Age: 75
End: 2025-05-25
Payer: MEDICARE

## 2025-05-25 RX ORDER — GLIMEPIRIDE 4 MG/1
TABLET ORAL
Qty: 180 TABLET | Refills: 3 | Status: SHIPPED | OUTPATIENT
Start: 2025-05-25

## 2025-05-25 NOTE — PROGRESS NOTES
Please advise that diabetes is uncontrolled with HgbA1c 10.1 with goal of <7.0.  Please advise him to increase glimepiride 4mg to twice daily. I am sending a new script to reflect that.

## 2025-05-25 NOTE — PROGRESS NOTES
Subjective:       Patient ID: Jae Millan is a 74 y.o. male.    Chief Complaint: Follow-up, Diabetes, Insomnia, and Urinary Frequency    Follow-up  Pertinent negatives include no abdominal pain, chest pain, headaches, nausea or vomiting.   Diabetes  Pertinent negatives for hypoglycemia include no headaches or seizures. Pertinent negatives for diabetes include no chest pain.   Urinary Frequency   Associated symptoms include frequency. Pertinent negatives include no nausea or vomiting.    Pt is doing ok except for some insomnia and urinary frequency - still some SOB.    Review of Systems   Respiratory:  Negative for shortness of breath.    Cardiovascular:  Negative for chest pain.   Gastrointestinal:  Negative for abdominal pain, diarrhea, nausea and vomiting.   Genitourinary:  Positive for frequency. Negative for dysuria.   Neurological:  Negative for seizures, syncope and headaches.       Objective:      Physical Exam  Constitutional:       General: He is not in acute distress.     Appearance: He is well-developed.   Eyes:      Pupils: Pupils are equal, round, and reactive to light.   Neck:      Thyroid: No thyromegaly.      Vascular: No JVD.   Cardiovascular:      Rate and Rhythm: Normal rate and regular rhythm.      Heart sounds: Normal heart sounds. No murmur heard.     No friction rub. No gallop.   Pulmonary:      Effort: Pulmonary effort is normal.      Breath sounds: Normal breath sounds. No wheezing or rales.   Abdominal:      General: Bowel sounds are normal. There is no distension.      Palpations: Abdomen is soft. There is no mass.      Tenderness: There is no abdominal tenderness. There is no guarding or rebound.   Musculoskeletal:      Cervical back: Neck supple.   Lymphadenopathy:      Cervical: No cervical adenopathy.   Skin:     General: Skin is warm and dry.   Neurological:      Mental Status: He is alert and oriented to person, place, and time.   Psychiatric:         Behavior: Behavior normal.          Thought Content: Thought content normal.         Judgment: Judgment normal.         Assessment:       1. Type 2 diabetes mellitus with diabetic neuropathy, without long-term current use of insulin    2. Trigger finger, unspecified finger, unspecified laterality        Plan:   Type 2 diabetes mellitus with diabetic neuropathy, without long-term current use of insulin  -     CBC Auto Differential; Future; Expected date: 04/17/2025  -     Hemoglobin A1C; Future; Expected date: 04/17/2025  -     Basic Metabolic Panel; Future; Expected date: 04/17/2025  -     DIABETIC SHOES FOR HOME USE    Trigger finger, unspecified finger, unspecified laterality  -     Ambulatory referral/consult to Orthopedics; Future; Expected date: 04/24/2025    Other orders  -     tamsulosin (FLOMAX) 0.4 mg Cap; One capsule at bedtime  Dispense: 90 capsule; Refill: 3

## 2025-05-26 NOTE — TELEPHONE ENCOUNTER
"Spoke with pt to inform him of the below:    "  Please advise that diabetes is uncontrolled with HgbA1c 10.1 with goal of <7.0.  Please advise him to increase glimepiride 4mg to twice daily. I am sending a new script to reflect that.       "  Pt VU.  "

## 2025-06-10 ENCOUNTER — TELEPHONE (OUTPATIENT)
Dept: ORTHOPEDICS | Facility: CLINIC | Age: 75
End: 2025-06-10
Payer: MEDICARE

## 2025-06-10 ENCOUNTER — ANESTHESIA EVENT (OUTPATIENT)
Dept: SURGERY | Facility: OTHER | Age: 75
End: 2025-06-10
Payer: MEDICARE

## 2025-06-10 RX ORDER — TRAMADOL HYDROCHLORIDE 50 MG/1
50 TABLET, FILM COATED ORAL EVERY 6 HOURS PRN
Qty: 10 TABLET | Refills: 0 | Status: SHIPPED | OUTPATIENT
Start: 2025-06-10

## 2025-06-10 NOTE — PRE-PROCEDURE INSTRUCTIONS
Information to Prepare you for your Surgery    PRE-ADMIT TESTING -  [phone]    [address] [facility]          Your surgery has been scheduled at [facility]. We are pleased to have the opportunity to serve you. For Further Information please call [phone].    On the day of surgery please report to the Information Desk on the 1st floor.    CONTACT YOUR PHYSICIAN'S OFFICE THE DAY PRIOR TO YOUR SURGERY TO OBTAIN YOUR ARRIVAL TIME.     The evening before surgery do not eat anything after 9 p.m. ( this includes hard candy, chewing gum and mints).  You may only have GATORADE, POWERADE AND WATER  from 9 p.m. until you leave your home.   DO NOT DRINK ANY LIQUIDS ON THE WAY TO THE HOSPITAL.      Why does your anesthesiologist allow you to drink Gatorade/Powerade before surgery?  Gatorade/Powerade helps to increase your comfort before surgery and to decrease your nausea after surgery. The carbohydrates in Gatorade/Powerade help reduce your body's stress response to surgery.  If you are a diabetic-drink only water prior to surgery.    Outpatient Surgery- May allow 2 adult (18 and older) Support Persons (1 being the designated ) for all surgical/procedural patients. A breastfeeding mother will be allowed her infant and 2 adult Support Persons. No one under the age of 18 will be allowed in the building.       MEDICATION INSTRUCTIONS:     Take the below medicines morning of surgery:    Amlodipine  Carvedilol  Pantoprazole     If you take ASPIRIN - Your PHYSICIAN/SURGEON will inform you IF/OR when you need to stop taking aspirin prior to your surgery.     The week prior to surgery do not ot take any medications containing IBUPROFEN or NSAIDS ( Advil, Motrin, Goodys, BC, Aleve, Naproxen etc) If you are not sure if you should take a medicine please call your surgeon's office.  Ok to take Tylenol    Do Not Wear any make-up (especially eye make-up) to surgery. Please remove any false  eyelashes or eyelash extensions. If you arrive the day of surgery with makeup/eyelashes on you will be required to remove prior to surgery. (There is a risk of corneal abrasions if eye makeup/eyelash extensions are not removed)      Leave all valuables at home.   Do Not wear any jewelry or watches, including any metal in body piercings. Jewelry must be removed prior to coming to the hospital.  There is a possibility that rings that are unable to be removed may be cut off if they are on the surgical extremity.    Please remove all hair extensions, wigs, clips and any other metal accessories/ ornaments from your hair.  These items may pose a flammable/fire risk in Surgery and must be removed.    Do not shave your surgical area at least 5 days prior to your surgery. The surgical prep will be performed at the hospital according to Infection Control regulations.    Contact Lens must be removed before surgery. Either do not wear the contact lens or bring a case and solution for storage.  Please bring a container for eyeglasses or dentures as required.  Bring any paperwork your physician has provided, such as consent forms,  history and physicals, doctor's orders, etc.   Bring comfortable clothes that are loose fitting to wear upon discharge. Take into consideration the type of surgery being performed.  Maintain your diet as advised per your physician the day prior to surgery.      Adequate rest the night before surgery is advised.   Park in the Parking lot behind the hospital or in the [place] [facility] across the street from the parking lot. Parking is complimentary.  If you will be discharged the same day as your procedure, please arrange for a responsible adult to drive you home or to accompany you if traveling by taxi.   YOU WILL NOT BE PERMITTED TO DRIVE OR TO LEAVE THE HOSPITAL ALONE AFTER SURGERY.   If you are being discharged the same day, it is strongly recommended that you arrange for someone to remain  with you for the first 24 hrs following your surgery.    The Surgeon will speak to your family/visitor after your surgery regarding the outcome of your surgery and post op care.  The Surgeon may speak to you after your surgery, but there is a possibility you may not remember the details.  Please check with your family members regarding the conversation with the Surgeon.    We strongly recommend whoever is bringing you home be present for discharge instructions.  This will ensure a thorough understanding for your post op home care.    If the patient has fever, cough, or signs/symptoms of Flu or Covid please do not come in for your surgery. Contact your surgeon and your primary care physician for further instructions.           Thank you for your cooperation.  The Staff of [facility].            Bathing Instructions with Hibiclens    Shower the evening before and morning of your procedure with Chlorhexidine (Hibiclens)  do not use Chlorhexidine on your face or genitals. Do not get in your eyes.  Wash your face with water and your regular face wash/soap  Use your regular shampoo  Apply Chlorhexidine (Hibiclens) directly on your skin or on a wet washcloth and wash gently. When showering: Move away from the shower stream when applying Chlorhexidine (Hibiclens) to avoid rinsing off too soon.  Rinse thoroughly with warm water  Do not dilute Chlorhexidine (Hibiclens)   Dry off as usual, do not use any deodorant, powder, body lotions, perfume, after shave or cologne.

## 2025-06-10 NOTE — TELEPHONE ENCOUNTER
Spoke c pt. Informed pt of 7:30  arrival time for  06/11/25 surgery at the [facility]. Pt confirmed no scratches, scrapes or open wounds Reminded pt of NPO status. Pt expressed understanding & was thankful.

## 2025-06-11 ENCOUNTER — ANESTHESIA (OUTPATIENT)
Dept: SURGERY | Facility: OTHER | Age: 75
End: 2025-06-11
Payer: MEDICARE

## 2025-06-11 ENCOUNTER — HOSPITAL ENCOUNTER (OUTPATIENT)
Facility: OTHER | Age: 75
Discharge: HOME | End: 2025-06-11
Attending: ORTHOPAEDIC SURGERY | Admitting: ORTHOPAEDIC SURGERY
Payer: MEDICARE

## 2025-06-11 VITALS
BODY MASS INDEX: 31.5 KG/M2 | HEIGHT: 70 IN | WEIGHT: 220 LBS | TEMPERATURE: 98 F | OXYGEN SATURATION: 94 % | DIASTOLIC BLOOD PRESSURE: 73 MMHG | HEART RATE: 55 BPM | SYSTOLIC BLOOD PRESSURE: 121 MMHG | RESPIRATION RATE: 15 BRPM

## 2025-06-11 DIAGNOSIS — Z98.890 S/P TRIGGER FINGER RELEASE: ICD-10-CM

## 2025-06-11 DIAGNOSIS — M65.331 TRIGGER FINGER, RIGHT MIDDLE FINGER: Primary | ICD-10-CM

## 2025-06-11 LAB — POCT GLUCOSE: 171 MG/DL (ref 70–110)

## 2025-06-11 PROCEDURE — 63600175 PHARM REV CODE 636 W HCPCS: Performed by: ORTHOPAEDIC SURGERY

## 2025-06-11 PROCEDURE — 71000016 HC POSTOP RECOV ADDL HR: Performed by: ORTHOPAEDIC SURGERY

## 2025-06-11 PROCEDURE — 25000003 PHARM REV CODE 250

## 2025-06-11 PROCEDURE — 26055 INCISE FINGER TENDON SHEATH: CPT | Mod: F7,,, | Performed by: ORTHOPAEDIC SURGERY

## 2025-06-11 PROCEDURE — 63600175 PHARM REV CODE 636 W HCPCS: Performed by: STUDENT IN AN ORGANIZED HEALTH CARE EDUCATION/TRAINING PROGRAM

## 2025-06-11 PROCEDURE — 63600175 PHARM REV CODE 636 W HCPCS

## 2025-06-11 PROCEDURE — 25000003 PHARM REV CODE 250: Performed by: ORTHOPAEDIC SURGERY

## 2025-06-11 PROCEDURE — 36000706: Performed by: ORTHOPAEDIC SURGERY

## 2025-06-11 PROCEDURE — 37000008 HC ANESTHESIA 1ST 15 MINUTES: Performed by: ORTHOPAEDIC SURGERY

## 2025-06-11 PROCEDURE — 37000009 HC ANESTHESIA EA ADD 15 MINS: Performed by: ORTHOPAEDIC SURGERY

## 2025-06-11 PROCEDURE — 82962 GLUCOSE BLOOD TEST: CPT | Performed by: ORTHOPAEDIC SURGERY

## 2025-06-11 PROCEDURE — 71000015 HC POSTOP RECOV 1ST HR: Performed by: ORTHOPAEDIC SURGERY

## 2025-06-11 PROCEDURE — 36000707: Performed by: ORTHOPAEDIC SURGERY

## 2025-06-11 RX ORDER — SODIUM CHLORIDE, SODIUM LACTATE, POTASSIUM CHLORIDE, CALCIUM CHLORIDE 600; 310; 30; 20 MG/100ML; MG/100ML; MG/100ML; MG/100ML
INJECTION, SOLUTION INTRAVENOUS CONTINUOUS PRN
Status: DISCONTINUED | OUTPATIENT
Start: 2025-06-11 | End: 2025-06-11

## 2025-06-11 RX ORDER — SODIUM CHLORIDE, SODIUM LACTATE, POTASSIUM CHLORIDE, CALCIUM CHLORIDE 600; 310; 30; 20 MG/100ML; MG/100ML; MG/100ML; MG/100ML
INJECTION, SOLUTION INTRAVENOUS CONTINUOUS
Status: DISCONTINUED | OUTPATIENT
Start: 2025-06-11 | End: 2025-06-11 | Stop reason: HOSPADM

## 2025-06-11 RX ORDER — SODIUM CHLORIDE 9 MG/ML
INJECTION, SOLUTION INTRAVENOUS CONTINUOUS
Status: DISCONTINUED | OUTPATIENT
Start: 2025-06-11 | End: 2025-06-11 | Stop reason: HOSPADM

## 2025-06-11 RX ORDER — PROPOFOL 10 MG/ML
INJECTION, EMULSION INTRAVENOUS
Status: DISCONTINUED | OUTPATIENT
Start: 2025-06-11 | End: 2025-06-11

## 2025-06-11 RX ORDER — FENTANYL CITRATE 50 UG/ML
INJECTION, SOLUTION INTRAMUSCULAR; INTRAVENOUS
Status: DISCONTINUED | OUTPATIENT
Start: 2025-06-11 | End: 2025-06-11

## 2025-06-11 RX ORDER — LIDOCAINE HYDROCHLORIDE 10 MG/ML
INJECTION, SOLUTION EPIDURAL; INFILTRATION; INTRACAUDAL; PERINEURAL
Status: DISCONTINUED | OUTPATIENT
Start: 2025-06-11 | End: 2025-06-11 | Stop reason: HOSPADM

## 2025-06-11 RX ORDER — PROPOFOL 10 MG/ML
VIAL (ML) INTRAVENOUS CONTINUOUS PRN
Status: DISCONTINUED | OUTPATIENT
Start: 2025-06-11 | End: 2025-06-11

## 2025-06-11 RX ORDER — LIDOCAINE HYDROCHLORIDE 20 MG/ML
INJECTION INTRAVENOUS
Status: DISCONTINUED | OUTPATIENT
Start: 2025-06-11 | End: 2025-06-11

## 2025-06-11 RX ORDER — PHENYLEPHRINE HYDROCHLORIDE 10 MG/ML
INJECTION INTRAVENOUS
Status: DISCONTINUED | OUTPATIENT
Start: 2025-06-11 | End: 2025-06-11

## 2025-06-11 RX ORDER — CEFAZOLIN 2 G/1
2 INJECTION, POWDER, FOR SOLUTION INTRAMUSCULAR; INTRAVENOUS
Status: COMPLETED | OUTPATIENT
Start: 2025-06-11 | End: 2025-06-11

## 2025-06-11 RX ORDER — ONDANSETRON HYDROCHLORIDE 2 MG/ML
INJECTION, SOLUTION INTRAVENOUS
Status: DISCONTINUED | OUTPATIENT
Start: 2025-06-11 | End: 2025-06-11

## 2025-06-11 RX ORDER — ACETAMINOPHEN 500 MG
1000 TABLET ORAL
Status: COMPLETED | OUTPATIENT
Start: 2025-06-11 | End: 2025-06-11

## 2025-06-11 RX ORDER — BACITRACIN ZINC 500 UNIT/G
OINTMENT (GRAM) TOPICAL
Status: DISCONTINUED | OUTPATIENT
Start: 2025-06-11 | End: 2025-06-11 | Stop reason: HOSPADM

## 2025-06-11 RX ORDER — MUPIROCIN 20 MG/G
OINTMENT TOPICAL
Status: DISCONTINUED | OUTPATIENT
Start: 2025-06-11 | End: 2025-06-11

## 2025-06-11 RX ORDER — LIDOCAINE HYDROCHLORIDE 10 MG/ML
0.5 INJECTION, SOLUTION EPIDURAL; INFILTRATION; INTRACAUDAL; PERINEURAL ONCE
Status: DISCONTINUED | OUTPATIENT
Start: 2025-06-11 | End: 2025-06-11 | Stop reason: HOSPADM

## 2025-06-11 RX ADMIN — PHENYLEPHRINE HYDROCHLORIDE 100 MCG: 10 INJECTION INTRAVENOUS at 09:06

## 2025-06-11 RX ADMIN — PROPOFOL 40 MG: 10 INJECTION, EMULSION INTRAVENOUS at 09:06

## 2025-06-11 RX ADMIN — GLYCOPYRROLATE 0.2 MG: 0.2 INJECTION, SOLUTION INTRAMUSCULAR; INTRAVITREAL at 09:06

## 2025-06-11 RX ADMIN — FENTANYL CITRATE 25 MCG: 50 INJECTION, SOLUTION INTRAMUSCULAR; INTRAVENOUS at 09:06

## 2025-06-11 RX ADMIN — CEFAZOLIN 2 G: 2 INJECTION, POWDER, FOR SOLUTION INTRAMUSCULAR; INTRAVENOUS at 09:06

## 2025-06-11 RX ADMIN — ONDANSETRON HYDROCHLORIDE 4 MG: 2 INJECTION INTRAMUSCULAR; INTRAVENOUS at 09:06

## 2025-06-11 RX ADMIN — PHENYLEPHRINE HYDROCHLORIDE 200 MCG: 10 INJECTION INTRAVENOUS at 10:06

## 2025-06-11 RX ADMIN — SODIUM CHLORIDE, SODIUM LACTATE, POTASSIUM CHLORIDE, AND CALCIUM CHLORIDE: .6; .31; .03; .02 INJECTION, SOLUTION INTRAVENOUS at 09:06

## 2025-06-11 RX ADMIN — PROPOFOL 150 MCG/KG/MIN: 10 INJECTION, EMULSION INTRAVENOUS at 09:06

## 2025-06-11 RX ADMIN — LIDOCAINE HYDROCHLORIDE 50 MG: 20 INJECTION, SOLUTION INTRAVENOUS at 09:06

## 2025-06-11 RX ADMIN — ACETAMINOPHEN 1000 MG: 500 TABLET, FILM COATED ORAL at 08:06

## 2025-06-11 NOTE — ANESTHESIA POSTPROCEDURE EVALUATION
Anesthesia Post Evaluation    Patient: Jae Millan    Procedure(s) Performed: Procedure(s) (LRB):  RIGHT LONG , TRIGGER FINGER RELEASE (Right)    Final Anesthesia Type: MAC      Patient location during evaluation: [facility]  Patient participation: Yes- Able to Participate  Level of consciousness: awake and alert  Post-procedure vital signs: reviewed and stable  Pain management: adequate  Airway patency: patent    PONV status at discharge: No PONV  Anesthetic complications: no      Cardiovascular status: blood pressure returned to baseline, hemodynamically stable and stable  Respiratory status: spontaneous ventilation and unassisted  Hydration status: euvolemic  Follow-up not needed.                    No case tracking events are documented in the log.      Pain/Rick Score: Pain Rating Prior to Med Admin: 0 (6/11/2025  8:11 AM)

## 2025-06-11 NOTE — DISCHARGE SUMMARY
[facility] ([place])  Discharge Note  Short Stay    Procedure(s) (LRB):  RIGHT LONG , TRIGGER FINGER RELEASE (Right)      OUTCOME: Patient tolerated treatment/procedure well without complication and is now ready for discharge.    DISPOSITION: Home or Self Care    FINAL DIAGNOSIS:  Trigger finger, right long finger    FOLLOWUP: In clinic    DISCHARGE INSTRUCTIONS:    Discharge Procedure Orders   Diet general     Call MD for:  temperature >100.4     Call MD for:  persistent nausea and vomiting     Call MD for:  severe uncontrolled pain     Call MD for:  difficulty breathing, headache or visual disturbances     Call MD for:  redness, tenderness, or signs of infection (pain, swelling, redness, odor or green/yellow discharge around incision site)     Call MD for:  hives     Call MD for:  persistent dizziness or light-headedness     Call MD for:  extreme fatigue     Leave dressing on - Keep it clean, dry, and intact until clinic visit     Non weight bearing        TIME SPENT ON DISCHARGE: 5 minutes

## 2025-06-11 NOTE — H&P
[facility]  History & Physical  Orthopedics     SUBJECTIVE:       Chief Complaint:  Right long finger pain     Referring Provider: Maryjane Farias MD      Interval  6/11:  The patient presents to preoperative area.  No changes in his past clinic visit.  All risks, benefits, alternatives of surgery explained again at this time.  Plan to proceed to the OR.      History of Present Illness:  Patient is a 74 y.o. right hand dominant male who presents today with complaints of complaints of a 5 year history of right long finger decreased range of motion and pain.  He was seen in clinic in 2020 where he was given a A1 pulley injection for right long trigger finger, he return to clinic in 2021 it was offered surgery for A1 pulley release for right long trigger finger. However, he declined at that time because he has a history of stroke in 2018 with with residual weakness of the left upper extremity.  He has a history of diabetes with hemoglobin A1c over 10.  He is on 3 separate medications for treatment.  He states that he is working with his primary care provider to improve his glucose control.  He also endorses history of coronary artery disease status post PCI with stent placement.     The patient is retired.     Onset of symptoms/DOI was 5 years ago.     Symptoms are aggravated by activity and movement.     Symptoms are alleviated by rest, immobilization, and medication.     Symptoms consist of pain, swelling, and decreased ROM.     The patient rates their pain as a 7/10.     Attempted treatment(s) and/or interventions include activity modifications, rest, rest, activity modification, anti-inflammatory medications, immobilization, and steroid injection.     The patient denies any fevers, chills, N/V, D/C and presents for evaluation.             Past Medical History:   Diagnosis Date    Cataract      Coronary artery disease involving native coronary artery of native heart with angina pectoris  02/15/2024    Decreased sensation of lower extremity      Diabetes mellitus      Diabetic neuropathy      Dysarthria      Dysphagia      ED (erectile dysfunction)      Glaucoma      Hemiparesis       LEFT side post CVA    High cholesterol      History of CVA with residual deficit 04/12/2019 August 23, 2018      History of hepatitis C, s/p successful Rx w/ cure (SVR12) 4/2020      Hypertension      Impaired functional mobility, balance, gait, and endurance      Pre-op testing 04/08/2024    Pulmonary emphysema, unspecified emphysema type 02/01/2024    Stroke      Urinary frequency              Past Surgical History:   Procedure Laterality Date    ANGIOGRAM, CORONARY, WITH LEFT HEART CATHETERIZATION   8/13/2024     Procedure: Angiogram, Coronary, with Left Heart Cath;  Surgeon: Sandoval Paul MD;  Location: [facility] CATH LAB;  Service: Cardiology;;    COLONOSCOPY N/A 3/9/2023     Procedure: COLONOSCOPY;  Surgeon: Kian Kraft MD;  Location: [facility] ENDO (4TH FLR);  Service: Endoscopy;  Laterality: N/A;  medical transportation-inst mail-tb  pre call complete-as    CORONARY ANGIOGRAPHY N/A 3/27/2024     Procedure: ANGIOGRAM, CORONARY ARTERY;  Surgeon: Sandoval Paul MD;  Location: [facility] CATH LAB;  Service: Cardiology;  Laterality: N/A;    CORONARY ANGIOGRAPHY N/A 11/14/2024     Procedure: ANGIOGRAM, CORONARY ARTERY;  Surgeon: Kian Whittington MD;  Location: [facility] CATH LAB;  Service: Cardiology;  Laterality: N/A;    INGUINAL HERNIA REPAIR Left 12/11/2019    IVUS, CORONARY   8/13/2024     Procedure: IVUS, Coronary;  Surgeon: Sandoval Paul MD;  Location: [facility] CATH LAB;  Service: Cardiology;;    STENT, CORONARY, MULTI VESSEL N/A 11/14/2024     Procedure: Stent, Coronary, Multi Vessel;  Surgeon: Kian Whittington MD;  Location: [facility] CATH LAB;  Service: Cardiology;  Laterality: N/A;    STENT, DRUG ELUTING, MULTI VESSEL, CORONARY   11/14/2024     Procedure: Stent, Drug Eluting, Multi Vessel, Coronary;   Surgeon: Kian Whittington MD;  Location: [facility] CATH LAB;  Service: Cardiology;;    STENT, DRUG ELUTING, SINGLE VESSEL, CORONARY N/A 8/13/2024     Procedure: Stent, Drug Eluting, Single Vessel, Coronary;  Surgeon: Sandoval Paul MD;  Location: [facility] CATH LAB;  Service: Cardiology;  Laterality: N/A;    UNDESCENDED TESTICLE EXPLORATION Right       R testicle removed as it was undescended      Review of patient's allergies indicates:  No Known Allergies  Social History          Social History Narrative    Not on file             Family History   Problem Relation Name Age of Onset    Heart disease Brother             cabg    Hypertension Sister        Amblyopia Neg Hx        Blindness Neg Hx        Cataracts Neg Hx        Glaucoma Neg Hx        Macular degeneration Neg Hx        Strabismus Neg Hx        Retinal detachment Neg Hx        Diabetes Neg Hx             [Current Medications]    [Current Medications]     Current Outpatient Medications:     amLODIPine (NORVASC) 10 MG tablet, TAKE 1 TABLET(10 MG) BY MOUTH EVERY DAY FOR HYPERTENSION, Disp: 90 tablet, Rfl: 3    aspirin 81 MG Chew, Take 1 tablet (81 mg total) by mouth once daily., Disp: , Rfl:     blood sugar diagnostic Strp, To check BG 4 times daily, to use with insurance preferred meter, Disp: 100 each, Rfl: 11    carvediloL (COREG) 6.25 MG tablet, TAKE 1 TABLET(6.25 MG) BY MOUTH TWICE DAILY, Disp: 180 tablet, Rfl: 3    clopidogreL (PLAVIX) 75 mg tablet, Take 1 tablet (75 mg total) by mouth once daily., Disp: 90 tablet, Rfl: 3    dorzolamide-timolol 2-0.5% (COSOPT) 22.3-6.8 mg/mL ophthalmic solution, Place 1 drop into both eyes 2 (two) times daily., Disp: 10 mL, Rfl: 12    empagliflozin (JARDIANCE) 25 mg tablet, Take 1 tablet (25 mg total) by mouth once daily., Disp: 90 tablet, Rfl: 3    ezetimibe (ZETIA) 10 mg tablet, Take 1 tablet (10 mg total) by mouth once daily., Disp: 90 tablet, Rfl: 3    furosemide (LASIX) 20 MG tablet, Take 1 tablet (20 mg total) by  "mouth once daily., Disp: 90 tablet, Rfl: 3    glimepiride (AMARYL) 4 MG tablet, Take 1 tablet (4 mg total) by mouth before breakfast., Disp: 90 tablet, Rfl: 3    hydrocortisone 2.5 % cream, Apply topically 2 (two) times daily., Disp: , Rfl:     JANUVIA 50 mg Tab, TAKE 1 TABLET(50 MG) BY MOUTH EVERY DAY, Disp: 90 tablet, Rfl: 1    lancets Misc, Check bg 4 times daily, Disp: 100 each, Rfl: 11    losartan-hydrochlorothiazide 100-25 mg (HYZAAR) 100-25 mg per tablet, Take 1 tablet by mouth once daily., Disp: 90 tablet, Rfl: 3    nitroGLYCERIN (NITROSTAT) 0.4 MG SL tablet, Place 1 tablet (0.4 mg total) under the tongue every 5 (five) minutes as needed for Chest pain., Disp: 30 tablet, Rfl: 1    pantoprazole (PROTONIX) 40 MG tablet, TAKE 1 TABLET(40 MG) BY MOUTH EVERY DAY FOR STOMACH, Disp: 90 tablet, Rfl: 3    rosuvastatin (CRESTOR) 20 MG tablet, Take 1 tablet (20 mg total) by mouth once daily., Disp: 90 tablet, Rfl: 3    tamsulosin (FLOMAX) 0.4 mg Cap, One capsule at bedtime, Disp: 90 capsule, Rfl: 3    gabapentin (NEURONTIN) 300 MG capsule, Take 1 capsule (300 mg total) by mouth 2 (two) times daily. In 1-2 weeks, if no relief, may increase dose to 3 times per day., Disp: , Rfl:         Review of Systems:  As per HPI otherwise noncontributory     OBJECTIVE:       Vital Signs (Most Recent):  Vitals       Vitals:     05/20/25 0939   Weight: 100.2 kg (220 lb 14.4 oz)   Height: 5' 10" (1.778 m)         Body mass index is 31.7 kg/m².        Physical Exam:  Constitutional: The patient appears well-developed and well-nourished. No distress.   Skin: No lesions appreciated  Head: Normocephalic and atraumatic.   Nose: Nose normal.   Ears: No deformities seen  Eyes: Conjunctivae and EOM are normal.   Neck: No tracheal deviation present.   Cardiovascular: Normal rate and intact distal pulses.    Pulmonary/Chest: Effort normal. No respiratory distress.   Abdominal: There is no guarding.   Neurological: The patient is alert. "   Psychiatric: The patient has a normal mood and affect.      Right Hand/Wrist Examination:     Observation/Inspection:  Swelling                       none                  Deformity                     none  Discoloration               none                  Scars                           none                  Atrophy                        none     HAND/WRIST EXAMINATION:  Finkelstein's Test                                Neg  WHAT Test                                         Neg  Snuff box tenderness                          Neg  Fox's Test                                     Neg  Hook of Hamate Tenderness              Neg  CMC grind                                           Neg  Circumduction test                              Neg     Neurovascular Exam:  Digits WWP, brisk CR < 3s throughout  NVI motor/LTS to M/R/U nerves, radial pulse 2+  Tinel's Test - Carpal Tunnel                Neg  Tinel's Test - Cubital Tunnel               Neg  Phalen's Test                                      Neg  Median Nerve Compression TestNeg     ROM hand shows inability to flex the right long finger at this time, locked in extension with painful mass felt over the A1 pulley and popping/clicking of the tendon with passive range of motion and passive flexion of the long finger.  Otherwise full range of motion to each of the fingers     ROM wrist full, painless    ROM elbow full, painless     Abdomen not guarded  Respirations nonlabored  Perfusion intact     Diagnostic Results:     Imaging - I independently viewed the patient's imaging as well as the radiology report.  Xrays of the patient's right hand demonstrate no evidence of any acute fractures or dislocations or significant degenerative changes.     ASSESSMENT/PLAN:       74 y.o. yo male with trigger finger of right long finger.        Plan: The patient and I had a thorough discussion today.  We discussed the working diagnosis as well as several other potential alternative  diagnoses.  Treatment options were discussed, both conservative and surgical.  Conservative treatment options would include things such as activity modifications, workplace modifications, a period of rest, oral vs topical OTC and prescription anti-inflammatory medications, occupational therapy, splinting/bracing, immobilization, corticosteroid injections, and others.  Surgical options were discussed as well.      At this time, the patient would like to proceed operative management of right long finger triggering with A1 pulley release.  Patient is consented in clinic..     Should the patient's symptoms worsen, persist, or fail to improve they should return for reevaluation and I would be happy to see them back anytime.

## 2025-06-11 NOTE — ANESTHESIA PREPROCEDURE EVALUATION
06/11/2025  Jae Millan is a 74 y.o., male.      Pre-op Assessment    I have reviewed the Patient Summary Reports.     I have reviewed the Nursing Notes. I have reviewed the NPO Status.   I have reviewed the Medications.     Review of Systems  Anesthesia Hx:  No problems with previous Anesthesia                Social:  Former Smoker       Cardiovascular:  Exercise tolerance: poor   Hypertension   CAD   CABG/stent   Angina (resolved post PCI)                                    Pulmonary:   COPD                     Renal/:  Chronic Renal Disease, CKD                Neurological:   CVA (Left sided weakness 2018), residual symptoms                                    Endocrine:  Diabetes             Physical Exam  General: Well nourished and Cooperative    Airway:  Mallampati: IV / III  Mouth Opening: Normal  TM Distance: Normal  Neck ROM: Normal ROM    Dental:  Intact    Anesthesia Plan  Type of Anesthesia, risks & benefits discussed:    Anesthesia Type: MAC  Intra-op Monitoring Plan: Standard ASA Monitors  Post Op Pain Control Plan: IV/PO Opioids PRN  Induction:  IV  Informed Consent: Informed consent signed with the Patient and all parties understand the risks and agree with anesthesia plan.  All questions answered.   ASA Score: 3  Anesthesia Plan Notes: S/p PCI x2 11/2024, resolved angina, persistent dyspnea. No CP/SOB today. 2018 CVA with L sided weakness  EF 63%, stress negative for ischemia 1/2025    Ready For Surgery From Anesthesia Perspective.   .

## 2025-06-11 NOTE — OP NOTE
[facility] ([facility]  Surgery Department  Operative Note    SUMMARY     Date of Procedure: 6/11/2025     Procedure: Procedure(s) (LRB):  RIGHT LONG , TRIGGER FINGER RELEASE (Right)   Right long finger flexor tendosynovectomy  Surgeons and Role:     * Tiffanie Pleitez MD - Primary    Assisting Surgeon: Jose Alberto    Pre-Operative Diagnosis: Trigger finger, unspecified finger, unspecified laterality [M65.30]    Post-Operative Diagnosis: Post-Op Diagnosis Codes:     * Trigger finger, unspecified finger, unspecified laterality [M65.30]    Anesthesia: Local MAC    Technical Procedures Used: surgery    Description of the Findings of the Procedure: COMPLICATIONS: None.   INDICATIONS FOR PROCEDURE: Jae Millan is a 74 y.o.year-old who has failed   conservative treatment for  right middle finger trigger finger; therefore,   operative intervention was deemed necessary. Risks and benefits were explained   to the patient in clinic. Consents were performed in clinic.   PROCEDURE IN DETAIL: After the correct site was marked with the patient's   participation in the Holding Area, the patient was brought to the Operating   Room, placed in supine position, underwent MAC anesthesia. A well-padded   nonsterile tourniquet was placed on the right forearm. Time-out was called for   correct site, procedure and patient to be indicated. Under sterile conditions,   an injection of lidocaine 1% without epinephrine was injected at the A1 pulley   space. The arm was prepped and draped in normal sterile fashion. Incision was   marked out in a longitudinal fashion along the pulley. The arm was   exsanguinated using Esmarch. Tourniquet was insufflated 250 mmHg and that is   where it remained for 10 minutes. The incision was made. Careful dissection   down was maintained to the A1 pulley. The neurovascular structures were   retracted out of the way. The A1 pulley was identified. It was sharply   incised. It was completely incised  proximally and distally. Portion of    pulley was also incised. The tendon was then retracted out of the tendon sheath   using a right angle. The finger was placed through range of motion, showed it   did not trigger. There was an area of fraying and a tenosynovectomy was conducted.The area was irrigated with copious amounts of normal saline.   Nylon closed the skin. Sterile dressing was applied. Tourniquet was deflated.   Brisk cap refill ensued. The patient was transferred the Recovery Room in   stable condition.   POSTOPERATIVE PLAN FOR THIS PATIENT: The patient is to keep the dressing clean, dry and   intact. We will take the sutures out at two weeks.   Of note, the pt had very stiff fingers, OT orderes placed    Significant Surgical Tasks Conducted by the Assistant(s), if Applicable: retraction    Complications: No    Estimated Blood Loss (EBL): * No values recorded between 6/11/2025  9:32 AM and 6/11/2025 10:01 AM *           Implants: * No implants in log *    Specimens:   Specimen (24h ago, onward)      None                    Condition: Good    Disposition: PACU - hemodynamically stable.    Attestation: I performed the procedure.    Discharge Note    SUMMARY     Admit Date: 6/11/2025    Discharge Date and Time: No discharge date for patient encounter.    Hospital Course (synopsis of major diagnoses, care, treatment, and services provided during the course of the hospital stay): surgery     Final Diagnosis: Post-Op Diagnosis Codes:     * Trigger finger, unspecified finger, unspecified laterality [M65.30]    Disposition: Home or Self Care    Follow Up/Patient Instructions:     Medications:  Reconciled Home Medications:      Medication List        START taking these medications      traMADoL 50 mg tablet  Commonly known as: ULTRAM  Take 1 tablet (50 mg total) by mouth every 6 (six) hours as needed for Pain.            CONTINUE taking these medications      amLODIPine 10 MG tablet  Commonly known as:  NORVASC  TAKE 1 TABLET(10 MG) BY MOUTH EVERY DAY FOR HYPERTENSION     aspirin 81 MG Chew  Take 1 tablet (81 mg total) by mouth once daily.     blood sugar diagnostic Strp  To check BG 4 times daily, to use with insurance preferred meter     carvediloL 6.25 MG tablet  Commonly known as: COREG  TAKE 1 TABLET(6.25 MG) BY MOUTH TWICE DAILY     clopidogreL 75 mg tablet  Commonly known as: PLAVIX  Take 1 tablet (75 mg total) by mouth once daily.     dorzolamide-timolol 2-0.5% 22.3-6.8 mg/mL ophthalmic solution  Commonly known as: COSOPT  Place 1 drop into both eyes 2 (two) times daily.     empagliflozin 25 mg tablet  Commonly known as: JARDIANCE  Take 1 tablet (25 mg total) by mouth once daily.     ezetimibe 10 mg tablet  Commonly known as: ZETIA  Take 1 tablet (10 mg total) by mouth once daily.     furosemide 20 MG tablet  Commonly known as: LASIX  Take 1 tablet (20 mg total) by mouth once daily.     gabapentin 300 MG capsule  Commonly known as: NEURONTIN  Take 1 capsule (300 mg total) by mouth 2 (two) times daily. In 1-2 weeks, if no relief, may increase dose to 3 times per day.     glimepiride 4 MG tablet  Commonly known as: AMARYL  One tablet twice daily     hydrocortisone 2.5 % cream  Apply topically 2 (two) times daily.     JANUVIA 50 mg Tab  Generic drug: SITagliptin phosphate  TAKE 1 TABLET(50 MG) BY MOUTH EVERY DAY     lancets Misc  Check bg 4 times daily     losartan-hydrochlorothiazide 100-25 mg 100-25 mg per tablet  Commonly known as: HYZAAR  Take 1 tablet by mouth once daily.     nitroGLYCERIN 0.4 MG SL tablet  Commonly known as: NITROSTAT  Place 1 tablet (0.4 mg total) under the tongue every 5 (five) minutes as needed for Chest pain.     pantoprazole 40 MG tablet  Commonly known as: PROTONIX  TAKE 1 TABLET(40 MG) BY MOUTH EVERY DAY FOR STOMACH     rosuvastatin 20 MG tablet  Commonly known as: CRESTOR  Take 1 tablet (20 mg total) by mouth once daily.     tamsulosin 0.4 mg Cap  Commonly known as: FLOMAX  One  capsule at bedtime            Discharge Procedure Orders   Diet general     Call MD for:  temperature >100.4     Call MD for:  persistent nausea and vomiting     Call MD for:  severe uncontrolled pain     Call MD for:  difficulty breathing, headache or visual disturbances     Call MD for:  redness, tenderness, or signs of infection (pain, swelling, redness, odor or green/yellow discharge around incision site)     Call MD for:  hives     Call MD for:  persistent dizziness or light-headedness     Call MD for:  extreme fatigue     Leave dressing on - Keep it clean, dry, and intact until clinic visit     Non weight bearing

## 2025-06-13 DIAGNOSIS — M65.30 TRIGGER FINGER, UNSPECIFIED FINGER, UNSPECIFIED LATERALITY: Primary | ICD-10-CM

## 2025-06-17 NOTE — PROGRESS NOTES
Patient ID: Jae Millan is a 74 y.o. male.    Chief Complaint: Finger Pain of the Right Hand    History of Present Illness    CHIEF COMPLAINT:  Right ring finger pain    HPI:  Mr. Millan presents with worsening pain in the right ring finger, rated 7/10 in severity. He also reports pain in the right long and another finger, with the right long finger being the most painful. The right long finger is described as extremely painful, and he can barely flex it. His fingers are tethered and stiff, with significant limitation in movement. He reports difficulty moving his fingers.    He was previously seen in 2020 and received an injection that provided some relief. He was seen again in 2021 but did not undergo surgery at that time due to left upper extremity weakness following a stroke in 2018. He wanted to maintain hand function for self-care.    Mr. Millan denies any other symptoms or medical conditions.    PREVIOUS TREATMENTS:  Mr. Millan received an injection in 2020, which provided some relief for his right ring finger pain.      ROS:  General: denies fever, denies chills, denies fatigue, denies weight gain, denies weight loss  Eyes: denies vision changes, denies redness, denies discharge  ENT: denies ear pain, denies nasal congestion, denies sore throat  Cardiovascular: denies chest pain, denies palpitations, denies lower extremity edema  Respiratory: denies cough, denies shortness of breath  Gastrointestinal: denies abdominal pain, denies nausea, denies vomiting, denies diarrhea, denies constipation, denies blood in stool  Genitourinary: denies dysuria, denies hematuria, denies frequency  Musculoskeletal: denies joint pain, denies muscle pain, reports limb pain, reports joint stiffness, reports limited movement  Skin: denies rash, denies lesion  Neurological: denies headache, denies dizziness, denies numbness, denies tingling  Psychiatric: denies anxiety, denies depression, denies sleep difficulty  Male  Genitourinary: reports urgency         Physical Exam    Musculoskeletal: Barely able to flex right long finger. Stiffness in fingers. Pain in right long finger.         Assessment & Plan     Mr. Millan understands the risks and benefits and elects to proceed with surgery for the right ring finger triggering and stiffness.   Keep surgical incision wrapped for 2 weeks.   Can use fingers for basic tasks like pulling up pants and buttons.   Avoid getting the incision wet.              No follow-ups on file.    This note was generated with the assistance of ambient listening technology. Verbal consent was obtained by the patient and accompanying visitor(s) for the recording of patient appointment to facilitate this note. I attest to having reviewed and edited the generated note for accuracy, though some syntax or spelling errors may persist. Please contact the author of this note for any clarification.

## 2025-06-18 ENCOUNTER — CLINICAL SUPPORT (OUTPATIENT)
Dept: REHABILITATION | Facility: HOSPITAL | Age: 75
End: 2025-06-18
Attending: ORTHOPAEDIC SURGERY
Payer: MEDICARE

## 2025-06-18 DIAGNOSIS — M65.30 TRIGGER FINGER, UNSPECIFIED FINGER, UNSPECIFIED LATERALITY: ICD-10-CM

## 2025-06-18 DIAGNOSIS — M79.644 FINGER PAIN, RIGHT: ICD-10-CM

## 2025-06-18 DIAGNOSIS — M25.60 RANGE OF MOTION DEFICIT: Primary | ICD-10-CM

## 2025-06-18 PROCEDURE — 97165 OT EVAL LOW COMPLEX 30 MIN: CPT

## 2025-06-18 PROCEDURE — 97110 THERAPEUTIC EXERCISES: CPT

## 2025-06-18 NOTE — PATIENT INSTRUCTIONS
"[facility] - OCCUPATIONAL THERAPY  HOME EXERCISE PROGRAM   Run under hot water, or use hot wet compress for 5-10 min in the morning or before the exercises to decrease stiffness.     Ice massage x 5 min at night for pain, inflammation and swelling   Complete the following massages for 5 min, 2x/day.                         WILL WAIT UNTIL STITCHES COME OUT - THEN GO UP/DOWN, ACROSS AND CIRCULAR MOTION   Complete the following exercises with 10 repetitions each, 3-4x/day.     AROM: DIP Flexion / Extension  Pinch middle knuckle to prevent bending. Bend end knuckle until stretch is felt.   Hold 3 seconds. Relax. Straighten finger as far as possible.    AROM: PIP Flexion / Extension  Pinch bottom knuckle  to prevent bending. Actively bend middle knuckle until stretch is felt.   Hold 3 seconds. Relax. Straighten finger as far as possible.      AROM: Isolated PIP Flexion  Bend only middle joint of your finger, keeping other fingers straight with other hand.    AROM: Isolated MCP Flexion / Extension ("Wave")   Bend only your large, bottom knuckles. Hold 3 seconds. Keep the tips of your fingers straight. Straighten fingers.    AROM: Isolated IPJ Flexion / Extension ("Hook")  Bend only your middle and end knuckles. Hold 3 seconds.   Straighten your fingers.     AROM: Composite Flexion / Extension ("Full Fist")  Bend every joint in your hand into a fist. Hold 3 seconds. Straighten your fingers.     AROM: Composte Extension ("Finger Lifts")  Lift your finger off of the table one at a time. Hold 3 seconds. Relax your finger.    AROM: Abduction / Adduction  With hand flat on table, spread all fingers apart, then bring them together as close as possible.    Copyright © I. All rights reserved.     Therapist: IRLANDA Currie, THOMAS       "

## 2025-06-18 NOTE — PROGRESS NOTES
Outpatient Rehab    Occupational Therapy Evaluation    Patient Name: Jae Millan  MRN: [id number]  YOB: 1950  Encounter Date: 6/18/2025    Therapy Diagnosis:   Encounter Diagnoses   Name Primary?    Trigger finger, unspecified finger, unspecified laterality     Range of motion deficit Yes    Finger pain, right      Physician: Tiffanie Pleitez, *    Physician Orders: Eval and Treat  Medical Diagnosis: Trigger finger, unspecified finger, unspecified laterality  Surgical Diagnosis: RIGHT LONG , TRIGGER FINGER RELEASE (Right)   Surgical Date: 6/11/2025  Days Since Last Surgery: 7    Visit # / Visits Authorized: 1 / 1  Insurance Authorization Period: 6/13/2025 to 6/13/2026  Date of Evaluation: 6/18/2025  Plan of Care Certification: 6/18/2025 to 9/10/25     Time In: 0830   Time Out: 0915  Total Time (in minutes): 45   Total Billable Time (in minutes):  45    Intake Outcome Measure for FOTO Survey    Therapist reviewed FOTO scores for Jae Millan on 6/18/2025.   FOTO report - see Media section or FOTO account episode details.     Intake Score:  % N/A     Precautions:       FALL RISK (Ambulates with straight cane); Left Silverio    Subjective   History of Present Illness  Jae is a 74 y.o. male who reports to occupational therapy with a chief concern of Finger pain, right.     The patient reports a medical diagnosis of M65.30 (ICD-10-CM) - Trigger finger, unspecified finger, unspecified laterality. The patient has experienced this issue since 06/11/25. Patient reports a surgery of RIGHT LONG , TRIGGER FINGER RELEASE (Right). Surgery occurred on 06/11/25. Diagnostic tests related to this condition: X-ray.        Dominant Hand: Right  History of Present Condition/Illness: Patient is a 73 yo right handed male who presents this date following right middle finger trigger release.  He reports he usually lives alone, but currently living with family due to finger surgery; He reports he has Left arm partial  paralysis following stroke in 8/2018 for which he was receiving therapy for previously, but now has to regain use of right hand.  He reports difficulty with using cane for balance, opening containers, dressing/bathing/grooming/hygiene, opening door knobs, etc.     Activities of Daily Living  Social history was obtained from Patient.        Patient Responsibilities: Community mobility, Meal prep, Personal ADL    Previously independent with activities of daily living? No     Reports he lives alone, but currently with family; reports he now needs assistance with bathing/hygiene, dressing.  He reports he has shower chair and straight cane at home, but no shower chair with family.     Previously independent with instrumental activities of daily living? No  Activities previously needing assistance include: Driving, Community mobility, and Grocery/shopping.      Reports he has assistance for transportation from members of [employer] or [facility] insurance. Family assisting with ADLs at this time.     Pain     Patient reports a current pain level of 6/10. Pain at best is reported as 2/10. Pain at worst is reported as 8/10.   Location: R middle finger  Clinical Progression (since onset): Stable  Pain Qualities: Sharp, Radiating, Tenderness  Pain-Relieving Factors: Activity modification, Rest, Immobilization  Pain-Aggravating Factors: Bending, Holding objects, Movement         Living Arrangements  Living Situation  Living Arrangements: Alone, Other (Comment)  Other Living Arrangements Comment: Currently living with family due to recent hand surgery.  Support Systems: Family members, [employer] community, Friends/neighbors    Equipment/Treatments  Mobility Equipment: Straight cane  Other Adaptive Equipment Comment: Shower chair at home, not available with family        Employment  Patient does not report that: Does the patient's condition impact their ability to work?  Employment Status: Retired, On disability           Past Medical History/Physical Systems Review:   Jae Millan  has a past medical history of Cataract, Coronary artery disease involving native coronary artery of native heart with angina pectoris, Decreased sensation of lower extremity, Diabetes mellitus, Diabetic neuropathy, Dysarthria, Dysphagia, ED (erectile dysfunction), Glaucoma, Hemiparesis, High cholesterol, History of CVA with residual deficit, History of hepatitis C, s/p successful Rx w/ cure (SVR12) 4/2020, Hypertension, Impaired functional mobility, balance, gait, and endurance, Pre-op testing, Pulmonary emphysema, unspecified emphysema type, Stroke, and Urinary frequency.    Jae Millan  has a past surgical history that includes Undescended testicle exploration (Right); Inguinal hernia repair (Left, 12/11/2019); Colonoscopy (N/A, 3/9/2023); Coronary angiography (N/A, 3/27/2024); stent, drug eluting, single vessel, coronary (N/A, 8/13/2024); ivus, coronary (8/13/2024); ANGIOGRAM, CORONARY, WITH LEFT HEART CATHETERIZATION (8/13/2024); Coronary angiography (N/A, 11/14/2024); stent, coronary, multi vessel (N/A, 11/14/2024); stent, drug eluting, multi vessel, coronary (11/14/2024); and Trigger finger release (Right, 6/11/2025).    Jae has a current medication list which includes the following prescription(s): amlodipine, aspirin, blood sugar diagnostic, carvedilol, clopidogrel, dorzolamide-timolol 2-0.5%, empagliflozin, ezetimibe, furosemide, gabapentin, glimepiride, hydrocortisone, januvia, lancets, losartan-hydrochlorothiazide 100-25 mg, nitroglycerin, pantoprazole, rosuvastatin, tamsulosin, and tramadol.    Review of patient's allergies indicates:  No Known Allergies     Objective      Wrist/Hand Swelling   Right   MCP Circumference 21.2 cm   Volumetry       Right Hand Digit Swelling   Proximal Phalanx PIP Middle Phalanx   Middle 8.7 cm 8.5 cm 7.5 cm        Digit 3 - Middle Finger Active Range of Motion  Right Middle Finger   Extension (deg)  Flexion (deg) Pain   MCP -15 50     PIP -20 40     DIP 0 30     DORMAN: 85       Treatment:  Therapeutic Exercise  TE 1: Blocking FDP, FDS, isolated FDS glide, hook, wave, fist, digit extension and ab/ad x 10 reps each  TE 2: Will await scar massage and modality use until stitches removed;  TE 3: Will attempt milvia taping next session due to limited ability to perform blocking exercises    Time Entry(in minutes):  OT Evaluation (Low) Time Entry: 30  Therapeutic Exercise Time Entry: 15    Assessment & Plan   Assessment  Jae presents with a condition of Low complexity.   Presentation of Symptoms: Stable  Will Comorbidities Impact Care: Yes     ADL Limitations : Bathing/showering, Dressing, Feeding, Functional mobility, Personal device care, Personal hygiene and grooming, Toileting and toilet hygiene  IADL Limitations: Community mobility, Home establishment and management, Meal preparation and cleanup  Functional Limitations: Activity tolerance, Carrying objects, Fine motor coordination, Gross motor coordination, Manipulating objects, Range of motion         Anticipated Need for Modification: Requests enlarged handouts  Evaluation/Treatment Response: Patient responded to treatment well  Patient Goal for Therapy (OT): Regain use of right hand  Prognosis: Good    Assessment Details: Patient would benefit from skilled OT services to increase hand ROM, decrease pain, swelling, inflammation, assess and address hand strength and improve functional hand use for ADL activity.     Plan  From an occupational therapy perspective, the patient would benefit from: Skilled Rehab Services    Planned therapy interventions include: Therapeutic exercise, Therapeutic activities, and Manual therapy.    Planned modalities to include: Cryotherapy (cold pack), Fluidotherapy, Paraffin bath, and Ultrasound.        Visit Frequency: 1 times Per Week for 12 Weeks.     This plan was discussed with Patient.   Discussion participants: Agreed Upon  Plan of Care    Plan details: Will initiate OT 1x week for 8-12 weeks in pursuit of OT goals listed below     The patient's spiritual, cultural, and educational needs were considered, and the patient is agreeable to the plan of care and goals.           Goals:   Active       LTGs       1. Increase  strength 2-8 lbs. For work/leisure/ADL activities          Start:  06/18/25    Expected End:  09/10/25            2. Increase 3 pt pinch strength 1-4 psi's for writing, typing, texting and FM activities        Start:  06/18/25    Expected End:  09/10/25            3. Increase ROM 11-20 degrees to increase functional hand use for ADLs/work/leisure activities        Start:  06/18/25    Expected End:  09/10/25            4.  Patient to return to living alone and be IND with home ADLs with pain of 3 or less by discharge        Start:  06/18/25    Expected End:  09/10/25               STGs       1. Patient to be IND with HEP & modalities for pain management         Start:  06/18/25    Expected End:  07/30/25            2. Increase ROM 3-10 degrees to increase functional hand use for ADLs/work/leisure activities        Start:  06/18/25    Expected End:  07/30/25            3. Decrease edema .1-.5mm to increase joint mobility/ flexibility for  functional hand use        Start:  06/18/25    Expected End:  07/30/25            4. Assess  & 3 pt pinch strength and update goals accordingly        Start:  06/18/25    Expected End:  07/30/25                Carmen Blakely, OT, CHT

## 2025-06-21 NOTE — PROGRESS NOTES
HPI     Glaucoma            Comments: HVF and OCT review          Comments    DLS: 2/17/25    1. Severe Traumatic Glaucoma OS   2. Angle Recession OS   3. APD OS  4. NS OU  5. BRVO with ME OD    MEDS:  Cosopt BID OU               Last edited by Kalpana Raza MA on 6/23/2025 12:08 PM.            Assessment /Plan     For exam results, see Encounter Report.    Traumatic glaucoma, left, severe stage  -     Porter Visual Field - OU - Extended - Both Eyes  -     Posterior Segment OCT Optic Nerve- Both eyes    Angle recession of left eye  -     Porter Visual Field - OU - Extended - Both Eyes  -     Posterior Segment OCT Optic Nerve- Both eyes    Afferent pupillary defect, left    Vitreomacular adhesion of right eye    Branch retinal vein occlusion of right eye with macular edema    Nuclear sclerotic cataract of both eyes    Blunt trauma of left eye, sequela    History of eye trauma        [facility]   Previous pt of [facility] - and was Rx for glaucoma     LOST TO F/U 9/2019 TO 8/2022 - SAW DR GARCIA 8/3/2022 AND SENT BACK OVER - PT IS OFF GTTS          Glaucoma (type and duration)    GLAUCOMA - 2/2 trauma OS (age 16)  - end stage    First HVF   2019   First photos   7/2019   Treatment / Drops started    Timolol BID OU , Azopt TID OU, Brimonidine BID OU       Stopped - ran out and did not refill            Family history    none        Glaucoma meds    cosopt os bid //  (use to use brim / azopt /timolol- os ) // off again 2/3/2023        H/O adverse rxn to glaucoma drops         LASERS    ??        GLAUCOMA SURGERIES    none        OTHER EYE SURGERIES    none        CDR    OD: 0.6 OS: 0.85        Tbase    16- 22 /18 - 23        Tmax    22/23          Ttarget    ??             HVF    4 test 2019 to 2025 - full  od --(improved w/ lid taped)  - ptosis  // 10-2 stim V - IAD     IN FUTURE TRY A 10-2 SS OS         Gonio   +4 od // recess 360 os          CCT    585/599        OCT    2  "test 2019 to 2021 - nl od // dec thru out os         Disc photos    7/2019// 6/2024     - Ttoday   14/17   - back on  cosopt -  - Test done today  IOP // HVF / DFE / OCT     2. Angle recession os    H/O blunt trauma age 16    Finger injury to the eye     3. + APD os     4. NS     5. BRVO w/ ME od    This was/is his good eye    - LAST SEEN - [facility] 10/26/2023 - IS TO f/U 6 MONTHS     6. Refractive error    Last glasses - Dr Erwin 2/2023 - has lost glasses    Refer to optometry     7/16/2019 - Pt new patient to me, used to be seen in [facility] for glaucoma. Pt ran out of medications in June 2019. Pt has no eye pain, red eye , flashes, floaters, changes in vision. PT has no hx of surgery or family hx of glaucoma.       Retina ([facility]) - evaluation of right macula - see OCT - this is his "good" eye // BRVO w/ ME od    Has seen Dr Jordan x 3 - did NOT get avastin 2/2 cardiac issues(7/27/2023)   Did NOT get ozurdex - pt defered (8/31/2023) - last seen 7/12/2024  - to F/U 6 months  - stable     1/8/2024   Pt gets confused on if he is using the copst in the right or  the left eye - told him to just use it in BOTH eyes so he does not have to keep it straight     6/23/2025  IOP 16 ou   Gonio +4 od and recession os     Dr Morejon - new glasses 11/6/2024 - doing ok     Cataracts - mild to mod - ? Vis sign    Doubt CE os will help much - adv glaucoma / angle rcession    May benefit from CE od     Refere back to [facility] - ? + V-M traction od // H/O BRVO od - this is his better eye - ? If impending hole     F/U 4 months with IOP/ gonio // cont same meds (( in future cont with 24-2 ss od and try a 10-2 ss os (the 10-2 stim V loos=ks so good) ))     "

## 2025-06-23 ENCOUNTER — OFFICE VISIT (OUTPATIENT)
Dept: OPHTHALMOLOGY | Facility: CLINIC | Age: 75
End: 2025-06-23
Payer: MEDICARE

## 2025-06-23 ENCOUNTER — TELEPHONE (OUTPATIENT)
Dept: ORTHOPEDICS | Facility: CLINIC | Age: 75
End: 2025-06-23
Payer: MEDICARE

## 2025-06-23 ENCOUNTER — CLINICAL SUPPORT (OUTPATIENT)
Dept: OPHTHALMOLOGY | Facility: CLINIC | Age: 75
End: 2025-06-23
Payer: MEDICARE

## 2025-06-23 DIAGNOSIS — H21.562 AFFERENT PUPILLARY DEFECT, LEFT: ICD-10-CM

## 2025-06-23 DIAGNOSIS — Z87.828 HISTORY OF EYE TRAUMA: ICD-10-CM

## 2025-06-23 DIAGNOSIS — H40.32X3 TRAUMATIC GLAUCOMA, LEFT, SEVERE STAGE: Primary | ICD-10-CM

## 2025-06-23 DIAGNOSIS — H21.552 ANGLE RECESSION OF LEFT EYE: ICD-10-CM

## 2025-06-23 DIAGNOSIS — H25.13 NUCLEAR SCLEROTIC CATARACT OF BOTH EYES: ICD-10-CM

## 2025-06-23 DIAGNOSIS — H34.8310 BRANCH RETINAL VEIN OCCLUSION OF RIGHT EYE WITH MACULAR EDEMA: ICD-10-CM

## 2025-06-23 DIAGNOSIS — S05.8X2S BLUNT TRAUMA OF LEFT EYE, SEQUELA: ICD-10-CM

## 2025-06-23 DIAGNOSIS — H43.821 VITREOMACULAR ADHESION OF RIGHT EYE: ICD-10-CM

## 2025-06-23 PROCEDURE — 3288F FALL RISK ASSESSMENT DOCD: CPT | Mod: CPTII,S$GLB,, | Performed by: OPHTHALMOLOGY

## 2025-06-23 PROCEDURE — 1101F PT FALLS ASSESS-DOCD LE1/YR: CPT | Mod: CPTII,S$GLB,, | Performed by: OPHTHALMOLOGY

## 2025-06-23 PROCEDURE — 3046F HEMOGLOBIN A1C LEVEL >9.0%: CPT | Mod: CPTII,S$GLB,, | Performed by: OPHTHALMOLOGY

## 2025-06-23 PROCEDURE — 99214 OFFICE O/P EST MOD 30 MIN: CPT | Mod: S$GLB,,, | Performed by: OPHTHALMOLOGY

## 2025-06-23 PROCEDURE — 92083 EXTENDED VISUAL FIELD XM: CPT | Mod: S$GLB,,, | Performed by: OPHTHALMOLOGY

## 2025-06-23 PROCEDURE — 99999 PR PBB SHADOW E&M-EST. PATIENT-LVL III: CPT | Mod: PBBFAC,,, | Performed by: OPHTHALMOLOGY

## 2025-06-23 PROCEDURE — 1160F RVW MEDS BY RX/DR IN RCRD: CPT | Mod: CPTII,S$GLB,, | Performed by: OPHTHALMOLOGY

## 2025-06-23 PROCEDURE — 1159F MED LIST DOCD IN RCRD: CPT | Mod: CPTII,S$GLB,, | Performed by: OPHTHALMOLOGY

## 2025-06-23 PROCEDURE — 92133 CPTRZD OPH DX IMG PST SGM ON: CPT | Mod: S$GLB,,, | Performed by: OPHTHALMOLOGY

## 2025-06-23 PROCEDURE — 2023F DILAT RTA XM W/O RTNOPTHY: CPT | Mod: CPTII,S$GLB,, | Performed by: OPHTHALMOLOGY

## 2025-06-23 PROCEDURE — 1126F AMNT PAIN NOTED NONE PRSNT: CPT | Mod: CPTII,S$GLB,, | Performed by: OPHTHALMOLOGY

## 2025-06-23 NOTE — PROGRESS NOTES
oct/hvf ou done/ou/rel/fix/coop.fair/patient chart checked for allergies/used pirate patch and taped both lids for testing/od +1.00 +0.75 x180 /+1.00 +1.25 x175/ej.        Assessment /Plan     For exam results, see Encounter Report.    There are no diagnoses linked to this encounter.

## 2025-06-25 ENCOUNTER — OFFICE VISIT (OUTPATIENT)
Dept: ORTHOPEDICS | Facility: CLINIC | Age: 75
End: 2025-06-25
Payer: MEDICARE

## 2025-06-25 DIAGNOSIS — Z98.890 S/P TRIGGER FINGER RELEASE: Primary | ICD-10-CM

## 2025-06-25 DIAGNOSIS — M65.30 TRIGGER FINGER, UNSPECIFIED FINGER, UNSPECIFIED LATERALITY: ICD-10-CM

## 2025-06-25 PROCEDURE — 99024 POSTOP FOLLOW-UP VISIT: CPT | Mod: S$GLB,,, | Performed by: SPECIALIST/TECHNOLOGIST

## 2025-06-25 PROCEDURE — 1159F MED LIST DOCD IN RCRD: CPT | Mod: CPTII,S$GLB,, | Performed by: SPECIALIST/TECHNOLOGIST

## 2025-06-25 PROCEDURE — 1125F AMNT PAIN NOTED PAIN PRSNT: CPT | Mod: CPTII,S$GLB,, | Performed by: SPECIALIST/TECHNOLOGIST

## 2025-06-25 PROCEDURE — 3288F FALL RISK ASSESSMENT DOCD: CPT | Mod: CPTII,S$GLB,, | Performed by: SPECIALIST/TECHNOLOGIST

## 2025-06-25 PROCEDURE — 1101F PT FALLS ASSESS-DOCD LE1/YR: CPT | Mod: CPTII,S$GLB,, | Performed by: SPECIALIST/TECHNOLOGIST

## 2025-06-25 PROCEDURE — 99999 PR PBB SHADOW E&M-EST. PATIENT-LVL III: CPT | Mod: PBBFAC,,, | Performed by: SPECIALIST/TECHNOLOGIST

## 2025-06-25 PROCEDURE — 3046F HEMOGLOBIN A1C LEVEL >9.0%: CPT | Mod: CPTII,S$GLB,, | Performed by: SPECIALIST/TECHNOLOGIST

## 2025-06-25 NOTE — PROGRESS NOTES
Mr. Millan is here today for a post-operative visit.  He is 14 days status post Right Trigger Finger Release of the long finger by Dr. Pleitez on 6/11/25. He reports that he is mild pain.  He has stiffness through the fingers. He states that he is in therapy currently. He denies fever, chills, and sweats since the time of the surgery.     Physical exam:    Vitals:    06/25/25 1421   PainSc:   6   PainLoc: Hand     Vital signs are stable, patient is afebrile.  Patient is well dressed and well groomed, no acute distress.  Alert and oriented to person, place, and time.  Post op dressing taken down.  Incision is clean, dry and intact.  There is no erythema or exudate.  There is no sign of any infection. He is NVI. Sutures removed without difficulty.  Unable to make a composite fist 2/2 to stiffness in the fingers.     Assessment:  status post Right Trigger Finger Release of the long finger        Plan:  Jae was seen today for post-op evaluation and pain.    Diagnoses and all orders for this visit:    S/P trigger finger release    Trigger finger, unspecified finger, unspecified laterality    - PO instruction reviewed and provided to patient  - Educated patient on HEP ROM of the Elbow, Wrist and Hand for 15 minutes.  - Educated patient on scar massage  - Patient will f/u in clinic in 4 weeks.     John Melendez PA-C, ATC  [facility]  [facility] [place], [place]    Disclaimer: This note has been generated using voice-recognition software. There may be typographical errors that have been missed during proof-reading.

## 2025-07-01 ENCOUNTER — TELEPHONE (OUTPATIENT)
Dept: CARDIOLOGY | Facility: CLINIC | Age: 75
End: 2025-07-01
Payer: MEDICARE

## 2025-07-01 DIAGNOSIS — R06.02 SOB (SHORTNESS OF BREATH): Primary | ICD-10-CM

## 2025-07-02 ENCOUNTER — OFFICE VISIT (OUTPATIENT)
Dept: CARDIOLOGY | Facility: CLINIC | Age: 75
End: 2025-07-02
Payer: MEDICARE

## 2025-07-02 VITALS
HEIGHT: 70 IN | HEART RATE: 67 BPM | OXYGEN SATURATION: 95 % | SYSTOLIC BLOOD PRESSURE: 104 MMHG | DIASTOLIC BLOOD PRESSURE: 63 MMHG | BODY MASS INDEX: 31.43 KG/M2 | WEIGHT: 219.56 LBS

## 2025-07-02 DIAGNOSIS — I25.119 CORONARY ARTERY DISEASE INVOLVING NATIVE CORONARY ARTERY OF NATIVE HEART WITH ANGINA PECTORIS: Primary | ICD-10-CM

## 2025-07-02 DIAGNOSIS — N18.32 STAGE 3B CHRONIC KIDNEY DISEASE: ICD-10-CM

## 2025-07-02 DIAGNOSIS — E11.40 TYPE 2 DIABETES MELLITUS WITH DIABETIC NEUROPATHY, WITHOUT LONG-TERM CURRENT USE OF INSULIN: Chronic | ICD-10-CM

## 2025-07-02 DIAGNOSIS — I10 ESSENTIAL HYPERTENSION: ICD-10-CM

## 2025-07-02 DIAGNOSIS — I69.354 HEMIPARESIS AFFECTING LEFT SIDE AS LATE EFFECT OF CEREBROVASCULAR ACCIDENT (CVA): ICD-10-CM

## 2025-07-02 DIAGNOSIS — I70.0 AORTIC ATHEROSCLEROSIS: ICD-10-CM

## 2025-07-02 DIAGNOSIS — N52.01 ERECTILE DYSFUNCTION DUE TO ARTERIAL INSUFFICIENCY: ICD-10-CM

## 2025-07-02 DIAGNOSIS — E78.2 HYPERLIPIDEMIA, MIXED: ICD-10-CM

## 2025-07-02 PROCEDURE — 3046F HEMOGLOBIN A1C LEVEL >9.0%: CPT | Mod: CPTII,S$GLB,, | Performed by: INTERNAL MEDICINE

## 2025-07-02 PROCEDURE — 3288F FALL RISK ASSESSMENT DOCD: CPT | Mod: CPTII,S$GLB,, | Performed by: INTERNAL MEDICINE

## 2025-07-02 PROCEDURE — 3078F DIAST BP <80 MM HG: CPT | Mod: CPTII,S$GLB,, | Performed by: INTERNAL MEDICINE

## 2025-07-02 PROCEDURE — 99214 OFFICE O/P EST MOD 30 MIN: CPT | Mod: S$GLB,,, | Performed by: INTERNAL MEDICINE

## 2025-07-02 PROCEDURE — 1159F MED LIST DOCD IN RCRD: CPT | Mod: CPTII,S$GLB,, | Performed by: INTERNAL MEDICINE

## 2025-07-02 PROCEDURE — 3074F SYST BP LT 130 MM HG: CPT | Mod: CPTII,S$GLB,, | Performed by: INTERNAL MEDICINE

## 2025-07-02 PROCEDURE — 3008F BODY MASS INDEX DOCD: CPT | Mod: CPTII,S$GLB,, | Performed by: INTERNAL MEDICINE

## 2025-07-02 PROCEDURE — 99999 PR PBB SHADOW E&M-EST. PATIENT-LVL V: CPT | Mod: PBBFAC,,, | Performed by: INTERNAL MEDICINE

## 2025-07-02 PROCEDURE — 1101F PT FALLS ASSESS-DOCD LE1/YR: CPT | Mod: CPTII,S$GLB,, | Performed by: INTERNAL MEDICINE

## 2025-07-02 NOTE — PROGRESS NOTES
Subjective:   Patient ID:  Jae Millan is a 74 y.o. male who presents for follow up of Shortness of Breath      HPI: Routine 7 month f/u.  A1c is still 10+.  Unclear compliance with three med regimen (Amaryl, Jardiance, Januvia).    He is doing well with no new symptoms or cardiovascular complaints and no change in exercise capacity.  He denies chest discomfort, YU, palpitations, PND/orthopnea, lightheadedness and syncope.    CT in 2024 showed mild-moderate coronary calcifications.    Last LDL near 50.      Dec 2024 HPI: Very pleasant man here to establish general care.  He's had multivessel stenting over the course of the last few months - first Dr. Paul stented his LAD and then Dr. Whittington stented his LCx and OM last month.     He has not felt better since the PCI and notes that he gets out of breath at just 1/2 block of walking, if not less.  No angina.  No lightheadedness/syncope.        Nov 2024 PCI    The Prox Cx lesion was 80% stenosed with 0% stenosis post-intervention.    The 1st Mrg lesion was 80% stenosed with 0% stenosis post-intervention.    Prox Cx lesion: A STENT SYNERGY XD [id number] stent was successfully placed at 12 [place] for 8 sec.    1st Mrg lesion: A STENT SYNERGY XD [id number] stent was successfully placed at 12 [place] for 8 sec.    The estimated blood loss was none.    The PCI was successful.    < 10 cc contrast used for procedure.        Dr. Whittington' October 2024 HPI:  Jae Millan is a 73 y.o. male s/p PCI LAD 8/2024, HTN, HLD, DM2, CVA with residual L deficits, CKD3, former smoker, who presents for follow-up after angiogram with PCI.     Chart review:  CT done in 2022 noted to have diffuse coronary calcifications. Further workup included PET stress demonstrating anterior ischemia. Cath done 3/27/2024 by Dr. Paul showed Multi-vessel CAD. Patient was turned down for CABG by CTS. PCI was planned to be staged due to CKD. Patient had staged PCI to mid-LAD by Dr. Paul on 8/13/2024. Pt had 40%  ostial LAD stenosis, and High grade ostial to proximal LCx and OM2 stenoses. Per d/c summary, plan was to do staged PCI to Lcx later due to CKD stage 4.      Today, Patient reports continued symptoms following PCI to LAD. He notes of substernal chest pressure with exertion after walking 1-2 blocks. Associated with shortness of breath. No radiation of pain. He endorses being on asa and plavix. No other acute symptoms at this time.      Problem List[1]    Current Outpatient Medications   Medication Sig    amLODIPine (NORVASC) 10 MG tablet TAKE 1 TABLET(10 MG) BY MOUTH EVERY DAY FOR HYPERTENSION    aspirin 81 MG Chew Take 1 tablet (81 mg total) by mouth once daily.    blood sugar diagnostic Strp To check BG 4 times daily, to use with insurance preferred meter    carvediloL (COREG) 6.25 MG tablet TAKE 1 TABLET(6.25 MG) BY MOUTH TWICE DAILY    clopidogreL (PLAVIX) 75 mg tablet Take 1 tablet (75 mg total) by mouth once daily.    dorzolamide-timolol 2-0.5% (COSOPT) 22.3-6.8 mg/mL ophthalmic solution Place 1 drop into both eyes 2 (two) times daily.    empagliflozin (JARDIANCE) 25 mg tablet Take 1 tablet (25 mg total) by mouth once daily.    ezetimibe (ZETIA) 10 mg tablet Take 1 tablet (10 mg total) by mouth once daily.    furosemide (LASIX) 20 MG tablet Take 1 tablet (20 mg total) by mouth once daily.    gabapentin (NEURONTIN) 300 MG capsule Take 1 capsule (300 mg total) by mouth 2 (two) times daily. In 1-2 weeks, if no relief, may increase dose to 3 times per day.    glimepiride (AMARYL) 4 MG tablet One tablet twice daily    hydrocortisone 2.5 % cream Apply topically 2 (two) times daily.    JANUVIA 50 mg Tab TAKE 1 TABLET(50 MG) BY MOUTH EVERY DAY    lancets Misc Check bg 4 times daily    losartan-hydrochlorothiazide 100-25 mg (HYZAAR) 100-25 mg per tablet Take 1 tablet by mouth once daily.    nitroGLYCERIN (NITROSTAT) 0.4 MG SL tablet Place 1 tablet (0.4 mg total) under the tongue every 5 (five) minutes as needed for  Chest pain.    pantoprazole (PROTONIX) 40 MG tablet TAKE 1 TABLET(40 MG) BY MOUTH EVERY DAY FOR STOMACH    rosuvastatin (CRESTOR) 20 MG tablet Take 1 tablet (20 mg total) by mouth once daily.    tamsulosin (FLOMAX) 0.4 mg Cap One capsule at bedtime    traMADoL (ULTRAM) 50 mg tablet Take 1 tablet (50 mg total) by mouth every 6 (six) hours as needed for Pain.     No current facility-administered medications for this visit.       ROS  The review of systems is negative except as above.     Objective:   Physical Exam  Vitals reviewed.   Constitutional:       Appearance: He is well-developed.   HENT:      Head: Normocephalic and atraumatic.   Eyes:      General: No scleral icterus.     Conjunctiva/sclera: Conjunctivae normal.   Neck:      Vascular: No JVD.   Cardiovascular:      Rate and Rhythm: Normal rate and regular rhythm.      Pulses: Intact distal pulses.      Heart sounds: Normal heart sounds. No murmur heard.     No friction rub. No gallop.   Pulmonary:      Effort: Pulmonary effort is normal.      Breath sounds: Normal breath sounds. No wheezing or rales.   Abdominal:      General: Bowel sounds are normal. There is no distension.      Palpations: Abdomen is soft.      Tenderness: There is no abdominal tenderness.   Musculoskeletal:         General: Normal range of motion.      Cervical back: Normal range of motion and neck supple.   Skin:     General: Skin is warm and dry.      Findings: No erythema or rash.   Neurological:      Mental Status: He is alert and oriented to person, place, and time.   Psychiatric:         Behavior: Behavior normal.         Thought Content: Thought content normal.         Judgment: Judgment normal.     Lab Results   Component Value Date    WBC 7.66 04/17/2025    HGB 14.1 04/17/2025    HCT 42.1 04/17/2025    MCV 81 (L) 04/17/2025     04/17/2025         Chemistry        Component Value Date/Time     04/17/2025 1352     03/14/2025 1214    K 4.0 04/17/2025 1352    K 4.3  03/14/2025 1214     04/17/2025 1352     03/14/2025 1214    CO2 26 04/17/2025 1352    CO2 26 03/14/2025 1214    BUN 35 (H) 04/17/2025 1352    CREATININE 2.2 (H) 04/17/2025 1352     (H) 04/17/2025 1352     03/14/2025 1214        Component Value Date/Time    CALCIUM 9.3 04/17/2025 1352    CALCIUM 9.4 03/14/2025 1214    ALKPHOS 70 01/27/2025 0803    AST 16 01/27/2025 0803    ALT 26 01/27/2025 0803    BILITOT 0.3 01/27/2025 0803    ESTGFRAFRICA 40.7 (A) 05/04/2022 1227    EGFRNONAA 35.2 (A) 05/04/2022 1227            Lab Results   Component Value Date    CHOL 108 (L) 01/27/2025    CHOL 137 04/18/2024    CHOL 142 03/16/2023     Lab Results   Component Value Date    HDL 34 (L) 01/27/2025    HDL 38 (L) 04/18/2024    HDL 44 03/16/2023     Lab Results   Component Value Date    LDLCALC 50.6 (L) 01/27/2025    LDLCALC 71.6 04/18/2024    LDLCALC 76.4 03/16/2023     Lab Results   Component Value Date    TRIG 117 01/27/2025    TRIG 137 04/18/2024    TRIG 108 03/16/2023     Lab Results   Component Value Date    CHOLHDL 31.5 01/27/2025    CHOLHDL 27.7 04/18/2024    CHOLHDL 31.0 03/16/2023       Lab Results   Component Value Date    TSH 2.234 01/27/2025       Lab Results   Component Value Date    HGBA1C 10.1 (H) 04/17/2025       Assessment:     1. Coronary artery disease involving native coronary artery of native heart with angina pectoris    2. Aortic atherosclerosis    3. Essential hypertension    4. Hyperlipidemia, mixed    5. Erectile dysfunction due to arterial insufficiency    6. Hemiparesis affecting left side as late effect of cerebrovascular accident (CVA)    7. Type 2 diabetes mellitus with diabetic neuropathy, without long-term current use of insulin    8. Stage 3b chronic kidney disease        Plan:     Referral to Endocrinology given continuing A1c > 10.    Continue current medicines.    Diet/exercise goals reinforced.    F/U 12 months                 [1]   Patient Active Problem List  Diagnosis     Essential hypertension    Type 2 diabetes mellitus with neurologic complication, without long-term current use of insulin    Hyperlipidemia, mixed    Impaired functional mobility, balance, gait, and endurance    Hemiparesis affecting left side as late effect of cerebrovascular accident (CVA)    Range of motion deficit    Inguinal hernia of left side without obstruction or gangrene    Hypogonadism in male    Erectile dysfunction due to arterial insufficiency    Vitamin D deficiency    Stage 3b chronic kidney disease    DM type 2 causing complication    Impaired mobility and ADLs    Absent plantar grasp reflex    Aortic atherosclerosis    Branch retinal vein occlusion of right eye with macular edema    Traumatic glaucoma, left, severe stage    Nuclear sclerotic cataract of both eyes    Severe obesity (BMI 35.0-39.9) with comorbidity    Hyperparathyroidism    Pulmonary emphysema, unspecified emphysema type    Coronary artery disease involving native coronary artery of native heart with angina pectoris    Chronic kidney disease (CKD), stage IV (severe)    BPH with urinary obstruction    Chronic diastolic heart failure    Finger pain, right

## 2025-07-03 ENCOUNTER — CLINICAL SUPPORT (OUTPATIENT)
Dept: REHABILITATION | Facility: HOSPITAL | Age: 75
End: 2025-07-03
Payer: MEDICARE

## 2025-07-03 DIAGNOSIS — M79.644 FINGER PAIN, RIGHT: Primary | ICD-10-CM

## 2025-07-03 PROCEDURE — 97140 MANUAL THERAPY 1/> REGIONS: CPT

## 2025-07-03 PROCEDURE — 97110 THERAPEUTIC EXERCISES: CPT

## 2025-07-03 PROCEDURE — 97018 PARAFFIN BATH THERAPY: CPT

## 2025-07-09 ENCOUNTER — CLINICAL SUPPORT (OUTPATIENT)
Dept: REHABILITATION | Facility: HOSPITAL | Age: 75
End: 2025-07-09
Payer: MEDICARE

## 2025-07-09 DIAGNOSIS — M79.644 FINGER PAIN, RIGHT: Primary | ICD-10-CM

## 2025-07-09 PROCEDURE — 97110 THERAPEUTIC EXERCISES: CPT

## 2025-07-09 PROCEDURE — 97018 PARAFFIN BATH THERAPY: CPT

## 2025-07-09 PROCEDURE — 97140 MANUAL THERAPY 1/> REGIONS: CPT

## 2025-07-16 ENCOUNTER — CLINICAL SUPPORT (OUTPATIENT)
Dept: REHABILITATION | Facility: HOSPITAL | Age: 75
End: 2025-07-16
Payer: MEDICARE

## 2025-07-16 DIAGNOSIS — M79.644 FINGER PAIN, RIGHT: Primary | ICD-10-CM

## 2025-07-16 PROCEDURE — 97110 THERAPEUTIC EXERCISES: CPT

## 2025-07-16 PROCEDURE — 97018 PARAFFIN BATH THERAPY: CPT

## 2025-07-16 PROCEDURE — 97140 MANUAL THERAPY 1/> REGIONS: CPT

## 2025-07-21 ENCOUNTER — TELEPHONE (OUTPATIENT)
Dept: ORTHOPEDICS | Facility: CLINIC | Age: 75
End: 2025-07-21
Payer: MEDICARE

## 2025-07-21 NOTE — PROGRESS NOTES
Outpatient Rehab    Occupational Therapy Visit    Patient Name: Jae Millan  MRN: [id number]  YOB: 1950  Encounter Date: 7/16/2025    Therapy Diagnosis:   Encounter Diagnosis   Name Primary?    Finger pain, right Yes     Physician: Tiffanie Pleitez, *    Physician Orders: Eval and Treat  Medical Diagnosis: Trigger finger, unspecified finger, unspecified laterality  Surgical Diagnosis: RIGHT LONG , TRIGGER FINGER RELEASE (Right)   Surgical Date: 6/11/2025  Days Since Last Surgery: 40    Visit # / Visits Authorized: 3 / 10  Insurance Authorization Period: 6/19/2025 to 12/31/2025  Date of Evaluation: 6/18/2025  Plan of Care Certification: 6/18/2025 to 9/10/2025      Time In: 1045   Time Out: 1130  Total Time (in minutes): 45   Total Billable Time (in minutes):  45    FOTO:  Intake Score (%): Not applicable for this Episode  Survey Score 2 (%): Not applicable for this Episode  Survey Score 3 (%): Not applicable for this Episode    Precautions:  Additional Precaution and Protocol Details: FALL RISK  Additional Precautions and Protocol Details: Fall risk - old CVA, ambulates with straight cane     Subjective   Patient with Old CVA and minimal use affected hand, reports inability to perform scar massage at home and reports he has to use his hand for daily activities.  He reports middle finger is stiff..  Pain reported as 5/10.      Objective                    Treatment:  Therapeutic Exercise  TE 1: Blocking FDP, FDS and isolated FDS glide with therapist assist; Patient only performing at home:  hook, wave, fist, digit extension and ab/ad x 10 reps each  TE 2: added thumb flexion and opposition  TE 3: wrist flex, ext, RD, UD x 10 reps each  Manual Therapy  MT 1: scar massage to A1 pulley region, and cupping to decrease adhesions,  MT 2: PROM of long finger to increase ROM, and decrease edema.  MT 3: Brief ice massage to volar finger and FDS following tx session.  Modalities  Paraffin Bath: Paraffin  bath x 10 min to  hand to increase blood flow, circulation and tissue elasticity prior to therex.    Time Entry(in minutes):  Paraffin Bath Time Entry: 10  Manual Therapy Time Entry: 15  Therapeutic Exercise Time Entry: 20    Assessment & Plan   Assessment: Generalized stiffness continues in long finger; ROM improving, but remains stiff and slightly swollen.  Will continue to progress as tolerated.  Patient continuing to use hand due to inability to use CVA affected extremity for ADL use.   Evaluation/Treatment Tolerance: Patient tolerated treatment well    The patient will continue to benefit from skilled outpatient occupational therapy in order to address the deficits listed in the problem list on the initial evaluation, provide patient and family education, and maximize the patients level of independence in the home and community environments.     The patient's spiritual, cultural, and educational needs were considered, and the patient is agreeable to the plan of care and goals.           Plan: Cont OT per tx plan    Goals:   Active       LTGs       1. Increase  strength 2-8 lbs. For work/leisure/ADL activities    (Progressing)       Start:  06/18/25    Expected End:  09/10/25            2. Increase 3 pt pinch strength 1-4 psi's for writing, typing, texting and FM activities  (Progressing)       Start:  06/18/25    Expected End:  09/10/25            3. Increase ROM 11-20 degrees to increase functional hand use for ADLs/work/leisure activities  (Progressing)       Start:  06/18/25    Expected End:  09/10/25            4.  Patient to return to living alone and be IND with home ADLs with pain of 3 or less by discharge  (Progressing)       Start:  06/18/25    Expected End:  09/10/25               STGs       1. Patient to be IND with HEP & modalities for pain management   (Progressing)       Start:  06/18/25    Expected End:  07/30/25            2. Increase ROM 3-10 degrees to increase functional hand use for  ADLs/work/leisure activities  (Progressing)       Start:  06/18/25    Expected End:  07/30/25            3. Decrease edema .1-.5mm to increase joint mobility/ flexibility for  functional hand use  (Progressing)       Start:  06/18/25    Expected End:  07/30/25            4. Assess  & 3 pt pinch strength and update goals accordingly  (Progressing)       Start:  06/18/25    Expected End:  07/30/25                Carmen Blakely, OT, CHT

## 2025-07-22 ENCOUNTER — CLINICAL SUPPORT (OUTPATIENT)
Dept: OPHTHALMOLOGY | Facility: CLINIC | Age: 75
End: 2025-07-22
Payer: MEDICARE

## 2025-07-22 ENCOUNTER — OFFICE VISIT (OUTPATIENT)
Dept: OPHTHALMOLOGY | Facility: CLINIC | Age: 75
End: 2025-07-22
Payer: MEDICARE

## 2025-07-22 DIAGNOSIS — H35.033 HYPERTENSIVE RETINOPATHY OF BOTH EYES: ICD-10-CM

## 2025-07-22 DIAGNOSIS — H21.552 ANGLE RECESSION OF LEFT EYE: ICD-10-CM

## 2025-07-22 DIAGNOSIS — H21.562 AFFERENT PUPILLARY DEFECT, LEFT: ICD-10-CM

## 2025-07-22 DIAGNOSIS — H40.32X3 TRAUMATIC GLAUCOMA, LEFT, SEVERE STAGE: ICD-10-CM

## 2025-07-22 DIAGNOSIS — H25.13 NUCLEAR SCLEROTIC CATARACT OF BOTH EYES: ICD-10-CM

## 2025-07-22 DIAGNOSIS — H34.8310 BRANCH RETINAL VEIN OCCLUSION OF RIGHT EYE WITH MACULAR EDEMA: Primary | ICD-10-CM

## 2025-07-22 PROCEDURE — G2211 COMPLEX E/M VISIT ADD ON: HCPCS | Mod: S$GLB,,, | Performed by: OPHTHALMOLOGY

## 2025-07-22 PROCEDURE — 3046F HEMOGLOBIN A1C LEVEL >9.0%: CPT | Mod: CPTII,S$GLB,, | Performed by: OPHTHALMOLOGY

## 2025-07-22 PROCEDURE — 2023F DILAT RTA XM W/O RTNOPTHY: CPT | Mod: CPTII,S$GLB,, | Performed by: OPHTHALMOLOGY

## 2025-07-22 PROCEDURE — 92134 CPTRZ OPH DX IMG PST SGM RTA: CPT | Mod: S$GLB,,, | Performed by: OPHTHALMOLOGY

## 2025-07-22 PROCEDURE — 92202 OPSCPY EXTND ON/MAC DRAW: CPT | Mod: S$GLB,,, | Performed by: OPHTHALMOLOGY

## 2025-07-22 PROCEDURE — 3288F FALL RISK ASSESSMENT DOCD: CPT | Mod: CPTII,S$GLB,, | Performed by: OPHTHALMOLOGY

## 2025-07-22 PROCEDURE — 1160F RVW MEDS BY RX/DR IN RCRD: CPT | Mod: CPTII,S$GLB,, | Performed by: OPHTHALMOLOGY

## 2025-07-22 PROCEDURE — 1101F PT FALLS ASSESS-DOCD LE1/YR: CPT | Mod: CPTII,S$GLB,, | Performed by: OPHTHALMOLOGY

## 2025-07-22 PROCEDURE — 99999 PR PBB SHADOW E&M-EST. PATIENT-LVL III: CPT | Mod: PBBFAC,,, | Performed by: OPHTHALMOLOGY

## 2025-07-22 PROCEDURE — 1126F AMNT PAIN NOTED NONE PRSNT: CPT | Mod: CPTII,S$GLB,, | Performed by: OPHTHALMOLOGY

## 2025-07-22 PROCEDURE — 99214 OFFICE O/P EST MOD 30 MIN: CPT | Mod: S$GLB,,, | Performed by: OPHTHALMOLOGY

## 2025-07-22 PROCEDURE — 1159F MED LIST DOCD IN RCRD: CPT | Mod: CPTII,S$GLB,, | Performed by: OPHTHALMOLOGY

## 2025-07-22 RX ORDER — PREDNISOLONE ACETATE 10 MG/ML
1 SUSPENSION/ DROPS OPHTHALMIC 4 TIMES DAILY
Qty: 5 ML | Refills: 3 | Status: SHIPPED | OUTPATIENT
Start: 2025-07-22 | End: 2026-07-22

## 2025-07-22 RX ORDER — KETOROLAC TROMETHAMINE 5 MG/ML
1 SOLUTION OPHTHALMIC 4 TIMES DAILY
Qty: 5 ML | Refills: 3 | Status: SHIPPED | OUTPATIENT
Start: 2025-07-22 | End: 2026-07-22

## 2025-07-22 NOTE — PROGRESS NOTES
HPI    Pt is here for a 6 month DFE/OCT    Pt states no eye pain or discomfort. No flashes, but occasionally sees   floaters. States that vision becomes blurred when looking into lights. No   other complaints.  Last edited by Paulino Workman MA on 7/22/2025  9:40 AM.         A/P    ICD-10-CM ICD-9-CM   1. Branch retinal vein occlusion of right eye with macular edema  H34.8310 362.36     362.83   2. Traumatic glaucoma, left, severe stage  H40.32X3 365.65     365.73   3. Angle recession of left eye  H21.552 364.77   4. Afferent pupillary defect, left  H21.562 364.75   5. Nuclear sclerotic cataract of both eyes  H25.13 366.16   6. Hypertensive retinopathy of both eyes  H35.033 362.11       1. Branch retinal vein occlusion of right eye with macular edema  Here for CME/ f/u, over due  Hx glauc OS    Exam notable for small tertiary BRVO OD with worsened IRF, VA 20/60 (was 20/40) we tried PF/Ket in past , pt did not want injections in past     Plan: discussed option of antiVEGF given worsened IRF, pt wishes to wait, will try PF/Ket QID and recheck 1 mo       Visit today is associated with current or anticipated ongoing medical care related to this patients single serious condition/complex condition (retinal vein occlusion)       2. Traumatic glaucoma, left, severe stage  3. Angle recession of left eye  4. Afferent pupillary defect, left  Remote hx of trauma in 1960s (finger to eye)  Nerve pallor  IOP 20/21  today, f/b Dr. Chew - Recent notes reviewed   Plan: Continue cosopt BID OU     5. Nuclear sclerotic cataract of both eyes  Mild NS, borderline VS OD given monocular  Plan: Observation for now, consider CEIOL in future    6. Hypertensive retinopathy of both eyes  PCP Maryjane Farias MD - Recent notes reviewed  04/17/2025  10.1  A1C    Significant cardiac hx with stent placement, hollenhorst plaque, had carotid last year  Plan: Observation for now, counseled on CVA risk and continued f/u with  cardiology  Recommend good blood pressure control, tight blood glucose control, and good cholesterol control           RTC 1 mo DFE/OCTm OU, poss Avastin OD  RTC [facility] as scheduled      I saw and examined the patient and reviewed in detail the findings documented. The final examination findings, image interpretations, and plan as documented in the record represent my personal judgment and conclusions.    Titi Jordan MD  Vitreoretinal Surgery   [facility]

## 2025-07-23 ENCOUNTER — OFFICE VISIT (OUTPATIENT)
Dept: ORTHOPEDICS | Facility: CLINIC | Age: 75
End: 2025-07-23
Payer: MEDICARE

## 2025-07-23 ENCOUNTER — CLINICAL SUPPORT (OUTPATIENT)
Dept: REHABILITATION | Facility: HOSPITAL | Age: 75
End: 2025-07-23
Payer: MEDICARE

## 2025-07-23 DIAGNOSIS — Z98.890 S/P TRIGGER FINGER RELEASE: Primary | ICD-10-CM

## 2025-07-23 DIAGNOSIS — M79.644 FINGER PAIN, RIGHT: Primary | ICD-10-CM

## 2025-07-23 PROBLEM — Z78.9 IMPAIRED MOBILITY AND ADLS: Status: RESOLVED | Noted: 2022-09-15 | Resolved: 2025-07-23

## 2025-07-23 PROBLEM — Z74.09 IMPAIRED MOBILITY AND ADLS: Status: RESOLVED | Noted: 2022-09-15 | Resolved: 2025-07-23

## 2025-07-23 PROBLEM — I69.354 HEMIPARESIS AFFECTING LEFT SIDE AS LATE EFFECT OF CEREBROVASCULAR ACCIDENT (CVA): Status: RESOLVED | Noted: 2019-06-06 | Resolved: 2025-07-23

## 2025-07-23 PROCEDURE — 97110 THERAPEUTIC EXERCISES: CPT

## 2025-07-23 PROCEDURE — 97140 MANUAL THERAPY 1/> REGIONS: CPT

## 2025-07-23 PROCEDURE — 97018 PARAFFIN BATH THERAPY: CPT

## 2025-07-23 PROCEDURE — 99999 PR PBB SHADOW E&M-EST. PATIENT-LVL III: CPT | Mod: PBBFAC,,, | Performed by: SPECIALIST/TECHNOLOGIST

## 2025-07-23 NOTE — PROGRESS NOTES
Outpatient Rehab    Occupational Therapy Visit    Patient Name: Jae Millan  MRN: [id number]  YOB: 1950  Encounter Date: 7/23/2025    Therapy Diagnosis:   Encounter Diagnosis   Name Primary?    Finger pain, right Yes     Physician: Tiffanie Pleitez, *    Physician Orders: Eval and Treat  Medical Diagnosis: Trigger finger, unspecified finger, unspecified laterality  Surgical Diagnosis: RIGHT LONG , TRIGGER FINGER RELEASE (Right)   Surgical Date: 6/11/2025  Days Since Last Surgery: 42    Visit # / Visits Authorized: 4 / 10  Insurance Authorization Period: 6/19/2025 to 12/31/2025  Date of Evaluation: 6/18/2025  Plan of Care Certification: 6/18/2025 to 9/10/2025      Time In: 1045   Time Out: 1130  Total Time (in minutes): 45   Total Billable Time (in minutes):  45    FOTO:  Intake Score (%): Not applicable for this Episode  Survey Score 2 (%): Not applicable for this Episode  Survey Score 3 (%): Not applicable for this Episode    Precautions:  Additional Precaution and Protocol Details: FALL RISK  Additional Precautions and Protocol Details: Fall risk - old CVA, ambulates with straight cane     Subjective   told PA about stiffness, but that's normal he said, He thinks I should continue with therapy.  Pain still 6 out of 10. We'll schedule more next week so I can set up transportation..  Pain reported as 6/10.      Objective        Right  Strength  Right Hand Dynamometer Position: 2  Elbow Position Forearm Position Trial 1 (lbs) Trial 2  (lbs) Trial 3  (lbs) Average  (lbs) Pain   Flexed Neutral 25 25 25 25         Right Pinch Strength   Trial 1 (lbs) Trial 2 (lbs) Trial 3 (lbs) Average (lbs) Pain   Lateral (Key Pinch)             Three Point (Three Jaw Lucio) 9 9 9 9     Two Point (Tip to Tip)                   /Pinch Details  Unable to assess left due to old CVA             Treatment:  Therapeutic Exercise  TE 1: Blocking FDP, FDS and isolated FDS glide with therapist assist; Patient only  performing at home:  hook, wave, fist, digit extension and ab/ad x 10 reps each  TE 2: added thumb flexion and opposition  TE 3: wrist flex, ext, RD, UD x 10 reps each  TE 4: red digi flex for gross , 20 lbs. PHG and 4# green clothespin with pom poms  TE 5: Provided pink and green sponges for HEP.  Manual Therapy  MT 1: scar and DTM massage to A1 pulley region, and cupping to decrease adhesions,  MT 2: Mobilization of PIP joints and PROM of long finger to increase ROM, and decrease edema.  MT 3: fabricated edema sleeve for home use  Modalities  Paraffin Bath: Paraffin bath x 10 min to  hand to increase blood flow, circulation and tissue elasticity prior to therex.    Time Entry(in minutes):  Paraffin Bath Time Entry: 10  Manual Therapy Time Entry: 15  Therapeutic Exercise Time Entry: 20    Assessment & Plan   Assessment: ROM improving; mild adhesions and PIP joint stiffness and pain remain.  Will progress with  and 3 pt pinch as tolerated. Baseline /3 pt pinch assessed this date.   Evaluation/Treatment Tolerance: Patient tolerated treatment well    The patient will continue to benefit from skilled outpatient occupational therapy in order to address the deficits listed in the problem list on the initial evaluation, provide patient and family education, and maximize the patients level of independence in the home and community environments.     The patient's spiritual, cultural, and educational needs were considered, and the patient is agreeable to the plan of care and goals.           Plan: Cont OT per tx plan    Goals:   Active       LTGs       1. Increase  strength 2-8 lbs. For work/leisure/ADL activities    (Progressing)       Start:  06/18/25    Expected End:  09/10/25            2. Increase 3 pt pinch strength 1-4 psi's for writing, typing, texting and FM activities  (Progressing)       Start:  06/18/25    Expected End:  09/10/25            3. Increase ROM 11-20 degrees to increase functional  hand use for ADLs/work/leisure activities  (Progressing)       Start:  06/18/25    Expected End:  09/10/25            4.  Patient to return to living alone and be IND with home ADLs with pain of 3 or less by discharge  (Progressing)       Start:  06/18/25    Expected End:  09/10/25               STGs       1. Patient to be IND with HEP & modalities for pain management   (Met)       Start:  06/18/25    Expected End:  07/30/25    Resolved:  07/23/25         2. Increase ROM 3-10 degrees to increase functional hand use for ADLs/work/leisure activities  (Progressing)       Start:  06/18/25    Expected End:  07/30/25            3. Decrease edema .1-.5mm to increase joint mobility/ flexibility for  functional hand use  (Progressing)       Start:  06/18/25    Expected End:  07/30/25            4. Assess  & 3 pt pinch strength and update goals accordingly  (Met)       Start:  06/18/25    Expected End:  07/30/25    Resolved:  07/23/25             Carmen Blakely, OT, CHT

## 2025-07-23 NOTE — PROGRESS NOTES
7/23/25  Patient reports 6 weeks status post right trigger finger release of the long finger.  He reports he is in moderate pain.  He does have difficulty performing scar massage as well as doing his exercises at home due to having paralysis of the left hand.    6/25/25  Mr. Millan is here today for a post-operative visit.  He is 14 days status post Right Trigger Finger Release of the long finger by Dr. Pleitez on 6/11/25. He reports that he is mild pain.  He has stiffness through the fingers. He states that he is in therapy currently. He denies fever, chills, and sweats since the time of the surgery.     Physical exam:    Vitals:    07/23/25 0903   PainSc:   6   PainLoc: Hand     Vital signs are stable, patient is afebrile.  Patient is well dressed and well groomed, no acute distress.  Alert and oriented to person, place, and time.  Incision is clean, dry and intact.  There is no erythema or exudate.  There is no sign of any infection. He is NVI.  Able to make a loose composite fist.  Hypertrophic scar noted over the incision site.    Assessment:  status post Right Trigger Finger Release of the long finger        Plan:  Jae was seen today for follow-up, swelling and pain.    Diagnoses and all orders for this visit:    S/P trigger finger release        Educated patient on scar massage to be performed using external device that has stationary such as his cane or the edge of a table.  Continue with therapy for range of motion as well as scar massage   Patient we will follow up in 6 weeks if symptoms are not improving or worsening.    John Melendez PA-C, [facility]  [facility] [place], [place]    Disclaimer: This note has been generated using voice-recognition software. There may be typographical errors that have been missed during proof-reading.

## 2025-07-24 NOTE — PROGRESS NOTES
Outpatient Rehab    Occupational Therapy Visit    Patient Name: aJe Millan  MRN: [id number]  YOB: 1950  Encounter Date: 7/3/2025    Therapy Diagnosis:   Encounter Diagnosis   Name Primary?    Finger pain, right Yes     Physician: Tiffanie Pleitez, *    Physician Orders: Eval and Treat  Medical Diagnosis: Trigger finger, unspecified finger, unspecified laterality  Surgical Diagnosis: RIGHT LONG , TRIGGER FINGER RELEASE (Right)   Surgical Date: 6/11/2025  Days Since Last Surgery: 42    Visit # / Visits Authorized: 4 / 10  Insurance Authorization Period: 6/19/2025 to 12/31/2025  Date of Evaluation: 6/18/2025  Plan of Care Certification: 6/18/2025 to 9/10/2025      Time In: 1030   Time Out: 1115  Total Time (in minutes): 45   Total Billable Time (in minutes): 45    FOTO:  Intake Score (%): Not applicable for this Episode  Survey Score 2 (%): Not applicable for this Episode  Survey Score 3 (%): Not applicable for this Episode    Precautions:  Additional Precaution and Protocol Details: FALL RISK  Additional Precautions and Protocol Details: Fall risk - old CVA, ambulates with straight cane     Subjective   Pt continues to complaint of signifinct stiffness and swelling..         Objective            Treatment:  Therapeutic Exercise  TE 1: Blocking FDP, FDS and isolated FDS glide with therapist assist; Patient only performing at home:  hook, wave, fist, digit extension and ab/ad x 10 reps each  TE 2: added thumb flexion and opposition  TE 3: wrist flex, ext, RD, UD x 10 reps each  TE 4: red digi flex for gross , 20 lbs. PHG and 4# green clothespin with pom poms  Modalities  Paraffin Bath: x10 minutes - For improved circulation and increased tissue flexibility prior to exercises    Time Entry(in minutes):  Paraffin Bath Time Entry: 10  Manual Therapy Time Entry: 15  Therapeutic Exercise Time Entry: 20    Assessment & Plan   Assessment: Pt presents with improving edema at this time. Pain and  stiffness continue, however ROM is noted to improve. Plan to progress as tolerated.        The patient will continue to benefit from skilled outpatient occupational therapy in order to address the deficits listed in the problem list on the initial evaluation, provide patient and family education, and maximize the patients level of independence in the home and community environments.     The patient's spiritual, cultural, and educational needs were considered, and the patient is agreeable to the plan of care and goals.           Plan: Cont OT per tx plan    Goals:   Active       LTGs       1. Increase  strength 2-8 lbs. For work/leisure/ADL activities    (Progressing)       Start:  06/18/25    Expected End:  09/10/25            2. Increase 3 pt pinch strength 1-4 psi's for writing, typing, texting and FM activities  (Progressing)       Start:  06/18/25    Expected End:  09/10/25            3. Increase ROM 11-20 degrees to increase functional hand use for ADLs/work/leisure activities  (Progressing)       Start:  06/18/25    Expected End:  09/10/25            4.  Patient to return to living alone and be IND with home ADLs with pain of 3 or less by discharge  (Progressing)       Start:  06/18/25    Expected End:  09/10/25               STGs       1. Patient to be IND with HEP & modalities for pain management   (Met)       Start:  06/18/25    Expected End:  07/30/25    Resolved:  07/23/25         2. Increase ROM 3-10 degrees to increase functional hand use for ADLs/work/leisure activities  (Progressing)       Start:  06/18/25    Expected End:  07/30/25            3. Decrease edema .1-.5mm to increase joint mobility/ flexibility for  functional hand use  (Progressing)       Start:  06/18/25    Expected End:  07/30/25            4. Assess  & 3 pt pinch strength and update goals accordingly  (Met)       Start:  06/18/25    Expected End:  07/30/25    Resolved:  07/23/25             Chey Stanford OT

## 2025-07-24 NOTE — PROGRESS NOTES
Outpatient Rehab    Occupational Therapy Visit    Patient Name: Jae Millan  MRN: [id number]  YOB: 1950  Encounter Date: 7/9/2025    Therapy Diagnosis:   Encounter Diagnosis   Name Primary?    Finger pain, right Yes     Physician: Tiffanie Pleitez, *    Physician Orders: Eval and Treat  Medical Diagnosis: Trigger finger, unspecified finger, unspecified laterality  Surgical Diagnosis: RIGHT LONG , TRIGGER FINGER RELEASE (Right)   Surgical Date: 6/11/2025  Days Since Last Surgery: 43    Visit # / Visits Authorized: 4 / 10  Insurance Authorization Period: 6/19/2025 to 12/31/2025  Date of Evaluation: 6/18/2025  Plan of Care Certification: 6/18/2025 to 9/10/2025      Time In: 1045   Time Out: 1130  Total Time (in minutes): 45   Total Billable Time (in minutes): 45    FOTO:  Intake Score (%): Not applicable for this Episode  Survey Score 2 (%): Not applicable for this Episode  Survey Score 3 (%): Not applicable for this Episode    Precautions:  Additional Precaution and Protocol Details: FALL RISK  Additional Precautions and Protocol Details: Fall risk - old CVA, ambulates with straight cane     Subjective   Decreased edema noted at this time. Stiffness remains present..         Objective            Treatment:  Therapeutic Exercise  TE 1: Blocking FDP, FDS and isolated FDS glide with therapist assist; Patient only performing at home:  hook, wave, fist, digit extension and ab/ad x 10 reps each  TE 2: added thumb flexion and opposition  TE 3: wrist flex, ext, RD, UD x 10 reps each  TE 4: red digi flex for gross , 20 lbs. PHG and 4# green clothespin with pom poms  Manual Therapy  MT 1: scar and DTM massage to A1 pulley region, and cupping to decrease adhesions,  MT 2: Mobilization of PIP joints and PROM of long finger to increase ROM, and decrease edema.  Modalities  Paraffin Bath: x10 minutes - For improved circulation and increased tissue flexibility prior to exercises    Time Entry(in  minutes):  Paraffin Bath Time Entry: 10  Manual Therapy Time Entry: 15  Therapeutic Exercise Time Entry: 20    Assessment & Plan   Assessment: Pt continues to make slow progress in ROM. He demo's mild guarding and resistance to ROM due to pain at this time. Plan to progress with light compression and consider ktape for edema. Progress as tolerated.        The patient will continue to benefit from skilled outpatient occupational therapy in order to address the deficits listed in the problem list on the initial evaluation, provide patient and family education, and maximize the patients level of independence in the home and community environments.     The patient's spiritual, cultural, and educational needs were considered, and the patient is agreeable to the plan of care and goals.           Plan: Cont OT per tx plan    Goals:   Active       LTGs       1. Increase  strength 2-8 lbs. For work/leisure/ADL activities    (Progressing)       Start:  06/18/25    Expected End:  09/10/25            2. Increase 3 pt pinch strength 1-4 psi's for writing, typing, texting and FM activities  (Progressing)       Start:  06/18/25    Expected End:  09/10/25            3. Increase ROM 11-20 degrees to increase functional hand use for ADLs/work/leisure activities  (Progressing)       Start:  06/18/25    Expected End:  09/10/25            4.  Patient to return to living alone and be IND with home ADLs with pain of 3 or less by discharge  (Progressing)       Start:  06/18/25    Expected End:  09/10/25               STGs       1. Patient to be IND with HEP & modalities for pain management   (Met)       Start:  06/18/25    Expected End:  07/30/25    Resolved:  07/23/25         2. Increase ROM 3-10 degrees to increase functional hand use for ADLs/work/leisure activities  (Progressing)       Start:  06/18/25    Expected End:  07/30/25            3. Decrease edema .1-.5mm to increase joint mobility/ flexibility for  functional hand use   (Progressing)       Start:  06/18/25    Expected End:  07/30/25            4. Assess  & 3 pt pinch strength and update goals accordingly  (Met)       Start:  06/18/25    Expected End:  07/30/25    Resolved:  07/23/25             Chey Stanford OT

## 2025-08-06 ENCOUNTER — CLINICAL SUPPORT (OUTPATIENT)
Dept: REHABILITATION | Facility: HOSPITAL | Age: 75
End: 2025-08-06
Payer: MEDICARE

## 2025-08-06 DIAGNOSIS — M79.644 FINGER PAIN, RIGHT: Primary | ICD-10-CM

## 2025-08-06 PROCEDURE — 97110 THERAPEUTIC EXERCISES: CPT

## 2025-08-06 PROCEDURE — 97140 MANUAL THERAPY 1/> REGIONS: CPT

## 2025-08-06 PROCEDURE — 97018 PARAFFIN BATH THERAPY: CPT

## 2025-08-13 ENCOUNTER — CLINICAL SUPPORT (OUTPATIENT)
Dept: REHABILITATION | Facility: HOSPITAL | Age: 75
End: 2025-08-13
Payer: MEDICARE

## 2025-08-13 DIAGNOSIS — M79.644 FINGER PAIN, RIGHT: Primary | ICD-10-CM

## 2025-08-13 PROCEDURE — 97140 MANUAL THERAPY 1/> REGIONS: CPT | Performed by: OCCUPATIONAL THERAPIST

## 2025-08-13 PROCEDURE — 97110 THERAPEUTIC EXERCISES: CPT | Performed by: OCCUPATIONAL THERAPIST

## 2025-08-27 ENCOUNTER — CLINICAL SUPPORT (OUTPATIENT)
Dept: REHABILITATION | Facility: HOSPITAL | Age: 75
End: 2025-08-27
Payer: MEDICARE

## 2025-08-27 DIAGNOSIS — M79.644 FINGER PAIN, RIGHT: Primary | ICD-10-CM

## 2025-08-27 PROCEDURE — 97110 THERAPEUTIC EXERCISES: CPT

## 2025-08-27 PROCEDURE — 97140 MANUAL THERAPY 1/> REGIONS: CPT

## 2025-08-27 PROCEDURE — 97018 PARAFFIN BATH THERAPY: CPT

## 2025-08-29 ENCOUNTER — TELEPHONE (OUTPATIENT)
Dept: ORTHOPEDICS | Facility: CLINIC | Age: 75
End: 2025-08-29
Payer: MEDICARE

## 2025-09-03 ENCOUNTER — OFFICE VISIT (OUTPATIENT)
Dept: ORTHOPEDICS | Facility: CLINIC | Age: 75
End: 2025-09-03
Payer: MEDICARE

## 2025-09-03 ENCOUNTER — CLINICAL SUPPORT (OUTPATIENT)
Dept: REHABILITATION | Facility: HOSPITAL | Age: 75
End: 2025-09-03
Payer: MEDICARE

## 2025-09-03 DIAGNOSIS — M79.644 FINGER PAIN, RIGHT: Primary | ICD-10-CM

## 2025-09-03 DIAGNOSIS — Z98.890 S/P TRIGGER FINGER RELEASE: Primary | ICD-10-CM

## 2025-09-03 PROCEDURE — 97140 MANUAL THERAPY 1/> REGIONS: CPT

## 2025-09-03 PROCEDURE — 99999 PR PBB SHADOW E&M-EST. PATIENT-LVL III: CPT | Mod: PBBFAC,,, | Performed by: SPECIALIST/TECHNOLOGIST

## 2025-09-03 PROCEDURE — 97110 THERAPEUTIC EXERCISES: CPT

## 2025-09-03 PROCEDURE — 97018 PARAFFIN BATH THERAPY: CPT

## (undated) DEVICE — TRANSDUCER ADULT DISP

## (undated) DEVICE — GUIDE LAUNCHER 6FR EBU 3.5

## (undated) DEVICE — CATH IMPULSE 5F 100CM FR4

## (undated) DEVICE — CATH EAGLE EYE PLATINUM

## (undated) DEVICE — SYR B-D DISP CONTROL 10CC100/C

## (undated) DEVICE — GLOVE BIOGEL PI MICRO INDIC 7

## (undated) DEVICE — KIT CO-PILOT

## (undated) DEVICE — PAD DEFIB CADENCE ADULT R2

## (undated) DEVICE — GUIDE LAUNCH 6FR AL 1.0

## (undated) DEVICE — SPIKE SHORT LG BORE 1-WAY 2IN

## (undated) DEVICE — KIT GLIDESHEATH SLEND 6FR 10CM

## (undated) DEVICE — TUBE CONTRAST SQUEEZE

## (undated) DEVICE — HEMOSTAT VASC BAND LONG 27CM

## (undated) DEVICE — DRESSING N ADH OIL EMUL 3X3

## (undated) DEVICE — GUIDE WIRE BMW 014 X190

## (undated) DEVICE — SOL POVIDONE SCRUB IODINE 4 OZ

## (undated) DEVICE — CATH RADIAL TIG 6FR 4.0 110CM

## (undated) DEVICE — KIT CUSTOM MANIFOLD

## (undated) DEVICE — TRAY CATH LAB OMC

## (undated) DEVICE — APPLICATOR CHLORAPREP ORN 26ML

## (undated) DEVICE — COVER PROBE US 5.5X58L NON LTX

## (undated) DEVICE — CATH BLLN WOLVERINE 15X3MM

## (undated) DEVICE — GUIDEWIRE EMERALD 150CM PTFE

## (undated) DEVICE — KIT MICROINTRO 4F .018X40X7CM

## (undated) DEVICE — CATH EMERGE MR 20 X 3.00

## (undated) DEVICE — INFLATOR ENCORE 26 BLLN INFL

## (undated) DEVICE — CORD BIPOLAR 12 FOOT

## (undated) DEVICE — CATH DXTERITY AL20 100CM 6FR

## (undated) DEVICE — GLOVE BIOGEL ECLIPSE SZ 7

## (undated) DEVICE — GUIDEWIRE EMERALD .035IN 260CM

## (undated) DEVICE — BANDAGE MATRIX HK LOOP 2IN 5YD

## (undated) DEVICE — SHEATH INTRODUCER 6FR 11CM

## (undated) DEVICE — DRAPE STERI-DRAPE 1000 17X11IN

## (undated) DEVICE — OMNIPAQUE CONTRAST 350MG/100ML

## (undated) DEVICE — BANDAGE BULKEE LITE 3INX4.1YD

## (undated) DEVICE — PACK UPPER EXTREMITY BAPTIST

## (undated) DEVICE — CUFF TOURNIQUET DL PRT

## (undated) DEVICE — SOL IRR SOD CHL .9% POUR

## (undated) DEVICE — DRAPE ANGIO BRACH 38X44IN

## (undated) DEVICE — DEVICE PERCLOSE SUT CLSR 6FR

## (undated) DEVICE — NDL HYPO STD REG BVL 22GX1.5IN

## (undated) DEVICE — OMNIPAQUE 350MG 150ML VIAL

## (undated) DEVICE — CATH EMERGE MR 15 X 3.00

## (undated) DEVICE — SUT 4/0 18IN ETHILON BL P3

## (undated) DEVICE — UNDERPAD ULTRASORB 300LB 30X36

## (undated) DEVICE — FORCEP STRAIGHT DISP

## (undated) DEVICE — DRAPE FEMORAL 82 X 124 IN

## (undated) DEVICE — SPONGE COTTON TRAY 4X4IN

## (undated) DEVICE — KIT COPILOT VALVE HEMO TOOL

## (undated) DEVICE — INTRODUCER CATH 6F 11CM